# Patient Record
Sex: FEMALE | Race: WHITE | HISPANIC OR LATINO | URBAN - METROPOLITAN AREA
[De-identification: names, ages, dates, MRNs, and addresses within clinical notes are randomized per-mention and may not be internally consistent; named-entity substitution may affect disease eponyms.]

---

## 2023-05-18 ENCOUNTER — EMERGENCY (EMERGENCY)
Facility: HOSPITAL | Age: 58
LOS: 1 days | Discharge: HOME | End: 2023-05-18
Attending: STUDENT IN AN ORGANIZED HEALTH CARE EDUCATION/TRAINING PROGRAM | Admitting: STUDENT IN AN ORGANIZED HEALTH CARE EDUCATION/TRAINING PROGRAM
Payer: MEDICAID

## 2023-05-18 VITALS
DIASTOLIC BLOOD PRESSURE: 92 MMHG | SYSTOLIC BLOOD PRESSURE: 137 MMHG | TEMPERATURE: 97 F | RESPIRATION RATE: 18 BRPM | HEART RATE: 104 BPM | OXYGEN SATURATION: 97 %

## 2023-05-18 LAB
ALBUMIN SERPL ELPH-MCNC: 3.7 G/DL — SIGNIFICANT CHANGE UP (ref 3.3–5)
ALP SERPL-CCNC: 102 U/L — SIGNIFICANT CHANGE UP (ref 40–120)
ALT FLD-CCNC: 9 U/L — SIGNIFICANT CHANGE UP (ref 4–33)
ANION GAP SERPL CALC-SCNC: 13 MMOL/L — SIGNIFICANT CHANGE UP (ref 7–14)
APTT BLD: 31.1 SEC — SIGNIFICANT CHANGE UP (ref 27–36.3)
AST SERPL-CCNC: 27 U/L — SIGNIFICANT CHANGE UP (ref 4–32)
BASOPHILS # BLD AUTO: 0.12 K/UL — SIGNIFICANT CHANGE UP (ref 0–0.2)
BASOPHILS NFR BLD AUTO: 0.8 % — SIGNIFICANT CHANGE UP (ref 0–2)
BILIRUB SERPL-MCNC: 0.2 MG/DL — SIGNIFICANT CHANGE UP (ref 0.2–1.2)
BUN SERPL-MCNC: 11 MG/DL — SIGNIFICANT CHANGE UP (ref 7–23)
CALCIUM SERPL-MCNC: 9.4 MG/DL — SIGNIFICANT CHANGE UP (ref 8.4–10.5)
CHLORIDE SERPL-SCNC: 109 MMOL/L — HIGH (ref 98–107)
CO2 SERPL-SCNC: 18 MMOL/L — LOW (ref 22–31)
CREAT SERPL-MCNC: 0.57 MG/DL — SIGNIFICANT CHANGE UP (ref 0.5–1.3)
EGFR: 105 ML/MIN/1.73M2 — SIGNIFICANT CHANGE UP
EOSINOPHIL # BLD AUTO: 0.64 K/UL — HIGH (ref 0–0.5)
EOSINOPHIL NFR BLD AUTO: 4.4 % — SIGNIFICANT CHANGE UP (ref 0–6)
GLUCOSE SERPL-MCNC: 106 MG/DL — HIGH (ref 70–99)
HCG SERPL-ACNC: 7.4 MIU/ML — SIGNIFICANT CHANGE UP
HCT VFR BLD CALC: 43.2 % — SIGNIFICANT CHANGE UP (ref 34.5–45)
HGB BLD-MCNC: 13.2 G/DL — SIGNIFICANT CHANGE UP (ref 11.5–15.5)
IANC: 11.02 K/UL — HIGH (ref 1.8–7.4)
IMM GRANULOCYTES NFR BLD AUTO: 0.3 % — SIGNIFICANT CHANGE UP (ref 0–0.9)
INR BLD: 1.15 RATIO — SIGNIFICANT CHANGE UP (ref 0.88–1.16)
LYMPHOCYTES # BLD AUTO: 1.98 K/UL — SIGNIFICANT CHANGE UP (ref 1–3.3)
LYMPHOCYTES # BLD AUTO: 13.6 % — SIGNIFICANT CHANGE UP (ref 13–44)
MCHC RBC-ENTMCNC: 26.5 PG — LOW (ref 27–34)
MCHC RBC-ENTMCNC: 30.6 GM/DL — LOW (ref 32–36)
MCV RBC AUTO: 86.7 FL — SIGNIFICANT CHANGE UP (ref 80–100)
MONOCYTES # BLD AUTO: 0.79 K/UL — SIGNIFICANT CHANGE UP (ref 0–0.9)
MONOCYTES NFR BLD AUTO: 5.4 % — SIGNIFICANT CHANGE UP (ref 2–14)
NEUTROPHILS # BLD AUTO: 11.02 K/UL — HIGH (ref 1.8–7.4)
NEUTROPHILS NFR BLD AUTO: 75.5 % — SIGNIFICANT CHANGE UP (ref 43–77)
NRBC # BLD: 0 /100 WBCS — SIGNIFICANT CHANGE UP (ref 0–0)
NRBC # FLD: 0 K/UL — SIGNIFICANT CHANGE UP (ref 0–0)
PLATELET # BLD AUTO: 354 K/UL — SIGNIFICANT CHANGE UP (ref 150–400)
POTASSIUM SERPL-MCNC: 4.5 MMOL/L — SIGNIFICANT CHANGE UP (ref 3.5–5.3)
POTASSIUM SERPL-SCNC: 4.5 MMOL/L — SIGNIFICANT CHANGE UP (ref 3.5–5.3)
PROT SERPL-MCNC: 7.2 G/DL — SIGNIFICANT CHANGE UP (ref 6–8.3)
PROTHROM AB SERPL-ACNC: 13.4 SEC — SIGNIFICANT CHANGE UP (ref 10.5–13.4)
RBC # BLD: 4.98 M/UL — SIGNIFICANT CHANGE UP (ref 3.8–5.2)
RBC # FLD: 16.3 % — HIGH (ref 10.3–14.5)
SODIUM SERPL-SCNC: 140 MMOL/L — SIGNIFICANT CHANGE UP (ref 135–145)
WBC # BLD: 14.59 K/UL — HIGH (ref 3.8–10.5)
WBC # FLD AUTO: 14.59 K/UL — HIGH (ref 3.8–10.5)

## 2023-05-18 PROCEDURE — 99236 HOSP IP/OBS SAME DATE HI 85: CPT

## 2023-05-18 PROCEDURE — 70450 CT HEAD/BRAIN W/O DYE: CPT | Mod: 26,MA

## 2023-05-18 RX ORDER — METOCLOPRAMIDE HCL 10 MG
10 TABLET ORAL ONCE
Refills: 0 | Status: COMPLETED | OUTPATIENT
Start: 2023-05-18 | End: 2023-05-18

## 2023-05-18 RX ORDER — FAMOTIDINE 10 MG/ML
20 INJECTION INTRAVENOUS ONCE
Refills: 0 | Status: COMPLETED | OUTPATIENT
Start: 2023-05-18 | End: 2023-05-18

## 2023-05-18 RX ORDER — ACETAMINOPHEN 500 MG
1000 TABLET ORAL ONCE
Refills: 0 | Status: COMPLETED | OUTPATIENT
Start: 2023-05-18 | End: 2023-05-18

## 2023-05-18 RX ORDER — SODIUM CHLORIDE 9 MG/ML
1000 INJECTION INTRAMUSCULAR; INTRAVENOUS; SUBCUTANEOUS ONCE
Refills: 0 | Status: COMPLETED | OUTPATIENT
Start: 2023-05-18 | End: 2023-05-18

## 2023-05-18 RX ORDER — IPRATROPIUM/ALBUTEROL SULFATE 18-103MCG
3 AEROSOL WITH ADAPTER (GRAM) INHALATION ONCE
Refills: 0 | Status: COMPLETED | OUTPATIENT
Start: 2023-05-18 | End: 2023-05-18

## 2023-05-18 RX ORDER — ONDANSETRON 8 MG/1
4 TABLET, FILM COATED ORAL ONCE
Refills: 0 | Status: COMPLETED | OUTPATIENT
Start: 2023-05-18 | End: 2023-05-18

## 2023-05-18 RX ORDER — PROCHLORPERAZINE MALEATE 5 MG
10 TABLET ORAL ONCE
Refills: 0 | Status: COMPLETED | OUTPATIENT
Start: 2023-05-18 | End: 2023-05-18

## 2023-05-18 RX ADMIN — Medication 3 MILLILITER(S): at 21:20

## 2023-05-18 RX ADMIN — ONDANSETRON 4 MILLIGRAM(S): 8 TABLET, FILM COATED ORAL at 23:50

## 2023-05-18 RX ADMIN — Medication 1000 MILLIGRAM(S): at 21:00

## 2023-05-18 RX ADMIN — Medication 10 MILLIGRAM(S): at 19:45

## 2023-05-18 RX ADMIN — Medication 104 MILLIGRAM(S): at 23:50

## 2023-05-18 RX ADMIN — Medication 400 MILLIGRAM(S): at 19:45

## 2023-05-18 RX ADMIN — FAMOTIDINE 20 MILLIGRAM(S): 10 INJECTION INTRAVENOUS at 20:08

## 2023-05-18 RX ADMIN — SODIUM CHLORIDE 1000 MILLILITER(S): 9 INJECTION INTRAMUSCULAR; INTRAVENOUS; SUBCUTANEOUS at 19:46

## 2023-05-18 NOTE — ED PROVIDER NOTE - EYES, MLM
Clear bilaterally, pupils equal, round and reactive to light, L pupil slower to react than R pupil. L medial sclera erythema noted, no conjunctival erythema.

## 2023-05-18 NOTE — ED PROVIDER NOTE - CLINICAL SUMMARY MEDICAL DECISION MAKING FREE TEXT BOX
57-year-old female with past medical history of multiple strokes in 2021, on Lovenox, remaining history unknown presents emergency room for headache.  Reports diffuse headache for the last 7 months every single day, worse the last few days, no resolution with meds, Reports left eye issues since stroke without obvious acute changes.  Chronic left-sided weakness with diminished sensation, able to ambulate without assistance.  Denies fever, chills, acute change in vision, URI symptoms, chest pain, shortness of breath, difficulty breathing, ruperto pain, nausea, vomiting, diarrhea or urinary complaints. Vital signs stable, LUE/LLE 4/5 strength, dec sensation to L side which is baseline, no new focal deficit. Likely migraine headache, low suspicion for CVA/ICH. Will get labs, meds, IVF, CT head, reassess. Likely dc with outpatient neurology and PMD follow up here.

## 2023-05-18 NOTE — ED ADULT NURSE NOTE - NSFALLUNIVINTERV_ED_ALL_ED
Bed/Stretcher in lowest position, wheels locked, appropriate side rails in place/Call bell, personal items and telephone in reach/Instruct patient to call for assistance before getting out of bed/chair/stretcher/Non-slip footwear applied when patient is off stretcher/[place] to call system/Physically safe environment - no spills, clutter or unnecessary equipment/Purposeful proactive rounding/Room/bathroom lighting operational, light cord in reach

## 2023-05-18 NOTE — ED ADULT NURSE NOTE - ED STAT RN HANDOFF DETAILS
verbal report given to RN Gloria, no signs of acute distress, breathing with ease, denies pain at this time, stable gait

## 2023-05-18 NOTE — ED PROVIDER NOTE - CONSTITUTIONAL DISTRESS
"I feel pressure in my head and I feel tingling in my fingers and toes and my body aches. I have a history of fibromyalgia" no apparent

## 2023-05-18 NOTE — ED PROVIDER NOTE - ATTENDING APP SHARED VISIT CONTRIBUTION OF CARE
57F h/o multiple CVA now on Lovenox, remote breast Ca p/w persistent Lt sided HA x6 months. Pt states she has been living in [place] but moved to [place] ~10 days ago and plans on living here so was trying to establish care.  Reports left eye issues since stroke without obvious acute changes. Denies any new weakness or sensory deficits, stating that she has had "tingling" to Lt side consistently since the stroke. Also notes intermittent L hand weakness that is unchanged.  Denies fever, chills, acute change in vision, URI symptoms, chest pain, shortness of breath, difficulty breathing, nausea, vomiting, diarrhea or urinary complaints.   Gen: nad, non-toxic appearing  HEENT: scleral injection of L eye (baseline per pt and sister at bedside); EOMI, PERRL  CV: rrr  Pulm: clear lungs  Abd: soft, ntnd  Ext: no edema/swelling  Neuro: awake and alert, follows commands, CN 2-12 grossly intact, sensation grossly intact, motor 5/5 on R, 4/5 on L   MDM: 57F h/o multiple CVA now on Lovenox p/w persistent Lt sided HA x6 months. Symptoms likely 2/2 migraine vs headache syndrome, low suspicion for acute bleed, recurrent CVA or mets causing pain. Will get CTH to futher assess. Check basic labs. IVF, tylenol and reglan for symptom relief

## 2023-05-18 NOTE — ED ADULT TRIAGE NOTE - CHIEF COMPLAINT QUOTE
c/o left sided HA for 7 month worse last few days, repots onset after stroke, developed  left eye nystagmus s/p CVA. hx of  left breast CA not on chemo or radiation, no mastectomy.  Extremities are strong and equal B/L. ambulate with unsteady gait but states baseline. pt is  poor historian, most hx  provided by sister.

## 2023-05-18 NOTE — ED PROVIDER NOTE - OBJECTIVE STATEMENT
57-year-old female with past medical history of multiple strokes in 2021, on Lovenox, remaining history unknown presents emergency room for headache.  Patient reports diffuse headache for the last 7 months every single day, states last few days it is gotten worse which prompted her evaluation.  Patient states she has been living in [place] but came to [place] 10 days ago and plans on living here so was trying to establish care.  Reports left eye issues since stroke without obvious acute changes.  Reports chronic left-sided weakness with diminished sensation, able to ambulate without assistance.  Denies fever, chills, acute change in vision, URI symptoms, chest pain, shortness of breath, difficulty breathing, ruperto pain, nausea, vomiting, diarrhea or urinary complaints.  Of note, patient is a poor historian.  Sister is here providing some information but is unable to give more details.  Patient is currently going to be living with a friend until she can find a place of her own.

## 2023-05-19 VITALS
OXYGEN SATURATION: 99 % | HEART RATE: 83 BPM | DIASTOLIC BLOOD PRESSURE: 82 MMHG | SYSTOLIC BLOOD PRESSURE: 144 MMHG | RESPIRATION RATE: 17 BRPM | TEMPERATURE: 97 F

## 2023-05-19 LAB
ALBUMIN SERPL ELPH-MCNC: 3.4 G/DL — SIGNIFICANT CHANGE UP (ref 3.3–5)
ALP SERPL-CCNC: 92 U/L — SIGNIFICANT CHANGE UP (ref 40–120)
ALT FLD-CCNC: 5 U/L — SIGNIFICANT CHANGE UP (ref 4–33)
ANION GAP SERPL CALC-SCNC: 12 MMOL/L — SIGNIFICANT CHANGE UP (ref 7–14)
AST SERPL-CCNC: 11 U/L — SIGNIFICANT CHANGE UP (ref 4–32)
BASOPHILS # BLD AUTO: 0.13 K/UL — SIGNIFICANT CHANGE UP (ref 0–0.2)
BASOPHILS NFR BLD AUTO: 0.9 % — SIGNIFICANT CHANGE UP (ref 0–2)
BILIRUB SERPL-MCNC: 0.3 MG/DL — SIGNIFICANT CHANGE UP (ref 0.2–1.2)
BUN SERPL-MCNC: 8 MG/DL — SIGNIFICANT CHANGE UP (ref 7–23)
CALCIUM SERPL-MCNC: 8.8 MG/DL — SIGNIFICANT CHANGE UP (ref 8.4–10.5)
CHLORIDE SERPL-SCNC: 111 MMOL/L — HIGH (ref 98–107)
CHOLEST SERPL-MCNC: 155 MG/DL — SIGNIFICANT CHANGE UP
CO2 SERPL-SCNC: 17 MMOL/L — LOW (ref 22–31)
CREAT SERPL-MCNC: 0.59 MG/DL — SIGNIFICANT CHANGE UP (ref 0.5–1.3)
EGFR: 104 ML/MIN/1.73M2 — SIGNIFICANT CHANGE UP
EOSINOPHIL # BLD AUTO: 0.56 K/UL — HIGH (ref 0–0.5)
EOSINOPHIL NFR BLD AUTO: 4 % — SIGNIFICANT CHANGE UP (ref 0–6)
GLUCOSE SERPL-MCNC: 100 MG/DL — HIGH (ref 70–99)
HCG SERPL-ACNC: 6.5 MIU/ML — SIGNIFICANT CHANGE UP
HCT VFR BLD CALC: 39.8 % — SIGNIFICANT CHANGE UP (ref 34.5–45)
HDLC SERPL-MCNC: 45 MG/DL — LOW
HGB BLD-MCNC: 11.9 G/DL — SIGNIFICANT CHANGE UP (ref 11.5–15.5)
IANC: 10.1 K/UL — HIGH (ref 1.8–7.4)
IMM GRANULOCYTES NFR BLD AUTO: 0.4 % — SIGNIFICANT CHANGE UP (ref 0–0.9)
LIPID PNL WITH DIRECT LDL SERPL: 89 MG/DL — SIGNIFICANT CHANGE UP
LYMPHOCYTES # BLD AUTO: 16 % — SIGNIFICANT CHANGE UP (ref 13–44)
LYMPHOCYTES # BLD AUTO: 2.24 K/UL — SIGNIFICANT CHANGE UP (ref 1–3.3)
MCHC RBC-ENTMCNC: 25.9 PG — LOW (ref 27–34)
MCHC RBC-ENTMCNC: 29.9 GM/DL — LOW (ref 32–36)
MCV RBC AUTO: 86.7 FL — SIGNIFICANT CHANGE UP (ref 80–100)
MONOCYTES # BLD AUTO: 0.9 K/UL — SIGNIFICANT CHANGE UP (ref 0–0.9)
MONOCYTES NFR BLD AUTO: 6.4 % — SIGNIFICANT CHANGE UP (ref 2–14)
NEUTROPHILS # BLD AUTO: 10.1 K/UL — HIGH (ref 1.8–7.4)
NEUTROPHILS NFR BLD AUTO: 72.3 % — SIGNIFICANT CHANGE UP (ref 43–77)
NON HDL CHOLESTEROL: 110 MG/DL — SIGNIFICANT CHANGE UP
NRBC # BLD: 0 /100 WBCS — SIGNIFICANT CHANGE UP (ref 0–0)
NRBC # FLD: 0 K/UL — SIGNIFICANT CHANGE UP (ref 0–0)
PLATELET # BLD AUTO: 331 K/UL — SIGNIFICANT CHANGE UP (ref 150–400)
POTASSIUM SERPL-MCNC: 3.5 MMOL/L — SIGNIFICANT CHANGE UP (ref 3.5–5.3)
POTASSIUM SERPL-SCNC: 3.5 MMOL/L — SIGNIFICANT CHANGE UP (ref 3.5–5.3)
PROT SERPL-MCNC: 6.4 G/DL — SIGNIFICANT CHANGE UP (ref 6–8.3)
RBC # BLD: 4.59 M/UL — SIGNIFICANT CHANGE UP (ref 3.8–5.2)
RBC # FLD: 16 % — HIGH (ref 10.3–14.5)
SODIUM SERPL-SCNC: 140 MMOL/L — SIGNIFICANT CHANGE UP (ref 135–145)
TRIGL SERPL-MCNC: 107 MG/DL — SIGNIFICANT CHANGE UP
WBC # BLD: 13.98 K/UL — HIGH (ref 3.8–10.5)
WBC # FLD AUTO: 13.98 K/UL — HIGH (ref 3.8–10.5)

## 2023-05-19 RX ORDER — MAGNESIUM SULFATE 500 MG/ML
1 VIAL (ML) INJECTION ONCE
Refills: 0 | Status: COMPLETED | OUTPATIENT
Start: 2023-05-19 | End: 2023-05-19

## 2023-05-19 RX ORDER — KETOROLAC TROMETHAMINE 30 MG/ML
15 SYRINGE (ML) INJECTION ONCE
Refills: 0 | Status: DISCONTINUED | OUTPATIENT
Start: 2023-05-19 | End: 2023-05-19

## 2023-05-19 RX ORDER — AMPICILLIN SODIUM AND SULBACTAM SODIUM 250; 125 MG/ML; MG/ML
3 INJECTION, POWDER, FOR SUSPENSION INTRAMUSCULAR; INTRAVENOUS ONCE
Refills: 0 | Status: COMPLETED | OUTPATIENT
Start: 2023-05-19 | End: 2023-05-19

## 2023-05-19 RX ORDER — METOCLOPRAMIDE HCL 10 MG
10 TABLET ORAL ONCE
Refills: 0 | Status: COMPLETED | OUTPATIENT
Start: 2023-05-19 | End: 2023-05-19

## 2023-05-19 RX ORDER — ACETAMINOPHEN 500 MG
1000 TABLET ORAL ONCE
Refills: 0 | Status: COMPLETED | OUTPATIENT
Start: 2023-05-19 | End: 2023-05-19

## 2023-05-19 RX ORDER — DIPHENHYDRAMINE HCL 50 MG
25 CAPSULE ORAL ONCE
Refills: 0 | Status: COMPLETED | OUTPATIENT
Start: 2023-05-19 | End: 2023-05-19

## 2023-05-19 RX ORDER — CALAMINE AND ZINC OXIDE AND PHENOL 160; 10 MG/ML; MG/ML
1 LOTION TOPICAL ONCE
Refills: 0 | Status: COMPLETED | OUTPATIENT
Start: 2023-05-19 | End: 2023-05-19

## 2023-05-19 RX ORDER — POLYETHYLENE GLYCOL 3350 17 G/17G
17 POWDER, FOR SOLUTION ORAL ONCE
Refills: 0 | Status: COMPLETED | OUTPATIENT
Start: 2023-05-19 | End: 2023-05-19

## 2023-05-19 RX ORDER — AMPICILLIN SODIUM AND SULBACTAM SODIUM 250; 125 MG/ML; MG/ML
3 INJECTION, POWDER, FOR SUSPENSION INTRAMUSCULAR; INTRAVENOUS EVERY 6 HOURS
Refills: 0 | Status: DISCONTINUED | OUTPATIENT
Start: 2023-05-19 | End: 2023-05-22

## 2023-05-19 RX ORDER — MAGNESIUM SULFATE 500 MG/ML
2 VIAL (ML) INJECTION ONCE
Refills: 0 | Status: COMPLETED | OUTPATIENT
Start: 2023-05-19 | End: 2023-05-19

## 2023-05-19 RX ADMIN — POLYETHYLENE GLYCOL 3350 17 GRAM(S): 17 POWDER, FOR SOLUTION ORAL at 14:38

## 2023-05-19 RX ADMIN — CALAMINE AND ZINC OXIDE AND PHENOL 1 APPLICATION(S): 160; 10 LOTION TOPICAL at 14:39

## 2023-05-19 RX ADMIN — Medication 25 GRAM(S): at 13:24

## 2023-05-19 RX ADMIN — AMPICILLIN SODIUM AND SULBACTAM SODIUM 200 GRAM(S): 250; 125 INJECTION, POWDER, FOR SUSPENSION INTRAMUSCULAR; INTRAVENOUS at 03:09

## 2023-05-19 RX ADMIN — AMPICILLIN SODIUM AND SULBACTAM SODIUM 200 GRAM(S): 250; 125 INJECTION, POWDER, FOR SUSPENSION INTRAMUSCULAR; INTRAVENOUS at 15:34

## 2023-05-19 RX ADMIN — Medication 25 MILLIGRAM(S): at 07:22

## 2023-05-19 RX ADMIN — Medication 10 MILLIGRAM(S): at 07:22

## 2023-05-19 RX ADMIN — Medication 100 GRAM(S): at 03:16

## 2023-05-19 RX ADMIN — Medication 1000 MILLIGRAM(S): at 09:23

## 2023-05-19 RX ADMIN — Medication 400 MILLIGRAM(S): at 07:21

## 2023-05-19 RX ADMIN — AMPICILLIN SODIUM AND SULBACTAM SODIUM 200 GRAM(S): 250; 125 INJECTION, POWDER, FOR SUSPENSION INTRAMUSCULAR; INTRAVENOUS at 09:46

## 2023-05-19 NOTE — ED CDU PROVIDER INITIAL DAY NOTE - DETAILS
IV Unasyn / recommendations as per ENT team following pt, supportive care / headache management, general observation care / monitoring.  Pt recently moved to [place]; currently staying with a friend; ED team requested for  to evaluate in daytime to give pt resource options.

## 2023-05-19 NOTE — CHART NOTE - NSCHARTNOTEFT_GEN_A_CORE
Neurology contacted for recs for HA. Patients HA is mild and thus far not required medication. Patient has a HA neurologist in [place] but recently moved to .    CT head shows age-indeterminant infarct in titus vs artifact. Reviewed myself and called radiology. Likely artifact vs chronic. Very unlikely related to HA.   CT showing b/l R>L mastoid effusion and partial right middle ear effusion. ENT started patient on antibiotics. Due to HA related to sinuses no further inpatient neurology recs at this time.    If patient wishes to set up neurology care patient can follow up with [facility] upon discharge, Dr. Charles Crespo, [address], [place], [place]; ([phone]) for history of previous CVA & HA management

## 2023-05-19 NOTE — CONSULT NOTE ADULT - ASSESSMENT
57F PMH CVA, COPD, RA, breast Ca p/w 7 months of headache and 2 months of ear pain R>L. No clinical evidence of mastoiditis. EAC without edema. TMs intact. Facial nerve function intact. Afebrile. WBC 14.59. CT showing b/l R>L mastoid effusion and partial right middle ear effusion.     - no acute ENT intervention  - course of augmentin  - f/u [phone]  - d/w attending

## 2023-05-19 NOTE — ED CDU PROVIDER DISPOSITION NOTE - ATTENDING CONTRIBUTION TO CARE
I (Crista) agree with above, I performed a history and physical. Counseled [facility] medical staff, physician assistant, and/or medical student on medical decision making as documented. Medical decisions and treatment interventions were made in real time during the patient encounter. Additionally and/or with the following exceptions:

## 2023-05-19 NOTE — CONSULT NOTE ADULT - SUBJECTIVE AND OBJECTIVE BOX
57F PMH CVA, COPD, RA, breast Ca p/w with 7 months of headache and 2 months of ear pain R>L. Denies otorrhea and fever. No mastoid erythema, edema, or tenderness. No prior ear surgeries. No recent URI symptoms. Denies vertigo. Reports residual paresthesia on the left side of her body from a prior CVA. No facial asymmetry or weakness. AVSS    ICU Vital Signs Last 24 Hrs  T(C): 36.2 (19 May 2023 00:12), Max: 36.3 (18 May 2023 17:56)  T(F): 97.1 (19 May 2023 00:12), Max: 97.3 (18 May 2023 17:56)  HR: 88 (19 May 2023 00:12) (88 - 104)  BP: 146/90 (19 May 2023 00:12) (117/101 - 146/90)  BP(mean): --  ABP: --  ABP(mean): --  RR: 16 (19 May 2023 00:12) (16 - 18)  SpO2: 99% (19 May 2023 00:12) (97% - 99%)    O2 Parameters below as of 19 May 2023 00:12  Patient On (Oxygen Delivery Method): room air      PHYSICAL EXAM:    CONSTITUTIONAL: Well nourished, well developed, NON-DYSMORPHIC, and in no acute distress.    HEAD: normocephalic, atraumatic.  EARS: The right/left pinna was normal. The right/left external auditory canal was normal and the right/left TM was intact, possible effusion on the right. No mastoid edema, erythema, or tenderness  NOSE: Normal external nose. Anterior nasal cavity patent with no obstruction. Inferior turbinates normally sized.  ORAL CAVITY/OROPHARYNX: normal mucosa. No erythema, lesions or bleeding.  NECK: No cervical lymphadenopathy  RESPIRATORY: Respirations unlabored, no increased work of breathing with use of accessory muscles and retractions. No stridor.  CARDIAC: Warm extremities, no cyanosis.  NEURO: facial nerve function intact                          13.2   14.59 )-----------( 354      ( 18 May 2023 19:30 )             43.2   05-18    140  |  109<H>  |  11  ----------------------------<  106<H>  4.5   |  18<L>  |  0.57    Ca    9.4      18 May 2023 19:30    TPro  7.2  /  Alb  3.7  /  TBili  0.2  /  DBili  x   /  AST  27  /  ALT  9   /  AlkPhos  102  05-18    ACC: [id number] EXAM:  CT BRAIN   ORDERED BY: ИРИНА CHANDLER     PROCEDURE DATE:  05/18/2023          INTERPRETATION:  CLINICAL INFORMATION: Persistent headache resistant   medications.    TECHNIQUE: Noncontrast axial CT images were acquired through the head.   Two-dimensional sagittal and coronal reformats were generated.    COMPARISON STUDY: None    FINDINGS:  The study is slightly degraded by patient motion artifact.    No acute intracranial hemorrhage, mass effect, or midline shift. No   abnormal extra-axial collections. The basal cisterns are patent without   evidence of central herniation. The gray-white junctions are preserved.    There is a questionable ill-defined small hypodensity in the central titus   best seen on 401:20, unclear whether this is artifact or could represent   an age-indeterminate lacunar infarct.    The calvarium is intact. The soft tissues of the scalp are unremarkable.   The visualized paranasal sinuses are clear. Severe right greater than   left mastoid air cell effusions, partial opacification the right middle   ear.      IMPRESSION:    No acute intracranial hemorrhage or mass effect.    Questionable small central pontine hypodensity, may be artifactual or   represent age indeterminate lacunar infarct. Consider further workup with   MRI brain.    Severe bilateral mastoid air cell effusions as well as partial right   middle ear opacification, correlate for otomastoiditis.    --- End of Report ---          GUERDA ELDER MD; Resident Radiology  This document has been electronically signed.  FERDINAND FORREST MD; Attending Radiologist  This document has been electronically signed. May 18 2023 11:31PM

## 2023-05-19 NOTE — ED CDU PROVIDER INITIAL DAY NOTE - PHYSICAL EXAMINATION
CONSTITUTIONAL:  Well appearing, awake, alert, oriented to person, place, time/situation and in no apparent distress.  Pt. is objectively comfortable appearing and verbalizing in full, clear, effortless sentences.  ENMT: NC/AT.  Airway patent.  Nasal mucosa clear.  Moist mucous membranes.  Neck supple.  EYES:  Clear OU.  CARDIAC:  Normal rate, regular rhythm.  Heart sounds S1 S2.  No murmurs, gallops, or rubs.  RESPIRATORY:  Breath sounds clear and equal bilaterally.  No wheezes, no rales, no rhonchi.  GASTROINTESTINAL:  Abdomen soft, non-distended, non-tender.  No rebound, no guarding.  NEUROLOGICAL:  Alert and oriented to person/place/time/situation.  LUE/LLE with slight decreased strength and reported sensation diminished on left side.  No other focal deficits; no tremors noted.   MUSCULOSKELETAL:  Range of motion is not limited.  Gait stable; pt ambulates effortlessly, unassisted.  SKIN:  Skin color unremarkable.  Skin warm, dry, and intact.    PSYCHIATRIC:  Alert and oriented to person/place/time/situation.  Pt cooperative, pleasant.  No apparent risk to self or others.

## 2023-05-19 NOTE — ED CDU PROVIDER DISPOSITION NOTE - NSFOLLOWUPINSTRUCTIONS_ED_ALL_ED_FT
May take reglan 10mg three times daily (every 8 hours) as needed for headaches.   May take tylenol up to 1000mg with food every 4-6 hours for headaches.  Drink plenty of fluids.  Follow up with PCP, neurologist, ENT, as well as opthalmologist.  Take Augmentin twice daily for a total of 14 days for your ear infection.  Return to ED for fevers, numbness, tingling, dizziness, changes in your vision, weakness, chest pain or shortness of breath.      Acute Headache    WHAT YOU NEED TO KNOW:    An acute headache is pain or discomfort that starts suddenly and gets worse quickly. You may have an acute headache only when you feel stress or eat certain foods. Other acute headache pain can happen every day, and sometimes several times a day.     DISCHARGE INSTRUCTIONS:    Seek care immediately if:   •You have severe pain.      •You have numbness or weakness on one side of your face or body.      •You have a headache that occurs after a blow to the head, a fall, or other trauma.       •You have a headache, are forgetful or confused, or have trouble speaking.      •You have a headache, stiff neck, and a fever.      Contact your healthcare provider if:   •You have a constant headache and are vomiting.      •You have a headache each day that does not get better, even after treatment.      •You have changes in your headaches, or new symptoms that occur when you have a headache.      •You have questions or concerns about your condition or care.      Medicines: You may need any of the following:   •Prescription pain medicine may be given. The medicine your healthcare provider recommends will depend on the kind of headaches you have. You will need to take prescription headache medicines as directed to prevent a problem called rebound headache. These headaches happen with regular use of pain relievers for headache disorders.      •NSAIDs, such as ibuprofen, help decrease swelling, pain, and fever. This medicine is available with or without a doctor's order. NSAIDs can cause stomach bleeding or kidney problems in certain people. If you take blood thinner medicine, always ask your healthcare provider if NSAIDs are safe for you. Always read the medicine label and follow directions.      •Acetaminophen decreases pain and fever. It is available without a doctor's order. Ask how much to take and how often to take it. Follow directions. Read the labels of all other medicines you are using to see if they also contain acetaminophen, or ask your doctor or pharmacist. Acetaminophen can cause liver damage if not taken correctly. Do not use more than 3 grams (3,000 milligrams) total of acetaminophen in one day.       •Antidepressants may be given for some kinds of headaches.       •Take your medicine as directed. Contact your healthcare provider if you think your medicine is not helping or if you have side effects. Tell him or her if you are allergic to any medicine. Keep a list of the medicines, vitamins, and herbs you take. Include the amounts, and when and why you take them. Bring the list or the pill bottles to follow-up visits. Carry your medicine list with you in case of an emergency.      Manage your symptoms:   •Apply heat or ice on the headache area. Use a heat or ice pack. For an ice pack, you can also put crushed ice in a plastic bag. Cover the pack or bag with a towel before you apply it to your skin. Ice and heat both help decrease pain, and heat also helps decrease muscle spasms. Apply heat for 20 to 30 minutes every 2 hours. Apply ice for 15 to 20 minutes every hour. Apply heat or ice for as long and for as many days as directed. You may alternate heat and ice.      •Relax your muscles. Lie down in a comfortable position and close your eyes. Relax your muscles slowly. Start at your toes and work your way up your body.      •Keep a record of your headaches. Write down when your headaches start and stop. Include your symptoms and what you were doing when the headache began. Record what you ate or drank for 24 hours before the headache started. Describe the pain and where it hurts. Keep track of what you did to treat your headache and if it worked.       Prevent an acute headache:   •Avoid anything that triggers an acute headache. Examples include exposure to chemicals, going to high altitude, or not getting enough sleep. Create a regular sleep routine. Go to sleep at the same time and wake up at the same time each day. Do not use electronic devices before bedtime. These may trigger a headache or prevent you from sleeping well.      •Do not smoke. Nicotine and other chemicals in cigarettes and cigars can trigger an acute headache or make it worse. Ask your healthcare provider for information if you currently smoke and need help to quit. E-cigarettes or smokeless tobacco still contain nicotine. Talk to your healthcare provider before you use these products.       •Limit alcohol as directed. Alcohol can trigger an acute headache or make it worse. If you have cluster headaches, do not drink alcohol during an episode. For other types of headaches, ask your healthcare provider if it is safe for you to drink alcohol. Ask how much is safe for you to drink, and how often.      •Exercise as directed. Exercise can reduce tension and help with headache pain. Aim for 30 minutes of physical activity on most days of the week. Your healthcare provider can help you create an exercise plan.      •Eat a variety of healthy foods. Healthy foods include fruits, vegetables, low-fat dairy products, lean meats, fish, whole grains, and cooked beans. Your healthcare provider or dietitian can help you create meals plans if you need to avoid foods that trigger headaches.      Follow up with your healthcare provider as directed: Bring your headache record with you when you see your healthcare provider. Write down your questions so you remember to ask them during your visits.      Dolor de junie black    LO QUE NECESITA SABER:    El dolor de junie black es un dolor o molestia que comienza de repente y empeora rápidamente. Usted puede tener un dolor de junie black sólo cuando siente estrés o come ciertos alimentos. Otro tipo dolor de junie black puede producirse todos los días y a veces varias veces al día.    INSTRUCCIONES SOBRE EL [facility]ITAL[facility]:    Busque atención médica de inmediato si:  •Usted tiene dolor intenso.      •Usted tiene entumecimiento en un lado de mcarthur rhonda o cuerpo.      •Usted tiene un dolor de junie que ocurre después de un golpe en la junie, nahid caída u otro trauma.      •Tiene dolor de junie, está olvidadizo o confundido o tiene dificultad para hablar.      •Tiene dolor de junie, rigidez en el cliff y fiebre.      Comuníquese con mcarthur médico si:  •Usted tiene un dolor de junie nasreen y está vomitando.      •Usted tiene dolor de junie todos los días y no se jessica aun después de tratarlo.      •Wendy pilar de junie cambian u ocurren nuevos síntomas cuando tiene dolor de junie.      •Usted tiene preguntas o inquietudes acerca de mcarthur condición o cuidado.      Medicamentos:Es posible que usted necesite alguno de los siguientes:   •Los analgésicos recetadospodrían administrarse. El medicamento que recomienda mcarthur médico dependerá del tipo de dolor de junie que tenga. Usted necesitará juan medicamentos para el dolor de junie según las indicaciones para evitar un problema llamado dolor de junie de rebote. Estos pilar de junie ocurren con el uso regular de analgésicos para los trastornos de dolor de junie.      •Los HEVER,kilo el ibuprofeno, ayudan a disminuir la inflamación, el dolor y la fiebre. Melly medicamento está disponible con o sin nahid receta médica. Los HEVER pueden causar sangrado estomacal o problemas renales en ciertas personas. Si usted candy un medicamento anticoagulante, siempre pregúntele a mcarthur médico si los HEVER son seguros para usted. Siempre amanda la etiqueta de melly medicamento y siga las instrucciones.      •Acetaminofénalivia el dolor y baja la fiebre. Está disponible sin receta médica. Pregunte la cantidad y la frecuencia con que debe tomarlos. Siga las indicaciones. Amanda las etiquetas de todos los demás medicamentos que esté usando para saber si también contienen acetaminofén, o pregunte a mcarthur médico o farmacéutico. El acetaminofén puede causar daño en el hígado cuando no se candy de forma correcta. No use más de 3 gramos (3,000 miligramos) en total de acetaminofeno en un día.      •Antidepresivosse pueden administrar para algunos tipos de pilar de junie.      •[place] wendy medicamentos kilo se le haya indicado.Consulte con mcatrhur médico si usted josephine que mcarthur medicamento no le está ayudando o si presenta efectos secundarios. Infórmele si es alérgico a cualquier medicamento. Mantenga nahid lista actualizada de los medicamentos, las vitaminas y los productos herbales que candy. Incluya los siguientes datos de los medicamentos: cantidad, frecuencia y motivo de administración. Traiga con usted la lista o los envases de las píldoras a wendy citas de seguimiento. Lleve la lista de los medicamentos con usted en neil de nahid emergencia.      El manejo de wendy síntomas:  •Aplique hielo o caloren la tresa donde mcarthur hijo siente el dolor de junie. Utilice un paquete (compresa) de hielo o calor. Para un paquete de hielo, también puede colocar hielo molido en nahid bolsa plástica. Cubra el paquete de hielo o la bolsa con nahid toalla pequeña antes de aplicarla en la piel. Tanto el hielo kilo el calor ayudan a reducir el dolor, y el calor también contribuye a reducir los espasmos musculares. Aplique calor sharimla 20 a 30 minutos cada 2 horas. Aplique hielo sharmila 15 a 20 minutos cada hora. Aplique calor o hielo sharmila el tiempo y la cantidad de días que se le indique. Usted puede alternar el calor y el hielo.      •Relaje wendy músculos.Acuéstese en nahid posición cómoda y cierre wendy ojos. Relaje wendy músculos lentamente. Comience por los dedos de los pies y avance hacia arriba al nish de macrthur cuerpo.      •Registre en un diario wendy pilar de junie.Escriba cuándo comienzan y terminan wendy migrañas. Incluya wendy síntomas y qué estaba haciendo cuando comenzó la migraña. Registre lo que comió y lo que tomó las 24 horas previas al comienzo de mcarthur migraña. Describa el dolor y dónde le duele: Lleve un registro de lo que hizo para tratar mcarthur migraña y si obtuvo un resultado satisfactorio.      Cómo prevenir un dolor de junie black:  •Evite cualquier cosa que provoque un dolor de junie black.Los ejemplos incluyen la exposición a sustancias químicas, las grandes altitudes o no dormir lo suficiente. Josephine nahid rutina para dormir. Acuéstese y levántese todos los días a la misma hora. No utilice aparatos electrónicos antes de acostarse. Pueden provocarle un dolor de junie o impedirle dormir becky.      •No fume.La nicotina y otras sustancias químicas en los cigarrillos y puros pueden desencadenar un dolor de junie black o empeorarlo. Pida información a mcarthur médico si usted actualmente fuma y necesita ayuda para dejar de fumar. Los cigarrillos electrónicos o el tabaco sin humo igualmente contienen nicotina. Consulte con mcarthur médico antes de utilizar estos productos.      •Limite el consumo de alcohol según le indicaron.El alcohol puede provocar un dolor de junie black o empeorarlo. Si usted tiene pilar de junie de racimo, no tala alcohol sharmila un episodio. Para otros tipos de pilar de junie, pregúntele a mcarthur proveedor de atención médica si es seguro para usted beber alcohol. Pregunte cuál es la cantidad pal que puede beber y con qué frecuencia.      •Ejercítese según indicaciones.El ejercicio puede reducir la tensión y ayudarlo a aliviar el dolor de junie. Propóngase hacer 30 minutos de actividad física imani todos los días de la semana. Mcarthur médico puede ayudarle a crear un plan de ejercicios.      •Consuma alimentos saludables y variados.Los alimentos saludables incluyen las frutas, verduras, productos lácteos bajos en grasa, danielle magras, pescado y frijoles cocidos. Mcarthur médico o dietista puede ayudarle a crear planes de comidas si desea evitar los alimentos que provocan pilar de junie.      Acuda a wendy consultas de control con mcarthur médico según le indicaron.Traiga mcarthur registro de pilar de junie con usted cuando visite a mcarthur médico. Anote wedny preguntas para que se acuerde de hacerlas sharmila wendy visitas.

## 2023-05-19 NOTE — ED CDU PROVIDER INITIAL DAY NOTE - ATTENDING APP SHARED VISIT CONTRIBUTION OF CARE
57F h/o multiple strokes in 2021, on Lovenox p/w persistent HA [id number]. Workup in ED, included CT head which showed no acute intracranial hemorrhage or mass effect, questionable small central pontine hypodensity, and severe bilateral mastoid air cell effusions as well as partial right middle ear opacification, concerning for otomastoiditis.  ENT was consulted; pt was given IV Unasyn in the ED; ENT documented pt could be given regimen of PO Augmentin and f/u outpatient; no acute ENT intervention was felt warranted at this time.  Pt was treated for headache, with reported improvement.  Pt was sent to CDU for continued IV Unasyn and supportive care, general observation care / monitoring.

## 2023-05-19 NOTE — ED CDU PROVIDER DISPOSITION NOTE - CARE PROVIDER_API CALL
Charles Crespo)  Neurology; Vascular Neurology  [address] [facility] [address]  [place] [address]  Phone: [phone]  Fax: [phone]  Follow Up Time: 1-3 Days

## 2023-05-19 NOTE — ED CDU PROVIDER DISPOSITION NOTE - CLINICAL COURSE
57-year-old female with past medical history of multiple strokes w/ residual left-sided weakness, sensory deficit, OS scleral injection in 2021, on Lovenox, remaining history unknown presents emergency room for headache.  Patient reports diffuse headache for the last 7 months every single day, states last few days it is gotten worse which prompted her evaluation. symptoms c/w prior headaches just worsened in severity. Recently moved from [place] to [place] to live with her sister. was seeing neurologist outpatient and underwent MRI imaging. while in ED, labs revealing mild leukocytosis, CT revealing  No acute intracranial hemorrhage or mass effect.  Questionable small central pontine hypodensity, may be artifactual or represent age indeterminate lacunar infarct. Pt seen by ENT and started on Unasyn, although otoscopic exam unremarkable. Neuro consulted, and recommended outpatient w/u due to hx of Multiple CVAs and remains at baseline neurological status. Pt transferred to CDU for IV unasyn, and headache control. pt given reglan/benadryl/ofirmev, and remains headache free today. discharge lounge contacted for neuro/optho/PCP/ENT appts. Return precautions discusssed. pt given reglan 10mg PO TID as needed for headaches. pt / sister at bedside updated on plans. will dc. 57-year-old female with past medical history of multiple strokes w/ residual left-sided weakness, sensory deficit, OS scleral injection in 2021, on Lovenox, remaining history unknown presents emergency room for headache.  Patient reports diffuse headache for the last 7 months every single day, states last few days it is gotten worse which prompted her evaluation. symptoms c/w prior headaches just worsened in severity. Recently moved from [place] to [place] to live with her sister. was seeing neurologist outpatient and underwent MRI imaging. while in ED, labs revealing mild leukocytosis, CT revealing  No acute intracranial hemorrhage or mass effect.  Questionable small central pontine hypodensity, may be artifactual or represent age indeterminate lacunar infarct. Pt seen by ENT and started on Unasyn, although otoscopic exam unremarkable. Neuro consulted, and recommended outpatient w/u due to hx of Multiple CVAs and remains at baseline neurological status. Pt transferred to CDU for IV unasyn, and headache control. pt given reglan/benadryl/ofirmev, and remains headache free today. discharge lounge contacted for neuro/optho/PCP/ENT appts. Return precautions discusssed. pt given reglan 10mg PO TID as needed for headaches. pt / sister at bedside updated on plans. will dc.  upon discharge, pt states has bruising to her abdomen from lovenox injections, ecchymoses visualized, soft, no induration. Pt advised to apply warm compresses to the area, tylenol for pain, and if hardens worsens, develops fevers, redness or any worsening symptoms should return for eval.

## 2023-05-19 NOTE — ED CDU PROVIDER INITIAL DAY NOTE - OBJECTIVE STATEMENT
57-year-old female with past medical history of multiple strokes in 2021, on Lovenox, remaining history unknown presents emergency room for headache.  Patient reports diffuse headache for the last 7 months every single day, states last few days it is gotten worse which prompted her evaluation.  Patient states she has been living in [place] but came to [place] 10 days ago and plans on living here so was trying to establish care.  Reports left eye issues since stroke without obvious acute changes.  Reports chronic left-sided weakness with diminished sensation, able to ambulate without assistance.  Denies fever, chills, acute change in vision, URI symptoms, chest pain, shortness of breath, difficulty breathing, ruperto pain, nausea, vomiting, diarrhea or urinary complaints.  Of note, patient is a poor historian.  Sister is here providing some information but is unable to give more details.  Patient is currently going to be living with a friend until she can find a place of her own.    CDU PERRI Parsons Note---  ED Provider HPI as above, agreed to.  Pt is a 58 yo female, PMH multiple CVAs (2021) with residual left-sided weakness, sensory deficit, OS scleral injection; presenting to the ED c/o headache x 7 months, worse over past few days, as above.  In the ED, CT head was performed; per official radiology report: "...IMPRESSION:  No acute intracranial hemorrhage or mass effect.  Questionable small central pontine hypodensity, may be artifactual or represent age indeterminate lacunar infarct. Consider further workup with MRI brain.  Severe bilateral mastoid air cell effusions as well as partial right middle ear opacification, correlate for otomastoiditis.".  ENT was consulted; pt was given IV Unasyn in the ED; ENT documented pt could be given regimen of PO Augmentin and f/u outpatient; no acute ENT intervention was felt warranted at this time.  Pt was treated for headache, with reported improvement.  Pt was sent to CDU for continued IV Unasyn and supportive care, general observation care / monitoring.

## 2023-05-19 NOTE — ED ADULT NURSE REASSESSMENT NOTE - NS ED NURSE REASSESS COMMENT FT1
Break coverage RN: Pt A&Ox4 resting on stretcher. Respirations even and unlabored. Pt offers no complaints at this time. No acute distress noted. IV site patent, no redness or swelling noted. Pending results. bed in lowest position, side rails up, call bell in hand, safety maintained.
Break RN covering, pt breathing even and unlabored- pt reprots headache coming back. Dr. Tobin aware.

## 2023-05-19 NOTE — ED CDU PROVIDER INITIAL DAY NOTE - CLINICAL SUMMARY MEDICAL DECISION MAKING FREE TEXT BOX
show IV Unasyn / recommendations as per ENT team following pt, supportive care / headache management, general observation care / monitoring.  Pt recently moved to [place]; currently staying with a friend; ED team requested for  to evaluate in daytime to give pt resource options.

## 2023-05-19 NOTE — ED CDU PROVIDER DISPOSITION NOTE - PATIENT PORTAL LINK FT
You can access the FollowMyHealth Patient Portal offered by [facility] by registering at the following website: [email or web address]. By joining [facility]’s FollowMyHealth portal, you will also be able to view your health information using other applications (apps) compatible with our system.

## 2023-05-22 PROBLEM — F17.210 NICOTINE DEPENDENCE, CIGARETTES, UNCOMPLICATED: Chronic | Status: ACTIVE | Noted: 2023-05-19

## 2023-05-22 PROBLEM — Z00.00 ENCOUNTER FOR PREVENTIVE HEALTH EXAMINATION: Status: ACTIVE | Noted: 2023-05-22

## 2023-05-30 ENCOUNTER — OUTPATIENT (OUTPATIENT)
Dept: OUTPATIENT SERVICES | Facility: HOSPITAL | Age: 58
LOS: 1 days | Discharge: STILL ADMITTED OR ADMINISTRATIVE | End: 2023-05-30

## 2023-05-30 ENCOUNTER — NON-APPOINTMENT (OUTPATIENT)
Age: 58
End: 2023-05-30

## 2023-05-30 ENCOUNTER — APPOINTMENT (OUTPATIENT)
Dept: OPHTHALMOLOGY | Facility: CLINIC | Age: 58
End: 2023-05-30
Payer: MEDICAID

## 2023-05-30 PROCEDURE — 92004 COMPRE OPH EXAM NEW PT 1/>: CPT

## 2023-05-30 PROCEDURE — 92134 CPTRZ OPH DX IMG PST SGM RTA: CPT

## 2023-06-07 ENCOUNTER — NON-APPOINTMENT (OUTPATIENT)
Age: 58
End: 2023-06-07

## 2023-06-07 ENCOUNTER — APPOINTMENT (OUTPATIENT)
Dept: OPHTHALMOLOGY | Facility: CLINIC | Age: 58
End: 2023-06-07
Payer: MEDICAID

## 2023-06-07 DIAGNOSIS — F31.30 BIPOLAR DISORDER, CURRENT EPISODE DEPRESSED, MILD OR MODERATE SEVERITY, UNSPECIFIED: ICD-10-CM

## 2023-06-07 PROCEDURE — 92014 COMPRE OPH EXAM EST PT 1/>: CPT

## 2023-06-30 ENCOUNTER — OUTPATIENT (OUTPATIENT)
Dept: OUTPATIENT SERVICES | Facility: HOSPITAL | Age: 58
LOS: 1 days | End: 2023-06-30
Payer: MEDICAID

## 2023-06-30 ENCOUNTER — APPOINTMENT (OUTPATIENT)
Dept: OBGYN | Facility: CLINIC | Age: 58
End: 2023-06-30
Payer: MEDICAID

## 2023-06-30 VITALS
SYSTOLIC BLOOD PRESSURE: 115 MMHG | BODY MASS INDEX: 32.66 KG/M2 | HEIGHT: 61 IN | HEART RATE: 83 BPM | RESPIRATION RATE: 18 BRPM | DIASTOLIC BLOOD PRESSURE: 78 MMHG | OXYGEN SATURATION: 97 % | WEIGHT: 173 LBS

## 2023-06-30 DIAGNOSIS — Z83.3 FAMILY HISTORY OF DIABETES MELLITUS: ICD-10-CM

## 2023-06-30 DIAGNOSIS — I69.354 HEMIPLEGIA AND HEMIPARESIS FOLLOWING CEREBRAL INFARCTION AFFECTING LEFT NON-DOMINANT SIDE: ICD-10-CM

## 2023-06-30 DIAGNOSIS — Z78.9 OTHER SPECIFIED HEALTH STATUS: ICD-10-CM

## 2023-06-30 DIAGNOSIS — F17.210 NICOTINE DEPENDENCE, CIGARETTES, UNCOMPLICATED: ICD-10-CM

## 2023-06-30 DIAGNOSIS — J45.909 UNSPECIFIED ASTHMA, UNCOMPLICATED: ICD-10-CM

## 2023-06-30 DIAGNOSIS — Z92.3 PERSONAL HISTORY OF IRRADIATION: ICD-10-CM

## 2023-06-30 DIAGNOSIS — Z85.3 PERSONAL HISTORY OF MALIGNANT NEOPLASM OF BREAST: ICD-10-CM

## 2023-06-30 DIAGNOSIS — Z78.0 ASYMPTOMATIC MENOPAUSAL STATE: ICD-10-CM

## 2023-06-30 DIAGNOSIS — F31.10 BIPOLAR DISORDER, CURRENT EPISODE MANIC W/OUT PSYCHOTIC FEATURES, UNSPECIFIED: ICD-10-CM

## 2023-06-30 DIAGNOSIS — F31.10 BIPOLAR DISORDER, CURRENT EPISODE MANIC WITHOUT PSYCHOTIC FEATURES, UNSPECIFIED: ICD-10-CM

## 2023-06-30 DIAGNOSIS — Z00.00 ENCOUNTER FOR GENERAL ADULT MEDICAL EXAMINATION WITHOUT ABNORMAL FINDINGS: ICD-10-CM

## 2023-06-30 DIAGNOSIS — Z01.419 ENCOUNTER FOR GYNECOLOGICAL EXAMINATION (GENERAL) (ROUTINE) WITHOUT ABNORMAL FINDINGS: ICD-10-CM

## 2023-06-30 DIAGNOSIS — J44.9 CHRONIC OBSTRUCTIVE PULMONARY DISEASE, UNSPECIFIED: ICD-10-CM

## 2023-06-30 DIAGNOSIS — Z01.419 ENCOUNTER FOR GYNECOLOGICAL EXAMINATION (GENERAL) (ROUTINE) W/OUT ABNORMAL FINDINGS: ICD-10-CM

## 2023-06-30 DIAGNOSIS — Z80.3 FAMILY HISTORY OF MALIGNANT NEOPLASM OF BREAST: ICD-10-CM

## 2023-06-30 DIAGNOSIS — Z11.3 ENCOUNTER FOR SCREENING FOR INFECTIONS WITH A PREDOMINANTLY SEXUAL MODE OF TRANSMISSION: ICD-10-CM

## 2023-06-30 DIAGNOSIS — Z12.4 ENCOUNTER FOR SCREENING FOR MALIGNANT NEOPLASM OF CERVIX: ICD-10-CM

## 2023-06-30 DIAGNOSIS — Z86.73 PERSONAL HISTORY OF TRANSIENT ISCHEMIC ATTACK (TIA), AND CEREBRAL INFARCTION W/OUT RESIDUAL DEFICITS: ICD-10-CM

## 2023-06-30 PROCEDURE — 87591 N.GONORRHOEAE DNA AMP PROB: CPT

## 2023-06-30 PROCEDURE — 87624 HPV HI-RISK TYP POOLED RSLT: CPT

## 2023-06-30 PROCEDURE — G0463: CPT

## 2023-06-30 PROCEDURE — 99204 OFFICE O/P NEW MOD 45 MIN: CPT

## 2023-06-30 PROCEDURE — 87491 CHLMYD TRACH DNA AMP PROBE: CPT

## 2023-06-30 RX ORDER — ALBUTEROL SULFATE 2.5 MG/3ML
(2.5 MG/3ML) SOLUTION RESPIRATORY (INHALATION)
Refills: 0 | Status: ACTIVE | COMMUNITY

## 2023-06-30 NOTE — PHYSICAL EXAM
Patient seen and examined at the bedside.    Remained critically ill on continuous ICU monitoring.    OBJECTIVE:  Vital Signs Last 24 Hrs  T(C): 37.5 (29 Mar 2023 08:00), Max: 38 (28 Mar 2023 20:00)  T(F): 99.5 (29 Mar 2023 08:00), Max: 100.4 (28 Mar 2023 20:00)  HR: 75 (29 Mar 2023 08:00) (75 - 104)  BP: 95/53 (29 Mar 2023 08:00) (95/53 - 133/71)  BP(mean): 66 (29 Mar 2023 08:00) (66 - 98)  RR: 25 (29 Mar 2023 08:00) (1 - 29)  SpO2: 94% (29 Mar 2023 08:00) (94% - 100%)    Parameters below as of 29 Mar 2023 08:00  Patient On (Oxygen Delivery Method): nasal cannula  O2 Flow (L/min): 2        Physical Exam:   General: NC, resting comfortably in chair  Neurology: nonfocal  Eyes: bilateral pupils equal and reactive   ENT/Neck: Neck supple, trachea midline, No JVD   Respiratory: Clear bilaterally   CV: S1S2, no murmurs        [x] Sternal dressing, [x] Mediastinal CT x2, [x] L Pleural CT        [x] Sinus rhythm [x] Temporary pacing - box off   Abdominal: Soft, NT, ND +BS   Extremities: 1-2+ pedal edema noted, + peripheral pulses   Skin: No Rashes, Hematoma, Ecchymosis                             Assessment:  73 years old male with PMHX of HTN, Type I DM (50 years ago, on Insulin pump for last 15 years), Vertigo and BPH who was brought to ED due to Abdominal pain, Nausea and vomiting.    CAD s/p CABG 03/27/23   Hemodynamic instability   hyperglycemia  Diabetes mellitus type1  Hypovolemia  Respiratory insufficiency       Today:         Plan:   ***Neuro***  [x] Nonfocal   Post operative neuro assessment   Continue pain management with Tylenol, gabapentin, oxy and Dilaudid.     ***Cardiovascular***  Invasive hemodynamic monitoring, assess perfusion indices   SR / CVP 4/ MAP 58/ Hct 26.8%/ Lactate 0.7  Start Beta blockers when weaned off pressors  Volume: [x] Albumin 250 ccs  Reassessment of hemodynamics post resuscitation   Amiodarone for Afib prophylaxis   Monitor chest tube outputs   [x] Started Lovenox for VTE prophylaxis   [x] ASA [x] Statin [x] Plavix   Serial EKG and cardiac enzymes   Repeat gas, check H&H, if goes down give transfusion    ***Pulmonary***  NC - 2L   Encourage incentive spirometry, continue pulse ox monitoring, follow ABGs               ***GI***  [x] Tolerating PO consistent carb diet.   [x] Protonix   Bowel regimen with Miralax and senna.  Started Flomax for Urinary retention    ***Renal***  Continue to monitor I/Os, BUN/Creatinine.   Replete lytes PRN  Santana present     ***ID***  No active antibiotic coverage      ***Endocrine***  [x] DM1: HbA1c 8.2%                - [x] Insulin gtt --> [x] transition to ISS and Lantus;              - Need tight glycemic control to prevent wound infection.  Endo following for malfunctioning pump         Patient requires continuous monitoring with bedside rhythm monitoring, pulse oximetry monitoring, and continuous central venous and arterial pressure monitoring; and intermittent blood gas analysis. Care plan discussed with the ICU care team.   Patient remained critical, at risk for life threatening decompensation.    I have spent 30 minutes providing critical care management to this patient.    By signing my name below, I, Alfonso Pringle, attest that this documentation has been prepared under the direction and in the presence of Anita Alonzo NP   Electronically signed: Lucio Barnard, 03-29-23 @ 09:56    ICheri Mirabile NP, personally performed the services described in this documentation. all medical record entries made by the lucio were at my direction and in my presence. I have reviewed the chart and agree that the record reflects my personal performance and is accurate and complete  Electronically signed: Anita Alonzo NP  Patient seen and examined at the bedside.    Remained critically ill on continuous ICU monitoring.    OBJECTIVE:  Vital Signs Last 24 Hrs  T(C): 37.5 (29 Mar 2023 08:00), Max: 38 (28 Mar 2023 20:00)  T(F): 99.5 (29 Mar 2023 08:00), Max: 100.4 (28 Mar 2023 20:00)  HR: 75 (29 Mar 2023 08:00) (75 - 104)  BP: 95/53 (29 Mar 2023 08:00) (95/53 - 133/71)  BP(mean): 66 (29 Mar 2023 08:00) (66 - 98)  RR: 25 (29 Mar 2023 08:00) (1 - 29)  SpO2: 94% (29 Mar 2023 08:00) (94% - 100%)    Parameters below as of 29 Mar 2023 08:00  Patient On (Oxygen Delivery Method): nasal cannula  O2 Flow (L/min): 2        Physical Exam:   General: NC, resting comfortably in chair  Neurology: nonfocal  Eyes: bilateral pupils equal and reactive   ENT/Neck: Neck supple, trachea midline, No JVD   Respiratory: Clear bilaterally   CV: S1S2, no murmurs        [x] Sternal dressing, [x] Mediastinal CT x2, [x] L Pleural CT        [x] Sinus rhythm [x] Temporary pacing - box off   Abdominal: Soft, NT, ND +BS   Extremities: 1-2+ pedal edema noted, + peripheral pulses   Skin: No Rashes, Hematoma, Ecchymosis                             Assessment:  73 years old male with PMHX of HTN, Type I DM (50 years ago, on Insulin pump for last 15 years), Vertigo and BPH who was brought to ED due to Abdominal pain, Nausea and vomiting.    CAD s/p CABG 03/27/23   Hemodynamic instability   hyperglycemia  Diabetes mellitus type1  Hypovolemia  Respiratory insufficiency       Today:   - PT/OT as tolerated   - Dc Chest Tube    - Follow up with Endocrine for his DM1   - Diurese PRN       Plan:   ***Neuro***  [x] Nonfocal   Post operative neuro assessment   Continue pain management with Tylenol, gabapentin, oxy and Dilaudid.     ***Cardiovascular***  Invasive hemodynamic monitoring, assess perfusion indices   SR / CVP 4/ MAP 58/ Hct 26.8%/ Lactate 0.7  Start Beta blockers when weaned off pressors  Volume: [x] Albumin 250 ccs  Reassessment of hemodynamics post resuscitation   Amiodarone for Afib prophylaxis   Monitor chest tube outputs   [x] Started Lovenox for VTE prophylaxis   [x] ASA [x] Statin [x] Plavix   Serial EKG and cardiac enzymes   Repeat gas, check H&H, if goes down give transfusion    ***Pulmonary***  NC - 2L   Encourage incentive spirometry, continue pulse ox monitoring, follow ABGs               ***GI***  [x] Tolerating PO consistent carb diet.   [x] Protonix   Bowel regimen with Miralax and senna.  Started Flomax for Urinary retention    ***Renal***  Continue to monitor I/Os, BUN/Creatinine.   Replete lytes PRN  Santana present     ***ID***  No active antibiotic coverage      ***Endocrine***  [x] DM1: HbA1c 8.2%                - [x] Insulin gtt --> [x] transition to ISS and Lantus;              - Need tight glycemic control to prevent wound infection.  Endo following for malfunctioning pump         Patient requires continuous monitoring with bedside rhythm monitoring, pulse oximetry monitoring, and continuous central venous and arterial pressure monitoring; and intermittent blood gas analysis. Care plan discussed with the ICU care team.   Patient remained critical, at risk for life threatening decompensation.    I have spent 30 minutes providing critical care management to this patient.    By signing my name below, I, Alfonso Pringle, attest that this documentation has been prepared under the direction and in the presence of Anita Alonzo NP   Electronically signed: Lucio Barnard, 03-29-23 @ 09:56    I, Anita Alonzo NP, personally performed the services described in this documentation. all medical record entries made by the lucio were at my direction and in my presence. I have reviewed the chart and agree that the record reflects my personal performance and is accurate and complete  Electronically signed: Anita Alonzo NP  Patient seen and examined at the bedside.    Remained critically ill on continuous ICU monitoring.    OBJECTIVE:  Vital Signs Last 24 Hrs  T(C): 37.5 (29 Mar 2023 08:00), Max: 38 (28 Mar 2023 20:00)  T(F): 99.5 (29 Mar 2023 08:00), Max: 100.4 (28 Mar 2023 20:00)  HR: 75 (29 Mar 2023 08:00) (75 - 104)  BP: 95/53 (29 Mar 2023 08:00) (95/53 - 133/71)  BP(mean): 66 (29 Mar 2023 08:00) (66 - 98)  RR: 25 (29 Mar 2023 08:00) (1 - 29)  SpO2: 94% (29 Mar 2023 08:00) (94% - 100%)    Parameters below as of 29 Mar 2023 08:00  Patient On (Oxygen Delivery Method): nasal cannula  O2 Flow (L/min): 2        Physical Exam:   General: NAD, resting comfortably in chair  Neurology: nonfocal  Eyes: bilateral pupils equal and reactive   ENT/Neck: Neck supple, trachea midline, No JVD   Respiratory: Clear bilaterally   CV: S1S2, no murmurs        [x] Sternal dressing, [x] Mediastinal CT x2, [x] L Pleural CT        [x] Sinus rhythm [x] Temporary pacing - box off   Abdominal: Soft, NT, ND +BS   Extremities: 1-2+ pedal edema noted, + peripheral pulses   Skin: No Rashes, Hematoma, Ecchymosis                             Assessment:  73 years old male with PMHX of HTN, Type I DM (50 years ago, on Insulin pump for last 15 years), Vertigo and BPH who was brought to ED due to Abdominal pain, Nausea and vomiting.    CAD s/p CABG 03/27/23   Hemodynamic instability   hyperglycemia  Diabetes mellitus type1  Hypovolemia  Respiratory insufficiency       Today:   - PT/OT as tolerated   - Dc Chest Tubes    - Follow up with Endocrine recs for his DM1   - Diurese w/ PO lasix x 3 days  - Afib RVR with hypotension-> amio bolus x1, lopressor on, and Full PO amio load      Plan:   ***Neuro***  [x] Nonfocal   Post operative neuro assessment   Continue pain management with Tylenol, gabapentin, oxy and Dilaudid.     ***Cardiovascular***  Invasive hemodynamic monitoring, assess perfusion indices   SR / CVP 4/ MAP 58/ Hct 26.8%/ Lactate 0.7  Start Beta blockers when weaned off pressors  Volume: [x] Albumin 250 ccs  Reassessment of hemodynamics post resuscitation   Amiodarone for Afib prophylaxis   Monitor chest tube outputs -> will d/c   [x] Started Lovenox for VTE prophylaxis   [x] ASA [x] Statin [x] Plavix   PO amio load for afib        ***Pulmonary***  NC - 2L, wean to rm air   Encourage incentive spirometry, continue pulse ox monitoring, follow ABGs               ***GI***  [x] Tolerating PO consistent carb diet.   [x] Protonix   Bowel regimen with Miralax and senna.  Started Flomax for Urinary retention    ***Renal***  Continue to monitor I/Os, BUN/Creatinine.   Replete lytes PRN  Santana present     ***ID***  No active antibiotic coverage      ***Endocrine***  [x] DM1: HbA1c 8.2%                - [x] Insulin gtt --> [x] transition to ISS and Lantus; hyperglycemic, increased lantus/ pre meal dose per endo             - Need tight glycemic control to prevent wound infection.  Endo following for malfunctioning pump         Patient requires continuous monitoring with bedside rhythm monitoring, pulse oximetry monitoring, and continuous central venous and arterial pressure monitoring; and intermittent blood gas analysis. Care plan discussed with the ICU care team.   Patient remained critical, at risk for life threatening decompensation.    I have spent 45 minutes providing critical care management to this patient.    By signing my name below, I, Alfonso Pringle, attest that this documentation has been prepared under the direction and in the presence of Anita Alonzo NP   Electronically signed: Lucio Barnard, 03-29-23 @ 09:56    I, Anita Alonzo NP, personally performed the services described in this documentation. all medical record entries made by the lucio were at my direction and in my presence. I have reviewed the chart and agree that the record reflects my personal performance and is accurate and complete  Electronically signed: Anita Alonzo NP  [Chaperone Present] : A chaperone was present in the examining room during all aspects of the physical examination [Appropriately responsive] : appropriately responsive [Alert] : alert [No Acute Distress] : no acute distress [No Lymphadenopathy] : no lymphadenopathy [Soft] : soft [Non-tender] : non-tender [No Lesions] : no lesions [Oriented x3] : oriented x3 [Examination Of The Breasts] : a normal appearance [No Masses] : no breast masses were palpable [Labia Majora] : normal [Labia Minora] : normal [Normal] : normal

## 2023-06-30 NOTE — HISTORY OF PRESENT ILLNESS
[postmenopausal] : postmenopausal [N] : Patient is not sexually active [Y] : Positive pregnancy history [FreeTextEntry1] : Annual  [Mammogramdate] : 2-3 yrs ago as per patient [PapSmeardate] : few years ago as per pt [PGHxTotal] : 4 [LMPDate] : 7-8 yrs ago [[facility]] : 4 [PGHxPremature] : 0 [PGHxAbortions] : 0 [[facility]] : 4

## 2023-07-03 ENCOUNTER — NON-APPOINTMENT (OUTPATIENT)
Age: 58
End: 2023-07-03

## 2023-07-03 ENCOUNTER — APPOINTMENT (OUTPATIENT)
Dept: OPHTHALMOLOGY | Facility: CLINIC | Age: 58
End: 2023-07-03
Payer: MEDICAID

## 2023-07-03 PROCEDURE — 92134 CPTRZ OPH DX IMG PST SGM RTA: CPT

## 2023-07-03 PROCEDURE — 92012 INTRM OPH EXAM EST PATIENT: CPT

## 2023-07-05 LAB
C TRACH RRNA SPEC QL NAA+PROBE: NOT DETECTED
HPV HIGH+LOW RISK DNA PNL CVX: NOT DETECTED
N GONORRHOEA RRNA SPEC QL NAA+PROBE: NOT DETECTED
SOURCE TP AMPLIFICATION: NORMAL

## 2023-07-06 LAB — CYTOLOGY CVX/VAG DOC THIN PREP: NORMAL

## 2023-07-10 ENCOUNTER — OUTPATIENT (OUTPATIENT)
Dept: OUTPATIENT SERVICES | Facility: HOSPITAL | Age: 58
LOS: 1 days | Discharge: HOME | End: 2023-07-10

## 2023-07-10 ENCOUNTER — INPATIENT (INPATIENT)
Facility: HOSPITAL | Age: 58
LOS: 7 days | Discharge: HOME | End: 2023-07-18
Attending: INTERNAL MEDICINE | Admitting: INTERNAL MEDICINE
Payer: MEDICAID

## 2023-07-10 VITALS
HEART RATE: 82 BPM | SYSTOLIC BLOOD PRESSURE: 95 MMHG | TEMPERATURE: 98 F | DIASTOLIC BLOOD PRESSURE: 61 MMHG | OXYGEN SATURATION: 100 % | RESPIRATION RATE: 17 BRPM

## 2023-07-10 LAB
ALBUMIN SERPL ELPH-MCNC: 4.6 G/DL — SIGNIFICANT CHANGE UP (ref 3.3–5)
ALP SERPL-CCNC: 100 U/L — SIGNIFICANT CHANGE UP (ref 40–120)
ALT FLD-CCNC: 19 U/L — SIGNIFICANT CHANGE UP (ref 4–33)
ANION GAP SERPL CALC-SCNC: 13 MMOL/L — SIGNIFICANT CHANGE UP (ref 7–14)
APTT BLD: 30.4 SEC — SIGNIFICANT CHANGE UP (ref 27–36.3)
AST SERPL-CCNC: 14 U/L — SIGNIFICANT CHANGE UP (ref 4–32)
BASE EXCESS BLDV CALC-SCNC: -3.4 MMOL/L — LOW (ref -2–3)
BASOPHILS # BLD AUTO: 0.1 K/UL — SIGNIFICANT CHANGE UP (ref 0–0.2)
BASOPHILS NFR BLD AUTO: 0.7 % — SIGNIFICANT CHANGE UP (ref 0–2)
BILIRUB SERPL-MCNC: 1 MG/DL — SIGNIFICANT CHANGE UP (ref 0.2–1.2)
BLOOD GAS VENOUS COMPREHENSIVE RESULT: SIGNIFICANT CHANGE UP
BUN SERPL-MCNC: 16 MG/DL — SIGNIFICANT CHANGE UP (ref 7–23)
CALCIUM SERPL-MCNC: 9.8 MG/DL — SIGNIFICANT CHANGE UP (ref 8.4–10.5)
CHLORIDE BLDV-SCNC: 107 MMOL/L — SIGNIFICANT CHANGE UP (ref 96–108)
CHLORIDE SERPL-SCNC: 105 MMOL/L — SIGNIFICANT CHANGE UP (ref 98–107)
CO2 BLDV-SCNC: 22.5 MMOL/L — SIGNIFICANT CHANGE UP (ref 22–26)
CO2 SERPL-SCNC: 19 MMOL/L — LOW (ref 22–31)
CREAT SERPL-MCNC: 0.58 MG/DL — SIGNIFICANT CHANGE UP (ref 0.5–1.3)
EGFR: 104 ML/MIN/1.73M2 — SIGNIFICANT CHANGE UP
EOSINOPHIL # BLD AUTO: 0.23 K/UL — SIGNIFICANT CHANGE UP (ref 0–0.5)
EOSINOPHIL NFR BLD AUTO: 1.7 % — SIGNIFICANT CHANGE UP (ref 0–6)
GAS PNL BLDV: 135 MMOL/L — LOW (ref 136–145)
GLUCOSE BLDV-MCNC: 106 MG/DL — HIGH (ref 70–99)
GLUCOSE SERPL-MCNC: 105 MG/DL — HIGH (ref 70–99)
HCO3 BLDV-SCNC: 21 MMOL/L — LOW (ref 22–29)
HCT VFR BLD CALC: 44.5 % — SIGNIFICANT CHANGE UP (ref 34.5–45)
HCT VFR BLDA CALC: 43 % — SIGNIFICANT CHANGE UP (ref 34.5–46.5)
HGB BLD CALC-MCNC: 14.3 G/DL — SIGNIFICANT CHANGE UP (ref 11.7–16.1)
HGB BLD-MCNC: 13.7 G/DL — SIGNIFICANT CHANGE UP (ref 11.5–15.5)
IANC: 10.14 K/UL — HIGH (ref 1.8–7.4)
IMM GRANULOCYTES NFR BLD AUTO: 0.3 % — SIGNIFICANT CHANGE UP (ref 0–0.9)
INR BLD: 1.17 RATIO — HIGH (ref 0.88–1.16)
LACTATE BLDV-MCNC: 1.4 MMOL/L — SIGNIFICANT CHANGE UP (ref 0.5–2)
LYMPHOCYTES # BLD AUTO: 19.1 % — SIGNIFICANT CHANGE UP (ref 13–44)
LYMPHOCYTES # BLD AUTO: 2.59 K/UL — SIGNIFICANT CHANGE UP (ref 1–3.3)
MCHC RBC-ENTMCNC: 26 PG — LOW (ref 27–34)
MCHC RBC-ENTMCNC: 30.8 GM/DL — LOW (ref 32–36)
MCV RBC AUTO: 84.6 FL — SIGNIFICANT CHANGE UP (ref 80–100)
MONOCYTES # BLD AUTO: 0.43 K/UL — SIGNIFICANT CHANGE UP (ref 0–0.9)
MONOCYTES NFR BLD AUTO: 3.2 % — SIGNIFICANT CHANGE UP (ref 2–14)
NEUTROPHILS # BLD AUTO: 10.14 K/UL — HIGH (ref 1.8–7.4)
NEUTROPHILS NFR BLD AUTO: 75 % — SIGNIFICANT CHANGE UP (ref 43–77)
NRBC # BLD: 0 /100 WBCS — SIGNIFICANT CHANGE UP (ref 0–0)
NRBC # FLD: 0 K/UL — SIGNIFICANT CHANGE UP (ref 0–0)
PCO2 BLDV: 37 MMHG — LOW (ref 39–52)
PH BLDV: 7.37 — SIGNIFICANT CHANGE UP (ref 7.32–7.43)
PLATELET # BLD AUTO: 328 K/UL — SIGNIFICANT CHANGE UP (ref 150–400)
PO2 BLDV: 42 MMHG — SIGNIFICANT CHANGE UP (ref 25–45)
POTASSIUM BLDV-SCNC: 3.7 MMOL/L — SIGNIFICANT CHANGE UP (ref 3.5–5.1)
POTASSIUM SERPL-MCNC: 3.6 MMOL/L — SIGNIFICANT CHANGE UP (ref 3.5–5.3)
POTASSIUM SERPL-SCNC: 3.6 MMOL/L — SIGNIFICANT CHANGE UP (ref 3.5–5.3)
PROT SERPL-MCNC: 7.9 G/DL — SIGNIFICANT CHANGE UP (ref 6–8.3)
PROTHROM AB SERPL-ACNC: 13.6 SEC — HIGH (ref 10.5–13.4)
RBC # BLD: 5.26 M/UL — HIGH (ref 3.8–5.2)
RBC # FLD: 16.8 % — HIGH (ref 10.3–14.5)
SAO2 % BLDV: 70.7 % — SIGNIFICANT CHANGE UP (ref 67–88)
SODIUM SERPL-SCNC: 137 MMOL/L — SIGNIFICANT CHANGE UP (ref 135–145)
TROPONIN T, HIGH SENSITIVITY RESULT: 104 NG/L — CRITICAL HIGH
TROPONIN T, HIGH SENSITIVITY RESULT: 113 NG/L — CRITICAL HIGH
WBC # BLD: 13.53 K/UL — HIGH (ref 3.8–10.5)
WBC # FLD AUTO: 13.53 K/UL — HIGH (ref 3.8–10.5)

## 2023-07-10 PROCEDURE — 70496 CT ANGIOGRAPHY HEAD: CPT | Mod: 26,MA

## 2023-07-10 PROCEDURE — 99291 CRITICAL CARE FIRST HOUR: CPT

## 2023-07-10 PROCEDURE — 70498 CT ANGIOGRAPHY NECK: CPT | Mod: 26,MA

## 2023-07-10 PROCEDURE — 74174 CTA ABD&PLVS W/CONTRAST: CPT | Mod: 26,MA

## 2023-07-10 PROCEDURE — 0042T: CPT | Mod: MA

## 2023-07-10 PROCEDURE — 71275 CT ANGIOGRAPHY CHEST: CPT | Mod: 26,MA

## 2023-07-10 RX ORDER — ACETAMINOPHEN 500 MG
1000 TABLET ORAL ONCE
Refills: 0 | Status: COMPLETED | OUTPATIENT
Start: 2023-07-10 | End: 2023-07-10

## 2023-07-10 RX ORDER — SODIUM CHLORIDE 9 MG/ML
1000 INJECTION INTRAMUSCULAR; INTRAVENOUS; SUBCUTANEOUS ONCE
Refills: 0 | Status: COMPLETED | OUTPATIENT
Start: 2023-07-10 | End: 2023-07-10

## 2023-07-10 RX ADMIN — Medication 400 MILLIGRAM(S): at 19:55

## 2023-07-10 RX ADMIN — SODIUM CHLORIDE 1000 MILLILITER(S): 9 INJECTION INTRAMUSCULAR; INTRAVENOUS; SUBCUTANEOUS at 18:14

## 2023-07-10 NOTE — ED PROVIDER NOTE - ATTENDING CONTRIBUTION TO CARE
I performed a history and physical exam of the patient and discussed their management with the resident/fellow/ACP/student. I have reviewed the resident/fellow/ACP/student note and agree with the documented findings and plan of care, except as noted. I have personally performed a substantive portion of the visit including all aspects of the medical decision making. My medical decision making and observations are found above. Please refer to any progress notes for updates on clinical course.     58 year old female with pmhx of multiple strokes in 2021, on Lovenox (hasn't been taking for unclear amount of time) presenting with Chest pain, left upper extremity/left facial numbness.  Patient went to [facility] for medication refill today with possible suicidal ideation and reported chest pain x1 week with new left facial numbness.  Patient reports baseline left upper extremity numbness/weakness from stroke in past but feels it may be, worse.  Chest pain right-sided, nonexertional, nonradiating with no associated LOC, palpitations, diaphoresis.  No recent fever/chills, nausea/vomiting/diarrhea, cough, rashes, or changes in urination. L arm numbness that is new since this morning.     Gen: WDWN, NAD, comfortable appearing   HEENT: PERRLA, EOMI, no nasal discharge, mucous membranes moist, no oropharyngeal edema/erythema/exudates   CV: RRR, +S1/S2, no M/R/G, equal b/l radial pulses 2+  Resp: CTAB, no W/R/R, SPO2 >95% on RA, no increased WOB   GI: Abdomen soft non-distended, NTTP, no masses/organomegaly   MSK/Skin: No CVA tenderness, no open wounds, no bruising, no LE edema  Neuro: A&Ox4, moving all 4 extremities spontaneously, gross sensation intact in UE and LE BL  Psych: appropriate mood I performed a history and physical exam of the patient and discussed their management with the resident/fellow/ACP/student. I have reviewed the resident/fellow/ACP/student note and agree with the documented findings and plan of care, except as noted. I have personally performed a substantive portion of the visit including all aspects of the medical decision making. My medical decision making and observations are found above. Please refer to any progress notes for updates on clinical course.     58 year old female with pmhx of multiple strokes in 2021, on Lovenox (hasn't been taking for unclear amount of time) presenting with chest pain, left upper extremity/left facial numbness. Patient went to [facility] for medication refill today with possible suicidal ideation (denies ingestions) and reported chest pain x1 week with new left facial numbness today. Patient reports baseline left upper extremity numbness/weakness from stroke in past but feels it may be worse now. Unclear LKN; patient poor historian. Chest pain right-sided, nonexertional, nonradiating with no associated LOC, palpitations, diaphoresis.  No recent fever/chills, nausea/vomiting/diarrhea, cough, rashes, or changes in urination.     Gen: WDWN, NAD, comfortable appearing   HEENT: PERRLA, EOMI, no nasal discharge, mucous membranes moist  CV: RRR, +S1/S2, no M/R/G, equal b/l radial pulses 2+  Resp: CTAB, no W/R/R, SPO2 >95% on RA, no increased WOB   GI: Abdomen soft non-distended, NTTP, no masses/organomegaly   MSK/Skin: No CVA tenderness, no open wounds, no bruising, no LE edema  Neuro: CN2-12 grossly intact, A&Ox4, MS +5/5 in UE and LE BL, finger to nose smooth and rapid, gross sensation intact decreased on left V1/V2/V3 distribution and diffusely throughout LUE, gait smooth and coordinated, negative pronator drift   Psych: No SI at this time, no hallucinations    58 year old female with pmhx of multiple strokes in 2021, on Lovenox (has been taking for unclear amount of time) presenting with chest pain, left upper extremity/left facial numbness. Patient poor historian with unclear last known well, left upper extremity/left facial decreased sensation, unclear if new/old, equal radial pulses and blood pressures in bilateral upper extremities. Code stroke called in ambulance bay. Aortic dissection considered but low suspicion given b/l UE pulses/BP equal with possible old neurologic symptoms?. Will reconsider CT aorta if any signs of cardiac ischemia or change in clinical status. Neuro at bedside and pending CTs at this time. Also will eval for ACS; EKG/cardiac enzymes vs. metabolic derangement

## 2023-07-10 NOTE — ED PROVIDER NOTE - NS ED ROS FT
GENERAL: No fever, no chills  	EYES: No change in vision  	HEENT: No trouble swallowing or speaking  	CARDIAC: + chest pain  	PULMONARY: No cough, no SOB  	GI: No abdominal pain, no nausea, no vomiting, no diarrhea, no constipation  	: No changes in urination  	SKIN: No rashes  	NEURO: + headache, + numbness  	MSK: No visible bony deformity   Otherwise as HPI or negative.

## 2023-07-10 NOTE — ED ADULT NURSE NOTE - NSFALLRISKINTERV_ED_ALL_ED
Pt needs updated script for urine cath needs 6 boxes not 5 sent to [facility] in [place] for urianry incontinence.         AS Assistance OOB with selected safe patient handling equipment if applicable/Assistance with ambulation/Communicate fall risk and risk factors to all staff, patient, and family/Monitor gait and stability/Provide visual cue: yellow wristband, yellow gown, etc/Reinforce activity limits and safety measures with patient and family/Call bell, personal items and telephone in reach/Instruct patient to call for assistance before getting out of bed/chair/stretcher/Non-slip footwear applied when patient is off stretcher/[place] to call system/Physically safe environment - no spills, clutter or unnecessary equipment/Purposeful Proactive Rounding/Room/bathroom lighting operational, light cord in reach

## 2023-07-10 NOTE — ED ADULT NURSE NOTE - OBJECTIVE STATEMENT
facilitator RN - Pt received at CT as code stroke. Pt coming from [facility] crisis center, initally presented there for medication refill and expressed SI. Pt is currently denying SI at this time. Pt is c/o of midsternal chest pain that radiates to the left arm numbness & left facial numbness, +blurry vision. States initial symptom onset one week ago. Pt also w/ slurred speech, pt and family member poor historians unable to elaborate if this is baseline. Resp even and unlabored. Hand grasp equal bilaterally, sensation intact bilaterally. 20G IV placed by EMS in L forearm, additional 20G IV placed to R AC by RN. Labs drawn and sent. Ongoing eval in progress.

## 2023-07-10 NOTE — ED PROVIDER NOTE - PHYSICAL EXAMINATION
General: NAD  HEENT: NCAT.   Cardiac: RRR, 2+ radial pulses  Chest: CTA  Abdomen: soft, non-distended, no ttp, no rebound or guarding  Extremities: no peripheral edema, calf tenderness, or leg size discrepancies  Skin: no rashes  Neuro: AAOx3, motor grossly intact. Numbness to L face, L arm, and L leg. CN II-XII intact. 5/5 strength upper and lower extremities. Normal sensation bilaterally upper and lower extremities. Normal finger to nose exam.   Psych: mood and affect appropriate

## 2023-07-10 NOTE — ED ADULT TRIAGE NOTE - CHIEF COMPLAINT QUOTE
Pt coming to ER from the [facility] crisis center for suicidal ideation c/o of midsternal chest pain that radiates to the left arm numbness. Dr Hedrick called for stroke evaluation CODE STROKE CALLED. Symptom onset one week ago.  Hx CVA, not currently taking her blood thinners anymore

## 2023-07-10 NOTE — CONSULT NOTE ADULT - TIME BILLING
58-year-old right-handed lady evaluated at [facility] on 7/11/2023 with increased left-sided sensory symptoms.  History and exam as above, with minor changes.  ROS otherwise negative.  CT head (7/10/2023) to my eye showed a lacunar infarct in the right lentiform nucleus of indeterminate age.  CTA neck and head (7/10/2023) was unremarkable.    Impression.  She had a stroke in 2022 and was hospitalized at [place], where she was treated with therapeutic Lovenox which has been maintained, but the reason for this decision is currently unknown.  She had residual left-sided weakness and perhaps mostly numbness or tingling.  Several days PTA, she developed worsened numbness/tingling affecting her left face and arm, perhaps a pure sensory syndrome.  Her presentation is consistent with right brain dysfunction, perhaps thalamic or involving sensory cortex, of unknown mechanism.  Suggest.  Elective MRI brain as inpatient or outpatient and should not delay discharge; elective TTE; for now, continue therapeutic Lovenox, but try to find out why she was treated with this; if there is in fact no role for ongoing Lovenox, then Plavix 300 mg loading dose, then 75 mg daily for 3 weeks; aspirin 81 mg daily; atorvastatin 80 mg daily, target LDL less than 70; PT/OT; smoking cessation counseling and treatment.

## 2023-07-10 NOTE — ED PROVIDER NOTE - PROGRESS NOTE DETAILS
Santhosh Tapia, PGY2 - trop 113. Chest pain with neurological symptoms. Will obtain CT angio chest and abd/pelv to r/o dissection. Talked about risks vs benefits for IV contrast. Patient and patient's aunt verbalized understanding and agrees with the plan. Communicated with CT tech. Will bring the patient to the CT now. Ryley, Attending   Agree with above. Low suspicion for aortic dissection however patient poor historian, unclear neurologic symptoms in setting of chest pain with no focal neurologic deficits other than decreased gross sensation of left upper extremity and left face, blood pressures taken in bilateral upper extremities: Right upper extremity 116/65, left upper extremity 122/74, equal radial pulses, EKG with no obvious ischemic signs. CT Aorta chest/AP ordered and patient brought emergently to CT. Pending read Ryley, Attending   Agree with above. Low suspicion for aortic dissection however patient poor historian, unclear neurologic symptoms in setting of chest pain with elevated Troponin with no focal neurologic deficits other than decreased gross sensation of left upper extremity and left face, blood pressures taken in bilateral upper extremities: Right upper extremity 116/65, left upper extremity 122/74, equal radial pulses, EKG with no obvious ischemic signs. CT Aorta chest/AP ordered and patient brought emergently to CT. Pending read. CT head shows age indeterminate lacunar infarct. Neuro reports no acute intervention and obtain non-emergent MRI. Jennifer Hedrick   No evidence of aortic dissection on CT chest. Pending rpt trop/admission. EKG shows no overt ischemic changes. Hemodynamically stable

## 2023-07-10 NOTE — ED PROVIDER NOTE - CLINICAL SUMMARY MEDICAL DECISION MAKING FREE TEXT BOX
This is a 58 year old female with pmh of  multiple strokes in 2021, on Lovenox presenting with L arm numbness that is new since this morning. Has been off of Lovenox for the last few days due to inability to fill prescription. Had suicidal ideation in the past, but denies at the moment. BP soft at this time with MAP of 72. Will give a normal saline bolus. Will reassess. Will work up for stroke with imaging and labs. Will ask further questions on suicidal ideation after work up is completed. Most likely psych eval needed.

## 2023-07-10 NOTE — CONSULT NOTE ADULT - ASSESSMENT
HPI: Patient BRENDEN DE LA CRUZ is a 58y (1965) wo/man with a PMHx significant for  multiple strokes in 2021 (residual L sided hemiparesis) on Lovenox (last dose 3 days, stopped as she ran out of meds), asthma/COPD, presents emergency room from [facility] for suicidal ideation c/o of midsternal chest pain that radiates to the left arm numbness. Code stroke was called for L facial numbness since this AM, as per primary team. Upon evaluation, pt states she has had L sided sensory motor symptoms for over a year but states L facial numbness has gotten significantly worse in the last 1-2 days. Associated with a generalized HA and light headedness.  Pt is not a tenecteplase/thromectomy candidate as pt is outside both windows.     NIH 6  preMRS 1    Impression: worsening L sided hemiparesis (especially in L face with numbness) possibly 2/2 recrudescence of prior CVA vs. new stroke possibly in setting of missed home AC for 3 days. Mechanism unclear at this time.     Recommendations:   Imaging:  -MRI brain w/o contrast, if no contraindications  -TTE w/ bubble study  -Can consider STAT CT head non-contrast for any change in neuro exam    Secondary prevention of stroke:  -Continue home AC, if no contraindications.   -Normotension  -Atorvastatin 80 mg daily (long-term goal LDL < 70)  -Tight glucose control (long-term goal HgbA1c < 6%)    Misc/additional recs:  -toxic/metabolic/infectious w/u per primary team  -Dysphagia screen  -Speech and swallow eval if dysphagia screen fails  -NPO except meds until dysphagia screen passed  -Head of bed > 30 degrees for aspiration prevention   -Continuous  telemetry to monitor for arrhythmia  -Stroke labwork: HgbA1C, fasting lipid panel, CBC, CMP, coag pannel, troponin  - Baseline EKG  -Neuro checks Q4  -PT/OT  -DVT Prophylaxis: Lovenox 40 mg Sq daily unless contraindicated in which case SCDs   Fall precautions, aspiration precautions  -ILR - inpatient or outpatient- to rule out arrhythmia if found to have ischemic stroke (if pt does not have ILR already)    To be discussed with neurology attending and will be formally staffed by attending during morning rounds. Recommendations will be finalized once signed by attending.    HPI: Patient BRENDEN DE LA CRUZ is a 58y (1965) wo/man with a PMHx significant for  multiple strokes in 2021 (residual L sided hemiparesis) on Lovenox (last dose 3 days, stopped as she ran out of meds), asthma/COPD, presents emergency room from [facility] for suicidal ideation c/o of midsternal chest pain that radiates to the left arm numbness. Code stroke was called for L facial numbness since this AM, as per primary team. Upon evaluation, pt states she has had L sided sensory motor symptoms for over a year but states L facial numbness has gotten significantly worse in the last 1-2 days (LKW 2 days ago).     NIH 6  preMRS 1    Impression: worsening L sided hemiparesis (especially in L face with numbness) possibly 2/2 recrudescence of prior CVA vs. new stroke possibly in setting of missed home AC for 3 days. Mechanism unclear at this time.     Recommendations:   Imaging:  -MRI brain w/o contrast, if no contraindications  -TTE w/ bubble study  -Can consider STAT CT head non-contrast for any change in neuro exam    Secondary prevention of stroke:  -Continue home AC, if no contraindications.   -Normotension  -Atorvastatin 80 mg daily (long-term goal LDL < 70)  -Tight glucose control (long-term goal HgbA1c < 6%)    Misc/additional recs:  -toxic/metabolic/infectious w/u per primary team  -Dysphagia screen  -Speech and swallow eval if dysphagia screen fails  -NPO except meds until dysphagia screen passed  -Head of bed > 30 degrees for aspiration prevention   -Continuous  telemetry to monitor for arrhythmia  -Stroke labwork: HgbA1C, fasting lipid panel, CBC, CMP, coag pannel, troponin  - Baseline EKG  -Neuro checks Q4  -PT/OT  -DVT Prophylaxis: Lovenox 40 mg Sq daily unless contraindicated in which case SCDs   Fall precautions, aspiration precautions  -ILR - inpatient or outpatient- to rule out arrhythmia if found to have ischemic stroke (if pt does not have ILR already)    To be discussed with neurology attending and will be formally staffed by attending during morning rounds. Recommendations will be finalized once signed by attending.    HPI: Patient BRENDEN DE LA CRUZ is a 58y (1965) wo/man with a PMHx significant for  multiple strokes in 8976-9623 (residual L sided hemiparesis) on Lovenox (last dose 3 days, stopped as she ran out of meds), asthma/COPD, breast CA (dx 5 years ago, treated with radiation), RA on methrotrexate, fibromyalgia, bipolar disorder  presents emergency room from [facility] for suicidal ideation c/o of midsternal chest pain that radiates to the left arm numbness. Code stroke was called for L facial numbness since this AM, as per primary team. Upon evaluation, pt states she has had L sided sensory motor symptoms for over a year but states L facial numbness has gotten significantly worse in the last 1-2 days (LKW 2 days ago).     NIH 6  preMRS 1    Impression: worsening L sided hemiparesis (especially in L face with numbness) possibly 2/2 recrudescence of prior CVA vs. new stroke possibly in setting of missed home AC for 3 days. Mechanism unclear at this time.     Recommendations:   Imaging:  -MRI brain w/o contrast, if no contraindications  -TTE w/ bubble study  -Can consider STAT CT head non-contrast for any change in neuro exam    Secondary prevention of stroke:  -Continue home AC, if no contraindications.   -Normotension  -Atorvastatin 80 mg daily (long-term goal LDL < 70)  -Tight glucose control (long-term goal HgbA1c < 6%)    Misc/additional recs:  -toxic/metabolic/infectious w/u per primary team  -Dysphagia screen  -Speech and swallow eval if dysphagia screen fails  -NPO except meds until dysphagia screen passed  -Head of bed > 30 degrees for aspiration prevention   -Continuous  telemetry to monitor for arrhythmia  -Stroke labwork: HgbA1C, fasting lipid panel, CBC, CMP, coag pannel, troponin  - Baseline EKG  -Neuro checks Q4  -PT/OT  -DVT Prophylaxis: Lovenox 40 mg Sq daily unless contraindicated in which case SCDs   Fall precautions, aspiration precautions  -ILR - inpatient or outpatient- to rule out arrhythmia if found to have ischemic stroke (if pt does not have ILR already)    To be discussed with neurology attending and will be formally staffed by attending during morning rounds. Recommendations will be finalized once signed by attending.

## 2023-07-10 NOTE — ED PROVIDER NOTE - OBJECTIVE STATEMENT
This is a 58 year old female with pmh of  multiple strokes in 2021, on Lovenox presenting with L arm numbness that is new since this morning. Has been off of Lovenox for the last few days due to inability to fill prescription. Had suicidal ideation in the past, but denies at the moment. Endorses mild chest pain x weeks. Has residual L facial numbness from prior stroke, however the L arm numbness is new. Denies any weakness.

## 2023-07-10 NOTE — CONSULT NOTE ADULT - SUBJECTIVE AND OBJECTIVE BOX
Neurology - Consult Note    -  Spectra: [phone] ([facility]), [phone] ([facility])  -    HPI: Patient BRENDEN DE LA CRUZ is a 58y (1965) wo/man with a PMHx significant for  multiple strokes in 2021 (residual L sided hemiparesis) on Lovenox (last dose 3 days, stopped as she ran out of meds), asthma/COPD, presents emergency room from [facility] for suicidal ideation c/o of midsternal chest pain that radiates to the left arm numbness. Code stroke was called for L facial numbness since this AM, as per primary team. Upon evaluation, pt states she has had L sided sensory motor symptoms for over a year but states L facial numbness has gotten significantly worse in the last 1-2 days. Associated with a generalized HA and light headedness.  Pt is not a tenecteplase/thromectomy candidate as pt is outside both windows.     NIH 6  preMRS 1    Review of Systems:    CONSTITUTIONAL: No fevers or chills  NECK: No pain or stiffness  RESPIRATORY: No hemoptysis or shortness of breath  NEUROLOGICAL: +As stated in HPI above  SKIN: No itching, burning, rashes, or lesions   All other review of systems is negative unless indicated above.    Allergies:  aspirin (Hives; Swelling)      PMHx/PSHx/Family Hx: As above, otherwise see below   CVA (cerebrovascular accident)    Cigarette smoker        Social Hx:  No current use of tobacco, alcohol, or illicit drugs    Medications:  MEDICATIONS  (STANDING):  sodium chloride 0.9% Bolus 1000 milliLiter(s) IV Bolus once    MEDICATIONS  (PRN):      Vitals:  T(C): 36.7 (07-10-23 @ 16:58), Max: 36.7 (07-10-23 @ 16:58)  HR: 82 (07-10-23 @ 16:58) (82 - 82)  BP: 95/61 (07-10-23 @ 16:58) (95/61 - 95/61)  RR: 17 (07-10-23 @ 16:58) (17 - 17)  SpO2: 100% (07-10-23 @ 16:58) (100% - 100%)    Physical Examination:   General - NAD  Cardiovascular - Peripheral pulses palpable, no edema  Eyes -  Fundoscopy not performed due to safety precautions in the setting of the COVID-19 pandemic    Neurologic Exam:  Mental status - Awake, Alert, Oriented to person, place, and time. Speech fluent, repetition and naming intact. Follows simple and complex commands. Attention/concentration, recent and remote memory (including registration and recall), and fund of knowledge intact    Cranial nerves - PERRLA, VFF, EOMI, face sensation (V1-V3) decreased on L side, facial strength intact without asymmetry b/l, hearing intact b/l, palate with symmetric elevation,  sternocleidomastiod 5/5 strength b/l, tongue midline on protrusion with full lateral movement    Motor - Normal bulk and tone throughout. No pronator drift.  Strength testing  Mild- moderate drift in LUE And LLE and mild drift in RLE.    Sensation - Light touch/temperature  decreased in LUE and LLE.     Coordination - Finger to Nose intact b/l. No tremors appreciated    Gait and station - deferred due to fall risk    Labs:          CAPILLARY BLOOD GLUCOSE              CSF:                  Radiology:     Neurology - Consult Note    -  Spectra: [phone] ([facility]), [phone] ([facility]  -    HPI: Patient BRENDEN DE LA CRUZ is a 58y (1965) wo/man with a PMHx significant for  multiple strokes in 2021 (residual L sided hemiparesis) on Lovenox (last dose 3 days, stopped as she ran out of meds), asthma/COPD, presents emergency room from [facility] for suicidal ideation c/o of midsternal chest pain that radiates to the left arm numbness. Code stroke was called for L facial numbness since this AM, as per primary team. Upon evaluation, pt states she has had L sided sensory motor symptoms for over a year but states L facial numbness has gotten significantly worse in the last 1-2 days (LKW 2 days ago). Associated with a generalized HA and light headedness.  Pt is not a tenecteplase/thromectomy candidate as pt is outside both windows.     NIH 6  preMRS 1    Review of Systems:    CONSTITUTIONAL: No fevers or chills  NECK: No pain or stiffness  RESPIRATORY: No hemoptysis or shortness of breath  NEUROLOGICAL: +As stated in HPI above  SKIN: No itching, burning, rashes, or lesions   All other review of systems is negative unless indicated above.    Allergies:  aspirin (Hives; Swelling)      PMHx/PSHx/Family Hx: As above, otherwise see below   CVA (cerebrovascular accident)    Cigarette smoker        Social Hx:  No current use of tobacco, alcohol, or illicit drugs    Medications:  MEDICATIONS  (STANDING):  sodium chloride 0.9% Bolus 1000 milliLiter(s) IV Bolus once    MEDICATIONS  (PRN):      Vitals:  T(C): 36.7 (07-10-23 @ 16:58), Max: 36.7 (07-10-23 @ 16:58)  HR: 82 (07-10-23 @ 16:58) (82 - 82)  BP: 95/61 (07-10-23 @ 16:58) (95/61 - 95/61)  RR: 17 (07-10-23 @ 16:58) (17 - 17)  SpO2: 100% (07-10-23 @ 16:58) (100% - 100%)    Physical Examination:   General - NAD  Cardiovascular - Peripheral pulses palpable, no edema  Eyes -  Fundoscopy not performed due to safety precautions in the setting of the COVID-19 pandemic    Neurologic Exam:  Mental status - Awake, Alert, Oriented to person, place, and time. Speech fluent, repetition and naming intact. Follows simple and complex commands. Attention/concentration, recent and remote memory (including registration and recall), and fund of knowledge intact    Cranial nerves - PERRLA, VFF, EOMI, face sensation (V1-V3) decreased on L side, facial strength intact without asymmetry b/l, hearing intact b/l, palate with symmetric elevation,  sternocleidomastiod 5/5 strength b/l, tongue midline on protrusion with full lateral movement    Motor - Normal bulk and tone throughout. No pronator drift.  Strength testing  Mild- moderate drift in LUE And LLE and mild drift in RLE.    Sensation - Light touch/temperature  decreased in LUE and LLE.     Coordination - Finger to Nose intact b/l. No tremors appreciated    Gait and station - deferred due to fall risk    Labs:          CAPILLARY BLOOD GLUCOSE              CSF:                  Radiology:     Neurology - Consult Note    -  Spectra: [phone] ([facility]), [phone] ([facility])  -    HPI: HPI: Patient BRENDEN DE LA CRUZ is a 58y (1965) wo/man with a PMHx significant for  multiple strokes in [id number] (residual L sided hemiparesis) on Lovenox (last dose 3 days, stopped as she ran out of meds), asthma/COPD, breast CA (dx 5 years ago, treated with radiation), RA on methrotrexate, fibromyalgia, bipolar disorder  presents emergency room from [facility] for suicidal ideation c/o of midsternal chest pain that radiates to the left arm numbness. Code stroke was called for L facial numbness since this AM, as per primary team. Upon evaluation, pt states she has had L sided sensory motor symptoms for over a year but states L facial numbness has gotten significantly worse in the last 1-2 days (LKW 2 days ago). Associated with a generalized HA and light headedness.  Pt is not a tenecteplase/thromectomy candidate as pt is outside both windows.     NIH 6  preMRS 1    Review of Systems:    CONSTITUTIONAL: No fevers or chills  NECK: No pain or stiffness  RESPIRATORY: No hemoptysis or shortness of breath  NEUROLOGICAL: +As stated in HPI above  SKIN: No itching, burning, rashes, or lesions   All other review of systems is negative unless indicated above.    Allergies:  aspirin (Hives; Swelling)      PMHx/PSHx/Family Hx: As above, otherwise see below   CVA (cerebrovascular accident)    Cigarette smoker        Social Hx:  No current use of tobacco, alcohol, or illicit drugs    Medications:  MEDICATIONS  (STANDING):  sodium chloride 0.9% Bolus 1000 milliLiter(s) IV Bolus once    MEDICATIONS  (PRN):      Vitals:  T(C): 36.7 (07-10-23 @ 16:58), Max: 36.7 (07-10-23 @ 16:58)  HR: 82 (07-10-23 @ 16:58) (82 - 82)  BP: 95/61 (07-10-23 @ 16:58) (95/61 - 95/61)  RR: 17 (07-10-23 @ 16:58) (17 - 17)  SpO2: 100% (07-10-23 @ 16:58) (100% - 100%)    Physical Examination:   General - NAD  Cardiovascular - Peripheral pulses palpable, no edema  Eyes -  Fundoscopy not performed due to safety precautions in the setting of the COVID-19 pandemic    Neurologic Exam:  Mental status - Awake, Alert, Oriented to person, place, and time. Speech fluent, repetition and naming intact. Follows simple and complex commands. Attention/concentration, recent and remote memory (including registration and recall), and fund of knowledge intact    Cranial nerves - PERRLA, VFF, EOMI, face sensation (V1-V3) decreased on L side, facial strength intact without asymmetry b/l, hearing intact b/l, palate with symmetric elevation,  sternocleidomastiod 5/5 strength b/l, tongue midline on protrusion with full lateral movement    Motor - Normal bulk and tone throughout. No pronator drift.  Strength testing  Mild- moderate drift in LUE And LLE and mild drift in RLE.    Sensation - Light touch/temperature  decreased in LUE and LLE.     Coordination - Finger to Nose intact b/l. No tremors appreciated    Gait and station - deferred due to fall risk    Labs:          CAPILLARY BLOOD GLUCOSE              CSF:                  Radiology:

## 2023-07-10 NOTE — ED ADULT NURSE NOTE - CHIEF COMPLAINT QUOTE
Pt coming to ER from the [facility] crisis center for suicidal ideation c/o of midsternal chest pain that radiates to the left arm numbness. Dr Hedrick called for stroke evaluation CODE STROKE CALLED. Symptom onset one week ago.  Hx CVA, not currently taking her blood thinners anymore

## 2023-07-11 DIAGNOSIS — R77.8 OTHER SPECIFIED ABNORMALITIES OF PLASMA PROTEINS: ICD-10-CM

## 2023-07-11 DIAGNOSIS — Z90.710 ACQUIRED ABSENCE OF BOTH CERVIX AND UTERUS: Chronic | ICD-10-CM

## 2023-07-11 DIAGNOSIS — Z91.148 PATIENT'S OTHER NONCOMPLIANCE WITH MEDICATION REGIMEN FOR OTHER REASON: ICD-10-CM

## 2023-07-11 DIAGNOSIS — R51.9 HEADACHE, UNSPECIFIED: ICD-10-CM

## 2023-07-11 DIAGNOSIS — K59.01 SLOW TRANSIT CONSTIPATION: ICD-10-CM

## 2023-07-11 DIAGNOSIS — R10.11 RIGHT UPPER QUADRANT PAIN: ICD-10-CM

## 2023-07-11 DIAGNOSIS — R20.2 PARESTHESIA OF SKIN: ICD-10-CM

## 2023-07-11 DIAGNOSIS — H57.89 OTHER SPECIFIED DISORDERS OF EYE AND ADNEXA: ICD-10-CM

## 2023-07-11 DIAGNOSIS — F41.9 ANXIETY DISORDER, UNSPECIFIED: ICD-10-CM

## 2023-07-11 DIAGNOSIS — Z90.49 ACQUIRED ABSENCE OF OTHER SPECIFIED PARTS OF DIGESTIVE TRACT: Chronic | ICD-10-CM

## 2023-07-11 DIAGNOSIS — E86.0 DEHYDRATION: ICD-10-CM

## 2023-07-11 DIAGNOSIS — Z29.9 ENCOUNTER FOR PROPHYLACTIC MEASURES, UNSPECIFIED: ICD-10-CM

## 2023-07-11 LAB
24R-OH-CALCIDIOL SERPL-MCNC: 12 NG/ML — LOW (ref 30–80)
A1C WITH ESTIMATED AVERAGE GLUCOSE RESULT: 6.1 % — HIGH (ref 4–5.6)
ALBUMIN SERPL ELPH-MCNC: 3.6 G/DL — SIGNIFICANT CHANGE UP (ref 3.3–5)
ALP SERPL-CCNC: 109 U/L — SIGNIFICANT CHANGE UP (ref 40–120)
ALT FLD-CCNC: 31 U/L — SIGNIFICANT CHANGE UP (ref 4–33)
ANION GAP SERPL CALC-SCNC: 11 MMOL/L — SIGNIFICANT CHANGE UP (ref 7–14)
APPEARANCE UR: ABNORMAL
AST SERPL-CCNC: 62 U/L — HIGH (ref 4–32)
BACTERIA # UR AUTO: ABNORMAL /HPF
BASOPHILS # BLD AUTO: 0.09 K/UL — SIGNIFICANT CHANGE UP (ref 0–0.2)
BASOPHILS NFR BLD AUTO: 0.9 % — SIGNIFICANT CHANGE UP (ref 0–2)
BILIRUB DIRECT SERPL-MCNC: 0.4 MG/DL — HIGH (ref 0–0.3)
BILIRUB INDIRECT FLD-MCNC: 0.7 MG/DL — SIGNIFICANT CHANGE UP (ref 0–1)
BILIRUB SERPL-MCNC: 1.1 MG/DL — SIGNIFICANT CHANGE UP (ref 0.2–1.2)
BILIRUB UR-MCNC: NEGATIVE — SIGNIFICANT CHANGE UP
BUN SERPL-MCNC: 12 MG/DL — SIGNIFICANT CHANGE UP (ref 7–23)
CALCIUM SERPL-MCNC: 9 MG/DL — SIGNIFICANT CHANGE UP (ref 8.4–10.5)
CHLORIDE SERPL-SCNC: 104 MMOL/L — SIGNIFICANT CHANGE UP (ref 98–107)
CO2 SERPL-SCNC: 21 MMOL/L — LOW (ref 22–31)
COLOR SPEC: YELLOW — SIGNIFICANT CHANGE UP
CREAT SERPL-MCNC: 0.63 MG/DL — SIGNIFICANT CHANGE UP (ref 0.5–1.3)
DIFF PNL FLD: ABNORMAL
EGFR: 102 ML/MIN/1.73M2 — SIGNIFICANT CHANGE UP
EOSINOPHIL # BLD AUTO: 0.31 K/UL — SIGNIFICANT CHANGE UP (ref 0–0.5)
EOSINOPHIL NFR BLD AUTO: 3.1 % — SIGNIFICANT CHANGE UP (ref 0–6)
EPI CELLS # UR: PRESENT
ESTIMATED AVERAGE GLUCOSE: 128 — SIGNIFICANT CHANGE UP
GLUCOSE SERPL-MCNC: 94 MG/DL — SIGNIFICANT CHANGE UP (ref 70–99)
GLUCOSE UR QL: NEGATIVE MG/DL — SIGNIFICANT CHANGE UP
HCT VFR BLD CALC: 40.3 % — SIGNIFICANT CHANGE UP (ref 34.5–45)
HGB BLD-MCNC: 12.1 G/DL — SIGNIFICANT CHANGE UP (ref 11.5–15.5)
IANC: 7.22 K/UL — SIGNIFICANT CHANGE UP (ref 1.8–7.4)
IMM GRANULOCYTES NFR BLD AUTO: 0.3 % — SIGNIFICANT CHANGE UP (ref 0–0.9)
KETONES UR-MCNC: ABNORMAL MG/DL
LEUKOCYTE ESTERASE UR-ACNC: NEGATIVE — SIGNIFICANT CHANGE UP
LYMPHOCYTES # BLD AUTO: 2.03 K/UL — SIGNIFICANT CHANGE UP (ref 1–3.3)
LYMPHOCYTES # BLD AUTO: 20.1 % — SIGNIFICANT CHANGE UP (ref 13–44)
MAGNESIUM SERPL-MCNC: 1.9 MG/DL — SIGNIFICANT CHANGE UP (ref 1.6–2.6)
MCHC RBC-ENTMCNC: 25.7 PG — LOW (ref 27–34)
MCHC RBC-ENTMCNC: 30 GM/DL — LOW (ref 32–36)
MCV RBC AUTO: 85.6 FL — SIGNIFICANT CHANGE UP (ref 80–100)
MONOCYTES # BLD AUTO: 0.44 K/UL — SIGNIFICANT CHANGE UP (ref 0–0.9)
MONOCYTES NFR BLD AUTO: 4.3 % — SIGNIFICANT CHANGE UP (ref 2–14)
NEUTROPHILS # BLD AUTO: 7.22 K/UL — SIGNIFICANT CHANGE UP (ref 1.8–7.4)
NEUTROPHILS NFR BLD AUTO: 71.3 % — SIGNIFICANT CHANGE UP (ref 43–77)
NITRITE UR-MCNC: NEGATIVE — SIGNIFICANT CHANGE UP
NRBC # BLD: 0 /100 WBCS — SIGNIFICANT CHANGE UP (ref 0–0)
NRBC # FLD: 0 K/UL — SIGNIFICANT CHANGE UP (ref 0–0)
NT-PROBNP SERPL-SCNC: 686 PG/ML — HIGH
PH UR: 7 — SIGNIFICANT CHANGE UP (ref 5–8)
PHOSPHATE SERPL-MCNC: 3.3 MG/DL — SIGNIFICANT CHANGE UP (ref 2.5–4.5)
PLATELET # BLD AUTO: 273 K/UL — SIGNIFICANT CHANGE UP (ref 150–400)
POTASSIUM SERPL-MCNC: 3.5 MMOL/L — SIGNIFICANT CHANGE UP (ref 3.5–5.3)
POTASSIUM SERPL-SCNC: 3.5 MMOL/L — SIGNIFICANT CHANGE UP (ref 3.5–5.3)
PROT SERPL-MCNC: 6.3 G/DL — SIGNIFICANT CHANGE UP (ref 6–8.3)
PROT UR-MCNC: SIGNIFICANT CHANGE UP MG/DL
RBC # BLD: 4.71 M/UL — SIGNIFICANT CHANGE UP (ref 3.8–5.2)
RBC # FLD: 16.5 % — HIGH (ref 10.3–14.5)
RBC CASTS # UR COMP ASSIST: 1 /HPF — SIGNIFICANT CHANGE UP (ref 0–4)
SODIUM SERPL-SCNC: 136 MMOL/L — SIGNIFICANT CHANGE UP (ref 135–145)
SP GR SPEC: 1.05 — HIGH (ref 1–1.03)
TROPONIN T, HIGH SENSITIVITY RESULT: 89 NG/L — CRITICAL HIGH
TSH SERPL-MCNC: 2.02 UIU/ML — SIGNIFICANT CHANGE UP (ref 0.27–4.2)
UROBILINOGEN FLD QL: 1 MG/DL — SIGNIFICANT CHANGE UP (ref 0.2–1)
WBC # BLD: 10.12 K/UL — SIGNIFICANT CHANGE UP (ref 3.8–10.5)
WBC # FLD AUTO: 10.12 K/UL — SIGNIFICANT CHANGE UP (ref 3.8–10.5)
WBC UR QL: 3 /HPF — SIGNIFICANT CHANGE UP (ref 0–5)

## 2023-07-11 PROCEDURE — 12345: CPT | Mod: NC

## 2023-07-11 PROCEDURE — 76705 ECHO EXAM OF ABDOMEN: CPT | Mod: 26

## 2023-07-11 PROCEDURE — 99223 1ST HOSP IP/OBS HIGH 75: CPT

## 2023-07-11 PROCEDURE — 99233 SBSQ HOSP IP/OBS HIGH 50: CPT

## 2023-07-11 RX ORDER — PANTOPRAZOLE SODIUM 20 MG/1
40 TABLET, DELAYED RELEASE ORAL ONCE
Refills: 0 | Status: COMPLETED | OUTPATIENT
Start: 2023-07-11 | End: 2023-07-11

## 2023-07-11 RX ORDER — SODIUM CHLORIDE 9 MG/ML
1000 INJECTION, SOLUTION INTRAVENOUS
Refills: 0 | Status: DISCONTINUED | OUTPATIENT
Start: 2023-07-11 | End: 2023-07-18

## 2023-07-11 RX ORDER — METHOTREXATE 2.5 MG/1
1 TABLET ORAL
Refills: 0 | DISCHARGE

## 2023-07-11 RX ORDER — POLYETHYLENE GLYCOL 3350 17 G/17G
17 POWDER, FOR SOLUTION ORAL ONCE
Refills: 0 | Status: COMPLETED | OUTPATIENT
Start: 2023-07-11 | End: 2023-07-11

## 2023-07-11 RX ORDER — DULOXETINE HYDROCHLORIDE 30 MG/1
1 CAPSULE, DELAYED RELEASE ORAL
Refills: 0 | DISCHARGE

## 2023-07-11 RX ORDER — METOCLOPRAMIDE HCL 10 MG
1 TABLET ORAL
Refills: 0 | DISCHARGE

## 2023-07-11 RX ORDER — FOLIC ACID 0.8 MG
1 TABLET ORAL DAILY
Refills: 0 | Status: DISCONTINUED | OUTPATIENT
Start: 2023-07-11 | End: 2023-07-18

## 2023-07-11 RX ORDER — NICOTINE POLACRILEX 2 MG
1 GUM BUCCAL DAILY
Refills: 0 | Status: DISCONTINUED | OUTPATIENT
Start: 2023-07-11 | End: 2023-07-18

## 2023-07-11 RX ORDER — PREDNISOLONE SODIUM PHOSPHATE 1 %
1 DROPS OPHTHALMIC (EYE)
Refills: 0 | DISCHARGE

## 2023-07-11 RX ORDER — QUETIAPINE FUMARATE 200 MG/1
1 TABLET, FILM COATED ORAL
Refills: 0 | DISCHARGE

## 2023-07-11 RX ORDER — OLANZAPINE 15 MG/1
1 TABLET, FILM COATED ORAL
Refills: 0 | DISCHARGE

## 2023-07-11 RX ORDER — GABAPENTIN 400 MG/1
1 CAPSULE ORAL
Refills: 0 | DISCHARGE

## 2023-07-11 RX ORDER — DIPHENHYDRAMINE HCL 50 MG
25 CAPSULE ORAL ONCE
Refills: 0 | Status: COMPLETED | OUTPATIENT
Start: 2023-07-11 | End: 2023-07-11

## 2023-07-11 RX ORDER — MIRTAZAPINE 45 MG/1
1 TABLET, ORALLY DISINTEGRATING ORAL
Refills: 0 | DISCHARGE

## 2023-07-11 RX ORDER — TOPIRAMATE 25 MG
1 TABLET ORAL
Refills: 0 | DISCHARGE

## 2023-07-11 RX ORDER — METOCLOPRAMIDE HCL 10 MG
10 TABLET ORAL DAILY
Refills: 0 | Status: DISCONTINUED | OUTPATIENT
Start: 2023-07-11 | End: 2023-07-18

## 2023-07-11 RX ORDER — PREDNISOLONE SODIUM PHOSPHATE 1 %
1 DROPS OPHTHALMIC (EYE)
Refills: 0 | Status: DISCONTINUED | OUTPATIENT
Start: 2023-07-11 | End: 2023-07-18

## 2023-07-11 RX ORDER — SENNA PLUS 8.6 MG/1
2 TABLET ORAL AT BEDTIME
Refills: 0 | Status: DISCONTINUED | OUTPATIENT
Start: 2023-07-11 | End: 2023-07-18

## 2023-07-11 RX ORDER — CHOLECALCIFEROL (VITAMIN D3) 125 MCG
2000 CAPSULE ORAL DAILY
Refills: 0 | Status: DISCONTINUED | OUTPATIENT
Start: 2023-07-11 | End: 2023-07-12

## 2023-07-11 RX ORDER — ATORVASTATIN CALCIUM 80 MG/1
80 TABLET, FILM COATED ORAL ONCE
Refills: 0 | Status: COMPLETED | OUTPATIENT
Start: 2023-07-11 | End: 2023-07-11

## 2023-07-11 RX ORDER — ATORVASTATIN CALCIUM 80 MG/1
80 TABLET, FILM COATED ORAL AT BEDTIME
Refills: 0 | Status: DISCONTINUED | OUTPATIENT
Start: 2023-07-11 | End: 2023-07-18

## 2023-07-11 RX ORDER — FOLIC ACID 0.8 MG
1 TABLET ORAL
Refills: 0 | DISCHARGE

## 2023-07-11 RX ORDER — SODIUM CHLORIDE 9 MG/ML
1000 INJECTION, SOLUTION INTRAVENOUS ONCE
Refills: 0 | Status: COMPLETED | OUTPATIENT
Start: 2023-07-11 | End: 2023-07-11

## 2023-07-11 RX ORDER — ENOXAPARIN SODIUM 100 MG/ML
120 INJECTION SUBCUTANEOUS
Refills: 0 | DISCHARGE

## 2023-07-11 RX ORDER — PANTOPRAZOLE SODIUM 20 MG/1
40 TABLET, DELAYED RELEASE ORAL
Refills: 0 | Status: DISCONTINUED | OUTPATIENT
Start: 2023-07-12 | End: 2023-07-18

## 2023-07-11 RX ORDER — ENOXAPARIN SODIUM 100 MG/ML
120 INJECTION SUBCUTANEOUS EVERY 24 HOURS
Refills: 0 | Status: DISCONTINUED | OUTPATIENT
Start: 2023-07-11 | End: 2023-07-18

## 2023-07-11 RX ORDER — ACETAMINOPHEN 500 MG
650 TABLET ORAL EVERY 6 HOURS
Refills: 0 | Status: DISCONTINUED | OUTPATIENT
Start: 2023-07-11 | End: 2023-07-18

## 2023-07-11 RX ORDER — LANOLIN ALCOHOL/MO/W.PET/CERES
3 CREAM (GRAM) TOPICAL AT BEDTIME
Refills: 0 | Status: DISCONTINUED | OUTPATIENT
Start: 2023-07-11 | End: 2023-07-17

## 2023-07-11 RX ADMIN — Medication 25 MILLIGRAM(S): at 23:09

## 2023-07-11 RX ADMIN — Medication 1 DROP(S): at 17:57

## 2023-07-11 RX ADMIN — ATORVASTATIN CALCIUM 80 MILLIGRAM(S): 80 TABLET, FILM COATED ORAL at 23:08

## 2023-07-11 RX ADMIN — Medication 1 DROP(S): at 23:09

## 2023-07-11 RX ADMIN — PANTOPRAZOLE SODIUM 40 MILLIGRAM(S): 20 TABLET, DELAYED RELEASE ORAL at 10:29

## 2023-07-11 RX ADMIN — Medication 1 DROP(S): at 10:30

## 2023-07-11 RX ADMIN — ATORVASTATIN CALCIUM 80 MILLIGRAM(S): 80 TABLET, FILM COATED ORAL at 10:29

## 2023-07-11 RX ADMIN — Medication 1 MILLIGRAM(S): at 17:58

## 2023-07-11 RX ADMIN — SODIUM CHLORIDE 1000 MILLILITER(S): 9 INJECTION, SOLUTION INTRAVENOUS at 02:19

## 2023-07-11 RX ADMIN — Medication 10 MILLIGRAM(S): at 10:30

## 2023-07-11 RX ADMIN — ENOXAPARIN SODIUM 120 MILLIGRAM(S): 100 INJECTION SUBCUTANEOUS at 10:29

## 2023-07-11 NOTE — OCCUPATIONAL THERAPY INITIAL EVALUATION ADULT - GENERAL OBSERVATIONS, REHAB EVAL
Pt. received semisupine in bed. No acute distress. Noted pt agitated at times, but agreed to evaluation from Occupational Therapist as tolerated. +Heplock, +Tele. HR: 78 bpm. Aunt bedside.

## 2023-07-11 NOTE — PROGRESS NOTE ADULT - SUBJECTIVE AND OBJECTIVE BOX
[facility] Division of Hospital Medicine  Ashtyn Nunez MD  Pager (M-F, 8A-5P): [phone]  Other Times:      Patient is a 58y old  Female who presents with a chief complaint of Paresthesias (2023 01:47)      SUBJECTIVE / OVERNIGHT EVENTS:  No acute events overnight.     MEDICATIONS  (STANDING):  atorvastatin 80 milliGRAM(s) Oral at bedtime  atorvastatin 80 milliGRAM(s) Oral once  enoxaparin Injectable 120 milliGRAM(s) SubCutaneous every 24 hours  folic acid 1 milliGRAM(s) Oral daily  lactated ringers. 1000 milliLiter(s) (125 mL/Hr) IV Continuous <Continuous>  nicotine - 21 mG/24Hr(s) Patch 1 Patch Transdermal daily  pantoprazole  Injectable 40 milliGRAM(s) IV Push once  polyethylene glycol 3350 17 Gram(s) Oral once  prednisoLONE acetate 1% Suspension 1 Drop(s) Left EYE four times a day  predniSONE   Tablet 10 milliGRAM(s) Oral daily  senna 2 Tablet(s) Oral at bedtime    MEDICATIONS  (PRN):  acetaminophen     Tablet .. 650 milliGRAM(s) Oral every 6 hours PRN Mild Pain (1 - 3)  bisacodyl 5 milliGRAM(s) Oral daily PRN Constipation  melatonin 3 milliGRAM(s) Oral at bedtime PRN Insomnia  metoclopramide 10 milliGRAM(s) Oral daily PRN for headache  oxycodone    5 mG/acetaminophen 325 mG 1 Tablet(s) Oral every 6 hours PRN Moderate Pain (4 - 6)      CAPILLARY BLOOD GLUCOSE        I&O's Summary      PHYSICAL EXAM:  Vital Signs Last 24 Hrs  T(C): 36.6 (2023 07:24), Max: 37 (2023 01:28)  T(F): 97.8 (2023 07:24), Max: 98.6 (2023 01:28)  HR: 96 (2023 07:24) (66 - 96)  BP: 101/72 (2023 07:24) (95/61 - 109/65)  BP(mean): --  RR: 18 (2023 07:24) (17 - 18)  SpO2: 100% (2023 07:24) (100% - 100%)    Parameters below as of 10 Jul 2023 16:58  Patient On (Oxygen Delivery Method): room air        CONSTITUTIONAL: NAD, well-developed, well-groomed  EYES: EOMI; conjunctiva and sclera clear  ENMT: Moist oral mucosa  RESPIRATORY: Normal respiratory effort; lungs are clear to auscultation bilaterally  CARDIOVASCULAR: Regular rate and rhythm, normal S1 and S2, no murmur; No lower extremity edema  ABDOMEN: Nontender to palpation, normoactive bowel sounds, no rebound/guarding  MUSCULOSKELETAL:   no clubbing or cyanosis of digits; no joint swelling or tenderness to palpation  PSYCH: A+O to person, place, and time; affect appropriate  NEUROLOGY: CN 2-12 are intact and symmetric; no gross sensory deficits   SKIN: No rashes; no palpable lesions    LABS:                        12.1   10.12 )-----------( 273      ( 2023 06:02 )             40.3     07-11    136  |  104  |  12  ----------------------------<  94  3.5   |  21<L>  |  0.63    Ca    9.0      2023 06:02  Phos  3.3     07-11  Mg     1.90     07-11    TPro  7.9  /  Alb  4.6  /  TBili  1.0  /  DBili  x   /  AST  14  /  ALT  19  /  AlkPhos  100  07-10    PT/INR - ( 10 Jul 2023 17:23 )   PT: 13.6 sec;   INR: 1.17 ratio         PTT - ( 10 Jul 2023 17:23 )  PTT:30.4 sec      Urinalysis Basic - ( 2023 07:10 )    Color: Yellow / Appearance: Cloudy / S.052 / pH: x  Gluc: x / Ketone: Trace mg/dL  / Bili: Negative / Urobili: 1.0 mg/dL   Blood: x / Protein: Trace mg/dL / Nitrite: Negative   Leuk Esterase: Negative / RBC: 1 /HPF / WBC 3 /HPF   Sq Epi: x / Non Sq Epi: x / Bacteria: Few /HPF        RADIOLOGY & ADDITIONAL TESTS:  Results Reviewed:   Imaging Personally Reviewed:  Electrocardiogram Personally Reviewed:    COORDINATION OF CARE:  Care Discussed with Consultants/Other Providers [Y/N]: Y  Prior or Outpatient Records Reviewed [Y/N]: Y   [facility] Division of Hospital Medicine  Ashtyn Nunez MD  Pager (M-F, 8A-5P): [phone]  Other Times:      Patient is a 58y old  Female who presents with a chief complaint of Paresthesias (2023 01:47)      SUBJECTIVE / OVERNIGHT EVENTS:  Pt complaining of RUQ pain - states she has had decreased po intake for about 1 week at home.  She had nbnb vomit yesterday.  She tolerated a small portion of plain cereal this am for breakfast.  Denies fevers/chills.  She had her gall bladder removed but cannot recall the reason.  L sided weakness persists.  She complains of blurry vision from L eye, thinks it is worse than last week.      MEDICATIONS  (STANDING):  atorvastatin 80 milliGRAM(s) Oral at bedtime  atorvastatin 80 milliGRAM(s) Oral once  enoxaparin Injectable 120 milliGRAM(s) SubCutaneous every 24 hours  folic acid 1 milliGRAM(s) Oral daily  lactated ringers. 1000 milliLiter(s) (125 mL/Hr) IV Continuous <Continuous>  nicotine - 21 mG/24Hr(s) Patch 1 Patch Transdermal daily  pantoprazole  Injectable 40 milliGRAM(s) IV Push once  polyethylene glycol 3350 17 Gram(s) Oral once  prednisoLONE acetate 1% Suspension 1 Drop(s) Left EYE four times a day  predniSONE   Tablet 10 milliGRAM(s) Oral daily  senna 2 Tablet(s) Oral at bedtime    MEDICATIONS  (PRN):  acetaminophen     Tablet .. 650 milliGRAM(s) Oral every 6 hours PRN Mild Pain (1 - 3)  bisacodyl 5 milliGRAM(s) Oral daily PRN Constipation  melatonin 3 milliGRAM(s) Oral at bedtime PRN Insomnia  metoclopramide 10 milliGRAM(s) Oral daily PRN for headache  oxycodone    5 mG/acetaminophen 325 mG 1 Tablet(s) Oral every 6 hours PRN Moderate Pain (4 - 6)      CAPILLARY BLOOD GLUCOSE        I&O's Summary      PHYSICAL EXAM:  Vital Signs Last 24 Hrs  T(C): 36.6 (2023 07:24), Max: 37 (2023 01:28)  T(F): 97.8 (2023 07:24), Max: 98.6 (2023 01:28)  HR: 96 (2023 07:24) (66 - 96)  BP: 101/72 (2023 07:24) (95/61 - 109/65)  BP(mean): --  RR: 18 (2023 07:24) (17 - 18)  SpO2: 100% (2023 07:24) (100% - 100%)    Parameters below as of 10 Jul 2023 16:58  Patient On (Oxygen Delivery Method): room air        CONSTITUTIONAL: NAD, well-developed, well-groomed  EYES: EOMI; L eye with redness   ENMT: Moist oral mucosa  RESPIRATORY: Normal respiratory effort; lungs are clear to auscultation bilaterally  CARDIOVASCULAR: Regular rate and rhythm, normal S1 and S2, no murmur; No lower extremity edema  ABDOMEN: RUQ TTP with deep palpation; normoactive bowel sounds, no rebound/guarding  MUSCULOSKELETAL:   no clubbing or cyanosis of digits; no joint swelling or tenderness to palpation  PSYCH: A+O to person, place, and time; affect appropriate  NEUROLOGY: L sided weakness   SKIN: No rashes; no palpable lesions    LABS:                        12.1   10.12 )-----------( 273      ( 2023 06:02 )             40.3     07-11    136  |  104  |  12  ----------------------------<  94  3.5   |  21<L>  |  0.63    Ca    9.0      2023 06:02  Phos  3.3     07-11  Mg     1.90     07-11    TPro  7.9  /  Alb  4.6  /  TBili  1.0  /  DBili  x   /  AST  14  /  ALT  19  /  AlkPhos  100  07-10    PT/INR - ( 10 Jul 2023 17:23 )   PT: 13.6 sec;   INR: 1.17 ratio         PTT - ( 10 Jul 2023 17:23 )  PTT:30.4 sec      Urinalysis Basic - ( 2023 07:10 )    Color: Yellow / Appearance: Cloudy / S.052 / pH: x  Gluc: x / Ketone: Trace mg/dL  / Bili: Negative / Urobili: 1.0 mg/dL   Blood: x / Protein: Trace mg/dL / Nitrite: Negative   Leuk Esterase: Negative / RBC: 1 /HPF / WBC 3 /HPF   Sq Epi: x / Non Sq Epi: x / Bacteria: Few /HPF        RADIOLOGY & ADDITIONAL TESTS:  Results Reviewed:   Imaging Personally Reviewed:  Electrocardiogram Personally Reviewed:    COORDINATION OF CARE:  Care Discussed with Consultants/Other Providers [Y/N]: Y  Prior or Outpatient Records Reviewed [Y/N]: Y

## 2023-07-11 NOTE — H&P ADULT - PROBLEM SELECTOR PLAN 3
- troponin = 113 --> 114  - ECG = sinus rhythm w/ 1st degree AVB at 74 bpm, QTc = 430, TWI in V2, flattened T-wave in aVR, LAD  - patient initially hypotensive in the ED  - could be that troponin elevation is due to demand ischemia, stress  - on Telemetry  - Neurology team suggesting ILR (please d/w Cardiology team)   - Cardiology consult in the AM re ILR, troponemia  - f/u TTE (ordered)  - f/u AM lab-work, including lipid panel, TSH, electrolytes

## 2023-07-11 NOTE — H&P ADULT - PROBLEM SELECTOR PLAN 2
- due to multiple personal stressors  - cites same and becomes tearful  - confirms suicidal ideation at times, but dismisses the thought because of love for her grandchildren  - due to depression, has not been motivated to fill prescriptions so has not been taking most medications for over one month  - PTA psychotropic meds not prescribed; please get Psychiatry/BH consult in the AM  - f/u TSH, vitamin D levels in the AM  - evaluate for any signs of acute decompensation - due to multiple personal stressors  - cites being anxious and depressed and becomes tearful when attempting to discuss same  - has poor appetite, poor sleep, lack of motivation (including adhering to medication regimen); has not been motivated to fill prescriptions so has not been taking most medications for over one month  - confirms suicidal ideation at times, but dismisses the thought because of love for her grandchildren  - PTA psychotropic meds not prescribed; please get Psychiatry/BH consult in the AM  - f/u TSH, vitamin D levels in the AM  - evaluate for any signs of acute decompensation

## 2023-07-11 NOTE — PHYSICAL THERAPY INITIAL EVALUATION ADULT - PERTINENT HX OF CURRENT PROBLEM, REHAB EVAL
Patient is a 58 year old female, presents to the ED secondary to increased left face and arm paresthesia, as well as headache and upper abdominal pain. Complains of frontal headache, present for approximately 6 days and not ameliorated by Tylenol.  Has associated nausea and dizziness. Numbness of the left face and left upper extremity; persistent/chronic condition, but is more intense at present.

## 2023-07-11 NOTE — CONSULT NOTE ADULT - NS ATTEND AMEND GEN_ALL_CORE FT
8 year old woman with a pmhx of Depression, CVA (multiple, in 2021; on Lovenox), Rheumatoid arthritis, Breast cancer s/p radiation therapy, and Glaucoma who presented to the ED with left side face ad upper extremity numbness. Neurology following. CT scan of head and CTA of brain and neck negative for any acute findings.  CT brain showed age-indeterminate lacunar infarct in the right lentiform nucleus and bihemispheric chronic microvascular ischemic white matter changes. Now awaiting BMRI. Ep was consulted for ILR evaluation. Patient denied having a hx of cardiac arrhthymias. There is no telemetry evidence of atrial arrhythmias; nor is there a clear pacing indication at this time.  Per CRYSTAL-AF, patient will benefit from prolonged monitoring for detection of AF/PAF as cause of potential cardioembolic source.  The risks and benefits were explained in detail which included but not limited to bleeding, infection, stroke, syncope 2/2 vasovagal, intubation, and death. Patient expressed understanding and all questions were answered. Patient is currently is undecided about ILR.

## 2023-07-11 NOTE — PHARMACOTHERAPY INTERVENTION NOTE - COMMENTS
Medication history is complete. Medication list updated in Outpatient Medication Record (OMR). Patient has reported non-compliance, not taking medications for ~1 month.  Enoxaparin prescribed by prescriber in [place] Dr. Ricky Hernandez [phone]; most recently prescribed from this provider in April 2023. In May and June, prescribed by ophthalmologist Dr. Kev Reynoso [phone].  Please call spectra [phone] if you have any questions.

## 2023-07-11 NOTE — CONSULT NOTE ADULT - ASSESSMENT
Assessment and Recommendations:  58y female with a past medical history/ocular history of depression, CVA (multiple, in 2021; on Lovenox), Rheumatoid arthritis, Cigarette smoking, Breast cancer - s/p radiation therapy, anterior uveitis, anterior scleritis, cataract consulted for worsening red eye and eye pain, found to have mildly worsening anterior uveitis.    1) Anterior uveitis, OS  - patient with known anterior uveitis and anterior scleritis likely related to RA in the left eye  - patient sees Dr. Naomi Goldberg outpatient last visit 7/3  - vision, pressures overall stable from last week  - anterior exam with 2+ cell and 1+ flare, moderately worsened from 7/3 visit  - dilated exam within normal limits  - cataract OS to be addressed outpatient  - continue prednisone 10mg  - increased home pred forte from BID to QID in the left eye  - can follow up with Dr. Goldberg outpatient upon discharge  - please reach out to ophtho with any acute ophthalmic changes    Seen and discussed with Dr. Castellanos, attending.    Outpatient Follow-up: Patient should follow-up with his/her ophthalmologist or with [facility] Department of Ophthalmology within 1 week of after discharge at:    [address]  [place] [address]  [phone]    Patrick Briggs MD, PGY3  Also available on [employer] Teams

## 2023-07-11 NOTE — CONSULT NOTE ADULT - SUBJECTIVE AND OBJECTIVE BOX
[facility] DEPARTMENT OF OPHTHALMOLOGY - INITIAL ADULT CONSULT  -----------------------------------------------------------------------------------------------------------------  Patrick Briggs MD, PGY3  Available on [email or web address] Teams  -----------------------------------------------------------------------------------------------------------------    HPI:  58 year old female, with past history significant for Depression, CVA (multiple, in 2021; on Lovenox), Rheumatoid arthritis, Cigarette smoking, Breast cancer - s/p radiation therapy, and ?Glaucoma, presented to the ED secondary to increased L-face and LUE paresthesia, as well as headache and upper abdominal pain.  Seen and evaluated at bedside; appears a bit anxious, but in NAD.  Complains of frontal headache, present for approximately 6 days and not ameliorated by Tylenol.  Has associated nausea and dizziness.  Also reports pain at the epigastric area radiating toward the RUQ; states bending over to reach for a fallen object when she heard a snapping type sound and since then has had persistent pain.  Pain worsens with coughing and when lying supine in bed and is alleviated partially when pressure is applied to the area.  Pain is sharp and reaches 9/10 maximum intensity.  N prior episodes.  Suffers with acid reflux.  Also has constipation with last adequate bowel movement being approximately one week ago; had a miniscule amount of feces yesterday.    Comments on numbness of the left face and left upper extremity; persistent/chronic condition, but is more intense at present.    Patient also cites medication non-adherence, chiefly in the context of depression.  Has not been motivated to fill most of her prescriptions and presented some empty bottles confirming same.  States that she has significant amount of stressors at present, including being  from her partner of over 35 years.  Confirms depression and suicidal ideation at times, but not presently.  Thinks of suicide (no plans), but this loves her grandchildren too much to seriously entertain the thought.    Vital signs upon ED presentation as follows: BP = 95/61, HR = 82, RR = 17, T = 36.7 C (98 F), O2 Sat = 100% on RA.  Diagnosed with Paresthesias and prescribed NaCl one liter and Tylenol 1 gram in the ED. (11 Jul 2023 01:47)    Ophthlamology consulted as patient has a history of uveitis and scleritis and was complaining that the left eye is having more pain and photophobia. Last saw uveitis specialist Dr. Goldberg 7/3 with improvement. Now endorsing more pain and mild blurry vision. VA at that visit was 20/50 PH 20/30 OD, 20/100 PH 20/60 OS.    Past Medical History:  Depression, CVA (multiple, in 2021; on Lovenox), Rheumatoid arthritis, Cigarette smoking, Breast cancer - s/p radiation therapy  Past Ocular History: anterior uveitis, anterior scleritis, cataract  Drops: pred forte BID OS  Medications: prednisone 10 mg  Allergies: ASA  Family History: denies  Surgical History: no eye surgeries  Outpatient Ophthalmologist: Dr. Naomi Goldberg      Review of Systems:  Constitutional: No fever, chills  Eyes: +blurry vision OS, + eye redness OS, + photophobia OS, denies flashes, floaters, FBS, erythema, discharge, double vision, OU  Neuro: No tremors  Cardiovascular: No chest pain, palpitations  Respiratory: No SOB, no cough  GI: No nausea, vomiting, abdominal pain    Vital Signs: T(C): 36.6 (07-11-23 @ 18:02)  T(F): 97.8 (07-11-23 @ 18:02), Max: 98.6 (07-11-23 @ 01:28)  HR: 69 (07-11-23 @ 18:02) (66 - 96)  BP: 103/55 (07-11-23 @ 18:02) (99/53 - 109/65)  RR:  (17 - 18)  SpO2:  (100% - 100%)  Wt(kg): --  AAOx4    Ophthalmology Exam:  Visual acuity (cc): 20/50 PH 20/30-2 OD. 20/100 PH 20/70 OS (similar to last week no changes)  Pupils: PERRL OU, no APD   Intraocular Pressure:  Ttono 18 OD, 20 OS  Extraocular movements (EOMs): Full OU, no pain, no diplopia. alternating exotropia (previously noted)  Confrontational Visual Field (CVF): Full OD. Full OS    Pen Light Exam (PLE)  External: Normal OU.  Lids/Lashes/Lacrimal Ducts: Flat OU.  Sclera/Conjunctiva: White and quiet OD. nasal injection similar to last wee OS  Cornea: Clear OU.  Anterior Chamber: Deep and formed OD. 2+ cell, 1+ cell, no hypopyon OS  Iris: Flat OD. posterior synechiae OS  Lens: clear OD, NS with pigment on lens OD    Fundus Exam: dilated with 1% tropicamide and 2.5% phenylephrine  Approval obtained from primary team for dilation  Patient aware that pupils can remained dilated for at least 4-6 hours.  Exam performed with 20 D lens    Vitreous: wnl OU  Disc, cup/disc: sharp and pink, 0.2 OU  Macula: wnl OU  Vessels: wnl OU  Periphery: wnl OU    Labs/Imaging:  < from: CT Brain Stroke Protocol (07.10.23 @ 17:40) >  Age-indeterminate lacunar infarct in the right lentiform nucleus.  Bihemispheric chronic microvascular ischemic white matter changes.    Findings were discussed by Dr. Pace with  Dr. Medel, of   Neurology on 7/10/2023 5:35 PM with read back confirmation.    < end of copied text >

## 2023-07-11 NOTE — H&P ADULT - ASSESSMENT
[ x ]  Lab studies personally reviewed  [ x ]  Radiology personally reviewed  [ x ]  Old records personally reviewed    58 year old female, with past history significant for Depression, CVA (multiple, in 2021; on Lovenox), Rheumatoid arthritis, Cigarette smoking, Breast cancer - s/p radiation therapy, and ?Glaucoma, presented to the ED secondary to increased L-face and LUE paresthesia, as well as headache and upper abdominal pain.  Diagnosed with Paresthesias in the ED. [ x ]  Lab studies personally reviewed  [ x ]  Radiology personally reviewed  [ x ]  Old records personally reviewed    58 year old female, with past history significant for Depression, CVA (multiple, in 2021; on Lovenox), Rheumatoid arthritis, Cigarette smoking, Breast cancer - s/p radiation therapy, and ?Glaucoma, presented to the ED secondary to increased L-face and LUE paresthesia, as well as headache and upper abdominal pain.  Diagnosed with Paresthesias in the ED.    Misc/additional recs:  -toxic/metabolic/infectious w/u per primary team  -Dysphagia screen  -Speech and swallow eval if dysphagia screen fails  -NPO except meds until dysphagia screen passed  -Head of bed > 30 degrees for aspiration prevention   -Continuous  telemetry to monitor for arrhythmia  -Stroke labwork: HgbA1C, fasting lipid panel, CBC, CMP, coag pannel, troponin  - Baseline EKG  -Neuro checks Q4  -PT/OT  -DVT Prophylaxis: Lovenox 40 mg Sq daily unless contraindicated in which case SCDs   Fall precautions, aspiration precautions  -ILR - inpatient or outpatient- to rule out arrhythmia if found to have ischemic stroke (if pt does not have ILR already)    To be discussed with neurology attending and will be formally staffed by attending during morning rounds. Recommendations will be finalized once signed by attending.

## 2023-07-11 NOTE — PHYSICAL THERAPY INITIAL EVALUATION ADULT - MANUAL MUSCLE TESTING RESULTS, REHAB EVAL
bilateral upper extremity strength 3+/5, right lower extremity strength 4/5, left lower extremity strength 3+/5

## 2023-07-11 NOTE — OCCUPATIONAL THERAPY INITIAL EVALUATION ADULT - RANGE OF MOTION EXAMINATION, UPPER EXTREMITY
except Left Shoulder Flexion Active ROM 0-90 degrees/bilateral UE Active ROM was WFL  (within functional limits)

## 2023-07-11 NOTE — H&P ADULT - PROBLEM SELECTOR PLAN 5
- left eye very inflamed appearing; has blurred/decreased vision  - patient reporting different diagnoses; cataract, glaucoma, rheumatoid arthritis  - on prednisone drops initially every 2 hours; patient reports using daily at present  - would get urgent Ophthalmology consult - left eye very inflamed appearing; has blurred/decreased vision  - patient reporting different diagnoses; cataract, glaucoma, rheumatoid arthritis  - on prednisone drops initially every 2 hours; patient reports using daily at present  - would get urgent Ophthalmology consult  - may need Rheumatology consult

## 2023-07-11 NOTE — H&P ADULT - TIME BILLING
I have spent a total of 80 minutes or greater to prepare to see the patient, obtaining and reviewing history, physical examination, explaining the diagnosis/es, prognosis and treatment plan with the patient/family/caregiver.  I have also have spent time ordering studies and testing, interpreting results, performing medication reconciliation, submitting subspecialty consultation and documentation as above.

## 2023-07-11 NOTE — OCCUPATIONAL THERAPY INITIAL EVALUATION ADULT - PERTINENT HX OF CURRENT PROBLEM, REHAB EVAL
Pt is a 58 year old female with hx of Depression, CVA (multiple, in 2021), Rheumatoid arthritis, Cigarette smoking, Breast cancer s/p radiation therapy, and ?Glaucoma, who presented to [facility] on 7/11/23 with increased Left face and Left UE paresthesia, as well as headache and upper abdominal pain. CT Brain Scan on 7/10/23 displayed Age-indeterminate lacunar infarct in the right lentiform nucleus. Bihemispheric chronic microvascular ischemic white matter changes.

## 2023-07-11 NOTE — PROGRESS NOTE ADULT - PROBLEM SELECTOR PLAN 4
- liver function tests from today pending  - CTA of abdomen neg for acute pathology   - could be associated with constipation since last adequate bowel movement was more than one week ago  - bowel regimen prescribed  - check RUQ sono although CTA neg

## 2023-07-11 NOTE — H&P ADULT - PROBLEM SELECTOR PLAN 7
- chiefly due to depression; patient is not motivated to fill prescriptions  - has not been taking some medications for more than one month  - needs Psychiatry consult in the AM  - please f/u with [facility] Pharmacist for medication verification

## 2023-07-11 NOTE — H&P ADULT - PROBLEM SELECTOR PLAN 9
- last adequate bowel movement was more than one week ago  - likely impacted by dehydration  - IVF hydration prescribed.  Encourage oral hydration  - f/u electrolytes, TSH level

## 2023-07-11 NOTE — H&P ADULT - NSHPSOCIALHISTORY_GEN_ALL_CORE
SOCIAL HISTORY:    Marital Status:  (  )    ( x ) Single        (  )        (  )        (  )   Lives with:          ( x ) Alone      (  ) Spouse     (  ) Children         (  ) Parents             (  ) Other    History of smoking  No history of alcohol abuse  No history of illegal drug use    Occupation: SOCIAL HISTORY:    Marital Status:  (  )    ( x ) Single        (  )        (  )        (  )   Lives with:          ( x ) Alone      (  ) Spouse     (  ) Children         (  ) Parents             (  ) Other    History of smoking; active smoker of less than 1ppd since around age 29  years old  No history of alcohol abuse  No history of illegal drug use    Occupation:

## 2023-07-11 NOTE — OCCUPATIONAL THERAPY INITIAL EVALUATION ADULT - NSOTDISCHREC_GEN_A_CORE
Pt. may benefit from PT post-hospital discharge. Please continue to follow progress notes closely./No skilled OT needs

## 2023-07-11 NOTE — H&P ADULT - PROBLEM SELECTOR PLAN 6
- frontal area; not ameliorated with Tylenol at home  - chart review notes patient with history of migraine headache  - likely impacted by stress, dehydration, medication non-compliance  - being hydrated  - prescribed Percocet  - no Motrin since record of allergy to aspirin  - continuing w/ Reglan (unclear if daily dosing, but prescribing daily PRN for now)

## 2023-07-11 NOTE — H&P ADULT - NSICDXFAMILYHX_GEN_ALL_CORE_FT
FAMILY HISTORY:  Father  Still living? Unknown  Family history of diabetes mellitus (DM), Age at diagnosis: Age Unknown    Mother  Still living? Unknown  Family history of breast cancer, Age at diagnosis: Age Unknown     Admission

## 2023-07-11 NOTE — PROGRESS NOTE ADULT - PROBLEM SELECTOR PLAN 3
- on Telemetry  - Neurology team suggesting ILR (please d/w Cardiology team)   - EP consult placed for ILR  - f/u TTE (ordered)

## 2023-07-11 NOTE — PHYSICAL THERAPY INITIAL EVALUATION ADULT - GENERAL OBSERVATIONS, REHAB EVAL
Patient was found supine in bed A&Ox4, with some agitation, but agreeable to participate in PT/OT evaluations, all lines and tubes intact, aunt at bedside, HR 76 at rest prior to mobility

## 2023-07-11 NOTE — H&P ADULT - HISTORY OF PRESENT ILLNESS
58 year old female, with past history significant for Depression, CVA (multiple, in 2021; on Lovenox), Rheumatoid arthritis, Cigarette smoking, Breast cancer - s/p radiation therapy, and ?Glaucoma, presented to the ED secondary to increased L-face and LUE paresthesia, as well as headache and upper abdominal pain.  Seen and evaluated at bedside;    Vital signs upon ED presentation as follows: BP = 95/61, HR = 82, RR = 17, T = 36.7 C (98 F), O2 Sat = 100% on RA.  Diagnosed with Paresthesias and prescribed NaCl one liter and Tylenol 1 gram in the ED. 58 year old female, with past history significant for Depression, CVA (multiple, in 2021; on Lovenox), Rheumatoid arthritis, Cigarette smoking, Breast cancer - s/p radiation therapy, and ?Glaucoma, presented to the ED secondary to increased L-face and LUE paresthesia, as well as headache and upper abdominal pain.  Seen and evaluated at bedside; appears a bit anxious, but in NAD.  Complains of frontal headache, present for approximately 6 days and not ameliorated by Tylenol.  Has associated nausea and dizziness.  Also reports pain at the epigastric area radiating toward the RUQ; states bending over to reach for a fallen object when she heard a snapping type sound and since then has had persistent pain.  Pain worsens with coughing and when lying supine in bed and is alleviated partially when pressure is applied to the area.  Pain is sharp and reaches 9/10 maximum intensity.  N prior episodes.  Suffers with acid reflux.  Also has constipation with last adequate bowel movement being approximately one week ago; had a miniscule amount of feces yesterday.    Comments on numbness of the left face and left upper extremity; persistent/chronic condition, but is more intense at present.    Patient also cites medication non-adherence, chiefly in the context of depression.  Has not been motivated to fill most of her prescriptions and presented some empty bottles confirming same.  States that she has significant amount of stressors at present, including being  from her partner of over 35 years.  Confirms depression and suicidal ideation at times, but not presently.  Thinks of suicide (no plans), but this loves her grandchildren too much to seriously entertain the thought.    Vital signs upon ED presentation as follows: BP = 95/61, HR = 82, RR = 17, T = 36.7 C (98 F), O2 Sat = 100% on RA.  Diagnosed with Paresthesias and prescribed NaCl one liter and Tylenol 1 gram in the ED.

## 2023-07-11 NOTE — H&P ADULT - PROBLEM SELECTOR PLAN 4
- sudden onset and rated at 9/10 maximum intensity  - some relief when pressure applied  - history of acid reflux, per patient  - liver enzymes, bilirubin with acceptable ranges  - CTA of abdomen = "No evidence of aortic dissection.  No acute pathology"  - could be associated with constipation since last adequate bowel movement was more than one week ago  - bowel regimen prescribed  - prescribing Protonix for now

## 2023-07-11 NOTE — CONSULT NOTE ADULT - ASSESSMENT
This is a 58 year old woman with a pmhx of Depression, CVA (multiple, in 2021; on Lovenox), Rheumatoid arthritis, Breast cancer s/p radiation therapy, and Glaucoma who presented to the ED with left side face ad upper extremity numbness. Neurology following. CT scan of head and CTA of brain and neck negative for any acute findings.  CT brain showed age-indeterminate lacunar infarct in the right lentiform nucleus and bihemispheric chronic microvascular ischemic white matter changes. Now awaiting BMRI. Ep was consulted for ILR evaluation. Patient denied having a hx of cardiac arrhthymias. There is no telemetry evidence of atrial arrhythmias; nor is there a clear pacing indication at this time.  Per CRYSTAL-AF, patient will benefit from prolonged monitoring for detection of AF/PAF as cause of potential cardioembolic source.  The risks and benefits were explained in detail which included but not limited to bleeding, infection, stroke, syncope 2/2 vasovagal, intubation, and death. Patient expressed understanding and all questions were answered. Patient is currently is undecided about ILR.     Plan:  - Continuous telemetric monitoring  - Monitor electrolytes and replete K to 4 and Mg to 2  - TTE ordered  - Appreciated neurology rec  - Continue care per primary team    Shaji Morales PA-C  Patient to be staffed with attending. Please await attending addendum

## 2023-07-11 NOTE — H&P ADULT - NSHPREVIEWOFSYSTEMS_GEN_ALL_CORE
REVIEW OF SYSTEMS:    CONSTITUTIONAL: No weakness, fever, chills or sweating  EYES/ENT: L-eye redness with decreased vision.  No dysphagia  NECK: No pain or stiffness  RESPIRATORY: No cough or hemoptysis.  No shortness of breath  CARDIOVASCULAR: No chest pain or palpitations.  No lower extremity edema  GASTROINTESTINAL: No epigastric or abdominal pain. No nausea, vomiting or hematemesis.  No diarrhea or constipation. No melena or hematochezia.  GENITOURINARY: No dysuria, frequency or hematuria  MUSCULOSKELETAL: No joint pain, swelling, decreased ROM, erythema, warmth  NEUROLOGICAL: Numbness of the left face and left upper extremity; more pronounced than usual  PSYCHIATRY: Depression and anxiety related to significant personal and relationship stressors  SKIN: No itching, burning, rashes, or lesions   All other review of systems is negative unless indicated above.

## 2023-07-11 NOTE — PHYSICAL THERAPY INITIAL EVALUATION ADULT - RANGE OF MOTION EXAMINATION, REHAB EVAL
noted some decreased ROM in left arm in comparison to right, although still WFL/bilateral upper extremity ROM was WFL (within functional limits)/bilateral lower extremity ROM was WFL (within functional limits)

## 2023-07-11 NOTE — H&P ADULT - PROBLEM SELECTOR PLAN 1
- - presented w/ report of L- arm numbness since the AM as well as L-facial numbness; both more pronounced than at baseline  - history of multiple CVAs and is on Lovenox 120 mg daily  - CT scan of head and CTA of brain and neck negative for any acute findings  - s/p Stroke Code in the ED and has already been seen by Neurology team (appreciated).  NIH = 6  - on Telemetry  - continuing PTA Lovenox 120 mg are recommended  - atorvastatin 80 mg daily  - f/u AM lab-work  - f/u PT/OT  - passed bedside dysphagia screen  - f/u Speech/Swallow eval  - suggestion for ILR, per Neurology team

## 2023-07-11 NOTE — H&P ADULT - PROBLEM SELECTOR PLAN 8
- endorses poor fluid intake  - lab-work appears hemoconcentrated and patient w/ dry oral mucosa  - constipation, with last bowel movement more than one week ago is likely impacted by same  - IVF hydration prescribed  - encourage oral hydration  - f/u electrolytes

## 2023-07-11 NOTE — CONSULT NOTE ADULT - SUBJECTIVE AND OBJECTIVE BOX
Source: patient and Chart    HPI:  This is a 58 year old woman with a pmhx of Depression, CVA (multiple, in ; on Lovenox), Rheumatoid arthritis, Breast cancer s/p radiation therapy, and Glaucoma who presented to the ED with left side face ad upper extremity numbness.    Patient stated that she has been having a frontal HA for the past 6 days and Epigastric pain which radiated to her RUQ for 7 days. She admitted to a hx of constipation but stated yesterday she had a loose BM. She stated that the abdominal pain worsens with coughing and when lying supine in bed and is alleviated partially when pressure is applied to the area.  She describe that pain as sharp and a 9/10 at its maximum intensity. She stated that she try tylenols with no relief. She stated that at baseline she had numbness in her left face and left upper extremity but noted yesterday that it was more intense. Patient expressed that she does not have a stable place to live. She currently staying with a friend but will need to find a new place in 2 months. She also admitted to be non-complaint with her medication. Patient denied fever, chills, diaphoresis, HA, lightheadedness, dizziness, changes in visions, cold like symptoms  (sore throat, cough, conjunctivitis runny nose), CP, palpitation, orthopnea, hematuria, melena, and hematochezia. Patient admitted to chronic SOB.     ED course was significant for  BP = 95/61, HR = 82, RR = 17, T = 36.7 C (98 F), O2 Sat = 100% on RA. Labs were significant for WB __, Hgb __, HCT __, Na __, K __, sCr __, TSH __,  FT4 __, and Troponin 1 negative x2.  CXR revealed __. and EKG. Neuroogy was consulted for code stroke.     There is no telemetry evidence of atrial arrhythmias; nor is there a clear pacing indication at this time.  Per CRYSTAL-AF, patient will benefit from prolonged monitoring for detection of AF/PAF as cause of potential cardioembolic source.  The risks and benefits were explained in detail which included but not limited to bleeding, infection, stroke, syncope 2/2 vasovagal, intubation, and death. Patient expressed understanding and all questions were answered. Patient is currently is undecided about ILR.     PAST MEDICAL & SURGICAL HISTORY:  Cigarette smoker      Rheumatoid arthritis      Other depression      Breast cancer      Migraines      History of multiple cerebrovascular accidents (CVAs)      Suicidal ideation      Paresthesia of left arm      Facial paresthesia      History of hysterectomy      History of cholecystectomy            MEDICATIONS  (STANDING):  atorvastatin 80 milliGRAM(s) Oral at bedtime  enoxaparin Injectable 120 milliGRAM(s) SubCutaneous every 24 hours  folic acid 1 milliGRAM(s) Oral daily  lactated ringers. 1000 milliLiter(s) (125 mL/Hr) IV Continuous <Continuous>  nicotine - 21 mG/24Hr(s) Patch 1 Patch Transdermal daily  prednisoLONE acetate 1% Suspension 1 Drop(s) Left EYE four times a day  predniSONE   Tablet 10 milliGRAM(s) Oral daily  senna 2 Tablet(s) Oral at bedtime    MEDICATIONS  (PRN):  acetaminophen     Tablet .. 650 milliGRAM(s) Oral every 6 hours PRN Mild Pain (1 - 3)  bisacodyl 5 milliGRAM(s) Oral daily PRN Constipation  melatonin 3 milliGRAM(s) Oral at bedtime PRN Insomnia  metoclopramide 10 milliGRAM(s) Oral daily PRN for headache  oxycodone    5 mG/acetaminophen 325 mG 1 Tablet(s) Oral every 6 hours PRN Moderate Pain (4 - 6)      FAMILY HISTORY:  Family history of breast cancer (Mother)    Family history of diabetes mellitus (DM) (Father)        SOCIAL HISTORY:    LIVING SITUATION:  CIGARETTES: Denied  ALCOHOL: denied   ILLICIT DRUG USES: denied    REVIEW OF SYSTEMS:  CONSTITUTIONAL: No fever, weight loss, chills, shakes, or fatigue  EYES: No eye pain, visual disturbances, or discharge  ENMT:  No difficulty hearing, tinnitus, vertigo; No sinus or throat pain  NECK: No pain or stiffness  RESPIRATORY: No cough, wheezing, hemoptysis, or shortness of breath  CARDIOVASCULAR: No chest pain, dyspnea, palpitations, dizziness, syncope, paroxysmal nocturnal dyspnea, orthopnea, or arm or leg swelling  GASTROINTESTINAL: No abdominal  or epigastric pain, nausea, vomiting, hematemesis, diarrhea, constipation, melena or bright red blood.  GENITOURINARY: No dysuria, nocturia, hematuria, or urinary incontinence  NEUROLOGICAL: No headaches, memory loss, slurred speech, limb weakness, loss of strength, numbness, or tremors  MUSCULOSKELETAL: No joint pain or swelling, muscle, back, or extremity pain  PSYCHIATRIC: No depression, anxiety, or difficulty sleeping        Vital Signs Last 24 Hrs  T(C): 36.6 (2023 07:24), Max: 37 (2023 01:28)  T(F): 97.8 (2023 07:24), Max: 98.6 (2023 01:28)  HR: 74 (2023 10:35) (66 - 96)  BP: 107/67 (2023 10:35) (95/61 - 109/65)  BP(mean): --  RR: 18 (2023 10:35) (17 - 18)  SpO2: 100% (2023 10:35) (100% - 100%)    Parameters below as of 2023 10:35  Patient On (Oxygen Delivery Method): room air        PHYSICAL EXAM:  GENERAL: Well appearing, speaking in full sentence, in NAD  HEAD:  Atraumatic, Normocephalic  EYES: EOMI, PERRLA, conjunctiva and sclera clear  ENMT: No tonsillar erythema, exudates, or enlargement; Moist mucous membranes, Good dentition, No lesions  NECK: Supple and normal thyroid.  No JVD or carotid bruit.  Carotid pulse is 2+ bilaterally.  HEART: S1S2 RRR; No murmurs, rubs, or gallops appreciated .  PULMONARY:CTABL, normal respiratory effort.  No rales, wheezing, or rhonchi appreciated bilaterally  ABDOMEN: Bowel sounds present, soft, NDNT  EXTREMITIES:  Warm, well -perfused, no pedal edema, distal pulses present  NEUROLOGICAL:AOx3 ,  motor function grossly  intact    INTERPRETATION OF TELEMETRY:    ECG:    I&O's Detail      LABS:                        12.1   10.12 )-----------( 273      ( 2023 06:02 )             40.3     07-11    136  |  104  |  12  ----------------------------<  94  3.5   |  21<L>  |  0.63    Ca    9.0      2023 06:02  Phos  3.3     07-11  Mg     1.90     07-11    TPro  6.3  /  Alb  3.6  /  TBili  1.1  /  DBili  0.4<H>  /  AST  62<H>  /  ALT  31  /  AlkPhos  109  07-11        PT/INR - ( 10 Jul 2023 17:23 )   PT: 13.6 sec;   INR: 1.17 ratio         PTT - ( 10 Jul 2023 17:23 )  PTT:30.4 sec  Urinalysis Basic - ( 2023 07:10 )    Color: Yellow / Appearance: Cloudy / S.052 / pH: x  Gluc: x / Ketone: Trace mg/dL  / Bili: Negative / Urobili: 1.0 mg/dL   Blood: x / Protein: Trace mg/dL / Nitrite: Negative   Leuk Esterase: Negative / RBC: 1 /HPF / WBC 3 /HPF   Sq Epi: x / Non Sq Epi: x / Bacteria: Few /HPF      BNP  I&O's Detail    Daily     Daily     RADIOLOGY & ADDITIONAL STUDIES:        ASSESSMENT:      Plan:  - Continuous telemetric monitoring  - Monitor electrolytes and replete K to 4 and Mg to 2  -  - Continue care per primary team        Patient to be staffed with attending. Please await attending addendum   Source: patient and Chart    HPI:  This is a 58 year old woman with a pmhx of Depression, CVA (multiple, in ; on Lovenox), Rheumatoid arthritis, Breast cancer s/p radiation therapy, and Glaucoma who presented to the ED with left side face ad upper extremity numbness.    Patient stated that she has been having a frontal HA for the past 6 days and Epigastric pain which radiated to her RUQ for 7 days. She admitted to a hx of constipation but stated yesterday she had a loose BM. She stated that the abdominal pain is worsens with coughing and when lying supine in bed and is alleviated partially when pressure is applied to the area.  She described that pain as sharp and a 9/10 at its maximum intensity. She stated that she tried tylenols with no relief. She stated that at baseline she has numbness in her left face and left upper extremity but noted yesterday that it was more intense. Patient expressed that she does not have a stable place to live. She currently "rooming" with a friend but will need to find a new place in 2 months. She also admitted to be non-complaint with her medications. Patient denied fever, chills, diaphoresis, HA, lightheadedness, dizziness, changes in visions, cold like symptoms  (sore throat, cough, conjunctivitis runny nose), CP, palpitation, orthopnea, hematuria, melena, and hematochezia. Patient admitted to chronic SOB.     ED course was significant for  BP = 95/61, HR = 82, RR = 17, T = 36.7 C (98 F), O2 Sat = 100% on RA. Labs were significant WBC 13.53, H/H 13.7/44.5, , Na 137, K 3.6, BUN 16, scr 0.58, AST 14, ALT 19, PT, 13.6, and INR 1.17. Neurology was cosnulted for code stroke. CT scan of head and CTA of brain and neck negative for any acute findings.  CT brain showed age-indeterminate lacunar infarct in the right lentiform nucleus and bihemispheric chronic microvascular ischemic white matter changes. Patient was deemed not a  tenecteplase/thromectomy candidate as pt is outside both windows. CT AP showed no evidence of aortic dissection. EEp was consulted for ILR evaluation. Patient denied having a hx of cardiac arrhthymias. KG showed 1st degree AVB HR 74 bpm. There is no telemetry evidence of atrial arrhythmias; nor is there a clear pacing indication at this time.  Per [facility]-AF, patient will benefit from prolonged monitoring for detection of AF/PAF as cause of potential cardioembolic source.  The risks and benefits were explained in detail which included but not limited to bleeding, infection, stroke, syncope 2/2 vasovagal, intubation, and death. Patient expressed understanding and all questions were answered. Patient is currently is undecided about ILR.     PAST MEDICAL & SURGICAL HISTORY:  Cigarette smoker  Rheumatoid arthritis  Other depression  Breast cancer  Migraines  History of multiple cerebrovascular accidents (CVAs)  Suicidal ideation  Paresthesia of left arm  Facial paresthesia  History of hysterectomy  History of cholecystectomy    MEDICATIONS  (STANDING):  atorvastatin 80 milliGRAM(s) Oral at bedtime  enoxaparin Injectable 120 milliGRAM(s) SubCutaneous every 24 hours  folic acid 1 milliGRAM(s) Oral daily  lactated ringers. 1000 milliLiter(s) (125 mL/Hr) IV Continuous <Continuous>  nicotine - 21 mG/24Hr(s) Patch 1 Patch Transdermal daily  prednisoLONE acetate 1% Suspension 1 Drop(s) Left EYE four times a day  predniSONE   Tablet 10 milliGRAM(s) Oral daily  senna 2 Tablet(s) Oral at bedtime    MEDICATIONS  (PRN):  acetaminophen     Tablet .. 650 milliGRAM(s) Oral every 6 hours PRN Mild Pain (1 - 3)  bisacodyl 5 milliGRAM(s) Oral daily PRN Constipation  melatonin 3 milliGRAM(s) Oral at bedtime PRN Insomnia  metoclopramide 10 milliGRAM(s) Oral daily PRN for headache  oxycodone    5 mG/acetaminophen 325 mG 1 Tablet(s) Oral every 6 hours PRN Moderate Pain (4 - 6)      FAMILY HISTORY:  Mother hx of breast cancer  Father hx of DM        SOCIAL HISTORY:  LIVING SITUATION: currently rooming with a friend   CIGARETTES: smoked for about 40 yeard 1ppd  ALCOHOL: denied   ILLICIT DRUG USES: denied    REVIEW OF SYSTEMS:  CONSTITUTIONAL: No fever, weight loss, chills, shakes, or fatigue  EYES: No eye pain, visual disturbances, or discharge  ENMT:  No difficulty hearing, tinnitus, vertigo; No sinus or throat pain  NECK: No pain or stiffness  RESPIRATORY: per HPI  CARDIOVASCULAR: per HPI  GASTROINTESTINAL:per HPI  GENITOURINARY: No dysuria, nocturia, hematuria, or urinary incontinence  NEUROLOGICAL: No headaches, memory loss, slurred speech, limb weakness, loss of strength, numbness, or tremors  MUSCULOSKELETAL: No joint pain or swelling, muscle, back, or extremity pain    Vital Signs Last 24 Hrs  T(C): 36.6 (2023 07:24), Max: 37 (2023 01:28)  T(F): 97.8 (2023 07:24), Max: 98.6 (2023 01:28)  HR: 74 (2023 10:35) (66 - 96)  BP: 107/67 (2023 10:35) (95/61 - 109/65)  BP(mean): --  RR: 18 (2023 10:35) (17 - 18)  SpO2: 100% (2023 10:35) (100% - 100%)    Parameters below as of 2023 10:35  Patient On (Oxygen Delivery Method): room air        PHYSICAL EXAM:  GENERAL: Well appearing, speaking in full sentence, in NAD  HEART: S1S2 RRR  Pulmonary: rhonchi b/l , normal respiratory effort  ABDOMEN: Bowel sounds present, soft  EXTREMITIES:  Warm, well -perfused, no pedal edema, distal pulses present  NEUROLOGICAL:AOx3     INTERPRETATION OF TELEMETRY: SR hr 60-80BPM      I&O's Detail      LABS:                        12.1   10.12 )-----------( 273      ( 2023 06:02 )             40.3     07-11    136  |  104  |  12  ----------------------------<  94  3.5   |  21<L>  |  0.63    Ca    9.0      2023 06:02  Phos  3.3     07-11  Mg     1.90     07-11    TPro  6.3  /  Alb  3.6  /  TBili  1.1  /  DBili  0.4<H>  /  AST  62<H>  /  ALT  31  /  AlkPhos  109  07-11        PT/INR - ( 10 Jul 2023 17:23 )   PT: 13.6 sec;   INR: 1.17 ratio         PTT - ( 10 Jul 2023 17:23 )  PTT:30.4 sec  Urinalysis Basic - ( 2023 07:10 )    Color: Yellow / Appearance: Cloudy / S.052 / pH: x  Gluc: x / Ketone: Trace mg/dL  / Bili: Negative / Urobili: 1.0 mg/dL   Blood: x / Protein: Trace mg/dL / Nitrite: Negative   Leuk Esterase: Negative / RBC: 1 /HPF / WBC 3 /HPF   Sq Epi: x / Non Sq Epi: x / Bacteria: Few /HPF      BNP  I&O's Detail    Daily     Daily     RADIOLOGY & ADDITIONAL STUDIES:    < from: CT Brain Stroke Protocol (07.10.23 @ 17:40) >    IMPRESSION:  Age-indeterminate lacunar infarct in the right lentiform nucleus.  Bihemispheric chronic microvascular ischemic white matter changes.    < end of copied text >  < from: CT Brain Perfusion Maps Stroke (07.10.23 @ 17:39) >  CT PERFUSION demonstrated: No perfusion abnormality.  If symptoms persist consider follow up head CT or MRI, MRA  if no   contraindication.    CTA COW:  Patent intracranial circulation without flow limiting stenosis.    CTA NECK: Patent, ECAs, ICAs, no  hemodynamically significant stenosis at    ICA origins by NASCET criteria.  Bilateral vertebral arteries are patent without flow limiting stenosis.    < end of copied text >  < from: CT Angio Chest Aorta w/wo IV Cont (07.10.23 @ 19:26) >  IMPRESSION:  No evidence of aortic dissection    No acute pathology    < end of copied text >  < from: CT Angio Abdomen and Pelvis w/ IV Cont (07.10.23 @ 19:26) >  IMPRESSION:  No evidence of aortic dissection    No acute pathology    < end of copied text >

## 2023-07-11 NOTE — H&P ADULT - NSHPLABSRESULTS_GEN_ALL_CORE
LAB-WORK/STUDIES:                          13.7   13.53 )-----------( 328      ( 10 Jul 2023 17:23 )             44.5     10 Jul 2023 17:23    137    |  105    |  16     ----------------------------<  105    3.6     |  19     |  0.58     Ca    9.8        10 Jul 2023 17:23    TPro  7.9    /  Alb  4.6    /  TBili  1.0    /  DBili  x      /  AST  14     /  ALT  19     /  AlkPhos  100    10 Jul 2023 17:23    LIVER FUNCTIONS - ( 10 Jul 2023 17:23 )  Alb: 4.6 g/dL / Pro: 7.9 g/dL / ALK PHOS: 100 U/L / ALT: 19 U/L / AST: 14 U/L / GGT: x           PT/INR - ( 10 Jul 2023 17:23 )   PT: 13.6 sec;   INR: 1.17 ratio    PTT - ( 10 Jul 2023 17:23 )  PTT:30.4 sec    CAPILLARY BLOOD GLUCOSE          Urinalysis Basic - ( 10 Jul 2023 17:23 )    Color: x / Appearance: x / SG: x / pH: x  Gluc: 105 mg/dL / Ketone: x  / Bili: x / Urobili: x   Blood: x / Protein: x / Nitrite: x   Leuk Esterase: x / RBC: x / WBC x   Sq Epi: x / Non Sq Epi: x / Bacteria: x    =======================================================================    ECG = sinus tachycardia at 103 bpm, QTc = 429    ======================================================================= LAB-WORK/STUDIES:                          13.7   13.53 )-----------( 328      ( 10 Jul 2023 17:23 )             44.5     10 Jul 2023 17:23    137    |  105    |  16     ----------------------------<  105    3.6     |  19     |  0.58     Ca    9.8        10 Jul 2023 17:23    TPro  7.9    /  Alb  4.6    /  TBili  1.0    /  DBili  x      /  AST  14     /  ALT  19     /  AlkPhos  100    10 Jul 2023 17:23    LIVER FUNCTIONS - ( 10 Jul 2023 17:23 )  Alb: 4.6 g/dL / Pro: 7.9 g/dL / ALK PHOS: 100 U/L / ALT: 19 U/L / AST: 14 U/L / GGT: x           PT/INR - ( 10 Jul 2023 17:23 )   PT: 13.6 sec;   INR: 1.17 ratio    PTT - ( 10 Jul 2023 17:23 )  PTT:30.4 sec    CAPILLARY BLOOD GLUCOSE          Urinalysis Basic - ( 10 Jul 2023 17:23 )    Color: x / Appearance: x / SG: x / pH: x  Gluc: 105 mg/dL / Ketone: x  / Bili: x / Urobili: x   Blood: x / Protein: x / Nitrite: x   Leuk Esterase: x / RBC: x / WBC x   Sq Epi: x / Non Sq Epi: x / Bacteria: x    =======================================================================    ECG = sinus rhythm w/ 1st degree AVB at 74 bpm, QTc = 430,   TWI in V2, flattened T-wave in aVR, LAD    =======================================================================

## 2023-07-11 NOTE — PROGRESS NOTE ADULT - PROBLEM SELECTOR PLAN 5
- left eye very inflamed appearing; has blurred/decreased vision - pt with hx of uveitis likely due to RA   - on prednisone drops initially every 2 hours; patient reports using 4x daily at present  - ophthalmology consulted given symptoms worsening as per pt   - may need Rheumatology consult

## 2023-07-11 NOTE — PROGRESS NOTE ADULT - PROBLEM SELECTOR PLAN 6
- frontal area; not ameliorated with Tylenol at home  - chart review notes patient with history of migraine headache  - likely impacted by stress, dehydration, medication non-compliance  - prescribed Percocet  - no Motrin since record of allergy to aspirin  - continuing w/ Reglan (unclear if daily dosing, but prescribing daily PRN for now)

## 2023-07-11 NOTE — OCCUPATIONAL THERAPY INITIAL EVALUATION ADULT - MD ORDER
Occupational Therapy (OT) to evaluate and treat. Occupational Therapy (OT) to evaluate and treat. Out of bed with assistance. Per MEAGHAN Luciano, pt is okay to participate in OT evaluation and perform activity as tolerated.

## 2023-07-11 NOTE — H&P ADULT - NSICDXPASTMEDICALHX_GEN_ALL_CORE_FT
PAST MEDICAL HISTORY:  Breast cancer     Cigarette smoker     CVA (cerebrovascular accident)     Other depression     Rheumatoid arthritis      PAST MEDICAL HISTORY:  Breast cancer     Cigarette smoker     Facial paresthesia     History of multiple cerebrovascular accidents (CVAs)     Migraines     Other depression     Paresthesia of left arm     Rheumatoid arthritis     Suicidal ideation      PAST MEDICAL HISTORY:  APS (antiphospholipid syndrome)     Breast cancer     Cigarette smoker     Facial paresthesia     History of multiple cerebrovascular accidents (CVAs)     Migraines     Other depression     Paresthesia of left arm     Rheumatoid arthritis     Suicidal ideation

## 2023-07-11 NOTE — ED ADULT NURSE REASSESSMENT NOTE - NS ED NURSE REASSESS COMMENT FT1
Break RN note- Patient resting quietly in bed, breathing even and nonlabored. No acute distress. Patient complaining of under breast pain. Patient medicated as ordered. Safety maintained. Patient stable upon exiting the room. Cardiac monitor in place- sinus rhythm. Safety maintained. Patient stable upon exiting the room.

## 2023-07-11 NOTE — PROGRESS NOTE ADULT - ASSESSMENT
58 year old female, with past history significant for Depression, CVA (multiple, in 2021; on Lovenox), Rheumatoid arthritis, Cigarette smoking, Breast cancer - s/p radiation therapy, and uveitis, presented to the ED secondary to increased L-face and LUE paresthesia, as well as headache and upper abdominal pain.

## 2023-07-11 NOTE — H&P ADULT - NSHPPHYSICALEXAM_GEN_ALL_CORE
Vital Signs Last 24 Hrs  T(C): 37 (11 Jul 2023 01:28), Max: 37 (11 Jul 2023 01:28)  T(F): 98.6 (11 Jul 2023 01:28), Max: 98.6 (11 Jul 2023 01:28)  HR: 68 (11 Jul 2023 01:28) (66 - 82)  BP: 99/53 (11 Jul 2023 01:28) (95/61 - 109/65)  BP(mean): --  RR: 17 (11 Jul 2023 01:28) (17 - 17)  SpO2: 100% (11 Jul 2023 01:28) (100% - 100%)    Parameters below as of 10 Jul 2023 16:58  Patient On (Oxygen Delivery Method): room air    ================================================ Vital Signs Last 24 Hrs  T(C): 37 (11 Jul 2023 01:28), Max: 37 (11 Jul 2023 01:28)  T(F): 98.6 (11 Jul 2023 01:28), Max: 98.6 (11 Jul 2023 01:28)  HR: 68 (11 Jul 2023 01:28) (66 - 82)  BP: 99/53 (11 Jul 2023 01:28) (95/61 - 109/65)  BP(mean): --  RR: 17 (11 Jul 2023 01:28) (17 - 17)  SpO2: 100% (11 Jul 2023 01:28) (100% - 100%)    Parameters below as of 10 Jul 2023 16:58  Patient On (Oxygen Delivery Method): room air    ================================================    PHYSICAL EXAMINATION:    APPEARANCE: Adequately groomed, adequately nourished female, sitting up in stretcher, appearing a bit anxious and helpless, but in NAD  HEENT: Dry oral mucosa.  Poor dentition.  Sivakumar erythema of the let eye with some drooping of the eyelid.  No epiphora noted.  Decreased vision  LYMPHATIC: No lymphadenopathy appreciated  CARDIOVASCULAR: (+) S1 S2.  No JVD.  No murmurs.  No edema  RESPIRATORY: Pronounced bronchial breath sounds bilaterally with some rhonchi.  No wheezing or crackles appreciated  GASTROINTESTINAL: Soft.  Non-tender.  (+) BS  GENITOURINARY: No suprapubic tenderness.  No CVA tenderness B/L  EXTREMITIES: Normal range of motion.  No clubbing, cyanosis or edema  MUSCULOSKELETAL: No atrophy.  No asymmetry.  Good ROM  SKIN: No rashes. No ecchymoses.  No cyanosis  PSYCHIATRIC: A&O x 3.  Sad appearing.  Became tearful at one instance when addressing current stressors.  Appears a bit despondent  NEUROLOGICAL: Non-focal, DEWITT x 4 against gravity  VASCULAR: Peripheral pulses palpable

## 2023-07-11 NOTE — PROGRESS NOTE ADULT - PROBLEM SELECTOR PLAN 1
- presented w/ report of L- arm numbness since the AM as well as L-facial numbness; both more pronounced than at baseline  - history of multiple CVAs and is on Lovenox 120 mg daily  - CT scan of head and CTA of brain and neck negative for any acute findings  - s/p Stroke Code in the ED and has already been seen by Neurology team (appreciated).  NIH = 6  - on Telemetry  - continuing PTA Lovenox 120 mg - unclear reason why pt takes this medication at home, pt unsure  - atorvastatin 80 mg daily  - f/u MRI   - f/u PT/OT  - passed bedside dysphagia screen  - f/u Speech/Swallow eval  - suggestion for ILR, per Neurology team

## 2023-07-11 NOTE — PHYSICAL THERAPY INITIAL EVALUATION ADULT - ADDITIONAL COMMENTS
As per patient she is very stressed and overwhelmed because she is currently "homeless" patient stated she was staying with her friend in a home with steps to enter. Patient was unclear wether she would be going back there or would be staying with her aunt in her home. Aunt lives in a basement with steps to get down to. Patient denies using any assistive devices prior to admission.     Patient left supine in bed, NAD, lines intact, RN Mustapha made aware.

## 2023-07-12 LAB
ANION GAP SERPL CALC-SCNC: 11 MMOL/L — SIGNIFICANT CHANGE UP (ref 7–14)
BASOPHILS # BLD AUTO: 0.09 K/UL — SIGNIFICANT CHANGE UP (ref 0–0.2)
BASOPHILS NFR BLD AUTO: 1 % — SIGNIFICANT CHANGE UP (ref 0–2)
BUN SERPL-MCNC: 15 MG/DL — SIGNIFICANT CHANGE UP (ref 7–23)
CALCIUM SERPL-MCNC: 9.1 MG/DL — SIGNIFICANT CHANGE UP (ref 8.4–10.5)
CHLORIDE SERPL-SCNC: 109 MMOL/L — HIGH (ref 98–107)
CO2 SERPL-SCNC: 18 MMOL/L — LOW (ref 22–31)
CREAT SERPL-MCNC: 0.61 MG/DL — SIGNIFICANT CHANGE UP (ref 0.5–1.3)
EGFR: 103 ML/MIN/1.73M2 — SIGNIFICANT CHANGE UP
EOSINOPHIL # BLD AUTO: 0.23 K/UL — SIGNIFICANT CHANGE UP (ref 0–0.5)
EOSINOPHIL NFR BLD AUTO: 2.6 % — SIGNIFICANT CHANGE UP (ref 0–6)
GLUCOSE SERPL-MCNC: 102 MG/DL — HIGH (ref 70–99)
HCT VFR BLD CALC: 39.9 % — SIGNIFICANT CHANGE UP (ref 34.5–45)
HCV AB S/CO SERPL IA: 0.12 S/CO — SIGNIFICANT CHANGE UP (ref 0–0.99)
HCV AB SERPL-IMP: SIGNIFICANT CHANGE UP
HGB BLD-MCNC: 11.6 G/DL — SIGNIFICANT CHANGE UP (ref 11.5–15.5)
IANC: 5.16 K/UL — SIGNIFICANT CHANGE UP (ref 1.8–7.4)
IMM GRANULOCYTES NFR BLD AUTO: 0.3 % — SIGNIFICANT CHANGE UP (ref 0–0.9)
LYMPHOCYTES # BLD AUTO: 2.69 K/UL — SIGNIFICANT CHANGE UP (ref 1–3.3)
LYMPHOCYTES # BLD AUTO: 30.7 % — SIGNIFICANT CHANGE UP (ref 13–44)
MAGNESIUM SERPL-MCNC: 2 MG/DL — SIGNIFICANT CHANGE UP (ref 1.6–2.6)
MCHC RBC-ENTMCNC: 26.3 PG — LOW (ref 27–34)
MCHC RBC-ENTMCNC: 29.1 GM/DL — LOW (ref 32–36)
MCV RBC AUTO: 90.5 FL — SIGNIFICANT CHANGE UP (ref 80–100)
MONOCYTES # BLD AUTO: 0.57 K/UL — SIGNIFICANT CHANGE UP (ref 0–0.9)
MONOCYTES NFR BLD AUTO: 6.5 % — SIGNIFICANT CHANGE UP (ref 2–14)
NEUTROPHILS # BLD AUTO: 5.16 K/UL — SIGNIFICANT CHANGE UP (ref 1.8–7.4)
NEUTROPHILS NFR BLD AUTO: 58.9 % — SIGNIFICANT CHANGE UP (ref 43–77)
NRBC # BLD: 0 /100 WBCS — SIGNIFICANT CHANGE UP (ref 0–0)
NRBC # FLD: 0 K/UL — SIGNIFICANT CHANGE UP (ref 0–0)
PHOSPHATE SERPL-MCNC: 3.5 MG/DL — SIGNIFICANT CHANGE UP (ref 2.5–4.5)
PLATELET # BLD AUTO: 311 K/UL — SIGNIFICANT CHANGE UP (ref 150–400)
POTASSIUM SERPL-MCNC: 3.8 MMOL/L — SIGNIFICANT CHANGE UP (ref 3.5–5.3)
POTASSIUM SERPL-SCNC: 3.8 MMOL/L — SIGNIFICANT CHANGE UP (ref 3.5–5.3)
RBC # BLD: 4.41 M/UL — SIGNIFICANT CHANGE UP (ref 3.8–5.2)
RBC # FLD: 16.6 % — HIGH (ref 10.3–14.5)
SODIUM SERPL-SCNC: 138 MMOL/L — SIGNIFICANT CHANGE UP (ref 135–145)
WBC # BLD: 8.77 K/UL — SIGNIFICANT CHANGE UP (ref 3.8–10.5)
WBC # FLD AUTO: 8.77 K/UL — SIGNIFICANT CHANGE UP (ref 3.8–10.5)

## 2023-07-12 PROCEDURE — 99233 SBSQ HOSP IP/OBS HIGH 50: CPT

## 2023-07-12 PROCEDURE — 90792 PSYCH DIAG EVAL W/MED SRVCS: CPT

## 2023-07-12 PROCEDURE — 93306 TTE W/DOPPLER COMPLETE: CPT | Mod: 26

## 2023-07-12 PROCEDURE — 99232 SBSQ HOSP IP/OBS MODERATE 35: CPT

## 2023-07-12 RX ORDER — ERGOCALCIFEROL 1.25 MG/1
50000 CAPSULE ORAL
Refills: 0 | Status: DISCONTINUED | OUTPATIENT
Start: 2023-07-12 | End: 2023-07-18

## 2023-07-12 RX ORDER — OLANZAPINE 15 MG/1
5 TABLET, FILM COATED ORAL
Refills: 0 | Status: DISCONTINUED | OUTPATIENT
Start: 2023-07-12 | End: 2023-07-18

## 2023-07-12 RX ORDER — ERGOCALCIFEROL 1.25 MG/1
50000 CAPSULE ORAL
Refills: 0 | Status: DISCONTINUED | OUTPATIENT
Start: 2023-07-12 | End: 2023-07-12

## 2023-07-12 RX ORDER — DIPHENHYDRAMINE HCL 50 MG
25 CAPSULE ORAL ONCE
Refills: 0 | Status: COMPLETED | OUTPATIENT
Start: 2023-07-12 | End: 2023-07-12

## 2023-07-12 RX ADMIN — Medication 1 DROP(S): at 22:30

## 2023-07-12 RX ADMIN — Medication 25 MILLIGRAM(S): at 23:12

## 2023-07-12 RX ADMIN — ENOXAPARIN SODIUM 120 MILLIGRAM(S): 100 INJECTION SUBCUTANEOUS at 09:03

## 2023-07-12 RX ADMIN — Medication 1 DROP(S): at 18:16

## 2023-07-12 RX ADMIN — Medication 2000 UNIT(S): at 11:42

## 2023-07-12 RX ADMIN — OLANZAPINE 5 MILLIGRAM(S): 15 TABLET, FILM COATED ORAL at 19:06

## 2023-07-12 RX ADMIN — Medication 1 DROP(S): at 06:04

## 2023-07-12 RX ADMIN — ERGOCALCIFEROL 50000 UNIT(S): 1.25 CAPSULE ORAL at 19:06

## 2023-07-12 RX ADMIN — Medication 1 DROP(S): at 11:42

## 2023-07-12 RX ADMIN — ATORVASTATIN CALCIUM 80 MILLIGRAM(S): 80 TABLET, FILM COATED ORAL at 21:20

## 2023-07-12 RX ADMIN — Medication 10 MILLIGRAM(S): at 06:04

## 2023-07-12 RX ADMIN — Medication 1 MILLIGRAM(S): at 11:42

## 2023-07-12 RX ADMIN — PANTOPRAZOLE SODIUM 40 MILLIGRAM(S): 20 TABLET, DELAYED RELEASE ORAL at 06:05

## 2023-07-12 NOTE — BH CONSULTATION LIAISON ASSESSMENT NOTE - NSBHCHARTREVIEWLAB_PSY_A_CORE FT
11.6   8.77  )-----------( 311      ( 12 Jul 2023 05:54 )             39.9   07-12    138  |  109<H>  |  15  ----------------------------<  102<H>  3.8   |  18<L>  |  0.61    Ca    9.1      12 Jul 2023 05:54  Phos  3.5     07-12  Mg     2.00     07-12    TPro  6.3  /  Alb  3.6  /  TBili  1.1  /  DBili  0.4<H>  /  AST  62<H>  /  ALT  31  /  AlkPhos  109  07-11

## 2023-07-12 NOTE — PROGRESS NOTE ADULT - SUBJECTIVE AND OBJECTIVE BOX
Patient is a 58y old  Female who presents with a chief complaint of Paresthesias (12 Jul 2023 13:36)      SUBJECTIVE / OVERNIGHT EVENTS: patient seen and examined by bedside, pt c/o headache, dizziness, left face tingling, , had vomited yesterday, was sob before, now resolved   tele : NSR in 60s. first degree AV block     MEDICATIONS  (STANDING):  atorvastatin 80 milliGRAM(s) Oral at bedtime  cholecalciferol 2000 Unit(s) Oral daily  enoxaparin Injectable 120 milliGRAM(s) SubCutaneous every 24 hours  folic acid 1 milliGRAM(s) Oral daily  lactated ringers. 1000 milliLiter(s) (125 mL/Hr) IV Continuous <Continuous>  nicotine - 21 mG/24Hr(s) Patch 1 Patch Transdermal daily  OLANZapine 5 milliGRAM(s) Oral <User Schedule>  pantoprazole    Tablet 40 milliGRAM(s) Oral before breakfast  prednisoLONE acetate 1% Suspension 1 Drop(s) Left EYE four times a day  predniSONE   Tablet 10 milliGRAM(s) Oral daily  senna 2 Tablet(s) Oral at bedtime    MEDICATIONS  (PRN):  acetaminophen     Tablet .. 650 milliGRAM(s) Oral every 6 hours PRN Mild Pain (1 - 3)  bisacodyl 5 milliGRAM(s) Oral daily PRN Constipation  melatonin 3 milliGRAM(s) Oral at bedtime PRN Insomnia  metoclopramide 10 milliGRAM(s) Oral daily PRN for headache  oxycodone    5 mG/acetaminophen 325 mG 1 Tablet(s) Oral every 6 hours PRN Moderate Pain (4 - 6)      Vital Signs Last 24 Hrs  T(C): 36.4 (12 Jul 2023 10:00), Max: 36.6 (11 Jul 2023 18:02)  T(F): 97.6 (12 Jul 2023 10:00), Max: 97.9 (11 Jul 2023 21:45)  HR: 61 (12 Jul 2023 10:00) (61 - 75)  BP: 105/60 (12 Jul 2023 10:00) (102/59 - 118/71)  BP(mean): --  RR: 18 (12 Jul 2023 10:00) (18 - 18)  SpO2: 98% (12 Jul 2023 10:00) (98% - 100%)    Parameters below as of 12 Jul 2023 10:00  Patient On (Oxygen Delivery Method): room air      CAPILLARY BLOOD GLUCOSE        I&O's Summary      PHYSICAL EXAM:  CONSTITUTIONAL: NAD,   EYES: EOMI; L eye with redness   ENMT: Moist oral mucosa  RESPIRATORY: Normal respiratory effort; lungs are clear to auscultation bilaterally  CARDIOVASCULAR: Regular rate and rhythm, normal S1 and S2, no murmur; No lower extremity edema  ABDOMEN: RUQ TTP with deep palpation; normoactive bowel sounds, no rebound/guarding  MUSCULOSKELETAL:   no clubbing or cyanosis of digits; no joint swelling or tenderness to palpation  PSYCH: A+O to person, place, and time; affect appropriate  NEUROLOGY: moving all extremities, strength intact    SKIN: No rashes; no palpable lesions    LABS:                        11.6   8.77  )-----------( 311      ( 12 Jul 2023 05:54 )             39.9     07-12    138  |  109<H>  |  15  ----------------------------<  102<H>  3.8   |  18<L>  |  0.61    Ca    9.1      12 Jul 2023 05:54  Phos  3.5     07-12  Mg     2.00     07-12    TPro  6.3  /  Alb  3.6  /  TBili  1.1  /  DBili  0.4<H>  /  AST  62<H>  /  ALT  31  /  AlkPhos  109  07-11    PT/INR - ( 10 Jul 2023 17:23 )   PT: 13.6 sec;   INR: 1.17 ratio         PTT - ( 10 Jul 2023 17:23 )  PTT:30.4 sec      Urinalysis Basic - ( 12 Jul 2023 05:54 )  < from: US Abdomen Upper Quadrant Right (07.11.23 @ 17:33) >    Liver: 15.1 cm. Increased echogenicity, likely fatty infiltration.  Bile ducts: Normal caliber. Common bile duct measures 5 mm.  Gallbladder: Cholecystectomy.  Pancreas: Poorly visualized due to bowel gas.  Right kidney: 10.1 cm. No hydronephrosis. A 9.9 x 7.9 x 9.8 cm cyst again   noted.  Ascites: None.    IMPRESSION:    Cholecystectomy. Additional findings as described    < end of copied text >    Color: x / Appearance: x / SG: x / pH: x  Gluc: 102 mg/dL / Ketone: x  / Bili: x / Urobili: x   Blood: x / Protein: x / Nitrite: x   Leuk Esterase: x / RBC: x / WBC x   Sq Epi: x / Non Sq Epi: x / Bacteria: x        RADIOLOGY & ADDITIONAL TESTS:  < from: TTE with Doppler (w/Cont) (07.12.23 @ 07:20) >  -------------------------------------------------------------------  CONCLUSIONS:  1. Normal mitral valve. Minimal mitral regurgitation.  2. Normal left ventricular internal dimensions and wall  thicknesses.  3. Endocardium not well visualized; grossly moderate to  severe segmental left ventricular systolic dysfunction.  Hypokinesis of the apex, mid to distal septum, and distal  anterior wall.  Endocardial visualization enhanced with  intravenous injection of echo contrast (Definity).  No  obvious LV thrombus seen.  4. Normal left ventricular diastolic function.  5. The right ventricle is not well visualized; grossly  normal right ventricular systolic function.  6. A bubble study was performed with the intravenous  injection of agitated saline contrast.  Following  intravenous contrast injection, bubbles were seen in the  left heart (following the Valsalva maneuver).  This may be  consistent with an intracardiac shunt.  *** No previous Echo exam.    < end of copied text >    Imaging Personally Reviewed:    Consultant(s) Notes Reviewed: EP, Psychiatry      Care Discussed with Consultants/Other Providers:

## 2023-07-12 NOTE — BH CONSULTATION LIAISON ASSESSMENT NOTE - NSBHATTESTCOMMENTATTENDFT_PSY_A_CORE
met with the patient. case discussed with ms3, impression and plan discussed. Patient admits to depressive symptoms, denies any manic symptoms today, denies any si or hi. Passive thoughts of dying, but future oriented, and help seeking. Poor sleep  In agreement with resuming zyprexa

## 2023-07-12 NOTE — PROVIDER CONTACT NOTE (OTHER) - ACTION/TREATMENT ORDERED:
PERRI Wild made aware; no action at this time; monitor patient and notify with any changes; Safety maintained

## 2023-07-12 NOTE — BH CONSULTATION LIAISON ASSESSMENT NOTE - NSBHCHARTREVIEWVS_PSY_A_CORE FT
Vital Signs Last 24 Hrs  T(C): 36.4 (12 Jul 2023 06:00), Max: 36.6 (11 Jul 2023 18:02)  T(F): 97.5 (12 Jul 2023 06:00), Max: 97.9 (11 Jul 2023 21:45)  HR: 65 (12 Jul 2023 06:00) (65 - 75)  BP: 102/59 (12 Jul 2023 06:00) (102/59 - 118/71)  BP(mean): --  RR: 18 (12 Jul 2023 06:00) (18 - 18)  SpO2: 98% (12 Jul 2023 06:00) (98% - 100%)    Parameters below as of 12 Jul 2023 06:00  Patient On (Oxygen Delivery Method): room air

## 2023-07-12 NOTE — PROGRESS NOTE ADULT - SUBJECTIVE AND OBJECTIVE BOX
Subjective: Denies HA, lightheadedness, dizziness, CP, palpitation, SOB, abdominal pain, and N/V.      Interval Events:  - Presented with left side face ad upper extremity numbness. Patient stated that she has been having a frontal HA for the past 6 days and Epigastric pain which radiated to her RUQ for 7 days. She stated that the abdominal pain is worsens with coughing and when lying supine in bed and is alleviated partially when pressure is applied to the area.  She described that pain as sharp and a 9/10 at its maximum intensity. CT AP showed no evidence of aortic dissection  - Neurology was cosnulted for code stroke. CT scan of head and CTA of brain and neck negative for any acute findings.  CT brain showed age-indeterminate lacunar infarct in the right lentiform nucleus and bihemispheric chronic microvascular ischemic white matter changes. Patient was deemed not a  tenecteplase/thromectomy candidate as pt is outside both windows  - Ep was consulted for ILR evaluation. Patient denied having a hx of cardiac arrhthymias. KG showed 1st degree AVB HR 74 bpm. There is no telemetry evidence of atrial arrhythmias; nor is there a clear pacing indication at this time.  Per [facility]YSTAL-AF, patient will benefit from prolonged monitoring for detection of AF/PAF as cause of potential cardioembolic source.  The risks and benefits were explained in detail which included but not limited to bleeding, infection, stroke, syncope 2/2 vasovagal, intubation, and death. Patient expressed understanding and all questions were answered. Patient is currently is undecided about ILR.     MEDICATIONS  (STANDING):  atorvastatin 80 milliGRAM(s) Oral at bedtime  cholecalciferol 2000 Unit(s) Oral daily  enoxaparin Injectable 120 milliGRAM(s) SubCutaneous every 24 hours  folic acid 1 milliGRAM(s) Oral daily  lactated ringers. 1000 milliLiter(s) (125 mL/Hr) IV Continuous <Continuous>  nicotine - 21 mG/24Hr(s) Patch 1 Patch Transdermal daily  pantoprazole    Tablet 40 milliGRAM(s) Oral before breakfast  prednisoLONE acetate 1% Suspension 1 Drop(s) Left EYE four times a day  predniSONE   Tablet 10 milliGRAM(s) Oral daily  senna 2 Tablet(s) Oral at bedtime    MEDICATIONS  (PRN):  acetaminophen     Tablet .. 650 milliGRAM(s) Oral every 6 hours PRN Mild Pain (1 - 3)  bisacodyl 5 milliGRAM(s) Oral daily PRN Constipation  melatonin 3 milliGRAM(s) Oral at bedtime PRN Insomnia  metoclopramide 10 milliGRAM(s) Oral daily PRN for headache  oxycodone    5 mG/acetaminophen 325 mG 1 Tablet(s) Oral every 6 hours PRN Moderate Pain (4 - 6)      Vital Signs Last 24 Hrs  T(C): 36.4 (12 Jul 2023 10:00), Max: 36.6 (11 Jul 2023 18:02)  T(F): 97.6 (12 Jul 2023 10:00), Max: 97.9 (11 Jul 2023 21:45)  HR: 61 (12 Jul 2023 10:00) (61 - 75)  BP: 105/60 (12 Jul 2023 10:00) (102/59 - 118/71)  BP(mean): --  RR: 18 (12 Jul 2023 10:00) (18 - 18)  SpO2: 98% (12 Jul 2023 10:00) (98% - 100%)    Parameters below as of 12 Jul 2023 10:00  Patient On (Oxygen Delivery Method): room air      I&O's Detail      Physical Exam:  GENERAL: Lying in bed, well appearing, speaking in full sentence, in NAD  HEART: S1S2 RRR  PULMONARY: CTABL, normal respiratory effort  ABDOMEN: + Bowel sounds present, soft  EXTREMITIES:  Warm, well -perfused, no pedal edema, distal pulses present  NEUROLOGICAL:AOx3    EKG:  TELEMETERIC:                            11.6   8.77  )-----------( 311      ( 12 Jul 2023 05:54 )             39.9     PT/INR - ( 10 Jul 2023 17:23 )   PT: 13.6 sec;   INR: 1.17 ratio         PTT - ( 10 Jul 2023 17:23 )  PTT:30.4 sec  07-12    138  |  109<H>  |  15  ----------------------------<  102<H>  3.8   |  18<L>  |  0.61    Ca    9.1      12 Jul 2023 05:54  Phos  3.5     07-12  Mg     2.00     07-12    TPro  6.3  /  Alb  3.6  /  TBili  1.1  /  DBili  0.4<H>  /  AST  62<H>  /  ALT  31  /  AlkPhos  109  07-11      DIMENSIONS:  Dimensions:     Normal Values:  LA:     3.3 cm    2.0 - 4.0 cm  Ao:     2.8 cm    2.0 - 3.8 cm  SEPTUM: 0.7 cm    0.6 - 1.2 cm  PWT:    0.8 cm    0.6 - 1.1 cm  LVIDd:  4.9 cm    3.0 - 5.6 cm  LVIDs:  3.7 cm    1.8 - 4.0 cm  Derived Variables:  LVMI: 69 g/m2  RWT: 0.32  Fractional short: 24 %  Ejection Fraction (Visual Estimate): 30-35 %  ------------------------------------------------------------------------  OBSERVATIONS:  Mitral Valve: Normal mitral valve. Minimal mitral  regurgitation.  Aortic Root: Normal aortic root.  Aortic Valve: Aortic valve leaflet morphology not well  visualized. Minimal aortic regurgitation.  Left Atrium: Normal left atrium.  LA volume index = 22  cc/m2.  Left Ventricle: Endocardium not well visualized; grossly  moderate to severe segmental left ventricular systolic  dysfunction.  Hypokinesis of the apex, mid to distal  septum, and distal anterior wall.  Endocardial  visualization enhanced with intravenous injection of echo  contrast (Definity).  No obvious LV thrombus seen. Normal  left ventricular internal dimensions and wall thicknesses.  Normal left ventricular diastolic function.  Right Heart: Normal right atrium. The right ventricle is  not well visualized; grossly normal right ventricular  systolic function. Normal tricuspid valve. Minimal  tricuspid regurgitation. Normal pulmonic valve.  Pericardium/PleuraNormal pericardium with no pericardial  effusion.  ------------------------------------------------------------------------  CONCLUSIONS:  1. Normal mitral valve. Minimal mitral regurgitation.  2. Normal left ventricular internal dimensions and wall  thicknesses.  3. Endocardium not well visualized; grossly moderate to  severe segmental left ventricular systolic dysfunction.  Hypokinesis of the apex, mid to distal septum, and distal  anterior wall.  Endocardial visualization enhanced with  intravenous injection of echo contrast (Definity).  No  obvious LV thrombus seen.  4. Normal left ventricular diastolic function.  5. The right ventricle is not well visualized; grossly  normal right ventricular systolic function.  6. A bubble study was performed with the intravenous  injection of agitated saline contrast.  Following  intravenous contrast injection, bubbles were seen in the  left heart (following the Valsalva maneuver).  This may be  consistent with an intracardiac shunt.  *** No previous Echo exam.  ------------------------------------------------------------------------  Confirmed on  7/12/2023 - 09:49:14 by Amaury Nunes M.D.,  [facility], [facility]  ------------------------------------------------------------------------

## 2023-07-12 NOTE — BH CONSULTATION LIAISON ASSESSMENT NOTE - DESCRIPTION
Housing instability. Says she became homeless and is now crashing on friends couch, but that she needs to move out in the next month or two. Has 4 children. Previously  for 33 years, but  left her a year ago for another woman. Drinks alcohol a few times a year. Smokes cannabis every other day. Smokes a pack of cigarettes a day. Does not use other drugs.     Unemployed, on disability. Was previously living in [place] in May.

## 2023-07-12 NOTE — BH CONSULTATION LIAISON ASSESSMENT NOTE - NSSUICPROTFACT_PSY_ALL_CORE
Identifies reasons for living/Cultural, spiritual and/or moral attitudes against suicide/Taoist beliefs

## 2023-07-12 NOTE — PROGRESS NOTE ADULT - PROBLEM SELECTOR PLAN 3
- on Telemetry  -downtrending   - Neurology team suggesting ILR  - EP consult placed for ILR,pt undecided about it   - TTE noted as above, grossly moderate to severe segmental left ventricular systolic dysfunction. Hypokinesis of the apex, mid to distal septum, and distal anterior wall.  .  No obvious LV thrombus seen.   bubble study was performed concerning for  intracardiac shunt.   will f/u further Neuro recs  will also request cardiology eval

## 2023-07-12 NOTE — PROGRESS NOTE ADULT - PROBLEM SELECTOR PLAN 1
- presented w/ report of L- arm numbness since the AM as well as L-facial numbness; both more pronounced than at baseline  - history of multiple CVAs and is on Lovenox 120 mg daily  - CT scan of head and CTA of brain and neck negative for any acute findings  - s/p Stroke Code in the ED and has already been seen by Neurology team (appreciated).  NIH = 6  - on Telemetry  - continuing PTA Lovenox 120 mg - unclear reason why pt takes this medication at home, pt says she was on coumadin, was getting bruises , so was changed to lovenox   - atorvastatin 80 mg daily  - f/u MRI   - f/u PT/OT  - passed bedside dysphagia screen  - f/u Speech/Swallow eval  - suggestion for ILR, per Neurology team, EP yolanda appreciated, pt undecided about ILR, will CTM

## 2023-07-12 NOTE — BH CONSULTATION LIAISON ASSESSMENT NOTE - DESCRIPTION (FIRST USE, LAST USE, QUANTITY, FREQUENCY, DURATION)
1 pack a day. Patient unable to say when she started smoking -- memory impairment post-CVA. Says probably 20 years.

## 2023-07-12 NOTE — PROVIDER CONTACT NOTE (OTHER) - ACTION/TREATMENT ORDERED:
PERRI Wild made aware; Okay to give percocet to patient for pain; encourage oral hydration and food intake to help increase Bp and repeat BP in hour of giving pain med; Safety maintained;

## 2023-07-12 NOTE — PROVIDER CONTACT NOTE (OTHER) - BACKGROUND
Pt admitted with chest pain and Left sided numbness and weakness
Pt admitted for left sided numbness and weakness

## 2023-07-12 NOTE — BH CONSULTATION LIAISON ASSESSMENT NOTE - RISK ASSESSMENT
Risk factors: little social support, single, substance use (cannabis), chronic pain/health problems, housing instability, financial instability, bipolar diagnosis  Protective factors: says she wants to be here for grandchildren, [employer] (Religion) believes suicide is a sin and only god can decide life/death, positive attitude and self-efficacy re: improving mental health Risk factors: little social support, noncompliance, no connected with psychiatry, single, substance use (cannabis), chronic pain/health problems, housing instability, financial instability, bipolar diagnosis  Protective factors: says she wants to be here for grandchildren, [employer] ([employer]) believes suicide is a sin and only god can decide life/death, positive attitude and self-efficacy re: improving mental health  Denies any si or hi today, calm, advocates for self

## 2023-07-12 NOTE — BH CONSULTATION LIAISON ASSESSMENT NOTE - NSICDXPASTMEDICALHX_GEN_ALL_CORE_FT
PAST MEDICAL HISTORY:  Breast cancer     Cigarette smoker     Facial paresthesia     History of multiple cerebrovascular accidents (CVAs)     Migraines     Other depression     Paresthesia of left arm     Rheumatoid arthritis     Suicidal ideation

## 2023-07-12 NOTE — BH CONSULTATION LIAISON ASSESSMENT NOTE - SUMMARY
Siddhartha Frost 58 year old female with a past psychiatric history notable for bipolar disorder, depression, and anxiety with a PMH of multiple CVAs, glaucoma, and rheumatoid arthritis who presented to the [facility] ED complaining of parasthesias of the left arm secondary to a subacute lacunar infarct and who was referred to  psychiatry for evaluation of suicidal ideation and depressive symptoms.    Patient is on disability, has chronic pain, has housing instability, was recently left by her  of 33 years, and feels like these factors have been contributing to her low mood. Patient is endorses symptoms of depression, passive SI, and low-mood and is interested in psychiatric treatment and long-term follow-up care outpatient. Endorses a positive attitude about improving her mental health and life circumstances.     She denies any feelings of grandiosity, working on any new projects, spending more money than usual, impulsivity, high levels of energy, or a euphoric mood. She denies any visual hallucinations, delusions, or paranoia. She denies wanting to physically hurt herself, but endorses passive suicidal ideation.     Assessment    Plan  - Start patient on lithium and risperidone  - CBT and psychiatry follow-up outpatient     Siddhartha Frost 58 year old female with a past psychiatric history notable for bipolar disorder, depression, and anxiety with a PMH of multiple CVAs, glaucoma, and rheumatoid arthritis who presented to the [facility] ED complaining of parasthesias of the left arm secondary to a subacute lacunar infarct and who was referred to  psychiatry for evaluation of suicidal ideation and depressive symptoms.    Patient is on disability, has chronic pain, has housing instability, was recently left by her  of 33 years, and feels like these factors have been contributing to her low mood. Patient is endorses symptoms of depression, passive SI, and low-mood and is interested in psychiatric treatment and long-term follow-up care outpatient. Endorses a positive attitude about improving her mental health and life circumstances.     She denies any feelings of grandiosity, working on any new projects, spending more money than usual, impulsivity, high levels of energy, or a euphoric mood. She denies any visual hallucinations, delusions, or paranoia. She denies wanting to physically hurt herself, but endorses passive suicidal ideation.     Assessment  Patient experiencing a depressive episode in the context of Bipolar I with psychotic features    Plan  - Start patient on lithium and risperidone  - Melatonin for sleep  - Get TSH, vitamin D  - CBT and psychiatry follow-up outpatient       Siddhartha Frost 58 year old female with a past psychiatric history notable for bipolar disorder, depression, and anxiety with a PMH of multiple CVAs, glaucoma, and rheumatoid arthritis who presented to the [facility] ED complaining of parasthesias of the left arm secondary to a subacute lacunar infarct and who was referred to  psychiatry for evaluation of suicidal ideation and depressive symptoms.    Patient is on disability, has chronic pain, has housing instability, was recently left by her  of 33 years, and feels like these factors have been contributing to her low mood. Patient is endorses symptoms of depression, passive SI, and low-mood and is interested in psychiatric treatment and long-term follow-up care outpatient. Endorses a positive attitude about improving her mental health and life circumstances.     She denies any feelings of grandiosity, working on any new projects, spending more money than usual, impulsivity, high levels of energy, or a euphoric mood. She denies any visual hallucinations, delusions, or paranoia. She denies wanting to physically hurt herself, but endorses passive suicidal ideation.     Assessment  Patient experiencing a depressive episode in the context of Bipolar I with psychotic features    Plan  - No indication for psychiatric 1:1  - Get TSH, vitamin D levels  - Will await collateral from previous psychiatrist in [place].  - RESUME Zyprexa at a dose of 5mg q 8PM PO--- to target sleep, mood, anxiety.  - SW assistance-- homelessness, needs to establish care with medical providers in [place].   - AGGRESSION/COMBATIVE BEHAVIORS==== Zyprexa 2.5mg BID PRN IM/PO  - DISPOSITION---- outpatient psychiatric follow up. Will benefit from psychotherapy and psychiatric med management       Siddhartha Frost 58 year old female with a past psychiatric history notable for bipolar disorder, depression, and anxiety with a PMH of multiple CVAs, glaucoma, and rheumatoid arthritis who presented to the [facility] ED complaining of parasthesias of the left arm secondary to a subacute lacunar infarct and who was referred to  psychiatry for evaluation of suicidal ideation and depressive symptoms.    Patient is on disability, has chronic pain, has housing instability, was recently left by her  of 33 years, and feels like these factors have been contributing to her low mood. Patient is endorses symptoms of depression, passive SI, and low-mood and is interested in psychiatric treatment and long-term follow-up care outpatient. Endorses a positive attitude about improving her mental health and life circumstances.     She denies any feelings of grandiosity, working on any new projects, spending more money than usual, impulsivity, high levels of energy, or a euphoric mood. She denies any visual hallucinations, delusions, or paranoia. Has a h/o manic symptoms, + psychosis. She denies wanting to physically hurt herself, but endorses passive suicidal ideation.     Plan  - No indication for psychiatric 1:1  - Get TSH, vitamin D levels  - Will await collateral from previous psychiatrist in [place].  - RESUME Zyprexa at a dose of 5mg q 8PM PO--- to target sleep, mood, anxiety. Monitor qtc. Monitor BP  - SW assistance-- homelessness, needs to establish care with medical providers in [place].   - AGGRESSION/COMBATIVE BEHAVIORS==== Zyprexa 2.5mg BID PRN IM/PO  - DISPOSITION---- outpatient psychiatric follow up. Will benefit from psychotherapy and psychiatric med management

## 2023-07-12 NOTE — PROGRESS NOTE ADULT - ASSESSMENT
This is a 58 year old woman with a pmhx of Depression, CVA (multiple, in 2021; on Lovenox), Rheumatoid arthritis, Breast cancer s/p radiation therapy, and Glaucoma who presented to the ED with left side face ad upper extremity numbness. Neurology following. CT scan of head and CTA of brain and neck negative for any acute findings.  CT brain showed age-indeterminate lacunar infarct in the right lentiform nucleus and bihemispheric chronic microvascular ischemic white matter changes. Now awaiting BMRI. Ep was consulted for ILR evaluation. Patient denied having a hx of cardiac arrhthymias. There is no telemetry evidence of atrial arrhythmias; nor is there a clear pacing indication at this time.  Per [facility]YSTAL-AF, patient will benefit from prolonged monitoring for detection of AF/PAF as cause of potential cardioembolic source.  The risks and benefits were explained in detail which included but not limited to bleeding, infection, stroke, syncope 2/2 vasovagal, intubation, and death. Patient expressed understanding and all questions were answered. Patient is currently is undecided about ILR. Ep will sign off.   Plan:  - Continuous telemetric monitoring  - Monitor electrolytes and replete K to 4 and Mg to 2  - Appreciated neurology rec  - GMDT per cardiology /primary team with re-evaluation of EF in 3months   - Continue care per primary team    Shaji Morales PA-C  Patient to be staffed with attending. Please await attending addendum

## 2023-07-12 NOTE — BH CONSULTATION LIAISON ASSESSMENT NOTE - CURRENT MEDICATION
MEDICATIONS  (STANDING):  atorvastatin 80 milliGRAM(s) Oral at bedtime  cholecalciferol 2000 Unit(s) Oral daily  enoxaparin Injectable 120 milliGRAM(s) SubCutaneous every 24 hours  folic acid 1 milliGRAM(s) Oral daily  lactated ringers. 1000 milliLiter(s) (125 mL/Hr) IV Continuous <Continuous>  nicotine - 21 mG/24Hr(s) Patch 1 Patch Transdermal daily  pantoprazole    Tablet 40 milliGRAM(s) Oral before breakfast  prednisoLONE acetate 1% Suspension 1 Drop(s) Left EYE four times a day  predniSONE   Tablet 10 milliGRAM(s) Oral daily  senna 2 Tablet(s) Oral at bedtime    MEDICATIONS  (PRN):  acetaminophen     Tablet .. 650 milliGRAM(s) Oral every 6 hours PRN Mild Pain (1 - 3)  bisacodyl 5 milliGRAM(s) Oral daily PRN Constipation  melatonin 3 milliGRAM(s) Oral at bedtime PRN Insomnia  metoclopramide 10 milliGRAM(s) Oral daily PRN for headache  oxycodone    5 mG/acetaminophen 325 mG 1 Tablet(s) Oral every 6 hours PRN Moderate Pain (4 - 6)

## 2023-07-12 NOTE — PROGRESS NOTE ADULT - PROBLEM SELECTOR PLAN 4
- LFts noted, mild AST elevation, will CTM   - CTA of abdomen neg for acute pathology   - could be associated with constipation since last adequate bowel movement was more than one week ago  - bowel regimen prescribed  - cRUQ sono noted as above, no acute findings

## 2023-07-12 NOTE — PROGRESS NOTE ADULT - PROBLEM SELECTOR PLAN 10
- on Lovenox 120 mg SQ PTA (in the context of multiple CVAs); continuing  Vit d deficiency: will supplement   PT eval   plan of care d/w pt and ACP

## 2023-07-12 NOTE — BH CONSULTATION LIAISON ASSESSMENT NOTE - OTHER PAST PSYCHIATRIC HISTORY (INCLUDE DETAILS REGARDING ONSET, COURSE OF ILLNESS, INPATIENT/OUTPATIENT TREATMENT)
Bipolar I  Says she's had two episodes where she didn't sleep or eat for a period of time, had grandiose delusions (thought she was god), and had auditory and visual hallucinations and paranoid delusions (she saw blood coming out of the walls, believed she could 'smell' the devil). Has had periods of depression. Was previously given lithium, but has been unable to find motivation to get medications and has a hard time keeping track of what she needs to take.

## 2023-07-12 NOTE — PROGRESS NOTE ADULT - PROBLEM SELECTOR PLAN 5
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

## 2023-07-12 NOTE — BH CONSULTATION LIAISON ASSESSMENT NOTE - HPI (INCLUDE ILLNESS QUALITY, SEVERITY, DURATION, TIMING, CONTEXT, MODIFYING FACTORS, ASSOCIATED SIGNS AND SYMPTOMS)
Siddhartha Frost 58 year old female with a past psychiatric history notable for bipolar disorder, depression, and anxiety with a PMH of multiple CVAs, glaucoma, and rheumatoid arthritis who presented to the [facility] ED complaining of parasthesias of the left arm secondary to a subacute lacunar infarct and who was referred to  psychiatry for evaluation of suicidal ideation and depressive symptoms.    Siddhartha notes that for the past year or so she has been feeling very 'down'. She is on disability, has chronic pain, has housing instability, was recently left by her  of 33 years, and feels like these factors have been contributing to her low mood. She has been having difficulty sleeping for the past few months, waking up every few hours. She is eating less than she used to and has difficulty concentrating. She has thoughts about wanting to end her life, but denies any plan and states that she wouldn't kill herself because it is against her Christianity (Scientologist). She is less interested in the activities that she usually enjoys doing and says that she has had low self-esteem for the past year after her  left her for another woman. She was previously taking lithium for bipolar disorder, but says it was switched to 'gabapentin' and that she hasn't had the energy to  her medications at the pharmacy so has been non-adherent to medications for approximately a month. She says that while she has been at the hospital she has been hearing things 'moving' that she thinks other people cannot hear, but denies any other auditory hallucinations like voices. She also endorses anxiety about her future and her health.    She denies any feelings of grandiosity, working on any new projects, spending more money than usual, impulsivity, high levels of energy, or a euphoric mood. She denies any visual hallucinations, delusions, or paranoia. She denies wanting to physically hurt herself, but endorses passive suicidal ideation.     Patient is interested in psychiatric treatment to help her depressive symptoms and insomnia and wants long-term follow-up care outpatient. Endorses a positive attitude about improving her mental health and life circumstances.      Siddhartha Frost 58 year old female with a past psychiatric history notable for bipolar disorder, depression, and anxiety with a PMH of multiple CVAs, glaucoma, and rheumatoid arthritis who presented to the [facility] ED complaining of parasthesias of the left arm secondary to a subacute lacunar infarct and who was referred to  psychiatry for evaluation of suicidal ideation and depressive symptoms.    Sdidhartha notes that for the past year or so she has been feeling very 'down'. She is on disability, has chronic pain, has housing instability, was recently left by her  of 33 years, and feels like these factors have been contributing to her low mood. She has been having difficulty sleeping for the past few months, waking up every few hours. She is eating less than she used to and has difficulty concentrating. She has thoughts about wanting to end her life, but denies any plan and states that she wouldn't kill herself because it is against her Hindu (Jehovah's witness). She is less interested in the activities that she usually enjoys doing and says that she has had low self-esteem for the past year after her  left her for another woman. She was previously taking lithium for bipolar disorder, but says it was switched to 'gabapentin' and that she hasn't had the energy to  her medications at the pharmacy so has been non-adherent to medications for approximately a month. She says that while she has been at the hospital she has been hearing things 'moving' that she thinks other people cannot hear, but denies any other auditory hallucinations like voices. She also endorses anxiety about her future and her health.    She denies any feelings of grandiosity, working on any new projects, spending more money than usual, impulsivity, high levels of energy, or a euphoric mood. She denies any visual hallucinations, delusions, or paranoia. She denies wanting to physically hurt herself, but endorses passive suicidal ideation.     Patient is interested in psychiatric treatment to help her depressive symptoms and insomnia and wants long-term follow-up care outpatient. Endorses a positive attitude about improving her mental health and life circumstances.     Previous Medz from pharmacy. Pharmacist said last time patient picked up medications was in April  Lovenox 128 8 mg 1 daily  Olanzapine 5 mg 1 bedtime  Topomax 50 mg 1 tablet 2x  Mirtazapine 45 mg at bedtime  Metoclopramide PRN for migraines  Prenisolone eyedrops  Cymbalta 20mg last dose was in feb  Hydroxazine - discontinued  Gabapentin    Pt saw Kadi Fernández at [facility] on [facility] in [place] for mental health treatment. Called and spoke with receptionst and said they would call back/     Siddhartha Frost 58 year old female with a past psychiatric history notable for bipolar disorder, depression, and anxiety with a PMH of multiple CVAs, glaucoma, and rheumatoid arthritis who presented to the [facility] ED complaining of parasthesias of the left arm secondary to a subacute lacunar infarct and who was referred to  psychiatry for evaluation of suicidal ideation and depressive symptoms.    Siddhartha notes that for the past year or so she has been feeling very 'down'. She is on disability, has chronic pain, has housing instability, was recently left by her  of 33 years, and feels like these factors have been contributing to her low mood. She has been having difficulty sleeping for the past few months, waking up every few hours. She is eating less than she used to and has difficulty concentrating. She has thoughts about wanting to end her life, but denies any plan and states that she wouldn't kill herself because it is against her Latter day (Congregation). She is less interested in the activities that she usually enjoys doing and says that she has had low self-esteem for the past year after her  left her for another woman. She was previously taking lithium for bipolar disorder, but says it was switched to 'gabapentin' and that she hasn't had the energy to  her medications at the pharmacy so has been non-adherent to medications for approximately a month. She says that while she has been at the hospital she has been hearing things 'moving' that she thinks other people cannot hear, but denies any other auditory hallucinations like voices. She also endorses anxiety about her future and her health.    She denies any feelings of grandiosity, working on any new projects, spending more money than usual, impulsivity, high levels of energy, or a euphoric mood. She denies any visual hallucinations, delusions, or paranoia. She denies wanting to physically hurt herself, but endorses passive suicidal ideation no intent or plan. Is future oriented    Patient is interested in psychiatric treatment to help her depressive symptoms and insomnia and wants long-term follow-up care outpatient. Endorses a positive attitude about improving her mental health and life circumstances.     Previous Medz from pharmacy. Pharmacist said last time patient picked up medications was in April  Lovenox 128 8 mg 1 daily  Olanzapine 5 mg 1 bedtime  Topomax 50 mg 1 tablet 2x  Mirtazapine 45 mg at bedtime  Metoclopramide PRN for migraines  Prenisolone eyedrops  Cymbalta 20mg last dose was in feb  Hydroxazine - discontinued  Gabapentin    Pt saw Kadi Fernández at [facility] on [facility] in [place] for mental health treatment. Called and spoke with receptionst and said they would call back/

## 2023-07-12 NOTE — PROVIDER CONTACT NOTE (OTHER) - ASSESSMENT
Pt resting comfortably at this time; resp even and unlabored; VSS stable except for soft BP; patient report slight dizziness however not related to BP related to pain; No acute distress at this moment; Safety maintained
Pt asymptomatic with BP of 95/50; VSS at this time; No signs of distress; patient resting comfortably in bed; Call bell in reach; Safety maintained

## 2023-07-12 NOTE — BH CONSULTATION LIAISON ASSESSMENT NOTE - MSE UNSTRUCTURED FT
Appearance: Adult female in no acute distress; laying in hospital bed  Behavior: Calm & cooperative; fair eye contact  Speech: Normal rate, tone, and volume  Motor: No PMR/PMA; no other abnormal movements  Mood: “Ok”  Affect: Depressed and mood congruent  Thought Process: Somewhat circumstantial, mostly logical, and goal-directed  Thought Content: Endorses SI without plan, denies HI; no evidence of delusions  Perception: Notes that she hears sounds at night that she doesn't think are really there  Cognition: Grossly intact, but with some impaired attention  Insight: Fair  Judgement: Fair  Impulsivity: Intact

## 2023-07-13 DIAGNOSIS — Z87.898 PERSONAL HISTORY OF OTHER SPECIFIED CONDITIONS: ICD-10-CM

## 2023-07-13 LAB
ANION GAP SERPL CALC-SCNC: 11 MMOL/L — SIGNIFICANT CHANGE UP (ref 7–14)
BUN SERPL-MCNC: 12 MG/DL — SIGNIFICANT CHANGE UP (ref 7–23)
CALCIUM SERPL-MCNC: 8.8 MG/DL — SIGNIFICANT CHANGE UP (ref 8.4–10.5)
CHLORIDE SERPL-SCNC: 108 MMOL/L — HIGH (ref 98–107)
CO2 SERPL-SCNC: 21 MMOL/L — LOW (ref 22–31)
CREAT SERPL-MCNC: 0.6 MG/DL — SIGNIFICANT CHANGE UP (ref 0.5–1.3)
EGFR: 103 ML/MIN/1.73M2 — SIGNIFICANT CHANGE UP
GLUCOSE SERPL-MCNC: 92 MG/DL — SIGNIFICANT CHANGE UP (ref 70–99)
HCT VFR BLD CALC: 39.9 % — SIGNIFICANT CHANGE UP (ref 34.5–45)
HGB BLD-MCNC: 12.6 G/DL — SIGNIFICANT CHANGE UP (ref 11.5–15.5)
MAGNESIUM SERPL-MCNC: 1.9 MG/DL — SIGNIFICANT CHANGE UP (ref 1.6–2.6)
MCHC RBC-ENTMCNC: 27.2 PG — SIGNIFICANT CHANGE UP (ref 27–34)
MCHC RBC-ENTMCNC: 31.6 GM/DL — LOW (ref 32–36)
MCV RBC AUTO: 86.2 FL — SIGNIFICANT CHANGE UP (ref 80–100)
NRBC # BLD: 0 /100 WBCS — SIGNIFICANT CHANGE UP (ref 0–0)
NRBC # FLD: 0 K/UL — SIGNIFICANT CHANGE UP (ref 0–0)
PHOSPHATE SERPL-MCNC: 3.3 MG/DL — SIGNIFICANT CHANGE UP (ref 2.5–4.5)
PLATELET # BLD AUTO: 293 K/UL — SIGNIFICANT CHANGE UP (ref 150–400)
POTASSIUM SERPL-MCNC: 3.8 MMOL/L — SIGNIFICANT CHANGE UP (ref 3.5–5.3)
POTASSIUM SERPL-SCNC: 3.8 MMOL/L — SIGNIFICANT CHANGE UP (ref 3.5–5.3)
RBC # BLD: 4.63 M/UL — SIGNIFICANT CHANGE UP (ref 3.8–5.2)
RBC # FLD: 16.4 % — HIGH (ref 10.3–14.5)
SODIUM SERPL-SCNC: 140 MMOL/L — SIGNIFICANT CHANGE UP (ref 135–145)
WBC # BLD: 8.4 K/UL — SIGNIFICANT CHANGE UP (ref 3.8–10.5)
WBC # FLD AUTO: 8.4 K/UL — SIGNIFICANT CHANGE UP (ref 3.8–10.5)

## 2023-07-13 PROCEDURE — 99233 SBSQ HOSP IP/OBS HIGH 50: CPT

## 2023-07-13 PROCEDURE — 70551 MRI BRAIN STEM W/O DYE: CPT | Mod: 26

## 2023-07-13 PROCEDURE — 93970 EXTREMITY STUDY: CPT | Mod: 26

## 2023-07-13 RX ORDER — DIPHENHYDRAMINE HCL 50 MG
25 CAPSULE ORAL AT BEDTIME
Refills: 0 | Status: COMPLETED | OUTPATIENT
Start: 2023-07-13 | End: 2023-07-16

## 2023-07-13 RX ADMIN — Medication 25 MILLIGRAM(S): at 22:51

## 2023-07-13 RX ADMIN — Medication 1 MILLIGRAM(S): at 15:08

## 2023-07-13 RX ADMIN — Medication 1 DROP(S): at 05:57

## 2023-07-13 RX ADMIN — Medication 10 MILLIGRAM(S): at 05:58

## 2023-07-13 RX ADMIN — Medication 1 DROP(S): at 15:07

## 2023-07-13 RX ADMIN — ATORVASTATIN CALCIUM 80 MILLIGRAM(S): 80 TABLET, FILM COATED ORAL at 21:26

## 2023-07-13 RX ADMIN — ENOXAPARIN SODIUM 120 MILLIGRAM(S): 100 INJECTION SUBCUTANEOUS at 11:28

## 2023-07-13 RX ADMIN — Medication 1 DROP(S): at 21:25

## 2023-07-13 RX ADMIN — OLANZAPINE 5 MILLIGRAM(S): 15 TABLET, FILM COATED ORAL at 21:26

## 2023-07-13 RX ADMIN — PANTOPRAZOLE SODIUM 40 MILLIGRAM(S): 20 TABLET, DELAYED RELEASE ORAL at 05:57

## 2023-07-13 NOTE — DIETITIAN INITIAL EVALUATION ADULT - REASON FOR ADMISSION
Paresthesia    Per chart Patient is a 58y old  Female who presents with a chief complaint of Paresthesias

## 2023-07-13 NOTE — DIETITIAN INITIAL EVALUATION ADULT - NS FNS DIET ORDER
Diet, Regular:   High Fiber (HIFIBER)  DASH/TLC {Sodium & Cholesterol Restricted} (DASH)  Low Fat (LOWFAT) (07-13-23 @ 12:32)

## 2023-07-13 NOTE — DIETITIAN INITIAL EVALUATION ADULT - OTHER INFO
Attempted to see pt at bedside, pt was out for MRI. Pt was NPO from 7/10 - 7/11. Pt was then placed on a minced and moist diet on 7/11 - 7/13. Swallow study on 7/13 recommended diet be upgraded to Regular. Pt is currently on a bowel regimen, last BM unknown. No current weight trends will reassess on follow-up. Attempted to see pt at bedside during breakfast and lunch, pt was out for MRI. Pt was NPO from 7/10 - 7/11. Pt was then placed on a minced and moist diet on 7/11 - 7/13. Bedside swallow study on 7/13 recommended diet be upgraded to Regular. Pt is currently being followed by psych for anxiety and depression, is noted with a poor appetite. Pt is currently on a bowel regimen as noted for constipation, last BM unknown. No current weight trends will reassess on follow-up.

## 2023-07-13 NOTE — PROGRESS NOTE ADULT - PROBLEM SELECTOR PLAN 1
- presented w/ report of L- arm numbness since the AM as well as L-facial numbness; both more pronounced than at baseline  - history of multiple CVAs and is on Lovenox 120 mg daily  - CT scan of head and CTA of brain and neck negative for any acute findings  - s/p Stroke Code in the ED and has already been seen by Neurology team (appreciated).  NIH = 6  - on Telemetry  - continuing PTA Lovenox 120 mg - unclear reason why pt takes this medication at home, pt says she was on coumadin, was getting bruises , so was changed to lovenox   - atorvastatin 80 mg daily  - f/u MRI   - f/u PT/OT  - passed bedside dysphagia screen  - f/u Speech/Swallow eval  - suggestion for ILR, per Neurology team, EP yolanda appreciated, pt undecided about ILR, will CTM - presented w/ report of L- arm numbness since the AM as well as L-facial numbness; both more pronounced than at baseline  - history of multiple CVAs and is on Lovenox 120 mg daily  - CT scan of head and CTA of brain and neck negative for any acute findings  - s/p Stroke Code in the ED and has already been seen by Neurology team (appreciated).  NIH = 6  - on Telemetry  - continuing PTA Lovenox 120 mg - unclear reason why pt takes this medication at home, pt says she was on coumadin, was getting bruises , so was changed to lovenox   - atorvastatin 80 mg daily  -  MRI  head shows Acute lacunar infarct involving the right basal ganglia region.  - f/u PT/OT recc outpt PT   - passed bedside dysphagia screen  - Speech/Swallow eval noted, recc regular with thin fluids   - suggestion for ILR, per Neurology team, EP eval appreciated, pt undecided about ILR, will CTM  -TTE noted - grossly moderate to severe segmental left ventricular systolic dysfunction. Hypokinesis of the apex, mid to distal septum, and distal anterior wall.  .  No obvious LV thrombus seen. bubble study was performed concerning for  intracardiac shunt. Case was discussed with neurology  , recc checking doppler which is negative  cards recc checking LESLEY if MRI shows stroke, will order for LESLEY   will f/u further neuro recs

## 2023-07-13 NOTE — PROGRESS NOTE ADULT - PROBLEM SELECTOR PLAN 3
- on Telemetry  -downtrending   - Neurology team suggesting ILR  - EP consult placed for ILR,pt undecided about it   - TTE noted as above, grossly moderate to severe segmental left ventricular systolic dysfunction. Hypokinesis of the apex, mid to distal septum, and distal anterior wall.  .  No obvious LV thrombus seen.   bubble study was performed concerning for  intracardiac shunt.   will f/u further Neuro recs  will also request cardiology eval - on Telemetry  -downtrending   - Neurology team suggesting ILR  - EP consult placed for ILR,pt undecided about it   - TTE noted as above, grossly moderate to severe segmental left ventricular systolic dysfunction. Hypokinesis of the apex, mid to distal septum, and distal anterior wall.  .  No obvious LV thrombus seen.   bubble study was performed concerning for  intracardiac shunt.   will f/u further Neuro recs   cardiology eval appreciated, case discussed, since acute CVA present , will need to wait 1 month prior to [facility] pe cards recs  will order LESLEY

## 2023-07-13 NOTE — CONSULT NOTE ADULT - ASSESSMENT
58 year old female, with past history significant for Depression, CVA (multiple, in 2021; on Lovenox), Rheumatoid arthritis, Cigarette smoking, Breast cancer - s/p radiation therapy, and ?Glaucoma, presented to the ED secondary to increased L-face and LUE paresthesia, as well as headache and upper abdominal pain.         1. LV dysfunction  -new mod-severe segmental LV dysfunction  -denies cardiac hx or workup in past  -endorses SOB, CP on exertion. trops elevated on admission 113--104--89  -will need to discuss LHC pending neuro workup    2.  Paresthesias  -presented w/ report of L- arm numbness as well as L-facial numbness  -history of multiple CVAs and is on Lovenox 120 mg daily  -CT scan of head and CTA of brain and neck negative for any acute findings  -s/p Stroke Code in the ED  -f/u MRI   -f/u neuro recs, patient deciding about ILR     58 year old female, with past history significant for Depression, CVA (multiple, in 2021; on Lovenox), Rheumatoid arthritis, Cigarette smoking, Breast cancer - s/p radiation therapy, and ?Glaucoma, presented to the ED secondary to increased L-face and LUE paresthesia, as well as headache and upper abdominal pain.         1. LV dysfunction  -new mod-severe segmental LV dysfunction  -denies cardiac hx or workup in past  -endorses SOB, CP on exertion. trops elevated on admission 113--104--89  -will need to discuss LHC pending neuro workup. If acute CVA present will need to wait 1 month prior to LHC    2.  Paresthesias  -presented w/ report of L- arm numbness as well as L-facial numbness  -history of multiple CVAs and is on Lovenox 120 mg daily  -CT scan of head and CTA of brain and neck negative for any acute findings  -s/p Stroke Code in the ED  -f/u MRI   -f/u neuro recs, patient deciding about ILR  -intracardiac shunt on TTE, if acute CVA present will need LESLEY

## 2023-07-13 NOTE — DIETITIAN INITIAL EVALUATION ADULT - PERTINENT LABORATORY DATA
07-13    140  |  108<H>  |  12  ----------------------------<  92  3.8   |  21<L>  |  0.60    Ca    8.8      13 Jul 2023 05:51  Phos  3.3     07-13  Mg     1.90     07-13    A1C with Estimated Average Glucose Result: 6.1 % (07-11-23 @ 06:02)

## 2023-07-13 NOTE — PROGRESS NOTE ADULT - PROBLEM SELECTOR PLAN 6
- frontal area; not ameliorated with Tylenol at home  - chart review notes patient with history of migraine headache  - likely impacted by stress, dehydration, medication non-compliance  - prescribed Percocet  - no Motrin since record of allergy to aspirin  - continuing w/ Reglan (unclear if daily dosing, but prescribing daily PRN for now) - frontal area; not ameliorated with Tylenol at home  - chart review notes patient with history of migraine headache  - headache improved

## 2023-07-13 NOTE — BH CONSULTATION LIAISON PROGRESS NOTE - NSBHFUPINTERVALHXFT_PSY_A_CORE
Siddhartha Santosh 58 year old female with a past psychiatric history notable for bipolar disorder, depression, and anxiety with a PMH of multiple CVAs, glaucoma, and rheumatoid arthritis who presented to the [facility] ED complaining of parasthesias of the left arm secondary to a subacute lacunar infarct and who was referred to  psychiatry for evaluation of suicidal ideation and depressive symptoms.    AOX3, but keeps forgetting name of hospital (calling it the [facility])  Patient denies auditory/visual hallucinations, SI or HI, paranoid delusions.  Mood is 'depressed'  Patient still saying mood is low and has difficulty sleeping at night English Siddhartha Frost 58 year old female with a past psychiatric history notable for bipolar disorder, depression, and anxiety with a PMH of multiple CVAs, glaucoma, and rheumatoid arthritis who presented to the [facility] ED complaining of parasthesias of the left arm secondary to a subacute lacunar infarct and who was referred to  psychiatry for evaluation of suicidal ideation and depressive symptoms.    AOX3, but keeps forgetting name of hospital (calling it the [facility])  Patient denies auditory/visual hallucinations, SI or HI, paranoid delusions.  Mood is 'depressed'  Patient still saying mood is low and has difficulty sleeping at night    Spoke with sister over the phone. Says patient has longstanding bipolar disorder and when she has witnessed manic episodes that the patient had incoherent speech, talked about god/devil, and had auditory hallucinations. Also mentioned patient had 2 suicide attempts in the past trying to overdose on prescription pain pills given to treat fibromyalgia/RA. Said patient had difficulty remembering to take medications and go to appointments and came to [place] for social support with friend/sister to help get her back on her feet. Expressed concerns for substance use disorder re: prescription pain meds and cannabis. Said patient used to go to [facility] in [place] frequently.     Siddhartha Frost 58 year old female with a past psychiatric history notable for bipolar disorder, depression, and anxiety with a PMH of multiple CVAs, glaucoma, and rheumatoid arthritis who presented to the [facility] ED complaining of parasthesias of the left arm secondary to a subacute lacunar infarct and who was referred to  psychiatry for evaluation of suicidal ideation and depressive symptoms.    AOX3, but keeps forgetting name of hospital (calling it the [facility])  Patient denies auditory/visual hallucinations, SI or HI, paranoid delusions.  Mood is 'depressed'  Patient still saying mood is low and has difficulty sleeping at night    Spoke with sister over the phone. Says patient has longstanding bipolar disorder and when she has witnessed manic episodes that the patient had incoherent speech, talked about god/devil, and had auditory hallucinations. Also mentioned patient had 2 suicide attempts in the past trying to overdose on prescription pain pills given to treat fibromyalgia/RA. Said patient had difficulty remembering to take medications and go to appointments and came to [place] for social support with friend/sister to help get her back on her feet. Expressed concerns for substance use disorder re: prescription pain meds and cannabis.      AOX3, but keeps forgetting name of hospital (calling it the [facility])  Patient denies auditory/visual hallucinations, SI or HI, paranoid delusions.  Mood is 'depressed'  Patient still saying mood is low and has difficulty sleeping at night    Spoke with sister over the phone. Says patient has longstanding bipolar disorder and when she has witnessed manic episodes that the patient had incoherent speech, talked about god/devil, and had auditory hallucinations. Also mentioned patient had 2 suicide attempts in the past trying to overdose on prescription pain pills given to treat fibromyalgia/RA. Said patient had difficulty remembering to take medications and go to appointments and came to [place] for social support with friend/sister to help get her back on her feet. Expressed concerns for substance use disorder re: prescription pain meds and cannabis.     Care coordinated with psychiatric provider in [place]. Did not have record of a bipolar diagnosis, but rather focus was [facility]AN[facility]. Has a h.o inpatient psych admissions at [facility], OD on prescription pain meds. Has not seen patient since last 1 year

## 2023-07-13 NOTE — PROGRESS NOTE ADULT - PROBLEM SELECTOR PLAN 9
- last adequate bowel movement was more than one week ago  - likely impacted by dehydration  - IVF hydration prescribed.  Encourage oral hydration  - f/u electrolytes, TSH level resolved l

## 2023-07-13 NOTE — BH CONSULTATION LIAISON PROGRESS NOTE - NSBHFUPINTERVALCCFT_PSY_A_CORE
Patient still complaining that she cannot sleep well  Last night patient's BP dropped to 96/46 (around 9pm)  Patient given 25mg benedryl prn last night to help with sleep Patient still complaining that she cannot sleep well  Last night patient's BP dropped to 96/46 (around 9pm)  Patient given 25mg benadryl prn last night to help with sleep

## 2023-07-13 NOTE — DIETITIAN INITIAL EVALUATION ADULT - PERTINENT MEDS FT
MEDICATIONS  (STANDING):  atorvastatin 80 milliGRAM(s) Oral at bedtime  enoxaparin Injectable 120 milliGRAM(s) SubCutaneous every 24 hours  ergocalciferol 83382 Unit(s) Oral <User Schedule>  folic acid 1 milliGRAM(s) Oral daily  lactated ringers. 1000 milliLiter(s) (125 mL/Hr) IV Continuous <Continuous>  nicotine - 21 mG/24Hr(s) Patch 1 Patch Transdermal daily  OLANZapine 5 milliGRAM(s) Oral <User Schedule>  pantoprazole    Tablet 40 milliGRAM(s) Oral before breakfast  prednisoLONE acetate 1% Suspension 1 Drop(s) Left EYE four times a day  predniSONE   Tablet 10 milliGRAM(s) Oral daily  senna 2 Tablet(s) Oral at bedtime    MEDICATIONS  (PRN):  acetaminophen     Tablet .. 650 milliGRAM(s) Oral every 6 hours PRN Mild Pain (1 - 3)  bisacodyl 5 milliGRAM(s) Oral daily PRN Constipation  melatonin 3 milliGRAM(s) Oral at bedtime PRN Insomnia  metoclopramide 10 milliGRAM(s) Oral daily PRN for headache  oxycodone    5 mG/acetaminophen 325 mG 1 Tablet(s) Oral every 6 hours PRN Moderate Pain (4 - 6)

## 2023-07-13 NOTE — PROGRESS NOTE ADULT - SUBJECTIVE AND OBJECTIVE BOX
Patient is a 58y old  Female who presents with a chief complaint of Paresthesias (12 Jul 2023 16:29)      SUBJECTIVE / OVERNIGHT EVENTS:    MEDICATIONS  (STANDING):  atorvastatin 80 milliGRAM(s) Oral at bedtime  enoxaparin Injectable 120 milliGRAM(s) SubCutaneous every 24 hours  ergocalciferol 51925 Unit(s) Oral <User Schedule>  folic acid 1 milliGRAM(s) Oral daily  lactated ringers. 1000 milliLiter(s) (125 mL/Hr) IV Continuous <Continuous>  nicotine - 21 mG/24Hr(s) Patch 1 Patch Transdermal daily  OLANZapine 5 milliGRAM(s) Oral <User Schedule>  pantoprazole    Tablet 40 milliGRAM(s) Oral before breakfast  prednisoLONE acetate 1% Suspension 1 Drop(s) Left EYE four times a day  predniSONE   Tablet 10 milliGRAM(s) Oral daily  senna 2 Tablet(s) Oral at bedtime    MEDICATIONS  (PRN):  acetaminophen     Tablet .. 650 milliGRAM(s) Oral every 6 hours PRN Mild Pain (1 - 3)  bisacodyl 5 milliGRAM(s) Oral daily PRN Constipation  melatonin 3 milliGRAM(s) Oral at bedtime PRN Insomnia  metoclopramide 10 milliGRAM(s) Oral daily PRN for headache  oxycodone    5 mG/acetaminophen 325 mG 1 Tablet(s) Oral every 6 hours PRN Moderate Pain (4 - 6)      Vital Signs Last 24 Hrs  T(C): 36.3 (13 Jul 2023 05:47), Max: 36.7 (12 Jul 2023 14:00)  T(F): 97.4 (13 Jul 2023 05:47), Max: 98.1 (12 Jul 2023 21:00)  HR: 52 (13 Jul 2023 05:47) (52 - 71)  BP: 104/62 (13 Jul 2023 05:47) (91/55 - 116/54)  BP(mean): --  RR: 18 (13 Jul 2023 05:47) (18 - 18)  SpO2: 98% (13 Jul 2023 05:47) (96% - 99%)    Parameters below as of 13 Jul 2023 05:47  Patient On (Oxygen Delivery Method): room air      CAPILLARY BLOOD GLUCOSE        I&O's Summary      PHYSICAL EXAM:  GENERAL: NAD, well-developed  HEAD:  Atraumatic, Normocephalic  EYES: EOMI, PERRLA, conjunctiva and sclera clear  NECK: Supple, No JVD  CHEST/LUNG: Clear to auscultation bilaterally; No wheeze  HEART: Regular rate and rhythm; No murmurs, rubs, or gallops  ABDOMEN: Soft, Nontender, Nondistended; Bowel sounds present  EXTREMITIES:  2+ Peripheral Pulses, No clubbing, cyanosis, or edema  PSYCH: AAOx3  NEUROLOGY: non-focal  SKIN: No rashes or lesions    LABS:                        12.6   8.40  )-----------( 293      ( 13 Jul 2023 05:51 )             39.9     07-13    140  |  108<H>  |  12  ----------------------------<  92  3.8   |  21<L>  |  0.60    Ca    8.8      13 Jul 2023 05:51  Phos  3.3     07-13  Mg     1.90     07-13            Urinalysis Basic - ( 13 Jul 2023 05:51 )    Color: x / Appearance: x / SG: x / pH: x  Gluc: 92 mg/dL / Ketone: x  / Bili: x / Urobili: x   Blood: x / Protein: x / Nitrite: x   Leuk Esterase: x / RBC: x / WBC x   Sq Epi: x / Non Sq Epi: x / Bacteria: x        RADIOLOGY & ADDITIONAL TESTS:    Imaging Personally Reviewed:    Consultant(s) Notes Reviewed:      Care Discussed with Consultants/Other Providers:   Patient is a 58y old  Female who presents with a chief complaint of Paresthesias (12 Jul 2023 16:29)      SUBJECTIVE / OVERNIGHT EVENTS: patient seen and examined by bedside, pt still with persistent left face tingling, chest pain , sob resolved   tele : NSR in 50s      MEDICATIONS  (STANDING):  atorvastatin 80 milliGRAM(s) Oral at bedtime  enoxaparin Injectable 120 milliGRAM(s) SubCutaneous every 24 hours  ergocalciferol 71540 Unit(s) Oral <User Schedule>  folic acid 1 milliGRAM(s) Oral daily  lactated ringers. 1000 milliLiter(s) (125 mL/Hr) IV Continuous <Continuous>  nicotine - 21 mG/24Hr(s) Patch 1 Patch Transdermal daily  OLANZapine 5 milliGRAM(s) Oral <User Schedule>  pantoprazole    Tablet 40 milliGRAM(s) Oral before breakfast  prednisoLONE acetate 1% Suspension 1 Drop(s) Left EYE four times a day  predniSONE   Tablet 10 milliGRAM(s) Oral daily  senna 2 Tablet(s) Oral at bedtime    MEDICATIONS  (PRN):  acetaminophen     Tablet .. 650 milliGRAM(s) Oral every 6 hours PRN Mild Pain (1 - 3)  bisacodyl 5 milliGRAM(s) Oral daily PRN Constipation  melatonin 3 milliGRAM(s) Oral at bedtime PRN Insomnia  metoclopramide 10 milliGRAM(s) Oral daily PRN for headache  oxycodone    5 mG/acetaminophen 325 mG 1 Tablet(s) Oral every 6 hours PRN Moderate Pain (4 - 6)      Vital Signs Last 24 Hrs  T(C): 36.3 (13 Jul 2023 05:47), Max: 36.7 (12 Jul 2023 14:00)  T(F): 97.4 (13 Jul 2023 05:47), Max: 98.1 (12 Jul 2023 21:00)  HR: 52 (13 Jul 2023 05:47) (52 - 71)  BP: 104/62 (13 Jul 2023 05:47) (91/55 - 116/54)  BP(mean): --  RR: 18 (13 Jul 2023 05:47) (18 - 18)  SpO2: 98% (13 Jul 2023 05:47) (96% - 99%)    Parameters below as of 13 Jul 2023 05:47  Patient On (Oxygen Delivery Method): room air      CAPILLARY BLOOD GLUCOSE        I&O's Summary      PHYSICAL EXAM:  CONSTITUTIONAL: NAD,   EYES: EOMI; L eye with redness   ENMT: Moist oral mucosa  RESPIRATORY: Normal respiratory effort; lungs are clear to auscultation bilaterally  CARDIOVASCULAR: Regular rate and rhythm, normal S1 and S2, no murmur; No lower extremity edema  ABDOMEN: RUQ TTP with deep palpation; normoactive bowel sounds, no rebound/guarding  MUSCULOSKELETAL:   no clubbing or cyanosis of digits; no joint swelling or tenderness to palpation  PSYCH: A+O to person, place, and time; affect appropriate  NEUROLOGY: moving all extremities, strength intact    SKIN: No rashes; no palpable lesions        LABS:                        12.6   8.40  )-----------( 293      ( 13 Jul 2023 05:51 )             39.9     07-13    140  |  108<H>  |  12  ----------------------------<  92  3.8   |  21<L>  |  0.60    Ca    8.8      13 Jul 2023 05:51  Phos  3.3     07-13  Mg     1.90     07-13            Urinalysis Basic - ( 13 Jul 2023 05:51 )    Color: x / Appearance: x / SG: x / pH: x  Gluc: 92 mg/dL / Ketone: x  / Bili: x / Urobili: x   Blood: x / Protein: x / Nitrite: x   Leuk Esterase: x / RBC: x / WBC x   Sq Epi: x / Non Sq Epi: x / Bacteria: x        RADIOLOGY & ADDITIONAL TESTS:  < from: MR Head No Cont (07.13.23 @ 13:52) >  MRI of the brain was performed sagittal T1 axial T1 T2 T2 FLAIR diffusion   and susceptibility weightedsequence    This exam is compared with prior head CT performed on July 10, 2023    The lateral ventricles have a normal configuration    There is evidence of abnormal T2 prolongation with restricted diffusion   seen involving the right basal ganglia region. This is compatible with an   acute lacunar infarct. No hemorrhagic transformation is seen. No   significant shift or herniation is seen.    No abnormal intra or extra-axial collections are seen.    The large vessels demonstrate normal flow voids.    Opacification of both mastoid and middle ear regions seen which   compatible underlying inflammatory change.    IMPRESSION: Acute lacunar infarct involving the right basal ganglia   region.    < end of copied text >  < from: US Duplex Venous Lower Ext Complete, Bilateral (07.13.23 @ 09:51) >  DINGS:    RIGHT:  Normal compressibility of the RIGHT common femoral, femoral and popliteal   veins.  Doppler examination shows normal spontaneous and phasic flow.  No RIGHT calf vein thrombosis is detected.    LEFT:  Normal compressibility of the LEFT common femoral, femoral and popliteal   veins.  Doppler examination shows normal spontaneous and phasic flow.  No LEFT calf vein thrombosis is detected.    IMPRESSION:  No evidence of deep venous thrombosis in either lower extremity.    < end of copied text >    Imaging Personally Reviewed:    Consultant(s) Notes Reviewed:  cardiology, psych     Care Discussed with Consultants/Other Providers: cardiology

## 2023-07-13 NOTE — SWALLOW BEDSIDE ASSESSMENT ADULT - SWALLOW EVAL: DIAGNOSIS
1- Functional Oral Stage for puree, regular solids, and thin liquids marked by adequate mastication, adequate bolus manipulation, adequate anterior to posterior transfer, and adequate oral clearance. 2- Functional pharyngeal stage for puree, regular solids, and thin liquids marked by initiation of the pharyngeal swallow with hyolaryngeal excursion upon palpation without evidence of impaired airway protection.

## 2023-07-13 NOTE — DIETITIAN INITIAL EVALUATION ADULT - PROBLEM SELECTOR PLAN 2
- due to multiple personal stressors  - cites being anxious and depressed and becomes tearful when attempting to discuss same  - has poor appetite, poor sleep, lack of motivation (including adhering to medication regimen); has not been motivated to fill prescriptions so has not been taking most medications for over one month  - confirms suicidal ideation at times, but dismisses the thought because of love for her grandchildren  - PTA psychotropic meds not prescribed; please get Psychiatry/BH consult in the AM  - f/u TSH, vitamin D levels in the AM  - evaluate for any signs of acute decompensation

## 2023-07-13 NOTE — PROGRESS NOTE ADULT - PROBLEM SELECTOR PLAN 8
- endorses poor fluid intake  - lab-work appears hemoconcentrated and patient w/ dry oral mucosa  - constipation, with last bowel movement more than one week ago is likely impacted by same  - IVF hydration prescribed  - encourage oral hydration  - f/u electrolytes - endorses poor fluid intake  - lab-work appears hemoconcentrated and patient w/ dry oral mucosa  - constipation resolved   - s/p IVF hydration   - encourage oral hydration  - f/u electrolytes

## 2023-07-13 NOTE — DIETITIAN INITIAL EVALUATION ADULT - ORAL NUTRITION SUPPLEMENTS
Mighty Shake x2 daily (440 kcal, 12 g protein) RDN to provide Mighty Shake x2 daily (440 kcal, 12 g protein)

## 2023-07-13 NOTE — DIETITIAN INITIAL EVALUATION ADULT - PROBLEM SELECTOR PLAN 1
- presented w/ report of L- arm numbness since the AM as well as L-facial numbness; both more pronounced than at baseline  - history of multiple CVAs and is on Lovenox 120 mg daily  - CT scan of head and CTA of brain and neck negative for any acute findings  - s/p Stroke Code in the ED and has already been seen by Neurology team (appreciated).  NIH = 6  - on Telemetry  - continuing PTA Lovenox 120 mg are recommended  - atorvastatin 80 mg daily  - f/u AM lab-work  - f/u PT/OT  - passed bedside dysphagia screen  - f/u Speech/Swallow eval  - suggestion for ILR, per Neurology team

## 2023-07-13 NOTE — SWALLOW BEDSIDE ASSESSMENT ADULT - ASR SWALLOW RECOMMEND DIAG
Objective testing NOT warranted at this time given no overt signs of aspiration and functional oral/pharyngeal stage

## 2023-07-13 NOTE — SWALLOW BEDSIDE ASSESSMENT ADULT - COMMENTS
Progress Note- Hospitalist Attending 7/13: "58 year old female, with past history significant for Depression, CVA (multiple, in 2021; on Lovenox), Rheumatoid arthritis, Cigarette smoking, Breast cancer - s/p radiation therapy, and uveitis, presented to the ED secondary to increased L-face and LUE paresthesia, as well as headache and upper abdominal pain."     Patient is awake/alert and oriented during clinical swallow evaluation this AM. Patient is able to follow simple directives and make needs/wants known. Patient denies difficulty swallowing and states she eats "regular food" at home.

## 2023-07-13 NOTE — BH CONSULTATION LIAISON PROGRESS NOTE - NSBHASSESSMENTFT_PSY_ALL_CORE
Siddhartha Frost 58 year old female with a past psychiatric history notable for bipolar disorder, depression, and anxiety with a PMH of multiple CVAs, glaucoma, and rheumatoid arthritis who presented to the [facility] ED complaining of parasthesias of the left arm secondary to a subacute lacunar infarct and who was referred to  psychiatry for evaluation of suicidal ideation and depressive symptoms.    Patient is on disability, has chronic pain, has housing instability, was recently left by her  of 33 years, and feels like these factors have been contributing to her low mood. Patient is endorses symptoms of depression, passive SI, and low-mood and is interested in psychiatric treatment and long-term follow-up care outpatient. Endorses a positive attitude about improving her mental health and life circumstances.     She denies any feelings of grandiosity, working on any new projects, spending more money than usual, impulsivity, high levels of energy, or a euphoric mood. She denies any visual hallucinations, delusions, or paranoia. Has a h/o manic symptoms, + psychosis. She denies wanting to physically hurt herself, but endorses passive suicidal ideation.     7/13: patient still complaining that she cannot sleep and that her mood is low. BP was decreased for period of time last night.    Plan  - No indication for psychiatric 1:1  - Will await collateral from previous psychiatrist in [place].  - RESUME Zyprexa at a dose of 5mg q 8PM PO--- to target sleep, mood, anxiety. Monitor qtc. Monitor BP  - SW assistance-- homelessness, needs to establish care with medical providers in [place].   - AGGRESSION/COMBATIVE BEHAVIORS==== Zyprexa 2.5mg BID PRN IM/PO  - DISPOSITION---- outpatient psychiatric follow up. Will benefit from Siddhartha Frost 58 year old female with a past psychiatric history notable for bipolar disorder, depression, and anxiety with a PMH of multiple CVAs, glaucoma, and rheumatoid arthritis who presented to the [facility] ED complaining of parasthesias of the left arm secondary to a subacute lacunar infarct and who was referred to  psychiatry for evaluation of suicidal ideation and depressive symptoms.    Patient is on disability, has chronic pain, has housing instability, was recently left by her  of 33 years, and feels like these factors have been contributing to her low mood. Patient is endorses symptoms of depression, passive SI, and low-mood and is interested in psychiatric treatment and long-term follow-up care outpatient. Endorses a positive attitude about improving her mental health and life circumstances.     She denies any feelings of grandiosity, working on any new projects, spending more money than usual, impulsivity, high levels of energy, or a euphoric mood. She denies any visual hallucinations, delusions, or paranoia. Has a h/o manic symptoms, + psychosis. She denies wanting to physically hurt herself, but endorses passive suicidal ideation.     7/13: patient still complaining that she cannot sleep and that her mood is low. BP was decreased for period of time last night.     Plan  - No indication for psychiatric 1:1    - CONTINUE Zyprexa at a dose of 5mg q 8PM PO--- to target sleep, mood, anxiety. Monitor qtc. Monitor BP  - SW assistance-- homelessness, needs to establish care with medical providers in [place].   - AGGRESSION/COMBATIVE BEHAVIORS==== Zyprexa 2.5mg BID PRN IM/PO  - DISPOSITION---- outpatient psychiatric follow up.

## 2023-07-13 NOTE — CONSULT NOTE ADULT - SUBJECTIVE AND OBJECTIVE BOX
German Carrasco MD  Interventional Cardiology / Advance Heart Failure and Cardiac Transplant Specialist  [place] Office : [address] [place] [address]  Tel:   [place] Office : [address] [facility] [address] [address]  Tel: [phone]      HISTORY OF PRESENTING ILLNESS:  58 year old female, with past history significant for Depression, CVA (multiple, in 2021; on Lovenox), Rheumatoid arthritis, Cigarette smoking, Breast cancer - s/p radiation therapy, and ?Glaucoma, presented to the ED secondary to increased L-face and LUE paresthesia, as well as headache and upper abdominal pain. Complains of frontal headache, present for approximately 6 days and not ameliorated by Tylenol.  Has associated nausea and dizziness.  Also reports pain at the epigastric area radiating toward the RUQ; states bending over to reach for a fallen object when she heard a snapping type sound and since then has had persistent pain.  Pain worsens with coughing and when lying supine in bed and is alleviated partially when pressure is applied to the area.  Pain is sharp and reaches 9/10 maximum intensity.  N prior episodes.  Suffers with acid reflux.  Also has constipation with last adequate bowel movement being approximately one week ago; had a miniscule amount of feces yesterday. Cardiology consulted for abnormal TTE. TTE shows mod-severe segmental LV dysfunction. Patient denies hx of MI, CHF Or stents. Denies cardiac workup in the past.     	  MEDICATIONS:  enoxaparin Injectable 120 milliGRAM(s) SubCutaneous every 24 hours        acetaminophen     Tablet .. 650 milliGRAM(s) Oral every 6 hours PRN  melatonin 3 milliGRAM(s) Oral at bedtime PRN  OLANZapine 5 milliGRAM(s) Oral <User Schedule>  oxycodone    5 mG/acetaminophen 325 mG 1 Tablet(s) Oral every 6 hours PRN    bisacodyl 5 milliGRAM(s) Oral daily PRN  metoclopramide 10 milliGRAM(s) Oral daily PRN  pantoprazole    Tablet 40 milliGRAM(s) Oral before breakfast  senna 2 Tablet(s) Oral at bedtime    atorvastatin 80 milliGRAM(s) Oral at bedtime  predniSONE   Tablet 10 milliGRAM(s) Oral daily    ergocalciferol 04489 Unit(s) Oral <User Schedule>  folic acid 1 milliGRAM(s) Oral daily  lactated ringers. 1000 milliLiter(s) IV Continuous <Continuous>  prednisoLONE acetate 1% Suspension 1 Drop(s) Left EYE four times a day      PAST MEDICAL/SURGICAL HISTORY  PAST MEDICAL & SURGICAL HISTORY:  Cigarette smoker      Rheumatoid arthritis      Other depression      Breast cancer      Migraines      History of multiple cerebrovascular accidents (CVAs)      Suicidal ideation      Paresthesia of left arm      Facial paresthesia      History of hysterectomy      History of cholecystectomy          SOCIAL HISTORY: Substance Use (street drugs): ( x ) never used  (  ) other:    FAMILY HISTORY:  Family history of breast cancer (Mother)    Family history of diabetes mellitus (DM) (Father)        REVIEW OF SYSTEMS:  CONSTITUTIONAL: No fever, weight loss, or fatigue  EYES: No eye pain, visual disturbances, or discharge  ENMT:  No difficulty hearing, tinnitus, vertigo; No sinus or throat pain  BREASTS: No pain, masses, or nipple discharge  GASTROINTESTINAL: No abdominal or epigastric pain. No nausea, vomiting, or hematemesis; No diarrhea or constipation. No melena or hematochezia.  GENITOURINARY: No dysuria, frequency, hematuria, or incontinence  NEUROLOGICAL: No headaches, memory loss, loss of strength, numbness, or tremors  ENDOCRINE: No heat or cold intolerance; No hair loss  MUSCULOSKELETAL: No joint pain or swelling; No muscle, back, or extremity pain  PSYCHIATRIC: No depression, anxiety, mood swings, or difficulty sleeping  HEME/LYMPH: No easy bruising, or bleeding gums  All others negative    PHYSICAL EXAM:  T(C): 36.6 (07-13-23 @ 08:06), Max: 36.7 (07-12-23 @ 14:00)  HR: 69 (07-13-23 @ 08:06) (52 - 71)  BP: 111/66 (07-13-23 @ 08:06) (91/55 - 116/54)  RR: 17 (07-13-23 @ 08:06) (17 - 18)  SpO2: 98% (07-13-23 @ 08:06) (96% - 99%)  Wt(kg): --  I&O's Summary        GENERAL: NAD  EYES: EOMI, PERRLA, conjunctiva and sclera clear  ENMT: No tonsillar erythema, exudates, or enlargement  Cardiovascular: Normal S1 S2, No JVD, No murmurs, No edema  Respiratory: Lungs clear to auscultation	  Gastrointestinal:  Soft, Non-tender, + BS	  Extremities: No edema                                     12.6   8.40  )-----------( 293      ( 13 Jul 2023 05:51 )             39.9     07-13    140  |  108<H>  |  12  ----------------------------<  92  3.8   |  21<L>  |  0.60    Ca    8.8      13 Jul 2023 05:51  Phos  3.3     07-13  Mg     1.90     07-13      proBNP:   Lipid Profile:   HgA1c:   TSH:     Consultant(s) Notes Reviewed:  [x ] YES  [ ] NO    Care Discussed with Consultants/Other Providers [ x] YES  [ ] NO    Imaging Personally Reviewed independently:  [x] YES  [ ] NO    All labs, radiologic studies, vitals, orders and medications list reviewed. Patient is seen and examined at bedside. Case discussed with medical team.

## 2023-07-13 NOTE — BH CONSULTATION LIAISON PROGRESS NOTE - MSE UNSTRUCTURED FT
Appearance: Adult female in no acute distress;   Behavior: Calm & cooperative; fair eye contact  Speech: Normal rate, tone, and volume  Motor: No PMR/PMA; no other abnormal movements  Mood: “depressed”  Affect: Lethargic and mood congruent; full-range  Thought Process: Linear, logical, and goal-directed  Thought Content: Denies SI or HI; no evidence of delusions  Perception: no AVH  Cognition: somewhat impaired  Insight: Fair  Judgement: Fair  Impulsivity: Intact

## 2023-07-13 NOTE — PROGRESS NOTE ADULT - PROBLEM SELECTOR PLAN 10
- on Lovenox 120 mg SQ PTA (in the context of multiple CVAs); continuing  Vit d deficiency: will supplement   PT eval   plan of care d/w pt and ACP - on Lovenox 120 mg SQ PTA (in the context of multiple CVAs); continuing  Vit d deficiency: will supplement   PT eval -outpt PT   plan of care d/w pt and ACP

## 2023-07-14 LAB
ANION GAP SERPL CALC-SCNC: 2 MMOL/L — LOW (ref 7–14)
BUN SERPL-MCNC: 17 MG/DL — SIGNIFICANT CHANGE UP (ref 7–23)
CALCIUM SERPL-MCNC: 9.4 MG/DL — SIGNIFICANT CHANGE UP (ref 8.4–10.5)
CHLORIDE SERPL-SCNC: 111 MMOL/L — HIGH (ref 98–107)
CO2 SERPL-SCNC: 21 MMOL/L — LOW (ref 22–31)
CREAT SERPL-MCNC: 0.62 MG/DL — SIGNIFICANT CHANGE UP (ref 0.5–1.3)
EGFR: 103 ML/MIN/1.73M2 — SIGNIFICANT CHANGE UP
GLUCOSE SERPL-MCNC: 106 MG/DL — HIGH (ref 70–99)
HCT VFR BLD CALC: 42.6 % — SIGNIFICANT CHANGE UP (ref 34.5–45)
HGB BLD-MCNC: 12.9 G/DL — SIGNIFICANT CHANGE UP (ref 11.5–15.5)
MAGNESIUM SERPL-MCNC: 1.9 MG/DL — SIGNIFICANT CHANGE UP (ref 1.6–2.6)
MCHC RBC-ENTMCNC: 26.3 PG — LOW (ref 27–34)
MCHC RBC-ENTMCNC: 30.3 GM/DL — LOW (ref 32–36)
MCV RBC AUTO: 86.9 FL — SIGNIFICANT CHANGE UP (ref 80–100)
NRBC # BLD: 0 /100 WBCS — SIGNIFICANT CHANGE UP (ref 0–0)
NRBC # FLD: 0 K/UL — SIGNIFICANT CHANGE UP (ref 0–0)
PHOSPHATE SERPL-MCNC: 3.2 MG/DL — SIGNIFICANT CHANGE UP (ref 2.5–4.5)
PLATELET # BLD AUTO: 281 K/UL — SIGNIFICANT CHANGE UP (ref 150–400)
POTASSIUM SERPL-MCNC: 3.7 MMOL/L — SIGNIFICANT CHANGE UP (ref 3.5–5.3)
POTASSIUM SERPL-SCNC: 3.7 MMOL/L — SIGNIFICANT CHANGE UP (ref 3.5–5.3)
RBC # BLD: 4.9 M/UL — SIGNIFICANT CHANGE UP (ref 3.8–5.2)
RBC # FLD: 17.2 % — HIGH (ref 10.3–14.5)
SODIUM SERPL-SCNC: 134 MMOL/L — LOW (ref 135–145)
WBC # BLD: 9.46 K/UL — SIGNIFICANT CHANGE UP (ref 3.8–10.5)
WBC # FLD AUTO: 9.46 K/UL — SIGNIFICANT CHANGE UP (ref 3.8–10.5)

## 2023-07-14 PROCEDURE — 99232 SBSQ HOSP IP/OBS MODERATE 35: CPT

## 2023-07-14 PROCEDURE — 99231 SBSQ HOSP IP/OBS SF/LOW 25: CPT

## 2023-07-14 PROCEDURE — 33285 INSJ SUBQ CAR RHYTHM MNTR: CPT

## 2023-07-14 RX ADMIN — Medication 1 MILLIGRAM(S): at 14:33

## 2023-07-14 RX ADMIN — PANTOPRAZOLE SODIUM 40 MILLIGRAM(S): 20 TABLET, DELAYED RELEASE ORAL at 06:15

## 2023-07-14 RX ADMIN — OLANZAPINE 5 MILLIGRAM(S): 15 TABLET, FILM COATED ORAL at 22:17

## 2023-07-14 RX ADMIN — ENOXAPARIN SODIUM 120 MILLIGRAM(S): 100 INJECTION SUBCUTANEOUS at 09:30

## 2023-07-14 RX ADMIN — ATORVASTATIN CALCIUM 80 MILLIGRAM(S): 80 TABLET, FILM COATED ORAL at 22:17

## 2023-07-14 RX ADMIN — Medication 1 DROP(S): at 22:18

## 2023-07-14 RX ADMIN — Medication 1 DROP(S): at 09:30

## 2023-07-14 RX ADMIN — Medication 10 MILLIGRAM(S): at 06:18

## 2023-07-14 NOTE — PROGRESS NOTE ADULT - PROBLEM SELECTOR PLAN 10
- on Lovenox 120 mg SQ PTA (in the context of multiple CVAs); continuing  Vit d deficiency: will supplement   PT eval -outpt PT   plan of care d/w pt and ACP

## 2023-07-14 NOTE — PROGRESS NOTE ADULT - SUBJECTIVE AND OBJECTIVE BOX
German Carrasco MD  Interventional Cardiology / Advance Heart Failure and Cardiac Transplant Specialist  [place] Office : [address] [place] [address] [place]. [address]  Tel:   [place] Office : [address] [facility] [place] [address]  Tel: [phone]       Pt is lying in bed comfortable not in distress, no chest pains no SOB no palpitations  	  MEDICATIONS:  enoxaparin Injectable 120 milliGRAM(s) SubCutaneous every 24 hours        acetaminophen     Tablet .. 650 milliGRAM(s) Oral every 6 hours PRN  diphenhydrAMINE 25 milliGRAM(s) Oral at bedtime PRN  melatonin 3 milliGRAM(s) Oral at bedtime PRN  OLANZapine 5 milliGRAM(s) Oral <User Schedule>  oxycodone    5 mG/acetaminophen 325 mG 1 Tablet(s) Oral every 6 hours PRN    bisacodyl 5 milliGRAM(s) Oral daily PRN  metoclopramide 10 milliGRAM(s) Oral daily PRN  pantoprazole    Tablet 40 milliGRAM(s) Oral before breakfast  senna 2 Tablet(s) Oral at bedtime    atorvastatin 80 milliGRAM(s) Oral at bedtime  predniSONE   Tablet 10 milliGRAM(s) Oral daily    ergocalciferol 16284 Unit(s) Oral <User Schedule>  folic acid 1 milliGRAM(s) Oral daily  lactated ringers. 1000 milliLiter(s) IV Continuous <Continuous>  prednisoLONE acetate 1% Suspension 1 Drop(s) Left EYE four times a day      PAST MEDICAL/SURGICAL HISTORY  PAST MEDICAL & SURGICAL HISTORY:  Cigarette smoker      Rheumatoid arthritis      Other depression      Breast cancer      Migraines      History of multiple cerebrovascular accidents (CVAs)      Suicidal ideation      Paresthesia of left arm      Facial paresthesia      History of hysterectomy      History of cholecystectomy          SOCIAL HISTORY: Substance Use (street drugs): ( x ) never used  (  ) other:    FAMILY HISTORY:  Family history of breast cancer (Mother)    Family history of diabetes mellitus (DM) (Father)         PHYSICAL EXAM:  T(C): 36.6 (07-14-23 @ 10:00), Max: 36.9 (07-13-23 @ 18:00)  HR: 67 (07-14-23 @ 10:00) (63 - 76)  BP: 112/76 (07-14-23 @ 10:00) (94/50 - 122/76)  RR: 18 (07-14-23 @ 10:00) (17 - 20)  SpO2: 99% (07-14-23 @ 10:00) (96% - 100%)  Wt(kg): --  I&O's Summary           EYES:   PERRLA   ENMT:   Moist mucous membranes, Good dentition, No lesions  Cardiovascular: Normal S1 S2, No JVD, No murmurs, No edema  Respiratory: Lungs clear to auscultation	  Gastrointestinal:  Soft, Non-tender, + BS	  Extremities: no edema                                    12.9   9.46  )-----------( 281      ( 14 Jul 2023 06:11 )             42.6     07-14    134<L>  |  111<H>  |  17  ----------------------------<  106<H>  3.7   |  21<L>  |  0.62    Ca    9.4      14 Jul 2023 06:11  Phos  3.2     07-14  Mg     1.90     07-14      proBNP:   Lipid Profile:   HgA1c:   TSH:     Consultant(s) Notes Reviewed:  [x ] YES  [ ] NO    Care Discussed with Consultants/Other Providers [ x] YES  [ ] NO    Imaging Personally Reviewed independently:  [x] YES  [ ] NO    All labs, radiologic studies, vitals, orders and medications list reviewed. Patient is seen and examined at bedside. Case discussed with medical team.

## 2023-07-14 NOTE — PROGRESS NOTE ADULT - ASSESSMENT
This is a 58 year old woman with a pmhx of Depression, CVA (multiple, in 2021; on Lovenox), Rheumatoid arthritis, Breast cancer s/p radiation therapy, and Glaucoma who presented to the ED with left side face ad upper extremity numbness. Neurology following. CT scan of head and CTA of brain and neck negative for any acute findings.  CT brain showed age-indeterminate lacunar infarct in the right lentiform nucleus and bihemispheric chronic microvascular ischemic white matter changes. Now awaiting BMRI. Ep was consulted for ILR evaluation. Patient denied having a hx of cardiac arrhthymias. There is no telemetry evidence of atrial arrhythmias; nor is there a clear pacing indication at this time.  Per CRYSTAL-AF, patient will benefit from prolonged monitoring for detection of AF/PAF as cause of potential cardioembolic source.  The risks and benefits were explained in detail which included but not limited to bleeding, infection, stroke, syncope 2/2 vasovagal, intubation, and death. Patient expressed understanding and all questions were answered. Patient is currently is undecided about ILR.     Plan:  - Continuous telemetric monitoring  - Monitor electrolytes and replete K to 4 and Mg to 2  - TTE ordered  - Appreciated neurology rec  - Continue care per primary team    Shaji Morales PA-C  Patient to be staffed with attending. Please await attending addendum This is a 58 year old woman with a pmhx of Depression, CVA (multiple, in 2021; on Lovenox), Rheumatoid arthritis, Breast cancer s/p radiation therapy, and Glaucoma who presented to the ED with left side face ad upper extremity numbness. Neurology following. CT scan of head and CTA of brain and neck negative for any acute findings.  CT brain showed age-indeterminate lacunar infarct in the right lentiform nucleus and bihemispheric chronic microvascular ischemic white matter changes. MRI demonstrated right lacunar infarct.  ECHO with grossly moderate to severe segmental LV dysfunction with bubble study showing possible intracardiac shunt.  Patient denied having a hx of cardiac arrhthymias. There is no EKG or telemetry evidence of atrial arrhythmias; nor is there a clear pacing indication at this time.  Per CRYSTAL-AF, patient will benefit from prolonged monitoring for detection of AF/PAF as cause of potential cardioembolic source.  The risks and benefits were explained in detail which included but not limited to bleeding, infection, stroke, syncope 2/2 vasovagal, intubation, and death. Patient expressed understanding and all questions were answered and has now agreed to the  ILR. She is scheduled for this afternoon.          This is a 58 year old woman with a pmhx of Depression, CVA (multiple, in 2021; on Lovenox), Rheumatoid arthritis, Breast cancer s/p radiation therapy, and Glaucoma who presented to the ED with left side face ad upper extremity numbness. Neurology following. CT scan of head and CTA of brain and neck negative for any acute findings.  CT brain showed age-indeterminate lacunar infarct in the right lentiform nucleus and bihemispheric chronic microvascular ischemic white matter changes. MRI demonstrated right lacunar infarct.  ECHO with grossly moderate to severe segmental LV dysfunction with bubble study showing likely PFO.  Patient denied having a hx of cardiac arrhthymias. There is no EKG or telemetry evidence of atrial arrhythmias; nor is there a clear pacing indication at this time.  Per CRYSTAL-AF, patient will benefit from prolonged monitoring for detection of AF/PAF as cause of potential cardioembolic source.  The risks and benefits were explained in detail which included but not limited to bleeding, infection, stroke, syncope 2/2 vasovagal, intubation, and death. Patient expressed understanding and all questions were answered and has now agreed to the  ILR. She is scheduled for this afternoon.

## 2023-07-14 NOTE — PROGRESS NOTE ADULT - PROBLEM SELECTOR PLAN 3
- on Telemetry  -downtrending   - Neurology team suggesting ILR  - EP consult placed for ILR,pt undecided about it   - TTE noted as above, grossly moderate to severe segmental left ventricular systolic dysfunction. Hypokinesis of the apex, mid to distal septum, and distal anterior wall.  .  No obvious LV thrombus seen.   bubble study was performed concerning for  intracardiac shunt.   will f/u further Neuro recs   cardiology eval appreciated, case discussed, since acute CVA present , will need to wait 1 month prior to [facility] pe cards recs  will order LESLEY - on Telemetry  -downtrending   - Neurology team suggesting ILR  - EP consult placed for ILR,pt undecided about it   - TTE noted as above, grossly moderate to severe segmental left ventricular systolic dysfunction. Hypokinesis of the apex, mid to distal septum, and distal anterior wall.  .  No obvious LV thrombus seen.   bubble study was performed concerning for  intracardiac shunt.   will f/u further Neuro recs   cardiology eval appreciated, case discussed, since acute CVA present , will need to wait 1 month prior to C pe cards recs  BP soft, card rec GDMT when BP improves

## 2023-07-14 NOTE — CHART NOTE - NSCHARTNOTEFT_GEN_A_CORE
ELECTROPHYSIOLOGY      Patient s/p implantation of a Medtronic ILR.   Tolerated the procedure well. No complications.  Vital signs stable.   Telemetry: NSR 76 bpm.   Dressing dry and intact. No evidence of swelling, bleeding or ecchymosis.   Post procedure ILR teaching done.  Written instructions and contact information provided.  Patient instructed to remove the dressing in 24 hours.   She was given a home monitor with verbal and written instructions.   She has a follow-up appointment in the device clinic on 7/31/23 at 9:20am  [address] [place] [address] [phone].   Repeat echocardiogram for reevaluation of LVEF will be done by general cardiology team as an outpatient. ELECTROPHYSIOLOGY      Patient s/p implantation of a Medtronic ILR.   Tolerated the procedure well. No complications.  Vital signs stable.   Telemetry: NSR 76 bpm.   Dressing dry and intact. No evidence of swelling, bleeding or ecchymosis.   Post procedure ILR teaching done.  Written instructions and contact information provided.  Patient instructed to remove the dressing in 24 hours.   She was given a home monitor with verbal and written instructions.   Repeat echocardiogram for reevaluation of LVEF after 3 months of GDMT will be done by general cardiology team as an outpatient.   Patient being considered for outpatient PFO closure if no atrial arrhythmia noted on ILR.   She has a follow-up appointment in the device clinic on 7/31/23 at 9:20am  4th floor Oncology [facility] [phone].

## 2023-07-14 NOTE — CHART NOTE - NSCHARTNOTEFT_GEN_A_CORE
Spoke with Dr. Carrasco, Patient with likely PFO on TTE, anterior wall motion abnormality. If decision is to intervene for closure, LESLEY can be done during that time post workup of cardiomyopathy.

## 2023-07-14 NOTE — PROGRESS NOTE ADULT - SUBJECTIVE AND OBJECTIVE BOX
Patient is a 58y old  Female who presents with a chief complaint of Paresthesia    Per chart Patient is a 58y old  Female who presents with a chief complaint of Paresthesias      (13 Jul 2023 13:27)      SUBJECTIVE / OVERNIGHT EVENTS:    MEDICATIONS  (STANDING):  atorvastatin 80 milliGRAM(s) Oral at bedtime  enoxaparin Injectable 120 milliGRAM(s) SubCutaneous every 24 hours  ergocalciferol 76453 Unit(s) Oral <User Schedule>  folic acid 1 milliGRAM(s) Oral daily  lactated ringers. 1000 milliLiter(s) (125 mL/Hr) IV Continuous <Continuous>  nicotine - 21 mG/24Hr(s) Patch 1 Patch Transdermal daily  OLANZapine 5 milliGRAM(s) Oral <User Schedule>  pantoprazole    Tablet 40 milliGRAM(s) Oral before breakfast  prednisoLONE acetate 1% Suspension 1 Drop(s) Left EYE four times a day  predniSONE   Tablet 10 milliGRAM(s) Oral daily  senna 2 Tablet(s) Oral at bedtime    MEDICATIONS  (PRN):  acetaminophen     Tablet .. 650 milliGRAM(s) Oral every 6 hours PRN Mild Pain (1 - 3)  bisacodyl 5 milliGRAM(s) Oral daily PRN Constipation  diphenhydrAMINE 25 milliGRAM(s) Oral at bedtime PRN Insomnia  melatonin 3 milliGRAM(s) Oral at bedtime PRN Insomnia  metoclopramide 10 milliGRAM(s) Oral daily PRN for headache  oxycodone    5 mG/acetaminophen 325 mG 1 Tablet(s) Oral every 6 hours PRN Moderate Pain (4 - 6)      Vital Signs Last 24 Hrs  T(C): 36.5 (14 Jul 2023 06:00), Max: 36.9 (13 Jul 2023 18:00)  T(F): 97.7 (14 Jul 2023 06:00), Max: 98.4 (13 Jul 2023 18:00)  HR: 63 (14 Jul 2023 06:00) (63 - 76)  BP: 95/55 (14 Jul 2023 06:00) (94/50 - 122/76)  BP(mean): --  RR: 18 (14 Jul 2023 06:00) (17 - 20)  SpO2: 96% (14 Jul 2023 06:00) (96% - 100%)    Parameters below as of 14 Jul 2023 06:00  Patient On (Oxygen Delivery Method): room air      CAPILLARY BLOOD GLUCOSE        I&O's Summary      PHYSICAL EXAM:  GENERAL: NAD, well-developed  HEAD:  Atraumatic, Normocephalic  EYES: EOMI, PERRLA, conjunctiva and sclera clear  NECK: Supple, No JVD  CHEST/LUNG: Clear to auscultation bilaterally; No wheeze  HEART: Regular rate and rhythm; No murmurs, rubs, or gallops  ABDOMEN: Soft, Nontender, Nondistended; Bowel sounds present  EXTREMITIES:  2+ Peripheral Pulses, No clubbing, cyanosis, or edema  PSYCH: AAOx3  NEUROLOGY: non-focal  SKIN: No rashes or lesions    LABS:                        12.9   9.46  )-----------( 281      ( 14 Jul 2023 06:11 )             42.6     07-14    134<L>  |  111<H>  |  17  ----------------------------<  106<H>  3.7   |  21<L>  |  0.62    Ca    9.4      14 Jul 2023 06:11  Phos  3.2     07-14  Mg     1.90     07-14            Urinalysis Basic - ( 14 Jul 2023 06:11 )    Color: x / Appearance: x / SG: x / pH: x  Gluc: 106 mg/dL / Ketone: x  / Bili: x / Urobili: x   Blood: x / Protein: x / Nitrite: x   Leuk Esterase: x / RBC: x / WBC x   Sq Epi: x / Non Sq Epi: x / Bacteria: x        RADIOLOGY & ADDITIONAL TESTS:    Imaging Personally Reviewed:    Consultant(s) Notes Reviewed:      Care Discussed with Consultants/Other Providers:   Patient is a 58y old  Female who presents with a chief complaint of Paresthesia    Per chart Patient is a 58y old  Female who presents with a chief complaint of Paresthesias      (13 Jul 2023 13:27)      SUBJECTIVE / OVERNIGHT EVENTS: patient seen and examined by bedside, pt c/o left face numbness , getting frustrated as she is NPO , LESLEY was cancelled today as recommend  If decision is to intervene for closure, LESLEY can be done during that time post workup of cardiomyopathy.   Tele : NSR in 60s. , was kavon to 40s when sleeping       MEDICATIONS  (STANDING):  atorvastatin 80 milliGRAM(s) Oral at bedtime  enoxaparin Injectable 120 milliGRAM(s) SubCutaneous every 24 hours  ergocalciferol 52517 Unit(s) Oral <User Schedule>  folic acid 1 milliGRAM(s) Oral daily  lactated ringers. 1000 milliLiter(s) (125 mL/Hr) IV Continuous <Continuous>  nicotine - 21 mG/24Hr(s) Patch 1 Patch Transdermal daily  OLANZapine 5 milliGRAM(s) Oral <User Schedule>  pantoprazole    Tablet 40 milliGRAM(s) Oral before breakfast  prednisoLONE acetate 1% Suspension 1 Drop(s) Left EYE four times a day  predniSONE   Tablet 10 milliGRAM(s) Oral daily  senna 2 Tablet(s) Oral at bedtime    MEDICATIONS  (PRN):  acetaminophen     Tablet .. 650 milliGRAM(s) Oral every 6 hours PRN Mild Pain (1 - 3)  bisacodyl 5 milliGRAM(s) Oral daily PRN Constipation  diphenhydrAMINE 25 milliGRAM(s) Oral at bedtime PRN Insomnia  melatonin 3 milliGRAM(s) Oral at bedtime PRN Insomnia  metoclopramide 10 milliGRAM(s) Oral daily PRN for headache  oxycodone    5 mG/acetaminophen 325 mG 1 Tablet(s) Oral every 6 hours PRN Moderate Pain (4 - 6)      Vital Signs Last 24 Hrs  T(C): 36.5 (14 Jul 2023 06:00), Max: 36.9 (13 Jul 2023 18:00)  T(F): 97.7 (14 Jul 2023 06:00), Max: 98.4 (13 Jul 2023 18:00)  HR: 63 (14 Jul 2023 06:00) (63 - 76)  BP: 95/55 (14 Jul 2023 06:00) (94/50 - 122/76)  BP(mean): --  RR: 18 (14 Jul 2023 06:00) (17 - 20)  SpO2: 96% (14 Jul 2023 06:00) (96% - 100%)    Parameters below as of 14 Jul 2023 06:00  Patient On (Oxygen Delivery Method): room air            PHYSICAL EXAM:  CONSTITUTIONAL: NAD,   EYES: EOMI; L eye with redness   ENMT: Moist oral mucosa  RESPIRATORY: Normal respiratory effort; lungs are clear to auscultation bilaterally  CARDIOVASCULAR: Regular rate and rhythm, normal S1 and S2, no murmur; No lower extremity edema  ABDOMEN: RUQ TTP with deep palpation; normoactive bowel sounds, no rebound/guarding  MUSCULOSKELETAL:   no clubbing or cyanosis of digits; no joint swelling or tenderness to palpation  PSYCH: A+O to person, place, and time; affect appropriate  NEUROLOGY: moving all extremities, strength intact    SKIN: No rashes; no palpable lesions    LABS:                        12.9   9.46  )-----------( 281      ( 14 Jul 2023 06:11 )             42.6     07-14    134<L>  |  111<H>  |  17  ----------------------------<  106<H>  3.7   |  21<L>  |  0.62    Ca    9.4      14 Jul 2023 06:11  Phos  3.2     07-14  Mg     1.90     07-14            Urinalysis Basic - ( 14 Jul 2023 06:11 )    Color: x / Appearance: x / SG: x / pH: x  Gluc: 106 mg/dL / Ketone: x  / Bili: x / Urobili: x   Blood: x / Protein: x / Nitrite: x   Leuk Esterase: x / RBC: x / WBC x   Sq Epi: x / Non Sq Epi: x / Bacteria: x        RADIOLOGY & ADDITIONAL TESTS:    Imaging Personally Reviewed:    Consultant(s) Notes Reviewed:  cardiology     Care Discussed with Consultants/Other Providers: cardiology

## 2023-07-14 NOTE — PROGRESS NOTE ADULT - PROBLEM SELECTOR PLAN 6
- frontal area; not ameliorated with Tylenol at home  - chart review notes patient with history of migraine headache  - headache improved

## 2023-07-14 NOTE — PROGRESS NOTE ADULT - PROBLEM SELECTOR PLAN 8
- endorses poor fluid intake  - lab-work appears hemoconcentrated and patient w/ dry oral mucosa  - constipation resolved   - s/p IVF hydration   - encourage oral hydration  - f/u electrolytes

## 2023-07-14 NOTE — PROGRESS NOTE ADULT - SUBJECTIVE AND OBJECTIVE BOX
chief complaint:        Vital Signs Last 24 Hrs  T(C): 36.7 (14 Jul 2023 14:00), Max: 36.9 (13 Jul 2023 18:00)  T(F): 98 (14 Jul 2023 14:00), Max: 98.4 (13 Jul 2023 18:00)  HR: 71 (14 Jul 2023 14:00) (63 - 76)  BP: 107/65 (14 Jul 2023 14:00) (94/50 - 122/76)  BP(mean): --  RR: 18 (14 Jul 2023 14:00) (17 - 20)  SpO2: 100% (14 Jul 2023 14:00) (96% - 100%)    Parameters below as of 14 Jul 2023 14:00  Patient On (Oxygen Delivery Method): room air            Telemetry:  Normal sinus rhythm 60-70's.  Sinus kavon to 44 bpm when asleep.   MEDICATIONS  (STANDING):  atorvastatin 80 milliGRAM(s) Oral at bedtime  enoxaparin Injectable 120 milliGRAM(s) SubCutaneous every 24 hours  ergocalciferol 73306 Unit(s) Oral <User Schedule>  folic acid 1 milliGRAM(s) Oral daily  lactated ringers. 1000 milliLiter(s) (125 mL/Hr) IV Continuous <Continuous>  nicotine - 21 mG/24Hr(s) Patch 1 Patch Transdermal daily  OLANZapine 5 milliGRAM(s) Oral <User Schedule>  pantoprazole    Tablet 40 milliGRAM(s) Oral before breakfast  prednisoLONE acetate 1% Suspension 1 Drop(s) Left EYE four times a day  predniSONE   Tablet 10 milliGRAM(s) Oral daily  senna 2 Tablet(s) Oral at bedtime    MEDICATIONS  (PRN):  acetaminophen     Tablet .. 650 milliGRAM(s) Oral every 6 hours PRN Mild Pain (1 - 3)  bisacodyl 5 milliGRAM(s) Oral daily PRN Constipation  diphenhydrAMINE 25 milliGRAM(s) Oral at bedtime PRN Insomnia  melatonin 3 milliGRAM(s) Oral at bedtime PRN Insomnia  metoclopramide 10 milliGRAM(s) Oral daily PRN for headache  oxycodone    5 mG/acetaminophen 325 mG 1 Tablet(s) Oral every 6 hours PRN Moderate Pain (4 - 6)          Physical exam:   Gen- well developed well nourished NAD  Resp-  clear to auscultation. No wheezing, rales or rhonchi  CV- S1 and S2 RRR. No murmurs, gallops or rubs  ABD- soft nontender + bowel sounds  EXT- no edema no calf tenderness  Neuro- residual left facial numbness. No asymmetry. Tongue midline. All extremities with intact strength.                              12.9   9.46  )-----------( 281      ( 14 Jul 2023 06:11 )             42.6       07-14    134<L>  |  111<H>  |  17  ----------------------------<  106<H>  3.7   |  21<L>  |  0.62    Ca    9.4      14 Jul 2023 06:11  Phos  3.2     07-14  Mg     1.90     07-14                             Called by team that patient is agreeing to the ILR.   Patient denies chest pain, shortness of breath, palpitations or lightheadedness.   Reviewed the risks, benefits and alternatives to the ILR with the patient and answered all questions.       Vital Signs Last 24 Hrs  T(C): 36.7 (14 Jul 2023 14:00), Max: 36.9 (13 Jul 2023 18:00)  T(F): 98 (14 Jul 2023 14:00), Max: 98.4 (13 Jul 2023 18:00)  HR: 71 (14 Jul 2023 14:00) (63 - 76)  BP: 107/65 (14 Jul 2023 14:00) (94/50 - 122/76)  BP(mean): --  RR: 18 (14 Jul 2023 14:00) (17 - 20)  SpO2: 100% (14 Jul 2023 14:00) (96% - 100%)    Parameters below as of 14 Jul 2023 14:00  Patient On (Oxygen Delivery Method): room air            Telemetry:  Normal sinus rhythm 60-70's.  Sinus kavon to 44 bpm when asleep.   MEDICATIONS  (STANDING):  atorvastatin 80 milliGRAM(s) Oral at bedtime  enoxaparin Injectable 120 milliGRAM(s) SubCutaneous every 24 hours  ergocalciferol 56557 Unit(s) Oral <User Schedule>  folic acid 1 milliGRAM(s) Oral daily  lactated ringers. 1000 milliLiter(s) (125 mL/Hr) IV Continuous <Continuous>  nicotine - 21 mG/24Hr(s) Patch 1 Patch Transdermal daily  OLANZapine 5 milliGRAM(s) Oral <User Schedule>  pantoprazole    Tablet 40 milliGRAM(s) Oral before breakfast  prednisoLONE acetate 1% Suspension 1 Drop(s) Left EYE four times a day  predniSONE   Tablet 10 milliGRAM(s) Oral daily  senna 2 Tablet(s) Oral at bedtime    MEDICATIONS  (PRN):  acetaminophen     Tablet .. 650 milliGRAM(s) Oral every 6 hours PRN Mild Pain (1 - 3)  bisacodyl 5 milliGRAM(s) Oral daily PRN Constipation  diphenhydrAMINE 25 milliGRAM(s) Oral at bedtime PRN Insomnia  melatonin 3 milliGRAM(s) Oral at bedtime PRN Insomnia  metoclopramide 10 milliGRAM(s) Oral daily PRN for headache  oxycodone    5 mG/acetaminophen 325 mG 1 Tablet(s) Oral every 6 hours PRN Moderate Pain (4 - 6)          Physical exam:   Gen- well developed well nourished NAD  Resp-  clear to auscultation. No wheezing, rales or rhonchi  CV- S1 and S2 RRR. No murmurs, gallops or rubs  ABD- soft nontender + bowel sounds  EXT- no edema no calf tenderness  Neuro- residual left facial numbness. No asymmetry. Tongue midline. All extremities with intact strength.                              12.9   9.46  )-----------( 281      ( 14 Jul 2023 06:11 )             42.6       07-14    134<L>  |  111<H>  |  17  ----------------------------<  106<H>  3.7   |  21<L>  |  0.62    Ca    9.4      14 Jul 2023 06:11  Phos  3.2     07-14  Mg     1.90     07-14          ECHOCARDIOGRAM  Patient name: BRENDEN DE LA CRUZ  YOB: 1965   Age: 58 (F)   MR#: [id number]  Study Date: 7/12/2023  Location: [facility].bubbleSonographer: Kary Aguilar RDCS  Study quality: Technically Fair  Referring Physician: Leonard Manning MD  Blood Pressure: 101/72 mmHg  Height: 155 cm  Weight: 77 kg  BSA: 1.8 m2  Heart Rate: 59 mmHg  ------------------------------------------------------------------------  PROCEDURE: Transthoracic echocardiogram with 2-D, M-Mode  and complete spectral and color flow Doppler. Patient was  injected with 10 cc's of aerosolized saline.  Patient was injected with 10 cc's of aerosolized saline.  Intravenous ultrasound enhancing agent was administered for  improved left ventricular endocardial border definition.  Following the intravenous injection of ultrasound enhancing  agent, harmonic imaging was performed.  INDICATION: Other cerebrovascular disease (I67.89)  ------------------------------------------------------------------------  DIMENSIONS:  Dimensions:     Normal Values:  LA:     3.3 cm    2.0 - 4.0 cm  Ao:     2.8 cm    2.0 - 3.8 cm  SEPTUM: 0.7 cm    0.6 - 1.2 cm  PWT:    0.8 cm    0.6 - 1.1 cm  LVIDd:  4.9 cm    3.0 - 5.6 cm  LVIDs:  3.7 cm    1.8 - 4.0 cm  Derived Variables:  LVMI: 69 g/m2  RWT: 0.32  Fractional short: 24 %  Ejection Fraction (Visual Estimate): 30-35 %  ------------------------------------------------------------------------  OBSERVATIONS:  Mitral Valve: Normal mitral valve. Minimal mitral  regurgitation.  Aortic Root: Normal aortic root.  Aortic Valve: Aortic valve leaflet morphology not well  visualized. Minimal aortic regurgitation.  Left Atrium: Normal left atrium.  LA volume index = 22  cc/m2.  Left Ventricle: Endocardium not well visualized; grossly  moderate to severe segmental left ventricular systolic  dysfunction.  Hypokinesis of the apex, mid to distal  septum, and distal anterior wall.  Endocardial  visualization enhanced with intravenous injection of echo  contrast (Definity).  No obvious LV thrombus seen. Normal  left ventricular internal dimensions and wall thicknesses.  Normal left ventricular diastolic function.  Right Heart: Normal right atrium. The right ventricle is  not well visualized; grossly normal right ventricular  systolic function. Normal tricuspid valve. Minimal  tricuspid regurgitation. Normal pulmonic valve.  Pericardium/PleuraNormal pericardium with no pericardial  effusion.  ------------------------------------------------------------------------  CONCLUSIONS:  1. Normal mitral valve. Minimal mitral regurgitation.  2. Normal left ventricular internal dimensions and wall  thicknesses.  3. Endocardium not well visualized; grossly moderate to  severe segmental left ventricular systolic dysfunction.  Hypokinesis of the apex, mid to distal septum, and distal  anterior wall.  Endocardial visualization enhanced with  intravenous injection of echo contrast (Definity).  No  obvious LV thrombus seen.  4. Normal left ventricular diastolic function.  5. The right ventricle is not well visualized; grossly  normal right ventricular systolic function.  6. A bubble study was performed with the intravenous  injection of agitated saline contrast.  Following  intravenous contrast injection, bubbles were seen in the  left heart (following the Valsalva maneuver).  This may be  consistent with an intracardiac shunt.  *** No previous Echo exam.  ------------------------------------------------------------------------  Confirmed on  7/12/2023 - 09:49:14 by Amaury Nunes M.D.,  [facility], [place]  ------------------------------------------------------------------------

## 2023-07-14 NOTE — PROGRESS NOTE ADULT - PROBLEM SELECTOR PLAN 1
- presented w/ report of L- arm numbness since the AM as well as L-facial numbness; both more pronounced than at baseline  - history of multiple CVAs and is on Lovenox 120 mg daily  - CT scan of head and CTA of brain and neck negative for any acute findings  - s/p Stroke Code in the ED and has already been seen by Neurology team (appreciated).  NIH = 6  - on Telemetry  - continuing PTA Lovenox 120 mg - unclear reason why pt takes this medication at home, pt says she was on coumadin, was getting bruises , so was changed to lovenox   - atorvastatin 80 mg daily  -  MRI  head shows Acute lacunar infarct involving the right basal ganglia region.  - f/u PT/OT recc outpt PT   - passed bedside dysphagia screen  - Speech/Swallow eval noted, recc regular with thin fluids   - suggestion for ILR, per Neurology team, EP eval appreciated, pt undecided about ILR, will CTM  -TTE noted - grossly moderate to severe segmental left ventricular systolic dysfunction. Hypokinesis of the apex, mid to distal septum, and distal anterior wall.  .  No obvious LV thrombus seen. bubble study was performed concerning for  intracardiac shunt. Case was discussed with neurology  , recc checking doppler which is negative  cards recc checking LESLEY if MRI shows stroke, will order for LESLEY   will f/u further neuro recs - presented w/ report of L- arm numbness as well as L-facial numbness; both more pronounced than at baseline  - history of multiple CVAs and is on Lovenox 120 mg daily  - CT scan of head and CTA of brain and neck negative for any acute findings  - s/p Stroke Code in the ED and has already been seen by Neurology team (appreciated).  NIH = 6  - on Telemetry  - continuing PTA Lovenox 120 mg - unclear reason why pt takes this medication at home, pt says she was on coumadin, was getting bruises , so was changed to lovenox   - atorvastatin 80 mg daily  -  MRI  head shows Acute lacunar infarct involving the right basal ganglia region.  - f PT/OT recc outpt PT   - passed bedside dysphagia screen  - Speech/Swallow eval noted, recc regular with thin fluids   - suggestion for ILR, per Neurology team, EP eval appreciated, pt undecided about ILR, will CTM  -TTE noted - grossly moderate to severe segmental left ventricular systolic dysfunction. Hypokinesis of the apex, mid to distal septum, and distal anterior wall.  .  No obvious LV thrombus seen. bubble study was performed concerning for  intracardiac shunt. Case was discussed with neurology  , recc checking doppler which is negative  cards recc checking LESLEY if MRI shows stroke,  LESLEY was cancelled as LESLEY can be done during that time post workup of cardiomyopathy  If decision is to intervene for closure,   cards rec ILR, pt agreed, EP notified    will f/u further neuro recs

## 2023-07-14 NOTE — BH CONSULTATION LIAISON PROGRESS NOTE - MSE UNSTRUCTURED FT
Appearance: Adult female in no acute distress  Behavior: Calm & cooperative; fair eye contact  Speech: Normal rate, tone, and volume  Motor: No PMR/PMA; no other abnormal movements  Mood: “Fine”  Affect: Euthymic and mood congruent; full-range  Thought Process: Linear, logical, and goal-directed  Thought Content: Denies SI or HI; no evidence of delusions, (major themes of session)  Perception: no AVH  Cognition: impaired  Insight: fair  Judgement: Fair  Impulsivity: Intact

## 2023-07-14 NOTE — BH CONSULTATION LIAISON PROGRESS NOTE - NSBHFUPINTERVALHXFT_PSY_A_CORE
Pt denies SI, HI, AVH, paranoia. Says she feels 'ok' and that her mood is improved from the previous few days. Says she is still having difficulty falling asleep and requires benedryl. No PRNs required for agitation. Pt denies SI, HI, AVH, paranoia. Says she feels 'ok' and that her mood is improved from the previous few days. Says she is still having difficulty falling asleep and requires benadryl. No PRNs required for agitation.

## 2023-07-14 NOTE — PROGRESS NOTE ADULT - ASSESSMENT
58 year old female, with past history significant for Depression, CVA (multiple, in 2021; on Lovenox), Rheumatoid arthritis, Cigarette smoking, Breast cancer - s/p radiation therapy, and ?Glaucoma, presented to the ED secondary to increased L-face and LUE paresthesia, as well as headache and upper abdominal pain.         1. LV dysfunction  -new mod-severe segmental LV dysfunction  -denies cardiac hx or workup in past  -endorses SOB, CP on exertion. trops elevated on admission 113--104--89  -  [facility] as out pt as pt has an acute stroke  - blood pressure soft will add GDMT when bp improves    2.  Paresthesias  -presented w/ report of L- arm numbness as well as L-facial numbness  -history of multiple CVAs and is on Lovenox 120 mg daily  -CT scan of head and CTA of brain and neck negative for any acute findings  -s/p Stroke Code in the ED  -  MRI shows Right basal ganglia acute infarct   -  patient agrees for  ILR  -intracardiac shunt on TTE, spoke to LESLEY attending she is concerned about doing LESLEY now sec to acute infarct and sev LV dysfunction, will get done as out pt if ILR shows no atrial fibrillation

## 2023-07-14 NOTE — CHART NOTE - NSCHARTNOTEFT_GEN_A_CORE
TTE 7/12/23: EF 30-35%, normal LA, bubble study consistent with an intracardiac shunt.  MRI brain w/o contrast 7/13/23 revealing acute lacunar infarct involving R basal ganglia.   Neurological impression: residual left hemiparesis +/- sensory symptoms, p/w worsening numbness/tingling consistent with a pure sensory syndrome, likely localizable to R brain infarct. Mechanism of acute ischemic stroke is consistent with small vessel disease.    Patient currently on anticoagulation with enoxaparin 120 mg QD.  As per 7/10/23 neurology attending attestation, "continue therapeutic Lovenox, but try to find out why she was treated with this; if there is in fact no role for ongoing Lovenox, then Plavix 300 mg loading dose, then 75 mg daily for 3 weeks"    If PFO closure is being considered for secondary stroke prevention, we would recommend against closure, as patient's acute ischemic stroke is most consistent with small vessel disease rather than embolic stroke. Similarly, if LESLEY is being pursued for PFO characterization for PFO closure, we would similarly recommend against LESLEY for this purpose.      -  Aram Hughes MD  PGY-4 Neurology  #[id number] TTE 7/12/23: EF 30-35%, normal LA, bubble study consistent with an intracardiac shunt.  MRI brain w/o contrast 7/13/23 revealing acute lacunar infarct involving R basal ganglia.   Neurological impression: residual left hemiparesis +/- sensory symptoms, p/w worsening numbness/tingling consistent with a pure sensory syndrome, likely localizable to R brain infarct. Mechanism of acute ischemic stroke is consistent with small vessel disease.    Patient currently on anticoagulation with enoxaparin 120 mg QD.  As per 7/10/23 neurology attending attestation, "continue therapeutic Lovenox, but try to find out why she was treated with this; if there is in fact no role for ongoing Lovenox, then instead: Plavix 300 mg loading dose, then 75 mg daily for 3 weeks; ASA 81 mg daily"    Impression. The infarct is most likely due to small vessel disease, and most likely not due to embolic stroke of undetermined source (i.e. not cryptogenic); strictly from the neurovascular standpoint, there is no role for LESLEY and no role for PFO closure.      -  Aram Hughes MD  PGY-4 Neurology  #[id number]    Stroke attending. Agree with above

## 2023-07-14 NOTE — BH CONSULTATION LIAISON PROGRESS NOTE - NSBHASSESSMENTFT_PSY_ALL_CORE
Siddhartha Frost 58 year old female with a past psychiatric history notable for bipolar disorder, depression, and anxiety with a PMH of multiple CVAs, glaucoma, and rheumatoid arthritis who presented to the [facility] ED complaining of parasthesias of the left arm secondary to a subacute lacunar infarct and who was referred to  psychiatry for evaluation of suicidal ideation and depressive symptoms.    Patient is on disability, has chronic pain, has housing instability, was recently left by her  of 33 years, and feels like these factors have been contributing to her low mood. Patient is endorses symptoms of depression, passive SI, and low-mood and is interested in psychiatric treatment and long-term follow-up care outpatient. Endorses a positive attitude about improving her mental health and life circumstances.     She denies any feelings of grandiosity, working on any new projects, spending more money than usual, impulsivity, high levels of energy, or a euphoric mood. She denies any visual hallucinations, delusions, or paranoia. Has a h/o manic symptoms, + psychosis. She denies wanting to physically hurt herself, but endorses passive suicidal ideation.     7/13: patient still complaining that she cannot sleep and that her mood is low. BP was decreased for period of time last night.   7/14: says mood is improved from past few days. still complaining of difficulty falling asleep, but says that she is sleeping better through the night (fewer nightime awakenings)    Plan  - No indication for psychiatric 1:1  - RESUME Zyprexa at a dose of 5mg q 8PM PO--- to target sleep, mood, anxiety. Monitor qtc. Monitor BP  - SW assistance-- homelessness, needs to establish care with medical providers in [place].   - AGGRESSION/COMBATIVE BEHAVIORS==== Zyprexa 2.5mg BID PRN IM/PO  - DISPOSITION---- outpatient psychiatric follow up. Will benefit from psychotherapy and psychiatric med management Siddhartha Frost 58 year old female with a past psychiatric history notable for bipolar disorder, depression, and anxiety with a PMH of multiple CVAs, glaucoma, and rheumatoid arthritis who presented to the [facility] ED complaining of parasthesias of the left arm secondary to a subacute lacunar infarct and who was referred to  psychiatry for evaluation of suicidal ideation and depressive symptoms.    Patient is on disability, has chronic pain, has housing instability, was recently left by her  of 33 years, and feels like these factors have been contributing to her low mood. Patient is endorses symptoms of depression, passive SI, and low-mood and is interested in psychiatric treatment and long-term follow-up care outpatient. Endorses a positive attitude about improving her mental health and life circumstances.     She denies any feelings of grandiosity, working on any new projects, spending more money than usual, impulsivity, high levels of energy, or a euphoric mood. She denies any visual hallucinations, delusions, or paranoia. Has a h/o manic symptoms, + psychosis. She denies wanting to physically hurt herself, but endorses passive suicidal ideation.     7/13: patient still complaining that she cannot sleep and that her mood is low. BP was decreased for period of time last night.   7/14: says mood is improved from past few days. still complaining of difficulty falling asleep, but says that she is sleeping better through the night (fewer nightime awakenings)    Plan  - No indication for psychiatric 1:1  - CONTINUE Zyprexa at a dose of 5mg q 8PM PO--- to target sleep, mood, anxiety. Monitor qtc. Monitor BP  - SW assistance-- homelessness, needs to establish care with medical providers in [place].   - AGGRESSION/COMBATIVE BEHAVIORS==== Zyprexa 2.5mg BID PRN IM/PO  - DISPOSITION---- outpatient psychiatric follow up. Will benefit from psychotherapy and psychiatric med management

## 2023-07-15 PROBLEM — M06.9 RHEUMATOID ARTHRITIS, UNSPECIFIED: Chronic | Status: ACTIVE | Noted: 2023-07-11

## 2023-07-15 PROBLEM — F32.89 OTHER SPECIFIED DEPRESSIVE EPISODES: Chronic | Status: ACTIVE | Noted: 2023-07-11

## 2023-07-15 PROBLEM — R20.2 PARESTHESIA OF SKIN: Chronic | Status: ACTIVE | Noted: 2023-07-11

## 2023-07-15 PROBLEM — Z86.73 PERSONAL HISTORY OF TRANSIENT ISCHEMIC ATTACK (TIA), AND CEREBRAL INFARCTION WITHOUT RESIDUAL DEFICITS: Chronic | Status: ACTIVE | Noted: 2023-07-11

## 2023-07-15 PROBLEM — G43.909 MIGRAINE, UNSPECIFIED, NOT INTRACTABLE, WITHOUT STATUS MIGRAINOSUS: Chronic | Status: ACTIVE | Noted: 2023-07-11

## 2023-07-15 PROBLEM — C50.919 MALIGNANT NEOPLASM OF UNSPECIFIED SITE OF UNSPECIFIED FEMALE BREAST: Chronic | Status: ACTIVE | Noted: 2023-07-11

## 2023-07-15 PROBLEM — R45.851 SUICIDAL IDEATIONS: Chronic | Status: ACTIVE | Noted: 2023-07-11

## 2023-07-15 LAB
ANION GAP SERPL CALC-SCNC: 13 MMOL/L — SIGNIFICANT CHANGE UP (ref 7–14)
BUN SERPL-MCNC: 18 MG/DL — SIGNIFICANT CHANGE UP (ref 7–23)
CALCIUM SERPL-MCNC: 8.7 MG/DL — SIGNIFICANT CHANGE UP (ref 8.4–10.5)
CHLORIDE SERPL-SCNC: 105 MMOL/L — SIGNIFICANT CHANGE UP (ref 98–107)
CO2 SERPL-SCNC: 22 MMOL/L — SIGNIFICANT CHANGE UP (ref 22–31)
CREAT SERPL-MCNC: 0.51 MG/DL — SIGNIFICANT CHANGE UP (ref 0.5–1.3)
EGFR: 107 ML/MIN/1.73M2 — SIGNIFICANT CHANGE UP
GLUCOSE SERPL-MCNC: 101 MG/DL — HIGH (ref 70–99)
HCT VFR BLD CALC: 40.6 % — SIGNIFICANT CHANGE UP (ref 34.5–45)
HGB BLD-MCNC: 12.4 G/DL — SIGNIFICANT CHANGE UP (ref 11.5–15.5)
MAGNESIUM SERPL-MCNC: 1.7 MG/DL — SIGNIFICANT CHANGE UP (ref 1.6–2.6)
MCHC RBC-ENTMCNC: 26.9 PG — LOW (ref 27–34)
MCHC RBC-ENTMCNC: 30.5 GM/DL — LOW (ref 32–36)
MCV RBC AUTO: 88.1 FL — SIGNIFICANT CHANGE UP (ref 80–100)
NRBC # BLD: 0 /100 WBCS — SIGNIFICANT CHANGE UP (ref 0–0)
NRBC # FLD: 0 K/UL — SIGNIFICANT CHANGE UP (ref 0–0)
PHOSPHATE SERPL-MCNC: 3.5 MG/DL — SIGNIFICANT CHANGE UP (ref 2.5–4.5)
PLATELET # BLD AUTO: 277 K/UL — SIGNIFICANT CHANGE UP (ref 150–400)
POTASSIUM SERPL-MCNC: 3.4 MMOL/L — LOW (ref 3.5–5.3)
POTASSIUM SERPL-SCNC: 3.4 MMOL/L — LOW (ref 3.5–5.3)
RBC # BLD: 4.61 M/UL — SIGNIFICANT CHANGE UP (ref 3.8–5.2)
RBC # FLD: 17.4 % — HIGH (ref 10.3–14.5)
SODIUM SERPL-SCNC: 140 MMOL/L — SIGNIFICANT CHANGE UP (ref 135–145)
WBC # BLD: 9.77 K/UL — SIGNIFICANT CHANGE UP (ref 3.8–10.5)
WBC # FLD AUTO: 9.77 K/UL — SIGNIFICANT CHANGE UP (ref 3.8–10.5)

## 2023-07-15 PROCEDURE — 99232 SBSQ HOSP IP/OBS MODERATE 35: CPT

## 2023-07-15 PROCEDURE — 99497 ADVNCD CARE PLAN 30 MIN: CPT | Mod: 25

## 2023-07-15 RX ORDER — DIPHENHYDRAMINE HCL 50 MG
25 CAPSULE ORAL ONCE
Refills: 0 | Status: DISCONTINUED | OUTPATIENT
Start: 2023-07-15 | End: 2023-07-18

## 2023-07-15 RX ADMIN — Medication 1 DROP(S): at 14:44

## 2023-07-15 RX ADMIN — Medication 5 MILLIGRAM(S): at 14:49

## 2023-07-15 RX ADMIN — Medication 1 DROP(S): at 09:45

## 2023-07-15 RX ADMIN — Medication 1 DROP(S): at 03:37

## 2023-07-15 RX ADMIN — ATORVASTATIN CALCIUM 80 MILLIGRAM(S): 80 TABLET, FILM COATED ORAL at 21:26

## 2023-07-15 RX ADMIN — SENNA PLUS 2 TABLET(S): 8.6 TABLET ORAL at 21:21

## 2023-07-15 RX ADMIN — Medication 1 MILLIGRAM(S): at 12:33

## 2023-07-15 RX ADMIN — ENOXAPARIN SODIUM 120 MILLIGRAM(S): 100 INJECTION SUBCUTANEOUS at 09:46

## 2023-07-15 RX ADMIN — OLANZAPINE 5 MILLIGRAM(S): 15 TABLET, FILM COATED ORAL at 19:45

## 2023-07-15 RX ADMIN — Medication 10 MILLIGRAM(S): at 05:37

## 2023-07-15 RX ADMIN — Medication 3 MILLIGRAM(S): at 21:23

## 2023-07-15 RX ADMIN — Medication 25 MILLIGRAM(S): at 01:16

## 2023-07-15 RX ADMIN — Medication 1 DROP(S): at 21:37

## 2023-07-15 RX ADMIN — PANTOPRAZOLE SODIUM 40 MILLIGRAM(S): 20 TABLET, DELAYED RELEASE ORAL at 05:37

## 2023-07-15 NOTE — PROGRESS NOTE ADULT - PROBLEM SELECTOR PLAN 3
- on Telemetry  -downtrending   - Neurology team suggesting ILR  - EP consult placed for ILR,pt undecided about it   - TTE noted as above, grossly moderate to severe segmental left ventricular systolic dysfunction. Hypokinesis of the apex, mid to distal septum, and distal anterior wall.  .  No obvious LV thrombus seen.   bubble study was performed concerning for  intracardiac shunt.   will f/u further Neuro recs   cardiology eval appreciated, case discussed, since acute CVA present , will need to wait 1 month prior to C pe cards recs  BP soft, card rec GDMT when BP improves

## 2023-07-15 NOTE — PROGRESS NOTE ADULT - CONVERSATION DETAILS
Advance care directives discussed. Pt has no advance care directives, Education provided about HCP, pt wants her sister to be HCP, Encouraged patient to discuss with her sister and if she is willing, HCP document can be completed   Code status also discussed, Pt wants to be resuscitated with CPR and intubation if needed   Pt FULL CODE at  this time

## 2023-07-15 NOTE — PROGRESS NOTE ADULT - PROBLEM SELECTOR PLAN 1
- presented w/ report of L- arm numbness as well as L-facial numbness; both more pronounced than at baseline  - history of multiple CVAs and is on Lovenox 120 mg daily  - CT scan of head and CTA of brain and neck negative for any acute findings  - s/p Stroke Code in the ED and has already been seen by Neurology team (appreciated).  NIH = 6  - on Telemetry  - continuing PTA Lovenox 120 mg - unclear reason why pt takes this medication at home, pt says she was on coumadin, was getting bruises , so was changed to lovenox   - atorvastatin 80 mg daily  -  MRI  head shows Acute lacunar infarct involving the right basal ganglia region.  - f PT/OT recc outpt PT   - passed bedside dysphagia screen  - Speech/Swallow eval noted, recc regular with thin fluids   - suggestion for ILR, per Neurology team, EP eval appreciated, pt undecided about ILR, will CTM  -TTE noted - grossly moderate to severe segmental left ventricular systolic dysfunction. Hypokinesis of the apex, mid to distal septum, and distal anterior wall.  .  No obvious LV thrombus seen. bubble study was performed concerning for  intracardiac shunt. Case was discussed with neurology  , recc checking doppler which is negative  cards recc checking LESLEY if MRI shows stroke,  LESLEY was cancelled as LESLEY can be done during that time post workup of cardiomyopathy  If decision is to intervene for closure,   cards rec ILR, pt agreed, EP notified    will f/u further neuro recs - presented w/ report of L- arm numbness as well as L-facial numbness; both more pronounced than at baseline  - history of multiple CVAs and is on Lovenox 120 mg daily  - CT scan of head and CTA of brain and neck negative for any acute findings  - s/p Stroke Code in the ED and has already been seen by Neurology team (appreciated).  NIH = 6  - on Telemetry  - continuing PTA Lovenox 120 mg - unclear reason why pt takes this medication at home, pt says she was on coumadin, was getting bruises , so was changed to lovenox   - atorvastatin 80 mg daily  -  MRI  head shows Acute lacunar infarct involving the right basal ganglia region.  - f PT/OT recc outpt PT   - passed bedside dysphagia screen  - Speech/Swallow eval noted, recc regular with thin fluids   -TTE noted - grossly moderate to severe segmental left ventricular systolic dysfunction. Hypokinesis of the apex, mid to distal septum, and distal anterior wall.  .  No obvious LV thrombus seen. bubble study was performed concerning for  intracardiac shunt. Case was discussed with neurology  , recc checking doppler which is negative  cards recc checking LESLEY if MRI shows stroke,  LESLEY was cancelled as LESLEY can be done during that time post workup of cardiomyopathy  If decision is to intervene for closure,   cards rec ILR, EP f/u appreciated, pt s/p ILR, , She has a follow-up appointment in the device clinic on 7/31/23 at 9:20am  4th floor Oncology Building [phone].  neuro f/u noted , Per neuro  The infarct is most likely due to small vessel disease, and most likely not due to embolic stroke of undetermined source (i.e. not cryptogenic);   From the neurovascular standpoint, there is no role for LESLEY and no role for PFO closure. per neuro   per neuro if there is no role for ongoing Lovenox, then instead:  start Plavix 300 mg loading dose, then 75 mg daily for 3 weeks; ASA 81 mg daily  Not sure why patient on Lovenox at this time

## 2023-07-15 NOTE — PROGRESS NOTE ADULT - PROBLEM SELECTOR PLAN 5
- left eye very inflamed appearing; has blurred/decreased vision - pt with hx of uveitis likely due to RA   - on prednisone drops initially every 2 hours; patient reports using 4x daily at present  - ophthalmology consulted given symptoms worsening as per pt , recs noted , c/w prednisone 10 mg po daily   - may need Rheumatology consult - left eye very inflamed appearing; has blurred/decreased vision - pt with hx of uveitis likely due to RA   - on prednisone drops initially every 2 hours; patient reports using 4x daily at present  - ophthalmology consulted given symptoms worsening as per pt , recs noted , c/w prednisone 10 mg po daily   -  Rheumatology eval as outpt

## 2023-07-15 NOTE — PROGRESS NOTE ADULT - SUBJECTIVE AND OBJECTIVE BOX
Patient is a 58y old  Female who presents with a chief complaint of Paresthesias (14 Jul 2023 16:41)      SUBJECTIVE / OVERNIGHT EVENTS:    MEDICATIONS  (STANDING):  atorvastatin 80 milliGRAM(s) Oral at bedtime  enoxaparin Injectable 120 milliGRAM(s) SubCutaneous every 24 hours  ergocalciferol 64586 Unit(s) Oral <User Schedule>  folic acid 1 milliGRAM(s) Oral daily  lactated ringers. 1000 milliLiter(s) (125 mL/Hr) IV Continuous <Continuous>  nicotine - 21 mG/24Hr(s) Patch 1 Patch Transdermal daily  OLANZapine 5 milliGRAM(s) Oral <User Schedule>  pantoprazole    Tablet 40 milliGRAM(s) Oral before breakfast  prednisoLONE acetate 1% Suspension 1 Drop(s) Left EYE four times a day  predniSONE   Tablet 10 milliGRAM(s) Oral daily  senna 2 Tablet(s) Oral at bedtime    MEDICATIONS  (PRN):  acetaminophen     Tablet .. 650 milliGRAM(s) Oral every 6 hours PRN Mild Pain (1 - 3)  bisacodyl 5 milliGRAM(s) Oral daily PRN Constipation  diphenhydrAMINE 25 milliGRAM(s) Oral at bedtime PRN Insomnia  melatonin 3 milliGRAM(s) Oral at bedtime PRN Insomnia  metoclopramide 10 milliGRAM(s) Oral daily PRN for headache  oxycodone    5 mG/acetaminophen 325 mG 1 Tablet(s) Oral every 6 hours PRN Moderate Pain (4 - 6)      Vital Signs Last 24 Hrs  T(C): 36.6 (15 Jul 2023 05:30), Max: 36.8 (14 Jul 2023 18:00)  T(F): 97.8 (15 Jul 2023 05:30), Max: 98.2 (14 Jul 2023 18:00)  HR: 51 (15 Jul 2023 05:30) (51 - 73)  BP: 94/50 (15 Jul 2023 05:30) (94/50 - 125/61)  BP(mean): --  RR: 16 (15 Jul 2023 05:30) (16 - 18)  SpO2: 97% (15 Jul 2023 05:30) (97% - 100%)    Parameters below as of 15 Jul 2023 05:30  Patient On (Oxygen Delivery Method): room air      CAPILLARY BLOOD GLUCOSE        I&O's Summary      PHYSICAL EXAM:  GENERAL: NAD, well-developed  HEAD:  Atraumatic, Normocephalic  EYES: EOMI, PERRLA, conjunctiva and sclera clear  NECK: Supple, No JVD  CHEST/LUNG: Clear to auscultation bilaterally; No wheeze  HEART: Regular rate and rhythm; No murmurs, rubs, or gallops  ABDOMEN: Soft, Nontender, Nondistended; Bowel sounds present  EXTREMITIES:  2+ Peripheral Pulses, No clubbing, cyanosis, or edema  PSYCH: AAOx3  NEUROLOGY: non-focal  SKIN: No rashes or lesions    LABS:                        12.4   9.77  )-----------( 277      ( 15 Jul 2023 05:00 )             40.6     07-15    140  |  105  |  18  ----------------------------<  101<H>  3.4<L>   |  22  |  0.51    Ca    8.7      15 Jul 2023 05:00  Phos  3.5     07-15  Mg     1.70     07-15            Urinalysis Basic - ( 15 Jul 2023 05:00 )    Color: x / Appearance: x / SG: x / pH: x  Gluc: 101 mg/dL / Ketone: x  / Bili: x / Urobili: x   Blood: x / Protein: x / Nitrite: x   Leuk Esterase: x / RBC: x / WBC x   Sq Epi: x / Non Sq Epi: x / Bacteria: x        RADIOLOGY & ADDITIONAL TESTS:    Imaging Personally Reviewed:    Consultant(s) Notes Reviewed:      Care Discussed with Consultants/Other Providers:   Patient is a 58y old  Female who presents with a chief complaint of Paresthesias (14 Jul 2023 16:41)      SUBJECTIVE / OVERNIGHT EVENTS: patient seen and examined by bedside, pt alert and awake, still has some left face numbness, denies headache, dizziness, SOB, CP, Palpitations , N/V/D, abdominal pain  Tele : NSR 50-70, first degee AV block     MEDICATIONS  (STANDING):  atorvastatin 80 milliGRAM(s) Oral at bedtime  enoxaparin Injectable 120 milliGRAM(s) SubCutaneous every 24 hours  ergocalciferol 17722 Unit(s) Oral <User Schedule>  folic acid 1 milliGRAM(s) Oral daily  lactated ringers. 1000 milliLiter(s) (125 mL/Hr) IV Continuous <Continuous>  nicotine - 21 mG/24Hr(s) Patch 1 Patch Transdermal daily  OLANZapine 5 milliGRAM(s) Oral <User Schedule>  pantoprazole    Tablet 40 milliGRAM(s) Oral before breakfast  prednisoLONE acetate 1% Suspension 1 Drop(s) Left EYE four times a day  predniSONE   Tablet 10 milliGRAM(s) Oral daily  senna 2 Tablet(s) Oral at bedtime    MEDICATIONS  (PRN):  acetaminophen     Tablet .. 650 milliGRAM(s) Oral every 6 hours PRN Mild Pain (1 - 3)  bisacodyl 5 milliGRAM(s) Oral daily PRN Constipation  diphenhydrAMINE 25 milliGRAM(s) Oral at bedtime PRN Insomnia  melatonin 3 milliGRAM(s) Oral at bedtime PRN Insomnia  metoclopramide 10 milliGRAM(s) Oral daily PRN for headache  oxycodone    5 mG/acetaminophen 325 mG 1 Tablet(s) Oral every 6 hours PRN Moderate Pain (4 - 6)      Vital Signs Last 24 Hrs  T(C): 36.6 (15 Jul 2023 05:30), Max: 36.8 (14 Jul 2023 18:00)  T(F): 97.8 (15 Jul 2023 05:30), Max: 98.2 (14 Jul 2023 18:00)  HR: 51 (15 Jul 2023 05:30) (51 - 73)  BP: 94/50 (15 Jul 2023 05:30) (94/50 - 125/61)  BP(mean): --  RR: 16 (15 Jul 2023 05:30) (16 - 18)  SpO2: 97% (15 Jul 2023 05:30) (97% - 100%)    Parameters below as of 15 Jul 2023 05:30  Patient On (Oxygen Delivery Method): room air      CAPILLARY BLOOD GLUCOSE        PHYSICAL EXAM:  CONSTITUTIONAL: NAD,   EYES: EOMI; L eye redness improved   ENMT: Moist oral mucosa  RESPIRATORY: Normal respiratory effort; lungs are clear to auscultation bilaterally  CARDIOVASCULAR: Regular rate and rhythm, normal S1 and S2, no murmur; No lower extremity edema  ABDOMEN: RUQ TTP with deep palpation; normoactive bowel sounds, no rebound/guarding  MUSCULOSKELETAL:   no clubbing or cyanosis of digits; no joint swelling or tenderness to palpation  PSYCH: A+O to person, place, and time; affect appropriate  NEUROLOGY: moving all extremities, strength intact    SKIN: ILR site with dressing C/D/I       LABS:                        12.4   9.77  )-----------( 277      ( 15 Jul 2023 05:00 )             40.6     07-15    140  |  105  |  18  ----------------------------<  101<H>  3.4<L>   |  22  |  0.51    Ca    8.7      15 Jul 2023 05:00  Phos  3.5     07-15  Mg     1.70     07-15            Urinalysis Basic - ( 15 Jul 2023 05:00 )    Color: x / Appearance: x / SG: x / pH: x  Gluc: 101 mg/dL / Ketone: x  / Bili: x / Urobili: x   Blood: x / Protein: x / Nitrite: x   Leuk Esterase: x / RBC: x / WBC x   Sq Epi: x / Non Sq Epi: x / Bacteria: x        RADIOLOGY & ADDITIONAL TESTS:    Imaging Personally Reviewed:    Consultant(s) Notes Reviewed:  cardiology,  neurology, EP     Care Discussed with Consultants/Other Providers:

## 2023-07-15 NOTE — PROGRESS NOTE ADULT - ASSESSMENT
58 year old female, with past history significant for Depression, CVA (multiple, in 2021; on Lovenox), Rheumatoid arthritis, Cigarette smoking, Breast cancer - s/p radiation therapy, and ?Glaucoma, presented to the ED secondary to increased L-face and LUE paresthesia, as well as headache and upper abdominal pain.         1. LV dysfunction  -new mod-severe segmental LV dysfunction  -denies cardiac hx or workup in past  -endorses SOB, CP on exertion. trops elevated on admission 113--104--89  -  [facility] as out pt as pt has an acute stroke  - blood pressure soft will add GDMT when bp improves    2.  Paresthesias  -presented w/ report of L- arm numbness as well as L-facial numbness  -history of multiple CVAs and is on Lovenox 120 mg daily  -CT scan of head and CTA of brain and neck negative for any acute findings  -s/p Stroke Code in the ED  -  MRI shows Right basal ganglia acute infarct   -s/p ILR placement   -intracardiac shunt on TTE, spoke to LESLEY attending she is concerned about doing LESLEY now sec to acute infarct and sev LV dysfunction, will get done as out pt if ILR shows no atrial fibrillation

## 2023-07-15 NOTE — PROGRESS NOTE ADULT - PROBLEM SELECTOR PLAN 10
- on Lovenox 120 mg SQ PTA (in the context of multiple CVAs); continuing  Vit d deficiency: will supplement   PT eval -outpt PT   plan of care d/w pt and ACP - on Lovenox 120 mg SQ PTA (in the context of multiple CVAs); continuing  Vit d deficiency: will supplement   PT eval -outpt PT   plan of care d/w pt and ACP  Pt medically ready for discharge , will f/u CM to ensure safe discharge

## 2023-07-15 NOTE — PROGRESS NOTE ADULT - SUBJECTIVE AND OBJECTIVE BOX
German Carrasco MD  Interventional Cardiology / Advance Heart Failure and Cardiac Transplant Specialist  [place] Office : [address] [place] [address]  Tel:   [place] Office : [address] [facility] [address]  Tel: [phone]      Subjective/Overnight events: Pt is lying in bed comfortable not in distress, no chest pains no SOB no palpitations  	  MEDICATIONS:  enoxaparin Injectable 120 milliGRAM(s) SubCutaneous every 24 hours        acetaminophen     Tablet .. 650 milliGRAM(s) Oral every 6 hours PRN  diphenhydrAMINE 25 milliGRAM(s) Oral at bedtime PRN  melatonin 3 milliGRAM(s) Oral at bedtime PRN  OLANZapine 5 milliGRAM(s) Oral <User Schedule>  oxycodone    5 mG/acetaminophen 325 mG 1 Tablet(s) Oral every 6 hours PRN    bisacodyl 5 milliGRAM(s) Oral daily PRN  metoclopramide 10 milliGRAM(s) Oral daily PRN  pantoprazole    Tablet 40 milliGRAM(s) Oral before breakfast  senna 2 Tablet(s) Oral at bedtime    atorvastatin 80 milliGRAM(s) Oral at bedtime  predniSONE   Tablet 10 milliGRAM(s) Oral daily    ergocalciferol 68460 Unit(s) Oral <User Schedule>  folic acid 1 milliGRAM(s) Oral daily  lactated ringers. 1000 milliLiter(s) IV Continuous <Continuous>  prednisoLONE acetate 1% Suspension 1 Drop(s) Left EYE four times a day      PAST MEDICAL/SURGICAL HISTORY  PAST MEDICAL & SURGICAL HISTORY:  Cigarette smoker      Rheumatoid arthritis      Other depression      Breast cancer      Migraines      History of multiple cerebrovascular accidents (CVAs)      Suicidal ideation      Paresthesia of left arm      Facial paresthesia      History of hysterectomy      History of cholecystectomy          SOCIAL HISTORY: Substance Use (street drugs): ( x ) never used  (  ) other:    FAMILY HISTORY:  Family history of breast cancer (Mother)    Family history of diabetes mellitus (DM) (Father)          PHYSICAL EXAM:  T(C): 36.8 (07-15-23 @ 10:31), Max: 36.8 (07-14-23 @ 18:00)  HR: 68 (07-15-23 @ 10:31) (51 - 73)  BP: 101/59 (07-15-23 @ 10:31) (94/50 - 125/61)  RR: 17 (07-15-23 @ 10:31) (16 - 18)  SpO2: 98% (07-15-23 @ 10:31) (97% - 100%)  Wt(kg): --  I&O's Summary        GENERAL: NAD  EYES: EOMI, PERRLA, conjunctiva and sclera clear  ENMT: No tonsillar erythema, exudates, or enlargement  Cardiovascular: Normal S1 S2, No JVD, No murmurs, No edema  Respiratory: Lungs clear to auscultation	  Gastrointestinal:  Soft, Non-tender, + BS	  Extremities: No edema                                     12.4   9.77  )-----------( 277      ( 15 Jul 2023 05:00 )             40.6     07-15    140  |  105  |  18  ----------------------------<  101<H>  3.4<L>   |  22  |  0.51    Ca    8.7      15 Jul 2023 05:00  Phos  3.5     07-15  Mg     1.70     07-15      proBNP:   Lipid Profile:   HgA1c:   TSH:     Consultant(s) Notes Reviewed:  [x ] YES  [ ] NO    Care Discussed with Consultants/Other Providers [ x] YES  [ ] NO    Imaging Personally Reviewed independently:  [x] YES  [ ] NO    All labs, radiologic studies, vitals, orders and medications list reviewed. Patient is seen and examined at bedside. Case discussed with medical team.

## 2023-07-15 NOTE — PROGRESS NOTE ADULT - PROBLEM SELECTOR PLAN 4
- LFts noted, mild AST elevation, will CTM   - CTA of abdomen neg for acute pathology   - could be associated with constipation since last adequate bowel movement was more than one week ago  - bowel regimen prescribed  - cRUQ sono noted as above, no acute findings - LFts noted, mild AST elevation, will CTM   - CTA of abdomen neg for acute pathology   - could be associated with constipation since last adequate bowel movement was more than one week ago  - bowel regimen prescribed  - RUQ sono noted as above, no acute findings

## 2023-07-16 LAB
ANION GAP SERPL CALC-SCNC: 13 MMOL/L — SIGNIFICANT CHANGE UP (ref 7–14)
BUN SERPL-MCNC: 15 MG/DL — SIGNIFICANT CHANGE UP (ref 7–23)
CALCIUM SERPL-MCNC: 9.3 MG/DL — SIGNIFICANT CHANGE UP (ref 8.4–10.5)
CHLORIDE SERPL-SCNC: 105 MMOL/L — SIGNIFICANT CHANGE UP (ref 98–107)
CO2 SERPL-SCNC: 23 MMOL/L — SIGNIFICANT CHANGE UP (ref 22–31)
CREAT SERPL-MCNC: 0.55 MG/DL — SIGNIFICANT CHANGE UP (ref 0.5–1.3)
EGFR: 106 ML/MIN/1.73M2 — SIGNIFICANT CHANGE UP
GLUCOSE SERPL-MCNC: 98 MG/DL — SIGNIFICANT CHANGE UP (ref 70–99)
HCT VFR BLD CALC: 41.5 % — SIGNIFICANT CHANGE UP (ref 34.5–45)
HGB BLD-MCNC: 12.6 G/DL — SIGNIFICANT CHANGE UP (ref 11.5–15.5)
MAGNESIUM SERPL-MCNC: 1.8 MG/DL — SIGNIFICANT CHANGE UP (ref 1.6–2.6)
MCHC RBC-ENTMCNC: 26.8 PG — LOW (ref 27–34)
MCHC RBC-ENTMCNC: 30.4 GM/DL — LOW (ref 32–36)
MCV RBC AUTO: 88.1 FL — SIGNIFICANT CHANGE UP (ref 80–100)
NRBC # BLD: 0 /100 WBCS — SIGNIFICANT CHANGE UP (ref 0–0)
NRBC # FLD: 0 K/UL — SIGNIFICANT CHANGE UP (ref 0–0)
PHOSPHATE SERPL-MCNC: 3.9 MG/DL — SIGNIFICANT CHANGE UP (ref 2.5–4.5)
PLATELET # BLD AUTO: 282 K/UL — SIGNIFICANT CHANGE UP (ref 150–400)
POTASSIUM SERPL-MCNC: 3.7 MMOL/L — SIGNIFICANT CHANGE UP (ref 3.5–5.3)
POTASSIUM SERPL-SCNC: 3.7 MMOL/L — SIGNIFICANT CHANGE UP (ref 3.5–5.3)
RBC # BLD: 4.71 M/UL — SIGNIFICANT CHANGE UP (ref 3.8–5.2)
RBC # FLD: 17.2 % — HIGH (ref 10.3–14.5)
SODIUM SERPL-SCNC: 141 MMOL/L — SIGNIFICANT CHANGE UP (ref 135–145)
WBC # BLD: 10.41 K/UL — SIGNIFICANT CHANGE UP (ref 3.8–10.5)
WBC # FLD AUTO: 10.41 K/UL — SIGNIFICANT CHANGE UP (ref 3.8–10.5)

## 2023-07-16 PROCEDURE — 99232 SBSQ HOSP IP/OBS MODERATE 35: CPT

## 2023-07-16 RX ADMIN — Medication 10 MILLIGRAM(S): at 06:42

## 2023-07-16 RX ADMIN — Medication 1 MILLIGRAM(S): at 12:35

## 2023-07-16 RX ADMIN — SENNA PLUS 2 TABLET(S): 8.6 TABLET ORAL at 21:23

## 2023-07-16 RX ADMIN — Medication 1 DROP(S): at 21:23

## 2023-07-16 RX ADMIN — Medication 3 MILLIGRAM(S): at 21:23

## 2023-07-16 RX ADMIN — PANTOPRAZOLE SODIUM 40 MILLIGRAM(S): 20 TABLET, DELAYED RELEASE ORAL at 06:42

## 2023-07-16 RX ADMIN — OLANZAPINE 5 MILLIGRAM(S): 15 TABLET, FILM COATED ORAL at 19:16

## 2023-07-16 RX ADMIN — ATORVASTATIN CALCIUM 80 MILLIGRAM(S): 80 TABLET, FILM COATED ORAL at 21:23

## 2023-07-16 RX ADMIN — Medication 1 DROP(S): at 15:30

## 2023-07-16 RX ADMIN — Medication 25 MILLIGRAM(S): at 21:23

## 2023-07-16 RX ADMIN — Medication 1 DROP(S): at 06:42

## 2023-07-16 RX ADMIN — ENOXAPARIN SODIUM 120 MILLIGRAM(S): 100 INJECTION SUBCUTANEOUS at 10:27

## 2023-07-16 NOTE — PROGRESS NOTE ADULT - PROBLEM SELECTOR PLAN 5
- left eye very inflamed appearing; has blurred/decreased vision - pt with hx of uveitis likely due to RA   - on prednisone drops initially every 2 hours; patient reports using 4x daily at present  - ophthalmology consulted given symptoms worsening as per pt , recs noted , c/w prednisone 10 mg po daily   -  Rheumatology eval as outpt

## 2023-07-16 NOTE — PROGRESS NOTE ADULT - SUBJECTIVE AND OBJECTIVE BOX
German Carrasco MD  Interventional Cardiology / Advance Heart Failure and Cardiac Transplant Specialist  [place] Office : [address] [place] [address]  Tel:   [place] Office : [address] [facility] [address]  Tel: [phone]      Subjective/Overnight events: Pt is lying in bed comfortable not in distress, no chest pains no SOB no palpitations  	  MEDICATIONS:  enoxaparin Injectable 120 milliGRAM(s) SubCutaneous every 24 hours        acetaminophen     Tablet .. 650 milliGRAM(s) Oral every 6 hours PRN  diphenhydrAMINE 25 milliGRAM(s) Oral at bedtime PRN  diphenhydrAMINE 25 milliGRAM(s) Oral once  melatonin 3 milliGRAM(s) Oral at bedtime PRN  OLANZapine 5 milliGRAM(s) Oral <User Schedule>  oxycodone    5 mG/acetaminophen 325 mG 1 Tablet(s) Oral every 6 hours PRN    bisacodyl 5 milliGRAM(s) Oral daily PRN  metoclopramide 10 milliGRAM(s) Oral daily PRN  pantoprazole    Tablet 40 milliGRAM(s) Oral before breakfast  senna 2 Tablet(s) Oral at bedtime    atorvastatin 80 milliGRAM(s) Oral at bedtime  predniSONE   Tablet 10 milliGRAM(s) Oral daily    ergocalciferol 48017 Unit(s) Oral <User Schedule>  folic acid 1 milliGRAM(s) Oral daily  lactated ringers. 1000 milliLiter(s) IV Continuous <Continuous>  prednisoLONE acetate 1% Suspension 1 Drop(s) Left EYE four times a day      PAST MEDICAL/SURGICAL HISTORY  PAST MEDICAL & SURGICAL HISTORY:  Cigarette smoker      Rheumatoid arthritis      Other depression      Breast cancer      Migraines      History of multiple cerebrovascular accidents (CVAs)      Suicidal ideation      Paresthesia of left arm      Facial paresthesia      History of hysterectomy      History of cholecystectomy          SOCIAL HISTORY: Substance Use (street drugs): ( x ) never used  (  ) other:    FAMILY HISTORY:  Family history of breast cancer (Mother)    Family history of diabetes mellitus (DM) (Father)            PHYSICAL EXAM:  T(C): 36.7 (07-16-23 @ 10:20), Max: 37.1 (07-16-23 @ 06:21)  HR: 64 (07-16-23 @ 10:20) (64 - 75)  BP: 103/66 (07-16-23 @ 10:20) (102/56 - 119/76)  RR: 18 (07-16-23 @ 10:20) (18 - 18)  SpO2: 98% (07-16-23 @ 10:20) (98% - 100%)  Wt(kg): --  I&O's Summary        GENERAL: NAD  EYES: EOMI, PERRLA, conjunctiva and sclera clear  ENMT: No tonsillar erythema, exudates, or enlargement  Cardiovascular: Normal S1 S2, No JVD, No murmurs, No edema  Respiratory: Lungs clear to auscultation	  Gastrointestinal:  Soft, Non-tender, + BS	  Extremities: No edema                                     12.6   10.41 )-----------( 282      ( 16 Jul 2023 08:13 )             41.5     07-16    141  |  105  |  15  ----------------------------<  98  3.7   |  23  |  0.55    Ca    9.3      16 Jul 2023 08:13  Phos  3.9     07-16  Mg     1.80     07-16      proBNP:   Lipid Profile:   HgA1c:   TSH:     Consultant(s) Notes Reviewed:  [x ] YES  [ ] NO    Care Discussed with Consultants/Other Providers [ x] YES  [ ] NO    Imaging Personally Reviewed independently:  [x] YES  [ ] NO    All labs, radiologic studies, vitals, orders and medications list reviewed. Patient is seen and examined at bedside. Case discussed with medical team.

## 2023-07-16 NOTE — PROGRESS NOTE ADULT - ASSESSMENT
58 year old female, with past history significant for Depression, CVA (multiple, in 2021; on Lovenox), Rheumatoid arthritis, Cigarette smoking, Breast cancer - s/p radiation therapy, and ?Glaucoma, presented to the ED secondary to increased L-face and LUE paresthesia, as well as headache and upper abdominal pain.         1. LV dysfunction  -new mod-severe segmental LV dysfunction  -denies cardiac hx or workup in past  -endorses SOB, CP on exertion. trops elevated on admission 113--104--89  -  [facility] as out pt as pt has an acute stroke  - blood pressure soft will add GDMT when bp improves    2.  Paresthesias  -presented w/ report of L- arm numbness as well as L-facial numbness  -history of multiple CVAs and is on Lovenox 120 mg daily  -CT scan of head and CTA of brain and neck negative for any acute findings  -s/p Stroke Code in the ED  -  MRI shows Right basal ganglia acute infarct   -s/p ILR placement   -intracardiac shunt on TTE, spoke to LESLEY attending she is concerned about doing LESLEY now sec to acute infarct and sev LV dysfunction, will get done as out pt if ILR shows no atrial fibrillation

## 2023-07-16 NOTE — PROGRESS NOTE ADULT - PROBLEM SELECTOR PLAN 4
- LFts noted, mild AST elevation, will CTM   - CTA of abdomen neg for acute pathology   - could be associated with constipation since last adequate bowel movement was more than one week ago  - bowel regimen prescribed  - RUQ sono noted as above, no acute findings

## 2023-07-16 NOTE — PROGRESS NOTE ADULT - PROBLEM SELECTOR PLAN 10
- on Lovenox 120 mg SQ PTA (in the context of multiple CVAs); continuing  Vit d deficiency: will supplement   PT eval -outpt PT   plan of care d/w pt and ACP  Pt medically ready for discharge , will f/u CM to ensure safe discharge

## 2023-07-16 NOTE — PROGRESS NOTE ADULT - PROBLEM SELECTOR PLAN 3
- on Telemetry  -downtrending   - Neurology team suggesting ILR  - EP consult placed for ILR,pt undecided about it   - TTE noted as above, grossly moderate to severe segmental left ventricular systolic dysfunction. Hypokinesis of the apex, mid to distal septum, and distal anterior wall.  .  No obvious LV thrombus seen.   bubble study was performed concerning for  intracardiac shunt.   will f/u further Neuro recs   cardiology eval appreciated, case discussed, since acute CVA present , will need to wait 1 month prior to C pe cards recs  BP soft, card rec GDMT when BP improves - on Telemetry  -downtrending   - Neurology team suggesting ILR  - EP consult placed for ILR,pt undecided about it   - TTE noted as above, grossly moderate to severe segmental left ventricular systolic dysfunction. Hypokinesis of the apex, mid to distal septum, and distal anterior wall.  .  No obvious LV thrombus seen.   bubble study was performed concerning for  intracardiac shunt.   will f/u further Neuro recs   cardiology eval appreciated, case discussed, since acute CVA present , will need to wait 1 month prior to LHC per  cards recs  BP soft, card rec GDMT when BP improves, outpt cardiology f/u

## 2023-07-16 NOTE — PROGRESS NOTE ADULT - PROBLEM SELECTOR PLAN 8
- endorses poor fluid intake  - lab-work appears hemoconcentrated and patient w/ dry oral mucosa  - constipation resolved   - s/p IVF hydration   - encourage oral hydration  - f/u electrolytes resolved

## 2023-07-16 NOTE — PROGRESS NOTE ADULT - PROBLEM SELECTOR PLAN 1
- presented w/ report of L- arm numbness as well as L-facial numbness; both more pronounced than at baseline  - history of multiple CVAs and is on Lovenox 120 mg daily  - CT scan of head and CTA of brain and neck negative for any acute findings  - s/p Stroke Code in the ED and has already been seen by Neurology team (appreciated).  NIH = 6  - on Telemetry  - continuing PTA Lovenox 120 mg - unclear reason why pt takes this medication at home, pt says she was on coumadin, was getting bruises , so was changed to lovenox   - atorvastatin 80 mg daily  -  MRI  head shows Acute lacunar infarct involving the right basal ganglia region.  - f PT/OT recc outpt PT   - passed bedside dysphagia screen  - Speech/Swallow eval noted, recc regular with thin fluids   -TTE noted - grossly moderate to severe segmental left ventricular systolic dysfunction. Hypokinesis of the apex, mid to distal septum, and distal anterior wall.  .  No obvious LV thrombus seen. bubble study was performed concerning for  intracardiac shunt. Case was discussed with neurology  , recc checking doppler which is negative  cards recc checking LESLEY if MRI shows stroke,  LESLEY was cancelled as LESLEY can be done during that time post workup of cardiomyopathy  If decision is to intervene for closure,   cards rec ILR, EP f/u appreciated, pt s/p ILR, , She has a follow-up appointment in the device clinic on 7/31/23 at 9:20am  4th floor Oncology Building [phone].  neuro f/u noted , Per neuro  The infarct is most likely due to small vessel disease, and most likely not due to embolic stroke of undetermined source (i.e. not cryptogenic);   From the neurovascular standpoint, there is no role for LESLEY and no role for PFO closure. per neuro   per neuro if there is no role for ongoing Lovenox, then instead:  start Plavix 300 mg loading dose, then 75 mg daily for 3 weeks; ASA 81 mg daily  Not sure why patient on Lovenox at this time - presented w/ report of L- arm numbness as well as L-facial numbness; both more pronounced than at baseline  - history of multiple CVAs and is on Lovenox 120 mg daily  - CT scan of head and CTA of brain and neck negative for any acute findings  - continuing PTA Lovenox 120 mg - unclear reason why pt takes this medication at home, pt says she was on coumadin, was getting bruises , so was changed to lovenox , pt says she was on Lovenox as she had strokes   - atorvastatin 80 mg daily  -  MRI  head shows Acute lacunar infarct involving the right basal ganglia region.  - PT/OT recc outpt PT   - passed bedside dysphagia screen  - Speech/Swallow eval noted, recc regular with thin fluids   -TTE noted - grossly moderate to severe segmental left ventricular systolic dysfunction. Hypokinesis of the apex, mid to distal septum, and distal anterior wall.  .  No obvious LV thrombus seen. bubble study was performed concerning for  intracardiac shunt. Case was discussed with neurology  , recc checking doppler which is negative  cards recc checking LESLEY if MRI shows stroke,  LESLEY was cancelled as LESLEY can be done during that time post workup of cardiomyopathy  If decision is to intervene for closure,   cards rec ILR, EP f/u appreciated, pt s/p ILR, , She has a follow-up appointment in the device clinic on 7/31/23 at 9:20am  [address] [place] [address] [phone].  neuro f/u noted , Per neuro  The infarct is most likely due to small vessel disease, and most likely not due to embolic stroke of undetermined source (i.e. not cryptogenic);   From the neurovascular standpoint, there is no role for LESLEY and no role for PFO closure. per neuro   per neuro if there is no role for ongoing Lovenox, then instead:  start Plavix 300 mg loading dose, then 75 mg daily for 3 weeks; ASA 81 mg daily, Not sure why patient on Lovenox at this time, per patient its for stroke , will d/w Neurology if Asa 81 should be added

## 2023-07-16 NOTE — PROGRESS NOTE ADULT - SUBJECTIVE AND OBJECTIVE BOX
Patient is a 58y old  Female who presents with a chief complaint of Paresthesias (15 Jul 2023 13:16)      SUBJECTIVE / OVERNIGHT EVENTS:    MEDICATIONS  (STANDING):  atorvastatin 80 milliGRAM(s) Oral at bedtime  diphenhydrAMINE 25 milliGRAM(s) Oral once  enoxaparin Injectable 120 milliGRAM(s) SubCutaneous every 24 hours  ergocalciferol 54281 Unit(s) Oral <User Schedule>  folic acid 1 milliGRAM(s) Oral daily  lactated ringers. 1000 milliLiter(s) (125 mL/Hr) IV Continuous <Continuous>  nicotine - 21 mG/24Hr(s) Patch 1 Patch Transdermal daily  OLANZapine 5 milliGRAM(s) Oral <User Schedule>  pantoprazole    Tablet 40 milliGRAM(s) Oral before breakfast  prednisoLONE acetate 1% Suspension 1 Drop(s) Left EYE four times a day  predniSONE   Tablet 10 milliGRAM(s) Oral daily  senna 2 Tablet(s) Oral at bedtime    MEDICATIONS  (PRN):  acetaminophen     Tablet .. 650 milliGRAM(s) Oral every 6 hours PRN Mild Pain (1 - 3)  bisacodyl 5 milliGRAM(s) Oral daily PRN Constipation  diphenhydrAMINE 25 milliGRAM(s) Oral at bedtime PRN Insomnia  melatonin 3 milliGRAM(s) Oral at bedtime PRN Insomnia  metoclopramide 10 milliGRAM(s) Oral daily PRN for headache  oxycodone    5 mG/acetaminophen 325 mG 1 Tablet(s) Oral every 6 hours PRN Moderate Pain (4 - 6)      Vital Signs Last 24 Hrs  T(C): 37.1 (16 Jul 2023 06:21), Max: 37.1 (16 Jul 2023 06:21)  T(F): 98.7 (16 Jul 2023 06:21), Max: 98.7 (16 Jul 2023 06:21)  HR: 75 (16 Jul 2023 06:21) (64 - 75)  BP: 119/76 (16 Jul 2023 06:21) (101/59 - 119/76)  BP(mean): --  RR: 18 (16 Jul 2023 06:21) (17 - 18)  SpO2: 100% (16 Jul 2023 06:21) (98% - 100%)    Parameters below as of 16 Jul 2023 06:21  Patient On (Oxygen Delivery Method): room air      CAPILLARY BLOOD GLUCOSE        I&O's Summary      PHYSICAL EXAM:  GENERAL: NAD, well-developed  HEAD:  Atraumatic, Normocephalic  EYES: EOMI, PERRLA, conjunctiva and sclera clear  NECK: Supple, No JVD  CHEST/LUNG: Clear to auscultation bilaterally; No wheeze  HEART: Regular rate and rhythm; No murmurs, rubs, or gallops  ABDOMEN: Soft, Nontender, Nondistended; Bowel sounds present  EXTREMITIES:  2+ Peripheral Pulses, No clubbing, cyanosis, or edema  PSYCH: AAOx3  NEUROLOGY: non-focal  SKIN: No rashes or lesions    LABS:                        12.6   10.41 )-----------( 282      ( 16 Jul 2023 08:13 )             41.5     07-15    140  |  105  |  18  ----------------------------<  101<H>  3.4<L>   |  22  |  0.51    Ca    8.7      15 Jul 2023 05:00  Phos  3.5     07-15  Mg     1.70     07-15            Urinalysis Basic - ( 15 Jul 2023 05:00 )    Color: x / Appearance: x / SG: x / pH: x  Gluc: 101 mg/dL / Ketone: x  / Bili: x / Urobili: x   Blood: x / Protein: x / Nitrite: x   Leuk Esterase: x / RBC: x / WBC x   Sq Epi: x / Non Sq Epi: x / Bacteria: x        RADIOLOGY & ADDITIONAL TESTS:    Imaging Personally Reviewed:    Consultant(s) Notes Reviewed:      Care Discussed with Consultants/Other Providers:   Patient is a 58y old  Female who presents with a chief complaint of Paresthesias (15 Jul 2023 13:16)      SUBJECTIVE / OVERNIGHT EVENTS: patient seen and examined by bedside, pt still with left face numbness , denies headache, dizziness, SOB, CP, Palpitations , N/V/D, abdominal pain  tele : Sinus kavon 50s     MEDICATIONS  (STANDING):  atorvastatin 80 milliGRAM(s) Oral at bedtime  diphenhydrAMINE 25 milliGRAM(s) Oral once  enoxaparin Injectable 120 milliGRAM(s) SubCutaneous every 24 hours  ergocalciferol 63147 Unit(s) Oral <User Schedule>  folic acid 1 milliGRAM(s) Oral daily  lactated ringers. 1000 milliLiter(s) (125 mL/Hr) IV Continuous <Continuous>  nicotine - 21 mG/24Hr(s) Patch 1 Patch Transdermal daily  OLANZapine 5 milliGRAM(s) Oral <User Schedule>  pantoprazole    Tablet 40 milliGRAM(s) Oral before breakfast  prednisoLONE acetate 1% Suspension 1 Drop(s) Left EYE four times a day  predniSONE   Tablet 10 milliGRAM(s) Oral daily  senna 2 Tablet(s) Oral at bedtime    MEDICATIONS  (PRN):  acetaminophen     Tablet .. 650 milliGRAM(s) Oral every 6 hours PRN Mild Pain (1 - 3)  bisacodyl 5 milliGRAM(s) Oral daily PRN Constipation  diphenhydrAMINE 25 milliGRAM(s) Oral at bedtime PRN Insomnia  melatonin 3 milliGRAM(s) Oral at bedtime PRN Insomnia  metoclopramide 10 milliGRAM(s) Oral daily PRN for headache  oxycodone    5 mG/acetaminophen 325 mG 1 Tablet(s) Oral every 6 hours PRN Moderate Pain (4 - 6)      Vital Signs Last 24 Hrs  T(C): 37.1 (16 Jul 2023 06:21), Max: 37.1 (16 Jul 2023 06:21)  T(F): 98.7 (16 Jul 2023 06:21), Max: 98.7 (16 Jul 2023 06:21)  HR: 75 (16 Jul 2023 06:21) (64 - 75)  BP: 119/76 (16 Jul 2023 06:21) (101/59 - 119/76)  BP(mean): --  RR: 18 (16 Jul 2023 06:21) (17 - 18)  SpO2: 100% (16 Jul 2023 06:21) (98% - 100%)    Parameters below as of 16 Jul 2023 06:21  Patient On (Oxygen Delivery Method): room air      CAPILLARY BLOOD GLUCOSE        I&O's Summary    PHYSICAL EXAM:  CONSTITUTIONAL: NAD,   EYES: EOMI; L eye redness improved   ENMT: Moist oral mucosa  RESPIRATORY: Normal respiratory effort; lungs are clear to auscultation bilaterally  CARDIOVASCULAR: Regular rate and rhythm, normal S1 and S2, no murmur; No lower extremity edema  ABDOMEN: RUQ TTP with deep palpation; normoactive bowel sounds, no rebound/guarding  MUSCULOSKELETAL:   no clubbing or cyanosis of digits; no joint swelling or tenderness to palpation  PSYCH: A+O to person, place, and time; affect appropriate  NEUROLOGY: moving all extremities, strength intact    SKIN: ILR site with dressing C/D/I       LABS:                        12.6   10.41 )-----------( 282      ( 16 Jul 2023 08:13 )             41.5     07-15    140  |  105  |  18  ----------------------------<  101<H>  3.4<L>   |  22  |  0.51    Ca    8.7      15 Jul 2023 05:00  Phos  3.5     07-15  Mg     1.70     07-15            Urinalysis Basic - ( 15 Jul 2023 05:00 )    Color: x / Appearance: x / SG: x / pH: x  Gluc: 101 mg/dL / Ketone: x  / Bili: x / Urobili: x   Blood: x / Protein: x / Nitrite: x   Leuk Esterase: x / RBC: x / WBC x   Sq Epi: x / Non Sq Epi: x / Bacteria: x        RADIOLOGY & ADDITIONAL TESTS:    Imaging Personally Reviewed:    Consultant(s) Notes Reviewed: cardiology      Care Discussed with Consultants/Other Providers:

## 2023-07-17 ENCOUNTER — TRANSCRIPTION ENCOUNTER (OUTPATIENT)
Age: 58
End: 2023-07-17

## 2023-07-17 DIAGNOSIS — I63.9 CEREBRAL INFARCTION, UNSPECIFIED: ICD-10-CM

## 2023-07-17 LAB
ANION GAP SERPL CALC-SCNC: 9 MMOL/L — SIGNIFICANT CHANGE UP (ref 7–14)
BUN SERPL-MCNC: 20 MG/DL — SIGNIFICANT CHANGE UP (ref 7–23)
CALCIUM SERPL-MCNC: 9.2 MG/DL — SIGNIFICANT CHANGE UP (ref 8.4–10.5)
CHLORIDE SERPL-SCNC: 106 MMOL/L — SIGNIFICANT CHANGE UP (ref 98–107)
CO2 SERPL-SCNC: 23 MMOL/L — SIGNIFICANT CHANGE UP (ref 22–31)
CREAT SERPL-MCNC: 0.64 MG/DL — SIGNIFICANT CHANGE UP (ref 0.5–1.3)
EGFR: 102 ML/MIN/1.73M2 — SIGNIFICANT CHANGE UP
GLUCOSE SERPL-MCNC: 142 MG/DL — HIGH (ref 70–99)
HCT VFR BLD CALC: 38.5 % — SIGNIFICANT CHANGE UP (ref 34.5–45)
HGB BLD-MCNC: 11.8 G/DL — SIGNIFICANT CHANGE UP (ref 11.5–15.5)
MAGNESIUM SERPL-MCNC: 1.7 MG/DL — SIGNIFICANT CHANGE UP (ref 1.6–2.6)
MCHC RBC-ENTMCNC: 26.7 PG — LOW (ref 27–34)
MCHC RBC-ENTMCNC: 30.6 GM/DL — LOW (ref 32–36)
MCV RBC AUTO: 87.1 FL — SIGNIFICANT CHANGE UP (ref 80–100)
NRBC # BLD: 0 /100 WBCS — SIGNIFICANT CHANGE UP (ref 0–0)
NRBC # FLD: 0 K/UL — SIGNIFICANT CHANGE UP (ref 0–0)
PHOSPHATE SERPL-MCNC: 3.9 MG/DL — SIGNIFICANT CHANGE UP (ref 2.5–4.5)
PLATELET # BLD AUTO: 266 K/UL — SIGNIFICANT CHANGE UP (ref 150–400)
POTASSIUM SERPL-MCNC: 3.5 MMOL/L — SIGNIFICANT CHANGE UP (ref 3.5–5.3)
POTASSIUM SERPL-SCNC: 3.5 MMOL/L — SIGNIFICANT CHANGE UP (ref 3.5–5.3)
RBC # BLD: 4.42 M/UL — SIGNIFICANT CHANGE UP (ref 3.8–5.2)
RBC # FLD: 16.9 % — HIGH (ref 10.3–14.5)
SODIUM SERPL-SCNC: 138 MMOL/L — SIGNIFICANT CHANGE UP (ref 135–145)
WBC # BLD: 11.06 K/UL — HIGH (ref 3.8–10.5)
WBC # FLD AUTO: 11.06 K/UL — HIGH (ref 3.8–10.5)

## 2023-07-17 PROCEDURE — 93010 ELECTROCARDIOGRAM REPORT: CPT

## 2023-07-17 PROCEDURE — 99233 SBSQ HOSP IP/OBS HIGH 50: CPT

## 2023-07-17 RX ORDER — LANOLIN ALCOHOL/MO/W.PET/CERES
3 CREAM (GRAM) TOPICAL AT BEDTIME
Refills: 0 | Status: DISCONTINUED | OUTPATIENT
Start: 2023-07-17 | End: 2023-07-18

## 2023-07-17 RX ORDER — TRAZODONE HCL 50 MG
50 TABLET ORAL AT BEDTIME
Refills: 0 | Status: DISCONTINUED | OUTPATIENT
Start: 2023-07-17 | End: 2023-07-18

## 2023-07-17 RX ORDER — SPIRONOLACTONE 25 MG/1
25 TABLET, FILM COATED ORAL DAILY
Refills: 0 | Status: DISCONTINUED | OUTPATIENT
Start: 2023-07-17 | End: 2023-07-18

## 2023-07-17 RX ADMIN — Medication 1 DROP(S): at 11:19

## 2023-07-17 RX ADMIN — ATORVASTATIN CALCIUM 80 MILLIGRAM(S): 80 TABLET, FILM COATED ORAL at 22:04

## 2023-07-17 RX ADMIN — Medication 10 MILLIGRAM(S): at 05:50

## 2023-07-17 RX ADMIN — SPIRONOLACTONE 25 MILLIGRAM(S): 25 TABLET, FILM COATED ORAL at 22:04

## 2023-07-17 RX ADMIN — Medication 1 DROP(S): at 05:50

## 2023-07-17 RX ADMIN — PANTOPRAZOLE SODIUM 40 MILLIGRAM(S): 20 TABLET, DELAYED RELEASE ORAL at 05:50

## 2023-07-17 RX ADMIN — Medication 50 MILLIGRAM(S): at 22:04

## 2023-07-17 RX ADMIN — Medication 1 DROP(S): at 19:32

## 2023-07-17 RX ADMIN — Medication 3 MILLIGRAM(S): at 22:04

## 2023-07-17 RX ADMIN — ENOXAPARIN SODIUM 120 MILLIGRAM(S): 100 INJECTION SUBCUTANEOUS at 10:30

## 2023-07-17 RX ADMIN — OLANZAPINE 5 MILLIGRAM(S): 15 TABLET, FILM COATED ORAL at 19:32

## 2023-07-17 RX ADMIN — Medication 1 MILLIGRAM(S): at 11:19

## 2023-07-17 NOTE — PROGRESS NOTE ADULT - PROBLEM SELECTOR PROBLEM 1
Paresthesias
Paresthesias
CVA (cerebrovascular accident)
Paresthesias

## 2023-07-17 NOTE — PROGRESS NOTE ADULT - ASSESSMENT
58 year old female, with past history significant for Depression, CVA (multiple, in 2021; on Lovenox), Rheumatoid arthritis, Cigarette smoking, Breast cancer - s/p radiation therapy, and ?Glaucoma, presented to the ED secondary to increased L-face and LUE paresthesia, as well as headache and upper abdominal pain.         1. LV dysfunction  -new mod-severe segmental LV dysfunction  -denies cardiac hx or workup in past  -endorses SOB, CP on exertion. trops elevated on admission 113--104--89  -  [facility] as out pt as pt has an acute stroke  - blood pressure soft will add further GDMT when bp improves. started on aldactone 25mg daily    2.  Paresthesias  -presented w/ report of L- arm numbness as well as L-facial numbness  -history of multiple CVAs and is on Lovenox 120 mg daily  -CT scan of head and CTA of brain and neck negative for any acute findings  -s/p Stroke Code in the ED  -  MRI shows Right basal ganglia acute infarct   -s/p ILR placement   -intracardiac shunt on TTE, spoke to LESLEY attending she is concerned about doing LESLEY now sec to acute infarct and sev LV dysfunction, will get done as out pt if ILR shows no atrial fibrillation

## 2023-07-17 NOTE — DISCHARGE NOTE PROVIDER - NSDCFUSCHEDAPPT_GEN_ALL_CORE_FT
Marlena Guy  [facility]  OTOLARYNG  [facility]  Scheduled Appointment: 07/31/2023    [facility]  ELECTROPH [id number]  Scheduled Appointment: 07/31/2023    [facility]  ELECTROPH [id number]  Scheduled Appointment: 07/31/2023    Gerald Teresa  [facility]  RHEUM [address]  Scheduled Appointment: 08/01/2023    Wendi Lares  [facility]  NEUROLOGY [address] [facility]  Scheduled Appointment: 08/08/2023    Goldberg, Naomi  [facility]  [facility]THALM [address] [facility]  Scheduled Appointment: 08/11/2023    [facility]  NEUROLOGY  [address]  Scheduled Appointment: 10/10/2023

## 2023-07-17 NOTE — PROGRESS NOTE ADULT - PROBLEM SELECTOR PROBLEM 5
Inflammatory eye disease

## 2023-07-17 NOTE — DISCHARGE NOTE PROVIDER - NSFOLLOWUPCLINICS_GEN_ALL_ED_FT
[facility] General Internal Medicine  General Internal Medicine  [address] [place] [address]  Phone: [phone]  Fax:   Follow Up Time: 1 week    [facility] Rheumatology  Rheumatology  [address] [place] [id number]  Phone: [phone]  Fax:   Follow Up Time: 1 week

## 2023-07-17 NOTE — PROGRESS NOTE ADULT - PROBLEM SELECTOR PLAN 7
- chiefly due to depression; patient is not motivated to fill prescriptions  - has not been taking some medications for more than one month  - Apprec Psych assistance
- chiefly due to depression; patient is not motivated to fill prescriptions  - has not been taking some medications for more than one month  - [facility] pharmacy team to review home medications due to poor history
- chiefly due to depression; patient is not motivated to fill prescriptions  - has not been taking some medications for more than one month  - [facility] pharmacy team to review home medications due to poor history
- chiefly due to depression; patient is not motivated to fill prescriptions  - has not been taking some medications for more than one month  - [facility] pharmacy team to review home medications due to poor history
- chiefly due to depression; patient is not motivated to fill prescriptions  - has not been taking some medications for more than one month  - [facility] pharmacy team to review home medications due to poor history
- chiefly due to depression; patient is not motivated to fill prescriptions  - has not been taking some medications for more than one month  - [facility] pharmacy team to review home medications due to poor history
- chiefly due to depression; patient is not motivated to fill prescriptions  - has not been taking some medications for more than one month  - [facility] pharmacy team to review home medications due to poor history

## 2023-07-17 NOTE — CHART NOTE - NSCHARTNOTEFT_GEN_A_CORE
Per hematology notes from "[facility], Ricky Hernandez PA-C,  [place]. Patient was diagnosed with antiphospholipid syndrome, RA and ANCA-positive vasculitis. Per note, due to fluctuations in INR, Pt was started on Lovenox 120mg sc daily since 1/30/23. Discussed with neurology, will only continue lovenox on discharge, and hold anti platelets (aspirin and plavix)    Vivi Lawrence NP  [id number]

## 2023-07-17 NOTE — DISCHARGE NOTE PROVIDER - NSDCQMSTROKERISK_NEU_ALL_CORE
History of a stroke or TIA Blood clotting problems/History of a stroke or TIA Blood clotting problems/High blood pressure/History of a stroke or TIA

## 2023-07-17 NOTE — PROGRESS NOTE ADULT - PROBLEM SELECTOR PROBLEM 7
Nonadherence to medication

## 2023-07-17 NOTE — PROGRESS NOTE ADULT - PROBLEM SELECTOR PROBLEM 4
Right upper quadrant pain

## 2023-07-17 NOTE — PROGRESS NOTE ADULT - SUBJECTIVE AND OBJECTIVE BOX
Patient is a 58y old  Female who presents with a chief complaint of Paresthesias (17 Jul 2023 12:57)      INTERVAL HPI/OVERNIGHT EVENTS:  Seen by me this afternoon, niece at bedside, feeling much better, wants to go home soon. LUE numbness is still present.    Review of Systems: 12 point review of systems otherwise negative    MEDICATIONS  (STANDING):  atorvastatin 80 milliGRAM(s) Oral at bedtime  diphenhydrAMINE 25 milliGRAM(s) Oral once  enoxaparin Injectable 120 milliGRAM(s) SubCutaneous every 24 hours  ergocalciferol 32495 Unit(s) Oral <User Schedule>  folic acid 1 milliGRAM(s) Oral daily  lactated ringers. 1000 milliLiter(s) (125 mL/Hr) IV Continuous <Continuous>  melatonin 3 milliGRAM(s) Oral at bedtime  nicotine - 21 mG/24Hr(s) Patch 1 Patch Transdermal daily  OLANZapine 5 milliGRAM(s) Oral <User Schedule>  pantoprazole    Tablet 40 milliGRAM(s) Oral before breakfast  prednisoLONE acetate 1% Suspension 1 Drop(s) Left EYE four times a day  predniSONE   Tablet 10 milliGRAM(s) Oral daily  senna 2 Tablet(s) Oral at bedtime  spironolactone 25 milliGRAM(s) Oral daily  traZODone 50 milliGRAM(s) Oral at bedtime    MEDICATIONS  (PRN):  acetaminophen     Tablet .. 650 milliGRAM(s) Oral every 6 hours PRN Mild Pain (1 - 3)  bisacodyl 5 milliGRAM(s) Oral daily PRN Constipation  metoclopramide 10 milliGRAM(s) Oral daily PRN for headache  oxycodone    5 mG/acetaminophen 325 mG 1 Tablet(s) Oral every 6 hours PRN Moderate Pain (4 - 6)      Allergies    aspirin (Hives; Swelling)    Intolerances          Vital Signs Last 24 Hrs  T(C): 36.7 (17 Jul 2023 15:32), Max: 36.9 (17 Jul 2023 05:50)  T(F): 98 (17 Jul 2023 15:32), Max: 98.4 (17 Jul 2023 05:50)  HR: 74 (17 Jul 2023 15:33) (67 - 79)  BP: 92/56 (17 Jul 2023 15:33) (92/56 - 112/69)  BP(mean): --  RR: 18 (17 Jul 2023 15:32) (17 - 18)  SpO2: 94% (17 Jul 2023 15:32) (94% - 100%)    Parameters below as of 17 Jul 2023 15:32  Patient On (Oxygen Delivery Method): room air      CAPILLARY BLOOD GLUCOSE            Physical Exam:    Daily     Daily   General:  Well appearing, NAD, obese  HEENT:  Nonicteric, PERRLA  CV:  RRR, no murmur, no JVD  Lungs:  CTA B/L, no wheezes, rales, rhonchi  Abdomen:  Soft, non-tender, no distended, positive BS  Extremities:  2+ pulses, no c/c, no edema  Skin:  Warm and dry, no rashes  :  No soliz  Neuro:  AAOx3  No Restraints    LABS:                        11.8   11.06 )-----------( 266      ( 17 Jul 2023 07:14 )             38.5     07-17    138  |  106  |  20  ----------------------------<  142<H>  3.5   |  23  |  0.64    Ca    9.2      17 Jul 2023 07:14  Phos  3.9     07-17  Mg     1.70     07-17        Urinalysis Basic - ( 17 Jul 2023 07:14 )    Color: x / Appearance: x / SG: x / pH: x  Gluc: 142 mg/dL / Ketone: x  / Bili: x / Urobili: x   Blood: x / Protein: x / Nitrite: x   Leuk Esterase: x / RBC: x / WBC x   Sq Epi: x / Non Sq Epi: x / Bacteria: x          RADIOLOGY & ADDITIONAL TESTS:  Reviewed by me

## 2023-07-17 NOTE — BH CONSULTATION LIAISON PROGRESS NOTE - NSBHFUPINTERVALHXFT_PSY_A_CORE
Patient did not sleep well last night and is continuing to ask for something to help her sleep. She says that not sleeping has caused her to become upset and agitated and she wants to leave the hospital before she 'does something bad'.     Nurse said she was up all night and verbally disruptive. Was upset the nurses wouldn't give her anything to help her sleep.

## 2023-07-17 NOTE — BH CONSULTATION LIAISON PROGRESS NOTE - MSE UNSTRUCTURED FT
Appearance: Adult female in no acute distress  Behavior: Calm & cooperative; fair eye contact  Speech: Normal rate, tone, and volume  Motor: No PMR/PMA; no other abnormal movements  Mood: “upset”  Affect: Euthymic and mood congruent; full-range  Thought Process: Linear, logical, and goal-directed  Thought Content: Denies SI or HI; no evidence of delusions, (major themes of session)  Perception: no AVH  Cognition: slightly impaired   Insight: Fair  Judgement: Fair  Impulsivity: variable

## 2023-07-17 NOTE — DISCHARGE NOTE PROVIDER - HOSPITAL COURSE
58 year old female, with past history significant for Depression, CVA (multiple, in 2021; on Lovenox), Rheumatoid arthritis, Cigarette smoking, Breast cancer - s/p radiation therapy, and uveitis, presented to the ED secondary to increased L-face and LUE paresthesia, as well as headache and upper abdominal pain.     Paresthesias.    presented w/ report of L- arm numbness as well as L-facial numbness; both more pronounced than at baseline  - history of multiple CVAs and is on Lovenox 120 mg daily  - CT scan of head and CTA of brain and neck negative for any acute findings  - continuing PTA Lovenox 120 mg - unclear reason why pt takes this medication at home, pt says she was on coumadin, was getting bruises , so was changed to lovenox , pt says she was on Lovenox as she had strokes   - atorvastatin 80 mg daily  -  MRI  head shows Acute lacunar infarct involving the right basal ganglia region.  - PT/OT recc outpt PT   - Speech/Swallow eval noted, recc regular with thin fluids   -TTE noted - grossly moderate to severe segmental left ventricular systolic dysfunction. Hypokinesis of the apex, mid to distal septum, and distal anterior wall.  .  No obvious LV thrombus seen. bubble study was performed concerning for  intracardiac shunt. Case was discussed with neurology  , recc checking doppler which is negative  cards recc checking LESLEY if MRI shows stroke,  LESLEY was cancelled as LESLEY can be done during that time post workup of cardiomyopathy  If decision is to intervene for closure,   cards rec ILR, EP f/u appreciated, pt s/p ILR, , She has a follow-up appointment in the device clinic on 7/31/23 at 9:20am  [address] [place] [address] [facility] [phone].  neuro f/u noted , Per neuro  The infarct is most likely due to small vessel disease, and most likely not due to embolic stroke of undetermined source (i.e. not cryptogenic);   From the neurovascular standpoint, there is no role for LESLEY and no role for PFO closure. per neuro   per neuro if there is no role for ongoing Lovenox, then instead:  start Plavix 300 mg loading dose, then 75 mg daily for 3 weeks; ASA 81 mg daily, Not sure why patient on Lovenox at this time, per patient its for stroke , will d/w Neurology if Asa 81 should be added.   Anxiety and depression.   ·  Plan: - due to multiple personal stressors  - cites being anxious and depressed and becomes tearful when attempting to discuss same  - has poor appetite, poor sleep, lack of motivation (including adhering to medication regimen); has not been motivated to fill prescriptions so has not been taking most medications for over one month  - confirms suicidal ideation at times, but dismisses the thought because of love for her grandchildren  - PTA psychotropic meds not prescribed  - evaluate for any signs of acute decompensation  -  consult  appreciated, recommend to resume  Zyprexa at a dose of 5mg q 8PM PO to target sleep, mood, anxiety. Zyprexa 2.5mg BID PRN IM/PO for agitation   - SW assistance for homelessness,   -outpt psych f/u upon discharge.   Elevated troponin.   ·  Plan: - on Telemetry  -downtrending   - Neurology team suggesting ILR  - EP consult placed for ILR,pt undecided about it   - TTE noted as above, grossly moderate to severe segmental left ventricular systolic dysfunction. Hypokinesis of the apex, mid to distal septum, and distal anterior wall.  .  No obvious LV thrombus seen.   bubble study was performed concerning for  intracardiac shunt.   will f/u further Neuro recs   cardiology eval appreciated, case discussed, since acute CVA present , will need to wait 1 month prior to LHC per  cards recs  BP soft, card rec GDMT when BP improves, outpt cardiology f/u.     Right upper quadrant pain.   ·  Plan: - LFts noted, mild AST elevation, will CTM   - CTA of abdomen neg for acute pathology   - could be associated with constipation since last adequate bowel movement was more than one week ago  - bowel regimen prescribed  - RUQ sono noted as above, no acute findings.     Inflammatory eye disease.   ·  Plan: - left eye very inflamed appearing; has blurred/decreased vision - pt with hx of uveitis likely due to RA   - on prednisone drops initially every 2 hours; patient reports using 4x daily at present  - ophthalmology consulted given symptoms worsening as per pt , recs noted , c/w prednisone 10 mg po daily   -  Rheumatology eval as outpt.    : Acute headache.   ·  Plan: - frontal area; not ameliorated with Tylenol at home  - chart review notes patient with history of migraine headache  - headache improved.      Nonadherence to medication.   ·  Plan: - chiefly due to depression; patient is not motivated to fill prescriptions  - has not been taking some medications for more than one month  - [facility] pharmacy team to review home medications due to poor history.    Dehydration.   resolved.  Constipation  resolved 58F with past history significant for Depression, APS on lovenox, CVA (multiple, in 2021; on [place]), Rheumatoid arthritis, Cigarette smoking, Breast cancer - s/p radiation therapy, and uveitis, presented to the ED secondary to increased L-face and LUE paresthesia, as well as headache and upper abdominal pain. Found to have an acute lacunar infarct in imaging, likely due to recent non-compliance with lovenox as patient ran out of it. Lovenox was resumed, seen by Neurology and Cardiology as patient was found to have grossly moderate to severe segmental left ventricular systolic dysfunction on TTE. Given acute CVA decision is to have LHC and LESLEY (if needed) performed as outpatient. Aldactone was added, further GDMT as outpatient. An ILR was placed during her hospital stay. Seen by Psych while in the hospital, started on trazodone for insomnia and zyprexa was resumed. Seen by PT --> Outpatient PT. Medically stable for discharge home (will go to her niece's house for at least 2 weeks).    Paresthesias   presented w/ report of L- arm numbness as well as L-facial numbness; both more pronounced than at baseline  - history of multiple CVAs and is on Lovenox 120 mg daily  - CT scan of head and CTA of brain and neck negative for any acute findings  - continuing PTA Lovenox 120 mg (for APS per collateral obtained from Hematology in [place] that evaluated the patient), pt was on coumadin but was getting bruises and due to fluctuating INR's she was started on lovenox on January 2023  - atorvastatin 80 mg daily  -  MRI head shows Acute lacunar infarct involving the right basal ganglia region.  - PT/OT recc outpt PT   - Speech/Swallow eval noted, recc regular with thin fluids   -TTE noted - grossly moderate to severe segmental left ventricular systolic dysfunction. Hypokinesis of the apex, mid to distal septum, and distal anterior wall.  .  No obvious LV thrombus seen bubble study was performed concerning for  intracardiac shunt. Case was discussed with neurology, recc checking doppler which is negative  cards recc checking LESLEY if MRI shows stroke,  LESLEY was cancelled as LESLEY can be done during that time post workup of cardiomyopathy  If decision is to intervene for closure,   cards rec ILR, EP f/u appreciated, pt s/p ILR. She has a follow-up appointment in the device clinic on 7/31/23 at 9:20am 4th floor [facility]cology [facility] [phone].  neuro f/u noted , Per neuro  The infarct is most likely due to small vessel disease, and most likely not due to embolic stroke of undetermined source (i.e. not cryptogenic);   From the neurovascular standpoint, there is no role for LESLEY and no role for PFO closure per neuro      Anxiety and depression  ·  Plan: - due to multiple personal stressors  - cites being anxious and depressed and becomes tearful when attempting to discuss same  - has poor appetite, poor sleep, lack of motivation (including adhering to medication regimen); has not been motivated to fill prescriptions so has not been taking most medications for over one month  - confirms suicidal ideation at times, but dismisses the thought because of love for her grandchildren  - PTA psychotropic meds not prescribed  - evaluate for any signs of acute decompensation  -  consult  appreciated, recommend to resume  Zyprexa at a dose of 5mg q 8PM PO to target sleep, mood, anxiety. Zyprexa 2.5mg BID PRN IM/PO for agitation   - outpt psych f/u upon discharge.     Elevated troponin  ·  Plan: - on Telemetry  -downtrending   - Neurology team suggesting ILR, s/p ILR by EP  - TTE noted as above, grossly moderate to severe segmental left ventricular systolic dysfunction. Hypokinesis of the apex, mid to distal septum, and distal anterior wall. No obvious LV thrombus seen   bubble study was performed concerning for intracardiac shunt.   cardiology eval appreciated, case discussed, since acute CVA present , will need to wait 1 month prior to LHC per cards recs  Started on aldactone 25mg QD  BP soft, cards rec GDMT when BP improves, outpt cardiology f/up     Right upper quadrant pain.   ·  Plan: - LFts noted, mild AST elevation, will CTM   - CTA of abdomen neg for acute pathology   - could be associated with constipation since last adequate bowel movement was more than one week ago  - bowel regimen prescribed  - RUQ sono noted as above, no acute findings     Inflammatory eye disease  ·  Plan: - left eye very inflamed appearing; has blurred/decreased vision - pt with hx of uveitis likely due to RA   - on prednisone drops initially every 2 hours; patient reports using 4x daily at present  - ophthalmology consulted given symptoms worsening as per pt, recs noted , c/w prednisone 10 mg po daily   -  Rheumatology eval as outpt    : Acute headache  ·  Plan: - frontal area; not ameliorated with Tylenol at home  - chart review notes patient with history of migraine headache  - Headache improved      Nonadherence to medication.   ·  Plan: - chiefly due to depression; patient is not motivated to fill prescriptions  - has not been taking some medications for more than one month  - [facility] pharmacy team to review home medications due to poor history.  - Apprec Psych assistance    Dehydration  resolved    Constipation  resolved

## 2023-07-17 NOTE — PROGRESS NOTE ADULT - PROBLEM SELECTOR PLAN 10
- on Lovenox 120 mg SQ PTA (in the context of multiple CVAs/underlying APS), lifelong  Vit D deficiency, c/w ergocalciferol, needs Vitamin D level check in 8-12 weeks   PT eval -outpatient PT       Discussed with patient and niece at bedside  CM follow up in AM  Anticipate DC home in 1-2 days - on Lovenox 120 mg SQ PTA (in the context of multiple CVAs/underlying APS), lifelong  Vit D deficiency, c/w ergocalciferol, needs Vitamin D level check in 8-12 weeks   PT eval -outpatient PT       Discussed with patient and niece at bedside at length  CM follow up in AM  Anticipate DC home in 1-2 days

## 2023-07-17 NOTE — DISCHARGE NOTE PROVIDER - NSDCCPCAREPLAN_GEN_ALL_CORE_FT
PRINCIPAL DISCHARGE DIAGNOSIS  Diagnosis: CVA (cerebrovascular accident)  Assessment and Plan of Treatment: You presented with  left arm numbness as well as Left facial numbness;  MRI  head shows Acute lacunar infarct involving the right basal ganglia region. Echo shows  grossly moderate to severe segmental left ventricular systolic dysfunction. Bubble study was performed concerning for  intracardiac shunt. You had ILR placed by EP   You have  a follow-up appointment in the device clinic on 7/31/23 at 9:20am  4th floor [facility] [phone].  Follow up with Neurology, cardiology and EP.  Continue physical therapy and skills learned for rehabilitation.  Follow up with your neurologist in 1-2 weeks for further medical management.  Continue to take your medications as prescribed, low salt, low fat, and diabetic diet.  Consider joining a support group for stroke survivors for emotional support to prevent depression.        SECONDARY DISCHARGE DIAGNOSES  Diagnosis: Anxiety and depression  Assessment and Plan of Treatment: continue your medications .Folllow up with psychiatrist as outpatient    Diagnosis: Inflammatory eye disease  Assessment and Plan of Treatment: continue your eye drops. Follow up with opthalmology and rheumatology as outpatient    Diagnosis: Elevated troponin  Assessment and Plan of Treatment: You were noted to have elevated cardiac enzymes .  Echo shows , grossly moderate to severe segmental left ventricular systolic dysfunction. Hypokinesis of the apex, mid to distal septum, and distal anterior wall.  .  You were evaluated by cardiology    since acute CVA present , will need to wait 1 month prior to Cardiac cath. Follow up with cardiology for further management       Diagnosis: Nonadherence to medication  Assessment and Plan of Treatment: Make sure to be complaint with your medications. Follow up with your medical Doctors,     PRINCIPAL DISCHARGE DIAGNOSIS  Diagnosis: CVA (cerebrovascular accident)  Assessment and Plan of Treatment: You presented with  left arm numbness as well as Left facial numbness;  MRI  head shows Acute lacunar infarct involving the right basal ganglia region. Echo shows  grossly moderate to severe segmental left ventricular systolic dysfunction. Bubble study was performed concerning for  intracardiac shunt. You had ILR placed by EP   You have  a follow-up appointment in the device clinic on 7/31/23 at 9:20am  4th floor [facility] [phone].  Follow up with Neurology, cardiology and EP.  Continue physical therapy (Outpatient PT) and skills learned for rehabilitation.  Follow up with your neurologist in 1-2 weeks for further medical management.  Continue to take your medications as prescribed, low salt, low fat, and diabetic diet.  Consider joining a support group for stroke survivors for emotional support to prevent depression. CAN'T MISS ANY DOSAGES OF LOVENOX AS YOU HAVE APS (Antiphospholipid syndrome) and this disease makes you prone to develop blood clotts.        SECONDARY DISCHARGE DIAGNOSES  Diagnosis: Anxiety and depression  Assessment and Plan of Treatment: Continue your medications. Folllow up with psychiatrist as outpatient.    Diagnosis: Inflammatory eye disease  Assessment and Plan of Treatment: Continue your eye drops. Follow up with opthalmology and Rheumatology as outpatient.    Diagnosis: Elevated troponin  Assessment and Plan of Treatment: You were noted to have elevated cardiac enzymes .  Echo shows , grossly moderate to severe segmental left ventricular systolic dysfunction. Hypokinesis of the apex, mid to distal septum, and distal anterior wall. You were evaluated by cardiology since acute CVA present, will need to wait 1 month prior to Cardiac cath. You were started on spironolactone while in the hospital, please take it as prescribed. Follow up with cardiology for further management and medication adjustments.      Diagnosis: Nonadherence to medication  Assessment and Plan of Treatment: Make sure to be complaint with your medications. Follow up with your medical Doctors regularly.  CAN'T MISS ANY DOSAGES OF LOVENOX AS YOU HAVE APS (Antiphospholipid syndrome) and this disease makes you prone to develop blood clotts.

## 2023-07-17 NOTE — PROGRESS NOTE ADULT - PROBLEM SELECTOR PLAN 1
- presented w/ report of L- arm numbness as well as L-facial numbness; both more pronounced than at baseline  - history of multiple CVAs and is on Lovenox 120 mg daily  - CT scan of head and CTA of brain and neck negative for any acute findings  - continuing PTA Lovenox 120 mg - per collateral obtained pt has underlying APS/ANCA positive vasculitis, initially on warfarin but difficulties with therapeutic INR so on lovenox since 1/30/2023, to be continued  - atorvastatin 80 mg daily  -  MRI head shows Acute lacunar infarct involving the right basal ganglia region  - PT/OT recc outpt PT   - passed bedside dysphagia screen  - Speech/Swallow eval noted, recc regular with thin fluids   -TTE noted - grossly moderate to severe segmental left ventricular systolic dysfunction. Hypokinesis of the apex, mid to distal septum, and distal anterior wall.  .  No obvious LV thrombus seen. bubble study was performed concerning for  intracardiac shunt. Case was discussed with neurology  , recc checking doppler which is negative  cards recc checking LESLEY if MRI shows stroke,  LESLEY was cancelled as LESLEY can be done during that time post workup of cardiomyopathy  If decision is to intervene for closure  cards rec ILR, EP f/u appreciated, pt s/p ILR, , She has a follow-up appointment in the device clinic on 7/31/23 at 9:20am  4th floor Oncology Building [phone].  neuro f/u noted , Per neuro  The infarct is most likely due to small vessel disease, and most likely not due to embolic stroke of undetermined source (i.e. not cryptogenic);   From the neurovascular standpoint, there is no role for LESLEY and no role for PFO closure per neuro

## 2023-07-17 NOTE — PROGRESS NOTE ADULT - PROBLEM SELECTOR PROBLEM 6
Acute headache

## 2023-07-17 NOTE — DISCHARGE NOTE PROVIDER - NSDCMRMEDTOKEN_GEN_ALL_CORE_FT
cyclopentolate 1% ophthalmic solution: 1 drop(s) in the left eye once a day (patient states she does not use this; per [employer], frequency decreased from 3 times a day to once daily 7/3/23)  folic acid 1 mg oral tablet: 1 tab(s) orally once a day  gabapentin 300 mg oral capsule: 1 cap(s) orally 3 times a day (30-day supply last dispensed 5/11/23)  ipratropium-albuterol 0.5 mg-2.5 mg/3 mL inhalation solution: 3 milliliter(s) by nebulizer every 6 hours as needed for  shortness of breath and/or wheezing  Lovenox 120 mg/0.8 mL injectable solution: 0.8 milliliter(s) subcutaneously once a day  methotrexate 2.5 mg oral tablet: 6 tab(s) orally once a week on Friday  metoclopramide 10 mg oral tablet: 1 tab(s) orally once a day as needed for  headache /migraine (30-day supply last dispensed 3/30/23)  mirtazapine 45 mg oral tablet: 1 tab(s) orally once a day (at bedtime) (30-day supply last dispensed 4/23/23)  OLANZapine 5 mg oral tablet: 1 tab(s) orally once a day (at bedtime) (therapeutic duplication with quetiapine; 30-day supply last dispensed 4/23/23)  prednisoLONE acetate 1% ophthalmic suspension: 1 drop(s) in the left eye 2 times a day (Per [employer], frequency decreased from 4 times a day to 2 times a day 7/3/23)  predniSONE 5 mg oral tablet: 1 tab(s) orally once a day (Per [employer], dose decreased to 5mg 7/3/23)  QUEtiapine 50 mg oral tablet: 1 tab(s) orally once a day (at bedtime) (therapeutic duplication with olanzapine)  topiramate 50 mg oral tablet: 1 tab(s) orally 2 times a day (30-day supply last dispensed 4/23/23)   atorvastatin 80 mg oral tablet: 1 tab(s) orally once a day (at bedtime)  cyclopentolate 1% ophthalmic solution: 1 drop(s) in the left eye once a day  ergocalciferol 1.25 mg (50,000 intl units) oral capsule: 1 cap(s) orally once a week  folic acid 1 mg oral tablet: 1 tab(s) orally once a day  gabapentin 300 mg oral capsule: 1 cap(s) orally once a day (at bedtime) hold for sedation  Lovenox 120 mg/0.8 mL injectable solution: 0.8 milliliter(s) subcutaneously once a day  methotrexate 2.5 mg oral tablet: 6 tab(s) orally once a week on Friday  metoclopramide 10 mg oral tablet: 1 tab(s) orally once a day As needed for headache  nicotine 21 mg/24 hr transdermal film, extended release: 1 patch transdermal once a day  OLANZapine 5 mg oral tablet: 1 tab(s) orally once a day at 8 pm  pantoprazole 40 mg oral delayed release tablet: 1 tab(s) orally once a day (before a meal)  Physical Therapy: 3-4x/week for 8 weeks MDD: 1  prednisoLONE acetate 1% ophthalmic suspension: 1 drop(s) to each affected eye 4 times a day  predniSONE 10 mg oral tablet: 1 tab(s) orally once a day  senna leaf extract oral tablet: 2 tab(s) orally once a day (at bedtime)  spironolactone 25 mg oral tablet: 1 tab(s) orally once a day hold for low blood pressure  topiramate 50 mg oral tablet: 1 tab(s) orally 2 times a day  traZODone 50 mg oral tablet: 1 tab(s) orally once a day (at bedtime) hold for sedation or low blood pressure MDD: 1

## 2023-07-17 NOTE — BH CONSULTATION LIAISON PROGRESS NOTE - NSBHASSESSMENTFT_PSY_ALL_CORE
Siddhartha Frost 58 year old female with a past psychiatric history notable for bipolar disorder, depression, and anxiety with a PMH of multiple CVAs, glaucoma, and rheumatoid arthritis who presented to the [facility] ED complaining of parasthesias of the left arm secondary to a subacute lacunar infarct and who was referred to  psychiatry for evaluation of suicidal ideation and depressive symptoms.    Patient is on disability, has chronic pain, has housing instability, was recently left by her  of 33 years, and feels like these factors have been contributing to her low mood. Patient is endorses symptoms of depression, passive SI, and low-mood and is interested in psychiatric treatment and long-term follow-up care outpatient. Endorses a positive attitude about improving her mental health and life circumstances.     She denies any feelings of grandiosity, working on any new projects, spending more money than usual, impulsivity, high levels of energy, or a euphoric mood. She denies any visual hallucinations, delusions, or paranoia. Has a h/o manic symptoms, + psychosis. She denies wanting to physically hurt herself, but endorses passive suicidal ideation.     7/13: patient still complaining that she cannot sleep and that her mood is low. BP was decreased for period of time last night.   7/14: says mood is improved from past few days. still complaining of difficulty falling asleep, but says that she is sleeping better through the night (fewer nightime awakenings)  7/17: patient more agitated and upset than previously. still complaining of difficulty sleeping through night.     Plan  - No indication for psychiatric 1:1  - CONTINUE Zyprexa at a dose of 5mg q 8PM PO--- to target sleep, mood, anxiety. Monitor qtc. Monitor BP  - SW assistance-- homelessness, needs to establish care with medical providers in [place].   - AGGRESSION/COMBATIVE BEHAVIORS==== Zyprexa 2.5mg BID PRN IM/PO Siddhartha Frost 58 year old female with a past psychiatric history notable for bipolar disorder, depression, and anxiety with a PMH of multiple CVAs, glaucoma, and rheumatoid arthritis who presented to the [facility] ED complaining of parasthesias of the left arm secondary to a subacute lacunar infarct and who was referred to  psychiatry for evaluation of suicidal ideation and depressive symptoms.    Patient is on disability, has chronic pain, has housing instability, was recently left by her  of 33 years, and feels like these factors have been contributing to her low mood. Patient is endorses symptoms of depression, passive SI, and low-mood and is interested in psychiatric treatment and long-term follow-up care outpatient. Endorses a positive attitude about improving her mental health and life circumstances.     She denies any feelings of grandiosity, working on any new projects, spending more money than usual, impulsivity, high levels of energy, or a euphoric mood. She denies any visual hallucinations, delusions, or paranoia. Has a h/o manic symptoms, + psychosis. She denies wanting to physically hurt herself, but endorses passive suicidal ideation.     7/13: patient still complaining that she cannot sleep and that her mood is low. BP was decreased for period of time last night.   7/14: says mood is improved from past few days. still complaining of difficulty falling asleep, but says that she is sleeping better through the night (fewer nightime awakenings)  7/17: patient more agitated and upset than previously. still complaining of difficulty sleeping through night.     Plan  - No indication for psychiatric 1:1  - CONTINUE Zyprexa at a dose of 5mg q 8PM PO--- to target sleep, mood, anxiety. Monitor qtc. Monitor BP  - Give melatonin nightly, not just PRN  - Give trazodone 50mg at bedtime. Additional 50mg can be given PRN if patient wakes up/cannot sleep.  - SW assistance-- homelessness, needs to establish care with medical providers in [place].   - AGGRESSION/COMBATIVE BEHAVIORS==== Zyprexa 2.5mg BID PRN IM/PO Siddhartha Frost 58 year old female with a past psychiatric history notable for bipolar disorder, depression, and anxiety with a PMH of multiple CVAs, glaucoma, and rheumatoid arthritis who presented to the [facility] ED complaining of parasthesias of the left arm secondary to a subacute lacunar infarct and who was referred to  psychiatry for evaluation of suicidal ideation and depressive symptoms.    Patient is on disability, has chronic pain, has housing instability, was recently left by her  of 33 years, and feels like these factors have been contributing to her low mood. Patient is endorses symptoms of depression, passive SI, and low-mood and is interested in psychiatric treatment and long-term follow-up care outpatient. Endorses a positive attitude about improving her mental health and life circumstances.     She denies any feelings of grandiosity, working on any new projects, spending more money than usual, impulsivity, high levels of energy, or a euphoric mood. She denies any visual hallucinations, delusions, or paranoia. Has a h/o manic symptoms, + psychosis. She denies wanting to physically hurt herself, but endorses passive suicidal ideation.     7/13: patient still complaining that she cannot sleep and that her mood is low. BP was decreased for period of time last night.   7/14: says mood is improved from past few days. still complaining of difficulty falling asleep, but says that she is sleeping better through the night (fewer nightime awakenings)    7/17: Patient continues to complain of sleep issues- see below for recommendations.     Plan  - No indication for psychiatric 1:1  [] repeat EKG to monitor QTC  - CONTINUE Zyprexa at a dose of 5mg q 8PM PO--- to target sleep, mood, anxiety  	[] hold if QTC > 500  - switch melatonin to scheduled- not PRN  [] If QTC < 500, Start trazodone 50mg at bedtime. Additional 50mg can be given PRN if patient wakes up/cannot sleep.  []  assistance-- homelessness, needs to establish care with medical providers in [place]- wants to be closer to [place]  - AGGRESSION/COMBATIVE BEHAVIORS==== Zyprexa 2.5mg BID PRN IM/PO Siddhartha Frost 58 year old female with a past psychiatric history notable for bipolar disorder, depression, and anxiety with a PMH of multiple CVAs, glaucoma, and rheumatoid arthritis who presented to the [facility] ED complaining of parasthesias of the left arm secondary to a subacute lacunar infarct and who was referred to  psychiatry for evaluation of suicidal ideation and depressive symptoms.    Patient is on disability, has chronic pain, has housing instability, was recently left by her  of 33 years, and feels like these factors have been contributing to her low mood. Patient is endorses symptoms of depression, passive SI, and low-mood and is interested in psychiatric treatment and long-term follow-up care outpatient. Endorses a positive attitude about improving her mental health and life circumstances.     She denies any feelings of grandiosity, working on any new projects, spending more money than usual, impulsivity, high levels of energy, or a euphoric mood. She denies any visual hallucinations, delusions, or paranoia. Has a h/o manic symptoms, + psychosis. She denies wanting to physically hurt herself, but endorses passive suicidal ideation.     7/13: patient still complaining that she cannot sleep and that her mood is low. BP was decreased for period of time last night.   7/14: says mood is improved from past few days. still complaining of difficulty falling asleep, but says that she is sleeping better through the night (fewer nightime awakenings)    7/17: Patient continues to complain of sleep issues- see below for recommendations.     Plan  - No indication for psychiatric 1:1  [] repeat EKG to monitor QTC  - CONTINUE Zyprexa at a dose of 5mg q 8PM PO--- to target sleep, mood, anxiety  	[] hold if QTC > 500  - switch melatonin to scheduled- not PRN  [] If QTC < 500, Start trazodone 50mg at bedtime. Additional 25mg can be given PRN at least 2 hours after if patient wakes up/cannot sleep.  [] SW assistance-- homelessness, needs to establish care with medical providers in [place]- wants to be closer to [place]  - AGGRESSION/COMBATIVE BEHAVIORS==== Zyprexa 2.5mg BID PRN IM/PO

## 2023-07-17 NOTE — PROGRESS NOTE ADULT - PROBLEM SELECTOR PLAN 2
- due to multiple personal stressors  - cites being anxious and depressed and becomes tearful when attempting to discuss same  - has poor appetite, poor sleep, lack of motivation (including adhering to medication regimen); has not been motivated to fill prescriptions so has not been taking most medications for over one month  - confirms suicidal ideation at times, but dismisses the thought because of love for her grandchildren  - PTA psychotropic meds not prescribed  - evaluate for any signs of acute decompensation  -  consult  appreciated, recommend to resume  Zyprexa at a dose of 5mg q 8PM PO to target sleep, mood, anxiety. Zyprexa 2.5mg BID PRN IM/PO for agitation   - SW assistance for homelessness,   -outpt psych f/u upon discharge
- due to multiple personal stressors  - cites being anxious and depressed and becomes tearful when attempting to discuss same  - has poor appetite, poor sleep, lack of motivation (including adhering to medication regimen); has not been motivated to fill prescriptions so has not been taking most medications for over one month  - confirms suicidal ideation at times, but dismisses the thought because of love for her grandchildren  - PTA psychotropic meds not prescribed  - evaluate for any signs of acute decompensation  -  consult  appreciated, recommend to resume Zyprexa at a dose of 5mg q 8PM PO to target sleep, mood, anxiety. Zyprexa 2.5mg BID PRN IM/PO for agitation  - Trazodone 50mg qhs added today for insomnia, apprec Psych recs  -  assistance for homelessness  - Outpt psych f/u upon discharge
- due to multiple personal stressors  - cites being anxious and depressed and becomes tearful when attempting to discuss same  - has poor appetite, poor sleep, lack of motivation (including adhering to medication regimen); has not been motivated to fill prescriptions so has not been taking most medications for over one month  - confirms suicidal ideation at times, but dismisses the thought because of love for her grandchildren  - PTA psychotropic meds not prescribed  - evaluate for any signs of acute decompensation  -  consult  appreciated, recommend to resume  Zyprexa at a dose of 5mg q 8PM PO to target sleep, mood, anxiety. Zyprexa 2.5mg BID PRN IM/PO for agitation   - SW assistance for homelessness,   -outpt psych f/u upon discharge
- due to multiple personal stressors  - cites being anxious and depressed and becomes tearful when attempting to discuss same  - has poor appetite, poor sleep, lack of motivation (including adhering to medication regimen); has not been motivated to fill prescriptions so has not been taking most medications for over one month  - confirms suicidal ideation at times, but dismisses the thought because of love for her grandchildren  - PTA psychotropic meds not prescribed  - evaluate for any signs of acute decompensation  -  consult placed
- due to multiple personal stressors  - cites being anxious and depressed and becomes tearful when attempting to discuss same  - has poor appetite, poor sleep, lack of motivation (including adhering to medication regimen); has not been motivated to fill prescriptions so has not been taking most medications for over one month  - confirms suicidal ideation at times, but dismisses the thought because of love for her grandchildren  - PTA psychotropic meds not prescribed  - evaluate for any signs of acute decompensation  -  consult  appreciated, recommend to resume  Zyprexa at a dose of 5mg q 8PM PO to target sleep, mood, anxiety. Zyprexa 2.5mg BID PRN IM/PO for agitation   - SW assistance for homelessness,   -outpt psych f/u upon discharge

## 2023-07-17 NOTE — DISCHARGE NOTE PROVIDER - CARE PROVIDER_API CALL
German Carrasco  Interventional Cardiology  [address] [place] [address]  Phone: [phone]  Fax: [phone]  Follow Up Time: 1 week

## 2023-07-17 NOTE — PROGRESS NOTE ADULT - PROBLEM SELECTOR PLAN 4
- LFT's noted, mild AST elevation, will CTM   - CTA of abdomen neg for acute pathology   - could be associated with constipation since last adequate bowel movement was more than one week ago  - bowel regimen prescribed  - RUQ sono noted as above, no acute findings

## 2023-07-17 NOTE — PROGRESS NOTE ADULT - PROBLEM SELECTOR PLAN 3
- on Telemetry  - downtrending   - Neurology team suggesting ILR  - EP consult placed for ILR, s/p ILR   - TTE noted as above, grossly moderate to severe segmental left ventricular systolic dysfunction. Hypokinesis of the apex, mid to distal septum, and distal anterior wall.  .  No obvious LV thrombus seen.   bubble study was performed concerning for  intracardiac shunt.   will f/u further Neuro recs   cardiology eval appreciated, since acute CVA present , will need to wait 1 month prior to LHC per  cards recs  BP soft, card rec GDMT when BP improves, outpt cardiology f/u

## 2023-07-17 NOTE — PROGRESS NOTE ADULT - TIME BILLING
Time spent includes direct patient care  (interview and examination of patient), discussion with other providers, support staff and/or patient's family members, review of medical records labs, imaging studies, telemetry findings , ordering diagnostic tests and analyzing results, and documentation.
- Ordering, reviewing, and interpreting labs, testing, and imaging.  - Independently obtaining a review of systems and performing a physical exam  - Reviewing consultant documentation/recommendations in addition to discussing plan of care with consultants.  - Counselling and educating patient and family regarding interpretation of aforementioned items and plan of care.
Time spent includes direct patient care  (interview and examination of patient), discussion with other providers, support staff and/or patient's family members, review of medical records, labs, imaging  ordering diagnostic tests and analyzing results, and documentation.

## 2023-07-17 NOTE — PROGRESS NOTE ADULT - SUBJECTIVE AND OBJECTIVE BOX
German Carrasco MD  Interventional Cardiology / Endovascular Specialist  [place] Office : [address] [place] [address] Tel:   [place] Office : [address] [facility] [address]  Tel: [phone]      Subjective/Overnight events: Patient lying in bed comfortably. No acute distress. Denies chest pain, SOB or palpitations  	  MEDICATIONS:  enoxaparin Injectable 120 milliGRAM(s) SubCutaneous every 24 hours  spironolactone 25 milliGRAM(s) Oral daily        acetaminophen     Tablet .. 650 milliGRAM(s) Oral every 6 hours PRN  diphenhydrAMINE 25 milliGRAM(s) Oral once  melatonin 3 milliGRAM(s) Oral at bedtime PRN  OLANZapine 5 milliGRAM(s) Oral <User Schedule>  oxycodone    5 mG/acetaminophen 325 mG 1 Tablet(s) Oral every 6 hours PRN    bisacodyl 5 milliGRAM(s) Oral daily PRN  metoclopramide 10 milliGRAM(s) Oral daily PRN  pantoprazole    Tablet 40 milliGRAM(s) Oral before breakfast  senna 2 Tablet(s) Oral at bedtime    atorvastatin 80 milliGRAM(s) Oral at bedtime  predniSONE   Tablet 10 milliGRAM(s) Oral daily    ergocalciferol 95797 Unit(s) Oral <User Schedule>  folic acid 1 milliGRAM(s) Oral daily  lactated ringers. 1000 milliLiter(s) IV Continuous <Continuous>  prednisoLONE acetate 1% Suspension 1 Drop(s) Left EYE four times a day      PAST MEDICAL/SURGICAL HISTORY  PAST MEDICAL & SURGICAL HISTORY:  Cigarette smoker      Rheumatoid arthritis      Other depression      Breast cancer      Migraines      History of multiple cerebrovascular accidents (CVAs)      Suicidal ideation      Paresthesia of left arm      Facial paresthesia      History of hysterectomy      History of cholecystectomy          SOCIAL HISTORY: Substance Use (street drugs): ( x ) never used  (  ) other:    FAMILY HISTORY:  Family history of breast cancer (Mother)    Family history of diabetes mellitus (DM) (Father)            PHYSICAL EXAM:  T(C): 36.7 (07-17-23 @ 11:07), Max: 36.9 (07-17-23 @ 05:50)  HR: 67 (07-17-23 @ 11:07) (67 - 79)  BP: 112/69 (07-17-23 @ 11:07) (104/66 - 112/69)  RR: 17 (07-17-23 @ 11:07) (17 - 18)  SpO2: 96% (07-17-23 @ 11:07) (96% - 100%)  Wt(kg): --  I&O's Summary        GENERAL: NAD  EYES: EOMI, PERRLA, conjunctiva and sclera clear  ENMT: No tonsillar erythema, exudates, or enlargement  Cardiovascular: Normal S1 S2, No JVD, No murmurs, No edema  Respiratory: Lungs clear to auscultation	  Gastrointestinal:  Soft, Non-tender, + BS	  Extremities: No edema                                     11.8   11.06 )-----------( 266      ( 17 Jul 2023 07:14 )             38.5     07-17    138  |  106  |  20  ----------------------------<  142<H>  3.5   |  23  |  0.64    Ca    9.2      17 Jul 2023 07:14  Phos  3.9     07-17  Mg     1.70     07-17      proBNP:   Lipid Profile:   HgA1c:   TSH:     Consultant(s) Notes Reviewed:  [x ] YES  [ ] NO    Care Discussed with Consultants/Other Providers [ x] YES  [ ] NO    Imaging Personally Reviewed independently:  [x] YES  [ ] NO    All labs, radiologic studies, vitals, orders and medications list reviewed. Patient is seen and examined at bedside. Case discussed with medical team.

## 2023-07-17 NOTE — BH CONSULTATION LIAISON PROGRESS NOTE - NSBHCONSULTFOLLOWAFTERCARE_PSY_A_CORE FT
[facility]  [address] [facility] [address] [phone]    KATIE to assist in finding referrals for psychiatric care closer to where patient resides

## 2023-07-18 ENCOUNTER — TRANSCRIPTION ENCOUNTER (OUTPATIENT)
Age: 58
End: 2023-07-18

## 2023-07-18 VITALS
DIASTOLIC BLOOD PRESSURE: 60 MMHG | SYSTOLIC BLOOD PRESSURE: 112 MMHG | RESPIRATION RATE: 18 BRPM | OXYGEN SATURATION: 97 % | TEMPERATURE: 98 F | HEART RATE: 78 BPM

## 2023-07-18 LAB
ALBUMIN SERPL ELPH-MCNC: 3.9 G/DL — SIGNIFICANT CHANGE UP (ref 3.3–5)
ALP SERPL-CCNC: 111 U/L — SIGNIFICANT CHANGE UP (ref 40–120)
ALT FLD-CCNC: 13 U/L — SIGNIFICANT CHANGE UP (ref 4–33)
ANION GAP SERPL CALC-SCNC: 13 MMOL/L — SIGNIFICANT CHANGE UP (ref 7–14)
AST SERPL-CCNC: 8 U/L — SIGNIFICANT CHANGE UP (ref 4–32)
BILIRUB DIRECT SERPL-MCNC: <0.2 MG/DL — SIGNIFICANT CHANGE UP (ref 0–0.3)
BILIRUB INDIRECT FLD-MCNC: >0.1 MG/DL — SIGNIFICANT CHANGE UP (ref 0–1)
BILIRUB SERPL-MCNC: 0.3 MG/DL — SIGNIFICANT CHANGE UP (ref 0.2–1.2)
BUN SERPL-MCNC: 19 MG/DL — SIGNIFICANT CHANGE UP (ref 7–23)
CALCIUM SERPL-MCNC: 9.1 MG/DL — SIGNIFICANT CHANGE UP (ref 8.4–10.5)
CHLORIDE SERPL-SCNC: 103 MMOL/L — SIGNIFICANT CHANGE UP (ref 98–107)
CO2 SERPL-SCNC: 24 MMOL/L — SIGNIFICANT CHANGE UP (ref 22–31)
CREAT SERPL-MCNC: 0.53 MG/DL — SIGNIFICANT CHANGE UP (ref 0.5–1.3)
EGFR: 106 ML/MIN/1.73M2 — SIGNIFICANT CHANGE UP
GLUCOSE SERPL-MCNC: 116 MG/DL — HIGH (ref 70–99)
HCT VFR BLD CALC: 39.1 % — SIGNIFICANT CHANGE UP (ref 34.5–45)
HGB BLD-MCNC: 12 G/DL — SIGNIFICANT CHANGE UP (ref 11.5–15.5)
MAGNESIUM SERPL-MCNC: 1.8 MG/DL — SIGNIFICANT CHANGE UP (ref 1.6–2.6)
MCHC RBC-ENTMCNC: 26.1 PG — LOW (ref 27–34)
MCHC RBC-ENTMCNC: 30.7 GM/DL — LOW (ref 32–36)
MCV RBC AUTO: 85.2 FL — SIGNIFICANT CHANGE UP (ref 80–100)
NRBC # BLD: 0 /100 WBCS — SIGNIFICANT CHANGE UP (ref 0–0)
NRBC # FLD: 0 K/UL — SIGNIFICANT CHANGE UP (ref 0–0)
PHOSPHATE SERPL-MCNC: 4.3 MG/DL — SIGNIFICANT CHANGE UP (ref 2.5–4.5)
PLATELET # BLD AUTO: 285 K/UL — SIGNIFICANT CHANGE UP (ref 150–400)
POTASSIUM SERPL-MCNC: 3.7 MMOL/L — SIGNIFICANT CHANGE UP (ref 3.5–5.3)
POTASSIUM SERPL-SCNC: 3.7 MMOL/L — SIGNIFICANT CHANGE UP (ref 3.5–5.3)
PROT SERPL-MCNC: 6.1 G/DL — SIGNIFICANT CHANGE UP (ref 6–8.3)
RBC # BLD: 4.59 M/UL — SIGNIFICANT CHANGE UP (ref 3.8–5.2)
RBC # FLD: 17 % — HIGH (ref 10.3–14.5)
SODIUM SERPL-SCNC: 140 MMOL/L — SIGNIFICANT CHANGE UP (ref 135–145)
WBC # BLD: 11.38 K/UL — HIGH (ref 3.8–10.5)
WBC # FLD AUTO: 11.38 K/UL — HIGH (ref 3.8–10.5)

## 2023-07-18 PROCEDURE — 99239 HOSP IP/OBS DSCHRG MGMT >30: CPT

## 2023-07-18 PROCEDURE — 99232 SBSQ HOSP IP/OBS MODERATE 35: CPT

## 2023-07-18 RX ORDER — METOCLOPRAMIDE HCL 10 MG
1 TABLET ORAL
Qty: 0 | Refills: 0 | DISCHARGE
Start: 2023-07-18

## 2023-07-18 RX ORDER — SENNA PLUS 8.6 MG/1
2 TABLET ORAL
Qty: 0 | Refills: 0 | DISCHARGE
Start: 2023-07-18

## 2023-07-18 RX ORDER — OLANZAPINE 15 MG/1
1 TABLET, FILM COATED ORAL
Refills: 0 | DISCHARGE

## 2023-07-18 RX ORDER — ENOXAPARIN SODIUM 100 MG/ML
0.8 INJECTION SUBCUTANEOUS
Qty: 24 | Refills: 0
Start: 2023-07-18 | End: 2023-08-16

## 2023-07-18 RX ORDER — ERGOCALCIFEROL 1.25 MG/1
1 CAPSULE ORAL
Qty: 4 | Refills: 0
Start: 2023-07-18 | End: 2023-08-16

## 2023-07-18 RX ORDER — PREDNISOLONE SODIUM PHOSPHATE 1 %
1 DROPS OPHTHALMIC (EYE)
Qty: 0 | Refills: 0 | DISCHARGE
Start: 2023-07-18

## 2023-07-18 RX ORDER — TRAZODONE HCL 50 MG
1 TABLET ORAL
Qty: 30 | Refills: 0
Start: 2023-07-18 | End: 2023-08-16

## 2023-07-18 RX ORDER — PANTOPRAZOLE SODIUM 20 MG/1
1 TABLET, DELAYED RELEASE ORAL
Qty: 30 | Refills: 0
Start: 2023-07-18 | End: 2023-08-16

## 2023-07-18 RX ORDER — PREDNISOLONE SODIUM PHOSPHATE 1 %
1 DROPS OPHTHALMIC (EYE)
Refills: 0 | DISCHARGE

## 2023-07-18 RX ORDER — MIRTAZAPINE 45 MG/1
1 TABLET, ORALLY DISINTEGRATING ORAL
Refills: 0 | DISCHARGE

## 2023-07-18 RX ORDER — OLANZAPINE 15 MG/1
1 TABLET, FILM COATED ORAL
Qty: 30 | Refills: 0
Start: 2023-07-18 | End: 2023-08-16

## 2023-07-18 RX ORDER — ENOXAPARIN SODIUM 100 MG/ML
0.8 INJECTION SUBCUTANEOUS
Refills: 0 | DISCHARGE

## 2023-07-18 RX ORDER — NICOTINE POLACRILEX 2 MG
1 GUM BUCCAL
Qty: 2 | Refills: 0
Start: 2023-07-18 | End: 2023-08-16

## 2023-07-18 RX ORDER — ATORVASTATIN CALCIUM 80 MG/1
1 TABLET, FILM COATED ORAL
Qty: 30 | Refills: 0
Start: 2023-07-18 | End: 2023-08-16

## 2023-07-18 RX ORDER — METOCLOPRAMIDE HCL 10 MG
1 TABLET ORAL
Refills: 0 | DISCHARGE

## 2023-07-18 RX ORDER — IPRATROPIUM/ALBUTEROL SULFATE 18-103MCG
3 AEROSOL WITH ADAPTER (GRAM) INHALATION
Refills: 0 | DISCHARGE

## 2023-07-18 RX ORDER — GABAPENTIN 400 MG/1
1 CAPSULE ORAL
Refills: 0 | DISCHARGE

## 2023-07-18 RX ORDER — SPIRONOLACTONE 25 MG/1
1 TABLET, FILM COATED ORAL
Qty: 30 | Refills: 0
Start: 2023-07-18 | End: 2023-08-16

## 2023-07-18 RX ORDER — QUETIAPINE FUMARATE 200 MG/1
1 TABLET, FILM COATED ORAL
Refills: 0 | DISCHARGE

## 2023-07-18 RX ORDER — GABAPENTIN 400 MG/1
1 CAPSULE ORAL
Qty: 30 | Refills: 0
Start: 2023-07-18 | End: 2023-08-16

## 2023-07-18 RX ORDER — IPRATROPIUM/ALBUTEROL SULFATE 18-103MCG
3 AEROSOL WITH ADAPTER (GRAM) INHALATION
Qty: 360 | Refills: 0
Start: 2023-07-18 | End: 2023-08-16

## 2023-07-18 RX ADMIN — Medication 10 MILLIGRAM(S): at 05:31

## 2023-07-18 RX ADMIN — ENOXAPARIN SODIUM 120 MILLIGRAM(S): 100 INJECTION SUBCUTANEOUS at 08:57

## 2023-07-18 RX ADMIN — Medication 1 MILLIGRAM(S): at 11:15

## 2023-07-18 RX ADMIN — Medication 1 DROP(S): at 05:33

## 2023-07-18 RX ADMIN — Medication 1 DROP(S): at 11:15

## 2023-07-18 RX ADMIN — PANTOPRAZOLE SODIUM 40 MILLIGRAM(S): 20 TABLET, DELAYED RELEASE ORAL at 05:31

## 2023-07-18 RX ADMIN — SPIRONOLACTONE 25 MILLIGRAM(S): 25 TABLET, FILM COATED ORAL at 05:31

## 2023-07-18 NOTE — PROGRESS NOTE ADULT - SUBJECTIVE AND OBJECTIVE BOX
German Carrasco MD  Interventional Cardiology / Endovascular Specialist  [place] Office : [address] [place] [address] Tel:   [place] Office : [address] [facility] [address]  Tel: [phone]      Subjective/Overnight events: Patient lying in bed. No acute distress.  	  MEDICATIONS:  enoxaparin Injectable 120 milliGRAM(s) SubCutaneous every 24 hours  spironolactone 25 milliGRAM(s) Oral daily        acetaminophen     Tablet .. 650 milliGRAM(s) Oral every 6 hours PRN  diphenhydrAMINE 25 milliGRAM(s) Oral once  melatonin 3 milliGRAM(s) Oral at bedtime  OLANZapine 5 milliGRAM(s) Oral <User Schedule>  oxycodone    5 mG/acetaminophen 325 mG 1 Tablet(s) Oral every 6 hours PRN  traZODone 50 milliGRAM(s) Oral at bedtime    bisacodyl 5 milliGRAM(s) Oral daily PRN  metoclopramide 10 milliGRAM(s) Oral daily PRN  pantoprazole    Tablet 40 milliGRAM(s) Oral before breakfast  senna 2 Tablet(s) Oral at bedtime    atorvastatin 80 milliGRAM(s) Oral at bedtime  predniSONE   Tablet 10 milliGRAM(s) Oral daily    ergocalciferol 35559 Unit(s) Oral <User Schedule>  folic acid 1 milliGRAM(s) Oral daily  lactated ringers. 1000 milliLiter(s) IV Continuous <Continuous>  prednisoLONE acetate 1% Suspension 1 Drop(s) Left EYE four times a day      PAST MEDICAL/SURGICAL HISTORY  PAST MEDICAL & SURGICAL HISTORY:  Cigarette smoker      Rheumatoid arthritis      Other depression      Breast cancer      Migraines      History of multiple cerebrovascular accidents (CVAs)      Suicidal ideation      Paresthesia of left arm      Facial paresthesia      History of hysterectomy      History of cholecystectomy          SOCIAL HISTORY: Substance Use (street drugs): ( x ) never used  (  ) other:    FAMILY HISTORY:  Family history of breast cancer (Mother)    Family history of diabetes mellitus (DM) (Father)        PHYSICAL EXAM:  T(C): 36.8 (07-18-23 @ 12:54), Max: 37 (07-18-23 @ 10:00)  HR: 78 (07-18-23 @ 12:54) (65 - 89)  BP: 112/60 (07-18-23 @ 12:54) (92/56 - 112/60)  RR: 18 (07-18-23 @ 12:54) (16 - 18)  SpO2: 97% (07-18-23 @ 12:54) (94% - 99%)  Wt(kg): --  I&O's Summary        GENERAL: NAD  EYES: EOMI, PERRLA, conjunctiva and sclera clear  ENMT: No tonsillar erythema, exudates, or enlargement  Cardiovascular: Normal S1 S2, No JVD, No murmurs, No edema  Respiratory: Lungs clear to auscultation	  Gastrointestinal:  Soft, Non-tender, + BS	  Extremities: No edema                                     12.0   11.38 )-----------( 285      ( 18 Jul 2023 07:48 )             39.1     07-18    140  |  103  |  19  ----------------------------<  116<H>  3.7   |  24  |  0.53    Ca    9.1      18 Jul 2023 07:48  Phos  4.3     07-18  Mg     1.80     07-18    TPro  6.1  /  Alb  3.9  /  TBili  0.3  /  DBili  <0.2  /  AST  8   /  ALT  13  /  AlkPhos  111  07-18    proBNP:   Lipid Profile:   HgA1c:   TSH:     Consultant(s) Notes Reviewed:  [x ] YES  [ ] NO    Care Discussed with Consultants/Other Providers [ x] YES  [ ] NO    Imaging Personally Reviewed independently:  [x] YES  [ ] NO    All labs, radiologic studies, vitals, orders and medications list reviewed. Patient is seen and examined at bedside. Case discussed with medical team.

## 2023-07-18 NOTE — BH CONSULTATION LIAISON PROGRESS NOTE - NSBHCHARTREVIEWLAB_PSY_A_CORE FT
12.0   11.38 )-----------( 285      ( 18 Jul 2023 07:48 )             39.1   07-18    140  |  103  |  19  ----------------------------<  116<H>  3.7   |  24  |  0.53    Ca    9.1      18 Jul 2023 07:48  Phos  4.3     07-18  Mg     1.80     07-18    TPro  6.1  /  Alb  3.9  /  TBili  0.3  /  DBili  <0.2  /  AST  8   /  ALT  13  /  AlkPhos  111  07-18  
                      11.8   11.06 )-----------( 266      ( 17 Jul 2023 07:14 )             38.5   07-17    138  |  106  |  20  ----------------------------<  142<H>  3.5   |  23  |  0.64    Ca    9.2      17 Jul 2023 07:14  Phos  3.9     07-17  Mg     1.70     07-17    
                      12.9   9.46  )-----------( 281      ( 14 Jul 2023 06:11 )             42.6   07-14    134<L>  |  111<H>  |  17  ----------------------------<  106<H>  3.7   |  21<L>  |  0.62    Ca    9.4      14 Jul 2023 06:11  Phos  3.2     07-14  Mg     1.90     07-14    
                      12.6   8.40  )-----------( 293      ( 13 Jul 2023 05:51 )             39.9   07-13    140  |  108<H>  |  12  ----------------------------<  92  3.8   |  21<L>  |  0.60    Ca    8.8      13 Jul 2023 05:51  Phos  3.3     07-13  Mg     1.90     07-13

## 2023-07-18 NOTE — BH CONSULTATION LIAISON PROGRESS NOTE - NSBHFUPINTERVALHXFT_PSY_A_CORE
Patient says she got trazadone last night and it somewhat helped her insomnia, but she is still complaining of sleep difficulties. She asked for the PRN trazadone to be given if the first dose did not improve symptoms, but it was never given. Patient says she got Trazodone last night and it somewhat helped her insomnia, but she is still complaining of sleep difficulties. Denies manic sxs, denies SI and HI.

## 2023-07-18 NOTE — BH CONSULTATION LIAISON PROGRESS NOTE - CURRENT MEDICATION
MEDICATIONS  (STANDING):  atorvastatin 80 milliGRAM(s) Oral at bedtime  diphenhydrAMINE 25 milliGRAM(s) Oral once  enoxaparin Injectable 120 milliGRAM(s) SubCutaneous every 24 hours  ergocalciferol 25185 Unit(s) Oral <User Schedule>  folic acid 1 milliGRAM(s) Oral daily  lactated ringers. 1000 milliLiter(s) (125 mL/Hr) IV Continuous <Continuous>  melatonin 3 milliGRAM(s) Oral at bedtime  nicotine - 21 mG/24Hr(s) Patch 1 Patch Transdermal daily  OLANZapine 5 milliGRAM(s) Oral <User Schedule>  pantoprazole    Tablet 40 milliGRAM(s) Oral before breakfast  prednisoLONE acetate 1% Suspension 1 Drop(s) Left EYE four times a day  predniSONE   Tablet 10 milliGRAM(s) Oral daily  senna 2 Tablet(s) Oral at bedtime  spironolactone 25 milliGRAM(s) Oral daily  traZODone 50 milliGRAM(s) Oral at bedtime    MEDICATIONS  (PRN):  acetaminophen     Tablet .. 650 milliGRAM(s) Oral every 6 hours PRN Mild Pain (1 - 3)  bisacodyl 5 milliGRAM(s) Oral daily PRN Constipation  metoclopramide 10 milliGRAM(s) Oral daily PRN for headache  oxycodone    5 mG/acetaminophen 325 mG 1 Tablet(s) Oral every 6 hours PRN Moderate Pain (4 - 6)  
MEDICATIONS  (STANDING):  atorvastatin 80 milliGRAM(s) Oral at bedtime  enoxaparin Injectable 120 milliGRAM(s) SubCutaneous every 24 hours  ergocalciferol 18157 Unit(s) Oral <User Schedule>  folic acid 1 milliGRAM(s) Oral daily  lactated ringers. 1000 milliLiter(s) (125 mL/Hr) IV Continuous <Continuous>  nicotine - 21 mG/24Hr(s) Patch 1 Patch Transdermal daily  OLANZapine 5 milliGRAM(s) Oral <User Schedule>  pantoprazole    Tablet 40 milliGRAM(s) Oral before breakfast  prednisoLONE acetate 1% Suspension 1 Drop(s) Left EYE four times a day  predniSONE   Tablet 10 milliGRAM(s) Oral daily  senna 2 Tablet(s) Oral at bedtime    MEDICATIONS  (PRN):  acetaminophen     Tablet .. 650 milliGRAM(s) Oral every 6 hours PRN Mild Pain (1 - 3)  bisacodyl 5 milliGRAM(s) Oral daily PRN Constipation  diphenhydrAMINE 25 milliGRAM(s) Oral at bedtime PRN Insomnia  melatonin 3 milliGRAM(s) Oral at bedtime PRN Insomnia  metoclopramide 10 milliGRAM(s) Oral daily PRN for headache  oxycodone    5 mG/acetaminophen 325 mG 1 Tablet(s) Oral every 6 hours PRN Moderate Pain (4 - 6)  
MEDICATIONS  (STANDING):  atorvastatin 80 milliGRAM(s) Oral at bedtime  enoxaparin Injectable 120 milliGRAM(s) SubCutaneous every 24 hours  ergocalciferol 50266 Unit(s) Oral <User Schedule>  folic acid 1 milliGRAM(s) Oral daily  lactated ringers. 1000 milliLiter(s) (125 mL/Hr) IV Continuous <Continuous>  nicotine - 21 mG/24Hr(s) Patch 1 Patch Transdermal daily  OLANZapine 5 milliGRAM(s) Oral <User Schedule>  pantoprazole    Tablet 40 milliGRAM(s) Oral before breakfast  prednisoLONE acetate 1% Suspension 1 Drop(s) Left EYE four times a day  predniSONE   Tablet 10 milliGRAM(s) Oral daily  senna 2 Tablet(s) Oral at bedtime    MEDICATIONS  (PRN):  acetaminophen     Tablet .. 650 milliGRAM(s) Oral every 6 hours PRN Mild Pain (1 - 3)  bisacodyl 5 milliGRAM(s) Oral daily PRN Constipation  melatonin 3 milliGRAM(s) Oral at bedtime PRN Insomnia  metoclopramide 10 milliGRAM(s) Oral daily PRN for headache  oxycodone    5 mG/acetaminophen 325 mG 1 Tablet(s) Oral every 6 hours PRN Moderate Pain (4 - 6)  
MEDICATIONS  (STANDING):  atorvastatin 80 milliGRAM(s) Oral at bedtime  diphenhydrAMINE 25 milliGRAM(s) Oral once  enoxaparin Injectable 120 milliGRAM(s) SubCutaneous every 24 hours  ergocalciferol 82445 Unit(s) Oral <User Schedule>  folic acid 1 milliGRAM(s) Oral daily  lactated ringers. 1000 milliLiter(s) (125 mL/Hr) IV Continuous <Continuous>  nicotine - 21 mG/24Hr(s) Patch 1 Patch Transdermal daily  OLANZapine 5 milliGRAM(s) Oral <User Schedule>  pantoprazole    Tablet 40 milliGRAM(s) Oral before breakfast  prednisoLONE acetate 1% Suspension 1 Drop(s) Left EYE four times a day  predniSONE   Tablet 10 milliGRAM(s) Oral daily  senna 2 Tablet(s) Oral at bedtime    MEDICATIONS  (PRN):  acetaminophen     Tablet .. 650 milliGRAM(s) Oral every 6 hours PRN Mild Pain (1 - 3)  bisacodyl 5 milliGRAM(s) Oral daily PRN Constipation  melatonin 3 milliGRAM(s) Oral at bedtime PRN Insomnia  metoclopramide 10 milliGRAM(s) Oral daily PRN for headache  oxycodone    5 mG/acetaminophen 325 mG 1 Tablet(s) Oral every 6 hours PRN Moderate Pain (4 - 6)

## 2023-07-18 NOTE — DISCHARGE NOTE NURSING/CASE MANAGEMENT/SOCIAL WORK - PATIENT PORTAL LINK FT
You can access the FollowMyHealth Patient Portal offered by [facility] by registering at the following website: [email or web address]. By joining [facility]’s FollowMyHealth portal, you will also be able to view your health information using other applications (apps) compatible with our system.

## 2023-07-18 NOTE — BH CONSULTATION LIAISON PROGRESS NOTE - MSE UNSTRUCTURED FT
Appearance: Adult female in no acute distress; dressed in street clothes  Behavior: Calm & cooperative; fair eye contact  Speech: Normal rate, tone, and volume  Motor: No PMR/PMA; no other abnormal movements  Mood: "tired"  Affect: Euthymic and mood congruent; full-range  Thought Process: Linear, logical, and goal-directed  Thought Content: Denies SI or HI; no evidence of delusions, (major themes of session)  Perception: no AVH  Cognition: Grossly intact  Insight: Fair  Judgement: Fair  Impulsivity: Intact   Appearance: Adult female in no acute distress;   Behavior: Calm & cooperative; fair eye contact  Speech: Normal rate, tone, and volume  Motor: No PMR/PMA; no other abnormal movements  Mood: "tired"  Affect: Euthymic and mood congruent; full-range  Thought Process: Linear, logical, and goal-directed  Thought Content: Denies SI or HI; no evidence of delusions,   Perception: no AVH  Cognition: Grossly intact  Insight: Fair  Judgement: Fair

## 2023-07-18 NOTE — PROGRESS NOTE ADULT - PROVIDER SPECIALTY LIST ADULT
Cardiology
Cardiology
Hospitalist
Cardiology
Electrophysiology
Cardiology
Electrophysiology
Cardiology
Hospitalist

## 2023-07-18 NOTE — PROGRESS NOTE ADULT - NS ATTEND AMEND GEN_ALL_CORE FT
8 year old woman with a pmhx of Depression, CVA (multiple, in 2021; on Lovenox), Rheumatoid arthritis, Breast cancer s/p radiation therapy, and Glaucoma who presented to the ED with left side face ad upper extremity numbness. Neurology following. CT scan of head and CTA of brain and neck negative for any acute findings.  CT brain showed age-indeterminate lacunar infarct in the right lentiform nucleus and bihemispheric chronic microvascular ischemic white matter changes. Now awaiting BMRI. Ep was consulted for ILR evaluation. Patient denied having a hx of cardiac arrhthymias. There is no telemetry evidence of atrial arrhythmias; nor is there a clear pacing indication at this time.  Per CRYSTAL-AF, patient will benefit from prolonged monitoring for detection of AF/PAF as cause of potential cardioembolic source.  The risks and benefits were explained in detail which included but not limited to bleeding, infection, stroke, syncope 2/2 vasovagal, intubation, and death. Patient expressed understanding and all questions were answered. Patient is currently is undecided about ILR.
I reviewed the overnight course of events on the unit, re-confirming the patient history. I discussed the care with the patient and their family. The plan of care was discussed with the ACP team and modifications were made to the notation where appropriate. Differential diagnosis and plan of care discussed with patient after the evaluation. Advanced care planning was discussed with patient and family.  Advanced care planning forms were reviewed and discussed.  Risks, benefits and alternatives of cardiac procedures were discussed in detail and all questions were answered. 35 minutes spent on total encounter of which more than fifty percent of the encounter was spent counseling and/or coordinating care by the attending physician.
8 year old woman with a pmhx of Depression, CVA (multiple, in 2021; on Lovenox), Rheumatoid arthritis, Breast cancer s/p radiation therapy, and Glaucoma who presented to the ED with left side face ad upper extremity numbness. Neurology following. CT scan of head and CTA of brain and neck negative for any acute findings.  CT brain showed age-indeterminate lacunar infarct in the right lentiform nucleus and bihemispheric chronic microvascular ischemic white matter changes. Now awaiting BMRI. Ep was consulted for ILR evaluation. Patient denied having a hx of cardiac arrhthymias. There is no telemetry evidence of atrial arrhythmias; nor is there a clear pacing indication at this time.  Per CRYSTAL-AF, patient will benefit from prolonged monitoring for detection of AF/PAF as cause of potential cardioembolic source.  The risks and benefits were explained in detail which included but not limited to bleeding, infection, stroke, syncope 2/2 vasovagal, intubation, and death. Patient expressed understanding and all questions were answered. Patient agreed to ILR implant. Plan for today.

## 2023-07-18 NOTE — BH CONSULTATION LIAISON PROGRESS NOTE - NSBHCONSULTFOLLOWAFTERCARE_PSY_A_CORE FT
recommendation to follow-up with outpatient psychiatric care closer to her home in [facility].  [facility] behavioral health crisis phone number - [phone] [facility]  [phone] [facility] [address] [phone]    KATIE to assist in finding referrals for psychiatric care closer to where patient resides

## 2023-07-18 NOTE — PROGRESS NOTE ADULT - ASSESSMENT
58 year old female, with past history significant for Depression, CVA (multiple, in 2021; on Lovenox), Rheumatoid arthritis, Cigarette smoking, Breast cancer - s/p radiation therapy, and ?Glaucoma, presented to the ED secondary to increased L-face and LUE paresthesia, as well as headache and upper abdominal pain.         1. LV dysfunction  -new mod-severe segmental LV dysfunction  -denies cardiac hx or workup in past  -endorses SOB, CP on exertion. trops elevated on admission 113--104--89  -  [facility] as out pt as pt has an acute stroke  - blood pressure soft will add further GDMT when bp improves. c/w aldactone 25mg daily    2.  Paresthesias  -presented w/ report of L- arm numbness as well as L-facial numbness  -history of multiple CVAs and is on Lovenox 120 mg daily  -CT scan of head and CTA of brain and neck negative for any acute findings  -s/p Stroke Code in the ED  -  MRI shows Right basal ganglia acute infarct   -s/p ILR placement   -intracardiac shunt on TTE, spoke to LESLEY attending she is concerned about doing LESLEY now sec to acute infarct and sev LV dysfunction, will get done as out pt if ILR shows no atrial fibrillation

## 2023-07-18 NOTE — DISCHARGE NOTE NURSING/CASE MANAGEMENT/SOCIAL WORK - NSDPACMPNY_GEN_ALL_CORE
Canthacur Duration Text (Please Remove Duration From Postcare): The patient was instructed to leave the Canthacur on for 6-8 hours and then wash the area well with soap and water. Detail Level: Detailed Include Z78.9 (Other Specified Conditions Influencing Health Status) As An Associated Diagnosis?: No Cantharone Duration Text (Please Remove Duration From Postcare): The patient was instructed to leave the Cantharone on for 1 hour and then wash the area well with soap and water. Curette Text: Prior to application of cantharidin the lesions were lightly pared with a curette. Canthacur Ps Duration Text (Please Remove Duration From Postcare): The patient was instructed to leave the Canthacur PS on for 6-8 hours and then wash the area well with soap and water. Consent: The patient's consent was obtained including but not limited to risks of crusting, scabbing, scarring, blistering, darker or lighter pigmentary change, recurrence, incomplete removal and infection. Strength: Yanna Medical Necessity Information: It is in your best interest to select a reason for this procedure from the list below. All of these items fulfill various CMS LCD requirements except the new and changing color options. Cantharone Plus Duration Text (Please Remove Duration From Postcare): The patient was instructed to leave the Cantharone Plus on for 6-8 hours and then wash the area well with soap and water. Traveling alone Medical Necessity Clause: This procedure was medically necessary because the lesions that were treated were: Post-Care Instructions: I reviewed with the patient in detail post-care instructions. The patient understands that the treated areas should be washed off 1 hour after application. Cantharone Forte Duration Text (Please Remove Duration From Postcare): The patient was instructed to leave the Cantharone Forte on for 6-8 hours and then wash the area well with soap and water.

## 2023-07-18 NOTE — PROGRESS NOTE ADULT - REASON FOR ADMISSION
Paresthesias

## 2023-07-18 NOTE — BH CONSULTATION LIAISON PROGRESS NOTE - NSICDXBHPRIMARYDX_PSY_ALL_CORE
Bipolar 1 disorder   F31.9  

## 2023-07-18 NOTE — BH CONSULTATION LIAISON PROGRESS NOTE - NSBHATTESTTYPEVISIT_PSY_A_CORE
Attending with Resident/Fellow/Student
Attending with Resident/Fellow/Student
On-site Attending with Resident/Fellow/Student and KATHYA (99XXX codes)
Attending with Resident/Fellow/Student

## 2023-07-18 NOTE — BH CONSULTATION LIAISON PROGRESS NOTE - NSBHCONSFOLLOWNEEDS_PSY_ALL_CORE
Needs further psych safety assessment prior to discharge
No psychiatric contraindications to discharge
No psychiatric contraindications to discharge
Needs further psych safety assessment prior to discharge

## 2023-07-18 NOTE — BH CONSULTATION LIAISON PROGRESS NOTE - NSBHATTESTCOMMENTATTENDFT_PSY_A_CORE
Chart reviewed. I agree with MSPerla Hernández's history, MSE, A/P with below additions.    Patient seen this AM. She focuses on her sleep. States that she wants to go to sleep but cannot- but that she does feel tired. Denies SI/HI. Agrees to stay in the hospital as she knows why she is here and wants further workup. Affect is irritable. Thought process is mostly linear. Does not appear internally preoccupied.    Plan per above.  
Met with the patient. case discussed with ms3, impression and plan discussed and agreed upon
Handoff received from Dr. Keenan, chart reviewed, pt. seen/evaluated, I agree with above assessment/plan. Patient oriented, well engaged, euthymic, reports she slept better with Trazodone however sleep is still not great, in agreement to increase the trazodone dose for insomnia, thought process is linear/coherent,  denies manic or depressive sxs, no evidence of psychosis, firmly denies SIIP, denies HIIP, pt. is hopeful/future oriented, plan as above, case d/w Dr. Francis. 
met with the patient. case discussed with medical student, impression and plan discussed and agreed upon.   Patient was less distressed, affect much brighter, and engaging better. Her friend at bedside. She complains of sleep issues but otherwise feels better in terms of her mood and anxiety today. No si or hi, no psychosis. Gave us consent to speak with psychiatrist in [place]  Patient is interested in establishing care with providers within [facility]. Friend has no concerns for safety at home when asked--- patient will living with this friend/aunt in [place].   All questions answered

## 2023-07-18 NOTE — BH CONSULTATION LIAISON PROGRESS NOTE - NSBHASSESSMENTFT_PSY_ALL_CORE
Siddhartha Frost 58 year old female with a past psychiatric history notable for bipolar disorder, depression, and anxiety with a PMH of multiple CVAs, glaucoma, and rheumatoid arthritis who presented to the [facility] ED complaining of parasthesias of the left arm secondary to a subacute lacunar infarct and who was referred to  psychiatry for evaluation of suicidal ideation and depressive symptoms.    Patient is on disability, has chronic pain, has housing instability, was recently left by her  of 33 years, and feels like these factors have been contributing to her low mood. Patient is endorses symptoms of depression, passive SI, and low-mood and is interested in psychiatric treatment and long-term follow-up care outpatient. Endorses a positive attitude about improving her mental health and life circumstances.     She denies any feelings of grandiosity, working on any new projects, spending more money than usual, impulsivity, high levels of energy, or a euphoric mood. She denies any visual hallucinations, delusions, or paranoia. Has a h/o manic symptoms, + psychosis. She denies wanting to physically hurt herself, but endorses passive suicidal ideation.     7/13: patient still complaining that she cannot sleep and that her mood is low. BP was decreased for period of time last night.   7/14: says mood is improved from past few days. still complaining of difficulty falling asleep, but says that she is sleeping better through the night (fewer nightime awakenings)    7/17: Patient continues to complain of sleep issues- see below for recommendations.   7/18: Patient says sleep somewhat improved w/ trazadone, but still complaining of insomnia.     Plan - NOTE IN PROGRESS !!!!  - No indication for psychiatric 1:1  - CONTINUE Zyprexa at a dose of 5mg q 8PM PO--- to target sleep, mood, anxiety  	[] hold if QTC > 500  [] If QTC < 500, Start trazodone 50mg at bedtime. Additional 25mg can be given PRN at least 2 hours after if patient wakes up/cannot sleep.  [] SW assistance-- homelessness, needs to establish care with medical providers in [place]- wants to be closer to [place]  - AGGRESSION/COMBATIVE BEHAVIORS==== Zyprexa 2.5mg BID PRN IM/PO   Siddhartha Frost 58 year old female with a past psychiatric history notable for bipolar disorder, depression, and anxiety with a PMH of multiple CVAs, glaucoma, and rheumatoid arthritis who presented to the [facility] ED complaining of parasthesias of the left arm secondary to a subacute lacunar infarct and who was referred to  psychiatry for evaluation of suicidal ideation and depressive symptoms.    Patient is on disability, has chronic pain, has housing instability, was recently left by her  of 33 years, and feels like these factors have been contributing to her low mood. Patient is endorses symptoms of depression, passive SI, and low-mood and is interested in psychiatric treatment and long-term follow-up care outpatient. Endorses a positive attitude about improving her mental health and life circumstances.     She denies any feelings of grandiosity, working on any new projects, spending more money than usual, impulsivity, high levels of energy, or a euphoric mood. She denies any visual hallucinations, delusions, or paranoia. Has a h/o manic symptoms, + psychosis. She denies wanting to physically hurt herself, but endorses passive suicidal ideation.     7/13: patient still complaining that she cannot sleep and that her mood is low. BP was decreased for period of time last night.   7/14: says mood is improved from past few days. still complaining of difficulty falling asleep, but says that she is sleeping better through the night (fewer nightime awakenings)    7/17: Patient continues to complain of sleep issues- see below for recommendations.   7/18: Patient says sleep somewhat improved w/ trazadone, but still complaining of insomnia.     Plan - NOTE IN PROGRESS !!!!  - No indication for psychiatric 1:1  - CONTINUE Zyprexa at a dose of 5mg q 8PM PO--- to target sleep, mood, anxiety. Consider going up on Zyprexa?  	[] hold if QTC > 500  [] If QTC < 500, Start trazodone 75mg at bedtime. Additional 25mg can be given PRN at least 2 hours after if patient wakes up/cannot sleep.  [] SW assistance-- homelessness, needs to establish care with medical providers in [place]- wants to be closer to [place]  - AGGRESSION/COMBATIVE BEHAVIORS==== Zyprexa 2.5mg BID PRN IM/PO   Siddhartha Frost 58 year old female with a past psychiatric history notable for bipolar disorder, depression, and anxiety with a PMH of multiple CVAs, glaucoma, and rheumatoid arthritis who presented to the [facility] ED complaining of parasthesias of the left arm secondary to a subacute lacunar infarct and who was referred to  psychiatry for evaluation of suicidal ideation and depressive symptoms.    Patient is on disability, has chronic pain, has housing instability, was recently left by her  of 33 years, and feels like these factors have been contributing to her low mood. Patient is endorses symptoms of depression, passive SI, and low-mood and is interested in psychiatric treatment and long-term follow-up care outpatient. Endorses a positive attitude about improving her mental health and life circumstances.     She denies any feelings of grandiosity, working on any new projects, spending more money than usual, impulsivity, high levels of energy, or a euphoric mood. She denies any visual hallucinations, delusions, or paranoia. Has a h/o manic symptoms, + psychosis. She denies wanting to physically hurt herself, but endorses passive suicidal ideation.     7/13: patient still complaining that she cannot sleep and that her mood is low. BP was decreased for period of time last night.   7/14: says mood is improved from past few days. still complaining of difficulty falling asleep, but says that she is sleeping better through the night (fewer nightime awakenings)    7/17: Patient continues to complain of sleep issues- see below for recommendations.   7/18: Patient says sleep somewhat improved w/ trazadone, but still complaining of insomnia.     Plan - NOTE IN PROGRESS !!!!  - No indication for psychiatric 1:1  - CONTINUE Zyprexa at a dose of 5mg q 8PM PO--- to target sleep, mood, anxiety.   	[] hold if QTC > 500  [] If QTC < 500, Start trazodone 75mg at bedtime. Additional 25mg can be given PRN at least 2 hours after if patient wakes up/cannot sleep.  [] SW assistance-- homelessness, needs to establish care with medical providers in [place]- wants to be closer to [place]  - AGGRESSION/COMBATIVE BEHAVIORS==== Zyprexa 2.5mg BID PRN IM/PO   Siddhartha Frost 58 year old female with a past psychiatric history notable for bipolar disorder, depression, and anxiety with a PMH of multiple CVAs, glaucoma, and rheumatoid arthritis who presented to the [facility] ED complaining of parasthesias of the left arm secondary to a subacute lacunar infarct and who was referred to  psychiatry for evaluation of suicidal ideation and depressive symptoms.    Patient is on disability, has chronic pain, has housing instability, was recently left by her  of 33 years, and feels like these factors have been contributing to her low mood. Patient is endorses symptoms of depression, passive SI, and low-mood and is interested in psychiatric treatment and long-term follow-up care outpatient. Endorses a positive attitude about improving her mental health and life circumstances.     She denies any feelings of grandiosity, working on any new projects, spending more money than usual, impulsivity, high levels of energy, or a euphoric mood. She denies any visual hallucinations, delusions, or paranoia. Has a h/o manic symptoms, + psychosis. She denies wanting to physically hurt herself, but endorses passive suicidal ideation.     7/13: patient still complaining that she cannot sleep and that her mood is low. BP was decreased for period of time last night.   7/14: says mood is improved from past few days. still complaining of difficulty falling asleep, but says that she is sleeping better through the night (fewer nightime awakenings)    7/17: Patient continues to complain of sleep issues- see below for recommendations.   7/18: Patient says sleep somewhat improved w/ trazadone, but still complaining of insomnia. No si or hi. Hopeful/future oriented    Plan -   - No indication for psychiatric 1:1  - CONTINUE Zyprexa at a dose of 5mg q 8PM PO--- to target sleep, mood, anxiety.   	[] hold if QTC > 500  [] If QTC < 500, INCREASE trazodone 75mg at bedtime. Additional 25mg can be given PRN at least 2 hours after if patient wakes up/cannot sleep.  [] SW assistance-- homelessness, needs to establish care with medical providers in [place]- wants to be closer to [place]  - AGGRESSION/COMBATIVE BEHAVIORS==== Zyprexa 2.5mg BID PRN IM/PO if qtc <500

## 2023-07-18 NOTE — BH CONSULTATION LIAISON PROGRESS NOTE - NSBHATTESTBILLING_PSY_A_CORE
54730-Vsznnufjmx OBS or IP - high complexity OR 50-79 mins
84699-Ejfznaazet OBS or IP - low complexity OR 25-34 mins
62097-Npztwqjiui OBS or IP - high complexity OR 50-79 mins
24852-Cpvnfyuptn OBS or IP - moderate complexity OR 35-49 mins

## 2023-07-18 NOTE — BH CONSULTATION LIAISON PROGRESS NOTE - NSBHCHARTREVIEWVS_PSY_A_CORE FT
Vital Signs Last 24 Hrs  T(C): 36.6 (13 Jul 2023 08:06), Max: 36.7 (12 Jul 2023 14:00)  T(F): 97.8 (13 Jul 2023 08:06), Max: 98.1 (12 Jul 2023 21:00)  HR: 69 (13 Jul 2023 08:06) (52 - 71)  BP: 111/66 (13 Jul 2023 08:06) (91/55 - 116/54)  BP(mean): --  RR: 17 (13 Jul 2023 08:06) (17 - 18)  SpO2: 98% (13 Jul 2023 08:06) (96% - 99%)    Parameters below as of 13 Jul 2023 08:06  Patient On (Oxygen Delivery Method): room air    
Vital Signs Last 24 Hrs  T(C): 36.8 (18 Jul 2023 06:00), Max: 36.8 (18 Jul 2023 06:00)  T(F): 98.2 (18 Jul 2023 06:00), Max: 98.2 (18 Jul 2023 06:00)  HR: 65 (18 Jul 2023 06:00) (65 - 79)  BP: 100/53 (18 Jul 2023 06:00) (92/56 - 112/69)  BP(mean): --  RR: 16 (18 Jul 2023 06:00) (16 - 18)  SpO2: 99% (18 Jul 2023 06:00) (94% - 99%)    Parameters below as of 18 Jul 2023 06:00  Patient On (Oxygen Delivery Method): room air    
Vital Signs Last 24 Hrs  T(C): 36.5 (14 Jul 2023 06:00), Max: 36.9 (13 Jul 2023 18:00)  T(F): 97.7 (14 Jul 2023 06:00), Max: 98.4 (13 Jul 2023 18:00)  HR: 63 (14 Jul 2023 06:00) (63 - 76)  BP: 95/55 (14 Jul 2023 06:00) (94/50 - 122/76)  BP(mean): --  RR: 18 (14 Jul 2023 06:00) (17 - 20)  SpO2: 96% (14 Jul 2023 06:00) (96% - 100%)    Parameters below as of 14 Jul 2023 06:00  Patient On (Oxygen Delivery Method): room air    
Vital Signs Last 24 Hrs  T(C): 36.9 (17 Jul 2023 05:50), Max: 36.9 (17 Jul 2023 05:50)  T(F): 98.4 (17 Jul 2023 05:50), Max: 98.4 (17 Jul 2023 05:50)  HR: 75 (17 Jul 2023 05:50) (64 - 79)  BP: 107/65 (17 Jul 2023 05:50) (103/66 - 109/71)  BP(mean): --  RR: 17 (17 Jul 2023 05:50) (17 - 18)  SpO2: 100% (17 Jul 2023 05:50) (98% - 100%)    Parameters below as of 17 Jul 2023 05:50  Patient On (Oxygen Delivery Method): room air

## 2023-07-19 ENCOUNTER — TRANSCRIPTION ENCOUNTER (OUTPATIENT)
Age: 58
End: 2023-07-19

## 2023-07-21 PROBLEM — D68.61 ANTIPHOSPHOLIPID SYNDROME: Chronic | Status: ACTIVE | Noted: 2023-07-18

## 2023-07-27 DIAGNOSIS — E31.9 POLYGLANDULAR DYSFUNCTION, UNSPECIFIED: ICD-10-CM

## 2023-07-31 ENCOUNTER — APPOINTMENT (OUTPATIENT)
Dept: ELECTROPHYSIOLOGY | Facility: CLINIC | Age: 58
End: 2023-07-31
Payer: MEDICAID

## 2023-07-31 ENCOUNTER — NON-APPOINTMENT (OUTPATIENT)
Age: 58
End: 2023-07-31

## 2023-07-31 ENCOUNTER — APPOINTMENT (OUTPATIENT)
Dept: OTOLARYNGOLOGY | Facility: CLINIC | Age: 58
End: 2023-07-31

## 2023-07-31 PROCEDURE — 93285 PRGRMG DEV EVAL SCRMS IP: CPT

## 2023-08-03 RX ORDER — CHOLECALCIFEROL (VITAMIN D3) 1250 MCG
1.25 MG CAPSULE ORAL
Refills: 0 | Status: ACTIVE | COMMUNITY

## 2023-08-03 RX ORDER — CYCLOPENTOLATE HYDROCHLORIDE 10 MG/ML
1 SOLUTION OPHTHALMIC
Refills: 0 | Status: ACTIVE | COMMUNITY

## 2023-08-08 ENCOUNTER — APPOINTMENT (OUTPATIENT)
Dept: NEUROLOGY | Facility: CLINIC | Age: 58
End: 2023-08-08
Payer: MEDICAID

## 2023-08-08 ENCOUNTER — LABORATORY RESULT (OUTPATIENT)
Age: 58
End: 2023-08-08

## 2023-08-08 VITALS
SYSTOLIC BLOOD PRESSURE: 109 MMHG | WEIGHT: 180 LBS | DIASTOLIC BLOOD PRESSURE: 74 MMHG | HEART RATE: 85 BPM | TEMPERATURE: 98.3 F | HEIGHT: 61 IN | OXYGEN SATURATION: 98 % | BODY MASS INDEX: 33.99 KG/M2

## 2023-08-08 DIAGNOSIS — Z87.09 PERSONAL HISTORY OF OTHER DISEASES OF THE RESPIRATORY SYSTEM: ICD-10-CM

## 2023-08-08 DIAGNOSIS — Z09 ENCOUNTER FOR FOLLOW-UP EXAMINATION AFTER COMPLETED TREATMENT FOR CONDITIONS OTHER THAN MALIGNANT NEOPLASM: ICD-10-CM

## 2023-08-08 DIAGNOSIS — R20.0 ANESTHESIA OF SKIN: ICD-10-CM

## 2023-08-08 DIAGNOSIS — G45.9 TRANSIENT CEREBRAL ISCHEMIC ATTACK, UNSPECIFIED: ICD-10-CM

## 2023-08-08 PROCEDURE — 99215 OFFICE O/P EST HI 40 MIN: CPT

## 2023-08-08 NOTE — ASSESSMENT
[FreeTextEntry1] : lacunar infarct involving the right basal ganglia region cw Lipitor 80mg, advised patient to add asa 81mg for secondary stroke prevention BP goal of normal  Numbness in legs, concerning for neuropathy, will check basic neuropathy labs, nerve conduction EMG of the legs.  Will start Neurontin 300 mg 3 times a day for neuropathic pain.  I spent the time noted on the day of this patient encounter preparing for, providing and documenting the above E/M service and counseling and educate patient on differential, workup, disease course, and treatment/management. Education was provided to the patient during this encounter. All questions and concerns were answered and addressed in detail. The patient verbalized understanding and agreed to plan. Patient was advised to continue to monitor for neurologic symptoms and to notify my office or go to the nearest emergency room if there are any changes. Any orders/referrals and communications were provided as well.   Side effects of the above medications were discussed in detail including but not limited to applicable black box warning and teratogenicity as appropriate.  For patients with seizures, I discussed risk of SUDEP with patient and advised that SUDEP risk can be minimized with good seizure control through medication compliance and other measures. Patient was advised to bring previous records to my office, including CD of imaging, when applicable.

## 2023-08-08 NOTE — DATA REVIEWED
[de-identified] : Acute lacunar infarct involving the right basal ganglia region. [de-identified] : Ophthalmology note appreciated GYN note noted

## 2023-08-08 NOTE — PHYSICAL EXAM
[General Appearance - Alert] : alert [General Appearance - In No Acute Distress] : in no acute distress [Person] : oriented to person [Place] : oriented to place [Time] : oriented to time [Concentration Intact] : normal concentrating ability [Naming Objects] : no difficulty naming common objects [Fluency] : fluency intact [Comprehension] : comprehension intact [Vocabulary] : adequate range of vocabulary [Cranial Nerves Optic (II)] : visual acuity intact bilaterally,  visual fields full to confrontation, pupils equal round and reactive to light [Cranial Nerves Oculomotor (III)] : extraocular motion intact [Cranial Nerves Trigeminal (V)] : facial sensation intact symmetrically [Cranial Nerves Facial (VII)] : face symmetrical [Motor Tone] : muscle tone was normal in all four extremities [Motor Strength] : muscle strength was normal in all four extremities [Involuntary Movements] : no involuntary movements were seen [Sensation Tactile Decrease] : light touch was intact [Limited Balance] : the patient's balance was impaired [Coordination - Dysmetria Impaired Finger-to-Nose Bilateral] : not present [Coordination - Dysmetria Impaired Heel-to-Shin Bilateral] : not present

## 2023-08-08 NOTE — HISTORY OF PRESENT ILLNESS
[FreeTextEntry1] : Patient was admitted to the hospital and found to have right basal ganglier stroke, she is here for follow-up.  She says that she continues to feel unwell.  There is no focal symptoms, however she has numbness and tingling in her feet.  The patient denies being on aspirin or Plavix.  She is on atorvastatin 80 mg at night.

## 2023-08-09 LAB
CHOLEST SERPL-MCNC: 186 MG/DL
ESTIMATED AVERAGE GLUCOSE: 154 MG/DL
FOLATE SERPL-MCNC: >20 NG/ML
HBA1C MFR BLD HPLC: 7 %
HDLC SERPL-MCNC: 92 MG/DL
LDLC SERPL CALC-MCNC: 77 MG/DL
NONHDLC SERPL-MCNC: 94 MG/DL
T PALLIDUM AB SER QL IA: NEGATIVE
T3 SERPL-MCNC: 126 NG/DL
T4 SERPL-MCNC: 7.4 UG/DL
TRIGL SERPL-MCNC: 94 MG/DL
TSH SERPL-ACNC: 0.51 UIU/ML
VIT B12 SERPL-MCNC: 321 PG/ML

## 2023-08-11 ENCOUNTER — NON-APPOINTMENT (OUTPATIENT)
Age: 58
End: 2023-08-11

## 2023-08-11 ENCOUNTER — APPOINTMENT (OUTPATIENT)
Dept: OPHTHALMOLOGY | Facility: CLINIC | Age: 58
End: 2023-08-11
Payer: MEDICAID

## 2023-08-11 LAB — METHYLMALONATE SERPL-SCNC: 197 NMOL/L

## 2023-08-11 PROCEDURE — 92012 INTRM OPH EXAM EST PATIENT: CPT | Mod: 25

## 2023-08-11 RX ORDER — GABAPENTIN 300 MG/1
300 CAPSULE ORAL 3 TIMES DAILY
Qty: 90 | Refills: 5 | Status: ACTIVE | COMMUNITY
Start: 2023-08-08 | End: 1900-01-01

## 2023-08-24 ENCOUNTER — APPOINTMENT (OUTPATIENT)
Dept: INTERNAL MEDICINE | Facility: CLINIC | Age: 58
End: 2023-08-24

## 2023-08-31 ENCOUNTER — NON-APPOINTMENT (OUTPATIENT)
Age: 58
End: 2023-08-31

## 2023-08-31 ENCOUNTER — APPOINTMENT (OUTPATIENT)
Dept: ELECTROPHYSIOLOGY | Facility: CLINIC | Age: 58
End: 2023-08-31
Payer: MEDICAID

## 2023-08-31 PROCEDURE — 93298 REM INTERROG DEV EVAL SCRMS: CPT

## 2023-08-31 PROCEDURE — G2066: CPT

## 2023-09-06 ENCOUNTER — NON-APPOINTMENT (OUTPATIENT)
Age: 58
End: 2023-09-06

## 2023-09-06 ENCOUNTER — APPOINTMENT (OUTPATIENT)
Dept: OPHTHALMOLOGY | Facility: CLINIC | Age: 58
End: 2023-09-06
Payer: MEDICAID

## 2023-09-06 PROCEDURE — 92012 INTRM OPH EXAM EST PATIENT: CPT | Mod: 25

## 2023-09-17 ENCOUNTER — EMERGENCY (EMERGENCY)
Facility: HOSPITAL | Age: 58
LOS: 1 days | Discharge: HOME | End: 2023-09-17
Attending: EMERGENCY MEDICINE | Admitting: EMERGENCY MEDICINE
Payer: MEDICAID

## 2023-09-17 VITALS
DIASTOLIC BLOOD PRESSURE: 52 MMHG | SYSTOLIC BLOOD PRESSURE: 112 MMHG | TEMPERATURE: 98 F | OXYGEN SATURATION: 96 % | RESPIRATION RATE: 20 BRPM | HEART RATE: 64 BPM

## 2023-09-17 DIAGNOSIS — Z90.49 ACQUIRED ABSENCE OF OTHER SPECIFIED PARTS OF DIGESTIVE TRACT: Chronic | ICD-10-CM

## 2023-09-17 LAB
ALBUMIN SERPL ELPH-MCNC: 4.5 G/DL — SIGNIFICANT CHANGE UP (ref 3.3–5)
ALP SERPL-CCNC: 123 U/L — HIGH (ref 40–120)
ALT FLD-CCNC: 17 U/L — SIGNIFICANT CHANGE UP (ref 4–33)
ANION GAP SERPL CALC-SCNC: 16 MMOL/L — HIGH (ref 7–14)
AST SERPL-CCNC: 31 U/L — SIGNIFICANT CHANGE UP (ref 4–32)
BASE EXCESS BLDV CALC-SCNC: -1.3 MMOL/L — SIGNIFICANT CHANGE UP (ref -2–3)
BASOPHILS # BLD AUTO: 0.03 K/UL — SIGNIFICANT CHANGE UP (ref 0–0.2)
BASOPHILS NFR BLD AUTO: 0.2 % — SIGNIFICANT CHANGE UP (ref 0–2)
BILIRUB SERPL-MCNC: 0.8 MG/DL — SIGNIFICANT CHANGE UP (ref 0.2–1.2)
BLOOD GAS VENOUS COMPREHENSIVE RESULT: SIGNIFICANT CHANGE UP
BUN SERPL-MCNC: 18 MG/DL — SIGNIFICANT CHANGE UP (ref 7–23)
CALCIUM SERPL-MCNC: 9.5 MG/DL — SIGNIFICANT CHANGE UP (ref 8.4–10.5)
CHLORIDE BLDV-SCNC: 106 MMOL/L — SIGNIFICANT CHANGE UP (ref 96–108)
CHLORIDE SERPL-SCNC: 102 MMOL/L — SIGNIFICANT CHANGE UP (ref 98–107)
CO2 BLDV-SCNC: 24.9 MMOL/L — SIGNIFICANT CHANGE UP (ref 22–26)
CO2 SERPL-SCNC: 20 MMOL/L — LOW (ref 22–31)
CREAT SERPL-MCNC: 0.67 MG/DL — SIGNIFICANT CHANGE UP (ref 0.5–1.3)
EGFR: 101 ML/MIN/1.73M2 — SIGNIFICANT CHANGE UP
EOSINOPHIL # BLD AUTO: 0.01 K/UL — SIGNIFICANT CHANGE UP (ref 0–0.5)
EOSINOPHIL NFR BLD AUTO: 0.1 % — SIGNIFICANT CHANGE UP (ref 0–6)
FLUAV AG NPH QL: SIGNIFICANT CHANGE UP
FLUBV AG NPH QL: SIGNIFICANT CHANGE UP
GAS PNL BLDV: 137 MMOL/L — SIGNIFICANT CHANGE UP (ref 136–145)
GAS PNL BLDV: SIGNIFICANT CHANGE UP
GLUCOSE BLDV-MCNC: 164 MG/DL — HIGH (ref 70–99)
GLUCOSE SERPL-MCNC: 172 MG/DL — HIGH (ref 70–99)
HCO3 BLDV-SCNC: 24 MMOL/L — SIGNIFICANT CHANGE UP (ref 22–29)
HCT VFR BLD CALC: 42.8 % — SIGNIFICANT CHANGE UP (ref 34.5–45)
HCT VFR BLDA CALC: 41 % — SIGNIFICANT CHANGE UP (ref 34.5–46.5)
HGB BLD CALC-MCNC: 13.6 G/DL — SIGNIFICANT CHANGE UP (ref 11.7–16.1)
HGB BLD-MCNC: 13.1 G/DL — SIGNIFICANT CHANGE UP (ref 11.5–15.5)
IANC: 13.18 K/UL — HIGH (ref 1.8–7.4)
IMM GRANULOCYTES NFR BLD AUTO: 0.4 % — SIGNIFICANT CHANGE UP (ref 0–0.9)
LACTATE BLDV-MCNC: 2.7 MMOL/L — HIGH (ref 0.5–2)
LYMPHOCYTES # BLD AUTO: 0.66 K/UL — LOW (ref 1–3.3)
LYMPHOCYTES # BLD AUTO: 4.7 % — LOW (ref 13–44)
MCHC RBC-ENTMCNC: 27.9 PG — SIGNIFICANT CHANGE UP (ref 27–34)
MCHC RBC-ENTMCNC: 30.6 GM/DL — LOW (ref 32–36)
MCV RBC AUTO: 91.1 FL — SIGNIFICANT CHANGE UP (ref 80–100)
MONOCYTES # BLD AUTO: 0.12 K/UL — SIGNIFICANT CHANGE UP (ref 0–0.9)
MONOCYTES NFR BLD AUTO: 0.9 % — LOW (ref 2–14)
NEUTROPHILS # BLD AUTO: 13.18 K/UL — HIGH (ref 1.8–7.4)
NEUTROPHILS NFR BLD AUTO: 93.7 % — HIGH (ref 43–77)
NRBC # BLD: 0 /100 WBCS — SIGNIFICANT CHANGE UP (ref 0–0)
NRBC # FLD: 0 K/UL — SIGNIFICANT CHANGE UP (ref 0–0)
PCO2 BLDV: 40 MMHG — SIGNIFICANT CHANGE UP (ref 39–52)
PH BLDV: 7.38 — SIGNIFICANT CHANGE UP (ref 7.32–7.43)
PLATELET # BLD AUTO: 264 K/UL — SIGNIFICANT CHANGE UP (ref 150–400)
PO2 BLDV: 48 MMHG — HIGH (ref 25–45)
POTASSIUM BLDV-SCNC: 4.2 MMOL/L — SIGNIFICANT CHANGE UP (ref 3.5–5.1)
POTASSIUM SERPL-MCNC: 5.1 MMOL/L — SIGNIFICANT CHANGE UP (ref 3.5–5.3)
POTASSIUM SERPL-SCNC: 5.1 MMOL/L — SIGNIFICANT CHANGE UP (ref 3.5–5.3)
PROT SERPL-MCNC: 8 G/DL — SIGNIFICANT CHANGE UP (ref 6–8.3)
RBC # BLD: 4.7 M/UL — SIGNIFICANT CHANGE UP (ref 3.8–5.2)
RBC # FLD: 19.1 % — HIGH (ref 10.3–14.5)
RSV RNA NPH QL NAA+NON-PROBE: SIGNIFICANT CHANGE UP
SAO2 % BLDV: 80.2 % — SIGNIFICANT CHANGE UP (ref 67–88)
SARS-COV-2 RNA SPEC QL NAA+PROBE: SIGNIFICANT CHANGE UP
SODIUM SERPL-SCNC: 138 MMOL/L — SIGNIFICANT CHANGE UP (ref 135–145)
WBC # BLD: 14.05 K/UL — HIGH (ref 3.8–10.5)
WBC # FLD AUTO: 14.05 K/UL — HIGH (ref 3.8–10.5)

## 2023-09-17 PROCEDURE — 71046 X-RAY EXAM CHEST 2 VIEWS: CPT | Mod: 26

## 2023-09-17 PROCEDURE — 93010 ELECTROCARDIOGRAM REPORT: CPT

## 2023-09-17 PROCEDURE — 99285 EMERGENCY DEPT VISIT HI MDM: CPT

## 2023-09-17 RX ORDER — EPINEPHRINE 0.3 MG/.3ML
0.3 INJECTION INTRAMUSCULAR; SUBCUTANEOUS ONCE
Refills: 0 | Status: COMPLETED | OUTPATIENT
Start: 2023-09-17 | End: 2023-09-17

## 2023-09-17 RX ORDER — ALBUTEROL 90 UG/1
2.5 AEROSOL, METERED ORAL ONCE
Refills: 0 | Status: DISCONTINUED | OUTPATIENT
Start: 2023-09-17 | End: 2023-09-17

## 2023-09-17 RX ORDER — ACETAMINOPHEN 500 MG
650 TABLET ORAL ONCE
Refills: 0 | Status: COMPLETED | OUTPATIENT
Start: 2023-09-17 | End: 2023-09-17

## 2023-09-17 RX ORDER — ALBUTEROL 90 UG/1
2.5 AEROSOL, METERED ORAL ONCE
Refills: 0 | Status: COMPLETED | OUTPATIENT
Start: 2023-09-17 | End: 2023-09-17

## 2023-09-17 RX ORDER — ALBUTEROL 90 UG/1
2.5 AEROSOL, METERED ORAL
Refills: 0 | Status: DISCONTINUED | OUTPATIENT
Start: 2023-09-17 | End: 2023-09-17

## 2023-09-17 RX ORDER — ALBUTEROL 90 UG/1
1 AEROSOL, METERED ORAL ONCE
Refills: 0 | Status: COMPLETED | OUTPATIENT
Start: 2023-09-17 | End: 2023-09-17

## 2023-09-17 RX ADMIN — ALBUTEROL 1 PUFF(S): 90 AEROSOL, METERED ORAL at 21:43

## 2023-09-17 RX ADMIN — ALBUTEROL 2.5 MILLIGRAM(S): 90 AEROSOL, METERED ORAL at 21:42

## 2023-09-17 RX ADMIN — Medication 650 MILLIGRAM(S): at 21:41

## 2023-09-17 RX ADMIN — EPINEPHRINE 0.3 MILLIGRAM(S): 0.3 INJECTION INTRAMUSCULAR; SUBCUTANEOUS at 22:54

## 2023-09-17 NOTE — ED ADULT NURSE NOTE - OBJECTIVE STATEMENT
Patient received in room 22. A&O3. Ambulatory with assistance. Past medical history of asthma, COPD, Emphysema, Bipolar disorder, breast ca, Left sided paresthesia. Came into ED by EMS for increased SOB x1 month. States today she has been feeling more wheezing than usual. Patient appear well, able to speak in clear full sentences. Saturating 98% on RA. Received patient with 20g L hand placed by EMS. No signs of acute distress noted. Respirations even and unlabored. Denies Chest pain, N/V/D, fevers. Comfort and safety maintained. Stretcher in lowest position. Call bell within reach. Family at bedside. Pending MD orders.

## 2023-09-17 NOTE — ED ADULT NURSE NOTE - NSFALLRISKINTERV_ED_ALL_ED
Assistance OOB with selected safe patient handling equipment if applicable/Assistance with ambulation/Communicate fall risk and risk factors to all staff, patient, and family/Monitor gait and stability/Provide visual cue: yellow wristband, yellow gown, etc/Reinforce activity limits and safety measures with patient and family/Call bell, personal items and telephone in reach/Instruct patient to call for assistance before getting out of bed/chair/stretcher/Non-slip footwear applied when patient is off stretcher/[place] to call system/Physically safe environment - no spills, clutter or unnecessary equipment/Purposeful Proactive Rounding/Room/bathroom lighting operational, light cord in reach

## 2023-09-17 NOTE — ED ADULT TRIAGE NOTE - CHIEF COMPLAINT QUOTE
Pt c/o shortness of breath worsening X 1 month. Received 12mg dexamethasone IV X 1 Combivent prior to arrival. Arrives with 20 G to L hand. + cough. Pt noted to have left eye gaze at baseline from stroke X 4 months ago. Denies fevers, chills. Hx of Asthma, smoker, CVA, facial paresthesia

## 2023-09-17 NOTE — ED PROVIDER NOTE - OBJECTIVE STATEMENT
Sav Frost is a 58 y.o. F PMHx significant for asthma, COPD not on home O2, multiple CVAs with residual deficits, migraine, Breast CA, BP dz, MDD, who presents to the ED with c.o. SOB x1 month worse over the last week.  Pt states she has been taking albuterol and prednisone with minimal relief.  Notes that walking has exacerbated her symptoms.  Otherwise complains of cramping in her legs and L. sided HA for 2 days. Otherwise, she denies new changes in vision, sore throat, feeling sick with the flu or cold, CP, abdominal pain, dysuria hematuria, changes in bowel movements, or other myalgias or arthralgias that has not been chronic.    Of note, patient reports smoking and vaping. Patient does endorse using albuterol and prednisone however notes minimal relief.  Patient was given another albuterol treatment via EMS upon transition to [facility] ED  She is requesting assistance to quit.  She also endorses having extreme anxiety and depression secondary to her living circumstances and familial problems.  Patient is asking to see psychiatry and social work. Sav Frost is a 58 y.o. F PMHx significant for asthma, COPD not on home O2, multiple CVAs with residual deficits (L. eye gaze, facial paresthesia), migraine, Breast CA, BP dz, MDD, who presents to the ED with c.o. SOB x1 month worse over the last week.  Pt states she has been taking albuterol and prednisone with minimal relief.  Notes that walking has exacerbated her symptoms.  Otherwise complains of cramping in her legs and L. sided HA for 2 days. Otherwise, she denies new changes in vision, sore throat, feeling sick with the flu or cold, CP, abdominal pain, dysuria hematuria, changes in bowel movements, or other myalgias or arthralgias that has not been chronic.    Of note, patient reports smoking and vaping. Patient does endorse using albuterol and prednisone however notes minimal relief.  Patient was given another albuterol treatment via EMS upon transition to [facility] ED. Pt is requesting assistance to quit smoking and vaping.  She also endorses having extreme anxiety and depression secondary to her living circumstances and familial problems.  Patient is asking to see psychiatry and social work.

## 2023-09-17 NOTE — ED PROVIDER NOTE - NSICDXFAMILYHX_GEN_ALL_CORE_FT
FAMILY HISTORY:  Father  Still living? Unknown  Family history of diabetes mellitus (DM), Age at diagnosis: Age Unknown    Mother  Still living? Unknown  Family history of breast cancer, Age at diagnosis: Age Unknown

## 2023-09-17 NOTE — ED PROVIDER NOTE - ATTENDING CONTRIBUTION TO CARE
58 F complaining of SOB.  Patient with history of asthma/COPD.  Has been using albuterol with increasing frequency but states in the past week has not been working as well, used albuterol x2 yesterday but did not persist because did not make her feel better.  Patient also complaining of left-sided chest pain for several days, no pleuritic symptoms but positive KIRK.  No leg swelling or leg pain, no travel or trauma history.  Patient with history of anxiety and depression but denies SI/HI.  Patient has been undomiciled but is currently residing with a friend until the end of the month.  Patient request social work and help with smoking cessation.  Smokes 1 pack/day and has not had relief with OTC patches in the past.  MMM, bilat wheeze, sl. labored, heart reg, abd soft, NT to palp, no brandyn/guarding, no CVAT, no edema, NT calves.

## 2023-09-17 NOTE — ED PROVIDER NOTE - PATIENT PORTAL LINK FT
You can access the FollowMyHealth Patient Portal offered by [facility] by registering at the following website: [email or web address]. By joining [facility]’s FollowMyHealth portal, you will also be able to view your health information using other applications (apps) compatible with our system.

## 2023-09-17 NOTE — ED PROVIDER NOTE - CLINICAL SUMMARY MEDICAL DECISION MAKING FREE TEXT BOX
Sav Frost is a 58 y.o. F PMHx significant for asthma, COPD not on home O2, multiple CVAs with residual deficits, migraine, Breast CA, BP dz, MDD, who presents to the ED with c.o. SOB x1 month worse over the last week. Given HPI and physical exam findings, patient likely has asthma exacerbation.  However, other concerning differentials include COPD exacerbation however at this time patient does not exhibit signs of respiratory distress or productive cough, URI, pneumonia.  Will obtain basic labs to include CBC, CMP, UA to evaluate for electrolyte disturbance versus infectious etiology.  Chest x-ray with pain to evaluate for lung etiology such as pneumonia.  Will provide breathing treatment as patient does sound diffusely wheezy to auscultation.

## 2023-09-17 NOTE — ED PROVIDER NOTE - PROGRESS NOTE DETAILS
resting comfortably in bed. CTA chest "negative for PE. Upper lobe predominant emphysema. Additional clustered cystic airspaces in a lower lobe predominance concerning for honeycombing and raising   specter chronic interstitial lung disease." Pt informed of these results. At this time, pt here for social work consult for help with living circumstances and smoking cessation. called social work left voicemail for them to see pt for help with living circumstances and smoking cessation. Social to see. spoke to social work, will come see pt. once conversation is had. pt to be discharged.

## 2023-09-17 NOTE — ED PROVIDER NOTE - NSFOLLOWUPINSTRUCTIONS_ED_ALL_ED_FT
You were seen in the emergency department for shortness of breath. You likely have exacerbation of your asthma. While here blood work and chest xray were performed which were reassuring. We also performed at CT scan of your chest which was negative for pulmonary embolism. You symptoms also improved with albuterol treatment. You were also able to talk to social work to help with your smoking cessation and living circumstances. If you have worsening of your symptoms, cough, difficulty breathing, lightheadedness, dizziness, chest pain, fever You were seen in the emergency department for shortness of breath. You likely have exacerbation of your asthma. While here blood work and chest xray were performed which were reassuring. We also performed at CT scan of your chest which was negative for pulmonary embolism. You symptoms also improved with albuterol treatment. You were also able to talk to social work to help with your smoking cessation and living circumstances. If you have worsening of your symptoms, cough, difficulty breathing, lightheadedness, dizziness, chest pain, fever        Asthma    WHAT YOU NEED TO KNOW:    What is asthma? Asthma is a lung disease that makes breathing difficult. Chronic inflammation and reactions to triggers narrow the airways in the lungs. Asthma can become life-threatening if it is not managed.  Normal vs Asthmatic Bronchioles Adult    What is cough-variant asthma? Cough-variant asthma is a type of asthma that causes a dry cough that keeps coming back. A dry cough may be your only symptom, or you may also have chest tightness. These symptoms may be caused by exercise or exposure to odors, allergens, or respiratory tract infections. Cough-variant asthma is treated the same way as typical asthma.    What are the signs and symptoms of asthma?    Coughing    Wheezing    Shortness of breath    Chest tightness  What may trigger an asthma attack?    A cold, the flu, or a sinus infection    Exercise    Weather changes, especially cold, dry air    Smoking or secondhand smoke    Fumes from chemicals, dust, air pollution, or other small particles in the air    Pets, pollen, dust mites, or cockroaches  How is asthma diagnosed? Your healthcare provider will examine you and listen to your lungs. He or she will ask how often you have symptoms and what makes them worse. Tell him or her if you have trouble sleeping, exercising, or doing other activities because of shortness of breath. Your provider will ask about your allergies and past colds, and if anyone in your family has allergies or asthma. Tell your healthcare provider about medicines you take, including over-the-counter drugs and herbal supplements. You may need a chest x-ray to check for lung problems, or a lung function test. Lung function tests show how well you can breathe.    How is asthma treated?    Medicines may be used to decrease inflammation, open airways, and make it easier to breathe. Medicines may be inhaled, taken as a pill, or injected. Short-term medicines relieve your symptoms quickly. Long-term medicines are used to prevent future asthma attacks. Other medicines may be needed if your regular medicines are not able to prevent attacks.    Allergy testing may find allergies that trigger an asthma attack. You may need allergy shots or medicine to control allergies that make your asthma worse.  How can I manage my symptoms and prevent future attacks?    Follow your Asthma Action Plan (AAP). This is a written plan that you and your healthcare provider create. It explains which medicine you need and when to change doses if necessary. It also explains how you can monitor symptoms and use a peak flow meter. The meter measures how well your lungs are working.    Manage other health conditions, such as allergies, acid reflux, and sleep apnea.    Identify and avoid triggers. These may include pets, dust mites, mold, and cockroaches.    Do not smoke or be around others who smoke. Nicotine and other chemicals in cigarettes and cigars can cause lung damage. Ask your healthcare provider for information if you currently smoke and need help to quit. E-cigarettes or smokeless tobacco still contain nicotine. Talk to your healthcare provider before you use these products.    Ask about the flu vaccine. The flu can make your asthma worse. You may need a yearly flu shot.  Call your local emergency number (911 in the ) if:    You have severe shortness of breath.    The skin around your neck and ribs pulls in with each breath.    Your peak flow numbers are in the red zone of your AAP.  When should I seek immediate care?    You have shortness of breath, even after you take your short-term medicine as directed.    Your lips or nails turn blue or gray.  When should I call my doctor?    You run out of medicine before your next refill is due.    Your symptoms get worse.    You need to take more medicine than usual to control your symptoms.    You have questions or concerns about your condition or care.  CARE AGREEMENT:    You have the right to help plan your care. Learn about your health condition and how it may be treated. Discuss treatment options with your healthcare providers to decide what care you want to receive. You always have the right to refuse treatment.      How to Use a Metered Dose Inhaler    A metered dose inhaler (MDI) is a handheld device filled with medicine that must be breathed into the lungs (inhaled). The medicine is delivered by pushing down on a metal canister. This releases a preset amount of spray and mist through the mouth and into the lungs. Each MDI canister holds a certain number of doses (puffs).    Using a spacer with a metered dose inhaler may be recommended to help get more medicine into the lungs. A spacer is a plastic tube that connects to the MDI on one end and has a mouthpiece on the other end. A spacer holds the medicine in the tube for a short time. This allows more medicine to be inhaled.    The MDI can be used to deliver many kinds of inhaled medicines, including:  Quick relief or rescue medicines, such as bronchodilators.  Controller medicines, such as corticosteroids.  What are the risks?  If you do not use your inhaler correctly, medicine might not reach your lungs to help you breathe.  If you do not have enough strength to push down the canister to make it spray, ask your health care provider for ways to help.  The medicine in the MDI may cause side effects, such as:  Mouth sores (thrush).  Cough.  Hoarseness.  Shakiness.  Headache.  Supplies needed:  A metered dose inhaler.  A spacer, if recommended.  How to use a metered dose inhaler without a spacer    Remove the cap from the inhaler.  If you are using the inhaler for the first time, shake it for 5 seconds, turn it away from your face, then release 4 puffs into the air. This is called priming.  Shake the inhaler for 5 seconds.  Position the inhaler so the top of the canister faces up.  Put your index finger on the top of the medicine canister. Support the bottom of the inhaler with your thumb.  Breathe out normally and as completely as possible, away from the inhaler.  Either place the inhaler between your teeth and close your lips tightly around the mouthpiece, or hold the inhaler 1–2 inches (2.5–5 cm) away from your open mouth. Keep your tongue down out of the way. If you are unsure which technique to use, ask your health care provider.  Press the canister down with your index finger to release the medicine. Inhale deeply and slowly through your mouth until your lungs are completely filled. Do not breathe in through your nose. Inhaling should take 4–6 seconds.  Hold the medicine in your lungs for 5–10 seconds (10 seconds is best). This helps the medicine get into the small airways of your lungs.  Remove the inhaler from your mouth, turn your head, and breathe out normally.  Wait about 1 minute between puffs or as directed. Then repeat steps 3–10 until you have taken the number of puffs that your health care provider directed.  Put the cap on the inhaler.  If you are using a steroid inhaler, rinse your mouth with water, gargle, and spit out the water. Do not swallow the water.  How to use a metered dose inhaler with a spacer    Remove the cap from the inhaler.  If you are using the inhaler for the first time, shake it for 5 seconds, turn it away from your face, then release 4 puffs into the air. This is called priming.  Shake the inhaler for 5 seconds.  Place the open end of the spacer onto the inhaler mouthpiece.  Position the inhaler so the top of the canister faces up and the spacer mouthpiece faces you.  Put your index finger on the top of the medicine canister. Support the bottom of the inhaler and the spacer with your thumb.  Breathe out normally and as completely as possible, away from the spacer.  Place the spacer between your teeth and close your lips tightly around it. Keep your tongue down out of the way.  Press the canister down with your index finger to release the medicine, then inhale deeply and slowly through your mouth until your lungs are completely filled. Do not breathe in through your nose. Inhaling should take 4–6 seconds.  Hold the medicine in your lungs for 5–10 seconds (10 seconds is best). This helps the medicine get into the small airways of your lungs.  Remove the spacer from your mouth, turn your head, and breathe out normally.  Wait about 1 minute between puffs or as directed. Then repeat steps 3–11 until you have taken the number of puffs that your health care provider directed.  Remove the spacer from the inhaler and put the cap on the inhaler.  If you are using a steroid inhaler, rinse your mouth with water, gargle, and spit out the water. Do not swallow the water.  Follow these instructions at home:  Caring for your MDI    Store your inhaler at or near room temperature. A cold MDI will not work properly.  Follow directions on the package insert for care and cleaning of your MDI and spacer.  General instructions    Take your inhaled medicine only as told by your health care provider. Do not use the inhaler more than directed by your health care provider.  Refill your MDI with medicine before all the preset doses have been used.  If your inhaler has a counter, check it to determine how full your MDI is. The number you see tells you how many doses are left.  If your inhaler does not have a counter, ask your health care provider when you will need to refill it. Then write the refill date on a calendar or on your MDI canister.  Keep in mind that you cannot tell when the medicine in an inhaler is empty by shaking it. You may feel or hear something in the canister even when the preset medicine doses have been used up. Keeping track of your dosages is important.  Do not use any products that contain nicotine or tobacco, such as cigarettes, e-cigarettes, and chewing tobacco. If you need help quitting, ask your health care provider.  Keep all follow-up visits as told by your health care provider. This is important.  Where to find more information  Centers for Disease Control and Prevention: www.cdc.gov  American Lung Association: www.lung.org  Contact a health care provider if:  Symptoms are only partially relieved with your inhaler.  You are having trouble using your inhaler.  You have side effects from the medicine.  You have chills or a fever.  You have night sweats.  There is blood in your thick saliva (phlegm).  Get help right away if:  You have dizziness.  You have a fast heart rate.  You have severe shortness of breath.  You have difficulty breathing.  These symptoms may represent a serious problem that is an emergency. Do not wait to see if the symptoms will go away. Get medical help right away. Call your local emergency services (911 in the [place] Do not drive yourself to the hospital.    Summary  A metered dose inhaler is a handheld device for taking medicine that must be breathed into the lungs (inhaled).  Take your inhaled medicine only as told by your health care provider. Do not use the inhaler more than directed by your health care provider.  You cannot tell when the medicine is gone in an inhaler by shaking it. Refill it with medicine before all the preset doses have been used.  Follow directions on the package insert for care and cleaning of your MDI and spacer.  This information is not intended to replace advice given to you by your health care provider. Make sure you discuss any questions you have with your health care provider. You were seen in the emergency department for shortness of breath. You likely have exacerbation of your asthma. While here blood work and chest xray were performed which were reassuring. We also performed at CT scan of your chest which was negative for pulmonary embolism. You symptoms also improved with albuterol treatment. You were also able to talk to social work to help with your smoking cessation and living circumstances. If you have worsening of your symptoms, cough, difficulty breathing, lightheadedness, dizziness, chest pain, fever please return to the emergency department.     Please follow up with your primary care doctor within 1-2 weeks for further management of your symptoms.         Asthma    WHAT YOU NEED TO KNOW:    What is asthma? Asthma is a lung disease that makes breathing difficult. Chronic inflammation and reactions to triggers narrow the airways in the lungs. Asthma can become life-threatening if it is not managed.  Normal vs Asthmatic Bronchioles Adult    What is cough-variant asthma? Cough-variant asthma is a type of asthma that causes a dry cough that keeps coming back. A dry cough may be your only symptom, or you may also have chest tightness. These symptoms may be caused by exercise or exposure to odors, allergens, or respiratory tract infections. Cough-variant asthma is treated the same way as typical asthma.    What are the signs and symptoms of asthma?    Coughing    Wheezing    Shortness of breath    Chest tightness  What may trigger an asthma attack?    A cold, the flu, or a sinus infection    Exercise    Weather changes, especially cold, dry air    Smoking or secondhand smoke    Fumes from chemicals, dust, air pollution, or other small particles in the air    Pets, pollen, dust mites, or cockroaches  How is asthma diagnosed? Your healthcare provider will examine you and listen to your lungs. He or she will ask how often you have symptoms and what makes them worse. Tell him or her if you have trouble sleeping, exercising, or doing other activities because of shortness of breath. Your provider will ask about your allergies and past colds, and if anyone in your family has allergies or asthma. Tell your healthcare provider about medicines you take, including over-the-counter drugs and herbal supplements. You may need a chest x-ray to check for lung problems, or a lung function test. Lung function tests show how well you can breathe.    How is asthma treated?    Medicines may be used to decrease inflammation, open airways, and make it easier to breathe. Medicines may be inhaled, taken as a pill, or injected. Short-term medicines relieve your symptoms quickly. Long-term medicines are used to prevent future asthma attacks. Other medicines may be needed if your regular medicines are not able to prevent attacks.    Allergy testing may find allergies that trigger an asthma attack. You may need allergy shots or medicine to control allergies that make your asthma worse.  How can I manage my symptoms and prevent future attacks?    Follow your Asthma Action Plan (AAP). This is a written plan that you and your healthcare provider create. It explains which medicine you need and when to change doses if necessary. It also explains how you can monitor symptoms and use a peak flow meter. The meter measures how well your lungs are working.    Manage other health conditions, such as allergies, acid reflux, and sleep apnea.    Identify and avoid triggers. These may include pets, dust mites, mold, and cockroaches.    Do not smoke or be around others who smoke. Nicotine and other chemicals in cigarettes and cigars can cause lung damage. Ask your healthcare provider for information if you currently smoke and need help to quit. E-cigarettes or smokeless tobacco still contain nicotine. Talk to your healthcare provider before you use these products.    Ask about the flu vaccine. The flu can make your asthma worse. You may need a yearly flu shot.  Call your local emergency number (911 in the [place]) if:    You have severe shortness of breath.    The skin around your neck and ribs pulls in with each breath.    Your peak flow numbers are in the red zone of your AAP.  When should I seek immediate care?    You have shortness of breath, even after you take your short-term medicine as directed.    Your lips or nails turn blue or gray.  When should I call my doctor?    You run out of medicine before your next refill is due.    Your symptoms get worse.    You need to take more medicine than usual to control your symptoms.    You have questions or concerns about your condition or care.  CARE AGREEMENT:    You have the right to help plan your care. Learn about your health condition and how it may be treated. Discuss treatment options with your healthcare providers to decide what care you want to receive. You always have the right to refuse treatment.      How to Use a Metered Dose Inhaler    A metered dose inhaler (MDI) is a handheld device filled with medicine that must be breathed into the lungs (inhaled). The medicine is delivered by pushing down on a metal canister. This releases a preset amount of spray and mist through the mouth and into the lungs. Each MDI canister holds a certain number of doses (puffs).    Using a spacer with a metered dose inhaler may be recommended to help get more medicine into the lungs. A spacer is a plastic tube that connects to the MDI on one end and has a mouthpiece on the other end. A spacer holds the medicine in the tube for a short time. This allows more medicine to be inhaled.    The MDI can be used to deliver many kinds of inhaled medicines, including:  Quick relief or rescue medicines, such as bronchodilators.  Controller medicines, such as corticosteroids.  What are the risks?  If you do not use your inhaler correctly, medicine might not reach your lungs to help you breathe.  If you do not have enough strength to push down the canister to make it spray, ask your health care provider for ways to help.  The medicine in the MDI may cause side effects, such as:  Mouth sores (thrush).  Cough.  Hoarseness.  Shakiness.  Headache.  Supplies needed:  A metered dose inhaler.  A spacer, if recommended.  How to use a metered dose inhaler without a spacer    Remove the cap from the inhaler.  If you are using the inhaler for the first time, shake it for 5 seconds, turn it away from your face, then release 4 puffs into the air. This is called priming.  Shake the inhaler for 5 seconds.  Position the inhaler so the top of the canister faces up.  Put your index finger on the top of the medicine canister. Support the bottom of the inhaler with your thumb.  Breathe out normally and as completely as possible, away from the inhaler.  Either place the inhaler between your teeth and close your lips tightly around the mouthpiece, or hold the inhaler 1–2 inches (2.5–5 cm) away from your open mouth. Keep your tongue down out of the way. If you are unsure which technique to use, ask your health care provider.  Press the canister down with your index finger to release the medicine. Inhale deeply and slowly through your mouth until your lungs are completely filled. Do not breathe in through your nose. Inhaling should take 4–6 seconds.  Hold the medicine in your lungs for 5–10 seconds (10 seconds is best). This helps the medicine get into the small airways of your lungs.  Remove the inhaler from your mouth, turn your head, and breathe out normally.  Wait about 1 minute between puffs or as directed. Then repeat steps 3–10 until you have taken the number of puffs that your health care provider directed.  Put the cap on the inhaler.  If you are using a steroid inhaler, rinse your mouth with water, gargle, and spit out the water. Do not swallow the water.  How to use a metered dose inhaler with a spacer    Remove the cap from the inhaler.  If you are using the inhaler for the first time, shake it for 5 seconds, turn it away from your face, then release 4 puffs into the air. This is called priming.  Shake the inhaler for 5 seconds.  Place the open end of the spacer onto the inhaler mouthpiece.  Position the inhaler so the top of the canister faces up and the spacer mouthpiece faces you.  Put your index finger on the top of the medicine canister. Support the bottom of the inhaler and the spacer with your thumb.  Breathe out normally and as completely as possible, away from the spacer.  Place the spacer between your teeth and close your lips tightly around it. Keep your tongue down out of the way.  Press the canister down with your index finger to release the medicine, then inhale deeply and slowly through your mouth until your lungs are completely filled. Do not breathe in through your nose. Inhaling should take 4–6 seconds.  Hold the medicine in your lungs for 5–10 seconds (10 seconds is best). This helps the medicine get into the small airways of your lungs.  Remove the spacer from your mouth, turn your head, and breathe out normally.  Wait about 1 minute between puffs or as directed. Then repeat steps 3–11 until you have taken the number of puffs that your health care provider directed.  Remove the spacer from the inhaler and put the cap on the inhaler.  If you are using a steroid inhaler, rinse your mouth with water, gargle, and spit out the water. Do not swallow the water.  Follow these instructions at home:  Caring for your MDI    Store your inhaler at or near room temperature. A cold MDI will not work properly.  Follow directions on the package insert for care and cleaning of your MDI and spacer.  General instructions    Take your inhaled medicine only as told by your health care provider. Do not use the inhaler more than directed by your health care provider.  Refill your MDI with medicine before all the preset doses have been used.  If your inhaler has a counter, check it to determine how full your MDI is. The number you see tells you how many doses are left.  If your inhaler does not have a counter, ask your health care provider when you will need to refill it. Then write the refill date on a calendar or on your MDI canister.  Keep in mind that you cannot tell when the medicine in an inhaler is empty by shaking it. You may feel or hear something in the canister even when the preset medicine doses have been used up. Keeping track of your dosages is important.  Do not use any products that contain nicotine or tobacco, such as cigarettes, e-cigarettes, and chewing tobacco. If you need help quitting, ask your health care provider.  Keep all follow-up visits as told by your health care provider. This is important.  Where to find more information  Centers for Disease Control and Prevention: www.cdc.gov  American Lung Association: www.lung.org  Contact a health care provider if:  Symptoms are only partially relieved with your inhaler.  You are having trouble using your inhaler.  You have side effects from the medicine.  You have chills or a fever.  You have night sweats.  There is blood in your thick saliva (phlegm).  Get help right away if:  You have dizziness.  You have a fast heart rate.  You have severe shortness of breath.  You have difficulty breathing.  These symptoms may represent a serious problem that is an emergency. Do not wait to see if the symptoms will go away. Get medical help right away. Call your local emergency services (911 in the [place] Do not drive yourself to the hospital.    Summary  A metered dose inhaler is a handheld device for taking medicine that must be breathed into the lungs (inhaled).  Take your inhaled medicine only as told by your health care provider. Do not use the inhaler more than directed by your health care provider.  You cannot tell when the medicine is gone in an inhaler by shaking it. Refill it with medicine before all the preset doses have been used.  Follow directions on the package insert for care and cleaning of your MDI and spacer.  This information is not intended to replace advice given to you by your health care provider. Make sure you discuss any questions you have with your health care provider.

## 2023-09-17 NOTE — ED PROVIDER NOTE - NSICDXPASTMEDICALHX_GEN_ALL_CORE_FT
PAST MEDICAL HISTORY:  APS (antiphospholipid syndrome)     Breast cancer     Cigarette smoker     Facial paresthesia     History of multiple cerebrovascular accidents (CVAs)     Migraines     Other depression     Paresthesia of left arm     Rheumatoid arthritis     Suicidal ideation

## 2023-09-18 VITALS
TEMPERATURE: 98 F | HEART RATE: 68 BPM | SYSTOLIC BLOOD PRESSURE: 103 MMHG | OXYGEN SATURATION: 96 % | DIASTOLIC BLOOD PRESSURE: 61 MMHG | RESPIRATION RATE: 18 BRPM

## 2023-09-18 PROCEDURE — 71275 CT ANGIOGRAPHY CHEST: CPT | Mod: 26,ME

## 2023-09-18 PROCEDURE — G1004: CPT

## 2023-09-18 NOTE — CHART NOTE - NSCHARTNOTEFT_GEN_A_CORE
was informed by provider that patient is discharged and needs assistance with getting a taxi home.  met with patient at bedside and she confirmed home address: [address] [place] [address].  scheduled taxi through [employer] online portal and the assigned vendor is [employer] ([phone]); pick-up at 9:30am and invoice number [id number]. No further social work intervention needed.

## 2023-09-18 NOTE — PROVIDER CONTACT NOTE (OTHER) - ASSESSMENT
The  met the patient at the bedside, and she mentioned that she feels overwhelmed because she has nowhere to live at the end of the month. She shared that she moved from [place] to [place] and is staying with a friend. The friend that she is staying with is leaving due to long term. The patient mentions that she has four children and a younger sister who wants nothing to do with her. The patient was very emotional. Sw provided validations to the patient on several obstacles that are stressing her, which are stopping tobacco, homelessness, mental health, and how hard it has been for her to navigate the world. SW and the client were able to explore options that best suited the client, such as supportive housing, and referrals were provided for shelter, housing resources, and mental health.

## 2023-09-18 NOTE — ED ADULT NURSE REASSESSMENT NOTE - NS ED NURSE REASSESS COMMENT FT1
Break coverage RN: Pt A&Ox4 resting on stretcher c/o sob. Respirations even and unlabored, sating 100% on room air, normal sinus on cardiac monitor. Pt denies any chest pain, dizziness, nausea, vomiting or discomfort. Medication administered per eMAR. Pt educated on signs/symptoms of medication given. No acute distress noted upon leaving room. bed in lowest position, side rails up, call bell in hand, safety maintained.
Patient in room 22. A&O3. Ambulatory. Patient anxious to get home. Patient pending SW for dispo. Respirations even and unlabored. No signs of acute distress noted. Stretcher in lowest position. Call bell within reach.
Received report from Erik robertson. patient in room 22. A&O3. Respirations even and unlabored. Patient pending CT. Comfort and safety maintained. Stretcher in lowest position. Call bell within reach.
SW arranged transport.
Patient A&O3. Respirations even and unlabored. No signs of acute distress noted. Comfort and safety maintained. Stretcher In lowest position. Pending dispo.

## 2023-10-01 PROBLEM — Z92.3 HISTORY OF RADIATION THERAPY: Status: RESOLVED | Noted: 2023-06-30 | Resolved: 2023-10-01

## 2023-10-02 ENCOUNTER — APPOINTMENT (OUTPATIENT)
Dept: RHEUMATOLOGY | Facility: CLINIC | Age: 58
End: 2023-10-02
Payer: MEDICAID

## 2023-10-02 VITALS
SYSTOLIC BLOOD PRESSURE: 117 MMHG | OXYGEN SATURATION: 98 % | WEIGHT: 184 LBS | HEIGHT: 61 IN | TEMPERATURE: 98.3 F | DIASTOLIC BLOOD PRESSURE: 77 MMHG | BODY MASS INDEX: 34.74 KG/M2 | RESPIRATION RATE: 17 BRPM | HEART RATE: 111 BPM

## 2023-10-02 DIAGNOSIS — H20.9 UNSPECIFIED IRIDOCYCLITIS: ICD-10-CM

## 2023-10-02 DIAGNOSIS — I73.00 RAYNAUD'S SYNDROME W/OUT GANGRENE: ICD-10-CM

## 2023-10-02 PROCEDURE — 99204 OFFICE O/P NEW MOD 45 MIN: CPT

## 2023-10-02 RX ORDER — CAPSAICIN 0.1 G/100G
0.1 CREAM TOPICAL 4 TIMES DAILY
Qty: 1 | Refills: 5 | Status: ACTIVE | COMMUNITY
Start: 2023-10-02 | End: 1900-01-01

## 2023-10-04 ENCOUNTER — NON-APPOINTMENT (OUTPATIENT)
Age: 58
End: 2023-10-04

## 2023-10-04 ENCOUNTER — APPOINTMENT (OUTPATIENT)
Dept: ELECTROPHYSIOLOGY | Facility: CLINIC | Age: 58
End: 2023-10-04
Payer: MEDICAID

## 2023-10-04 PROCEDURE — G2066: CPT

## 2023-10-04 PROCEDURE — 93298 REM INTERROG DEV EVAL SCRMS: CPT

## 2023-10-06 ENCOUNTER — TRANSCRIPTION ENCOUNTER (OUTPATIENT)
Age: 58
End: 2023-10-06

## 2023-10-17 ENCOUNTER — APPOINTMENT (OUTPATIENT)
Dept: NEUROLOGY | Facility: HOSPITAL | Age: 58
End: 2023-10-17

## 2023-11-07 ENCOUNTER — NON-APPOINTMENT (OUTPATIENT)
Age: 58
End: 2023-11-07

## 2023-11-07 ENCOUNTER — APPOINTMENT (OUTPATIENT)
Dept: ELECTROPHYSIOLOGY | Facility: CLINIC | Age: 58
End: 2023-11-07
Payer: MEDICAID

## 2023-11-07 ENCOUNTER — INPATIENT (INPATIENT)
Facility: HOSPITAL | Age: 58
LOS: 6 days | Discharge: ANOTHER HOSPITAL | End: 2023-11-14
Attending: STUDENT IN AN ORGANIZED HEALTH CARE EDUCATION/TRAINING PROGRAM | Admitting: STUDENT IN AN ORGANIZED HEALTH CARE EDUCATION/TRAINING PROGRAM
Payer: MEDICAID

## 2023-11-07 VITALS
TEMPERATURE: 98 F | DIASTOLIC BLOOD PRESSURE: 75 MMHG | RESPIRATION RATE: 16 BRPM | SYSTOLIC BLOOD PRESSURE: 134 MMHG | HEART RATE: 87 BPM | OXYGEN SATURATION: 98 %

## 2023-11-07 DIAGNOSIS — Z90.49 ACQUIRED ABSENCE OF OTHER SPECIFIED PARTS OF DIGESTIVE TRACT: Chronic | ICD-10-CM

## 2023-11-07 DIAGNOSIS — R53.1 WEAKNESS: ICD-10-CM

## 2023-11-07 DIAGNOSIS — Z90.710 ACQUIRED ABSENCE OF BOTH CERVIX AND UTERUS: Chronic | ICD-10-CM

## 2023-11-07 LAB
ALBUMIN SERPL ELPH-MCNC: 4.4 G/DL — SIGNIFICANT CHANGE UP (ref 3.3–5)
ALBUMIN SERPL ELPH-MCNC: 4.4 G/DL — SIGNIFICANT CHANGE UP (ref 3.3–5)
ALP SERPL-CCNC: 120 U/L — SIGNIFICANT CHANGE UP (ref 40–120)
ALP SERPL-CCNC: 120 U/L — SIGNIFICANT CHANGE UP (ref 40–120)
ALT FLD-CCNC: 18 U/L — SIGNIFICANT CHANGE UP (ref 4–33)
ALT FLD-CCNC: 18 U/L — SIGNIFICANT CHANGE UP (ref 4–33)
ANION GAP SERPL CALC-SCNC: 14 MMOL/L — SIGNIFICANT CHANGE UP (ref 7–14)
ANION GAP SERPL CALC-SCNC: 14 MMOL/L — SIGNIFICANT CHANGE UP (ref 7–14)
AST SERPL-CCNC: 19 U/L — SIGNIFICANT CHANGE UP (ref 4–32)
AST SERPL-CCNC: 19 U/L — SIGNIFICANT CHANGE UP (ref 4–32)
BASOPHILS # BLD AUTO: 0.09 K/UL — SIGNIFICANT CHANGE UP (ref 0–0.2)
BASOPHILS # BLD AUTO: 0.09 K/UL — SIGNIFICANT CHANGE UP (ref 0–0.2)
BASOPHILS NFR BLD AUTO: 0.4 % — SIGNIFICANT CHANGE UP (ref 0–2)
BASOPHILS NFR BLD AUTO: 0.4 % — SIGNIFICANT CHANGE UP (ref 0–2)
BILIRUB SERPL-MCNC: 1.1 MG/DL — SIGNIFICANT CHANGE UP (ref 0.2–1.2)
BILIRUB SERPL-MCNC: 1.1 MG/DL — SIGNIFICANT CHANGE UP (ref 0.2–1.2)
BUN SERPL-MCNC: 15 MG/DL — SIGNIFICANT CHANGE UP (ref 7–23)
BUN SERPL-MCNC: 15 MG/DL — SIGNIFICANT CHANGE UP (ref 7–23)
CALCIUM SERPL-MCNC: 9.8 MG/DL — SIGNIFICANT CHANGE UP (ref 8.4–10.5)
CALCIUM SERPL-MCNC: 9.8 MG/DL — SIGNIFICANT CHANGE UP (ref 8.4–10.5)
CHLORIDE SERPL-SCNC: 105 MMOL/L — SIGNIFICANT CHANGE UP (ref 98–107)
CHLORIDE SERPL-SCNC: 105 MMOL/L — SIGNIFICANT CHANGE UP (ref 98–107)
CO2 SERPL-SCNC: 21 MMOL/L — LOW (ref 22–31)
CO2 SERPL-SCNC: 21 MMOL/L — LOW (ref 22–31)
CREAT SERPL-MCNC: 0.62 MG/DL — SIGNIFICANT CHANGE UP (ref 0.5–1.3)
CREAT SERPL-MCNC: 0.62 MG/DL — SIGNIFICANT CHANGE UP (ref 0.5–1.3)
EGFR: 103 ML/MIN/1.73M2 — SIGNIFICANT CHANGE UP
EGFR: 103 ML/MIN/1.73M2 — SIGNIFICANT CHANGE UP
EOSINOPHIL # BLD AUTO: 0.11 K/UL — SIGNIFICANT CHANGE UP (ref 0–0.5)
EOSINOPHIL # BLD AUTO: 0.11 K/UL — SIGNIFICANT CHANGE UP (ref 0–0.5)
EOSINOPHIL NFR BLD AUTO: 0.5 % — SIGNIFICANT CHANGE UP (ref 0–6)
EOSINOPHIL NFR BLD AUTO: 0.5 % — SIGNIFICANT CHANGE UP (ref 0–6)
FLUAV AG NPH QL: SIGNIFICANT CHANGE UP
FLUAV AG NPH QL: SIGNIFICANT CHANGE UP
FLUBV AG NPH QL: SIGNIFICANT CHANGE UP
FLUBV AG NPH QL: SIGNIFICANT CHANGE UP
GLUCOSE BLDC GLUCOMTR-MCNC: 99 MG/DL — SIGNIFICANT CHANGE UP (ref 70–99)
GLUCOSE BLDC GLUCOMTR-MCNC: 99 MG/DL — SIGNIFICANT CHANGE UP (ref 70–99)
GLUCOSE SERPL-MCNC: 106 MG/DL — HIGH (ref 70–99)
GLUCOSE SERPL-MCNC: 106 MG/DL — HIGH (ref 70–99)
HCT VFR BLD CALC: 42.9 % — SIGNIFICANT CHANGE UP (ref 34.5–45)
HCT VFR BLD CALC: 42.9 % — SIGNIFICANT CHANGE UP (ref 34.5–45)
HGB BLD-MCNC: 13.7 G/DL — SIGNIFICANT CHANGE UP (ref 11.5–15.5)
HGB BLD-MCNC: 13.7 G/DL — SIGNIFICANT CHANGE UP (ref 11.5–15.5)
IANC: 18.32 K/UL — HIGH (ref 1.8–7.4)
IANC: 18.32 K/UL — HIGH (ref 1.8–7.4)
IMM GRANULOCYTES NFR BLD AUTO: 0.5 % — SIGNIFICANT CHANGE UP (ref 0–0.9)
IMM GRANULOCYTES NFR BLD AUTO: 0.5 % — SIGNIFICANT CHANGE UP (ref 0–0.9)
LYMPHOCYTES # BLD AUTO: 2.11 K/UL — SIGNIFICANT CHANGE UP (ref 1–3.3)
LYMPHOCYTES # BLD AUTO: 2.11 K/UL — SIGNIFICANT CHANGE UP (ref 1–3.3)
LYMPHOCYTES # BLD AUTO: 9.8 % — LOW (ref 13–44)
LYMPHOCYTES # BLD AUTO: 9.8 % — LOW (ref 13–44)
MCHC RBC-ENTMCNC: 29.2 PG — SIGNIFICANT CHANGE UP (ref 27–34)
MCHC RBC-ENTMCNC: 29.2 PG — SIGNIFICANT CHANGE UP (ref 27–34)
MCHC RBC-ENTMCNC: 31.9 GM/DL — LOW (ref 32–36)
MCHC RBC-ENTMCNC: 31.9 GM/DL — LOW (ref 32–36)
MCV RBC AUTO: 91.5 FL — SIGNIFICANT CHANGE UP (ref 80–100)
MCV RBC AUTO: 91.5 FL — SIGNIFICANT CHANGE UP (ref 80–100)
MONOCYTES # BLD AUTO: 0.69 K/UL — SIGNIFICANT CHANGE UP (ref 0–0.9)
MONOCYTES # BLD AUTO: 0.69 K/UL — SIGNIFICANT CHANGE UP (ref 0–0.9)
MONOCYTES NFR BLD AUTO: 3.2 % — SIGNIFICANT CHANGE UP (ref 2–14)
MONOCYTES NFR BLD AUTO: 3.2 % — SIGNIFICANT CHANGE UP (ref 2–14)
NEUTROPHILS # BLD AUTO: 18.32 K/UL — HIGH (ref 1.8–7.4)
NEUTROPHILS # BLD AUTO: 18.32 K/UL — HIGH (ref 1.8–7.4)
NEUTROPHILS NFR BLD AUTO: 85.6 % — HIGH (ref 43–77)
NEUTROPHILS NFR BLD AUTO: 85.6 % — HIGH (ref 43–77)
NRBC # BLD: 0 /100 WBCS — SIGNIFICANT CHANGE UP (ref 0–0)
NRBC # BLD: 0 /100 WBCS — SIGNIFICANT CHANGE UP (ref 0–0)
NRBC # FLD: 0 K/UL — SIGNIFICANT CHANGE UP (ref 0–0)
NRBC # FLD: 0 K/UL — SIGNIFICANT CHANGE UP (ref 0–0)
NT-PROBNP SERPL-SCNC: 998 PG/ML — HIGH
NT-PROBNP SERPL-SCNC: 998 PG/ML — HIGH
PLATELET # BLD AUTO: 314 K/UL — SIGNIFICANT CHANGE UP (ref 150–400)
PLATELET # BLD AUTO: 314 K/UL — SIGNIFICANT CHANGE UP (ref 150–400)
POTASSIUM SERPL-MCNC: 4.2 MMOL/L — SIGNIFICANT CHANGE UP (ref 3.5–5.3)
POTASSIUM SERPL-MCNC: 4.2 MMOL/L — SIGNIFICANT CHANGE UP (ref 3.5–5.3)
POTASSIUM SERPL-SCNC: 4.2 MMOL/L — SIGNIFICANT CHANGE UP (ref 3.5–5.3)
POTASSIUM SERPL-SCNC: 4.2 MMOL/L — SIGNIFICANT CHANGE UP (ref 3.5–5.3)
PROT SERPL-MCNC: 7.1 G/DL — SIGNIFICANT CHANGE UP (ref 6–8.3)
PROT SERPL-MCNC: 7.1 G/DL — SIGNIFICANT CHANGE UP (ref 6–8.3)
RBC # BLD: 4.69 M/UL — SIGNIFICANT CHANGE UP (ref 3.8–5.2)
RBC # BLD: 4.69 M/UL — SIGNIFICANT CHANGE UP (ref 3.8–5.2)
RBC # FLD: 16 % — HIGH (ref 10.3–14.5)
RBC # FLD: 16 % — HIGH (ref 10.3–14.5)
RSV RNA NPH QL NAA+NON-PROBE: SIGNIFICANT CHANGE UP
RSV RNA NPH QL NAA+NON-PROBE: SIGNIFICANT CHANGE UP
SARS-COV-2 RNA SPEC QL NAA+PROBE: SIGNIFICANT CHANGE UP
SARS-COV-2 RNA SPEC QL NAA+PROBE: SIGNIFICANT CHANGE UP
SODIUM SERPL-SCNC: 140 MMOL/L — SIGNIFICANT CHANGE UP (ref 135–145)
SODIUM SERPL-SCNC: 140 MMOL/L — SIGNIFICANT CHANGE UP (ref 135–145)
TROPONIN T, HIGH SENSITIVITY RESULT: 48 NG/L — SIGNIFICANT CHANGE UP
TROPONIN T, HIGH SENSITIVITY RESULT: 48 NG/L — SIGNIFICANT CHANGE UP
WBC # BLD: 21.43 K/UL — HIGH (ref 3.8–10.5)
WBC # BLD: 21.43 K/UL — HIGH (ref 3.8–10.5)
WBC # FLD AUTO: 21.43 K/UL — HIGH (ref 3.8–10.5)
WBC # FLD AUTO: 21.43 K/UL — HIGH (ref 3.8–10.5)

## 2023-11-07 PROCEDURE — 93010 ELECTROCARDIOGRAM REPORT: CPT

## 2023-11-07 PROCEDURE — 99285 EMERGENCY DEPT VISIT HI MDM: CPT

## 2023-11-07 PROCEDURE — 99223 1ST HOSP IP/OBS HIGH 75: CPT

## 2023-11-07 PROCEDURE — 71046 X-RAY EXAM CHEST 2 VIEWS: CPT | Mod: 26

## 2023-11-07 PROCEDURE — 70450 CT HEAD/BRAIN W/O DYE: CPT | Mod: 26,MA

## 2023-11-07 RX ORDER — AZITHROMYCIN 500 MG/1
500 TABLET, FILM COATED ORAL ONCE
Refills: 0 | Status: COMPLETED | OUTPATIENT
Start: 2023-11-07 | End: 2023-11-07

## 2023-11-07 RX ORDER — CEFTRIAXONE 500 MG/1
1000 INJECTION, POWDER, FOR SOLUTION INTRAMUSCULAR; INTRAVENOUS ONCE
Refills: 0 | Status: COMPLETED | OUTPATIENT
Start: 2023-11-07 | End: 2023-11-07

## 2023-11-07 RX ADMIN — CEFTRIAXONE 100 MILLIGRAM(S): 500 INJECTION, POWDER, FOR SOLUTION INTRAMUSCULAR; INTRAVENOUS at 23:35

## 2023-11-07 NOTE — H&P ADULT - PROBLEM SELECTOR PLAN 11
- Unclear history of her breast cancer, reports ?RT and ?surgery -> need to clarify details in AM  - Patient also reports having an appointment for mammogram this month -> might need to be rescheduled if she is in the hospital during the appointment

## 2023-11-07 NOTE — H&P ADULT - PROBLEM SELECTOR PLAN 1
- Patient with reports of worsening L sided numbness of the L side of her body with complaints of generalized weakness  - On examination noted to have decreased sensation to light touch on the L side of her body face/arm/leg, also with 4+/5 strength L arm/leg seems to be at baseline when compared to prior  - No sudden onset of these changes, said that they has been progressive since her initial stroke in 2022 though states that it has been more severe since Thursday of last week  - Given her prior history of acute CVA with mostly sensory complaints will obtain MR Head non-con  - Neurology consulted  - c/w aspirin, high dose statin therapy  - c/w outpatient follow up with Dr. Wendi Lares  - Check A1C, lipid profile, TSH

## 2023-11-07 NOTE — ED ADULT NURSE NOTE - OBJECTIVE STATEMENT
Patient Education        Your [facility] at [facility] 24 Instructions  During your baby's first few weeks, you will spend most of your time feeding, diapering, and comforting your baby. You may feel overwhelmed at times. It is normal to wonder if you know what you are doing, especially if you are first-time parents.  care gets easier with every day. Soon you will know what each cry means and be able to figure out what your baby needs and wants. Follow-up care is a key part of your child's treatment and safety. Be sure to make and go to all appointments, and call your doctor if your child is having problems. It's also a good idea to know your child's test results and keep a list of the medicines your child takes. How can you care for your child at home? Feeding  · Feed your baby on demand. This means that you should breastfeed or bottle-feed your baby whenever he or she seems hungry. Do not set a schedule. · During the first 2 weeks,  babies need to be fed every 1 to 3 hours (10 to 12 times in 24 hours) or whenever the baby is hungry. Formula-fed babies may need fewer feedings, about 6 to 10 every 24 hours. · These early feedings often are short. Sometimes, a  nurses or drinks from a bottle only for a few minutes. Feedings gradually will last longer. · You may have to wake your sleepy baby to feed in the first few days after birth. Sleeping  · Always put your baby to sleep on his or her back, not the stomach. This lowers the risk of sudden infant death syndrome (SIDS). · Most babies sleep for a total of 18 hours each day. They wake for a short time at least every 2 to 3 hours. · Newborns have some moments of active sleep. The baby may make sounds or seem restless. This happens about every 50 to 60 minutes and usually lasts a few minutes. · At first, your baby may sleep through loud noises. Later, noises may wake your baby.   · When your  wakes up, he or she usually will be hungry and will need to be fed. Diaper changing and bowel habits  · Try to check your baby's diaper at least every 2 hours. If it needs to be changed, do it as soon as you can. That will help prevent diaper rash. · Your 's wet and soiled diapers can give you clues about your baby's health. Babies can become dehydrated if they're not getting enough breast milk or formula or if they lose fluid because of diarrhea, vomiting, or a fever. · For the first few days, your baby may have about 3 wet diapers a day. After that, expect 6 or more wet diapers a day throughout the first month of life. It can be hard to tell when a diaper is wet if you use disposable diapers. If you cannot tell, put a piece of tissue in the diaper. It will be wet when your baby urinates. · Keep track of what bowel habits are normal or usual for your child. Umbilical cord care  · Gently clean your baby's umbilical cord stump and the skin around it at least one time a day. You also can clean it during diaper changes. · Gently pat dry the area with a soft cloth. You can help your baby's umbilical cord stump fall off and heal faster by keeping it dry between cleanings. · The stump should fall off within a week or two. After the stump falls off, keep cleaning around the belly button at least one time a day until it has healed. When should you call for help? Call your baby's doctor now or seek immediate medical care if:    · Your baby has a rectal temperature that is less than 97.5°F (36.4°C) or is 100.4°F (38°C) or higher. Call if you cannot take your baby's temperature but he or she seems hot.     · Your baby has no wet diapers for 6 hours.     · Your baby's skin or whites of the eyes gets a brighter or deeper yellow.     · You see pus or red skin on or around the umbilical cord stump.  These are signs of infection.    Watch closely for changes in your child's health, and be sure to contact your doctor if:    · Your baby is not having regular bowel movements based on his or her age.     · Your baby cries in an unusual way or for an unusual length of time.     · Your baby is rarely awake and does not wake up for feedings, is very fussy, seems too tired to eat, or is not interested in eating. Where can you learn more? Go to http://gavino-jeff.info/. Enter I820 in the search box to learn more about \"Your Ocate at Home: Care Instructions. \"  Current as of: 2018  Content Version: 11.9  © 5829-2304 [employer]. Care instructions adapted under license by [employer] (which disclaims liability or warranty for this information). If you have questions about a medical condition or this instruction, always ask your healthcare professional. [address] any warranty or liability for your use of this information. Patient Education        Your  at [address] 24 Instructions  During your baby's first few weeks, you will spend most of your time feeding, diapering, and comforting your baby. You may feel overwhelmed at times. It is normal to wonder if you know what you are doing, especially if you are first-time parents. Ocate care gets easier with every day. Soon you will know what each cry means and be able to figure out what your baby needs and wants. Follow-up care is a key part of your child's treatment and safety. Be sure to make and go to all appointments, and call your doctor if your child is having problems. It's also a good idea to know your child's test results and keep a list of the medicines your child takes. How can you care for your child at home? Feeding  · Feed your baby on demand. This means that you should breastfeed or bottle-feed your baby whenever he or she seems hungry. Do not set a schedule.   · During the first 2 weeks,  babies need to be fed every 1 to 3 hours (10 to 12 times in 24 hours) or whenever the baby is hungry. Formula-fed babies may need fewer feedings, about 6 to 10 every 24 hours. · These early feedings often are short. Sometimes, a  nurses or drinks from a bottle only for a few minutes. Feedings gradually will last longer. · You may have to wake your sleepy baby to feed in the first few days after birth. Sleeping  · Always put your baby to sleep on his or her back, not the stomach. This lowers the risk of sudden infant death syndrome (SIDS). · Most babies sleep for a total of 18 hours each day. They wake for a short time at least every 2 to 3 hours. · Newborns have some moments of active sleep. The baby may make sounds or seem restless. This happens about every 50 to 60 minutes and usually lasts a few minutes. · At first, your baby may sleep through loud noises. Later, noises may wake your baby. · When your  wakes up, he or she usually will be hungry and will need to be fed. Diaper changing and bowel habits  · Try to check your baby's diaper at least every 2 hours. If it needs to be changed, do it as soon as you can. That will help prevent diaper rash. · Your 's wet and soiled diapers can give you clues about your baby's health. Babies can become dehydrated if they're not getting enough breast milk or formula or if they lose fluid because of diarrhea, vomiting, or a fever. · For the first few days, your baby may have about 3 wet diapers a day. After that, expect 6 or more wet diapers a day throughout the first month of life. It can be hard to tell when a diaper is wet if you use disposable diapers. If you cannot tell, put a piece of tissue in the diaper. It will be wet when your baby urinates. · Keep track of what bowel habits are normal or usual for your child. Umbilical cord care  · Gently clean your baby's umbilical cord stump and the skin around it at least one time a day. You also can clean it during diaper changes.   · Gently pat dry the area with a soft cloth. You can help your baby's umbilical cord stump fall off and heal faster by keeping it dry between cleanings. · The stump should fall off within a week or two. After the stump falls off, keep cleaning around the belly button at least one time a day until it has healed. When should you call for help? Call your baby's doctor now or seek immediate medical care if:    · Your baby has a rectal temperature that is less than 97.5°F (36.4°C) or is 100.4°F (38°C) or higher. Call if you cannot take your baby's temperature but he or she seems hot.     · Your baby has no wet diapers for 6 hours.     · Your baby's skin or whites of the eyes gets a brighter or deeper yellow.     · You see pus or red skin on or around the umbilical cord stump. These are signs of infection.    Watch closely for changes in your child's health, and be sure to contact your doctor if:    · Your baby is not having regular bowel movements based on his or her age.     · Your baby cries in an unusual way or for an unusual length of time.     · Your baby is rarely awake and does not wake up for feedings, is very fussy, seems too tired to eat, or is not interested in eating. Where can you learn more? Go to http://gavino-jeff.info/. Enter O457 in the search box to learn more about \"Your [place] at Home: Care Instructions. \"  Current as of: 2018  Content Version: 11.9  © 6619-9212 [employer]. Care instructions adapted under license by [employer] (which disclaims liability or warranty for this information). If you have questions about a medical condition or this instruction, always ask your healthcare professional. [place] [address] any warranty or liability for your use of this information. Pt received to Rm 13, A&Ox4, ambulatory at baseline with no assisted devices.  Pt hx of DM type 2, asthma, CVA July 2023. Pt endorses use of anticoagulants. Pt states she has residual weakness to the left side at baseline s/p CVA, but newer onset of headache, left sided numbness, generalized weakness, left sided facial numbness, blurry vision, slow speech, drooling since friday. Pt denies chest pain, N/V/D, SOB, palpitations. Right eye noted to be round, reactive to light, Left eye sluggish, slow to react to light. Pt afebrile. Normal sinus rhythm on cardiac monitor. Abdomen soft, nontender, nondistended. Skin cool, dry, intact. Safety maintained. Plan of care ongoing.

## 2023-11-07 NOTE — H&P ADULT - ASSESSMENT
This is a 58F with history as above who presents to the hospital with multiple complaints.  DISCHARGE

## 2023-11-07 NOTE — H&P ADULT - PROBLEM SELECTOR PLAN 3
- Significant SOB with exertion with sputum (white) but no changes  - Significant wheezing throughout all lungs  - VBG without pCO2 retention  - Given no cough, no changes in her sputum production, and no pCO2 retention low concern for COPD exacerbation  - Will place on duonebs ATC for her wheezing, encourage continued tobacco cessation, c/w home inh therapy (need to clarify dosing, med hx pharmacist emailed)

## 2023-11-07 NOTE — ED ADULT TRIAGE NOTE - CHIEF COMPLAINT QUOTE
Pt c/o headache , L side numbness,  nausea, weakness, L facial numbness, L shldr pain, slowed speech, blurry vision , drooling  since FRiday

## 2023-11-07 NOTE — H&P ADULT - PROBLEM SELECTOR PLAN 5
- Patient reports worsening depression with insomnia, poor appetite, anhedonia, poor social support  - previously was on lithium and depakote, currently on olanzapine and trazodone  - No SI/HI, no AVH  - Concern for worsening depression in setting of social stressors, poor social support  - c/w current psych meds, psych C&L consult placed  - Patient also having difficulty obtaining outpatient psych f/u, states the earlier appointment she obtained was for March 2024, encourage follow up and see if patient can get an earlier appointment prior to discharge

## 2023-11-07 NOTE — H&P ADULT - PROBLEM SELECTOR PLAN 9
- Changed from lovenox injs to eliquis 5mg BID -> will c/w eliquis 5mg BID  - c/w outpatient follow up with Dr. Tim Driver for her heme conditions

## 2023-11-07 NOTE — ED PROVIDER NOTE - PHYSICAL EXAMINATION
Const: Awake, alert, pt appears in mild distress.  Well appearing.  Moving comfortably on stretcher.  HEENT: NC/AT.  Moist mucous membranes.  No pharyngeal erythema, no exudates.  Eyes: Extraocular movements intact b/l.  Conjunctiva pink.  No scleral icterus.  Cardiac: Regular rate and regular rhythm. S1 S2 present.  Peripheral pulses 2+ and symmetric. No LE edema.  Resp: Speaking in full sentences. No evidence of respiratory distress.  Breath sounds clear to auscultation b/l. Normal chest excursion.   Abd: Non-distended, no overlying skin changes.  Soft, non-tender, no guarding, no rigidity, no rebound tenderness.  No palpable masses.  Normal bowel sounds in all 4 quadrants.  Neuro: Awake, alert & oriented x 3.  Moves all extremities spontaneously. Left gaze preference. Decreased sensation on entire left side. CN2-12 intact. No dysmetria. Normal heel to shin. Antalgic gait.

## 2023-11-07 NOTE — ED ADULT NURSE NOTE - NSFALLASSISTNEEDED_ED_ALL_ED
Standing/Walking/Toileting/Moving from bed to stretcher Helical Rim Advancement Flap Text: The defect edges were debeveled with a #15 blade scalpel.  Given the location of the defect and the proximity to free margins (helical rim) a double helical rim advancement flap was deemed most appropriate.  Using a sterile surgical marker, the appropriate advancement flaps were drawn incorporating the defect and placing the expected incisions between the helical rim and antihelix where possible.  The area thus outlined was incised through and through with a #15 scalpel blade.  With a skin hook and iris scissors, the flaps were gently and sharply undermined and freed up.

## 2023-11-07 NOTE — H&P ADULT - PROBLEM SELECTOR PLAN 6
- Patient with leukocytosis to 21k on presentation, unclear on cause  - Did report nasal congestion but full RVP negative, also reports sputum but likely in setting of known COPD as CXR without opacities  - Reports diarrhea x2 but last episode on 11/7 AM and abdomen currently benign on examination  - No Gu complaints from patient  - Given she is on MTX would want to r/o infectious issues -> check UA, monitor temp curve, trend WBC  - s/p ceftriaxone and azithromycin in ED but low concern for PNA as patient without cough and without opacities on examination -> hold off on additional abx and monitor clinically

## 2023-11-07 NOTE — H&P ADULT - PROBLEM SELECTOR PLAN 12
DVT ppx - on eliquis  Diet - DASH/CC regular diet (prior S&S eval in July 2023 recommended regular diet with thin liquids)  Activity - OOB with assistance, increase as tolerated    Med Hx Pharmacist emailed for medication reconciliation DVT ppx - on eliquis  Diet - DASH/CC regular diet (prior S&S eval in July 2023 recommended regular diet with thin liquids, pt herself also denies dysphagia currently)  Activity - OOB with assistance, increase as tolerated    Med Hx Pharmacist emailed for medication reconciliation DVT ppx - on eliquis  Diet - DASH/CC regular diet (prior S&S eval in July 2023 recommended regular diet with thin liquids, pt herself also denies dysphagia currently)  Activity - OOB with assistance, increase as tolerated    Med Hx Pharmacist emailed for medication reconciliation  Social work eval for patient's living situation and disposition eval    Aspiration and fall precautions

## 2023-11-07 NOTE — H&P ADULT - PROBLEM SELECTOR PLAN 4
- Reports abd pain with liquid diarrhea x2  - No significant abd pain noted on examination  - Also reports blood on her toilet paper when wiping, no blood in toilet bowl, no known episodes of melena  - Will monitor for now, if recurrent liquid diarrhea then would obtain GI studies  - Monitor for blood in BM, asked patient to show us her next BM to assess for blood

## 2023-11-07 NOTE — H&P ADULT - PROBLEM SELECTOR PLAN 7
- Rheum eval called to evaluate for MTX continuation  - c/w folic acid supplementation  - Outpatient f/u with Dr. Gerald Teresa

## 2023-11-07 NOTE — H&P ADULT - HISTORY OF PRESENT ILLNESS
This is a 58F with history of CVA with L sided weakness/numbness/L gaze deviation, DM2 with b/l peripheral neuropathy, COPD/Asthma, Breast Ca s/p ?RT, Bipolar depression, Rheumatoid Arthritis, Antiphospholipid syndrome, L eye uveitis, and current tobacco use who presents to the hospital with multiple complaints.     Patient states that her main issue is her symptoms from her prior stroke. States that she had her stroke in 2022 and was hospitalized at [facility] in [place]. Since then states that she has had chronic L sided weakness and chronic L sided numbness but recently (x months, especially over the past few days) her numbness and weakness seem to be worsening. She denies any new symptoms, just a worsening of her chronic symptoms. Also reports blurry vision and states that she has double vision at times (none currently).     Patient also reports significant exercise limitation 2/2 SOB. Said that she can only walk 1/2 of a hallway before she has to stop 2/2 SOB. No SOB at rest, no orthopnea but patient does report some LE edema. Also reports having daily, pressure like, L sided chest pain for some time now. Follows with a cardiologist but unclear on what work up she has had. Denies current chest pain, no palpitations, no syncope.     Also reports chronic wheezing. Denies cough but reports sputum production (white in color, no blood) that is unchanged in amount. Reports new rhinorrhea but no sore throat. Does report chills/diaphoresis but denies fevers.     Also reports recent abdominal pain with associated nausea. Also with 2 episodes of liquid diarrhea, nonbloody but reports seeing blood on her toilet paper when wiping, streaks and small amount as per patient. No melena.     Also reports feeling more depression recently 2/2 social stressors (daughter, , home situation, etc). Denies SI/HI, denies AVH. Does state that she used to be on lithium and depakote in the past and thinks she might need to restart those medications.     No other acute complaints.     On arrival to the ED, her vitals were T 97.8, P 87, /75, RR 16, O2 sat 98% RA.  This is a 58F with history of CVA with L sided weakness/numbness/L gaze deviation, DM2 with b/l peripheral neuropathy, COPD/Asthma, Breast Ca s/p ?RT, Bipolar depression, Rheumatoid Arthritis, Antiphospholipid syndrome, L eye uveitis/scleritis, Newly diagnosed DM2 (pt cannot remember which medication she takes for it), and current tobacco use who presents to the hospital with multiple complaints.     Patient states that her main issue is her symptoms from her prior stroke. States that she had her stroke in 2022 and was hospitalized at [facility] in [place]. Since then states that she has had chronic L sided weakness and chronic L sided numbness but recently (x months, especially over the past few days) her numbness and weakness seem to be worsening. She denies any new symptoms, just a worsening of her chronic symptoms. Also reports blurry vision and states that she has double vision at times (none currently).     Patient also reports significant exercise limitation 2/2 SOB. Said that she can only walk 1/2 of a hallway before she has to stop 2/2 SOB. No SOB at rest, no orthopnea but patient does report some LE edema. Also reports having daily, pressure like, L sided chest pain for some time now. Follows with a cardiologist but unclear on what work up she has had. Denies current chest pain, no palpitations, no syncope.     Also reports chronic wheezing. Denies cough but reports sputum production (white in color, no blood) that is unchanged in amount. Reports new rhinorrhea but no sore throat. Does report chills/diaphoresis but denies fevers.     Also reports recent abdominal pain with associated nausea. Also with 2 episodes of liquid diarrhea, nonbloody but reports seeing blood on her toilet paper when wiping, streaks and small amount as per patient. No melena.     Also reports feeling more depression recently 2/2 social stressors (daughter, , home situation, etc). Denies SI/HI, denies AVH. Does state that she used to be on lithium and depakote in the past and thinks she might need to restart those medications.     No other acute complaints.     On arrival to the ED, her vitals were T 97.8, P 87, /75, RR 16, O2 sat 98% RA. Her lab work was significant for leukocytosis but otherwise did not show any other significant abnormalities. She was given ceftriaxone 1g and azithromycin 500mg and was admitted to medicine on telemetry.

## 2023-11-07 NOTE — H&P ADULT - NSHPLABSRESULTS_GEN_ALL_CORE
LABS and ADDITIONAL STUDIES:                        13.2   15.76 )-----------( 294      ( 08 Nov 2023 06:43 )             41.4     139  |  103  |  13  ----------------------------<  96     11-08  3.5   |  19<L>  |  0.62    Ca    9.1      08 Nov 2023 06:43  Phos  3.5     11-08  Mg     1.30     11-08    TPro  6.8  /  Alb  4.1  /  TBili  1.2  /  DBili  x   /  AST  21  /  ALT  18  /  AlkPhos  111  11-08    06:43 - VBG - pH: 7.37  | pCO2: 43    | pO2: 44    | Lactate:          hs Troponin, T - 39 ng/L (11-08-23 @ 06:43)  hs Troponin, T - 48 ng/L (11-07-23 @ 20:00)    Pro-BNP: 998 (11-07-23 @ 20:00)    Hgb A1c: 7.6 (11-08-23 @ 06:43)    VBG - neg    < from: Xray Chest 2 Views PA/Lat (11.07.23 @ 20:49) >  FINDINGS:  The heart is normal in size.  The lungs are clear.  There is no pneumothorax or pleural effusion.  No acute bony abnormality.    IMPRESSION:  Clear lungs.  --- End of Report ---  < end of copied text >    < from: CT Head No Cont (11.07.23 @ 21:19) >  FINDINGS:  There is no acute intracranial mass-effect, hemorrhage, midline shift, or abnormal extra-axial fluid collection. Gray-white differentiation is maintained.    Tiny chronic right basal ganglia lacunar infarction again noted.    Ventricles, sulci, and cisterns are normal in size for the patient's age without hydrocephalus. Basal cisterns are patent.    Visualized paranasal sinuses and mastoid air cells are clear. Calvarium is intact.    IMPRESSION:  No acute intracranial bleeding.  --- End of Report ---  < end of copied text >    EKG - NSR at 79, QTc 440, V5 poor due to artifact but no significant ST-T wave changes

## 2023-11-07 NOTE — ED PROVIDER NOTE - CLINICAL SUMMARY MEDICAL DECISION MAKING FREE TEXT BOX
58-year-old female with history of COPD not on home O2, breat Ca s/p radiation, DM, CVA (left gaze preference, left-sided weakness and numbness) presents to the ED with new weakness for the past 3 days.  Patient endorses feeling weak, numbness that has worsened.  Patient also endorses chest pain, SOB, palpitations.  Patient says that chest pain has been going on for the past 4 weeks and at times radiates to her left shoulder.  Patient endorses cigarette use (7 cigarettes/day).    Patient also endorses that she is currently homeless and has never done rehab after her 2 CVAs.  Patient is unable to ambulate w/o support.  Patient currently does not have a cane, walker or wheelchair. Ddx includes new CVA, ACS, arrythmia, viral/infectious etiology. orders: labs, flu/covid, ekg, chest x ray. Dispo pending labs, imaging and reassessment.

## 2023-11-07 NOTE — H&P ADULT - NSHPREVIEWOFSYSTEMS_GEN_ALL_CORE
REVIEW OF SYSTEMS:    CONSTITUTIONAL: No weakness, fevers or chills  EYES: +Blurry vision, sometimes double vision  ENT: +rhinorrhea, No sore throat  NECK: No pain or stiffness  RESPIRATORY: +Wheezing with chronic white colored sputum production; No cough, hemoptysis; +shortness of breath with exertion  CARDIOVASCULAR: +L sided chest pain pressure like chest pain almost daily, no current chest pain, no palpitations; possible lower extremity edema  GASTROINTESTINAL: +abdominal pain. +nausea, no vomiting or hematemesis; +diarrhea (last episode on AM of 11/7), No constipation. No melena or hematochezia. +Blood on toilet paper when wiping.   BACK: No back pain  GENITOURINARY: No dysuria, frequency or hematuria  NEUROLOGICAL: +Worsening L sided numbness and weakness  SKIN: No itching, burning, rashes, or lesions

## 2023-11-07 NOTE — ED PROVIDER NOTE - ATTENDING CONTRIBUTION TO CARE
58-year-old female, every day smoker, past medical history of diabetes myelitis, CVA with residual left-sided weakness and left gaze, COPD (not on home O2), breast cancer status post radiation, presenting with complaints of intermittent chest pain x4 weeks as well as worsening of her left-sided symptoms since Friday.  Patient denies fevers, chills, cough.  Patient also states has been having increasing trouble getting around at work members home.  She states that she is homeless, needs assistance with housing.  On exam, patient is well-appearing, appears mildly anxious, crying on exam secondary to reported stress, heart regular rhythm, lungs with wheezing bilateral.  No lower extremity edema.  Left-sided weakness, left gaze preference.  Plan for CT head, EKG, chest x-ray, labs, fluid COVID and to be admitted.

## 2023-11-07 NOTE — H&P ADULT - PROBLEM SELECTOR PLAN 10
- c/o b/l blurry vision  - On snellen her visual acuity is 20/40 on R and 20/50 on L  - Reports occasional double vision  - No eye discharge or pain noted  - Ophthalmology consult placed  - c/w outpatient follow up with Dr. Naomi Goldberg

## 2023-11-07 NOTE — ED ADULT NURSE REASSESSMENT NOTE - NS ED NURSE REASSESS COMMENT FT1
Pt received to room 13. Pt A&Ox4, ambulatory at baseline. Pt c/o left sided weakness, left sided facial droop, left pupil less reactive than right pupil. Pt endorses a headache. Pt denies chest pain, SOB, N/V/D/C, urinary symptoms. Respirations even and unlabored. Abdomen round, nontender, and nondistended. +2 pulses in all extremities. Pt has an 18G IV in right AC. Safety measures maintained. Pt has a PMHx of CVA in July. Care plan continued.

## 2023-11-07 NOTE — H&P ADULT - NSHPPHYSICALEXAM_GEN_ALL_CORE
Vital Signs Last 24 Hrs  T(C): 36.6 (08 Nov 2023 06:54), Max: 37.1 (07 Nov 2023 23:41)  T(F): 97.8 (08 Nov 2023 06:54), Max: 98.7 (07 Nov 2023 23:41)  HR: 77 (08 Nov 2023 06:54) (68 - 87)  BP: 109/88 (08 Nov 2023 06:54) (106/82 - 134/75)  BP(mean): --  RR: 20 (08 Nov 2023 06:54) (14 - 20)  SpO2: 97% (08 Nov 2023 06:54) (97% - 98%)    Parameters below as of 08 Nov 2023 06:54  Patient On (Oxygen Delivery Method): room air    GENERAL: No acute distress, well-developed  EYES: L eye with L sided gaze deviation but EOMI on ocular testing, PERRL, conjunctiva and sclera clear; on snellen test - 20/40 R eye, 20/50 on L eye  ENT: Neck supple, No JVD, moist mucosa  CHEST/LUNG: +Diffuse wheeze, no crackles  BACK: No spinal tenderness  HEART: Regular rate and rhythm; No murmurs, rubs, or gallops  ABDOMEN: Soft, Nontender, Nondistended; Bowel sounds present  EXTREMITIES: +DP/PT/rad, No clubbing, cyanosis, or edema  PSYCH: Nl behavior, nl affect  NEUROLOGY: AAOx3; speech fluent, no facial asymmetry, tongue midline, EOMI/PERRL, Decreased V1-V3 sensation to light touch on L side when compared to R as per patient, shoulder shrug intact; Decreased sensation to light touch L UE and L  LE when compared to R side; 5/5 strength RUE and RLE, 4+/5 strength L UE and L LE  SKIN: Normal color, No rashes or lesions

## 2023-11-07 NOTE — H&P ADULT - PROBLEM SELECTOR PLAN 8
- New diagnosis of T2DM, unsure of what medications she is on currently for it  - Will place on low dose ISS, FS qAC, CC diet, check A1C

## 2023-11-07 NOTE — H&P ADULT - NSHPOUTPATIENTPROVIDERS_GEN_ALL_CORE
PCP - Dr. Khan  Cards - Dr. Carrasco  Rheum - Dr. Teresa ([facility])  Neuro - Dr. Lares ([facility])

## 2023-11-07 NOTE — ED ADULT NURSE NOTE - NSFALLRISKINTERV_ED_ALL_ED

## 2023-11-07 NOTE — H&P ADULT - PROBLEM SELECTOR PLAN 2
- Reports daily chest pain, L sided, pressure like, radiation to R side at times, occurs randomly; also with significant KIRK  - No chest pain currently, EKG nonischemic, troponins indeterminant x2 but with positive downtrend  - Prior TTE with a moderate to severe LV systolic dysfunction with an intracardiac shunt, LESLEY was deferred at that time 2/2 acute CVA  - Will recheck TTE to reassess LV function, intracardiac shunt  - Cardiology consultation placed with Dr. German Carrasco (her outpatient cardiologist)

## 2023-11-07 NOTE — ED PROVIDER NOTE - OBJECTIVE STATEMENT
58-year-old female with history of COPD not on home O2, breat Ca s/p radiation, DM, CVA (left gaze preference, left-sided weakness and numbness) presents to the ED with new weakness for the past 3 days.  Patient endorses feeling weak, numbness that has worsened.  Patient also endorses chest pain, SOB, palpitations.  Patient says that chest pain has been going on for the past 4 weeks and at times radiates to her left shoulder.  Patient endorses cigarette use (7 cigarettes/day).    Patient also endorses that she is currently homeless and has never done rehab after her 2 CVAs.  Patient is unable to ambulate w/o support.  Patient currently does not have a cane, walker or wheelchair.

## 2023-11-08 ENCOUNTER — NON-APPOINTMENT (OUTPATIENT)
Age: 58
End: 2023-11-08

## 2023-11-08 DIAGNOSIS — R10.9 UNSPECIFIED ABDOMINAL PAIN: ICD-10-CM

## 2023-11-08 DIAGNOSIS — Z85.3 PERSONAL HISTORY OF MALIGNANT NEOPLASM OF BREAST: ICD-10-CM

## 2023-11-08 DIAGNOSIS — Z29.9 ENCOUNTER FOR PROPHYLACTIC MEASURES, UNSPECIFIED: ICD-10-CM

## 2023-11-08 DIAGNOSIS — M06.9 RHEUMATOID ARTHRITIS, UNSPECIFIED: ICD-10-CM

## 2023-11-08 DIAGNOSIS — E11.42 TYPE 2 DIABETES MELLITUS WITH DIABETIC POLYNEUROPATHY: ICD-10-CM

## 2023-11-08 DIAGNOSIS — R07.9 CHEST PAIN, UNSPECIFIED: ICD-10-CM

## 2023-11-08 DIAGNOSIS — F31.9 BIPOLAR DISORDER, UNSPECIFIED: ICD-10-CM

## 2023-11-08 DIAGNOSIS — Z86.73 PERSONAL HISTORY OF TRANSIENT ISCHEMIC ATTACK (TIA), AND CEREBRAL INFARCTION WITHOUT RESIDUAL DEFICITS: ICD-10-CM

## 2023-11-08 DIAGNOSIS — R20.2 PARESTHESIA OF SKIN: ICD-10-CM

## 2023-11-08 DIAGNOSIS — D68.61 ANTIPHOSPHOLIPID SYNDROME: ICD-10-CM

## 2023-11-08 DIAGNOSIS — D72.829 ELEVATED WHITE BLOOD CELL COUNT, UNSPECIFIED: ICD-10-CM

## 2023-11-08 DIAGNOSIS — H20.9 UNSPECIFIED IRIDOCYCLITIS: ICD-10-CM

## 2023-11-08 DIAGNOSIS — R06.02 SHORTNESS OF BREATH: ICD-10-CM

## 2023-11-08 LAB
A1C WITH ESTIMATED AVERAGE GLUCOSE RESULT: 7.6 % — HIGH (ref 4–5.6)
A1C WITH ESTIMATED AVERAGE GLUCOSE RESULT: 7.6 % — HIGH (ref 4–5.6)
ALBUMIN SERPL ELPH-MCNC: 4.1 G/DL — SIGNIFICANT CHANGE UP (ref 3.3–5)
ALBUMIN SERPL ELPH-MCNC: 4.1 G/DL — SIGNIFICANT CHANGE UP (ref 3.3–5)
ALP SERPL-CCNC: 111 U/L — SIGNIFICANT CHANGE UP (ref 40–120)
ALP SERPL-CCNC: 111 U/L — SIGNIFICANT CHANGE UP (ref 40–120)
ALT FLD-CCNC: 18 U/L — SIGNIFICANT CHANGE UP (ref 4–33)
ALT FLD-CCNC: 18 U/L — SIGNIFICANT CHANGE UP (ref 4–33)
ANION GAP SERPL CALC-SCNC: 17 MMOL/L — HIGH (ref 7–14)
ANION GAP SERPL CALC-SCNC: 17 MMOL/L — HIGH (ref 7–14)
AST SERPL-CCNC: 21 U/L — SIGNIFICANT CHANGE UP (ref 4–32)
AST SERPL-CCNC: 21 U/L — SIGNIFICANT CHANGE UP (ref 4–32)
B PERT DNA SPEC QL NAA+PROBE: SIGNIFICANT CHANGE UP
B PERT DNA SPEC QL NAA+PROBE: SIGNIFICANT CHANGE UP
B PERT+PARAPERT DNA PNL SPEC NAA+PROBE: SIGNIFICANT CHANGE UP
B PERT+PARAPERT DNA PNL SPEC NAA+PROBE: SIGNIFICANT CHANGE UP
BASE EXCESS BLDV CALC-SCNC: -0.6 MMOL/L — SIGNIFICANT CHANGE UP (ref -2–3)
BASE EXCESS BLDV CALC-SCNC: -0.6 MMOL/L — SIGNIFICANT CHANGE UP (ref -2–3)
BASOPHILS # BLD AUTO: 0.09 K/UL — SIGNIFICANT CHANGE UP (ref 0–0.2)
BASOPHILS # BLD AUTO: 0.09 K/UL — SIGNIFICANT CHANGE UP (ref 0–0.2)
BASOPHILS NFR BLD AUTO: 0.6 % — SIGNIFICANT CHANGE UP (ref 0–2)
BASOPHILS NFR BLD AUTO: 0.6 % — SIGNIFICANT CHANGE UP (ref 0–2)
BILIRUB SERPL-MCNC: 1.2 MG/DL — SIGNIFICANT CHANGE UP (ref 0.2–1.2)
BILIRUB SERPL-MCNC: 1.2 MG/DL — SIGNIFICANT CHANGE UP (ref 0.2–1.2)
BORDETELLA PARAPERTUSSIS (RAPRVP): SIGNIFICANT CHANGE UP
BORDETELLA PARAPERTUSSIS (RAPRVP): SIGNIFICANT CHANGE UP
BUN SERPL-MCNC: 13 MG/DL — SIGNIFICANT CHANGE UP (ref 7–23)
BUN SERPL-MCNC: 13 MG/DL — SIGNIFICANT CHANGE UP (ref 7–23)
C PNEUM DNA SPEC QL NAA+PROBE: SIGNIFICANT CHANGE UP
C PNEUM DNA SPEC QL NAA+PROBE: SIGNIFICANT CHANGE UP
CALCIUM SERPL-MCNC: 9.1 MG/DL — SIGNIFICANT CHANGE UP (ref 8.4–10.5)
CALCIUM SERPL-MCNC: 9.1 MG/DL — SIGNIFICANT CHANGE UP (ref 8.4–10.5)
CHLORIDE SERPL-SCNC: 103 MMOL/L — SIGNIFICANT CHANGE UP (ref 98–107)
CHLORIDE SERPL-SCNC: 103 MMOL/L — SIGNIFICANT CHANGE UP (ref 98–107)
CHOLEST SERPL-MCNC: 100 MG/DL — SIGNIFICANT CHANGE UP
CHOLEST SERPL-MCNC: 100 MG/DL — SIGNIFICANT CHANGE UP
CO2 BLDV-SCNC: 26.2 MMOL/L — HIGH (ref 22–26)
CO2 BLDV-SCNC: 26.2 MMOL/L — HIGH (ref 22–26)
CO2 SERPL-SCNC: 19 MMOL/L — LOW (ref 22–31)
CO2 SERPL-SCNC: 19 MMOL/L — LOW (ref 22–31)
CREAT SERPL-MCNC: 0.62 MG/DL — SIGNIFICANT CHANGE UP (ref 0.5–1.3)
CREAT SERPL-MCNC: 0.62 MG/DL — SIGNIFICANT CHANGE UP (ref 0.5–1.3)
EGFR: 103 ML/MIN/1.73M2 — SIGNIFICANT CHANGE UP
EGFR: 103 ML/MIN/1.73M2 — SIGNIFICANT CHANGE UP
EOSINOPHIL # BLD AUTO: 0.2 K/UL — SIGNIFICANT CHANGE UP (ref 0–0.5)
EOSINOPHIL # BLD AUTO: 0.2 K/UL — SIGNIFICANT CHANGE UP (ref 0–0.5)
EOSINOPHIL NFR BLD AUTO: 1.3 % — SIGNIFICANT CHANGE UP (ref 0–6)
EOSINOPHIL NFR BLD AUTO: 1.3 % — SIGNIFICANT CHANGE UP (ref 0–6)
ESTIMATED AVERAGE GLUCOSE: 171 — SIGNIFICANT CHANGE UP
ESTIMATED AVERAGE GLUCOSE: 171 — SIGNIFICANT CHANGE UP
FLUAV SUBTYP SPEC NAA+PROBE: SIGNIFICANT CHANGE UP
FLUAV SUBTYP SPEC NAA+PROBE: SIGNIFICANT CHANGE UP
FLUBV RNA SPEC QL NAA+PROBE: SIGNIFICANT CHANGE UP
FLUBV RNA SPEC QL NAA+PROBE: SIGNIFICANT CHANGE UP
GLUCOSE BLDC GLUCOMTR-MCNC: 102 MG/DL — HIGH (ref 70–99)
GLUCOSE BLDC GLUCOMTR-MCNC: 102 MG/DL — HIGH (ref 70–99)
GLUCOSE SERPL-MCNC: 96 MG/DL — SIGNIFICANT CHANGE UP (ref 70–99)
GLUCOSE SERPL-MCNC: 96 MG/DL — SIGNIFICANT CHANGE UP (ref 70–99)
HADV DNA SPEC QL NAA+PROBE: SIGNIFICANT CHANGE UP
HADV DNA SPEC QL NAA+PROBE: SIGNIFICANT CHANGE UP
HCO3 BLDV-SCNC: 25 MMOL/L — SIGNIFICANT CHANGE UP (ref 22–29)
HCO3 BLDV-SCNC: 25 MMOL/L — SIGNIFICANT CHANGE UP (ref 22–29)
HCOV 229E RNA SPEC QL NAA+PROBE: SIGNIFICANT CHANGE UP
HCOV 229E RNA SPEC QL NAA+PROBE: SIGNIFICANT CHANGE UP
HCOV HKU1 RNA SPEC QL NAA+PROBE: SIGNIFICANT CHANGE UP
HCOV HKU1 RNA SPEC QL NAA+PROBE: SIGNIFICANT CHANGE UP
HCOV NL63 RNA SPEC QL NAA+PROBE: SIGNIFICANT CHANGE UP
HCOV NL63 RNA SPEC QL NAA+PROBE: SIGNIFICANT CHANGE UP
HCOV OC43 RNA SPEC QL NAA+PROBE: SIGNIFICANT CHANGE UP
HCOV OC43 RNA SPEC QL NAA+PROBE: SIGNIFICANT CHANGE UP
HCT VFR BLD CALC: 41.4 % — SIGNIFICANT CHANGE UP (ref 34.5–45)
HCT VFR BLD CALC: 41.4 % — SIGNIFICANT CHANGE UP (ref 34.5–45)
HDLC SERPL-MCNC: 43 MG/DL — LOW
HDLC SERPL-MCNC: 43 MG/DL — LOW
HGB BLD-MCNC: 13.2 G/DL — SIGNIFICANT CHANGE UP (ref 11.5–15.5)
HGB BLD-MCNC: 13.2 G/DL — SIGNIFICANT CHANGE UP (ref 11.5–15.5)
HMPV RNA SPEC QL NAA+PROBE: SIGNIFICANT CHANGE UP
HMPV RNA SPEC QL NAA+PROBE: SIGNIFICANT CHANGE UP
HPIV1 RNA SPEC QL NAA+PROBE: SIGNIFICANT CHANGE UP
HPIV1 RNA SPEC QL NAA+PROBE: SIGNIFICANT CHANGE UP
HPIV2 RNA SPEC QL NAA+PROBE: SIGNIFICANT CHANGE UP
HPIV2 RNA SPEC QL NAA+PROBE: SIGNIFICANT CHANGE UP
HPIV3 RNA SPEC QL NAA+PROBE: SIGNIFICANT CHANGE UP
HPIV3 RNA SPEC QL NAA+PROBE: SIGNIFICANT CHANGE UP
HPIV4 RNA SPEC QL NAA+PROBE: SIGNIFICANT CHANGE UP
HPIV4 RNA SPEC QL NAA+PROBE: SIGNIFICANT CHANGE UP
IANC: 11.64 K/UL — HIGH (ref 1.8–7.4)
IANC: 11.64 K/UL — HIGH (ref 1.8–7.4)
IMM GRANULOCYTES NFR BLD AUTO: 0.5 % — SIGNIFICANT CHANGE UP (ref 0–0.9)
IMM GRANULOCYTES NFR BLD AUTO: 0.5 % — SIGNIFICANT CHANGE UP (ref 0–0.9)
LIPID PNL WITH DIRECT LDL SERPL: 41 MG/DL — SIGNIFICANT CHANGE UP
LIPID PNL WITH DIRECT LDL SERPL: 41 MG/DL — SIGNIFICANT CHANGE UP
LYMPHOCYTES # BLD AUTO: 18 % — SIGNIFICANT CHANGE UP (ref 13–44)
LYMPHOCYTES # BLD AUTO: 18 % — SIGNIFICANT CHANGE UP (ref 13–44)
LYMPHOCYTES # BLD AUTO: 2.83 K/UL — SIGNIFICANT CHANGE UP (ref 1–3.3)
LYMPHOCYTES # BLD AUTO: 2.83 K/UL — SIGNIFICANT CHANGE UP (ref 1–3.3)
M PNEUMO DNA SPEC QL NAA+PROBE: SIGNIFICANT CHANGE UP
M PNEUMO DNA SPEC QL NAA+PROBE: SIGNIFICANT CHANGE UP
MAGNESIUM SERPL-MCNC: 1.3 MG/DL — LOW (ref 1.6–2.6)
MAGNESIUM SERPL-MCNC: 1.3 MG/DL — LOW (ref 1.6–2.6)
MCHC RBC-ENTMCNC: 29 PG — SIGNIFICANT CHANGE UP (ref 27–34)
MCHC RBC-ENTMCNC: 29 PG — SIGNIFICANT CHANGE UP (ref 27–34)
MCHC RBC-ENTMCNC: 31.9 GM/DL — LOW (ref 32–36)
MCHC RBC-ENTMCNC: 31.9 GM/DL — LOW (ref 32–36)
MCV RBC AUTO: 91 FL — SIGNIFICANT CHANGE UP (ref 80–100)
MCV RBC AUTO: 91 FL — SIGNIFICANT CHANGE UP (ref 80–100)
MONOCYTES # BLD AUTO: 0.92 K/UL — HIGH (ref 0–0.9)
MONOCYTES # BLD AUTO: 0.92 K/UL — HIGH (ref 0–0.9)
MONOCYTES NFR BLD AUTO: 5.8 % — SIGNIFICANT CHANGE UP (ref 2–14)
MONOCYTES NFR BLD AUTO: 5.8 % — SIGNIFICANT CHANGE UP (ref 2–14)
NEUTROPHILS # BLD AUTO: 11.64 K/UL — HIGH (ref 1.8–7.4)
NEUTROPHILS # BLD AUTO: 11.64 K/UL — HIGH (ref 1.8–7.4)
NEUTROPHILS NFR BLD AUTO: 73.8 % — SIGNIFICANT CHANGE UP (ref 43–77)
NEUTROPHILS NFR BLD AUTO: 73.8 % — SIGNIFICANT CHANGE UP (ref 43–77)
NON HDL CHOLESTEROL: 57 MG/DL — SIGNIFICANT CHANGE UP
NON HDL CHOLESTEROL: 57 MG/DL — SIGNIFICANT CHANGE UP
NRBC # BLD: 0 /100 WBCS — SIGNIFICANT CHANGE UP (ref 0–0)
NRBC # BLD: 0 /100 WBCS — SIGNIFICANT CHANGE UP (ref 0–0)
NRBC # FLD: 0 K/UL — SIGNIFICANT CHANGE UP (ref 0–0)
NRBC # FLD: 0 K/UL — SIGNIFICANT CHANGE UP (ref 0–0)
PCO2 BLDV: 43 MMHG — SIGNIFICANT CHANGE UP (ref 39–52)
PCO2 BLDV: 43 MMHG — SIGNIFICANT CHANGE UP (ref 39–52)
PH BLDV: 7.37 — SIGNIFICANT CHANGE UP (ref 7.32–7.43)
PH BLDV: 7.37 — SIGNIFICANT CHANGE UP (ref 7.32–7.43)
PHOSPHATE SERPL-MCNC: 3.5 MG/DL — SIGNIFICANT CHANGE UP (ref 2.5–4.5)
PHOSPHATE SERPL-MCNC: 3.5 MG/DL — SIGNIFICANT CHANGE UP (ref 2.5–4.5)
PLATELET # BLD AUTO: 294 K/UL — SIGNIFICANT CHANGE UP (ref 150–400)
PLATELET # BLD AUTO: 294 K/UL — SIGNIFICANT CHANGE UP (ref 150–400)
PO2 BLDV: 44 MMHG — SIGNIFICANT CHANGE UP (ref 25–45)
PO2 BLDV: 44 MMHG — SIGNIFICANT CHANGE UP (ref 25–45)
POTASSIUM SERPL-MCNC: 3.5 MMOL/L — SIGNIFICANT CHANGE UP (ref 3.5–5.3)
POTASSIUM SERPL-MCNC: 3.5 MMOL/L — SIGNIFICANT CHANGE UP (ref 3.5–5.3)
POTASSIUM SERPL-SCNC: 3.5 MMOL/L — SIGNIFICANT CHANGE UP (ref 3.5–5.3)
POTASSIUM SERPL-SCNC: 3.5 MMOL/L — SIGNIFICANT CHANGE UP (ref 3.5–5.3)
PROT SERPL-MCNC: 6.8 G/DL — SIGNIFICANT CHANGE UP (ref 6–8.3)
PROT SERPL-MCNC: 6.8 G/DL — SIGNIFICANT CHANGE UP (ref 6–8.3)
RAPID RVP RESULT: SIGNIFICANT CHANGE UP
RAPID RVP RESULT: SIGNIFICANT CHANGE UP
RBC # BLD: 4.55 M/UL — SIGNIFICANT CHANGE UP (ref 3.8–5.2)
RBC # BLD: 4.55 M/UL — SIGNIFICANT CHANGE UP (ref 3.8–5.2)
RBC # FLD: 15.9 % — HIGH (ref 10.3–14.5)
RBC # FLD: 15.9 % — HIGH (ref 10.3–14.5)
RSV RNA SPEC QL NAA+PROBE: SIGNIFICANT CHANGE UP
RSV RNA SPEC QL NAA+PROBE: SIGNIFICANT CHANGE UP
RV+EV RNA SPEC QL NAA+PROBE: SIGNIFICANT CHANGE UP
RV+EV RNA SPEC QL NAA+PROBE: SIGNIFICANT CHANGE UP
SAO2 % BLDV: 73.6 % — SIGNIFICANT CHANGE UP (ref 67–88)
SAO2 % BLDV: 73.6 % — SIGNIFICANT CHANGE UP (ref 67–88)
SARS-COV-2 RNA SPEC QL NAA+PROBE: SIGNIFICANT CHANGE UP
SARS-COV-2 RNA SPEC QL NAA+PROBE: SIGNIFICANT CHANGE UP
SODIUM SERPL-SCNC: 139 MMOL/L — SIGNIFICANT CHANGE UP (ref 135–145)
SODIUM SERPL-SCNC: 139 MMOL/L — SIGNIFICANT CHANGE UP (ref 135–145)
TRIGL SERPL-MCNC: 79 MG/DL — SIGNIFICANT CHANGE UP
TRIGL SERPL-MCNC: 79 MG/DL — SIGNIFICANT CHANGE UP
TROPONIN T, HIGH SENSITIVITY RESULT: 39 NG/L — SIGNIFICANT CHANGE UP
TROPONIN T, HIGH SENSITIVITY RESULT: 39 NG/L — SIGNIFICANT CHANGE UP
TSH SERPL-MCNC: 1.32 UIU/ML — SIGNIFICANT CHANGE UP (ref 0.27–4.2)
TSH SERPL-MCNC: 1.32 UIU/ML — SIGNIFICANT CHANGE UP (ref 0.27–4.2)
WBC # BLD: 15.76 K/UL — HIGH (ref 3.8–10.5)
WBC # BLD: 15.76 K/UL — HIGH (ref 3.8–10.5)
WBC # FLD AUTO: 15.76 K/UL — HIGH (ref 3.8–10.5)
WBC # FLD AUTO: 15.76 K/UL — HIGH (ref 3.8–10.5)

## 2023-11-08 PROCEDURE — 99223 1ST HOSP IP/OBS HIGH 75: CPT

## 2023-11-08 PROCEDURE — 93298 REM INTERROG DEV EVAL SCRMS: CPT

## 2023-11-08 PROCEDURE — 99232 SBSQ HOSP IP/OBS MODERATE 35: CPT

## 2023-11-08 PROCEDURE — 99222 1ST HOSP IP/OBS MODERATE 55: CPT | Mod: GC

## 2023-11-08 PROCEDURE — G2066: CPT

## 2023-11-08 RX ORDER — ACETAMINOPHEN 500 MG
1000 TABLET ORAL ONCE
Refills: 0 | Status: COMPLETED | OUTPATIENT
Start: 2023-11-08 | End: 2023-11-08

## 2023-11-08 RX ORDER — DEXTROSE 50 % IN WATER 50 %
25 SYRINGE (ML) INTRAVENOUS ONCE
Refills: 0 | Status: DISCONTINUED | OUTPATIENT
Start: 2023-11-08 | End: 2023-11-14

## 2023-11-08 RX ORDER — TOPIRAMATE 25 MG
1 TABLET ORAL
Qty: 0 | Refills: 0 | DISCHARGE

## 2023-11-08 RX ORDER — SODIUM CHLORIDE 9 MG/ML
1000 INJECTION, SOLUTION INTRAVENOUS
Refills: 0 | Status: DISCONTINUED | OUTPATIENT
Start: 2023-11-08 | End: 2023-11-14

## 2023-11-08 RX ORDER — METOPROLOL TARTRATE 50 MG
25 TABLET ORAL DAILY
Refills: 0 | Status: DISCONTINUED | OUTPATIENT
Start: 2023-11-08 | End: 2023-11-14

## 2023-11-08 RX ORDER — SPIRONOLACTONE 25 MG/1
25 TABLET, FILM COATED ORAL DAILY
Refills: 0 | Status: DISCONTINUED | OUTPATIENT
Start: 2023-11-08 | End: 2023-11-14

## 2023-11-08 RX ORDER — FOLIC ACID 0.8 MG
1 TABLET ORAL DAILY
Refills: 0 | Status: DISCONTINUED | OUTPATIENT
Start: 2023-11-08 | End: 2023-11-14

## 2023-11-08 RX ORDER — GABAPENTIN 400 MG/1
300 CAPSULE ORAL THREE TIMES A DAY
Refills: 0 | Status: DISCONTINUED | OUTPATIENT
Start: 2023-11-08 | End: 2023-11-14

## 2023-11-08 RX ORDER — MAGNESIUM SULFATE 500 MG/ML
1 VIAL (ML) INJECTION ONCE
Refills: 0 | Status: COMPLETED | OUTPATIENT
Start: 2023-11-08 | End: 2023-11-08

## 2023-11-08 RX ORDER — LANOLIN ALCOHOL/MO/W.PET/CERES
3 CREAM (GRAM) TOPICAL AT BEDTIME
Refills: 0 | Status: DISCONTINUED | OUTPATIENT
Start: 2023-11-08 | End: 2023-11-14

## 2023-11-08 RX ORDER — OLANZAPINE 15 MG/1
5 TABLET, FILM COATED ORAL AT BEDTIME
Refills: 0 | Status: DISCONTINUED | OUTPATIENT
Start: 2023-11-08 | End: 2023-11-13

## 2023-11-08 RX ORDER — ACETAMINOPHEN 500 MG
650 TABLET ORAL EVERY 6 HOURS
Refills: 0 | Status: DISCONTINUED | OUTPATIENT
Start: 2023-11-08 | End: 2023-11-14

## 2023-11-08 RX ORDER — GLUCAGON INJECTION, SOLUTION 0.5 MG/.1ML
1 INJECTION, SOLUTION SUBCUTANEOUS ONCE
Refills: 0 | Status: DISCONTINUED | OUTPATIENT
Start: 2023-11-08 | End: 2023-11-14

## 2023-11-08 RX ORDER — TRAZODONE HCL 50 MG
50 TABLET ORAL AT BEDTIME
Refills: 0 | Status: DISCONTINUED | OUTPATIENT
Start: 2023-11-08 | End: 2023-11-14

## 2023-11-08 RX ORDER — IPRATROPIUM/ALBUTEROL SULFATE 18-103MCG
3 AEROSOL WITH ADAPTER (GRAM) INHALATION EVERY 6 HOURS
Refills: 0 | Status: DISCONTINUED | OUTPATIENT
Start: 2023-11-08 | End: 2023-11-14

## 2023-11-08 RX ORDER — NICOTINE POLACRILEX 2 MG
1 GUM BUCCAL EVERY 4 HOURS
Refills: 0 | Status: DISCONTINUED | OUTPATIENT
Start: 2023-11-08 | End: 2023-11-14

## 2023-11-08 RX ORDER — INSULIN LISPRO 100/ML
VIAL (ML) SUBCUTANEOUS
Refills: 0 | Status: DISCONTINUED | OUTPATIENT
Start: 2023-11-08 | End: 2023-11-09

## 2023-11-08 RX ORDER — BUDESONIDE AND FORMOTEROL FUMARATE DIHYDRATE 160; 4.5 UG/1; UG/1
2 AEROSOL RESPIRATORY (INHALATION)
Refills: 0 | Status: DISCONTINUED | OUTPATIENT
Start: 2023-11-08 | End: 2023-11-14

## 2023-11-08 RX ORDER — ONDANSETRON 8 MG/1
4 TABLET, FILM COATED ORAL EVERY 8 HOURS
Refills: 0 | Status: DISCONTINUED | OUTPATIENT
Start: 2023-11-08 | End: 2023-11-14

## 2023-11-08 RX ORDER — ATORVASTATIN CALCIUM 80 MG/1
80 TABLET, FILM COATED ORAL AT BEDTIME
Refills: 0 | Status: DISCONTINUED | OUTPATIENT
Start: 2023-11-08 | End: 2023-11-14

## 2023-11-08 RX ORDER — DEXTROSE 50 % IN WATER 50 %
12.5 SYRINGE (ML) INTRAVENOUS ONCE
Refills: 0 | Status: DISCONTINUED | OUTPATIENT
Start: 2023-11-08 | End: 2023-11-14

## 2023-11-08 RX ORDER — OLANZAPINE 15 MG/1
2.5 TABLET, FILM COATED ORAL EVERY 12 HOURS
Refills: 0 | Status: DISCONTINUED | OUTPATIENT
Start: 2023-11-08 | End: 2023-11-14

## 2023-11-08 RX ORDER — CYCLOPENTOLATE HYDROCHLORIDE 10 MG/ML
1 SOLUTION/ DROPS OPHTHALMIC
Qty: 0 | Refills: 0 | DISCHARGE

## 2023-11-08 RX ORDER — DEXTROSE 50 % IN WATER 50 %
15 SYRINGE (ML) INTRAVENOUS ONCE
Refills: 0 | Status: DISCONTINUED | OUTPATIENT
Start: 2023-11-08 | End: 2023-11-14

## 2023-11-08 RX ORDER — CHOLECALCIFEROL (VITAMIN D3) 125 MCG
1000 CAPSULE ORAL DAILY
Refills: 0 | Status: DISCONTINUED | OUTPATIENT
Start: 2023-11-08 | End: 2023-11-14

## 2023-11-08 RX ORDER — PANTOPRAZOLE SODIUM 20 MG/1
40 TABLET, DELAYED RELEASE ORAL
Refills: 0 | Status: DISCONTINUED | OUTPATIENT
Start: 2023-11-08 | End: 2023-11-14

## 2023-11-08 RX ORDER — APIXABAN 2.5 MG/1
5 TABLET, FILM COATED ORAL EVERY 12 HOURS
Refills: 0 | Status: DISCONTINUED | OUTPATIENT
Start: 2023-11-08 | End: 2023-11-13

## 2023-11-08 RX ORDER — METHOTREXATE 2.5 MG/1
15 TABLET ORAL
Refills: 0 | Status: DISCONTINUED | OUTPATIENT
Start: 2023-11-08 | End: 2023-11-14

## 2023-11-08 RX ORDER — ASPIRIN/CALCIUM CARB/MAGNESIUM 324 MG
81 TABLET ORAL DAILY
Refills: 0 | Status: DISCONTINUED | OUTPATIENT
Start: 2023-11-08 | End: 2023-11-14

## 2023-11-08 RX ORDER — MAGNESIUM OXIDE 400 MG ORAL TABLET 241.3 MG
400 TABLET ORAL
Refills: 0 | Status: COMPLETED | OUTPATIENT
Start: 2023-11-08 | End: 2023-11-09

## 2023-11-08 RX ORDER — INSULIN LISPRO 100/ML
VIAL (ML) SUBCUTANEOUS AT BEDTIME
Refills: 0 | Status: DISCONTINUED | OUTPATIENT
Start: 2023-11-08 | End: 2023-11-09

## 2023-11-08 RX ADMIN — Medication 1 EACH: at 20:00

## 2023-11-08 RX ADMIN — Medication 1000 UNIT(S): at 12:50

## 2023-11-08 RX ADMIN — GABAPENTIN 300 MILLIGRAM(S): 400 CAPSULE ORAL at 13:45

## 2023-11-08 RX ADMIN — Medication 10 MILLIGRAM(S): at 10:10

## 2023-11-08 RX ADMIN — ATORVASTATIN CALCIUM 80 MILLIGRAM(S): 80 TABLET, FILM COATED ORAL at 21:53

## 2023-11-08 RX ADMIN — MAGNESIUM OXIDE 400 MG ORAL TABLET 400 MILLIGRAM(S): 241.3 TABLET ORAL at 12:50

## 2023-11-08 RX ADMIN — Medication 1000 MILLIGRAM(S): at 00:50

## 2023-11-08 RX ADMIN — PANTOPRAZOLE SODIUM 40 MILLIGRAM(S): 20 TABLET, DELAYED RELEASE ORAL at 10:10

## 2023-11-08 RX ADMIN — MAGNESIUM OXIDE 400 MG ORAL TABLET 400 MILLIGRAM(S): 241.3 TABLET ORAL at 18:38

## 2023-11-08 RX ADMIN — Medication 3: at 18:34

## 2023-11-08 RX ADMIN — Medication 3 MILLIGRAM(S): at 21:52

## 2023-11-08 RX ADMIN — Medication 50 MILLIGRAM(S): at 21:53

## 2023-11-08 RX ADMIN — Medication 25 MILLIGRAM(S): at 10:11

## 2023-11-08 RX ADMIN — SPIRONOLACTONE 25 MILLIGRAM(S): 25 TABLET, FILM COATED ORAL at 10:10

## 2023-11-08 RX ADMIN — Medication 81 MILLIGRAM(S): at 12:50

## 2023-11-08 RX ADMIN — AZITHROMYCIN 255 MILLIGRAM(S): 500 TABLET, FILM COATED ORAL at 00:38

## 2023-11-08 RX ADMIN — Medication 100 GRAM(S): at 10:10

## 2023-11-08 RX ADMIN — BUDESONIDE AND FORMOTEROL FUMARATE DIHYDRATE 2 PUFF(S): 160; 4.5 AEROSOL RESPIRATORY (INHALATION) at 10:09

## 2023-11-08 RX ADMIN — OLANZAPINE 5 MILLIGRAM(S): 15 TABLET, FILM COATED ORAL at 21:53

## 2023-11-08 RX ADMIN — Medication 400 MILLIGRAM(S): at 00:20

## 2023-11-08 RX ADMIN — GABAPENTIN 300 MILLIGRAM(S): 400 CAPSULE ORAL at 21:52

## 2023-11-08 RX ADMIN — Medication 1 MILLIGRAM(S): at 12:50

## 2023-11-08 RX ADMIN — APIXABAN 5 MILLIGRAM(S): 2.5 TABLET, FILM COATED ORAL at 18:38

## 2023-11-08 NOTE — BH CONSULTATION LIAISON ASSESSMENT NOTE - NSBHCHARTREVIEWVS_PSY_A_CORE FT
Vital Signs Last 24 Hrs  T(C): 36.6 (08 Nov 2023 09:54), Max: 37.1 (07 Nov 2023 23:41)  T(F): 97.8 (08 Nov 2023 09:54), Max: 98.7 (07 Nov 2023 23:41)  HR: 77 (08 Nov 2023 09:54) (68 - 87)  BP: 130/66 (08 Nov 2023 09:54) (106/82 - 134/75)  BP(mean): --  RR: 19 (08 Nov 2023 09:54) (14 - 20)  SpO2: 97% (08 Nov 2023 09:54) (97% - 98%)    Parameters below as of 08 Nov 2023 09:54  Patient On (Oxygen Delivery Method): room air

## 2023-11-08 NOTE — PHARMACOTHERAPY INTERVENTION NOTE - COMMENTS
Medication history is complete. Medication list updated in Outpatient Medication Record (OMR). Please call spectra [id number] if you have any questions.

## 2023-11-08 NOTE — BH CONSULTATION LIAISON ASSESSMENT NOTE - NSSUICPROTFACT_PSY_ALL_CORE
Responsibility to children, family, or others/Fear of death or the actual act of killing self/Cultural, spiritual and/or moral attitudes against suicide/Moravian beliefs

## 2023-11-08 NOTE — BH CONSULTATION LIAISON ASSESSMENT NOTE - MSE OPTIONS
TS Pt is requesting refills of Valacyclovir HCl (VALTREX) 1000 MG Tab   And pt is requesting a new prescription of Xerese topical cream.  [facility] [address] [place] [id number]  Phone: [phone] Fax: [phone]             Unstructured MSE

## 2023-11-08 NOTE — BH CONSULTATION LIAISON ASSESSMENT NOTE - MSE UNSTRUCTURED FT
On exam, she is AA O x 3, pleasant, though a bit dramatic. Endorses various aforementioned physical sx but also her psychosocial stressors. Says this Friday she was evicted from where she was staying with a friend who lost her job; she then took a train to a shelter but it was "a hassle." She tried to stay with an aunt but said "she is too Tenriism - I love God and everything but she is constantly praying." She notes that she has a psych hx of bipolar disorder and was in hospitals in the past, and says she needs to reconnect with psychiatry but only has an appointment somewhere in [facility] for March 2024 ("that's too far away"). She endorses current depressed mood in a vague manner tied to these psychosocial realities and endorses poor sleep from ruminating on these stressors, but denies hopelessness, SI, HI safety concerns, or AVH/psychosis. Throughout, of note, she presents herself as helpless but also help-rejecting.

## 2023-11-08 NOTE — CONSULT NOTE ADULT - ASSESSMENT
This is a 58F with history of CVA with L sided weakness/numbness/L gaze deviation, DM2 with b/l peripheral neuropathy, COPD/Asthma, Breast Ca s/p ?RT, Bipolar depression, Rheumatoid Arthritis, ?Antiphospholipid syndrome, L eye uveitis/scleritis, and current tobacco use who presents to the hospital with multiple complaints. Rheumatology consulted given her RA history.     #RA  - Established care with Dr. Teresa 10/2/23, prior was following in [place]   - Diagnosed in her 40s, reports pain, stiffness, and swelling of wrists, MPCs, PIPs, DIPs, elbows, shoulders, knees, ankles  - Currently on MTX 6 tabs weekly with folic acid, also on prednisone 10 mg daily for ?4-5 months. Not always adherent with her medications   - Hx of L eye anterior uveitis and scleritis, also has HF with EF 30-35%   - Reports tenderness on exam as above but no synovitis appreciated, ?an element of fibromyalgia     #Leukocytosis   - WBC 21 neutrophil predominant --> 15   - Received antibiotics in the ED but currently off them   - No fevers, chronic cough with clear phlegm, isolated episode of diarrhea yesterday, and no urinary symptoms  - CXR clear, pending blood cultures and U/A   - Overall low suspicion for infection     Recommendations:     INCOMPLETE PLEASE WAIT    Peyton Irwin MD  Rheumatology Fellow  Available on TEAMS   This is a 58F with history of CVA with L sided weakness/numbness/L gaze deviation, DM2 with b/l peripheral neuropathy, COPD/Asthma, Breast Ca s/p ?RT, Bipolar depression, Rheumatoid Arthritis, ?Antiphospholipid syndrome, L eye uveitis/scleritis, and current tobacco use who presents to the hospital with multiple complaints. Rheumatology consulted given her RA history.     #RA  - Established care with Dr. Teresa 10/2/23, prior was following in [place]   - Diagnosed in her 40s, reports pain, stiffness, and swelling of wrists, MPCs, PIPs, DIPs, elbows, shoulders, knees, ankles  - Currently on MTX 6 tabs weekly with folic acid, also on prednisone 10 mg daily for ?4-5 months. Not always adherent with her medications   - Hx of L eye anterior uveitis and scleritis, also has HF with EF 30-35%   - Reports tenderness on exam as above but no synovitis appreciated, low suspicion for RA flare   - ?an element of fibromyalgia     #Leukocytosis   - WBC 21 neutrophil predominant --> 15   - Received antibiotics in the ED but currently off them   - No fevers, chronic cough with clear phlegm, isolated episode of diarrhea yesterday, and no urinary symptoms  - CXR clear, pending blood cultures and U/A   - Overall low suspicion for infection     Recommendations:     INCOMPLETE PLEASE WAIT    Peyton Irwin MD  Rheumatology Fellow  Available on TEAMS   This is a 58F with history of CVA with L sided weakness/numbness/L gaze deviation, DM2 with b/l peripheral neuropathy, COPD/Asthma, Breast Ca s/p ?RT, Bipolar depression, Rheumatoid Arthritis, ?Antiphospholipid syndrome, L eye uveitis/scleritis, and current tobacco use who presents to the hospital with multiple complaints. Rheumatology consulted given her RA history.     #RA  - Established care with Dr. Teresa 10/2/23, prior was following in [place]   - Diagnosed in her 40s, reports pain, stiffness, and swelling of wrists, MPCs, PIPs, DIPs, elbows, shoulders, knees, ankles  - Currently on MTX 6 tabs weekly with folic acid, also on prednisone 10 mg daily for ?4-5 months. Not always adherent with her medications   - Hx of L eye anterior uveitis and scleritis, also has HF with EF 30-35%   - Reports tenderness on exam as above but no synovitis appreciated, low suspicion for RA flare   - ?an element of fibromyalgia     #Leukocytosis   - WBC 21 neutrophil predominant --> 15   - Received antibiotics in the ED but currently off them   - No fevers, chronic cough with clear phlegm, isolated episode of diarrhea yesterday, and no urinary symptoms  - CXR clear, pending blood cultures and U/A   - Overall low suspicion for infection     #CVA   - History of multiple CVAs   - Has RFs including DM, smoking, and malignancy   - CT scan of head negative for any acute findings, MRI pending  - Intracardiac shunt on TTE last admission  - Cardiology following     Recommendations:   - Can c/w MTX 6 pills weekly as long as remainder of infectious workup (U/A and blood cultures) is negative  - C/w folic acid 1 mg daily  - Can c/w prednisone 10 mg daily   - Please obtain RF, CCP, HLA-B27, centromere antibody, RNA polymerase III IgG, scleroderma antibodies, APS labs, GEORGE, C3, C4, ESR, CRP, CPK, dsDNA, ALLYSON, G6PD, U/A, urine protein/Cr, SSA/SSB, quantiferon, and hepatitis panel  - Can obtain x-rays hands/wrists, elbows, knees, and feet/ankles to evaluate for RA changes   - No contraindication to discharge from Rheumatology perspective (labs can be followed up outpatient)   - Pt should follow up with Dr. Teresa outpatient on discharge, depending on above workup can consider MTX, IL-6 inhibitor, or Rituximab as potential treatment options for RA     Case discussed with Dr. Yeny Irwin MD  Rheumatology Fellow  Available on TEAMS

## 2023-11-08 NOTE — CONSULT NOTE ADULT - ASSESSMENT
IMPRESSION:     RECOMMENDATIONS:       IMPRESSION: Exacerbation of Left-sided subjective weakness/numbness likely 2/2 recrudescence of prior Right BG infarct vs. new acute ischemic stroke although low suspicion at this time.     RECOMMENDATIONS:  [] Continue home Eliquis 5mg BID  [] Infectious work-up including UA, CXR, CBC, CMP, Mg, Phos  [] Check HbA1c and lipid panel   [] Continue home  58F R-handed PMHx acute ischemic strokes (Right BG infarct 7/2023 and another unknown infarct in 2022; residual Left-sided weakness/numbness), T2DM w/ peripheral neuropathy, COPD/Asthma, Breast Ca s/p ?RT, Bipolar depression, Rheumatoid Arthritis, Antiphospholipid syndrome, L eye uveitis/scleritis, and current tobacco use (7 cigarettes / day) who presents to the hospital with multiple complaints including worsening of Left-sided weakness and numbness. On exam, pt has full strength symmetrically on manual motor exam and decreased sensation of face, arm and leg (80%) per pt. Vitals stable. CBC notable for WBC 15.76 but pt on prednisone and CMP WNL. CTH w/o acute findings.     IMPRESSION: Exacerbation of Left-sided subjective weakness/numbness likely 2/2 recrudescence of prior Right BG infarct vs. less likely new acute ischemic stroke as small vessel disease mechanism strokes are known to have fluctuations in symptoms.    RECOMMENDATIONS:  [] No further neuroimaging indicated at this time.   [] Continue home Eliquis 5mg BID  [] Continue home Atorvastatin 80mg QHS  [] Check HbA1c (7.6) and lipid panel (LDL 41)  [] Infectious work-up including CBC, CMP, Mg, Phos, UA, CXR  [] Follow-up psych recs for mood changes  [] Neurochecks and vitals per routine  [] Rest of care per primary team    Case to be seen and d/w Neurology attending.

## 2023-11-08 NOTE — BH CONSULTATION LIAISON ASSESSMENT NOTE - SUMMARY
This is a 58F with history of CVA with L sided weakness/numbness/L gaze deviation, DM2 with b/l peripheral neuropathy, COPD/Asthma, Breast Ca s/p ?RT, Bipolar depression, Rheumatoid Arthritis, Antiphospholipid syndrome, L eye uveitis/scleritis, Newly diagnosed DM2 (pt cannot remember which medication she takes for it), and current tobacco use who presents to the hospital with multiple complaints. Psych consulted for depression. Pt known to us from a recent similar admission 8/2023 with similar physical and psychiatric complaints. She endorses subsyndromal depressive sx heavily confounded by psychosocial stressors. She presents helpless but also help-rejecting. Present and past admission likely informed by some degree of secondary gain given lack of financial/domestic stability.     Plan:   - Start Zyprexa 5mg HS (was previously prescribed this) for mood, anxiety, poor sleep; can be prescribed this x 30 days whenever discharged  - C/W Zyprexa 2.5 IM Q 12 PRN severe agitation as ordered   - Avoid benzodiazepines/controlled substances whenever possible  - Medical w/u as you are  - No indication for sitter/1:1; routine observation appropriate.   - SW consult given financial/domestic concerns.  - Referral can be given to [facility] including the [facility] ([phone]) though much depends on where she will reside after this  - Dispo: no role for inpt psychiatric admission; consider shelter/other referrals as indicated

## 2023-11-08 NOTE — CONSULT NOTE ADULT - SUBJECTIVE AND OBJECTIVE BOX
Neurology - Consult Note    Spectra: [phone] ([facility]), [phone] (J)    HPI:  This is a 58F with history of CVA with L sided weakness/numbness/L gaze deviation, DM2 with b/l peripheral neuropathy, COPD/Asthma, Breast Ca s/p ?RT, Bipolar depression, Rheumatoid Arthritis, Antiphospholipid syndrome, L eye uveitis/scleritis, Newly diagnosed DM2 (pt cannot remember which medication she takes for it), and current tobacco use who presents to the hospital with multiple complaints.     Patient states that her main issue is her symptoms from her prior stroke. States that she had her stroke in 2022 and was hospitalized at [facility] in [place]. Since then states that she has had chronic L sided weakness and chronic L sided numbness but recently (x months, especially over the past few days) her numbness and weakness seem to be worsening. She denies any new symptoms, just a worsening of her chronic symptoms. Also reports blurry vision and states that she has double vision at times (none currently).     Patient also reports significant exercise limitation 2/2 SOB. Said that she can only walk 1/2 of a hallway before she has to stop 2/2 SOB. No SOB at rest, no orthopnea but patient does report some LE edema. Also reports having daily, pressure like, L sided chest pain for some time now. Follows with a cardiologist but unclear on what work up she has had. Denies current chest pain, no palpitations, no syncope.     Also reports chronic wheezing. Denies cough but reports sputum production (white in color, no blood) that is unchanged in amount. Reports new rhinorrhea but no sore throat. Does report chills/diaphoresis but denies fevers.     Also reports recent abdominal pain with associated nausea. Also with 2 episodes of liquid diarrhea, nonbloody but reports seeing blood on her toilet paper when wiping, streaks and small amount as per patient. No melena.     Also reports feeling more depression recently 2/2 social stressors (daughter, , home situation, etc). Denies SI/HI, denies AVH. Does state that she used to be on lithium and depakote in the past and thinks she might need to restart those medications.     No other acute complaints.     On arrival to the ED, her vitals were T 97.8, P 87, /75, RR 16, O2 sat 98% RA. Her lab work was significant for leukocytosis but otherwise did not show any other significant abnormalities. She was given ceftriaxone 1g and azithromycin 500mg and was admitted to medicine on telemetry.  (07 Nov 2023 23:12)      Review of Systems:  INCOMPLETE   CONSTITUTIONAL: No fevers or chills  EYES AND ENT: No visual changes or no throat pain   NECK: No pain or stiffness  RESPIRATORY: No shortness of breath  CARDIOVASCULAR: No chest pain or palpitations  GASTROINTESTINAL: No nausea or vomiting   GENITOURINARY: No dysuria  NEUROLOGICAL: +As stated in HPI above  SKIN: No itching, burning, rashes, or lesions   All other review of systems is negative unless indicated above.    Allergies:  No Known Allergies      PMHx/PSHx/Family Hx: As above, otherwise see below   CVA (cerebrovascular accident)    Cigarette smoker    Rheumatoid arthritis    Other depression    Breast cancer    Migraines    History of multiple cerebrovascular accidents (CVAs)    Suicidal ideation    Paresthesia of left arm    Facial paresthesia    APS (antiphospholipid syndrome)        Social Hx:  Never smoker; no current use of tobacco, alcohol, or illicit drugs  Lives with ***; occupation ***, baseline functional status is ***    Medications:  MEDICATIONS  (STANDING):  apixaban 5 milliGRAM(s) Oral every 12 hours  aspirin enteric coated 81 milliGRAM(s) Oral daily  atorvastatin 80 milliGRAM(s) Oral at bedtime  budesonide 160 MICROgram(s)/formoterol 4.5 MICROgram(s) Inhaler 2 Puff(s) Inhalation two times a day  cholecalciferol 1000 Unit(s) Oral daily  dextrose 5%. 1000 milliLiter(s) (50 mL/Hr) IV Continuous <Continuous>  dextrose 5%. 1000 milliLiter(s) (100 mL/Hr) IV Continuous <Continuous>  dextrose 50% Injectable 25 Gram(s) IV Push once  dextrose 50% Injectable 25 Gram(s) IV Push once  dextrose 50% Injectable 12.5 Gram(s) IV Push once  folic acid 1 milliGRAM(s) Oral daily  gabapentin 300 milliGRAM(s) Oral three times a day  glucagon  Injectable 1 milliGRAM(s) IntraMuscular once  insulin lispro (ADMELOG) corrective regimen sliding scale   SubCutaneous at bedtime  insulin lispro (ADMELOG) corrective regimen sliding scale   SubCutaneous three times a day before meals  magnesium oxide 400 milliGRAM(s) Oral three times a day with meals  metoprolol succinate ER 25 milliGRAM(s) Oral daily  OLANZapine 5 milliGRAM(s) Oral at bedtime  pantoprazole    Tablet 40 milliGRAM(s) Oral before breakfast  predniSONE   Tablet 10 milliGRAM(s) Oral daily  spironolactone 25 milliGRAM(s) Oral daily  traZODone 50 milliGRAM(s) Oral at bedtime    MEDICATIONS  (PRN):  acetaminophen     Tablet .. 650 milliGRAM(s) Oral every 6 hours PRN Temp greater or equal to 38C (100.4F), Mild Pain (1 - 3)  albuterol/ipratropium for Nebulization 3 milliLiter(s) Nebulizer every 6 hours PRN Shortness of Breath and/or Wheezing  aluminum hydroxide/magnesium hydroxide/simethicone Suspension 30 milliLiter(s) Oral every 4 hours PRN Dyspepsia  dextrose Oral Gel 15 Gram(s) Oral once PRN Blood Glucose LESS THAN 70 milliGRAM(s)/deciliter  melatonin 3 milliGRAM(s) Oral at bedtime PRN Insomnia  OLANZapine Injectable 2.5 milliGRAM(s) IntraMuscular every 12 hours PRN agitation  ondansetron Injectable 4 milliGRAM(s) IV Push every 8 hours PRN Nausea and/or Vomiting      Vitals:  T(C): 36.6 (11-08-23 @ 09:54), Max: 37.1 (11-07-23 @ 23:41)  HR: 77 (11-08-23 @ 09:54) (68 - 87)  BP: 130/66 (11-08-23 @ 09:54) (106/82 - 134/75)  RR: 19 (11-08-23 @ 09:54) (14 - 20)  SpO2: 97% (11-08-23 @ 09:54) (97% - 98%)    Physical Examination: INCOMPLETE  General - non-toxic appearing male/female in no acute distress  Cardiovascular - peripheral pulses palpable, no edema  Respiratory - breathing comfortably with no increased work of breathing    Neurologic Exam:  Mental status - Awake, Alert, Oriented to person, place, and time. Speech fluent, repetition and naming intact. Follows simple and complex commands. Attention/concentration, recent and remote memory (including registration 3/3 and recall 3/3), and fund of knowledge intact    Cranial nerves - PERRLA (4mm -> 3mm b/l), VFF, EOMI, face sensation (V1-V3) intact b/l, facial strength intact without asymmetry b/l, hearing intact b/l, palate with symmetric elevation, trapezius OR sternocleidomastiod 5/5 strength b/l, tongue midline on protrusion with full lateral movement    Motor - Normal bulk and tone throughout. No pronator drift.    Strength testing            Deltoid      Biceps      Triceps     Wrist Extension    Wrist Flexion     Interossei         R            5                 5               5                     5                              5                        5                 5  L             5                 5               5                     5                              5                        5                 5              Hip Flexion    Hip Extension    Knee Flexion    Knee Extension    Dorsiflexion    Plantar Flexion  R              5                         5                       5                           5                            5                          5  L              5                         5                        5                           5                            5                          5    Sensation - Light touch/temperature OR pain/vibration intact throughout    DTR's -             Biceps      Triceps     Brachioradialis      Patellar    Ankle    Toes/plantar response  R             2+             2+                  2+                       2+            2+                 Down  L              2+             2+                 2+                        2+           2+                 Down    Coordination - Finger to Nose intact b/l. No tremors appreciated    Gait and station - Normal casual gait. Romberg (-)    Labs:                        13.2   15.76 )-----------( 294      ( 08 Nov 2023 06:43 )             41.4     11-08    139  |  103  |  13  ----------------------------<  96  3.5   |  19<L>  |  0.62    Ca    9.1      08 Nov 2023 06:43  Phos  3.5     11-08  Mg     1.30     11-08    TPro  6.8  /  Alb  4.1  /  TBili  1.2  /  DBili  x   /  AST  21  /  ALT  18  /  AlkPhos  111  11-08    CAPILLARY BLOOD GLUCOSE      POCT Blood Glucose.: 102 mg/dL (08 Nov 2023 08:41)    LIVER FUNCTIONS - ( 08 Nov 2023 06:43 )  Alb: 4.1 g/dL / Pro: 6.8 g/dL / ALK PHOS: 111 U/L / ALT: 18 U/L / AST: 21 U/L / GGT: x               CSF:                  Radiology:  CT Head No Cont:  (07 Nov 2023 21:19)     Neurology - Consult Note    Spectra: [phone] ([facility]), [phone] ([facility])    HPI:  58F R-handed PMHx acute ischemic strokes (Right BG infarct 7/2023 and another unknown infarct in 2022; residual Left-sided weakness/numbness), T2DM w/ peripheral neuropathy, COPD/Asthma, Breast Ca s/p ?RT, Bipolar depression, Rheumatoid Arthritis, Antiphospholipid syndrome, L eye uveitis/scleritis, and current tobacco use (7 cigarettes / day) who presents to the hospital with multiple complaints including worsening of Left-sided weakness and numbness. Pt states since Thursday, 11/2/2023, she started noticing worsening numbness without any triggering event. Pt thought symptoms would improve but persisted at a point which was worse than baseline therfore, visited ED. Of note, pt had her first stroke in 2022 at [facility] in [place]. Since then she has had chronic L sided weakness and chronic L sided numbness She denies any new symptoms, just a worsening of her chronic symptoms.      Review of Systems:  CONSTITUTIONAL: No fevers or chills  EYES AND ENT: No visual changes or no throat pain   NECK: No pain or stiffness  RESPIRATORY: No shortness of breath  CARDIOVASCULAR: No chest pain or palpitations  GASTROINTESTINAL: No nausea or vomiting   GENITOURINARY: No dysuria  NEUROLOGICAL: +As stated in HPI above  SKIN: No itching, burning, rashes, or lesions   All other review of systems is negative unless indicated above.    Allergies:  No Known Allergies      PMHx/PSHx/Family Hx: As above, otherwise see below   CVA (cerebrovascular accident)    Cigarette smoker    Rheumatoid arthritis    Other depression    Breast cancer    Migraines    History of multiple cerebrovascular accidents (CVAs)    Suicidal ideation    Paresthesia of left arm    Facial paresthesia    APS (antiphospholipid syndrome)        Social Hx:  Current smoker; current use of tobacco (7 cigarettes / day), no alcohol, or illicit drugs  Lives with aunt but does not have a permanent home currently.     Medications:  MEDICATIONS  (STANDING):  apixaban 5 milliGRAM(s) Oral every 12 hours  aspirin enteric coated 81 milliGRAM(s) Oral daily  atorvastatin 80 milliGRAM(s) Oral at bedtime  budesonide 160 MICROgram(s)/formoterol 4.5 MICROgram(s) Inhaler 2 Puff(s) Inhalation two times a day  cholecalciferol 1000 Unit(s) Oral daily  dextrose 5%. 1000 milliLiter(s) (50 mL/Hr) IV Continuous <Continuous>  dextrose 5%. 1000 milliLiter(s) (100 mL/Hr) IV Continuous <Continuous>  dextrose 50% Injectable 25 Gram(s) IV Push once  dextrose 50% Injectable 25 Gram(s) IV Push once  dextrose 50% Injectable 12.5 Gram(s) IV Push once  folic acid 1 milliGRAM(s) Oral daily  gabapentin 300 milliGRAM(s) Oral three times a day  glucagon  Injectable 1 milliGRAM(s) IntraMuscular once  insulin lispro (ADMELOG) corrective regimen sliding scale   SubCutaneous at bedtime  insulin lispro (ADMELOG) corrective regimen sliding scale   SubCutaneous three times a day before meals  magnesium oxide 400 milliGRAM(s) Oral three times a day with meals  metoprolol succinate ER 25 milliGRAM(s) Oral daily  OLANZapine 5 milliGRAM(s) Oral at bedtime  pantoprazole    Tablet 40 milliGRAM(s) Oral before breakfast  predniSONE   Tablet 10 milliGRAM(s) Oral daily  spironolactone 25 milliGRAM(s) Oral daily  traZODone 50 milliGRAM(s) Oral at bedtime    MEDICATIONS  (PRN):  acetaminophen     Tablet .. 650 milliGRAM(s) Oral every 6 hours PRN Temp greater or equal to 38C (100.4F), Mild Pain (1 - 3)  albuterol/ipratropium for Nebulization 3 milliLiter(s) Nebulizer every 6 hours PRN Shortness of Breath and/or Wheezing  aluminum hydroxide/magnesium hydroxide/simethicone Suspension 30 milliLiter(s) Oral every 4 hours PRN Dyspepsia  dextrose Oral Gel 15 Gram(s) Oral once PRN Blood Glucose LESS THAN 70 milliGRAM(s)/deciliter  melatonin 3 milliGRAM(s) Oral at bedtime PRN Insomnia  OLANZapine Injectable 2.5 milliGRAM(s) IntraMuscular every 12 hours PRN agitation  ondansetron Injectable 4 milliGRAM(s) IV Push every 8 hours PRN Nausea and/or Vomiting      Vitals:  T(C): 36.6 (11-08-23 @ 09:54), Max: 37.1 (11-07-23 @ 23:41)  HR: 77 (11-08-23 @ 09:54) (68 - 87)  BP: 130/66 (11-08-23 @ 09:54) (106/82 - 134/75)  RR: 19 (11-08-23 @ 09:54) (14 - 20)  SpO2: 97% (11-08-23 @ 09:54) (97% - 98%)    Physical Examination: INCOMPLETE  General - non-toxic appearing male/female in no acute distress  Cardiovascular - peripheral pulses palpable, no edema  Respiratory - breathing comfortably with no increased work of breathing    Neurologic Exam:  Mental status - Awake, Alert, Oriented to person, place, and time. Speech fluent, repetition and naming intact. Follows simple and complex commands. Attention/concentration, recent and remote memory (including registration 3/3 and recall 3/3), and fund of knowledge intact    Cranial nerves - PERRLA (4mm -> 3mm b/l), VFF, EOMI, face sensation (V1-V3) intact b/l, facial strength intact without asymmetry b/l, hearing intact b/l, palate with symmetric elevation, trapezius OR sternocleidomastiod 5/5 strength b/l, tongue midline on protrusion with full lateral movement    Motor - Normal bulk and tone throughout. No pronator drift.    Strength testing            Deltoid      Biceps      Triceps     Wrist Extension    Wrist Flexion     Interossei         R            5                 5               5                     5                              5                        5                 5  L             5                 5               5                     5                              5                        5                 5              Hip Flexion    Hip Extension    Knee Flexion    Knee Extension    Dorsiflexion    Plantar Flexion  R              5                         5                       5                           5                            5                          5  L              5                         5                        5                           5                            5                          5    Sensation - Light touch/temperature OR pain/vibration intact throughout    DTR's -             Biceps      Triceps     Brachioradialis      Patellar    Ankle    Toes/plantar response  R             2+             2+                  2+                       2+            2+                 Down  L              2+             2+                 2+                        2+           2+                 Down    Coordination - Finger to Nose intact b/l. No tremors appreciated    Gait and station - Normal casual gait. Romberg (-)    Labs:                        13.2   15.76 )-----------( 294      ( 08 Nov 2023 06:43 )             41.4     11-08    139  |  103  |  13  ----------------------------<  96  3.5   |  19<L>  |  0.62    Ca    9.1      08 Nov 2023 06:43  Phos  3.5     11-08  Mg     1.30     11-08    TPro  6.8  /  Alb  4.1  /  TBili  1.2  /  DBili  x   /  AST  21  /  ALT  18  /  AlkPhos  111  11-08    CAPILLARY BLOOD GLUCOSE      POCT Blood Glucose.: 102 mg/dL (08 Nov 2023 08:41)    LIVER FUNCTIONS - ( 08 Nov 2023 06:43 )  Alb: 4.1 g/dL / Pro: 6.8 g/dL / ALK PHOS: 111 U/L / ALT: 18 U/L / AST: 21 U/L / GGT: x               CSF:                  Radiology:  CT Head No Cont:  (07 Nov 2023 21:19)     Neurology - Consult Note    Spectra: [phone] ([facility]), [phone] ([facility])    HPI:  58F R-handed PMHx acute ischemic strokes (Right BG infarct 7/2023 and another unknown infarct in 2022; residual Left-sided weakness/numbness), T2DM w/ peripheral neuropathy, COPD/Asthma, Breast Ca s/p ?RT, Bipolar depression, Rheumatoid Arthritis, Antiphospholipid syndrome, L eye uveitis/scleritis, and current tobacco use (7 cigarettes / day) who presents to the hospital with multiple complaints including worsening of Left-sided weakness and numbness. Pt states since Thursday, 11/2/2023, she started noticing worsening numbness without any triggering event. Pt thought symptoms would improve but persisted at a point which was worse than baseline therefore visited ED. Of note, pt had her first stroke in 2022 at [facility] in [place]. Since then she has had chronic L sided weakness and chronic L sided numbness She denies any new symptoms, just a worsening of her chronic symptoms.      Review of Systems:  CONSTITUTIONAL: No fevers or chills  EYES AND ENT: No visual changes or no throat pain   NECK: No pain or stiffness  RESPIRATORY: No shortness of breath  CARDIOVASCULAR: No chest pain or palpitations  GASTROINTESTINAL: No nausea or vomiting   NEUROLOGICAL: +As stated in HPI above  SKIN: No itching, burning, rashes, or lesions   All other review of systems is negative unless indicated above.    Allergies:  No Known Allergies      PMHx/PSHx/Family Hx: As above, otherwise see below   CVA (cerebrovascular accident)    Cigarette smoker    Rheumatoid arthritis    Other depression    Breast cancer    Migraines    History of multiple cerebrovascular accidents (CVAs)    Suicidal ideation    Paresthesia of left arm    Facial paresthesia    APS (antiphospholipid syndrome)        Social Hx:  Current smoker; current use of tobacco (7 cigarettes / day), no alcohol, or illicit drugs  Lives with aunt but does not have a permanent home currently.     Medications:  MEDICATIONS  (STANDING):  apixaban 5 milliGRAM(s) Oral every 12 hours  aspirin enteric coated 81 milliGRAM(s) Oral daily  atorvastatin 80 milliGRAM(s) Oral at bedtime  budesonide 160 MICROgram(s)/formoterol 4.5 MICROgram(s) Inhaler 2 Puff(s) Inhalation two times a day  cholecalciferol 1000 Unit(s) Oral daily  dextrose 5%. 1000 milliLiter(s) (50 mL/Hr) IV Continuous <Continuous>  dextrose 5%. 1000 milliLiter(s) (100 mL/Hr) IV Continuous <Continuous>  dextrose 50% Injectable 25 Gram(s) IV Push once  dextrose 50% Injectable 25 Gram(s) IV Push once  dextrose 50% Injectable 12.5 Gram(s) IV Push once  folic acid 1 milliGRAM(s) Oral daily  gabapentin 300 milliGRAM(s) Oral three times a day  glucagon  Injectable 1 milliGRAM(s) IntraMuscular once  insulin lispro (ADMELOG) corrective regimen sliding scale   SubCutaneous at bedtime  insulin lispro (ADMELOG) corrective regimen sliding scale   SubCutaneous three times a day before meals  magnesium oxide 400 milliGRAM(s) Oral three times a day with meals  metoprolol succinate ER 25 milliGRAM(s) Oral daily  OLANZapine 5 milliGRAM(s) Oral at bedtime  pantoprazole    Tablet 40 milliGRAM(s) Oral before breakfast  predniSONE   Tablet 10 milliGRAM(s) Oral daily  spironolactone 25 milliGRAM(s) Oral daily  traZODone 50 milliGRAM(s) Oral at bedtime    MEDICATIONS  (PRN):  acetaminophen     Tablet .. 650 milliGRAM(s) Oral every 6 hours PRN Temp greater or equal to 38C (100.4F), Mild Pain (1 - 3)  albuterol/ipratropium for Nebulization 3 milliLiter(s) Nebulizer every 6 hours PRN Shortness of Breath and/or Wheezing  aluminum hydroxide/magnesium hydroxide/simethicone Suspension 30 milliLiter(s) Oral every 4 hours PRN Dyspepsia  dextrose Oral Gel 15 Gram(s) Oral once PRN Blood Glucose LESS THAN 70 milliGRAM(s)/deciliter  melatonin 3 milliGRAM(s) Oral at bedtime PRN Insomnia  OLANZapine Injectable 2.5 milliGRAM(s) IntraMuscular every 12 hours PRN agitation  ondansetron Injectable 4 milliGRAM(s) IV Push every 8 hours PRN Nausea and/or Vomiting    Vitals:  T(C): 36.6 (11-08-23 @ 09:54), Max: 37.1 (11-07-23 @ 23:41)  HR: 77 (11-08-23 @ 09:54) (68 - 87)  BP: 130/66 (11-08-23 @ 09:54) (106/82 - 134/75)  RR: 19 (11-08-23 @ 09:54) (14 - 20)  SpO2: 97% (11-08-23 @ 09:54) (97% - 98%)    Physical Examination:  General - non-toxic appearing female in mild distress  Cardiovascular - peripheral pulses palpable, no edema  Respiratory - breathing comfortably with no increased work of breathing    Neurologic Exam:  Mental status - Awake, Alert, Oriented to person, place, and time. Speech fluent, repetition and naming intact. Follows simple and complex commands. Attention/concentration and fund of knowledge intact    Cranial nerves - PERRL (4mm -> 3mm b/l), VFF, EOMI (Left eye exotropic but returns to midline upon primary gaze), face sensation (V1-V3) decreased in all 3 distributions on Left-side, facial strength intact without asymmetry b/l, hearing intact b/l, palate with symmetric elevation, trapezius 5/5 strength b/l, tongue midline on protrusion with full lateral movement    Motor - Normal bulk and tone throughout. No pronator drift.    Strength testing            Deltoid      Biceps      Triceps     Wrist Extension    Wrist Flexion     Interossei         R            5              5               5               5                      5                        5                 5  L             5              5               5               5                      5                        5                 5              Hip Flexion    Hip Extension    Knee Flexion    Knee Extension    Dorsiflexion    Plantar Flexion  R              5                 5                       5                     5                            5                          5  L              5                 5                        5                    5                            5                          5    Sensation - Decreased sensation of Left arm and leg, otherwise, intact.     DTR's -             Biceps      Triceps     Brachioradialis      Patellar    Ankle    Toes/plantar response  R             1+           1+                 1+                 2+           2+                neutral  L              1+           1+                 1+                2+           2+                 up    Coordination - Finger to Nose intact b/l. No tremors appreciated    Gait and station - pt did not want to walk at the time of interview    Labs:                        13.2   15.76 )-----------( 294      ( 08 Nov 2023 06:43 )             41.4     11-08    139  |  103  |  13  ----------------------------<  96  3.5   |  19<L>  |  0.62    Ca    9.1      08 Nov 2023 06:43  Phos  3.5     11-08  Mg     1.30     11-08    TPro  6.8  /  Alb  4.1  /  TBili  1.2  /  DBili  x   /  AST  21  /  ALT  18  /  AlkPhos  111  11-08    CAPILLARY BLOOD GLUCOSE  POCT Blood Glucose.: 102 mg/dL (08 Nov 2023 08:41)    LIVER FUNCTIONS - ( 08 Nov 2023 06:43 )  Alb: 4.1 g/dL / Pro: 6.8 g/dL / ALK PHOS: 111 U/L / ALT: 18 U/L / AST: 21 U/L / GGT: x           Radiology:  CT Head No Cont:  (07 Nov 2023 21:19)  < from: CT Head No Cont (11.07.23 @ 21:19) >  FINDINGS:    There is no acute intracranial mass-effect, hemorrhage, midline shift, or   abnormal extra-axial fluid collection. Gray-white differentiation is   maintained.    Tiny chronic right basal ganglia lacunar infarction again noted.    Ventricles, sulci, and cisterns are normal in size for thepatient's age   without hydrocephalus. Basal cisterns are patent.    Visualized paranasal sinuses and mastoid air cells are clear. Calvarium   is intact.    IMPRESSION:    No acute intracranial bleeding.    < end of copied text >

## 2023-11-08 NOTE — BH CONSULTATION LIAISON ASSESSMENT NOTE - CURRENT MEDICATION
MEDICATIONS  (STANDING):  apixaban 5 milliGRAM(s) Oral every 12 hours  aspirin enteric coated 81 milliGRAM(s) Oral daily  atorvastatin 80 milliGRAM(s) Oral at bedtime  budesonide 160 MICROgram(s)/formoterol 4.5 MICROgram(s) Inhaler 2 Puff(s) Inhalation two times a day  cholecalciferol 1000 Unit(s) Oral daily  dextrose 5%. 1000 milliLiter(s) (50 mL/Hr) IV Continuous <Continuous>  dextrose 5%. 1000 milliLiter(s) (100 mL/Hr) IV Continuous <Continuous>  dextrose 50% Injectable 25 Gram(s) IV Push once  dextrose 50% Injectable 12.5 Gram(s) IV Push once  dextrose 50% Injectable 25 Gram(s) IV Push once  folic acid 1 milliGRAM(s) Oral daily  gabapentin 300 milliGRAM(s) Oral three times a day  glucagon  Injectable 1 milliGRAM(s) IntraMuscular once  insulin lispro (ADMELOG) corrective regimen sliding scale   SubCutaneous at bedtime  insulin lispro (ADMELOG) corrective regimen sliding scale   SubCutaneous three times a day before meals  magnesium oxide 400 milliGRAM(s) Oral three times a day with meals  metoprolol succinate ER 25 milliGRAM(s) Oral daily  OLANZapine 5 milliGRAM(s) Oral at bedtime  pantoprazole    Tablet 40 milliGRAM(s) Oral before breakfast  predniSONE   Tablet 10 milliGRAM(s) Oral daily  spironolactone 25 milliGRAM(s) Oral daily  traZODone 50 milliGRAM(s) Oral at bedtime    MEDICATIONS  (PRN):  acetaminophen     Tablet .. 650 milliGRAM(s) Oral every 6 hours PRN Temp greater or equal to 38C (100.4F), Mild Pain (1 - 3)  albuterol/ipratropium for Nebulization 3 milliLiter(s) Nebulizer every 6 hours PRN Shortness of Breath and/or Wheezing  aluminum hydroxide/magnesium hydroxide/simethicone Suspension 30 milliLiter(s) Oral every 4 hours PRN Dyspepsia  dextrose Oral Gel 15 Gram(s) Oral once PRN Blood Glucose LESS THAN 70 milliGRAM(s)/deciliter  melatonin 3 milliGRAM(s) Oral at bedtime PRN Insomnia  OLANZapine Injectable 2.5 milliGRAM(s) IntraMuscular every 12 hours PRN agitation  ondansetron Injectable 4 milliGRAM(s) IV Push every 8 hours PRN Nausea and/or Vomiting

## 2023-11-08 NOTE — CONSULT NOTE ADULT - ATTENDING COMMENTS
as above    Dr. Magali Saini  Rheumatology Attending  [phone]  [email or web address]
58F R-handed PMHx acute ischemic strokes (Right BG infarct 7/2023 and another unknown infarct in 2022; residual Left-sided weakness/numbness), T2DM w/ peripheral neuropathy, COPD/Asthma, Breast Ca s/p ?RT, Bipolar depression, Rheumatoid Arthritis, Antiphospholipid syndrome, L eye uveitis/scleritis, and current tobacco use (7 cigarettes / day) who presents to the hospital with multiple complaints including worsening of Left-sided weakness and numbness.   On exam, pt has full strength symmetrically on manual motor exam and decreased sensation of face, arm and leg (80%) per pt. LUE is clumsy compared to R.   L eye esotropia   Vitals stable. CBC notable for WBC 15.76 but pt on prednisone and CMP WNL.   CTH w/o acute findings.   TTE with EF 30%, mod severe AR  ILR interrogated w/o events    IMPRESSION: Exacerbation of Left-sided subjective weakness/numbness likely 2/2 recrudescence of prior Right BG infarct, doubt new stroke but given worsening of sx can rule out if persists    RECOMMENDATIONS:  [] MRI brain unlikely to   [] Was on Lovenox -> changed to Eliquis for APLS? Sees Dr. Driver outpt; would have her f/u with Hematology.   [] Continue home Atorvastatin 80mg QHS  [] Check HbA1c (7.6) and lipid panel (LDL 41)  [] Cardiology following, LESLEY planned. Would also r/o LV thrombus   [] Consider Optho consult for blurry vision  [] MTX, steroids per Rheumatology   [] Smoking cessation counseling, nicotine patch  [] Rest of care per primary team

## 2023-11-08 NOTE — CONSULT NOTE ADULT - ASSESSMENT
58F with history of CVA with L sided weakness/numbness/L gaze deviation, DM2 with b/l peripheral neuropathy, COPD/Asthma, Breast Ca s/p ?RT, Bipolar depression, Rheumatoid Arthritis, Antiphospholipid syndrome, L eye uveitis/scleritis, Newly diagnosed DM2 (pt cannot remember which medication she takes for it), and current tobacco use who presents to the hospital with multiple complaints.     1. LV dysfunction  -new mod-severe segmental LV dysfunction in July has not had ischemic workup due to stroke at that time. if no stroke this admission will do LHC  -c/w metoprolol and aldactone  -TTE pending     2. CVA  -history of multiple CVAs and is on eliquis   -CT scan of head negative for any acute findings  -MRI pending  -has ILR placement --obtain interrogation  -intracardiac shunt on TTE last admission, if no afib on ILR will consider LESLEY    3. Antiphospholipid syndrome  -on eliquis 5mg BID

## 2023-11-08 NOTE — CHART NOTE - NSCHARTNOTEFT_GEN_A_CORE
Ophthalmology was consulted for blurry vision. The patient was seen at bedside at 10:30am on 11/8 and stated that she has had chronic blurry vision but denies any acute changes in vision. The patient declined ophthalmological exam at this time, stating that she follows with Dr. Goldberg, and will make apt with her once discharged from the hospital. She denies any current photosensitivity, pain, or new blurry vision. pt last saw Dr. Naomi Goldberg on 9/6/23. At that time VA was 20/40 OD and 20/60-1 OS, IOP 10 OU, PERR no RAPD. She had alternating exotropia, white and quiet OD, minimal scleral injection OS with nasal SM/thinning. Cornea was clear OU. AC was deep and quiet OU with resolved cell. Lens was clear OD with small cat OS. Per Dr. Goldberg's note, patient was on prednisone PO 10mg/day and methotrexate 15. Please continue methotrexate per rheumatology as well as patient's home PO steroids dose. Ophthalmology was consulted for blurry vision. The patient was seen at bedside at 10:30am on 11/8 and stated that she has had chronic blurry vision but denies any acute changes in vision. The patient declined ophthalmological exam at this time, stating that she follows with Dr. Goldberg, and will make apt with her once discharged from the hospital. She denies any current photosensitivity, pain, or new blurry vision. pt last saw Dr. Naomi Goldberg on 9/6/23. At that time VA was 20/40 OD and 20/60-1 OS, IOP 10 OU, PERR no RAPD. She had alternating exotropia, white and quiet OD, minimal scleral injection OS with nasal SM/thinning. Cornea was clear OU. AC was deep and quiet OU with resolved cell. Lens was clear OD with small cat OS. Per Dr. Goldberg's note, patient was on prednisone PO 10mg/day and methotrexate 15. Please continue methotrexate per rheumatology as well as patient's home PO steroids dose. Please consult ophthalmology if patient experiences any acute changes in vision.    Katie Murrell MD  PGY3 ophthalmology Resident  [facility]

## 2023-11-08 NOTE — CONSULT NOTE ADULT - SUBJECTIVE AND OBJECTIVE BOX
BRENDEN DE LA CRUZ  [id number]    HISTORY OF PRESENT ILLNESS:   This is a 58F with history of CVA with L sided weakness/numbness/L gaze deviation, DM2 with b/l peripheral neuropathy, COPD/Asthma, Breast Ca s/p ?RT, Bipolar depression, Rheumatoid Arthritis, Antiphospholipid syndrome, L eye uveitis/scleritis, Newly diagnosed DM2 (pt cannot remember which medication she takes for it), and current tobacco use who presents to the hospital with multiple complaints.     Patient states that her main issue is her symptoms from her prior stroke. States that she had her stroke in 2022 and was hospitalized at [facility] in [place]. Since then states that she has had chronic L sided weakness and chronic L sided numbness but recently (x months, especially over the past few days) her numbness and weakness seem to be worsening. She denies any new symptoms, just a worsening of her chronic symptoms. Also reports blurry vision and states that she has double vision at times (none currently).     Patient also reports significant exercise limitation 2/2 SOB. Said that she can only walk 1/2 of a hallway before she has to stop 2/2 SOB. No SOB at rest, no orthopnea but patient does report some LE edema. Also reports having daily, pressure like, L sided chest pain for some time now. Follows with a cardiologist but unclear on what work up she has had. Denies current chest pain, no palpitations, no syncope.     Also reports chronic wheezing. Denies cough but reports sputum production (white in color, no blood) that is unchanged in amount. Reports new rhinorrhea but no sore throat. Does report chills/diaphoresis but denies fevers.     Also reports recent abdominal pain with associated nausea. Also with 2 episodes of liquid diarrhea, nonbloody but reports seeing blood on her toilet paper when wiping, streaks and small amount as per patient. No melena.     Also reports feeling more depression recently 2/2 social stressors (daughter, , home situation, etc). Denies SI/HI, denies AVH. Does state that she used to be on lithium and depakote in the past and thinks she might need to restart those medications.     No other acute complaints.     On arrival to the ED, her vitals were T 97.8, P 87, /75, RR 16, O2 sat 98% RA. Her lab work was significant for leukocytosis but otherwise did not show any other significant abnormalities. She was given ceftriaxone 1g and azithromycin 500mg and was admitted to medicine on telemetry.  (07 Nov 2023 23:12)    Rheumatology consulted given RA history on MTX.     Saw Rheumatology Dr. Teresa for an initial visit on 10/2/23. Reported being diagnosed with RA in her 40s. Presented with pain, stiffness, swelling of wrists, MPCs, PIPs, DIPs, elbows, shoulders, knees, ankles. Pt taking is MTX 6 tabs weekly w folic acid mg daily. Pt also taking prednisone 10mg daily. Pt says this has not helped. Pt takes OTC medication like tylenol, as her current regimen does not help. Reported swelling, but there was no synovitis on exam. Has a history of L eye anterior uveitis, scleritis along with CVA. Her respiratory issue was thought to be in the setting of COPD given hx of smoking. Unclear if MTX is the best option though given possibility of lung toxicity. The possibility of a TNF inhibitor was mentioned. The labs ordered during that visit were never done.     Pt reports pain and occasional swelling and stiffness of wrists, MPCs, PIPs, DIPs, elbows, shoulders, knees, and ankles. Was following with a rheumatologist in [place] but moved to [place] 6 months ago. Was on Remicade in the past but states that she had problems with follow up and adherence to medications. Has been on MTX 6 pills q1 week and prednisone 10 mg daily. Reports occasionally forgetting to take her MTX dose.     Denies fevers, rash, hair loss, ulcers, epistaxis, sinusitis, swollen glands, sicca symptoms, or weight loss  Endorses chills, depression, history of Raynaud's with purple discoloration of fingers, toes, worsening chronic L-sided weakness and numbness, chronic KIRK, chronic L-sided chest pain, and abdominal pain with 2 episodes of diarrhea yesterday     Reports one miscarriage early on during a pregnancy, denies any history of pre-eclampsia     MEDICATIONS  (STANDING):  apixaban 5 milliGRAM(s) Oral every 12 hours  aspirin enteric coated 81 milliGRAM(s) Oral daily  atorvastatin 80 milliGRAM(s) Oral at bedtime  budesonide 160 MICROgram(s)/formoterol 4.5 MICROgram(s) Inhaler 2 Puff(s) Inhalation two times a day  cholecalciferol 1000 Unit(s) Oral daily  dextrose 5%. 1000 milliLiter(s) (100 mL/Hr) IV Continuous <Continuous>  dextrose 5%. 1000 milliLiter(s) (50 mL/Hr) IV Continuous <Continuous>  dextrose 50% Injectable 25 Gram(s) IV Push once  dextrose 50% Injectable 25 Gram(s) IV Push once  dextrose 50% Injectable 12.5 Gram(s) IV Push once  folic acid 1 milliGRAM(s) Oral daily  gabapentin 300 milliGRAM(s) Oral three times a day  glucagon  Injectable 1 milliGRAM(s) IntraMuscular once  insulin lispro (ADMELOG) corrective regimen sliding scale   SubCutaneous three times a day before meals  insulin lispro (ADMELOG) corrective regimen sliding scale   SubCutaneous at bedtime  magnesium oxide 400 milliGRAM(s) Oral three times a day with meals  metoprolol succinate ER 25 milliGRAM(s) Oral daily  nicotine - Inhaler 1 Each Inhalation every 4 hours  OLANZapine 5 milliGRAM(s) Oral at bedtime  pantoprazole    Tablet 40 milliGRAM(s) Oral before breakfast  predniSONE   Tablet 10 milliGRAM(s) Oral daily  spironolactone 25 milliGRAM(s) Oral daily  traZODone 50 milliGRAM(s) Oral at bedtime    MEDICATIONS  (PRN):  acetaminophen     Tablet .. 650 milliGRAM(s) Oral every 6 hours PRN Temp greater or equal to 38C (100.4F), Mild Pain (1 - 3)  albuterol/ipratropium for Nebulization 3 milliLiter(s) Nebulizer every 6 hours PRN Shortness of Breath and/or Wheezing  aluminum hydroxide/magnesium hydroxide/simethicone Suspension 30 milliLiter(s) Oral every 4 hours PRN Dyspepsia  dextrose Oral Gel 15 Gram(s) Oral once PRN Blood Glucose LESS THAN 70 milliGRAM(s)/deciliter  melatonin 3 milliGRAM(s) Oral at bedtime PRN Insomnia  OLANZapine Injectable 2.5 milliGRAM(s) IntraMuscular every 12 hours PRN agitation  ondansetron Injectable 4 milliGRAM(s) IV Push every 8 hours PRN Nausea and/or Vomiting      Allergies    No Known Allergies    Intolerances        PERTINENT MEDICATION HISTORY: refer to HPI     SOCIAL HISTORY: homeless, current smoker, no alcohol, occasional weed   OCCUPATION: unemployed   TRAVEL HISTORY: no recent travel     FAMILY HISTORY:  Family history of breast cancer (Mother)    Family history of diabetes mellitus (DM) (Father)        Vital Signs Last 24 Hrs  T(C): 36.6 (08 Nov 2023 13:00), Max: 37.1 (07 Nov 2023 23:41)  T(F): 97.9 (08 Nov 2023 13:00), Max: 98.7 (07 Nov 2023 23:41)  HR: 69 (08 Nov 2023 13:00) (68 - 87)  BP: 103/58 (08 Nov 2023 13:00) (103/58 - 134/75)  BP(mean): --  RR: 19 (08 Nov 2023 13:00) (14 - 20)  SpO2: 98% (08 Nov 2023 13:00) (97% - 98%)    Parameters below as of 08 Nov 2023 13:00  Patient On (Oxygen Delivery Method): room air        Physical Exam:  General: No apparent distress  HEENT: L eye with L-sided gaze deviation, EOMI, PERRL, conjunctiva and sclera clear   CVS: +S1/S2, RRR, no murmurs/rubs/gallops  Resp: diffuse wheezing   GI: Soft, NT/ND +BS  MSK: tenderness to palpation of b/l wrists, MCPs, PIPs, DIPs, elbows, shoulders, knees, and ankles, along with muscles of UE and LE, but no swelling/warmth/erythema of the joints of the UE/LE  Neuro: AAOx3, 4+/5 LUE and LLE strength, otherwise 5/5 strength throughout   Skin: no visible rashes      LABS:                        13.2   15.76 )-----------( 294      ( 08 Nov 2023 06:43 )             41.4     11-08    139  |  103  |  13  ----------------------------<  96  3.5   |  19<L>  |  0.62    Ca    9.1      08 Nov 2023 06:43  Phos  3.5     11-08  Mg     1.30     11-08    TPro  6.8  /  Alb  4.1  /  TBili  1.2  /  DBili  x   /  AST  21  /  ALT  18  /  AlkPhos  111  11-08      Urinalysis Basic - ( 08 Nov 2023 06:43 )    Color: x / Appearance: x / SG: x / pH: x  Gluc: 96 mg/dL / Ketone: x  / Bili: x / Urobili: x   Blood: x / Protein: x / Nitrite: x   Leuk Esterase: x / RBC: x / WBC x   Sq Epi: x / Non Sq Epi: x / Bacteria: x      < from: Xray Chest 2 Views PA/Lat (11.07.23 @ 20:49) >  IMPRESSION:  Clear lungs.    --- End of Report ---    < end of copied text >

## 2023-11-08 NOTE — CONSULT NOTE ADULT - SUBJECTIVE AND OBJECTIVE BOX
German Carrasco MD  Interventional Cardiology / Endovascular Specialist  [place] Office : [address] [place] [id number] Tel:   [place] Office : [address] [facility] [place] [address]  Tel: [phone]    HISTORY OF PRESENTING ILLNESS:  This is a 58F with history of CVA with L sided weakness/numbness/L gaze deviation, DM2 with b/l peripheral neuropathy, COPD/Asthma, Breast Ca s/p ?RT, Bipolar depression, Rheumatoid Arthritis, Antiphospholipid syndrome, L eye uveitis/scleritis, Newly diagnosed DM2 (pt cannot remember which medication she takes for it), and current tobacco use who presents to the hospital with multiple complaints.     Patient states that her main issue is her symptoms from her prior stroke. States that she had her stroke in 2022 and was hospitalized at [facility] in [place]. Since then states that she has had chronic L sided weakness and chronic L sided numbness but recently (x months, especially over the past few days) her numbness and weakness seem to be worsening. She denies any new symptoms, just a worsening of her chronic symptoms. Also reports blurry vision and states that she has double vision at times (none currently).     Patient also reports significant exercise limitation 2/2 SOB. Said that she can only walk 1/2 of a hallway before she has to stop 2/2 SOB. No SOB at rest, no orthopnea but patient does report some LE edema. Also reports having daily, pressure like, L sided chest pain for some time now. Follows with a cardiologist but unclear on what work up she has had. Denies current chest pain, no palpitations, no syncope.     Also reports chronic wheezing. Denies cough but reports sputum production (white in color, no blood) that is unchanged in amount. Reports new rhinorrhea but no sore throat. Does report chills/diaphoresis but denies fevers.     Also reports recent abdominal pain with associated nausea. Also with 2 episodes of liquid diarrhea, nonbloody but reports seeing blood on her toilet paper when wiping, streaks and small amount as per patient. No melena.       	  MEDICATIONS:  apixaban 5 milliGRAM(s) Oral every 12 hours  aspirin enteric coated 81 milliGRAM(s) Oral daily  metoprolol succinate ER 25 milliGRAM(s) Oral daily  spironolactone 25 milliGRAM(s) Oral daily      albuterol/ipratropium for Nebulization 3 milliLiter(s) Nebulizer every 6 hours PRN  budesonide 160 MICROgram(s)/formoterol 4.5 MICROgram(s) Inhaler 2 Puff(s) Inhalation two times a day    acetaminophen     Tablet .. 650 milliGRAM(s) Oral every 6 hours PRN  gabapentin 300 milliGRAM(s) Oral three times a day  melatonin 3 milliGRAM(s) Oral at bedtime PRN  OLANZapine 5 milliGRAM(s) Oral at bedtime  OLANZapine Injectable 2.5 milliGRAM(s) IntraMuscular every 12 hours PRN  ondansetron Injectable 4 milliGRAM(s) IV Push every 8 hours PRN  traZODone 50 milliGRAM(s) Oral at bedtime    aluminum hydroxide/magnesium hydroxide/simethicone Suspension 30 milliLiter(s) Oral every 4 hours PRN  pantoprazole    Tablet 40 milliGRAM(s) Oral before breakfast    atorvastatin 80 milliGRAM(s) Oral at bedtime  dextrose 50% Injectable 25 Gram(s) IV Push once  dextrose 50% Injectable 25 Gram(s) IV Push once  dextrose 50% Injectable 12.5 Gram(s) IV Push once  dextrose Oral Gel 15 Gram(s) Oral once PRN  glucagon  Injectable 1 milliGRAM(s) IntraMuscular once  insulin lispro (ADMELOG) corrective regimen sliding scale   SubCutaneous at bedtime  insulin lispro (ADMELOG) corrective regimen sliding scale   SubCutaneous three times a day before meals  predniSONE   Tablet 10 milliGRAM(s) Oral daily    cholecalciferol 1000 Unit(s) Oral daily  dextrose 5%. 1000 milliLiter(s) IV Continuous <Continuous>  dextrose 5%. 1000 milliLiter(s) IV Continuous <Continuous>  folic acid 1 milliGRAM(s) Oral daily  magnesium oxide 400 milliGRAM(s) Oral three times a day with meals      PAST MEDICAL/SURGICAL HISTORY  PAST MEDICAL & SURGICAL HISTORY:  Cigarette smoker      Rheumatoid arthritis      Other depression      Breast cancer      Migraines      History of multiple cerebrovascular accidents (CVAs)      Suicidal ideation      Paresthesia of left arm      Facial paresthesia      APS (antiphospholipid syndrome)      History of hysterectomy      History of cholecystectomy          SOCIAL HISTORY: Substance Use (street drugs): ( x ) never used  (  ) other:    FAMILY HISTORY:  Family history of breast cancer (Mother)    Family history of diabetes mellitus (DM) (Father)        REVIEW OF SYSTEMS:  CONSTITUTIONAL: No fever, weight loss, or fatigue  EYES: No eye pain, visual disturbances, or discharge  ENMT:  No difficulty hearing, tinnitus, vertigo; No sinus or throat pain  BREASTS: No pain, masses, or nipple discharge  GASTROINTESTINAL: No abdominal or epigastric pain. No nausea, vomiting, or hematemesis; No diarrhea or constipation. No melena or hematochezia.  GENITOURINARY: No dysuria, frequency, hematuria, or incontinence  NEUROLOGICAL: No headaches, memory loss, loss of strength, numbness, or tremors  ENDOCRINE: No heat or cold intolerance; No hair loss  MUSCULOSKELETAL: No joint pain or swelling; No muscle, back, or extremity pain  PSYCHIATRIC: No depression, anxiety, mood swings, or difficulty sleeping  HEME/LYMPH: No easy bruising, or bleeding gums  All others negative    PHYSICAL EXAM:  T(C): 36.6 (11-08-23 @ 13:00), Max: 37.1 (11-07-23 @ 23:41)  HR: 69 (11-08-23 @ 13:00) (68 - 87)  BP: 103/58 (11-08-23 @ 13:00) (103/58 - 134/75)  RR: 19 (11-08-23 @ 13:00) (14 - 20)  SpO2: 98% (11-08-23 @ 13:00) (97% - 98%)  Wt(kg): --  I&O's Summary    Height (cm): 154.9 (11-08 @ 03:53)  Weight (kg): 86.2 (11-08 @ 03:53)  BMI (kg/m2): 35.9 (11-08 @ 03:53)  BSA (m2): 1.85 (11-08 @ 03:53)    GENERAL: NAD  EYES: EOMI, PERRLA, conjunctiva and sclera clear  ENMT: No tonsillar erythema, exudates, or enlargement  Cardiovascular: Normal S1 S2, No JVD, No murmurs, No edema  Respiratory: Lungs clear to auscultation	  Gastrointestinal:  Soft, Non-tender, + BS	  Extremities: No edema                                     13.2   15.76 )-----------( 294      ( 08 Nov 2023 06:43 )             41.4     11-08    139  |  103  |  13  ----------------------------<  96  3.5   |  19<L>  |  0.62    Ca    9.1      08 Nov 2023 06:43  Phos  3.5     11-08  Mg     1.30     11-08    TPro  6.8  /  Alb  4.1  /  TBili  1.2  /  DBili  x   /  AST  21  /  ALT  18  /  AlkPhos  111  11-08    proBNP:   Lipid Profile:   HgA1c:   TSH: Thyroid Stimulating Hormone, Serum: 1.32 uIU/mL (11-08 @ 06:43)      Consultant(s) Notes Reviewed:  [x ] YES  [ ] NO    Care Discussed with Consultants/Other Providers [ x] YES  [ ] NO    Imaging Personally Reviewed independently:  [x] YES  [ ] NO    All labs, radiologic studies, vitals, orders and medications list reviewed. Patient is seen and examined at bedside. Case discussed with medical team.

## 2023-11-08 NOTE — BH CONSULTATION LIAISON ASSESSMENT NOTE - HPI (INCLUDE ILLNESS QUALITY, SEVERITY, DURATION, TIMING, CONTEXT, MODIFYING FACTORS, ASSOCIATED SIGNS AND SYMPTOMS)
This is a 58F with history of CVA with L sided weakness/numbness/L gaze deviation, DM2 with b/l peripheral neuropathy, COPD/Asthma, Breast Ca s/p ?RT, Bipolar depression, Rheumatoid Arthritis, Antiphospholipid syndrome, L eye uveitis/scleritis, Newly diagnosed DM2 (pt cannot remember which medication she takes for it), and current tobacco use who presents to the hospital with multiple complaints. Psych consulted for depression.     Chart reviewed. Per initial H&P: "Patient states that her main issue is her symptoms from her prior stroke. States that she had her stroke in 2022 and was hospitalized at [facility] in [place]. Since then states that she has had chronic L sided weakness and chronic L sided numbness but recently (x months, especially over the past few days) her numbness and weakness seem to be worsening. She denies any new symptoms, just a worsening of her chronic symptoms. Also reports blurry vision and states that she has double vision at times (none currently)." Also states: "Also reports feeling more depression recently 2/2 social stressors (daughter, , home situation, etc). Denies SI/HI, denies AVH. Does state that she used to be on lithium and depakote in the past and thinks she might need to restart those medications."     Of note, chart also shows pt was seen by out  service 8/2023 for mood disorder: "Siddhartha Frost 58 year old female with a past psychiatric history notable for bipolar disorder, depression, and anxiety with a PMH of multiple CVAs, glaucoma, and rheumatoid arthritis who presented to the [facility] ED complaining of parasthesias of the left arm secondary to a subacute lacunar infarct and who was referred to  psychiatry for evaluation of suicidal ideation and depressive symptoms. Patient is on disability, has chronic pain, has housing instability, was recently left by her  of 33 years, and feels like these factors have been contributing to her low mood. Patient is endorses symptoms of depression, passive SI, and low-mood and is interested in psychiatric treatment and long-term follow-up care outpatient. Endorses a positive attitude about improving her mental health and life circumstances. She denies any feelings of grandiosity, working on any new projects, spending more money than usual, impulsivity, high levels of energy, or a euphoric mood. She denies any visual hallucinations, delusions, or paranoia. Has a h/o manic symptoms, + psychosis. She denies wanting to physically hurt herself, but endorses passive suicidal ideation."     On exam, she is AA O x 3, pleasant, though a bit dramatic. Endorses various aforementioned physical sx but also her psychosocial stressors. Says this Friday she was evicted from where she was staying with a friend who lost her job; she then took a train to a shelter but it was "a hassle." She tried to stay with an aunt but said "she is too Orthodoxy - I love God and everything but she is constantly praying." She notes that she has a psych hx of bipolar disorder and was in hospitals in the past, and says she needs to reconnect with psychiatry but only has an appointment somewhere in [facility] for March 2024 ("that's too far away"). She endorses current depressed mood in a vague manner tied to these psychosocial realities and endorses poor sleep from ruminating on these stressors, but denies hopelessness, SI, HI safety concerns, or AVH/psychosis. Throughout, of note, she presents herself as helpless but also help-rejecting. She also added that she feels "better since being here"

## 2023-11-09 LAB
A1C WITH ESTIMATED AVERAGE GLUCOSE RESULT: 7.5 % — HIGH (ref 4–5.6)
A1C WITH ESTIMATED AVERAGE GLUCOSE RESULT: 7.5 % — HIGH (ref 4–5.6)
ALBUMIN SERPL ELPH-MCNC: 3.9 G/DL — SIGNIFICANT CHANGE UP (ref 3.3–5)
ALBUMIN SERPL ELPH-MCNC: 3.9 G/DL — SIGNIFICANT CHANGE UP (ref 3.3–5)
ALP SERPL-CCNC: 109 U/L — SIGNIFICANT CHANGE UP (ref 40–120)
ALP SERPL-CCNC: 109 U/L — SIGNIFICANT CHANGE UP (ref 40–120)
ALT FLD-CCNC: 18 U/L — SIGNIFICANT CHANGE UP (ref 4–33)
ALT FLD-CCNC: 18 U/L — SIGNIFICANT CHANGE UP (ref 4–33)
ANION GAP SERPL CALC-SCNC: 12 MMOL/L — SIGNIFICANT CHANGE UP (ref 7–14)
ANION GAP SERPL CALC-SCNC: 12 MMOL/L — SIGNIFICANT CHANGE UP (ref 7–14)
ANTI-RIBONUCLEAR PROTEIN: 0.2 AI — SIGNIFICANT CHANGE UP
ANTI-RIBONUCLEAR PROTEIN: 0.2 AI — SIGNIFICANT CHANGE UP
APPEARANCE UR: CLEAR — SIGNIFICANT CHANGE UP
APPEARANCE UR: CLEAR — SIGNIFICANT CHANGE UP
AST SERPL-CCNC: 14 U/L — SIGNIFICANT CHANGE UP (ref 4–32)
AST SERPL-CCNC: 14 U/L — SIGNIFICANT CHANGE UP (ref 4–32)
BACTERIA # UR AUTO: NEGATIVE /HPF — SIGNIFICANT CHANGE UP
BACTERIA # UR AUTO: NEGATIVE /HPF — SIGNIFICANT CHANGE UP
BASOPHILS # BLD AUTO: 0.11 K/UL — SIGNIFICANT CHANGE UP (ref 0–0.2)
BASOPHILS # BLD AUTO: 0.11 K/UL — SIGNIFICANT CHANGE UP (ref 0–0.2)
BASOPHILS NFR BLD AUTO: 0.8 % — SIGNIFICANT CHANGE UP (ref 0–2)
BASOPHILS NFR BLD AUTO: 0.8 % — SIGNIFICANT CHANGE UP (ref 0–2)
BILIRUB SERPL-MCNC: 0.6 MG/DL — SIGNIFICANT CHANGE UP (ref 0.2–1.2)
BILIRUB SERPL-MCNC: 0.6 MG/DL — SIGNIFICANT CHANGE UP (ref 0.2–1.2)
BILIRUB UR-MCNC: NEGATIVE — SIGNIFICANT CHANGE UP
BILIRUB UR-MCNC: NEGATIVE — SIGNIFICANT CHANGE UP
BUN SERPL-MCNC: 14 MG/DL — SIGNIFICANT CHANGE UP (ref 7–23)
BUN SERPL-MCNC: 14 MG/DL — SIGNIFICANT CHANGE UP (ref 7–23)
C3 SERPL-MCNC: 140 MG/DL — SIGNIFICANT CHANGE UP (ref 90–180)
C3 SERPL-MCNC: 140 MG/DL — SIGNIFICANT CHANGE UP (ref 90–180)
C4 SERPL-MCNC: 22 MG/DL — SIGNIFICANT CHANGE UP (ref 10–40)
C4 SERPL-MCNC: 22 MG/DL — SIGNIFICANT CHANGE UP (ref 10–40)
CALCIUM SERPL-MCNC: 9 MG/DL — SIGNIFICANT CHANGE UP (ref 8.4–10.5)
CALCIUM SERPL-MCNC: 9 MG/DL — SIGNIFICANT CHANGE UP (ref 8.4–10.5)
CAST: 0 /LPF — SIGNIFICANT CHANGE UP (ref 0–4)
CAST: 0 /LPF — SIGNIFICANT CHANGE UP (ref 0–4)
CENTROMERE AB SER-ACNC: <0.2 AI — SIGNIFICANT CHANGE UP
CENTROMERE AB SER-ACNC: <0.2 AI — SIGNIFICANT CHANGE UP
CHLORIDE SERPL-SCNC: 107 MMOL/L — SIGNIFICANT CHANGE UP (ref 98–107)
CHLORIDE SERPL-SCNC: 107 MMOL/L — SIGNIFICANT CHANGE UP (ref 98–107)
CHOLEST SERPL-MCNC: 97 MG/DL — SIGNIFICANT CHANGE UP
CHOLEST SERPL-MCNC: 97 MG/DL — SIGNIFICANT CHANGE UP
CK SERPL-CCNC: 103 U/L — SIGNIFICANT CHANGE UP (ref 25–170)
CK SERPL-CCNC: 103 U/L — SIGNIFICANT CHANGE UP (ref 25–170)
CO2 SERPL-SCNC: 21 MMOL/L — LOW (ref 22–31)
CO2 SERPL-SCNC: 21 MMOL/L — LOW (ref 22–31)
COLOR SPEC: YELLOW — SIGNIFICANT CHANGE UP
COLOR SPEC: YELLOW — SIGNIFICANT CHANGE UP
CREAT ?TM UR-MCNC: 120 MG/DL — SIGNIFICANT CHANGE UP
CREAT ?TM UR-MCNC: 120 MG/DL — SIGNIFICANT CHANGE UP
CREAT SERPL-MCNC: 0.63 MG/DL — SIGNIFICANT CHANGE UP (ref 0.5–1.3)
CREAT SERPL-MCNC: 0.63 MG/DL — SIGNIFICANT CHANGE UP (ref 0.5–1.3)
CRP SERPL-MCNC: <3 MG/L — SIGNIFICANT CHANGE UP
CRP SERPL-MCNC: <3 MG/L — SIGNIFICANT CHANGE UP
DIFF PNL FLD: ABNORMAL
DIFF PNL FLD: ABNORMAL
DSDNA AB FLD-ACNC: <0.2 AI — SIGNIFICANT CHANGE UP
DSDNA AB FLD-ACNC: <0.2 AI — SIGNIFICANT CHANGE UP
EGFR: 102 ML/MIN/1.73M2 — SIGNIFICANT CHANGE UP
EGFR: 102 ML/MIN/1.73M2 — SIGNIFICANT CHANGE UP
ENA SCL70 AB SER-ACNC: <0.2 AI — SIGNIFICANT CHANGE UP
ENA SCL70 AB SER-ACNC: <0.2 AI — SIGNIFICANT CHANGE UP
ENA SM AB FLD QL: <0.2 AI — SIGNIFICANT CHANGE UP
ENA SM AB FLD QL: <0.2 AI — SIGNIFICANT CHANGE UP
ENA SS-A AB FLD IA-ACNC: <0.2 AI — SIGNIFICANT CHANGE UP
ENA SS-A AB FLD IA-ACNC: <0.2 AI — SIGNIFICANT CHANGE UP
EOSINOPHIL # BLD AUTO: 0.25 K/UL — SIGNIFICANT CHANGE UP (ref 0–0.5)
EOSINOPHIL # BLD AUTO: 0.25 K/UL — SIGNIFICANT CHANGE UP (ref 0–0.5)
EOSINOPHIL NFR BLD AUTO: 1.8 % — SIGNIFICANT CHANGE UP (ref 0–6)
EOSINOPHIL NFR BLD AUTO: 1.8 % — SIGNIFICANT CHANGE UP (ref 0–6)
ERYTHROCYTE [SEDIMENTATION RATE] IN BLOOD: 12 MM/HR — SIGNIFICANT CHANGE UP (ref 4–25)
ERYTHROCYTE [SEDIMENTATION RATE] IN BLOOD: 12 MM/HR — SIGNIFICANT CHANGE UP (ref 4–25)
ESTIMATED AVERAGE GLUCOSE: 169 — SIGNIFICANT CHANGE UP
ESTIMATED AVERAGE GLUCOSE: 169 — SIGNIFICANT CHANGE UP
GLUCOSE SERPL-MCNC: 138 MG/DL — HIGH (ref 70–99)
GLUCOSE SERPL-MCNC: 138 MG/DL — HIGH (ref 70–99)
GLUCOSE UR QL: NEGATIVE MG/DL — SIGNIFICANT CHANGE UP
GLUCOSE UR QL: NEGATIVE MG/DL — SIGNIFICANT CHANGE UP
HAV IGM SER-ACNC: SIGNIFICANT CHANGE UP
HAV IGM SER-ACNC: SIGNIFICANT CHANGE UP
HBV CORE AB SER-ACNC: SIGNIFICANT CHANGE UP
HBV CORE AB SER-ACNC: SIGNIFICANT CHANGE UP
HBV CORE IGM SER-ACNC: SIGNIFICANT CHANGE UP
HBV CORE IGM SER-ACNC: SIGNIFICANT CHANGE UP
HBV SURFACE AG SER-ACNC: SIGNIFICANT CHANGE UP
HBV SURFACE AG SER-ACNC: SIGNIFICANT CHANGE UP
HCT VFR BLD CALC: 37.8 % — SIGNIFICANT CHANGE UP (ref 34.5–45)
HCT VFR BLD CALC: 37.8 % — SIGNIFICANT CHANGE UP (ref 34.5–45)
HCV AB S/CO SERPL IA: 0.07 S/CO — SIGNIFICANT CHANGE UP (ref 0–0.99)
HCV AB S/CO SERPL IA: 0.07 S/CO — SIGNIFICANT CHANGE UP (ref 0–0.99)
HCV AB SERPL-IMP: SIGNIFICANT CHANGE UP
HCV AB SERPL-IMP: SIGNIFICANT CHANGE UP
HDLC SERPL-MCNC: 44 MG/DL — LOW
HDLC SERPL-MCNC: 44 MG/DL — LOW
HGB BLD-MCNC: 12.3 G/DL — SIGNIFICANT CHANGE UP (ref 11.5–15.5)
HGB BLD-MCNC: 12.3 G/DL — SIGNIFICANT CHANGE UP (ref 11.5–15.5)
IANC: 9.25 K/UL — HIGH (ref 1.8–7.4)
IANC: 9.25 K/UL — HIGH (ref 1.8–7.4)
IMM GRANULOCYTES NFR BLD AUTO: 0.5 % — SIGNIFICANT CHANGE UP (ref 0–0.9)
IMM GRANULOCYTES NFR BLD AUTO: 0.5 % — SIGNIFICANT CHANGE UP (ref 0–0.9)
KETONES UR-MCNC: NEGATIVE MG/DL — SIGNIFICANT CHANGE UP
KETONES UR-MCNC: NEGATIVE MG/DL — SIGNIFICANT CHANGE UP
LEUKOCYTE ESTERASE UR-ACNC: NEGATIVE — SIGNIFICANT CHANGE UP
LEUKOCYTE ESTERASE UR-ACNC: NEGATIVE — SIGNIFICANT CHANGE UP
LIPID PNL WITH DIRECT LDL SERPL: 37 MG/DL — SIGNIFICANT CHANGE UP
LIPID PNL WITH DIRECT LDL SERPL: 37 MG/DL — SIGNIFICANT CHANGE UP
LYMPHOCYTES # BLD AUTO: 22.7 % — SIGNIFICANT CHANGE UP (ref 13–44)
LYMPHOCYTES # BLD AUTO: 22.7 % — SIGNIFICANT CHANGE UP (ref 13–44)
LYMPHOCYTES # BLD AUTO: 3.11 K/UL — SIGNIFICANT CHANGE UP (ref 1–3.3)
LYMPHOCYTES # BLD AUTO: 3.11 K/UL — SIGNIFICANT CHANGE UP (ref 1–3.3)
MAGNESIUM SERPL-MCNC: 1.9 MG/DL — SIGNIFICANT CHANGE UP (ref 1.6–2.6)
MAGNESIUM SERPL-MCNC: 1.9 MG/DL — SIGNIFICANT CHANGE UP (ref 1.6–2.6)
MCHC RBC-ENTMCNC: 29.7 PG — SIGNIFICANT CHANGE UP (ref 27–34)
MCHC RBC-ENTMCNC: 29.7 PG — SIGNIFICANT CHANGE UP (ref 27–34)
MCHC RBC-ENTMCNC: 32.5 GM/DL — SIGNIFICANT CHANGE UP (ref 32–36)
MCHC RBC-ENTMCNC: 32.5 GM/DL — SIGNIFICANT CHANGE UP (ref 32–36)
MCV RBC AUTO: 91.3 FL — SIGNIFICANT CHANGE UP (ref 80–100)
MCV RBC AUTO: 91.3 FL — SIGNIFICANT CHANGE UP (ref 80–100)
MONOCYTES # BLD AUTO: 0.93 K/UL — HIGH (ref 0–0.9)
MONOCYTES # BLD AUTO: 0.93 K/UL — HIGH (ref 0–0.9)
MONOCYTES NFR BLD AUTO: 6.8 % — SIGNIFICANT CHANGE UP (ref 2–14)
MONOCYTES NFR BLD AUTO: 6.8 % — SIGNIFICANT CHANGE UP (ref 2–14)
NEUTROPHILS # BLD AUTO: 9.25 K/UL — HIGH (ref 1.8–7.4)
NEUTROPHILS # BLD AUTO: 9.25 K/UL — HIGH (ref 1.8–7.4)
NEUTROPHILS NFR BLD AUTO: 67.4 % — SIGNIFICANT CHANGE UP (ref 43–77)
NEUTROPHILS NFR BLD AUTO: 67.4 % — SIGNIFICANT CHANGE UP (ref 43–77)
NITRITE UR-MCNC: NEGATIVE — SIGNIFICANT CHANGE UP
NITRITE UR-MCNC: NEGATIVE — SIGNIFICANT CHANGE UP
NON HDL CHOLESTEROL: 53 MG/DL — SIGNIFICANT CHANGE UP
NON HDL CHOLESTEROL: 53 MG/DL — SIGNIFICANT CHANGE UP
NRBC # BLD: 0 /100 WBCS — SIGNIFICANT CHANGE UP (ref 0–0)
NRBC # BLD: 0 /100 WBCS — SIGNIFICANT CHANGE UP (ref 0–0)
NRBC # FLD: 0 K/UL — SIGNIFICANT CHANGE UP (ref 0–0)
NRBC # FLD: 0 K/UL — SIGNIFICANT CHANGE UP (ref 0–0)
PH UR: 6.5 — SIGNIFICANT CHANGE UP (ref 5–8)
PH UR: 6.5 — SIGNIFICANT CHANGE UP (ref 5–8)
PHOSPHATE SERPL-MCNC: 3.3 MG/DL — SIGNIFICANT CHANGE UP (ref 2.5–4.5)
PHOSPHATE SERPL-MCNC: 3.3 MG/DL — SIGNIFICANT CHANGE UP (ref 2.5–4.5)
PLATELET # BLD AUTO: 292 K/UL — SIGNIFICANT CHANGE UP (ref 150–400)
PLATELET # BLD AUTO: 292 K/UL — SIGNIFICANT CHANGE UP (ref 150–400)
POTASSIUM SERPL-MCNC: 3.7 MMOL/L — SIGNIFICANT CHANGE UP (ref 3.5–5.3)
POTASSIUM SERPL-MCNC: 3.7 MMOL/L — SIGNIFICANT CHANGE UP (ref 3.5–5.3)
POTASSIUM SERPL-SCNC: 3.7 MMOL/L — SIGNIFICANT CHANGE UP (ref 3.5–5.3)
POTASSIUM SERPL-SCNC: 3.7 MMOL/L — SIGNIFICANT CHANGE UP (ref 3.5–5.3)
PROT ?TM UR-MCNC: 15 MG/DL — SIGNIFICANT CHANGE UP
PROT ?TM UR-MCNC: 15 MG/DL — SIGNIFICANT CHANGE UP
PROT SERPL-MCNC: 6.2 G/DL — SIGNIFICANT CHANGE UP (ref 6–8.3)
PROT SERPL-MCNC: 6.2 G/DL — SIGNIFICANT CHANGE UP (ref 6–8.3)
PROT UR-MCNC: NEGATIVE MG/DL — SIGNIFICANT CHANGE UP
PROT UR-MCNC: NEGATIVE MG/DL — SIGNIFICANT CHANGE UP
PROT/CREAT UR-RTO: 0.1 RATIO — SIGNIFICANT CHANGE UP (ref 0–0.2)
PROT/CREAT UR-RTO: 0.1 RATIO — SIGNIFICANT CHANGE UP (ref 0–0.2)
RBC # BLD: 4.14 M/UL — SIGNIFICANT CHANGE UP (ref 3.8–5.2)
RBC # BLD: 4.14 M/UL — SIGNIFICANT CHANGE UP (ref 3.8–5.2)
RBC # FLD: 15.9 % — HIGH (ref 10.3–14.5)
RBC # FLD: 15.9 % — HIGH (ref 10.3–14.5)
RBC CASTS # UR COMP ASSIST: 6 /HPF — HIGH (ref 0–4)
RBC CASTS # UR COMP ASSIST: 6 /HPF — HIGH (ref 0–4)
RHEUMATOID FACT SERPL-ACNC: 168 IU/ML — HIGH (ref 0–13)
RHEUMATOID FACT SERPL-ACNC: 168 IU/ML — HIGH (ref 0–13)
SODIUM SERPL-SCNC: 140 MMOL/L — SIGNIFICANT CHANGE UP (ref 135–145)
SODIUM SERPL-SCNC: 140 MMOL/L — SIGNIFICANT CHANGE UP (ref 135–145)
SP GR SPEC: 1.02 — SIGNIFICANT CHANGE UP (ref 1–1.03)
SP GR SPEC: 1.02 — SIGNIFICANT CHANGE UP (ref 1–1.03)
SQUAMOUS # UR AUTO: 2 /HPF — SIGNIFICANT CHANGE UP (ref 0–5)
SQUAMOUS # UR AUTO: 2 /HPF — SIGNIFICANT CHANGE UP (ref 0–5)
TRIGL SERPL-MCNC: 80 MG/DL — SIGNIFICANT CHANGE UP
TRIGL SERPL-MCNC: 80 MG/DL — SIGNIFICANT CHANGE UP
UROBILINOGEN FLD QL: 1 MG/DL — SIGNIFICANT CHANGE UP (ref 0.2–1)
UROBILINOGEN FLD QL: 1 MG/DL — SIGNIFICANT CHANGE UP (ref 0.2–1)
WBC # BLD: 13.72 K/UL — HIGH (ref 3.8–10.5)
WBC # BLD: 13.72 K/UL — HIGH (ref 3.8–10.5)
WBC # FLD AUTO: 13.72 K/UL — HIGH (ref 3.8–10.5)
WBC # FLD AUTO: 13.72 K/UL — HIGH (ref 3.8–10.5)
WBC UR QL: 1 /HPF — SIGNIFICANT CHANGE UP (ref 0–5)
WBC UR QL: 1 /HPF — SIGNIFICANT CHANGE UP (ref 0–5)

## 2023-11-09 PROCEDURE — 93291 INTERROG DEV EVAL SCRMS IP: CPT | Mod: 26,GC

## 2023-11-09 PROCEDURE — G0452: CPT | Mod: 26

## 2023-11-09 PROCEDURE — 93306 TTE W/DOPPLER COMPLETE: CPT | Mod: 26

## 2023-11-09 PROCEDURE — 99232 SBSQ HOSP IP/OBS MODERATE 35: CPT

## 2023-11-09 RX ORDER — INSULIN LISPRO 100/ML
2 VIAL (ML) SUBCUTANEOUS
Refills: 0 | Status: DISCONTINUED | OUTPATIENT
Start: 2023-11-09 | End: 2023-11-10

## 2023-11-09 RX ORDER — INSULIN LISPRO 100/ML
VIAL (ML) SUBCUTANEOUS AT BEDTIME
Refills: 0 | Status: DISCONTINUED | OUTPATIENT
Start: 2023-11-09 | End: 2023-11-14

## 2023-11-09 RX ORDER — INSULIN LISPRO 100/ML
VIAL (ML) SUBCUTANEOUS
Refills: 0 | Status: DISCONTINUED | OUTPATIENT
Start: 2023-11-09 | End: 2023-11-14

## 2023-11-09 RX ORDER — INSULIN GLARGINE 100 [IU]/ML
6 INJECTION, SOLUTION SUBCUTANEOUS AT BEDTIME
Refills: 0 | Status: DISCONTINUED | OUTPATIENT
Start: 2023-11-09 | End: 2023-11-14

## 2023-11-09 RX ADMIN — Medication 4: at 18:11

## 2023-11-09 RX ADMIN — OLANZAPINE 5 MILLIGRAM(S): 15 TABLET, FILM COATED ORAL at 22:01

## 2023-11-09 RX ADMIN — INSULIN GLARGINE 6 UNIT(S): 100 INJECTION, SOLUTION SUBCUTANEOUS at 22:01

## 2023-11-09 RX ADMIN — MAGNESIUM OXIDE 400 MG ORAL TABLET 400 MILLIGRAM(S): 241.3 TABLET ORAL at 09:40

## 2023-11-09 RX ADMIN — GABAPENTIN 300 MILLIGRAM(S): 400 CAPSULE ORAL at 21:59

## 2023-11-09 RX ADMIN — GABAPENTIN 300 MILLIGRAM(S): 400 CAPSULE ORAL at 14:43

## 2023-11-09 RX ADMIN — Medication 10 MILLIGRAM(S): at 05:13

## 2023-11-09 RX ADMIN — Medication 2 UNIT(S): at 18:12

## 2023-11-09 RX ADMIN — APIXABAN 5 MILLIGRAM(S): 2.5 TABLET, FILM COATED ORAL at 18:12

## 2023-11-09 RX ADMIN — APIXABAN 5 MILLIGRAM(S): 2.5 TABLET, FILM COATED ORAL at 05:13

## 2023-11-09 RX ADMIN — Medication 1 EACH: at 09:36

## 2023-11-09 RX ADMIN — BUDESONIDE AND FORMOTEROL FUMARATE DIHYDRATE 2 PUFF(S): 160; 4.5 AEROSOL RESPIRATORY (INHALATION) at 09:36

## 2023-11-09 RX ADMIN — Medication 650 MILLIGRAM(S): at 18:10

## 2023-11-09 RX ADMIN — ATORVASTATIN CALCIUM 80 MILLIGRAM(S): 80 TABLET, FILM COATED ORAL at 22:00

## 2023-11-09 RX ADMIN — Medication 1 EACH: at 14:44

## 2023-11-09 RX ADMIN — GABAPENTIN 300 MILLIGRAM(S): 400 CAPSULE ORAL at 05:13

## 2023-11-09 RX ADMIN — Medication 1 MILLIGRAM(S): at 14:44

## 2023-11-09 RX ADMIN — Medication 1000 UNIT(S): at 14:43

## 2023-11-09 RX ADMIN — Medication 50 MILLIGRAM(S): at 21:59

## 2023-11-09 RX ADMIN — Medication 81 MILLIGRAM(S): at 14:43

## 2023-11-09 RX ADMIN — Medication 25 MILLIGRAM(S): at 05:13

## 2023-11-09 RX ADMIN — Medication 1 EACH: at 22:06

## 2023-11-09 RX ADMIN — SPIRONOLACTONE 25 MILLIGRAM(S): 25 TABLET, FILM COATED ORAL at 05:13

## 2023-11-09 RX ADMIN — BUDESONIDE AND FORMOTEROL FUMARATE DIHYDRATE 2 PUFF(S): 160; 4.5 AEROSOL RESPIRATORY (INHALATION) at 21:58

## 2023-11-09 RX ADMIN — Medication 4: at 12:49

## 2023-11-09 RX ADMIN — PANTOPRAZOLE SODIUM 40 MILLIGRAM(S): 20 TABLET, DELAYED RELEASE ORAL at 06:42

## 2023-11-09 RX ADMIN — Medication 1: at 09:35

## 2023-11-09 RX ADMIN — Medication 1 EACH: at 18:10

## 2023-11-09 RX ADMIN — Medication 1 EACH: at 05:11

## 2023-11-09 NOTE — PROCEDURE NOTE - ADDITIONAL PROCEDURE DETAILS
There were no events detected on ILR since last interrogation 7/31/23.  Device functioning properly.

## 2023-11-10 ENCOUNTER — TRANSCRIPTION ENCOUNTER (OUTPATIENT)
Age: 58
End: 2023-11-10

## 2023-11-10 LAB
24R-OH-CALCIDIOL SERPL-MCNC: 22.2 NG/ML — LOW (ref 30–80)
24R-OH-CALCIDIOL SERPL-MCNC: 22.2 NG/ML — LOW (ref 30–80)
ALBUMIN SERPL ELPH-MCNC: 3.8 G/DL — SIGNIFICANT CHANGE UP (ref 3.3–5)
ALBUMIN SERPL ELPH-MCNC: 3.8 G/DL — SIGNIFICANT CHANGE UP (ref 3.3–5)
ALP SERPL-CCNC: 103 U/L — SIGNIFICANT CHANGE UP (ref 40–120)
ALP SERPL-CCNC: 103 U/L — SIGNIFICANT CHANGE UP (ref 40–120)
ALT FLD-CCNC: 16 U/L — SIGNIFICANT CHANGE UP (ref 4–33)
ALT FLD-CCNC: 16 U/L — SIGNIFICANT CHANGE UP (ref 4–33)
ANION GAP SERPL CALC-SCNC: 11 MMOL/L — SIGNIFICANT CHANGE UP (ref 7–14)
ANION GAP SERPL CALC-SCNC: 11 MMOL/L — SIGNIFICANT CHANGE UP (ref 7–14)
APTT BLD: 34.3 SEC — SIGNIFICANT CHANGE UP (ref 24.5–35.6)
APTT BLD: 34.3 SEC — SIGNIFICANT CHANGE UP (ref 24.5–35.6)
AST SERPL-CCNC: 13 U/L — SIGNIFICANT CHANGE UP (ref 4–32)
AST SERPL-CCNC: 13 U/L — SIGNIFICANT CHANGE UP (ref 4–32)
BASOPHILS # BLD AUTO: 0.1 K/UL — SIGNIFICANT CHANGE UP (ref 0–0.2)
BASOPHILS # BLD AUTO: 0.1 K/UL — SIGNIFICANT CHANGE UP (ref 0–0.2)
BASOPHILS NFR BLD AUTO: 0.8 % — SIGNIFICANT CHANGE UP (ref 0–2)
BASOPHILS NFR BLD AUTO: 0.8 % — SIGNIFICANT CHANGE UP (ref 0–2)
BILIRUB SERPL-MCNC: 0.6 MG/DL — SIGNIFICANT CHANGE UP (ref 0.2–1.2)
BILIRUB SERPL-MCNC: 0.6 MG/DL — SIGNIFICANT CHANGE UP (ref 0.2–1.2)
BLD GP AB SCN SERPL QL: NEGATIVE — SIGNIFICANT CHANGE UP
BLD GP AB SCN SERPL QL: NEGATIVE — SIGNIFICANT CHANGE UP
BUN SERPL-MCNC: 15 MG/DL — SIGNIFICANT CHANGE UP (ref 7–23)
BUN SERPL-MCNC: 15 MG/DL — SIGNIFICANT CHANGE UP (ref 7–23)
CALCIUM SERPL-MCNC: 9 MG/DL — SIGNIFICANT CHANGE UP (ref 8.4–10.5)
CALCIUM SERPL-MCNC: 9 MG/DL — SIGNIFICANT CHANGE UP (ref 8.4–10.5)
CHLORIDE SERPL-SCNC: 109 MMOL/L — HIGH (ref 98–107)
CHLORIDE SERPL-SCNC: 109 MMOL/L — HIGH (ref 98–107)
CO2 SERPL-SCNC: 22 MMOL/L — SIGNIFICANT CHANGE UP (ref 22–31)
CO2 SERPL-SCNC: 22 MMOL/L — SIGNIFICANT CHANGE UP (ref 22–31)
CREAT SERPL-MCNC: 0.61 MG/DL — SIGNIFICANT CHANGE UP (ref 0.5–1.3)
CREAT SERPL-MCNC: 0.61 MG/DL — SIGNIFICANT CHANGE UP (ref 0.5–1.3)
EGFR: 103 ML/MIN/1.73M2 — SIGNIFICANT CHANGE UP
EGFR: 103 ML/MIN/1.73M2 — SIGNIFICANT CHANGE UP
EOSINOPHIL # BLD AUTO: 0.32 K/UL — SIGNIFICANT CHANGE UP (ref 0–0.5)
EOSINOPHIL # BLD AUTO: 0.32 K/UL — SIGNIFICANT CHANGE UP (ref 0–0.5)
EOSINOPHIL NFR BLD AUTO: 2.5 % — SIGNIFICANT CHANGE UP (ref 0–6)
EOSINOPHIL NFR BLD AUTO: 2.5 % — SIGNIFICANT CHANGE UP (ref 0–6)
GLUCOSE SERPL-MCNC: 113 MG/DL — HIGH (ref 70–99)
GLUCOSE SERPL-MCNC: 113 MG/DL — HIGH (ref 70–99)
HCT VFR BLD CALC: 37 % — SIGNIFICANT CHANGE UP (ref 34.5–45)
HCT VFR BLD CALC: 37 % — SIGNIFICANT CHANGE UP (ref 34.5–45)
HGB BLD-MCNC: 12 G/DL — SIGNIFICANT CHANGE UP (ref 11.5–15.5)
HGB BLD-MCNC: 12 G/DL — SIGNIFICANT CHANGE UP (ref 11.5–15.5)
IANC: 8.4 K/UL — HIGH (ref 1.8–7.4)
IANC: 8.4 K/UL — HIGH (ref 1.8–7.4)
IMM GRANULOCYTES NFR BLD AUTO: 0.6 % — SIGNIFICANT CHANGE UP (ref 0–0.9)
IMM GRANULOCYTES NFR BLD AUTO: 0.6 % — SIGNIFICANT CHANGE UP (ref 0–0.9)
INR BLD: 1.53 RATIO — HIGH (ref 0.85–1.18)
INR BLD: 1.53 RATIO — HIGH (ref 0.85–1.18)
LYMPHOCYTES # BLD AUTO: 2.89 K/UL — SIGNIFICANT CHANGE UP (ref 1–3.3)
LYMPHOCYTES # BLD AUTO: 2.89 K/UL — SIGNIFICANT CHANGE UP (ref 1–3.3)
LYMPHOCYTES # BLD AUTO: 22.8 % — SIGNIFICANT CHANGE UP (ref 13–44)
LYMPHOCYTES # BLD AUTO: 22.8 % — SIGNIFICANT CHANGE UP (ref 13–44)
MAGNESIUM SERPL-MCNC: 1.9 MG/DL — SIGNIFICANT CHANGE UP (ref 1.6–2.6)
MAGNESIUM SERPL-MCNC: 1.9 MG/DL — SIGNIFICANT CHANGE UP (ref 1.6–2.6)
MCHC RBC-ENTMCNC: 29.4 PG — SIGNIFICANT CHANGE UP (ref 27–34)
MCHC RBC-ENTMCNC: 29.4 PG — SIGNIFICANT CHANGE UP (ref 27–34)
MCHC RBC-ENTMCNC: 32.4 GM/DL — SIGNIFICANT CHANGE UP (ref 32–36)
MCHC RBC-ENTMCNC: 32.4 GM/DL — SIGNIFICANT CHANGE UP (ref 32–36)
MCV RBC AUTO: 90.7 FL — SIGNIFICANT CHANGE UP (ref 80–100)
MCV RBC AUTO: 90.7 FL — SIGNIFICANT CHANGE UP (ref 80–100)
MONOCYTES # BLD AUTO: 0.88 K/UL — SIGNIFICANT CHANGE UP (ref 0–0.9)
MONOCYTES # BLD AUTO: 0.88 K/UL — SIGNIFICANT CHANGE UP (ref 0–0.9)
MONOCYTES NFR BLD AUTO: 7 % — SIGNIFICANT CHANGE UP (ref 2–14)
MONOCYTES NFR BLD AUTO: 7 % — SIGNIFICANT CHANGE UP (ref 2–14)
NEUTROPHILS # BLD AUTO: 8.4 K/UL — HIGH (ref 1.8–7.4)
NEUTROPHILS # BLD AUTO: 8.4 K/UL — HIGH (ref 1.8–7.4)
NEUTROPHILS NFR BLD AUTO: 66.3 % — SIGNIFICANT CHANGE UP (ref 43–77)
NEUTROPHILS NFR BLD AUTO: 66.3 % — SIGNIFICANT CHANGE UP (ref 43–77)
NRBC # BLD: 0 /100 WBCS — SIGNIFICANT CHANGE UP (ref 0–0)
NRBC # BLD: 0 /100 WBCS — SIGNIFICANT CHANGE UP (ref 0–0)
NRBC # FLD: 0 K/UL — SIGNIFICANT CHANGE UP (ref 0–0)
NRBC # FLD: 0 K/UL — SIGNIFICANT CHANGE UP (ref 0–0)
PHOSPHATE SERPL-MCNC: 3.2 MG/DL — SIGNIFICANT CHANGE UP (ref 2.5–4.5)
PHOSPHATE SERPL-MCNC: 3.2 MG/DL — SIGNIFICANT CHANGE UP (ref 2.5–4.5)
PLATELET # BLD AUTO: 273 K/UL — SIGNIFICANT CHANGE UP (ref 150–400)
PLATELET # BLD AUTO: 273 K/UL — SIGNIFICANT CHANGE UP (ref 150–400)
POTASSIUM SERPL-MCNC: 4 MMOL/L — SIGNIFICANT CHANGE UP (ref 3.5–5.3)
POTASSIUM SERPL-MCNC: 4 MMOL/L — SIGNIFICANT CHANGE UP (ref 3.5–5.3)
POTASSIUM SERPL-SCNC: 4 MMOL/L — SIGNIFICANT CHANGE UP (ref 3.5–5.3)
POTASSIUM SERPL-SCNC: 4 MMOL/L — SIGNIFICANT CHANGE UP (ref 3.5–5.3)
PROT SERPL-MCNC: 6.2 G/DL — SIGNIFICANT CHANGE UP (ref 6–8.3)
PROT SERPL-MCNC: 6.2 G/DL — SIGNIFICANT CHANGE UP (ref 6–8.3)
PROTHROM AB SERPL-ACNC: 17.1 SEC — HIGH (ref 9.5–13)
PROTHROM AB SERPL-ACNC: 17.1 SEC — HIGH (ref 9.5–13)
RBC # BLD: 4.08 M/UL — SIGNIFICANT CHANGE UP (ref 3.8–5.2)
RBC # BLD: 4.08 M/UL — SIGNIFICANT CHANGE UP (ref 3.8–5.2)
RBC # FLD: 16 % — HIGH (ref 10.3–14.5)
RBC # FLD: 16 % — HIGH (ref 10.3–14.5)
RH IG SCN BLD-IMP: POSITIVE — SIGNIFICANT CHANGE UP
RH IG SCN BLD-IMP: POSITIVE — SIGNIFICANT CHANGE UP
SODIUM SERPL-SCNC: 142 MMOL/L — SIGNIFICANT CHANGE UP (ref 135–145)
SODIUM SERPL-SCNC: 142 MMOL/L — SIGNIFICANT CHANGE UP (ref 135–145)
TSH SERPL-MCNC: 1.36 UIU/ML — SIGNIFICANT CHANGE UP (ref 0.27–4.2)
TSH SERPL-MCNC: 1.36 UIU/ML — SIGNIFICANT CHANGE UP (ref 0.27–4.2)
WBC # BLD: 12.66 K/UL — HIGH (ref 3.8–10.5)
WBC # BLD: 12.66 K/UL — HIGH (ref 3.8–10.5)
WBC # FLD AUTO: 12.66 K/UL — HIGH (ref 3.8–10.5)
WBC # FLD AUTO: 12.66 K/UL — HIGH (ref 3.8–10.5)

## 2023-11-10 PROCEDURE — 93320 DOPPLER ECHO COMPLETE: CPT | Mod: 26,GC

## 2023-11-10 PROCEDURE — 99233 SBSQ HOSP IP/OBS HIGH 50: CPT

## 2023-11-10 PROCEDURE — 93312 ECHO TRANSESOPHAGEAL: CPT | Mod: 26

## 2023-11-10 PROCEDURE — 93325 DOPPLER ECHO COLOR FLOW MAPG: CPT | Mod: 26,GC

## 2023-11-10 RX ORDER — FUROSEMIDE 40 MG
40 TABLET ORAL ONCE
Refills: 0 | Status: COMPLETED | OUTPATIENT
Start: 2023-11-10 | End: 2023-11-10

## 2023-11-10 RX ORDER — INFLUENZA VIRUS VACCINE 15; 15; 15; 15 UG/.5ML; UG/.5ML; UG/.5ML; UG/.5ML
0.5 SUSPENSION INTRAMUSCULAR ONCE
Refills: 0 | Status: DISCONTINUED | OUTPATIENT
Start: 2023-11-10 | End: 2023-11-14

## 2023-11-10 RX ORDER — ACETAMINOPHEN 500 MG
1000 TABLET ORAL ONCE
Refills: 0 | Status: COMPLETED | OUTPATIENT
Start: 2023-11-10 | End: 2023-11-10

## 2023-11-10 RX ORDER — INSULIN LISPRO 100/ML
4 VIAL (ML) SUBCUTANEOUS
Refills: 0 | Status: DISCONTINUED | OUTPATIENT
Start: 2023-11-10 | End: 2023-11-14

## 2023-11-10 RX ADMIN — GABAPENTIN 300 MILLIGRAM(S): 400 CAPSULE ORAL at 12:20

## 2023-11-10 RX ADMIN — INSULIN GLARGINE 6 UNIT(S): 100 INJECTION, SOLUTION SUBCUTANEOUS at 22:46

## 2023-11-10 RX ADMIN — PANTOPRAZOLE SODIUM 40 MILLIGRAM(S): 20 TABLET, DELAYED RELEASE ORAL at 06:04

## 2023-11-10 RX ADMIN — GABAPENTIN 300 MILLIGRAM(S): 400 CAPSULE ORAL at 22:45

## 2023-11-10 RX ADMIN — OLANZAPINE 5 MILLIGRAM(S): 15 TABLET, FILM COATED ORAL at 22:45

## 2023-11-10 RX ADMIN — ATORVASTATIN CALCIUM 80 MILLIGRAM(S): 80 TABLET, FILM COATED ORAL at 22:45

## 2023-11-10 RX ADMIN — Medication 1 MILLIGRAM(S): at 12:20

## 2023-11-10 RX ADMIN — GABAPENTIN 300 MILLIGRAM(S): 400 CAPSULE ORAL at 06:03

## 2023-11-10 RX ADMIN — APIXABAN 5 MILLIGRAM(S): 2.5 TABLET, FILM COATED ORAL at 17:19

## 2023-11-10 RX ADMIN — Medication 3 MILLIGRAM(S): at 22:45

## 2023-11-10 RX ADMIN — METHOTREXATE 15 MILLIGRAM(S): 2.5 TABLET ORAL at 17:18

## 2023-11-10 RX ADMIN — Medication 40 MILLIGRAM(S): at 09:34

## 2023-11-10 RX ADMIN — Medication 2 UNIT(S): at 12:35

## 2023-11-10 RX ADMIN — Medication 2 UNIT(S): at 17:34

## 2023-11-10 RX ADMIN — Medication 50 MILLIGRAM(S): at 22:45

## 2023-11-10 RX ADMIN — Medication 10 MILLIGRAM(S): at 06:03

## 2023-11-10 RX ADMIN — Medication 1 EACH: at 12:21

## 2023-11-10 RX ADMIN — Medication 81 MILLIGRAM(S): at 12:20

## 2023-11-10 RX ADMIN — Medication 10: at 17:34

## 2023-11-10 RX ADMIN — APIXABAN 5 MILLIGRAM(S): 2.5 TABLET, FILM COATED ORAL at 06:04

## 2023-11-10 RX ADMIN — Medication 1 EACH: at 22:46

## 2023-11-10 RX ADMIN — Medication 1 EACH: at 06:06

## 2023-11-10 RX ADMIN — BUDESONIDE AND FORMOTEROL FUMARATE DIHYDRATE 2 PUFF(S): 160; 4.5 AEROSOL RESPIRATORY (INHALATION) at 22:45

## 2023-11-10 RX ADMIN — Medication 1000 UNIT(S): at 12:20

## 2023-11-10 RX ADMIN — BUDESONIDE AND FORMOTEROL FUMARATE DIHYDRATE 2 PUFF(S): 160; 4.5 AEROSOL RESPIRATORY (INHALATION) at 12:20

## 2023-11-10 NOTE — PROVIDER CONTACT NOTE (OTHER) - ASSESSMENT
Patient is AOx4, on RA, denies chest pain or SOB. Patient complains of headache. Patient is NPO for LESLEY.
Patient is AOx4, on RA, denies chest pain or SOB.

## 2023-11-10 NOTE — DIETITIAN INITIAL EVALUATION ADULT - ADD RECOMMEND
1. Monitor weight, labs, po intake and intolerance, bowel movement, skin integrity.   2. Encourage PO intake and honor food preferences as able.

## 2023-11-10 NOTE — CHART NOTE - NSCHARTNOTEFT_GEN_A_CORE
Type of Procedure: LESLEY (Transesophageal echocardiogram)  Licensed independent practitioner: Rocael Nunez MD  Assistant: none  Estimated blood loss: None  Specimen removed: None  Preoperative Dx: Severe AI, CMP  Postoperative Dx: same  Complications: See below**  Anesthesia type: Sedation by the attending anesthesiologist    Findings:     LESLEY terminated within 1 minute of starting. Patient had acute respiratory distress, desaturation, and hypotension accompanied by tachycardia.    Limited imaging suggests severe AI and at least moderate to severe MR.  Quantitation was not possible.    Pt is currently 100% sats with HR 64 and /73    Full report to follow    Rocael Nunez MD  Chief, Cardiology  [facility] Type of Procedure: LESLEY (Transesophageal echocardiogram)  Licensed independent practitioner: Rocael Nunez MD  Assistant: none  Estimated blood loss: None  Specimen removed: None  Preoperative Dx: Severe AI, CMP  Postoperative Dx: same  Complications: See below**  Anesthesia type: Sedation by the attending anesthesiologist    Findings:     LESLEY terminated within 1 minute of starting. Patient had acute respiratory distress, desaturation, and hypotension accompanied by tachycardia.    Limited imaging suggests severe AI and at least moderate to severe MR.  Quantitation was not possible.    Pt is currently 100% sats with HR 64 and /73    Pt given Lasix 40 mg IV x 1 in IRS  I informed Dr. Carrasco and the patient's sister Donna Lares after the procedure.    Full report to follow    Rocael Nunez MD  Chief, Cardiology  [facility]

## 2023-11-10 NOTE — DIETITIAN INITIAL EVALUATION ADULT - NS FNS DIET ORDER
Diet, Regular:   Consistent Carbohydrate {Evening Snack} (CSTCHOSN)  DASH/TLC {Sodium & Cholesterol Restricted} (DASH) (11-08-23 @ 04:01) [Active]

## 2023-11-10 NOTE — DISCHARGE NOTE PROVIDER - NSDCCPCAREPLAN_GEN_ALL_CORE_FT
PRINCIPAL DISCHARGE DIAGNOSIS  Diagnosis: Need for transfer to another facility  Assessment and Plan of Treatment: Acute on chronic systolic congestive heart failure.    Pt found to have new CHF in July, however, ischemic eval was postponed due to acute CVA. Now patient with daily chest pain and e/o volume overload.   -Prior TTE with a moderate to severe LV systolic dysfunction with an intracardiac shunt, LESLEY attempted 11/10, canceled due to flash pulm edema, hypotension, limited pictures were taken  -Cardiology following, s/p IV lasix -> changed to po   -Strict I/Os   -Daily standing weights  -11/14 LHC:  LAD prox 100% fills via right to left collateral   [ ] Transfer to [facility] for bypass & AVR. - Dr Loaiza   started on therapuetic lovenox

## 2023-11-10 NOTE — PHYSICAL THERAPY INITIAL EVALUATION ADULT - GENERAL OBSERVATIONS, REHAB EVAL
Chart reviewed. Pt. received in bed, no apparent distress, +tele, family member present at bedside. Chart reviewed. Pt. received in bed, no apparent distress, +tele, +pulse ox, HR 71 bpm, family member present at bedside.

## 2023-11-10 NOTE — DIETITIAN INITIAL EVALUATION ADULT - ORAL INTAKE PTA/DIET HISTORY
Patient reports usual body weight 190-200lbs. Patient reports good appetite prior to hospitalization. Patient states that she is trying to lose weight. Patient does not like to eat vegetables, has no known food allergies.

## 2023-11-10 NOTE — DISCHARGE NOTE PROVIDER - HOSPITAL COURSE
This is a 58F with history as above who presents to the hospital with multiple complaints including unsteady gait and paresthesia    Chronic systolic Heart failure  decreased EF last admission, CATH postponed due to CVA, plan for CATH 11/13  Cards following  Attempted LESLEY 11/10, cancelled due to hypotension and flash pulm edema.      Paresthesias with subjective weakness.    Patient with reports of worsening L sided numbness of the L side of her body with complaints of generalized weakness. Given her prior history of acute CVA, Neuro was consulted: symptoms likely recrudescence, no need for repeat imaging, will dc MRI brain  - c/w aspirin, high dose statin therapy  - c/w outpatient follow up with Dr. Wendi Lares.    Chest pain.   Intermittent  - Prior TTE with a moderate to severe LV systolic dysfunction with an intracardiac shunt, LESLEY attempted 11/10, canceled due to flash pulm edema, hypotension, limited oictures were taken  - Discussed with cards, plan for LHC 11/13 given decreased EF  - Cardiology consultation placed with Dr. German Carrasco (her outpatient cardiologist).  quid diarrhea then would obtain GI studies.    Bipolar depression.   - Patient reports worsening depression with insomnia, poor appetite, anhedonia, poor social support  - previously was on lithium and depakote, currently on olanzapine and trazodone  - No SI/HI, no AVH  - psych C&L consult following, outpt f/u  - Patient also having difficulty obtaining outpatient psych f/u, states the earlier appointment she obtained was for March 2024, encourage follow up and see if patient can get an earlier appointment prior to discharge.    Rheumatoid arthritis.    Rheum consulted, rec MTX continuation  - c/w folic acid supplementation  - Outpatient f/u with Dr. Gerald Teresa.    Type 2 diabetes mellitus with polyneuropathy.   ·  Plan: - New diagnosis of T2DM, unsure of what medications she is on currently for it  Outpt Po meds    : Antiphospholipid syndrome.   ·  Plan: - Changed from lovenox injs to eliquis 5mg BID -> will c/w eliquis 5mg BID  - c/w outpatient follow up with Dr. Tim Driver for her heme conditions.    Uveitis.   - Reports occasional double vision  - No eye discharge or pain noted  - Ophthalmology consult placed, pt declined exam and prefers outpt f/u  - c/w outpatient follow up with Dr. Naomi Goldberg.    History of breast cancer.   ·  Plan: - Unclear history of her breast cancer, reports ?RT and ?surgery   - Patient also reports having an appointment for mammogram this month -> might need to be rescheduled if she is in the hospital during the appointment.     58F smoker with PMH CVA with L sided weakness/numbness/L gaze deviation, DM2 with b/l peripheral neuropathy, COPD/Asthma, Breast Ca s/p ?RT, Bipolar depression, Rheumatoid Arthritis, Antiphospholipid syndrome, and L eye uveitis/scleritis, p/w multiple medical complaints. A/w exacerbation of L-sided subjective weakness/numbness likely 2/2 recrudescence of prior Right BG infarct. Cardiology following for new LV dysfunction since July, pending ischemic eval.      Acute on chronic systolic congestive heart failure.    Pt found to have new CHF in July, however, ischemic eval was postponed due to acute CVA. Now patient with daily chest pain and e/o volume overload.   -Prior TTE with a moderate to severe LV systolic dysfunction with an intracardiac shunt, LESLEY attempted 11/10, canceled due to flash pulm edema, hypotension, limited pictures were taken  -Cardiology following, s/p IV lasix -> changed to po   -Strict I/Os   -Daily standing weights  -LHC:  LAD prox 100% fills via right to left collateral   [ ] Transfer to [facility] for bypass & AVR. - Dr Loaiza     Chest pain.    Reports daily chest pain, L sided, pressure like, radiation to R side at times, occurs randomly; also with significant KIRK  - No chest pain currently, EKG nonischemic, troponin indeterminant x2 but with positive downtrend  - Prior TTE with a moderate to severe LV systolic dysfunction with an intracardiac shunt, LESLEY attempted 11/10, canceled due to flash pulm edema, hypotension, limited pictures were taken  - [facility] as above.    Paresthesias with subjective weakness.    Patient with reports of worsening L sided numbness of the L side of her body with complaints of generalized weakness  - On examination noted to have decreased sensation to light touch on the L side of her body face/arm/leg, also with 4+/5 strength L arm/leg seems to be at baseline when compared to prior  - Given her prior history of acute CVA, Neuro was consulted: symptoms likely recrudescence, no need for repeat imaging  - c/w aspirin, high dose statin therapy  - c/w outpatient follow up with Dr. Wendi Lares.    Bipolar depression.   Patient reports worsening depression with insomnia, poor appetite, anhedonia, poor social support  - previously was on lithium and depakote, currently on olanzapine and trazodone  - No SI/HI, no AVH  - psych C&L consult following, outpt f/u  - Patient also having difficulty obtaining outpatient psych f/u, states the earlier appointment she obtained was for March 2024, encourage follow up and see if patient can get an earlier appointment prior to discharge.    Rheumatoid arthritis.    Stable, not in flare.  - C/w home MTX, prednisone, and folic acid   - Outpatient f/u with Dr. Gerald Teresa.    Type 2 diabetes mellitus with polyneuropathy.    Recently diagnosed with T2DM. Not started on any medications.   - A1c 7.6%  - CC diet   - C/w lantus 6U qHS and admelog 4U qAC   - Low dose ISS.     Antiphospholipid syndrome.    Eliquis on hold for bypass  - started on therapeutic Lovenox  - per Dr Loaiza   - Outpatient follow up with Dr. Tim Driver.    Uveitis.    - c/o b/l blurry vision  - On snellen her visual acuity is 20/40 on R and 20/50 on L  - Reports occasional double vision  - No eye discharge or pain noted  - Ophthalmology consult placed, pt declined exam and prefers outpt f/u  - c/w outpatient follow up with Dr. Naomi Goldberg.     History of breast cancer.   H/o breast cancer s/p XRT now reportedly in remission.   -Pending outpatient MMG.    Need for prophylactic measure.   ·DVT ppx: Lovenox this PM   Diet: DASH/TLC   Dispo: [facility].

## 2023-11-10 NOTE — DISCHARGE NOTE PROVIDER - NSDCFUSCHEDAPPT_GEN_ALL_CORE_FT
[facility]  ELECTROPH [id number]  Scheduled Appointment: 12/12/2023    Wendi Lares  [facility]  NEUROLOGY [address] [facility]  Scheduled Appointment: 01/03/2024    Wendi Lares  [facility]  NEUROLOGY [address] [facility]  Scheduled Appointment: 01/03/2024    Goldberg, Naomi  [facility]  OPHTHALM [address] [facility]  Scheduled Appointment: 01/22/2024     Sivakumar Loaiza  [facility]  CTSURG [address]  Scheduled Appointment: 11/17/2023    [facility]  ELECTROPH [id number]  Scheduled Appointment: 12/12/2023    Wendi Lares  [facility]  NEUROLOGY [address] [facility]  Scheduled Appointment: 01/03/2024    Wendi Lares  [facility]  NEUROLOGY [address] [facility]  Scheduled Appointment: 01/03/2024    Goldberg, Naomi  [facility]  [address]ALM [address] [facility]  Scheduled Appointment: 01/22/2024     Sivakumar Loaiza  [facility]  Scheduled Appointment: 11/16/2023    Sivakumar Loaiza  [facility]  CTSURG [address]  Scheduled Appointment: 11/17/2023    [facility]  ELECTROPH [id number]  Scheduled Appointment: 12/12/2023    Wendi Lares  [facility]  NEUROLOGY [address] [facility]  Scheduled Appointment: 01/03/2024    Wendi Lares  [facility]  NEUROLOGY [address] [facility]  Scheduled Appointment: 01/03/2024    Goldberg, Naomi  [facility]  [address]THALM [address]  Scheduled Appointment: 01/22/2024

## 2023-11-10 NOTE — DISCHARGE NOTE PROVIDER - NSDCMRMEDTOKEN_GEN_ALL_CORE_FT
aspirin 81 mg oral tablet: 1 tab(s) orally once a day  atorvastatin 80 mg oral tablet: 1 tab(s) orally once a day (at bedtime)  Eliquis 5 mg oral tablet: 1 tab(s) orally 2 times a day  folic acid 1 mg oral tablet: 1 tab(s) orally once a day  gabapentin 300 mg oral capsule: 1 cap(s) orally 3 times a day  methotrexate 2.5 mg oral tablet: 6 tab(s) orally once a week on Friday  metoprolol succinate 25 mg oral capsule, extended release: 1 cap(s) orally once a day  OLANZapine 5 mg oral tablet: 1 tab(s) orally once a day at 8 pm  pantoprazole 40 mg oral delayed release tablet: 1 tab(s) orally once a day (before a meal)  prednisoLONE acetate 1% ophthalmic suspension: 1 drop(s) to each affected eye 4 times a day  predniSONE 10 mg oral tablet: 1 tab(s) orally once a day  spironolactone 25 mg oral tablet: 1 tab(s) orally once a day hold for low blood pressure  Symbicort 160 mcg-4.5 mcg/inh inhalation aerosol: 2 puff(s) inhaled 2 times a day  traZODone 50 mg oral tablet: 1 tab(s) orally once a day (at bedtime) hold for sedation or low blood pressure MDD: 1  Vitamin D3 25 mcg (1000 intl units) oral capsule: 1 cap(s) orally once a day   acetaminophen 325 mg oral tablet: 2 tab(s) orally every 6 hours As needed Temp greater or equal to 38C (100.4F), Mild Pain (1 - 3)  aluminum hydroxide-magnesium hydroxide 200 mg-200 mg/5 mL oral suspension: 30 milliliter(s) orally every 4 hours As needed Dyspepsia  aspirin 81 mg oral tablet: 1 tab(s) orally once a day  atorvastatin 80 mg oral tablet: 1 tab(s) orally once a day (at bedtime)  budesonide-formoterol 160 mcg-4.5 mcg/inh inhalation aerosol: 2 puff(s) inhaled 2 times a day  cholecalciferol oral tablet: 1000 unit(s) orally once a day  folic acid 1 mg oral tablet: 1 tab(s) orally once a day  furosemide 40 mg oral tablet: 1 tab(s) orally once a day  gabapentin 300 mg oral capsule: 1 cap(s) orally 3 times a day  insulin glargine 100 units/mL subcutaneous solution: 6 unit(s) subcutaneous once a day (at bedtime)  insulin lispro 100 units/mL injectable solution: 4 unit(s) injectable 3 times a day premeal  insulin lispro 100 units/mL injectable solution: 1 unit(s) injectable once a day (at bedtime) 0 Unit(s) if Glucose 61 - 250  2 Unit(s) if Glucose 251 - 300  4 Unit(s) if Glucose 301 - 350  6 Unit(s) if Glucose 351 - 400  8 Unit(s) if Glucose Greater Than 400  at bedtime  insulin lispro 100 units/mL injectable solution: 1 unit(s) injectable 3 times a day 2 Unit(s) if Glucose 151 - 200  4 Unit(s) if Glucose 201 - 250  6 Unit(s) if Glucose 251 - 300  8 Unit(s) if Glucose 301 - 350  10 Unit(s) if Glucose 351 - 400  12 Unit(s) if Glucose Greater Than 400  before meals  ipratropium-albuterol 0.5 mg-2.5 mg/3 mL inhalation solution: 3 milliliter(s) inhaled every 6 hours As needed Shortness of Breath and/or Wheezing  melatonin 3 mg oral tablet: 1 tab(s) orally once a day (at bedtime) As needed Insomnia  methotrexate 2.5 mg oral tablet: 6 tab(s) orally  metoprolol succinate 25 mg oral capsule, extended release: 1 cap(s) orally once a day  nicotine 10 mg inhalation device: 1 inhaler(s) inhaled every 4 hours as needed for nicotine  OLANZapine 10 mg intramuscular injection: 2.5 milligram(s) intramuscular every 12 hours As needed agitation  OLANZapine 7.5 mg oral tablet: 1 tab(s) orally once a day  ondansetron 2 mg/mL injectable solution: 8 milligram(s) injectable every 8 hours as needed for  nausea  pantoprazole 40 mg oral delayed release tablet: 1 tab(s) orally once a day (before a meal)  predniSONE 10 mg oral tablet: 1 tab(s) orally once a day  spironolactone 25 mg oral tablet: 1 tab(s) orally once a day  traZODone 50 mg oral tablet: 1 tab(s) orally once a day (at bedtime) hold for sedation or low blood pressure MDD: 1   acetaminophen 325 mg oral tablet: 2 tab(s) orally every 6 hours As needed Temp greater or equal to 38C (100.4F), Mild Pain (1 - 3)  aluminum hydroxide-magnesium hydroxide 200 mg-200 mg/5 mL oral suspension: 30 milliliter(s) orally every 4 hours As needed Dyspepsia  aspirin 81 mg oral tablet: 1 tab(s) orally once a day  atorvastatin 80 mg oral tablet: 1 tab(s) orally once a day (at bedtime)  budesonide-formoterol 160 mcg-4.5 mcg/inh inhalation aerosol: 2 puff(s) inhaled 2 times a day  cholecalciferol oral tablet: 1000 unit(s) orally once a day  folic acid 1 mg oral tablet: 1 tab(s) orally once a day  furosemide 40 mg oral tablet: 1 tab(s) orally once a day  gabapentin 300 mg oral capsule: 1 cap(s) orally 3 times a day  insulin glargine 100 units/mL subcutaneous solution: 6 unit(s) subcutaneous once a day (at bedtime)  insulin lispro 100 units/mL injectable solution: 4 unit(s) injectable 3 times a day premeal  insulin lispro 100 units/mL injectable solution: 1 unit(s) injectable once a day (at bedtime) 0 Unit(s) if Glucose 61 - 250  2 Unit(s) if Glucose 251 - 300  4 Unit(s) if Glucose 301 - 350  6 Unit(s) if Glucose 351 - 400  8 Unit(s) if Glucose Greater Than 400  at bedtime  insulin lispro 100 units/mL injectable solution: 1 unit(s) injectable 3 times a day 2 Unit(s) if Glucose 151 - 200  4 Unit(s) if Glucose 201 - 250  6 Unit(s) if Glucose 251 - 300  8 Unit(s) if Glucose 301 - 350  10 Unit(s) if Glucose 351 - 400  12 Unit(s) if Glucose Greater Than 400  before meals  ipratropium-albuterol 0.5 mg-2.5 mg/3 mL inhalation solution: 3 milliliter(s) inhaled every 6 hours As needed Shortness of Breath and/or Wheezing  melatonin 3 mg oral tablet: 1 tab(s) orally once a day (at bedtime) As needed Insomnia  methotrexate 2.5 mg oral tablet: 6 tab(s) orally once a week friday at 9:00  metoprolol succinate 25 mg oral capsule, extended release: 1 cap(s) orally once a day  nicotine 10 mg inhalation device: 1 inhaler(s) inhaled every 4 hours as needed for nicotine  OLANZapine 10 mg intramuscular injection: 2.5 milligram(s) intramuscular every 12 hours As needed agitation  OLANZapine 7.5 mg oral tablet: 1 tab(s) orally once a day  ondansetron 2 mg/mL injectable solution: 8 milligram(s) injectable every 8 hours as needed for  nausea  pantoprazole 40 mg oral delayed release tablet: 1 tab(s) orally once a day (before a meal)  predniSONE 10 mg oral tablet: 1 tab(s) orally once a day  spironolactone 25 mg oral tablet: 1 tab(s) orally once a day  traZODone 50 mg oral tablet: 1 tab(s) orally once a day (at bedtime) hold for sedation or low blood pressure MDD: 1   acetaminophen 325 mg oral tablet: 2 tab(s) orally every 6 hours As needed Temp greater or equal to 38C (100.4F), Mild Pain (1 - 3)  aluminum hydroxide-magnesium hydroxide 200 mg-200 mg/5 mL oral suspension: 30 milliliter(s) orally every 4 hours As needed Dyspepsia  aspirin 81 mg oral tablet: 1 tab(s) orally once a day  atorvastatin 80 mg oral tablet: 1 tab(s) orally once a day (at bedtime)  budesonide-formoterol 160 mcg-4.5 mcg/inh inhalation aerosol: 2 puff(s) inhaled 2 times a day  cholecalciferol oral tablet: 1000 unit(s) orally once a day  folic acid 1 mg oral tablet: 1 tab(s) orally once a day  furosemide 40 mg oral tablet: 1 tab(s) orally once a day  gabapentin 300 mg oral capsule: 1 cap(s) orally 3 times a day  insulin glargine 100 units/mL subcutaneous solution: 6 unit(s) subcutaneous once a day (at bedtime)  insulin lispro 100 units/mL injectable solution: 4 unit(s) injectable 3 times a day premeal  insulin lispro 100 units/mL injectable solution: 1 unit(s) injectable once a day (at bedtime) 0 Unit(s) if Glucose 61 - 250  2 Unit(s) if Glucose 251 - 300  4 Unit(s) if Glucose 301 - 350  6 Unit(s) if Glucose 351 - 400  8 Unit(s) if Glucose Greater Than 400  at bedtime  insulin lispro 100 units/mL injectable solution: 1 unit(s) injectable 3 times a day 2 Unit(s) if Glucose 151 - 200  4 Unit(s) if Glucose 201 - 250  6 Unit(s) if Glucose 251 - 300  8 Unit(s) if Glucose 301 - 350  10 Unit(s) if Glucose 351 - 400  12 Unit(s) if Glucose Greater Than 400  before meals  ipratropium-albuterol 0.5 mg-2.5 mg/3 mL inhalation solution: 3 milliliter(s) inhaled every 6 hours As needed Shortness of Breath and/or Wheezing  Lovenox 80 mg/0.8 mL injectable solution: 85 milligram(s) subcutaneously 2 times a day  melatonin 3 mg oral tablet: 1 tab(s) orally once a day (at bedtime) As needed Insomnia  methotrexate 2.5 mg oral tablet: 6 tab(s) orally once a week friday at 9:00  metoprolol succinate 25 mg oral capsule, extended release: 1 cap(s) orally once a day  nicotine 10 mg inhalation device: 1 inhaler(s) inhaled every 4 hours as needed for nicotine  OLANZapine 10 mg intramuscular injection: 2.5 milligram(s) intramuscular every 12 hours As needed agitation  OLANZapine 7.5 mg oral tablet: 1 tab(s) orally once a day  ondansetron 2 mg/mL injectable solution: 8 milligram(s) injectable every 8 hours as needed for  nausea  pantoprazole 40 mg oral delayed release tablet: 1 tab(s) orally once a day (before a meal)  predniSONE 10 mg oral tablet: 1 tab(s) orally once a day  spironolactone 25 mg oral tablet: 1 tab(s) orally once a day  traZODone 50 mg oral tablet: 1 tab(s) orally once a day (at bedtime) hold for sedation or low blood pressure MDD: 1

## 2023-11-10 NOTE — PHYSICAL THERAPY INITIAL EVALUATION ADULT - ADDITIONAL COMMENTS
Pt. left in bed post PT Evaluation in no apparent distress, all lines intact, call bell within reach, family member present. RN made aware of pt. status. Pt. left in bed post PT Evaluation in no apparent distress, all lines intact, SpO2 97% on room air, call bell within reach, family member present. RN made aware of pt. status.

## 2023-11-10 NOTE — DIETITIAN INITIAL EVALUATION ADULT - PROBLEM SELECTOR PROBLEM 8
Corrected phone number---- [phone]  This writer left a message on mother's voicemail #[phone]. Requested that mother call the contact center [phone] to request patient's account be registered. CC will then forward the call to [facility] peds/behavioral health.    Type 2 diabetes mellitus with polyneuropathy

## 2023-11-10 NOTE — DIETITIAN INITIAL EVALUATION ADULT - PERTINENT LABORATORY DATA
11-10    142  |  109<H>  |  15  ----------------------------<  113<H>  4.0   |  22  |  0.61    Ca    9.0      10 Nov 2023 07:00  Phos  3.2     11-10  Mg     1.90     11-10    TPro  6.2  /  Alb  3.8  /  TBili  0.6  /  DBili  x   /  AST  13  /  ALT  16  /  AlkPhos  103  11-10  POCT Blood Glucose.: 372 mg/dL (11-10-23 @ 17:32)  A1C with Estimated Average Glucose Result: 7.5 % (11-09-23 @ 06:15)  A1C with Estimated Average Glucose Result: 7.6 % (11-08-23 @ 06:43)  A1C with Estimated Average Glucose Result: 6.1 % (07-11-23 @ 06:02)

## 2023-11-10 NOTE — DISCHARGE NOTE PROVIDER - CARE PROVIDER_API CALL
Lamar Justin  Physician Assistant Services  [address], [address]  [place] [address]  Phone: [phone]  Follow Up Time:

## 2023-11-10 NOTE — PATIENT PROFILE ADULT - FALL HARM RISK - HARM RISK INTERVENTIONS
Communicate Risk of Fall with Harm to all staff/Monitor gait and stability/Reinforce activity limits and safety measures with patient and family/Tailored Fall Risk Interventions/Visual Cue: Yellow wristband and red socks/Bed in lowest position, wheels locked, appropriate side rails in place/Call bell, personal items and telephone in reach/Instruct patient to call for assistance before getting out of bed or chair/Non-slip footwear when patient is out of bed/[place] to call system/Physically safe environment - no spills, clutter or unnecessary equipment/Purposeful Proactive Rounding/Room/bathroom lighting operational, light cord in reach

## 2023-11-10 NOTE — PROVIDER CONTACT NOTE (OTHER) - BACKGROUND
Patient admitted for weakness. PMH of smoking, DM, CVA with left sided weakness and left eye deviation, COPD.
Patient admitted for weakness. PMH of smoking, DM, CVA with left sided weakness and left eye deviation, COPD.

## 2023-11-10 NOTE — DIETITIAN INITIAL EVALUATION ADULT - OTHER INFO
58F R-handed PMHx acute ischemic strokes (Right BG infarct 7/2023 and another unknown infarct in 2022; residual Left-sided weakness/numbness), T2DM w/ peripheral neuropathy, COPD/Asthma, Breast Ca s/p ?RT, Bipolar depression, Rheumatoid Arthritis, Antiphospholipid syndrome, L eye uveitis/scleritis, and current tobacco use (7 cigarettes / day) who presents to the hospital with multiple complaints including worsening of Left-sided weakness and numbness, per chart.     Patient states that she has good appetite, stating that she does not like the food provided in the hospital. As per RN flow sheet, noted patient with 1x % and 1x 0-25% meal consumption documented. Patient denies any difficulty chewing or swallowing, any nausea, vomiting, diarrhea, constipation during visit. Reports last bowel movement 11/9. Food preferences obtained during visit, adequate po intake encouraged. Current weight: 86.2kg (11/8, per chart). [facility] weight history: 87.5kg (10/26), 77.1kg (7/13), 78.5kg (6/30). Noted patient has weight fluctuation of -1.3kg/-1.4%BWx 2weeks, +9.1kg/+11%BW x 4 months and +7.7kg/+9.8%BW x 5 months. Noted patient is on spirolactone, lasix. Fluid shift could cause weight changes. Continue to monitor weight trend. Noted patient is on cholecalciferol for micronutrient support. Last HgA1c- 7.5% (11/9), POCT- 117-254mg/dL(last 24 hrs). RD provided the patient with extensive verbal and written DM diet education; including, source of carbohydrates, Plate Method, label reading, meal planning, avoid concentrated sweets and beverages. Also discussed Heart Healthy diet recommendations, portion control and healthy food choices. Importance of having consistent carbohydrate with protein at each meals emphasized. Patient is receptive to the information provided. Patient also requested RD to call daughter to provide nutrition education. RD attempted to call daughter, daughter did not . RD provided written nutritional materials by bedside for family references.

## 2023-11-10 NOTE — PHYSICAL THERAPY INITIAL EVALUATION ADULT - PERTINENT HX OF CURRENT PROBLEM, REHAB EVAL
Per documentation, pt. admitted for multiple complaints including unsteady gait and paresthesia. Pt. has history of CVA with L side weakness. Per documentation, pt. admitted for multiple complaints including unsteady gait and paresthesia. Pt. has history of CVA with left side weakness.

## 2023-11-10 NOTE — DIETITIAN INITIAL EVALUATION ADULT - PERTINENT MEDS FT
MEDICATIONS  (STANDING):  apixaban 5 milliGRAM(s) Oral every 12 hours  aspirin enteric coated 81 milliGRAM(s) Oral daily  atorvastatin 80 milliGRAM(s) Oral at bedtime  budesonide 160 MICROgram(s)/formoterol 4.5 MICROgram(s) Inhaler 2 Puff(s) Inhalation two times a day  cholecalciferol 1000 Unit(s) Oral daily  dextrose 5%. 1000 milliLiter(s) (50 mL/Hr) IV Continuous <Continuous>  dextrose 5%. 1000 milliLiter(s) (100 mL/Hr) IV Continuous <Continuous>  dextrose 50% Injectable 25 Gram(s) IV Push once  dextrose 50% Injectable 25 Gram(s) IV Push once  dextrose 50% Injectable 12.5 Gram(s) IV Push once  folic acid 1 milliGRAM(s) Oral daily  gabapentin 300 milliGRAM(s) Oral three times a day  glucagon  Injectable 1 milliGRAM(s) IntraMuscular once  influenza   Vaccine 0.5 milliLiter(s) IntraMuscular once  insulin glargine Injectable (LANTUS) 6 Unit(s) SubCutaneous at bedtime  insulin lispro (ADMELOG) corrective regimen sliding scale   SubCutaneous three times a day before meals  insulin lispro (ADMELOG) corrective regimen sliding scale   SubCutaneous at bedtime  insulin lispro Injectable (ADMELOG) 2 Unit(s) SubCutaneous three times a day before meals  methotrexate (Non - oncologic) 15 milliGRAM(s) Oral <User Schedule>  metoprolol succinate ER 25 milliGRAM(s) Oral daily  nicotine - Inhaler 1 Each Inhalation every 4 hours  OLANZapine 5 milliGRAM(s) Oral at bedtime  pantoprazole    Tablet 40 milliGRAM(s) Oral before breakfast  predniSONE   Tablet 10 milliGRAM(s) Oral daily  spironolactone 25 milliGRAM(s) Oral daily  traZODone 50 milliGRAM(s) Oral at bedtime    MEDICATIONS  (PRN):  acetaminophen     Tablet .. 650 milliGRAM(s) Oral every 6 hours PRN Temp greater or equal to 38C (100.4F), Mild Pain (1 - 3)  albuterol/ipratropium for Nebulization 3 milliLiter(s) Nebulizer every 6 hours PRN Shortness of Breath and/or Wheezing  aluminum hydroxide/magnesium hydroxide/simethicone Suspension 30 milliLiter(s) Oral every 4 hours PRN Dyspepsia  dextrose Oral Gel 15 Gram(s) Oral once PRN Blood Glucose LESS THAN 70 milliGRAM(s)/deciliter  melatonin 3 milliGRAM(s) Oral at bedtime PRN Insomnia  OLANZapine Injectable 2.5 milliGRAM(s) IntraMuscular every 12 hours PRN agitation  ondansetron Injectable 4 milliGRAM(s) IV Push every 8 hours PRN Nausea and/or Vomiting

## 2023-11-11 LAB
ALBUMIN SERPL ELPH-MCNC: 3.7 G/DL — SIGNIFICANT CHANGE UP (ref 3.3–5)
ALBUMIN SERPL ELPH-MCNC: 3.7 G/DL — SIGNIFICANT CHANGE UP (ref 3.3–5)
ALP SERPL-CCNC: 102 U/L — SIGNIFICANT CHANGE UP (ref 40–120)
ALP SERPL-CCNC: 102 U/L — SIGNIFICANT CHANGE UP (ref 40–120)
ALT FLD-CCNC: 16 U/L — SIGNIFICANT CHANGE UP (ref 4–33)
ALT FLD-CCNC: 16 U/L — SIGNIFICANT CHANGE UP (ref 4–33)
ANION GAP SERPL CALC-SCNC: 12 MMOL/L — SIGNIFICANT CHANGE UP (ref 7–14)
ANION GAP SERPL CALC-SCNC: 12 MMOL/L — SIGNIFICANT CHANGE UP (ref 7–14)
AST SERPL-CCNC: 9 U/L — SIGNIFICANT CHANGE UP (ref 4–32)
AST SERPL-CCNC: 9 U/L — SIGNIFICANT CHANGE UP (ref 4–32)
B2 GLYCOPROT1 AB SER QL: NEGATIVE — SIGNIFICANT CHANGE UP
B2 GLYCOPROT1 AB SER QL: NEGATIVE — SIGNIFICANT CHANGE UP
BASOPHILS # BLD AUTO: 0.07 K/UL — SIGNIFICANT CHANGE UP (ref 0–0.2)
BASOPHILS # BLD AUTO: 0.07 K/UL — SIGNIFICANT CHANGE UP (ref 0–0.2)
BASOPHILS NFR BLD AUTO: 0.5 % — SIGNIFICANT CHANGE UP (ref 0–2)
BASOPHILS NFR BLD AUTO: 0.5 % — SIGNIFICANT CHANGE UP (ref 0–2)
BILIRUB SERPL-MCNC: 1 MG/DL — SIGNIFICANT CHANGE UP (ref 0.2–1.2)
BILIRUB SERPL-MCNC: 1 MG/DL — SIGNIFICANT CHANGE UP (ref 0.2–1.2)
BUN SERPL-MCNC: 18 MG/DL — SIGNIFICANT CHANGE UP (ref 7–23)
BUN SERPL-MCNC: 18 MG/DL — SIGNIFICANT CHANGE UP (ref 7–23)
CALCIUM SERPL-MCNC: 8.8 MG/DL — SIGNIFICANT CHANGE UP (ref 8.4–10.5)
CALCIUM SERPL-MCNC: 8.8 MG/DL — SIGNIFICANT CHANGE UP (ref 8.4–10.5)
CARDIOLIPIN AB SER-ACNC: NEGATIVE — SIGNIFICANT CHANGE UP
CARDIOLIPIN AB SER-ACNC: NEGATIVE — SIGNIFICANT CHANGE UP
CCP IGG SERPL-ACNC: <8 UNITS — SIGNIFICANT CHANGE UP
CCP IGG SERPL-ACNC: <8 UNITS — SIGNIFICANT CHANGE UP
CHLORIDE SERPL-SCNC: 106 MMOL/L — SIGNIFICANT CHANGE UP (ref 98–107)
CHLORIDE SERPL-SCNC: 106 MMOL/L — SIGNIFICANT CHANGE UP (ref 98–107)
CO2 SERPL-SCNC: 22 MMOL/L — SIGNIFICANT CHANGE UP (ref 22–31)
CO2 SERPL-SCNC: 22 MMOL/L — SIGNIFICANT CHANGE UP (ref 22–31)
CREAT SERPL-MCNC: 0.65 MG/DL — SIGNIFICANT CHANGE UP (ref 0.5–1.3)
CREAT SERPL-MCNC: 0.65 MG/DL — SIGNIFICANT CHANGE UP (ref 0.5–1.3)
EGFR: 101 ML/MIN/1.73M2 — SIGNIFICANT CHANGE UP
EGFR: 101 ML/MIN/1.73M2 — SIGNIFICANT CHANGE UP
EOSINOPHIL # BLD AUTO: 0.17 K/UL — SIGNIFICANT CHANGE UP (ref 0–0.5)
EOSINOPHIL # BLD AUTO: 0.17 K/UL — SIGNIFICANT CHANGE UP (ref 0–0.5)
EOSINOPHIL NFR BLD AUTO: 1.2 % — SIGNIFICANT CHANGE UP (ref 0–6)
EOSINOPHIL NFR BLD AUTO: 1.2 % — SIGNIFICANT CHANGE UP (ref 0–6)
GAMMA INTERFERON BACKGROUND BLD IA-ACNC: 0.01 IU/ML — SIGNIFICANT CHANGE UP
GAMMA INTERFERON BACKGROUND BLD IA-ACNC: 0.01 IU/ML — SIGNIFICANT CHANGE UP
GLUCOSE SERPL-MCNC: 138 MG/DL — HIGH (ref 70–99)
GLUCOSE SERPL-MCNC: 138 MG/DL — HIGH (ref 70–99)
HCT VFR BLD CALC: 39.4 % — SIGNIFICANT CHANGE UP (ref 34.5–45)
HCT VFR BLD CALC: 39.4 % — SIGNIFICANT CHANGE UP (ref 34.5–45)
HGB BLD-MCNC: 12.6 G/DL — SIGNIFICANT CHANGE UP (ref 11.5–15.5)
HGB BLD-MCNC: 12.6 G/DL — SIGNIFICANT CHANGE UP (ref 11.5–15.5)
IANC: 11.91 K/UL — HIGH (ref 1.8–7.4)
IANC: 11.91 K/UL — HIGH (ref 1.8–7.4)
IMM GRANULOCYTES NFR BLD AUTO: 0.6 % — SIGNIFICANT CHANGE UP (ref 0–0.9)
IMM GRANULOCYTES NFR BLD AUTO: 0.6 % — SIGNIFICANT CHANGE UP (ref 0–0.9)
LYMPHOCYTES # BLD AUTO: 1.85 K/UL — SIGNIFICANT CHANGE UP (ref 1–3.3)
LYMPHOCYTES # BLD AUTO: 1.85 K/UL — SIGNIFICANT CHANGE UP (ref 1–3.3)
LYMPHOCYTES # BLD AUTO: 12.6 % — LOW (ref 13–44)
LYMPHOCYTES # BLD AUTO: 12.6 % — LOW (ref 13–44)
M TB IFN-G BLD-IMP: NEGATIVE — SIGNIFICANT CHANGE UP
M TB IFN-G BLD-IMP: NEGATIVE — SIGNIFICANT CHANGE UP
M TB IFN-G CD4+ BCKGRND COR BLD-ACNC: 0 IU/ML — SIGNIFICANT CHANGE UP
M TB IFN-G CD4+ BCKGRND COR BLD-ACNC: 0 IU/ML — SIGNIFICANT CHANGE UP
M TB IFN-G CD4+CD8+ BCKGRND COR BLD-ACNC: 0 IU/ML — SIGNIFICANT CHANGE UP
M TB IFN-G CD4+CD8+ BCKGRND COR BLD-ACNC: 0 IU/ML — SIGNIFICANT CHANGE UP
MAGNESIUM SERPL-MCNC: 1.9 MG/DL — SIGNIFICANT CHANGE UP (ref 1.6–2.6)
MAGNESIUM SERPL-MCNC: 1.9 MG/DL — SIGNIFICANT CHANGE UP (ref 1.6–2.6)
MCHC RBC-ENTMCNC: 29 PG — SIGNIFICANT CHANGE UP (ref 27–34)
MCHC RBC-ENTMCNC: 29 PG — SIGNIFICANT CHANGE UP (ref 27–34)
MCHC RBC-ENTMCNC: 32 GM/DL — SIGNIFICANT CHANGE UP (ref 32–36)
MCHC RBC-ENTMCNC: 32 GM/DL — SIGNIFICANT CHANGE UP (ref 32–36)
MCV RBC AUTO: 90.8 FL — SIGNIFICANT CHANGE UP (ref 80–100)
MCV RBC AUTO: 90.8 FL — SIGNIFICANT CHANGE UP (ref 80–100)
MONOCYTES # BLD AUTO: 0.64 K/UL — SIGNIFICANT CHANGE UP (ref 0–0.9)
MONOCYTES # BLD AUTO: 0.64 K/UL — SIGNIFICANT CHANGE UP (ref 0–0.9)
MONOCYTES NFR BLD AUTO: 4.3 % — SIGNIFICANT CHANGE UP (ref 2–14)
MONOCYTES NFR BLD AUTO: 4.3 % — SIGNIFICANT CHANGE UP (ref 2–14)
NEUTROPHILS # BLD AUTO: 11.91 K/UL — HIGH (ref 1.8–7.4)
NEUTROPHILS # BLD AUTO: 11.91 K/UL — HIGH (ref 1.8–7.4)
NEUTROPHILS NFR BLD AUTO: 80.8 % — HIGH (ref 43–77)
NEUTROPHILS NFR BLD AUTO: 80.8 % — HIGH (ref 43–77)
NRBC # BLD: 0 /100 WBCS — SIGNIFICANT CHANGE UP (ref 0–0)
NRBC # BLD: 0 /100 WBCS — SIGNIFICANT CHANGE UP (ref 0–0)
NRBC # FLD: 0 K/UL — SIGNIFICANT CHANGE UP (ref 0–0)
NRBC # FLD: 0 K/UL — SIGNIFICANT CHANGE UP (ref 0–0)
PHOSPHATE SERPL-MCNC: 3.4 MG/DL — SIGNIFICANT CHANGE UP (ref 2.5–4.5)
PHOSPHATE SERPL-MCNC: 3.4 MG/DL — SIGNIFICANT CHANGE UP (ref 2.5–4.5)
PLATELET # BLD AUTO: 273 K/UL — SIGNIFICANT CHANGE UP (ref 150–400)
PLATELET # BLD AUTO: 273 K/UL — SIGNIFICANT CHANGE UP (ref 150–400)
POTASSIUM SERPL-MCNC: 4.2 MMOL/L — SIGNIFICANT CHANGE UP (ref 3.5–5.3)
POTASSIUM SERPL-MCNC: 4.2 MMOL/L — SIGNIFICANT CHANGE UP (ref 3.5–5.3)
POTASSIUM SERPL-SCNC: 4.2 MMOL/L — SIGNIFICANT CHANGE UP (ref 3.5–5.3)
POTASSIUM SERPL-SCNC: 4.2 MMOL/L — SIGNIFICANT CHANGE UP (ref 3.5–5.3)
PROT SERPL-MCNC: 6.3 G/DL — SIGNIFICANT CHANGE UP (ref 6–8.3)
PROT SERPL-MCNC: 6.3 G/DL — SIGNIFICANT CHANGE UP (ref 6–8.3)
QUANT TB PLUS MITOGEN MINUS NIL: 2.5 IU/ML — SIGNIFICANT CHANGE UP
QUANT TB PLUS MITOGEN MINUS NIL: 2.5 IU/ML — SIGNIFICANT CHANGE UP
RBC # BLD: 4.34 M/UL — SIGNIFICANT CHANGE UP (ref 3.8–5.2)
RBC # BLD: 4.34 M/UL — SIGNIFICANT CHANGE UP (ref 3.8–5.2)
RBC # FLD: 16.1 % — HIGH (ref 10.3–14.5)
RBC # FLD: 16.1 % — HIGH (ref 10.3–14.5)
RF+CCP IGG SER-IMP: NEGATIVE — SIGNIFICANT CHANGE UP
RF+CCP IGG SER-IMP: NEGATIVE — SIGNIFICANT CHANGE UP
SODIUM SERPL-SCNC: 140 MMOL/L — SIGNIFICANT CHANGE UP (ref 135–145)
SODIUM SERPL-SCNC: 140 MMOL/L — SIGNIFICANT CHANGE UP (ref 135–145)
WBC # BLD: 14.73 K/UL — HIGH (ref 3.8–10.5)
WBC # BLD: 14.73 K/UL — HIGH (ref 3.8–10.5)
WBC # FLD AUTO: 14.73 K/UL — HIGH (ref 3.8–10.5)
WBC # FLD AUTO: 14.73 K/UL — HIGH (ref 3.8–10.5)

## 2023-11-11 PROCEDURE — 99232 SBSQ HOSP IP/OBS MODERATE 35: CPT

## 2023-11-11 RX ORDER — ACETAMINOPHEN 500 MG
1000 TABLET ORAL ONCE
Refills: 0 | Status: COMPLETED | OUTPATIENT
Start: 2023-11-11 | End: 2023-11-11

## 2023-11-11 RX ORDER — FUROSEMIDE 40 MG
40 TABLET ORAL
Refills: 0 | Status: DISCONTINUED | OUTPATIENT
Start: 2023-11-11 | End: 2023-11-14

## 2023-11-11 RX ORDER — HYDROXYZINE HCL 10 MG
25 TABLET ORAL ONCE
Refills: 0 | Status: COMPLETED | OUTPATIENT
Start: 2023-11-11 | End: 2023-11-11

## 2023-11-11 RX ADMIN — OLANZAPINE 5 MILLIGRAM(S): 15 TABLET, FILM COATED ORAL at 21:58

## 2023-11-11 RX ADMIN — BUDESONIDE AND FORMOTEROL FUMARATE DIHYDRATE 2 PUFF(S): 160; 4.5 AEROSOL RESPIRATORY (INHALATION) at 21:58

## 2023-11-11 RX ADMIN — Medication 1 EACH: at 09:04

## 2023-11-11 RX ADMIN — Medication 4: at 18:09

## 2023-11-11 RX ADMIN — GABAPENTIN 300 MILLIGRAM(S): 400 CAPSULE ORAL at 11:25

## 2023-11-11 RX ADMIN — Medication 1000 MILLIGRAM(S): at 15:44

## 2023-11-11 RX ADMIN — Medication 25 MILLIGRAM(S): at 22:15

## 2023-11-11 RX ADMIN — Medication 0: at 12:47

## 2023-11-11 RX ADMIN — GABAPENTIN 300 MILLIGRAM(S): 400 CAPSULE ORAL at 05:09

## 2023-11-11 RX ADMIN — INSULIN GLARGINE 6 UNIT(S): 100 INJECTION, SOLUTION SUBCUTANEOUS at 22:16

## 2023-11-11 RX ADMIN — Medication 2: at 09:07

## 2023-11-11 RX ADMIN — Medication 1 MILLIGRAM(S): at 11:24

## 2023-11-11 RX ADMIN — Medication 1000 MILLIGRAM(S): at 01:22

## 2023-11-11 RX ADMIN — Medication 1 EACH: at 12:48

## 2023-11-11 RX ADMIN — Medication 4 UNIT(S): at 18:10

## 2023-11-11 RX ADMIN — Medication 1 EACH: at 16:08

## 2023-11-11 RX ADMIN — PANTOPRAZOLE SODIUM 40 MILLIGRAM(S): 20 TABLET, DELAYED RELEASE ORAL at 05:09

## 2023-11-11 RX ADMIN — Medication 400 MILLIGRAM(S): at 00:22

## 2023-11-11 RX ADMIN — Medication 4 UNIT(S): at 12:47

## 2023-11-11 RX ADMIN — APIXABAN 5 MILLIGRAM(S): 2.5 TABLET, FILM COATED ORAL at 05:09

## 2023-11-11 RX ADMIN — BUDESONIDE AND FORMOTEROL FUMARATE DIHYDRATE 2 PUFF(S): 160; 4.5 AEROSOL RESPIRATORY (INHALATION) at 09:03

## 2023-11-11 RX ADMIN — Medication 50 MILLIGRAM(S): at 21:58

## 2023-11-11 RX ADMIN — SPIRONOLACTONE 25 MILLIGRAM(S): 25 TABLET, FILM COATED ORAL at 05:09

## 2023-11-11 RX ADMIN — Medication 4 UNIT(S): at 09:07

## 2023-11-11 RX ADMIN — Medication 81 MILLIGRAM(S): at 11:24

## 2023-11-11 RX ADMIN — APIXABAN 5 MILLIGRAM(S): 2.5 TABLET, FILM COATED ORAL at 18:08

## 2023-11-11 RX ADMIN — Medication 25 MILLIGRAM(S): at 05:09

## 2023-11-11 RX ADMIN — Medication 10 MILLIGRAM(S): at 05:09

## 2023-11-11 RX ADMIN — Medication 1 EACH: at 21:57

## 2023-11-11 RX ADMIN — Medication 40 MILLIGRAM(S): at 16:08

## 2023-11-11 RX ADMIN — Medication 400 MILLIGRAM(S): at 12:48

## 2023-11-11 RX ADMIN — ATORVASTATIN CALCIUM 80 MILLIGRAM(S): 80 TABLET, FILM COATED ORAL at 22:15

## 2023-11-11 RX ADMIN — GABAPENTIN 300 MILLIGRAM(S): 400 CAPSULE ORAL at 21:58

## 2023-11-11 RX ADMIN — Medication 1000 UNIT(S): at 11:24

## 2023-11-11 RX ADMIN — Medication 3 MILLIGRAM(S): at 21:59

## 2023-11-11 NOTE — PROGRESS NOTE ADULT - PROBLEM SELECTOR PLAN 12
DVT ppx - on eliquis  Diet - DASH/CC regular diet (prior S&S eval in July 2023 recommended regular diet with thin liquids, pt herself also denies dysphagia currently)  Activity - OOB with assistance, increase as tolerated    Social work eval for patient's living situation and disposition eval    Aspiration and fall precautions

## 2023-11-12 DIAGNOSIS — I50.23 ACUTE ON CHRONIC SYSTOLIC (CONGESTIVE) HEART FAILURE: ICD-10-CM

## 2023-11-12 LAB
ALBUMIN SERPL ELPH-MCNC: 3.9 G/DL — SIGNIFICANT CHANGE UP (ref 3.3–5)
ALBUMIN SERPL ELPH-MCNC: 3.9 G/DL — SIGNIFICANT CHANGE UP (ref 3.3–5)
ALP SERPL-CCNC: 109 U/L — SIGNIFICANT CHANGE UP (ref 40–120)
ALP SERPL-CCNC: 109 U/L — SIGNIFICANT CHANGE UP (ref 40–120)
ALT FLD-CCNC: 14 U/L — SIGNIFICANT CHANGE UP (ref 4–33)
ALT FLD-CCNC: 14 U/L — SIGNIFICANT CHANGE UP (ref 4–33)
ANION GAP SERPL CALC-SCNC: 12 MMOL/L — SIGNIFICANT CHANGE UP (ref 7–14)
ANION GAP SERPL CALC-SCNC: 12 MMOL/L — SIGNIFICANT CHANGE UP (ref 7–14)
AST SERPL-CCNC: 11 U/L — SIGNIFICANT CHANGE UP (ref 4–32)
AST SERPL-CCNC: 11 U/L — SIGNIFICANT CHANGE UP (ref 4–32)
BASOPHILS # BLD AUTO: 0.08 K/UL — SIGNIFICANT CHANGE UP (ref 0–0.2)
BASOPHILS # BLD AUTO: 0.08 K/UL — SIGNIFICANT CHANGE UP (ref 0–0.2)
BASOPHILS NFR BLD AUTO: 0.6 % — SIGNIFICANT CHANGE UP (ref 0–2)
BASOPHILS NFR BLD AUTO: 0.6 % — SIGNIFICANT CHANGE UP (ref 0–2)
BILIRUB SERPL-MCNC: 0.8 MG/DL — SIGNIFICANT CHANGE UP (ref 0.2–1.2)
BILIRUB SERPL-MCNC: 0.8 MG/DL — SIGNIFICANT CHANGE UP (ref 0.2–1.2)
BUN SERPL-MCNC: 28 MG/DL — HIGH (ref 7–23)
BUN SERPL-MCNC: 28 MG/DL — HIGH (ref 7–23)
CALCIUM SERPL-MCNC: 9.3 MG/DL — SIGNIFICANT CHANGE UP (ref 8.4–10.5)
CALCIUM SERPL-MCNC: 9.3 MG/DL — SIGNIFICANT CHANGE UP (ref 8.4–10.5)
CHLORIDE SERPL-SCNC: 102 MMOL/L — SIGNIFICANT CHANGE UP (ref 98–107)
CHLORIDE SERPL-SCNC: 102 MMOL/L — SIGNIFICANT CHANGE UP (ref 98–107)
CO2 SERPL-SCNC: 26 MMOL/L — SIGNIFICANT CHANGE UP (ref 22–31)
CO2 SERPL-SCNC: 26 MMOL/L — SIGNIFICANT CHANGE UP (ref 22–31)
CREAT SERPL-MCNC: 0.75 MG/DL — SIGNIFICANT CHANGE UP (ref 0.5–1.3)
CREAT SERPL-MCNC: 0.75 MG/DL — SIGNIFICANT CHANGE UP (ref 0.5–1.3)
EGFR: 92 ML/MIN/1.73M2 — SIGNIFICANT CHANGE UP
EGFR: 92 ML/MIN/1.73M2 — SIGNIFICANT CHANGE UP
EOSINOPHIL # BLD AUTO: 0.2 K/UL — SIGNIFICANT CHANGE UP (ref 0–0.5)
EOSINOPHIL # BLD AUTO: 0.2 K/UL — SIGNIFICANT CHANGE UP (ref 0–0.5)
EOSINOPHIL NFR BLD AUTO: 1.5 % — SIGNIFICANT CHANGE UP (ref 0–6)
EOSINOPHIL NFR BLD AUTO: 1.5 % — SIGNIFICANT CHANGE UP (ref 0–6)
GLUCOSE SERPL-MCNC: 155 MG/DL — HIGH (ref 70–99)
GLUCOSE SERPL-MCNC: 155 MG/DL — HIGH (ref 70–99)
HCT VFR BLD CALC: 41.2 % — SIGNIFICANT CHANGE UP (ref 34.5–45)
HCT VFR BLD CALC: 41.2 % — SIGNIFICANT CHANGE UP (ref 34.5–45)
HGB BLD-MCNC: 13 G/DL — SIGNIFICANT CHANGE UP (ref 11.5–15.5)
HGB BLD-MCNC: 13 G/DL — SIGNIFICANT CHANGE UP (ref 11.5–15.5)
IANC: 8.7 K/UL — HIGH (ref 1.8–7.4)
IANC: 8.7 K/UL — HIGH (ref 1.8–7.4)
IMM GRANULOCYTES NFR BLD AUTO: 0.4 % — SIGNIFICANT CHANGE UP (ref 0–0.9)
IMM GRANULOCYTES NFR BLD AUTO: 0.4 % — SIGNIFICANT CHANGE UP (ref 0–0.9)
LYMPHOCYTES # BLD AUTO: 25.9 % — SIGNIFICANT CHANGE UP (ref 13–44)
LYMPHOCYTES # BLD AUTO: 25.9 % — SIGNIFICANT CHANGE UP (ref 13–44)
LYMPHOCYTES # BLD AUTO: 3.41 K/UL — HIGH (ref 1–3.3)
LYMPHOCYTES # BLD AUTO: 3.41 K/UL — HIGH (ref 1–3.3)
MAGNESIUM SERPL-MCNC: 2 MG/DL — SIGNIFICANT CHANGE UP (ref 1.6–2.6)
MAGNESIUM SERPL-MCNC: 2 MG/DL — SIGNIFICANT CHANGE UP (ref 1.6–2.6)
MCHC RBC-ENTMCNC: 29.3 PG — SIGNIFICANT CHANGE UP (ref 27–34)
MCHC RBC-ENTMCNC: 29.3 PG — SIGNIFICANT CHANGE UP (ref 27–34)
MCHC RBC-ENTMCNC: 31.6 GM/DL — LOW (ref 32–36)
MCHC RBC-ENTMCNC: 31.6 GM/DL — LOW (ref 32–36)
MCV RBC AUTO: 92.8 FL — SIGNIFICANT CHANGE UP (ref 80–100)
MCV RBC AUTO: 92.8 FL — SIGNIFICANT CHANGE UP (ref 80–100)
MONOCYTES # BLD AUTO: 0.71 K/UL — SIGNIFICANT CHANGE UP (ref 0–0.9)
MONOCYTES # BLD AUTO: 0.71 K/UL — SIGNIFICANT CHANGE UP (ref 0–0.9)
MONOCYTES NFR BLD AUTO: 5.4 % — SIGNIFICANT CHANGE UP (ref 2–14)
MONOCYTES NFR BLD AUTO: 5.4 % — SIGNIFICANT CHANGE UP (ref 2–14)
NEUTROPHILS # BLD AUTO: 8.7 K/UL — HIGH (ref 1.8–7.4)
NEUTROPHILS # BLD AUTO: 8.7 K/UL — HIGH (ref 1.8–7.4)
NEUTROPHILS NFR BLD AUTO: 66.2 % — SIGNIFICANT CHANGE UP (ref 43–77)
NEUTROPHILS NFR BLD AUTO: 66.2 % — SIGNIFICANT CHANGE UP (ref 43–77)
NRBC # BLD: 0 /100 WBCS — SIGNIFICANT CHANGE UP (ref 0–0)
NRBC # BLD: 0 /100 WBCS — SIGNIFICANT CHANGE UP (ref 0–0)
NRBC # FLD: 0 K/UL — SIGNIFICANT CHANGE UP (ref 0–0)
NRBC # FLD: 0 K/UL — SIGNIFICANT CHANGE UP (ref 0–0)
PHOSPHATE SERPL-MCNC: 4.3 MG/DL — SIGNIFICANT CHANGE UP (ref 2.5–4.5)
PHOSPHATE SERPL-MCNC: 4.3 MG/DL — SIGNIFICANT CHANGE UP (ref 2.5–4.5)
PLATELET # BLD AUTO: 265 K/UL — SIGNIFICANT CHANGE UP (ref 150–400)
PLATELET # BLD AUTO: 265 K/UL — SIGNIFICANT CHANGE UP (ref 150–400)
POTASSIUM SERPL-MCNC: 3.2 MMOL/L — LOW (ref 3.5–5.3)
POTASSIUM SERPL-MCNC: 3.2 MMOL/L — LOW (ref 3.5–5.3)
POTASSIUM SERPL-SCNC: 3.2 MMOL/L — LOW (ref 3.5–5.3)
POTASSIUM SERPL-SCNC: 3.2 MMOL/L — LOW (ref 3.5–5.3)
PROT SERPL-MCNC: 6.4 G/DL — SIGNIFICANT CHANGE UP (ref 6–8.3)
PROT SERPL-MCNC: 6.4 G/DL — SIGNIFICANT CHANGE UP (ref 6–8.3)
RBC # BLD: 4.44 M/UL — SIGNIFICANT CHANGE UP (ref 3.8–5.2)
RBC # BLD: 4.44 M/UL — SIGNIFICANT CHANGE UP (ref 3.8–5.2)
RBC # FLD: 16.2 % — HIGH (ref 10.3–14.5)
RBC # FLD: 16.2 % — HIGH (ref 10.3–14.5)
RNAP III AB SER-ACNC: 4 UNITS — SIGNIFICANT CHANGE UP (ref 0–19)
RNAP III AB SER-ACNC: 4 UNITS — SIGNIFICANT CHANGE UP (ref 0–19)
SODIUM SERPL-SCNC: 140 MMOL/L — SIGNIFICANT CHANGE UP (ref 135–145)
SODIUM SERPL-SCNC: 140 MMOL/L — SIGNIFICANT CHANGE UP (ref 135–145)
WBC # BLD: 13.15 K/UL — HIGH (ref 3.8–10.5)
WBC # BLD: 13.15 K/UL — HIGH (ref 3.8–10.5)
WBC # FLD AUTO: 13.15 K/UL — HIGH (ref 3.8–10.5)
WBC # FLD AUTO: 13.15 K/UL — HIGH (ref 3.8–10.5)

## 2023-11-12 PROCEDURE — 99232 SBSQ HOSP IP/OBS MODERATE 35: CPT

## 2023-11-12 RX ORDER — POTASSIUM CHLORIDE 20 MEQ
40 PACKET (EA) ORAL EVERY 4 HOURS
Refills: 0 | Status: COMPLETED | OUTPATIENT
Start: 2023-11-12 | End: 2023-11-12

## 2023-11-12 RX ORDER — ACETAMINOPHEN 500 MG
1000 TABLET ORAL ONCE
Refills: 0 | Status: COMPLETED | OUTPATIENT
Start: 2023-11-12 | End: 2023-11-12

## 2023-11-12 RX ADMIN — Medication 4 UNIT(S): at 12:38

## 2023-11-12 RX ADMIN — Medication 2: at 19:18

## 2023-11-12 RX ADMIN — Medication 1000 MILLIGRAM(S): at 03:11

## 2023-11-12 RX ADMIN — Medication 4 UNIT(S): at 19:18

## 2023-11-12 RX ADMIN — OLANZAPINE 5 MILLIGRAM(S): 15 TABLET, FILM COATED ORAL at 22:45

## 2023-11-12 RX ADMIN — Medication 40 MILLIGRAM(S): at 11:56

## 2023-11-12 RX ADMIN — Medication 4 UNIT(S): at 09:04

## 2023-11-12 RX ADMIN — Medication 40 MILLIGRAM(S): at 06:30

## 2023-11-12 RX ADMIN — Medication 1 MILLIGRAM(S): at 11:54

## 2023-11-12 RX ADMIN — Medication 10 MILLIGRAM(S): at 06:30

## 2023-11-12 RX ADMIN — Medication 40 MILLIEQUIVALENT(S): at 11:54

## 2023-11-12 RX ADMIN — Medication 1 EACH: at 06:32

## 2023-11-12 RX ADMIN — GABAPENTIN 300 MILLIGRAM(S): 400 CAPSULE ORAL at 06:30

## 2023-11-12 RX ADMIN — PANTOPRAZOLE SODIUM 40 MILLIGRAM(S): 20 TABLET, DELAYED RELEASE ORAL at 06:30

## 2023-11-12 RX ADMIN — Medication 1 EACH: at 09:03

## 2023-11-12 RX ADMIN — Medication 1 EACH: at 11:53

## 2023-11-12 RX ADMIN — APIXABAN 5 MILLIGRAM(S): 2.5 TABLET, FILM COATED ORAL at 19:19

## 2023-11-12 RX ADMIN — ATORVASTATIN CALCIUM 80 MILLIGRAM(S): 80 TABLET, FILM COATED ORAL at 22:45

## 2023-11-12 RX ADMIN — Medication 81 MILLIGRAM(S): at 11:54

## 2023-11-12 RX ADMIN — Medication 40 MILLIEQUIVALENT(S): at 19:19

## 2023-11-12 RX ADMIN — Medication 400 MILLIGRAM(S): at 02:11

## 2023-11-12 RX ADMIN — Medication 1 EACH: at 22:46

## 2023-11-12 RX ADMIN — Medication 1000 UNIT(S): at 11:54

## 2023-11-12 RX ADMIN — GABAPENTIN 300 MILLIGRAM(S): 400 CAPSULE ORAL at 22:44

## 2023-11-12 RX ADMIN — Medication 50 MILLIGRAM(S): at 22:45

## 2023-11-12 RX ADMIN — BUDESONIDE AND FORMOTEROL FUMARATE DIHYDRATE 2 PUFF(S): 160; 4.5 AEROSOL RESPIRATORY (INHALATION) at 09:03

## 2023-11-12 RX ADMIN — INSULIN GLARGINE 6 UNIT(S): 100 INJECTION, SOLUTION SUBCUTANEOUS at 22:43

## 2023-11-12 RX ADMIN — APIXABAN 5 MILLIGRAM(S): 2.5 TABLET, FILM COATED ORAL at 06:30

## 2023-11-12 RX ADMIN — Medication 1 EACH: at 19:20

## 2023-11-12 RX ADMIN — GABAPENTIN 300 MILLIGRAM(S): 400 CAPSULE ORAL at 11:55

## 2023-11-13 LAB
ALBUMIN SERPL ELPH-MCNC: 4.1 G/DL — SIGNIFICANT CHANGE UP (ref 3.3–5)
ALBUMIN SERPL ELPH-MCNC: 4.1 G/DL — SIGNIFICANT CHANGE UP (ref 3.3–5)
ALP SERPL-CCNC: 124 U/L — HIGH (ref 40–120)
ALP SERPL-CCNC: 124 U/L — HIGH (ref 40–120)
ALT FLD-CCNC: 15 U/L — SIGNIFICANT CHANGE UP (ref 4–33)
ALT FLD-CCNC: 15 U/L — SIGNIFICANT CHANGE UP (ref 4–33)
ANA TITR SER: NEGATIVE — SIGNIFICANT CHANGE UP
ANA TITR SER: NEGATIVE — SIGNIFICANT CHANGE UP
ANION GAP SERPL CALC-SCNC: 11 MMOL/L — SIGNIFICANT CHANGE UP (ref 7–14)
ANION GAP SERPL CALC-SCNC: 11 MMOL/L — SIGNIFICANT CHANGE UP (ref 7–14)
AST SERPL-CCNC: 12 U/L — SIGNIFICANT CHANGE UP (ref 4–32)
AST SERPL-CCNC: 12 U/L — SIGNIFICANT CHANGE UP (ref 4–32)
BASOPHILS # BLD AUTO: 0.12 K/UL — SIGNIFICANT CHANGE UP (ref 0–0.2)
BASOPHILS # BLD AUTO: 0.12 K/UL — SIGNIFICANT CHANGE UP (ref 0–0.2)
BASOPHILS NFR BLD AUTO: 0.8 % — SIGNIFICANT CHANGE UP (ref 0–2)
BASOPHILS NFR BLD AUTO: 0.8 % — SIGNIFICANT CHANGE UP (ref 0–2)
BILIRUB SERPL-MCNC: 0.8 MG/DL — SIGNIFICANT CHANGE UP (ref 0.2–1.2)
BILIRUB SERPL-MCNC: 0.8 MG/DL — SIGNIFICANT CHANGE UP (ref 0.2–1.2)
BLD GP AB SCN SERPL QL: NEGATIVE — SIGNIFICANT CHANGE UP
BLD GP AB SCN SERPL QL: NEGATIVE — SIGNIFICANT CHANGE UP
BUN SERPL-MCNC: 27 MG/DL — HIGH (ref 7–23)
BUN SERPL-MCNC: 27 MG/DL — HIGH (ref 7–23)
CALCIUM SERPL-MCNC: 9.4 MG/DL — SIGNIFICANT CHANGE UP (ref 8.4–10.5)
CALCIUM SERPL-MCNC: 9.4 MG/DL — SIGNIFICANT CHANGE UP (ref 8.4–10.5)
CHLORIDE SERPL-SCNC: 103 MMOL/L — SIGNIFICANT CHANGE UP (ref 98–107)
CHLORIDE SERPL-SCNC: 103 MMOL/L — SIGNIFICANT CHANGE UP (ref 98–107)
CO2 SERPL-SCNC: 23 MMOL/L — SIGNIFICANT CHANGE UP (ref 22–31)
CO2 SERPL-SCNC: 23 MMOL/L — SIGNIFICANT CHANGE UP (ref 22–31)
CREAT SERPL-MCNC: 0.75 MG/DL — SIGNIFICANT CHANGE UP (ref 0.5–1.3)
CREAT SERPL-MCNC: 0.75 MG/DL — SIGNIFICANT CHANGE UP (ref 0.5–1.3)
CULTURE RESULTS: SIGNIFICANT CHANGE UP
DSDNA AB SER-ACNC: <12 IU/ML — SIGNIFICANT CHANGE UP
DSDNA AB SER-ACNC: <12 IU/ML — SIGNIFICANT CHANGE UP
EGFR: 92 ML/MIN/1.73M2 — SIGNIFICANT CHANGE UP
EGFR: 92 ML/MIN/1.73M2 — SIGNIFICANT CHANGE UP
EOSINOPHIL # BLD AUTO: 0.28 K/UL — SIGNIFICANT CHANGE UP (ref 0–0.5)
EOSINOPHIL # BLD AUTO: 0.28 K/UL — SIGNIFICANT CHANGE UP (ref 0–0.5)
EOSINOPHIL NFR BLD AUTO: 1.9 % — SIGNIFICANT CHANGE UP (ref 0–6)
EOSINOPHIL NFR BLD AUTO: 1.9 % — SIGNIFICANT CHANGE UP (ref 0–6)
GLUCOSE BLDC GLUCOMTR-MCNC: 139 MG/DL — HIGH (ref 70–99)
GLUCOSE BLDC GLUCOMTR-MCNC: 139 MG/DL — HIGH (ref 70–99)
GLUCOSE BLDC GLUCOMTR-MCNC: 163 MG/DL — HIGH (ref 70–99)
GLUCOSE BLDC GLUCOMTR-MCNC: 163 MG/DL — HIGH (ref 70–99)
GLUCOSE BLDC GLUCOMTR-MCNC: 220 MG/DL — HIGH (ref 70–99)
GLUCOSE BLDC GLUCOMTR-MCNC: 220 MG/DL — HIGH (ref 70–99)
GLUCOSE SERPL-MCNC: 138 MG/DL — HIGH (ref 70–99)
GLUCOSE SERPL-MCNC: 138 MG/DL — HIGH (ref 70–99)
HCT VFR BLD CALC: 42.8 % — SIGNIFICANT CHANGE UP (ref 34.5–45)
HCT VFR BLD CALC: 42.8 % — SIGNIFICANT CHANGE UP (ref 34.5–45)
HGB BLD-MCNC: 13.8 G/DL — SIGNIFICANT CHANGE UP (ref 11.5–15.5)
HGB BLD-MCNC: 13.8 G/DL — SIGNIFICANT CHANGE UP (ref 11.5–15.5)
HLA-B27 QL NAA+PROBE: SIGNIFICANT CHANGE UP
HLA-B27 QL NAA+PROBE: SIGNIFICANT CHANGE UP
IANC: 10.28 K/UL — HIGH (ref 1.8–7.4)
IANC: 10.28 K/UL — HIGH (ref 1.8–7.4)
IMM GRANULOCYTES NFR BLD AUTO: 0.5 % — SIGNIFICANT CHANGE UP (ref 0–0.9)
IMM GRANULOCYTES NFR BLD AUTO: 0.5 % — SIGNIFICANT CHANGE UP (ref 0–0.9)
LYMPHOCYTES # BLD AUTO: 23.8 % — SIGNIFICANT CHANGE UP (ref 13–44)
LYMPHOCYTES # BLD AUTO: 23.8 % — SIGNIFICANT CHANGE UP (ref 13–44)
LYMPHOCYTES # BLD AUTO: 3.57 K/UL — HIGH (ref 1–3.3)
LYMPHOCYTES # BLD AUTO: 3.57 K/UL — HIGH (ref 1–3.3)
MAGNESIUM SERPL-MCNC: 2 MG/DL — SIGNIFICANT CHANGE UP (ref 1.6–2.6)
MAGNESIUM SERPL-MCNC: 2 MG/DL — SIGNIFICANT CHANGE UP (ref 1.6–2.6)
MCHC RBC-ENTMCNC: 29.4 PG — SIGNIFICANT CHANGE UP (ref 27–34)
MCHC RBC-ENTMCNC: 29.4 PG — SIGNIFICANT CHANGE UP (ref 27–34)
MCHC RBC-ENTMCNC: 32.2 GM/DL — SIGNIFICANT CHANGE UP (ref 32–36)
MCHC RBC-ENTMCNC: 32.2 GM/DL — SIGNIFICANT CHANGE UP (ref 32–36)
MCV RBC AUTO: 91.3 FL — SIGNIFICANT CHANGE UP (ref 80–100)
MCV RBC AUTO: 91.3 FL — SIGNIFICANT CHANGE UP (ref 80–100)
MONOCYTES # BLD AUTO: 0.65 K/UL — SIGNIFICANT CHANGE UP (ref 0–0.9)
MONOCYTES # BLD AUTO: 0.65 K/UL — SIGNIFICANT CHANGE UP (ref 0–0.9)
MONOCYTES NFR BLD AUTO: 4.3 % — SIGNIFICANT CHANGE UP (ref 2–14)
MONOCYTES NFR BLD AUTO: 4.3 % — SIGNIFICANT CHANGE UP (ref 2–14)
NEUTROPHILS # BLD AUTO: 10.28 K/UL — HIGH (ref 1.8–7.4)
NEUTROPHILS # BLD AUTO: 10.28 K/UL — HIGH (ref 1.8–7.4)
NEUTROPHILS NFR BLD AUTO: 68.7 % — SIGNIFICANT CHANGE UP (ref 43–77)
NEUTROPHILS NFR BLD AUTO: 68.7 % — SIGNIFICANT CHANGE UP (ref 43–77)
NRBC # BLD: 0 /100 WBCS — SIGNIFICANT CHANGE UP (ref 0–0)
NRBC # BLD: 0 /100 WBCS — SIGNIFICANT CHANGE UP (ref 0–0)
NRBC # FLD: 0 K/UL — SIGNIFICANT CHANGE UP (ref 0–0)
NRBC # FLD: 0 K/UL — SIGNIFICANT CHANGE UP (ref 0–0)
PHOSPHATE SERPL-MCNC: 2.9 MG/DL — SIGNIFICANT CHANGE UP (ref 2.5–4.5)
PHOSPHATE SERPL-MCNC: 2.9 MG/DL — SIGNIFICANT CHANGE UP (ref 2.5–4.5)
PLATELET # BLD AUTO: 289 K/UL — SIGNIFICANT CHANGE UP (ref 150–400)
PLATELET # BLD AUTO: 289 K/UL — SIGNIFICANT CHANGE UP (ref 150–400)
POTASSIUM SERPL-MCNC: 4.2 MMOL/L — SIGNIFICANT CHANGE UP (ref 3.5–5.3)
POTASSIUM SERPL-MCNC: 4.2 MMOL/L — SIGNIFICANT CHANGE UP (ref 3.5–5.3)
POTASSIUM SERPL-SCNC: 4.2 MMOL/L — SIGNIFICANT CHANGE UP (ref 3.5–5.3)
POTASSIUM SERPL-SCNC: 4.2 MMOL/L — SIGNIFICANT CHANGE UP (ref 3.5–5.3)
PROT SERPL-MCNC: 7.1 G/DL — SIGNIFICANT CHANGE UP (ref 6–8.3)
PROT SERPL-MCNC: 7.1 G/DL — SIGNIFICANT CHANGE UP (ref 6–8.3)
RBC # BLD: 4.69 M/UL — SIGNIFICANT CHANGE UP (ref 3.8–5.2)
RBC # BLD: 4.69 M/UL — SIGNIFICANT CHANGE UP (ref 3.8–5.2)
RBC # FLD: 16.3 % — HIGH (ref 10.3–14.5)
RBC # FLD: 16.3 % — HIGH (ref 10.3–14.5)
RH IG SCN BLD-IMP: POSITIVE — SIGNIFICANT CHANGE UP
RH IG SCN BLD-IMP: POSITIVE — SIGNIFICANT CHANGE UP
SODIUM SERPL-SCNC: 137 MMOL/L — SIGNIFICANT CHANGE UP (ref 135–145)
SODIUM SERPL-SCNC: 137 MMOL/L — SIGNIFICANT CHANGE UP (ref 135–145)
SPECIMEN SOURCE: SIGNIFICANT CHANGE UP
WBC # BLD: 14.97 K/UL — HIGH (ref 3.8–10.5)
WBC # BLD: 14.97 K/UL — HIGH (ref 3.8–10.5)
WBC # FLD AUTO: 14.97 K/UL — HIGH (ref 3.8–10.5)
WBC # FLD AUTO: 14.97 K/UL — HIGH (ref 3.8–10.5)

## 2023-11-13 PROCEDURE — 99232 SBSQ HOSP IP/OBS MODERATE 35: CPT

## 2023-11-13 RX ORDER — HYDROXYZINE HCL 10 MG
25 TABLET ORAL ONCE
Refills: 0 | Status: COMPLETED | OUTPATIENT
Start: 2023-11-13 | End: 2023-11-13

## 2023-11-13 RX ORDER — OLANZAPINE 15 MG/1
7.5 TABLET, FILM COATED ORAL DAILY
Refills: 0 | Status: DISCONTINUED | OUTPATIENT
Start: 2023-11-13 | End: 2023-11-14

## 2023-11-13 RX ADMIN — ATORVASTATIN CALCIUM 80 MILLIGRAM(S): 80 TABLET, FILM COATED ORAL at 21:42

## 2023-11-13 RX ADMIN — Medication 40 MILLIGRAM(S): at 06:48

## 2023-11-13 RX ADMIN — GABAPENTIN 300 MILLIGRAM(S): 400 CAPSULE ORAL at 13:13

## 2023-11-13 RX ADMIN — Medication 50 MILLIGRAM(S): at 21:42

## 2023-11-13 RX ADMIN — Medication 81 MILLIGRAM(S): at 13:12

## 2023-11-13 RX ADMIN — BUDESONIDE AND FORMOTEROL FUMARATE DIHYDRATE 2 PUFF(S): 160; 4.5 AEROSOL RESPIRATORY (INHALATION) at 21:40

## 2023-11-13 RX ADMIN — Medication 40 MILLIGRAM(S): at 13:13

## 2023-11-13 RX ADMIN — Medication 2: at 13:10

## 2023-11-13 RX ADMIN — BUDESONIDE AND FORMOTEROL FUMARATE DIHYDRATE 2 PUFF(S): 160; 4.5 AEROSOL RESPIRATORY (INHALATION) at 13:08

## 2023-11-13 RX ADMIN — Medication 2: at 09:01

## 2023-11-13 RX ADMIN — Medication 4 UNIT(S): at 13:09

## 2023-11-13 RX ADMIN — Medication 1000 UNIT(S): at 13:14

## 2023-11-13 RX ADMIN — Medication 4 UNIT(S): at 17:48

## 2023-11-13 RX ADMIN — GABAPENTIN 300 MILLIGRAM(S): 400 CAPSULE ORAL at 21:42

## 2023-11-13 RX ADMIN — Medication 10 MILLIGRAM(S): at 06:47

## 2023-11-13 RX ADMIN — PANTOPRAZOLE SODIUM 40 MILLIGRAM(S): 20 TABLET, DELAYED RELEASE ORAL at 06:47

## 2023-11-13 RX ADMIN — Medication 1 MILLIGRAM(S): at 13:13

## 2023-11-13 RX ADMIN — INSULIN GLARGINE 6 UNIT(S): 100 INJECTION, SOLUTION SUBCUTANEOUS at 22:03

## 2023-11-13 RX ADMIN — Medication 25 MILLIGRAM(S): at 00:50

## 2023-11-13 RX ADMIN — GABAPENTIN 300 MILLIGRAM(S): 400 CAPSULE ORAL at 06:47

## 2023-11-13 RX ADMIN — Medication 1 EACH: at 21:58

## 2023-11-13 NOTE — BH CONSULTATION LIAISON PROGRESS NOTE - CURRENT MEDICATION
MEDICATIONS  (STANDING):  apixaban 5 milliGRAM(s) Oral every 12 hours  aspirin enteric coated 81 milliGRAM(s) Oral daily  atorvastatin 80 milliGRAM(s) Oral at bedtime  budesonide 160 MICROgram(s)/formoterol 4.5 MICROgram(s) Inhaler 2 Puff(s) Inhalation two times a day  cholecalciferol 1000 Unit(s) Oral daily  dextrose 5%. 1000 milliLiter(s) (50 mL/Hr) IV Continuous <Continuous>  dextrose 5%. 1000 milliLiter(s) (100 mL/Hr) IV Continuous <Continuous>  dextrose 50% Injectable 25 Gram(s) IV Push once  dextrose 50% Injectable 25 Gram(s) IV Push once  dextrose 50% Injectable 12.5 Gram(s) IV Push once  folic acid 1 milliGRAM(s) Oral daily  furosemide   Injectable 40 milliGRAM(s) IV Push two times a day  gabapentin 300 milliGRAM(s) Oral three times a day  glucagon  Injectable 1 milliGRAM(s) IntraMuscular once  influenza   Vaccine 0.5 milliLiter(s) IntraMuscular once  insulin glargine Injectable (LANTUS) 6 Unit(s) SubCutaneous at bedtime  insulin lispro (ADMELOG) corrective regimen sliding scale   SubCutaneous three times a day before meals  insulin lispro (ADMELOG) corrective regimen sliding scale   SubCutaneous at bedtime  insulin lispro Injectable (ADMELOG) 4 Unit(s) SubCutaneous three times a day before meals  methotrexate (Non - oncologic) 15 milliGRAM(s) Oral <User Schedule>  metoprolol succinate ER 25 milliGRAM(s) Oral daily  nicotine - Inhaler 1 Each Inhalation every 4 hours  OLANZapine 5 milliGRAM(s) Oral at bedtime  pantoprazole    Tablet 40 milliGRAM(s) Oral before breakfast  predniSONE   Tablet 10 milliGRAM(s) Oral daily  spironolactone 25 milliGRAM(s) Oral daily  traZODone 50 milliGRAM(s) Oral at bedtime    MEDICATIONS  (PRN):  acetaminophen     Tablet .. 650 milliGRAM(s) Oral every 6 hours PRN Temp greater or equal to 38C (100.4F), Mild Pain (1 - 3)  albuterol/ipratropium for Nebulization 3 milliLiter(s) Nebulizer every 6 hours PRN Shortness of Breath and/or Wheezing  aluminum hydroxide/magnesium hydroxide/simethicone Suspension 30 milliLiter(s) Oral every 4 hours PRN Dyspepsia  dextrose Oral Gel 15 Gram(s) Oral once PRN Blood Glucose LESS THAN 70 milliGRAM(s)/deciliter  melatonin 3 milliGRAM(s) Oral at bedtime PRN Insomnia  OLANZapine Injectable 2.5 milliGRAM(s) IntraMuscular every 12 hours PRN agitation  ondansetron Injectable 4 milliGRAM(s) IV Push every 8 hours PRN Nausea and/or Vomiting

## 2023-11-13 NOTE — BH CONSULTATION LIAISON PROGRESS NOTE - NSBHASSESSMENTFT_PSY_ALL_CORE
This is a 58F with history of CVA with L sided weakness/numbness/L gaze deviation, DM2 with b/l peripheral neuropathy, COPD/Asthma, Breast Ca s/p ?RT, Bipolar depression, Rheumatoid Arthritis, Antiphospholipid syndrome, L eye uveitis/scleritis, Newly diagnosed DM2 (pt cannot remember which medication she takes for it), and current tobacco use who presents to the hospital with multiple complaints. Psych consulted for depression. Pt known to us from a recent similar admission 8/2023 with similar physical and psychiatric complaints. She endorses subsyndromal depressive sx heavily confounded by psychosocial stressors. She presents helpless but also help-rejecting. Present and past admission likely informed by some degree of secondary gain given lack of financial/domestic stability.     11/13: Patient reports difficulty sleeping and anxiety mainly due to her living situation, might benefit from increase in Olanzapine     Plan:   - Increase Zyprexa 7.5mg HS (was previously prescribed this) for mood, anxiety, poor sleep.   - C/W Zyprexa 2.5 IM Q 12 PRN severe agitation as ordered   - Avoid benzodiazepines/controlled substances whenever possible  - Medical w/u as you are  - No indication for sitter/1:1; routine observation appropriate.   - SW consult given financial/domestic concerns.  - Referral can be given to [facility] Services including the Walk-In [facility] ([phone]) though much depends on where she will reside after this  - Dispo: no role for inpt psychiatric admission; consider shelter/other referrals as indicated This is a 58F with history of CVA with L sided weakness/numbness/L gaze deviation, DM2 with b/l peripheral neuropathy, COPD/Asthma, Breast Ca s/p ?RT, Bipolar depression, Rheumatoid Arthritis, Antiphospholipid syndrome, L eye uveitis/scleritis, Newly diagnosed DM2 (pt cannot remember which medication she takes for it), and current tobacco use who presents to the hospital with multiple complaints. Psych consulted for depression. Pt known to us from a recent similar admission 8/2023 with similar physical and psychiatric complaints. She endorses subsyndromal depressive sx heavily confounded by psychosocial stressors. She presents helpless but also help-rejecting. Present and past admission likely informed by some degree of secondary gain given lack of financial/domestic stability.     11/13: Patient reports difficulty sleeping and anxiety mainly due to her living situation, might benefit from increase in Olanzapine     Plan:   - Increase Zyprexa to 7.5mg HS (was previously prescribed this) for mood, anxiety, poor sleep. Can be prescribed this whenever she discharges.  - C/W Zyprexa 2.5 IM Q 12 PRN severe agitation as ordered   - Avoid benzodiazepines/controlled substances whenever possible  - Medical w/u as you are  - No indication for sitter/1:1; routine observation appropriate.   -  consult given financial/domestic concerns.  - Referral can be given to [facility] Services including the Walk-In Clinic ([phone]) though much depends on where she will reside after this  - Dispo: no role for inpt psychiatric admission; consider shelter/other referrals as indicated

## 2023-11-13 NOTE — BH CONSULTATION LIAISON PROGRESS NOTE - NSBHATTESTCOMMENTATTENDFT_PSY_A_CORE
Chart reviewed. Seen with fellow. Presents as AA O x 3, mildly anxious, but not overtly distressed. Comfortable in hospital. No safety concerns. Helpless about her situation but also doesn't seem to work on any components of her social issues. Open to Zyprexa increase. Discussed case with primary team. Agree with above assessment/recs. We will sign off, but please call us back as needed.

## 2023-11-13 NOTE — BH CONSULTATION LIAISON PROGRESS NOTE - NSBHFUPINTERVALHXFT_PSY_A_CORE
Patient seen for depression. Chart reviewed. Patient reports anxiety and difficulty sleeping because of her living situation where she would like to find a place to live in that's not far from here. She states that the only medication that helped her in the past is Ambien and that she understand that she can't be on this medication. patient is agreeing to increase Olanzapine to help with sleep and anxiety. She denies SI and AVH.

## 2023-11-13 NOTE — BH CONSULTATION LIAISON PROGRESS NOTE - MSE UNSTRUCTURED FT
On exam, she is AA O x 3, pleasant, though a bit dramatic. Endorses various aforementioned physical sx but also her psychosocial stressors. She endorses current depressed mood in a vague manner tied to these psychosocial realities and endorses poor sleep from ruminating on these stressors, but denies hopelessness, SI, HI safety concerns, or AVH/ psychosis.

## 2023-11-13 NOTE — BH CONSULTATION LIAISON PROGRESS NOTE - NSBHCHARTREVIEWVS_PSY_A_CORE FT
Vital Signs Last 24 Hrs  T(C): 36.9 (13 Nov 2023 11:45), Max: 37.1 (12 Nov 2023 18:55)  T(F): 98.5 (13 Nov 2023 11:45), Max: 98.7 (12 Nov 2023 18:55)  HR: 91 (13 Nov 2023 11:45) (68 - 91)  BP: 102/72 (13 Nov 2023 11:45) (95/52 - 122/61)  BP(mean): --  RR: 17 (13 Nov 2023 11:45) (17 - 18)  SpO2: 96% (13 Nov 2023 11:45) (96% - 97%)    Parameters below as of 13 Nov 2023 06:45  Patient On (Oxygen Delivery Method): room air

## 2023-11-14 ENCOUNTER — INPATIENT (INPATIENT)
Facility: HOSPITAL | Age: 58
LOS: 19 days | Discharge: NURSING, REHAB OR LONG-TERM CARE | DRG: 219 | End: 2023-12-04
Attending: THORACIC SURGERY (CARDIOTHORACIC VASCULAR SURGERY) | Admitting: THORACIC SURGERY (CARDIOTHORACIC VASCULAR SURGERY)
Payer: MEDICAID

## 2023-11-14 VITALS
RESPIRATION RATE: 18 BRPM | SYSTOLIC BLOOD PRESSURE: 112 MMHG | OXYGEN SATURATION: 97 % | HEART RATE: 75 BPM | TEMPERATURE: 97 F

## 2023-11-14 VITALS
HEART RATE: 75 BPM | OXYGEN SATURATION: 96 % | TEMPERATURE: 98 F | SYSTOLIC BLOOD PRESSURE: 128 MMHG | WEIGHT: 191.58 LBS | RESPIRATION RATE: 18 BRPM | DIASTOLIC BLOOD PRESSURE: 85 MMHG

## 2023-11-14 DIAGNOSIS — I25.10 ATHEROSCLEROTIC HEART DISEASE OF NATIVE CORONARY ARTERY WITHOUT ANGINA PECTORIS: ICD-10-CM

## 2023-11-14 DIAGNOSIS — E11.9 TYPE 2 DIABETES MELLITUS WITHOUT COMPLICATIONS: ICD-10-CM

## 2023-11-14 DIAGNOSIS — I51.9 HEART DISEASE, UNSPECIFIED: ICD-10-CM

## 2023-11-14 DIAGNOSIS — Z90.710 ACQUIRED ABSENCE OF BOTH CERVIX AND UTERUS: Chronic | ICD-10-CM

## 2023-11-14 DIAGNOSIS — R53.1 WEAKNESS: ICD-10-CM

## 2023-11-14 DIAGNOSIS — D68.61 ANTIPHOSPHOLIPID SYNDROME: ICD-10-CM

## 2023-11-14 DIAGNOSIS — Z90.49 ACQUIRED ABSENCE OF OTHER SPECIFIED PARTS OF DIGESTIVE TRACT: Chronic | ICD-10-CM

## 2023-11-14 DIAGNOSIS — I63.9 CEREBRAL INFARCTION, UNSPECIFIED: ICD-10-CM

## 2023-11-14 LAB
ALBUMIN SERPL ELPH-MCNC: 4.2 G/DL — SIGNIFICANT CHANGE UP (ref 3.3–5)
ALBUMIN SERPL ELPH-MCNC: 4.2 G/DL — SIGNIFICANT CHANGE UP (ref 3.3–5)
ALP SERPL-CCNC: 120 U/L — SIGNIFICANT CHANGE UP (ref 40–120)
ALP SERPL-CCNC: 120 U/L — SIGNIFICANT CHANGE UP (ref 40–120)
ALT FLD-CCNC: 15 U/L — SIGNIFICANT CHANGE UP (ref 10–45)
ALT FLD-CCNC: 15 U/L — SIGNIFICANT CHANGE UP (ref 10–45)
ANION GAP SERPL CALC-SCNC: 12 MMOL/L — SIGNIFICANT CHANGE UP (ref 5–17)
ANION GAP SERPL CALC-SCNC: 12 MMOL/L — SIGNIFICANT CHANGE UP (ref 5–17)
ANION GAP SERPL CALC-SCNC: 15 MMOL/L — HIGH (ref 7–14)
ANION GAP SERPL CALC-SCNC: 15 MMOL/L — HIGH (ref 7–14)
APTT BLD: 31.2 SEC — SIGNIFICANT CHANGE UP (ref 24.5–35.6)
APTT BLD: 31.2 SEC — SIGNIFICANT CHANGE UP (ref 24.5–35.6)
APTT BLD: 35.3 SEC — SIGNIFICANT CHANGE UP (ref 24.5–35.6)
APTT BLD: 35.3 SEC — SIGNIFICANT CHANGE UP (ref 24.5–35.6)
AST SERPL-CCNC: 12 U/L — SIGNIFICANT CHANGE UP (ref 10–40)
AST SERPL-CCNC: 12 U/L — SIGNIFICANT CHANGE UP (ref 10–40)
BASOPHILS # BLD AUTO: 0.11 K/UL — SIGNIFICANT CHANGE UP (ref 0–0.2)
BASOPHILS # BLD AUTO: 0.11 K/UL — SIGNIFICANT CHANGE UP (ref 0–0.2)
BASOPHILS NFR BLD AUTO: 0.6 % — SIGNIFICANT CHANGE UP (ref 0–2)
BASOPHILS NFR BLD AUTO: 0.6 % — SIGNIFICANT CHANGE UP (ref 0–2)
BILIRUB SERPL-MCNC: 0.9 MG/DL — SIGNIFICANT CHANGE UP (ref 0.2–1.2)
BILIRUB SERPL-MCNC: 0.9 MG/DL — SIGNIFICANT CHANGE UP (ref 0.2–1.2)
BLD GP AB SCN SERPL QL: NEGATIVE — SIGNIFICANT CHANGE UP
BLD GP AB SCN SERPL QL: NEGATIVE — SIGNIFICANT CHANGE UP
BUN SERPL-MCNC: 25 MG/DL — HIGH (ref 7–23)
BUN SERPL-MCNC: 25 MG/DL — HIGH (ref 7–23)
BUN SERPL-MCNC: 31 MG/DL — HIGH (ref 7–23)
BUN SERPL-MCNC: 31 MG/DL — HIGH (ref 7–23)
CALCIUM SERPL-MCNC: 9.2 MG/DL — SIGNIFICANT CHANGE UP (ref 8.4–10.5)
CALCIUM SERPL-MCNC: 9.2 MG/DL — SIGNIFICANT CHANGE UP (ref 8.4–10.5)
CALCIUM SERPL-MCNC: 9.4 MG/DL — SIGNIFICANT CHANGE UP (ref 8.4–10.5)
CALCIUM SERPL-MCNC: 9.4 MG/DL — SIGNIFICANT CHANGE UP (ref 8.4–10.5)
CHLORIDE SERPL-SCNC: 100 MMOL/L — SIGNIFICANT CHANGE UP (ref 98–107)
CHLORIDE SERPL-SCNC: 100 MMOL/L — SIGNIFICANT CHANGE UP (ref 98–107)
CHLORIDE SERPL-SCNC: 101 MMOL/L — SIGNIFICANT CHANGE UP (ref 96–108)
CHLORIDE SERPL-SCNC: 101 MMOL/L — SIGNIFICANT CHANGE UP (ref 96–108)
CO2 SERPL-SCNC: 23 MMOL/L — SIGNIFICANT CHANGE UP (ref 22–31)
CO2 SERPL-SCNC: 23 MMOL/L — SIGNIFICANT CHANGE UP (ref 22–31)
CO2 SERPL-SCNC: 24 MMOL/L — SIGNIFICANT CHANGE UP (ref 22–31)
CO2 SERPL-SCNC: 24 MMOL/L — SIGNIFICANT CHANGE UP (ref 22–31)
CREAT SERPL-MCNC: 0.67 MG/DL — SIGNIFICANT CHANGE UP (ref 0.5–1.3)
CREAT SERPL-MCNC: 0.67 MG/DL — SIGNIFICANT CHANGE UP (ref 0.5–1.3)
CREAT SERPL-MCNC: 1.22 MG/DL — SIGNIFICANT CHANGE UP (ref 0.5–1.3)
CREAT SERPL-MCNC: 1.22 MG/DL — SIGNIFICANT CHANGE UP (ref 0.5–1.3)
EGFR: 101 ML/MIN/1.73M2 — SIGNIFICANT CHANGE UP
EGFR: 101 ML/MIN/1.73M2 — SIGNIFICANT CHANGE UP
EGFR: 51 ML/MIN/1.73M2 — LOW
EGFR: 51 ML/MIN/1.73M2 — LOW
EOSINOPHIL # BLD AUTO: 0.25 K/UL — SIGNIFICANT CHANGE UP (ref 0–0.5)
EOSINOPHIL # BLD AUTO: 0.25 K/UL — SIGNIFICANT CHANGE UP (ref 0–0.5)
EOSINOPHIL NFR BLD AUTO: 1.3 % — SIGNIFICANT CHANGE UP (ref 0–6)
EOSINOPHIL NFR BLD AUTO: 1.3 % — SIGNIFICANT CHANGE UP (ref 0–6)
G6PD RBC-CCNC: 16.3 U/G HGB — SIGNIFICANT CHANGE UP (ref 7–20.5)
G6PD RBC-CCNC: 16.3 U/G HGB — SIGNIFICANT CHANGE UP (ref 7–20.5)
GLUCOSE BLDC GLUCOMTR-MCNC: 130 MG/DL — HIGH (ref 70–99)
GLUCOSE BLDC GLUCOMTR-MCNC: 130 MG/DL — HIGH (ref 70–99)
GLUCOSE BLDC GLUCOMTR-MCNC: 148 MG/DL — HIGH (ref 70–99)
GLUCOSE BLDC GLUCOMTR-MCNC: 148 MG/DL — HIGH (ref 70–99)
GLUCOSE BLDC GLUCOMTR-MCNC: 156 MG/DL — HIGH (ref 70–99)
GLUCOSE BLDC GLUCOMTR-MCNC: 156 MG/DL — HIGH (ref 70–99)
GLUCOSE BLDC GLUCOMTR-MCNC: 335 MG/DL — HIGH (ref 70–99)
GLUCOSE BLDC GLUCOMTR-MCNC: 335 MG/DL — HIGH (ref 70–99)
GLUCOSE SERPL-MCNC: 131 MG/DL — HIGH (ref 70–99)
GLUCOSE SERPL-MCNC: 131 MG/DL — HIGH (ref 70–99)
GLUCOSE SERPL-MCNC: 98 MG/DL — SIGNIFICANT CHANGE UP (ref 70–99)
GLUCOSE SERPL-MCNC: 98 MG/DL — SIGNIFICANT CHANGE UP (ref 70–99)
HCG SERPL-ACNC: 5.9 MIU/ML — SIGNIFICANT CHANGE UP
HCG SERPL-ACNC: 5.9 MIU/ML — SIGNIFICANT CHANGE UP
HCT VFR BLD CALC: 40.2 % — SIGNIFICANT CHANGE UP (ref 34.5–45)
HCT VFR BLD CALC: 40.2 % — SIGNIFICANT CHANGE UP (ref 34.5–45)
HCT VFR BLD CALC: 43.8 % — SIGNIFICANT CHANGE UP (ref 34.5–45)
HCT VFR BLD CALC: 43.8 % — SIGNIFICANT CHANGE UP (ref 34.5–45)
HGB BLD-MCNC: 12.8 G/DL — SIGNIFICANT CHANGE UP (ref 11.5–15.5)
HGB BLD-MCNC: 12.8 G/DL — SIGNIFICANT CHANGE UP (ref 11.5–15.5)
HGB BLD-MCNC: 14.2 G/DL — SIGNIFICANT CHANGE UP (ref 11.5–15.5)
HGB BLD-MCNC: 14.2 G/DL — SIGNIFICANT CHANGE UP (ref 11.5–15.5)
IMM GRANULOCYTES NFR BLD AUTO: 0.5 % — SIGNIFICANT CHANGE UP (ref 0–0.9)
IMM GRANULOCYTES NFR BLD AUTO: 0.5 % — SIGNIFICANT CHANGE UP (ref 0–0.9)
INR BLD: 1.14 RATIO — SIGNIFICANT CHANGE UP (ref 0.85–1.18)
INR BLD: 1.14 RATIO — SIGNIFICANT CHANGE UP (ref 0.85–1.18)
INR BLD: 1.22 RATIO — HIGH (ref 0.85–1.18)
INR BLD: 1.22 RATIO — HIGH (ref 0.85–1.18)
LYMPHOCYTES # BLD AUTO: 19.4 % — SIGNIFICANT CHANGE UP (ref 13–44)
LYMPHOCYTES # BLD AUTO: 19.4 % — SIGNIFICANT CHANGE UP (ref 13–44)
LYMPHOCYTES # BLD AUTO: 3.62 K/UL — HIGH (ref 1–3.3)
LYMPHOCYTES # BLD AUTO: 3.62 K/UL — HIGH (ref 1–3.3)
MAGNESIUM SERPL-MCNC: 2.2 MG/DL — SIGNIFICANT CHANGE UP (ref 1.6–2.6)
MAGNESIUM SERPL-MCNC: 2.2 MG/DL — SIGNIFICANT CHANGE UP (ref 1.6–2.6)
MCHC RBC-ENTMCNC: 29.1 PG — SIGNIFICANT CHANGE UP (ref 27–34)
MCHC RBC-ENTMCNC: 29.1 PG — SIGNIFICANT CHANGE UP (ref 27–34)
MCHC RBC-ENTMCNC: 29.4 PG — SIGNIFICANT CHANGE UP (ref 27–34)
MCHC RBC-ENTMCNC: 29.4 PG — SIGNIFICANT CHANGE UP (ref 27–34)
MCHC RBC-ENTMCNC: 31.8 GM/DL — LOW (ref 32–36)
MCHC RBC-ENTMCNC: 31.8 GM/DL — LOW (ref 32–36)
MCHC RBC-ENTMCNC: 32.4 GM/DL — SIGNIFICANT CHANGE UP (ref 32–36)
MCHC RBC-ENTMCNC: 32.4 GM/DL — SIGNIFICANT CHANGE UP (ref 32–36)
MCV RBC AUTO: 90.7 FL — SIGNIFICANT CHANGE UP (ref 80–100)
MCV RBC AUTO: 90.7 FL — SIGNIFICANT CHANGE UP (ref 80–100)
MCV RBC AUTO: 91.4 FL — SIGNIFICANT CHANGE UP (ref 80–100)
MCV RBC AUTO: 91.4 FL — SIGNIFICANT CHANGE UP (ref 80–100)
MONOCYTES # BLD AUTO: 1.15 K/UL — HIGH (ref 0–0.9)
MONOCYTES # BLD AUTO: 1.15 K/UL — HIGH (ref 0–0.9)
MONOCYTES NFR BLD AUTO: 6.2 % — SIGNIFICANT CHANGE UP (ref 2–14)
MONOCYTES NFR BLD AUTO: 6.2 % — SIGNIFICANT CHANGE UP (ref 2–14)
NEUTROPHILS # BLD AUTO: 13.46 K/UL — HIGH (ref 1.8–7.4)
NEUTROPHILS # BLD AUTO: 13.46 K/UL — HIGH (ref 1.8–7.4)
NEUTROPHILS NFR BLD AUTO: 72 % — SIGNIFICANT CHANGE UP (ref 43–77)
NEUTROPHILS NFR BLD AUTO: 72 % — SIGNIFICANT CHANGE UP (ref 43–77)
NRBC # BLD: 0 /100 WBCS — SIGNIFICANT CHANGE UP (ref 0–0)
NRBC # FLD: 0 K/UL — SIGNIFICANT CHANGE UP (ref 0–0)
NRBC # FLD: 0 K/UL — SIGNIFICANT CHANGE UP (ref 0–0)
NT-PROBNP SERPL-SCNC: 697 PG/ML — HIGH (ref 0–300)
NT-PROBNP SERPL-SCNC: 697 PG/ML — HIGH (ref 0–300)
PHOSPHATE SERPL-MCNC: 4.3 MG/DL — SIGNIFICANT CHANGE UP (ref 2.5–4.5)
PHOSPHATE SERPL-MCNC: 4.3 MG/DL — SIGNIFICANT CHANGE UP (ref 2.5–4.5)
PLATELET # BLD AUTO: 310 K/UL — SIGNIFICANT CHANGE UP (ref 150–400)
PLATELET # BLD AUTO: 310 K/UL — SIGNIFICANT CHANGE UP (ref 150–400)
PLATELET # BLD AUTO: 317 K/UL — SIGNIFICANT CHANGE UP (ref 150–400)
PLATELET # BLD AUTO: 317 K/UL — SIGNIFICANT CHANGE UP (ref 150–400)
POTASSIUM SERPL-MCNC: 3.4 MMOL/L — LOW (ref 3.5–5.3)
POTASSIUM SERPL-MCNC: 3.4 MMOL/L — LOW (ref 3.5–5.3)
POTASSIUM SERPL-MCNC: 4.2 MMOL/L — SIGNIFICANT CHANGE UP (ref 3.5–5.3)
POTASSIUM SERPL-MCNC: 4.2 MMOL/L — SIGNIFICANT CHANGE UP (ref 3.5–5.3)
POTASSIUM SERPL-SCNC: 3.4 MMOL/L — LOW (ref 3.5–5.3)
POTASSIUM SERPL-SCNC: 3.4 MMOL/L — LOW (ref 3.5–5.3)
POTASSIUM SERPL-SCNC: 4.2 MMOL/L — SIGNIFICANT CHANGE UP (ref 3.5–5.3)
POTASSIUM SERPL-SCNC: 4.2 MMOL/L — SIGNIFICANT CHANGE UP (ref 3.5–5.3)
PROT SERPL-MCNC: 6.9 G/DL — SIGNIFICANT CHANGE UP (ref 6–8.3)
PROT SERPL-MCNC: 6.9 G/DL — SIGNIFICANT CHANGE UP (ref 6–8.3)
PROTHROM AB SERPL-ACNC: 12.7 SEC — SIGNIFICANT CHANGE UP (ref 9.5–13)
PROTHROM AB SERPL-ACNC: 12.7 SEC — SIGNIFICANT CHANGE UP (ref 9.5–13)
PROTHROM AB SERPL-ACNC: 12.8 SEC — SIGNIFICANT CHANGE UP (ref 9.5–13)
PROTHROM AB SERPL-ACNC: 12.8 SEC — SIGNIFICANT CHANGE UP (ref 9.5–13)
RBC # BLD: 4.4 M/UL — SIGNIFICANT CHANGE UP (ref 3.8–5.2)
RBC # BLD: 4.4 M/UL — SIGNIFICANT CHANGE UP (ref 3.8–5.2)
RBC # BLD: 4.83 M/UL — SIGNIFICANT CHANGE UP (ref 3.8–5.2)
RBC # BLD: 4.83 M/UL — SIGNIFICANT CHANGE UP (ref 3.8–5.2)
RBC # FLD: 15.8 % — HIGH (ref 10.3–14.5)
RBC # FLD: 15.8 % — HIGH (ref 10.3–14.5)
RBC # FLD: 15.9 % — HIGH (ref 10.3–14.5)
RBC # FLD: 15.9 % — HIGH (ref 10.3–14.5)
RH IG SCN BLD-IMP: POSITIVE — SIGNIFICANT CHANGE UP
RH IG SCN BLD-IMP: POSITIVE — SIGNIFICANT CHANGE UP
SODIUM SERPL-SCNC: 136 MMOL/L — SIGNIFICANT CHANGE UP (ref 135–145)
SODIUM SERPL-SCNC: 136 MMOL/L — SIGNIFICANT CHANGE UP (ref 135–145)
SODIUM SERPL-SCNC: 139 MMOL/L — SIGNIFICANT CHANGE UP (ref 135–145)
SODIUM SERPL-SCNC: 139 MMOL/L — SIGNIFICANT CHANGE UP (ref 135–145)
WBC # BLD: 14.5 K/UL — HIGH (ref 3.8–10.5)
WBC # BLD: 14.5 K/UL — HIGH (ref 3.8–10.5)
WBC # BLD: 18.68 K/UL — HIGH (ref 3.8–10.5)
WBC # BLD: 18.68 K/UL — HIGH (ref 3.8–10.5)
WBC # FLD AUTO: 14.5 K/UL — HIGH (ref 3.8–10.5)
WBC # FLD AUTO: 14.5 K/UL — HIGH (ref 3.8–10.5)
WBC # FLD AUTO: 18.68 K/UL — HIGH (ref 3.8–10.5)
WBC # FLD AUTO: 18.68 K/UL — HIGH (ref 3.8–10.5)

## 2023-11-14 PROCEDURE — 99223 1ST HOSP IP/OBS HIGH 75: CPT | Mod: 57

## 2023-11-14 PROCEDURE — 99232 SBSQ HOSP IP/OBS MODERATE 35: CPT

## 2023-11-14 PROCEDURE — 93010 ELECTROCARDIOGRAM REPORT: CPT

## 2023-11-14 RX ORDER — ENOXAPARIN SODIUM 100 MG/ML
85 INJECTION SUBCUTANEOUS EVERY 12 HOURS
Refills: 0 | Status: DISCONTINUED | OUTPATIENT
Start: 2023-11-14 | End: 2023-11-14

## 2023-11-14 RX ORDER — CHOLECALCIFEROL (VITAMIN D3) 125 MCG
1000 CAPSULE ORAL DAILY
Refills: 0 | Status: DISCONTINUED | OUTPATIENT
Start: 2023-11-14 | End: 2023-11-21

## 2023-11-14 RX ORDER — OLANZAPINE 15 MG/1
1 TABLET, FILM COATED ORAL
Qty: 0 | Refills: 0 | DISCHARGE
Start: 2023-11-14

## 2023-11-14 RX ORDER — ATORVASTATIN CALCIUM 80 MG/1
80 TABLET, FILM COATED ORAL AT BEDTIME
Refills: 0 | Status: DISCONTINUED | OUTPATIENT
Start: 2023-11-14 | End: 2023-11-21

## 2023-11-14 RX ORDER — FOLIC ACID 0.8 MG
1 TABLET ORAL DAILY
Refills: 0 | Status: DISCONTINUED | OUTPATIENT
Start: 2023-11-14 | End: 2023-11-21

## 2023-11-14 RX ORDER — PANTOPRAZOLE SODIUM 20 MG/1
40 TABLET, DELAYED RELEASE ORAL
Refills: 0 | Status: DISCONTINUED | OUTPATIENT
Start: 2023-11-14 | End: 2023-11-21

## 2023-11-14 RX ORDER — CHOLECALCIFEROL (VITAMIN D3) 125 MCG
1000 CAPSULE ORAL
Qty: 0 | Refills: 0 | DISCHARGE
Start: 2023-11-14

## 2023-11-14 RX ORDER — LANOLIN ALCOHOL/MO/W.PET/CERES
3 CREAM (GRAM) TOPICAL AT BEDTIME
Refills: 0 | Status: DISCONTINUED | OUTPATIENT
Start: 2023-11-14 | End: 2023-11-21

## 2023-11-14 RX ORDER — IPRATROPIUM/ALBUTEROL SULFATE 18-103MCG
3 AEROSOL WITH ADAPTER (GRAM) INHALATION
Qty: 0 | Refills: 0 | DISCHARGE
Start: 2023-11-14

## 2023-11-14 RX ORDER — OLANZAPINE 15 MG/1
7.5 TABLET, FILM COATED ORAL DAILY
Refills: 0 | Status: DISCONTINUED | OUTPATIENT
Start: 2023-11-14 | End: 2023-11-21

## 2023-11-14 RX ORDER — ENOXAPARIN SODIUM 100 MG/ML
90 INJECTION SUBCUTANEOUS EVERY 12 HOURS
Refills: 0 | Status: DISCONTINUED | OUTPATIENT
Start: 2023-11-14 | End: 2023-11-21

## 2023-11-14 RX ORDER — OLANZAPINE 15 MG/1
2.5 TABLET, FILM COATED ORAL EVERY 12 HOURS
Refills: 0 | Status: DISCONTINUED | OUTPATIENT
Start: 2023-11-14 | End: 2023-11-21

## 2023-11-14 RX ORDER — POTASSIUM CHLORIDE 20 MEQ
40 PACKET (EA) ORAL ONCE
Refills: 0 | Status: DISCONTINUED | OUTPATIENT
Start: 2023-11-14 | End: 2023-11-14

## 2023-11-14 RX ORDER — FUROSEMIDE 40 MG
40 TABLET ORAL DAILY
Refills: 0 | Status: DISCONTINUED | OUTPATIENT
Start: 2023-11-14 | End: 2023-11-14

## 2023-11-14 RX ORDER — SPIRONOLACTONE 25 MG/1
25 TABLET, FILM COATED ORAL DAILY
Refills: 0 | Status: DISCONTINUED | OUTPATIENT
Start: 2023-11-14 | End: 2023-11-21

## 2023-11-14 RX ORDER — POTASSIUM CHLORIDE 20 MEQ
40 PACKET (EA) ORAL ONCE
Refills: 0 | Status: COMPLETED | OUTPATIENT
Start: 2023-11-14 | End: 2023-11-14

## 2023-11-14 RX ORDER — LANOLIN ALCOHOL/MO/W.PET/CERES
1 CREAM (GRAM) TOPICAL
Qty: 0 | Refills: 0 | DISCHARGE
Start: 2023-11-14

## 2023-11-14 RX ORDER — INSULIN LISPRO 100/ML
VIAL (ML) SUBCUTANEOUS AT BEDTIME
Refills: 0 | Status: DISCONTINUED | OUTPATIENT
Start: 2023-11-14 | End: 2023-11-21

## 2023-11-14 RX ORDER — SPIRONOLACTONE 25 MG/1
1 TABLET, FILM COATED ORAL
Qty: 0 | Refills: 0 | DISCHARGE
Start: 2023-11-14

## 2023-11-14 RX ORDER — NICOTINE POLACRILEX 2 MG
1 GUM BUCCAL
Qty: 0 | Refills: 0 | DISCHARGE
Start: 2023-11-14

## 2023-11-14 RX ORDER — INSULIN LISPRO 100/ML
1 VIAL (ML) SUBCUTANEOUS
Qty: 0 | Refills: 0 | DISCHARGE
Start: 2023-11-14

## 2023-11-14 RX ORDER — METOPROLOL TARTRATE 50 MG
25 TABLET ORAL DAILY
Refills: 0 | Status: DISCONTINUED | OUTPATIENT
Start: 2023-11-14 | End: 2023-11-21

## 2023-11-14 RX ORDER — ONDANSETRON 8 MG/1
8 TABLET, FILM COATED ORAL
Qty: 0 | Refills: 0 | DISCHARGE
Start: 2023-11-14

## 2023-11-14 RX ORDER — SODIUM CHLORIDE 9 MG/ML
3 INJECTION INTRAMUSCULAR; INTRAVENOUS; SUBCUTANEOUS EVERY 8 HOURS
Refills: 0 | Status: DISCONTINUED | OUTPATIENT
Start: 2023-11-14 | End: 2023-11-21

## 2023-11-14 RX ORDER — METHOTREXATE 2.5 MG/1
6 TABLET ORAL
Qty: 0 | Refills: 0 | DISCHARGE
Start: 2023-11-14

## 2023-11-14 RX ORDER — OLANZAPINE 15 MG/1
2.5 TABLET, FILM COATED ORAL
Qty: 0 | Refills: 0 | DISCHARGE
Start: 2023-11-14

## 2023-11-14 RX ORDER — BUDESONIDE AND FORMOTEROL FUMARATE DIHYDRATE 160; 4.5 UG/1; UG/1
2 AEROSOL RESPIRATORY (INHALATION)
Refills: 0 | DISCHARGE

## 2023-11-14 RX ORDER — INSULIN LISPRO 100/ML
VIAL (ML) SUBCUTANEOUS
Refills: 0 | Status: DISCONTINUED | OUTPATIENT
Start: 2023-11-14 | End: 2023-11-21

## 2023-11-14 RX ORDER — APIXABAN 2.5 MG/1
1 TABLET, FILM COATED ORAL
Refills: 0 | DISCHARGE

## 2023-11-14 RX ORDER — CHOLECALCIFEROL (VITAMIN D3) 125 MCG
1 CAPSULE ORAL
Refills: 0 | DISCHARGE

## 2023-11-14 RX ORDER — ACETAMINOPHEN 500 MG
2 TABLET ORAL
Qty: 0 | Refills: 0 | DISCHARGE
Start: 2023-11-14

## 2023-11-14 RX ORDER — ENOXAPARIN SODIUM 100 MG/ML
90 INJECTION SUBCUTANEOUS EVERY 12 HOURS
Refills: 0 | Status: DISCONTINUED | OUTPATIENT
Start: 2023-11-14 | End: 2023-11-14

## 2023-11-14 RX ORDER — FOLIC ACID 0.8 MG
1 TABLET ORAL
Refills: 0 | DISCHARGE

## 2023-11-14 RX ORDER — GABAPENTIN 400 MG/1
300 CAPSULE ORAL THREE TIMES A DAY
Refills: 0 | Status: DISCONTINUED | OUTPATIENT
Start: 2023-11-14 | End: 2023-11-21

## 2023-11-14 RX ORDER — METHOTREXATE 2.5 MG/1
6 TABLET ORAL
Refills: 0 | DISCHARGE

## 2023-11-14 RX ORDER — ASPIRIN/CALCIUM CARB/MAGNESIUM 324 MG
81 TABLET ORAL DAILY
Refills: 0 | Status: DISCONTINUED | OUTPATIENT
Start: 2023-11-14 | End: 2023-11-21

## 2023-11-14 RX ORDER — INSULIN LISPRO 100/ML
4 VIAL (ML) SUBCUTANEOUS
Refills: 0 | Status: DISCONTINUED | OUTPATIENT
Start: 2023-11-14 | End: 2023-11-16

## 2023-11-14 RX ORDER — TRAZODONE HCL 50 MG
50 TABLET ORAL AT BEDTIME
Refills: 0 | Status: DISCONTINUED | OUTPATIENT
Start: 2023-11-14 | End: 2023-11-21

## 2023-11-14 RX ORDER — BUDESONIDE AND FORMOTEROL FUMARATE DIHYDRATE 160; 4.5 UG/1; UG/1
2 AEROSOL RESPIRATORY (INHALATION)
Refills: 0 | Status: DISCONTINUED | OUTPATIENT
Start: 2023-11-14 | End: 2023-11-21

## 2023-11-14 RX ORDER — INSULIN GLARGINE 100 [IU]/ML
6 INJECTION, SOLUTION SUBCUTANEOUS AT BEDTIME
Refills: 0 | Status: DISCONTINUED | OUTPATIENT
Start: 2023-11-14 | End: 2023-11-16

## 2023-11-14 RX ORDER — INSULIN LISPRO 100/ML
4 VIAL (ML) SUBCUTANEOUS
Qty: 0 | Refills: 0 | DISCHARGE
Start: 2023-11-14

## 2023-11-14 RX ORDER — BUDESONIDE AND FORMOTEROL FUMARATE DIHYDRATE 160; 4.5 UG/1; UG/1
2 AEROSOL RESPIRATORY (INHALATION)
Qty: 0 | Refills: 0 | DISCHARGE
Start: 2023-11-14

## 2023-11-14 RX ORDER — FUROSEMIDE 40 MG
1 TABLET ORAL
Qty: 0 | Refills: 0 | DISCHARGE
Start: 2023-11-14

## 2023-11-14 RX ORDER — INSULIN GLARGINE 100 [IU]/ML
6 INJECTION, SOLUTION SUBCUTANEOUS
Qty: 0 | Refills: 0 | DISCHARGE
Start: 2023-11-14

## 2023-11-14 RX ORDER — ENOXAPARIN SODIUM 100 MG/ML
86 INJECTION SUBCUTANEOUS
Qty: 1 | Refills: 0
Start: 2023-11-14

## 2023-11-14 RX ORDER — FUROSEMIDE 40 MG
40 TABLET ORAL DAILY
Refills: 0 | Status: DISCONTINUED | OUTPATIENT
Start: 2023-11-14 | End: 2023-11-21

## 2023-11-14 RX ORDER — FOLIC ACID 0.8 MG
1 TABLET ORAL
Qty: 0 | Refills: 0 | DISCHARGE
Start: 2023-11-14

## 2023-11-14 RX ORDER — ACETAMINOPHEN 500 MG
650 TABLET ORAL EVERY 6 HOURS
Refills: 0 | Status: DISCONTINUED | OUTPATIENT
Start: 2023-11-14 | End: 2023-11-21

## 2023-11-14 RX ADMIN — Medication 1 MILLIGRAM(S): at 13:19

## 2023-11-14 RX ADMIN — ATORVASTATIN CALCIUM 80 MILLIGRAM(S): 80 TABLET, FILM COATED ORAL at 23:56

## 2023-11-14 RX ADMIN — Medication 3 MILLIGRAM(S): at 23:57

## 2023-11-14 RX ADMIN — SPIRONOLACTONE 25 MILLIGRAM(S): 25 TABLET, FILM COATED ORAL at 05:21

## 2023-11-14 RX ADMIN — Medication 40 MILLIGRAM(S): at 05:20

## 2023-11-14 RX ADMIN — GABAPENTIN 300 MILLIGRAM(S): 400 CAPSULE ORAL at 13:19

## 2023-11-14 RX ADMIN — Medication 1 EACH: at 13:20

## 2023-11-14 RX ADMIN — Medication 4 UNIT(S): at 13:17

## 2023-11-14 RX ADMIN — Medication 81 MILLIGRAM(S): at 12:18

## 2023-11-14 RX ADMIN — GABAPENTIN 300 MILLIGRAM(S): 400 CAPSULE ORAL at 05:21

## 2023-11-14 RX ADMIN — PANTOPRAZOLE SODIUM 40 MILLIGRAM(S): 20 TABLET, DELAYED RELEASE ORAL at 05:20

## 2023-11-14 RX ADMIN — SODIUM CHLORIDE 3 MILLILITER(S): 9 INJECTION INTRAMUSCULAR; INTRAVENOUS; SUBCUTANEOUS at 22:31

## 2023-11-14 RX ADMIN — Medication 10 MILLIGRAM(S): at 05:21

## 2023-11-14 RX ADMIN — Medication 1000 UNIT(S): at 13:19

## 2023-11-14 RX ADMIN — Medication 50 MILLIGRAM(S): at 23:56

## 2023-11-14 RX ADMIN — Medication 25 MILLIGRAM(S): at 05:20

## 2023-11-14 RX ADMIN — Medication 2: at 12:19

## 2023-11-14 RX ADMIN — OLANZAPINE 7.5 MILLIGRAM(S): 15 TABLET, FILM COATED ORAL at 00:07

## 2023-11-14 RX ADMIN — Medication 40 MILLIEQUIVALENT(S): at 09:36

## 2023-11-14 RX ADMIN — GABAPENTIN 300 MILLIGRAM(S): 400 CAPSULE ORAL at 23:56

## 2023-11-14 NOTE — PROGRESS NOTE ADULT - PROVIDER SPECIALTY LIST ADULT
Cardiology
Cardiology
Neurology
Cardiology
Cardiology
Neurology
Neurology
Cardiology
Cardiology
Hospitalist

## 2023-11-14 NOTE — PROGRESS NOTE ADULT - PROBLEM SELECTOR PROBLEM 3
Paresthesias with subjective weakness
Paresthesias with subjective weakness
Shortness of breath
Paresthesias with subjective weakness
Shortness of breath

## 2023-11-14 NOTE — PROGRESS NOTE ADULT - PROBLEM SELECTOR PROBLEM 1
Acute on chronic systolic congestive heart failure
Paresthesias with subjective weakness

## 2023-11-14 NOTE — PROGRESS NOTE ADULT - SUBJECTIVE AND OBJECTIVE BOX
German Carrasco MD  Interventional Cardiology / Endovascular Specialist  [place] Office : [address] [place] [address] [place] [address]  Tel:   [place] Office : [address] [facility] [place] [address]  Tel: [phone]    Pt lying in bed in [facility]   	  MEDICATIONS:  apixaban 5 milliGRAM(s) Oral every 12 hours  aspirin enteric coated 81 milliGRAM(s) Oral daily  metoprolol succinate ER 25 milliGRAM(s) Oral daily  spironolactone 25 milliGRAM(s) Oral daily      albuterol/ipratropium for Nebulization 3 milliLiter(s) Nebulizer every 6 hours PRN  budesonide 160 MICROgram(s)/formoterol 4.5 MICROgram(s) Inhaler 2 Puff(s) Inhalation two times a day    acetaminophen     Tablet .. 650 milliGRAM(s) Oral every 6 hours PRN  gabapentin 300 milliGRAM(s) Oral three times a day  melatonin 3 milliGRAM(s) Oral at bedtime PRN  OLANZapine 5 milliGRAM(s) Oral at bedtime  OLANZapine Injectable 2.5 milliGRAM(s) IntraMuscular every 12 hours PRN  ondansetron Injectable 4 milliGRAM(s) IV Push every 8 hours PRN  traZODone 50 milliGRAM(s) Oral at bedtime    aluminum hydroxide/magnesium hydroxide/simethicone Suspension 30 milliLiter(s) Oral every 4 hours PRN  pantoprazole    Tablet 40 milliGRAM(s) Oral before breakfast    atorvastatin 80 milliGRAM(s) Oral at bedtime  dextrose 50% Injectable 25 Gram(s) IV Push once  dextrose 50% Injectable 25 Gram(s) IV Push once  dextrose 50% Injectable 12.5 Gram(s) IV Push once  dextrose Oral Gel 15 Gram(s) Oral once PRN  glucagon  Injectable 1 milliGRAM(s) IntraMuscular once  insulin glargine Injectable (LANTUS) 6 Unit(s) SubCutaneous at bedtime  insulin lispro (ADMELOG) corrective regimen sliding scale   SubCutaneous three times a day before meals  insulin lispro (ADMELOG) corrective regimen sliding scale   SubCutaneous at bedtime  insulin lispro Injectable (ADMELOG) 4 Unit(s) SubCutaneous three times a day before meals  predniSONE   Tablet 10 milliGRAM(s) Oral daily    cholecalciferol 1000 Unit(s) Oral daily  dextrose 5%. 1000 milliLiter(s) IV Continuous <Continuous>  dextrose 5%. 1000 milliLiter(s) IV Continuous <Continuous>  folic acid 1 milliGRAM(s) Oral daily  influenza   Vaccine 0.5 milliLiter(s) IntraMuscular once  methotrexate (Non - oncologic) 15 milliGRAM(s) Oral <User Schedule>      PAST MEDICAL/SURGICAL HISTORY  PAST MEDICAL & SURGICAL HISTORY:  Cigarette smoker      Rheumatoid arthritis      Other depression      Breast cancer      Migraines      History of multiple cerebrovascular accidents (CVAs)      Suicidal ideation      Paresthesia of left arm      Facial paresthesia      APS (antiphospholipid syndrome)      History of hysterectomy      History of cholecystectomy          SOCIAL HISTORY: Substance Use (street drugs): ( x ) never used  (  ) other:    FAMILY HISTORY:  Family history of breast cancer (Mother)    Family history of diabetes mellitus (DM) (Father)        REVIEW OF SYSTEMS:  CONSTITUTIONAL: No fever, weight loss, or fatigue  EYES: No eye pain, visual disturbances, or discharge  ENMT:  No difficulty hearing, tinnitus, vertigo; No sinus or throat pain  BREASTS: No pain, masses, or nipple discharge  GASTROINTESTINAL: No abdominal or epigastric pain. No nausea, vomiting, or hematemesis; No diarrhea or constipation. No melena or hematochezia.  GENITOURINARY: No dysuria, frequency, hematuria, or incontinence  NEUROLOGICAL: No headaches, memory loss, loss of strength, numbness, or tremors  ENDOCRINE: No heat or cold intolerance; No hair loss  MUSCULOSKELETAL: No joint pain or swelling; No muscle, back, or extremity pain  PSYCHIATRIC: No depression, anxiety, mood swings, or difficulty sleeping  HEME/LYMPH: No easy bruising, or bleeding gums  All others negative    PHYSICAL EXAM:  T(C): 36.8 (11-10-23 @ 19:50), Max: 36.8 (11-10-23 @ 19:50)  HR: 81 (11-10-23 @ 19:50) (61 - 86)  BP: 98/63 (11-10-23 @ 19:50) (82/51 - 123/54)  RR: 17 (11-10-23 @ 19:50) (17 - 18)  SpO2: 100% (11-10-23 @ 19:50) (94% - 100%)  Wt(kg): --  I&O's Summary    GENERAL: NAD  EYES: EOMI, PERRLA, conjunctiva and sclera clear  ENMT: No tonsillar erythema, exudates, or enlargement  Cardiovascular: Normal S1 S2, No JVD, Diastolic murmur, No edema  Respiratory: Lungs clear to auscultation	  Gastrointestinal:  Soft, Non-tender, + BS	  Extremities: No edema                                   12.0   12.66 )-----------( 273      ( 10 Nov 2023 07:00 )             37.0     11-10    142  |  109<H>  |  15  ----------------------------<  113<H>  4.0   |  22  |  0.61    Ca    9.0      10 Nov 2023 07:00  Phos  3.2     11-10  Mg     1.90     11-10    TPro  6.2  /  Alb  3.8  /  TBili  0.6  /  DBili  x   /  AST  13  /  ALT  16  /  AlkPhos  103  11-10    proBNP:   Lipid Profile:   HgA1c:   TSH: Thyroid Stimulating Hormone, Serum: 1.36 uIU/mL (11-10 @ 07:00)      Consultant(s) Notes Reviewed:  [x ] YES  [ ] NO    Care Discussed with Consultants/Other Providers [ x] YES  [ ] NO    Imaging Personally Reviewed independently:  [x] YES  [ ] NO    All labs, radiologic studies, vitals, orders and medications list reviewed. Patient is seen and examined at bedside. Case discussed with medical team.              
  German Carrasco MD  Interventional Cardiology / Endovascular Specialist  [place] Office : [address] [place] [address] Tel:   [place] Office : [address] [facility] [place] [address]  Tel: [phone]      Subjective/Overnight events: Patient lying in bed. No acute distress. Denies chest pain, SOB or palpitations  	  MEDICATIONS:  apixaban 5 milliGRAM(s) Oral every 12 hours  aspirin enteric coated 81 milliGRAM(s) Oral daily  metoprolol succinate ER 25 milliGRAM(s) Oral daily  spironolactone 25 milliGRAM(s) Oral daily      albuterol/ipratropium for Nebulization 3 milliLiter(s) Nebulizer every 6 hours PRN  budesonide 160 MICROgram(s)/formoterol 4.5 MICROgram(s) Inhaler 2 Puff(s) Inhalation two times a day    acetaminophen     Tablet .. 650 milliGRAM(s) Oral every 6 hours PRN  gabapentin 300 milliGRAM(s) Oral three times a day  melatonin 3 milliGRAM(s) Oral at bedtime PRN  OLANZapine 5 milliGRAM(s) Oral at bedtime  OLANZapine Injectable 2.5 milliGRAM(s) IntraMuscular every 12 hours PRN  ondansetron Injectable 4 milliGRAM(s) IV Push every 8 hours PRN  traZODone 50 milliGRAM(s) Oral at bedtime    aluminum hydroxide/magnesium hydroxide/simethicone Suspension 30 milliLiter(s) Oral every 4 hours PRN  pantoprazole    Tablet 40 milliGRAM(s) Oral before breakfast    atorvastatin 80 milliGRAM(s) Oral at bedtime  dextrose 50% Injectable 25 Gram(s) IV Push once  dextrose 50% Injectable 12.5 Gram(s) IV Push once  dextrose 50% Injectable 25 Gram(s) IV Push once  dextrose Oral Gel 15 Gram(s) Oral once PRN  glucagon  Injectable 1 milliGRAM(s) IntraMuscular once  insulin lispro (ADMELOG) corrective regimen sliding scale   SubCutaneous at bedtime  insulin lispro (ADMELOG) corrective regimen sliding scale   SubCutaneous three times a day before meals  predniSONE   Tablet 10 milliGRAM(s) Oral daily    cholecalciferol 1000 Unit(s) Oral daily  dextrose 5%. 1000 milliLiter(s) IV Continuous <Continuous>  dextrose 5%. 1000 milliLiter(s) IV Continuous <Continuous>  folic acid 1 milliGRAM(s) Oral daily  methotrexate (Non - oncologic) 15 milliGRAM(s) Oral <User Schedule>      PAST MEDICAL/SURGICAL HISTORY  PAST MEDICAL & SURGICAL HISTORY:  Cigarette smoker      Rheumatoid arthritis      Other depression      Breast cancer      Migraines      History of multiple cerebrovascular accidents (CVAs)      Suicidal ideation      Paresthesia of left arm      Facial paresthesia      APS (antiphospholipid syndrome)      History of hysterectomy      History of cholecystectomy          SOCIAL HISTORY: Substance Use (street drugs): ( x ) never used  (  ) other:    FAMILY HISTORY:  Family history of breast cancer (Mother)    Family history of diabetes mellitus (DM) (Father)            PHYSICAL EXAM:  T(C): 37.1 (11-09-23 @ 04:44), Max: 37.1 (11-09-23 @ 04:44)  HR: 76 (11-09-23 @ 04:44) (68 - 76)  BP: 102/64 (11-09-23 @ 04:44) (102/64 - 116/64)  RR: 18 (11-09-23 @ 04:44) (18 - 19)  SpO2: 97% (11-09-23 @ 04:44) (95% - 97%)  Wt(kg): --  I&O's Summary        GENERAL: NAD  EYES: EOMI, PERRLA, conjunctiva and sclera clear  ENMT: No tonsillar erythema, exudates, or enlargement  Cardiovascular: Normal S1 S2, No JVD, No murmurs, No edema  Respiratory: Lungs clear to auscultation	  Gastrointestinal:  Soft, Non-tender, + BS	  Extremities: No edema                                     12.3   13.72 )-----------( 292      ( 09 Nov 2023 06:15 )             37.8     11-09    140  |  107  |  14  ----------------------------<  138<H>  3.7   |  21<L>  |  0.63    Ca    9.0      09 Nov 2023 06:15  Phos  3.3     11-09  Mg     1.90     11-09    TPro  6.2  /  Alb  3.9  /  TBili  0.6  /  DBili  x   /  AST  14  /  ALT  18  /  AlkPhos  109  11-09    proBNP:   Lipid Profile:   HgA1c:   TSH:     Consultant(s) Notes Reviewed:  [x ] YES  [ ] NO    Care Discussed with Consultants/Other Providers [ x] YES  [ ] NO    Imaging Personally Reviewed independently:  [x] YES  [ ] NO    All labs, radiologic studies, vitals, orders and medications list reviewed. Patient is seen and examined at bedside. Case discussed with medical team.              
German Carrasco MD  Interventional Cardiology / Advance Heart Failure and Cardiac Transplant Specialist  [place] Office : [address] [place] [address] [place] [address]  Tel:   [place] Office : [address] [facility] [place] [address]  Tel: [phone]       Pt is lying in bed comfortable not in distress, no chest pains no SOB no palpitations  	  MEDICATIONS:  aspirin enteric coated 81 milliGRAM(s) Oral daily  furosemide   Injectable 40 milliGRAM(s) IV Push two times a day  metoprolol succinate ER 25 milliGRAM(s) Oral daily  spironolactone 25 milliGRAM(s) Oral daily  albuterol/ipratropium for Nebulization 3 milliLiter(s) Nebulizer every 6 hours PRN  budesonide 160 MICROgram(s)/formoterol 4.5 MICROgram(s) Inhaler 2 Puff(s) Inhalation two times a day  acetaminophen     Tablet .. 650 milliGRAM(s) Oral every 6 hours PRN  gabapentin 300 milliGRAM(s) Oral three times a day  melatonin 3 milliGRAM(s) Oral at bedtime PRN  OLANZapine 7.5 milliGRAM(s) Oral daily  OLANZapine Injectable 2.5 milliGRAM(s) IntraMuscular every 12 hours PRN  ondansetron Injectable 4 milliGRAM(s) IV Push every 8 hours PRN  traZODone 50 milliGRAM(s) Oral at bedtime  aluminum hydroxide/magnesium hydroxide/simethicone Suspension 30 milliLiter(s) Oral every 4 hours PRN  pantoprazole    Tablet 40 milliGRAM(s) Oral before breakfast  atorvastatin 80 milliGRAM(s) Oral at bedtime  dextrose 50% Injectable 25 Gram(s) IV Push once  dextrose 50% Injectable 25 Gram(s) IV Push once  dextrose 50% Injectable 12.5 Gram(s) IV Push once  dextrose Oral Gel 15 Gram(s) Oral once PRN  glucagon  Injectable 1 milliGRAM(s) IntraMuscular once  insulin glargine Injectable (LANTUS) 6 Unit(s) SubCutaneous at bedtime  insulin lispro (ADMELOG) corrective regimen sliding scale   SubCutaneous three times a day before meals  insulin lispro (ADMELOG) corrective regimen sliding scale   SubCutaneous at bedtime  insulin lispro Injectable (ADMELOG) 4 Unit(s) SubCutaneous three times a day before meals  predniSONE   Tablet 10 milliGRAM(s) Oral daily    cholecalciferol 1000 Unit(s) Oral daily  dextrose 5%. 1000 milliLiter(s) IV Continuous <Continuous>  dextrose 5%. 1000 milliLiter(s) IV Continuous <Continuous>  folic acid 1 milliGRAM(s) Oral daily  influenza   Vaccine 0.5 milliLiter(s) IntraMuscular once  methotrexate (Non - oncologic) 15 milliGRAM(s) Oral <User Schedule>      PAST MEDICAL/SURGICAL HISTORY  PAST MEDICAL & SURGICAL HISTORY:  Cigarette smoker      Rheumatoid arthritis      Other depression      Breast cancer      Migraines      History of multiple cerebrovascular accidents (CVAs)      Suicidal ideation      Paresthesia of left arm      Facial paresthesia      APS (antiphospholipid syndrome)      History of hysterectomy      History of cholecystectomy          SOCIAL HISTORY: Substance Use (street drugs): ( x ) never used  (  ) other:    FAMILY HISTORY:  Family history of breast cancer (Mother)    Family history of diabetes mellitus (DM) (Father)         PHYSICAL EXAM:  T(C): 36.7 (11-14-23 @ 05:20), Max: 36.7 (11-14-23 @ 05:20)  HR: 62 (11-14-23 @ 05:20) (62 - 90)  BP: 101/61 (11-14-23 @ 05:20) (101/61 - 122/65)  RR: 18 (11-14-23 @ 05:20) (18 - 18)  SpO2: 98% (11-14-23 @ 05:20) (98% - 98%)  Wt(kg): --  I&O's Summary    13 Nov 2023 07:01  -  14 Nov 2023 07:00  --------------------------------------------------------  IN: 175 mL / OUT: 0 mL / NET: 175 mL          EYES:   PERRLA   ENMT:   Moist mucous membranes, Good dentition, No lesions  Cardiovascular: Normal S1 S2, No JVD, No murmurs, No edema  Respiratory: Lungs clear to auscultation	  Gastrointestinal:  Soft, Non-tender, + BS	  Extremities: no edema                                    14.2   14.50 )-----------( 310      ( 14 Nov 2023 05:42 )             43.8     11-14    139  |  100  |  25<H>  ----------------------------<  98  3.4<L>   |  24  |  0.67    Ca    9.4      14 Nov 2023 05:42  Phos  4.3     11-14  Mg     2.20     11-14    TPro  7.1  /  Alb  4.1  /  TBili  0.8  /  DBili  x   /  AST  12  /  ALT  15  /  AlkPhos  124<H>  11-13    proBNP:   Lipid Profile:   HgA1c:   TSH:     Consultant(s) Notes Reviewed:  [x ] YES  [ ] NO    Care Discussed with Consultants/Other Providers [ x] YES  [ ] NO    Imaging Personally Reviewed independently:  [x] YES  [ ] NO    All labs, radiologic studies, vitals, orders and medications list reviewed. Patient is seen and examined at bedside. Case discussed with medical team.        
  German Carrasco MD  Interventional Cardiology / Endovascular Specialist  [place] Office : [address] [place] [address] [place] [address]  Tel:   [place] Office : [address] [facility] [place] [address]  Tel: [phone]    Pt lying in bed in [facility]   	  MEDICATIONS:  apixaban 5 milliGRAM(s) Oral every 12 hours  aspirin enteric coated 81 milliGRAM(s) Oral daily  furosemide   Injectable 40 milliGRAM(s) IV Push two times a day  metoprolol succinate ER 25 milliGRAM(s) Oral daily  spironolactone 25 milliGRAM(s) Oral daily      albuterol/ipratropium for Nebulization 3 milliLiter(s) Nebulizer every 6 hours PRN  budesonide 160 MICROgram(s)/formoterol 4.5 MICROgram(s) Inhaler 2 Puff(s) Inhalation two times a day    acetaminophen     Tablet .. 650 milliGRAM(s) Oral every 6 hours PRN  gabapentin 300 milliGRAM(s) Oral three times a day  melatonin 3 milliGRAM(s) Oral at bedtime PRN  OLANZapine 5 milliGRAM(s) Oral at bedtime  OLANZapine Injectable 2.5 milliGRAM(s) IntraMuscular every 12 hours PRN  ondansetron Injectable 4 milliGRAM(s) IV Push every 8 hours PRN  traZODone 50 milliGRAM(s) Oral at bedtime    aluminum hydroxide/magnesium hydroxide/simethicone Suspension 30 milliLiter(s) Oral every 4 hours PRN  pantoprazole    Tablet 40 milliGRAM(s) Oral before breakfast    atorvastatin 80 milliGRAM(s) Oral at bedtime  dextrose 50% Injectable 25 Gram(s) IV Push once  dextrose 50% Injectable 25 Gram(s) IV Push once  dextrose 50% Injectable 12.5 Gram(s) IV Push once  dextrose Oral Gel 15 Gram(s) Oral once PRN  glucagon  Injectable 1 milliGRAM(s) IntraMuscular once  insulin glargine Injectable (LANTUS) 6 Unit(s) SubCutaneous at bedtime  insulin lispro (ADMELOG) corrective regimen sliding scale   SubCutaneous three times a day before meals  insulin lispro (ADMELOG) corrective regimen sliding scale   SubCutaneous at bedtime  insulin lispro Injectable (ADMELOG) 4 Unit(s) SubCutaneous three times a day before meals  predniSONE   Tablet 10 milliGRAM(s) Oral daily    cholecalciferol 1000 Unit(s) Oral daily  dextrose 5%. 1000 milliLiter(s) IV Continuous <Continuous>  dextrose 5%. 1000 milliLiter(s) IV Continuous <Continuous>  folic acid 1 milliGRAM(s) Oral daily  influenza   Vaccine 0.5 milliLiter(s) IntraMuscular once  methotrexate (Non - oncologic) 15 milliGRAM(s) Oral <User Schedule>      PAST MEDICAL/SURGICAL HISTORY  PAST MEDICAL & SURGICAL HISTORY:  Cigarette smoker      Rheumatoid arthritis      Other depression      Breast cancer      Migraines      History of multiple cerebrovascular accidents (CVAs)      Suicidal ideation      Paresthesia of left arm      Facial paresthesia      APS (antiphospholipid syndrome)      History of hysterectomy      History of cholecystectomy          SOCIAL HISTORY: Substance Use (street drugs): ( x ) never used  (  ) other:    FAMILY HISTORY:  Family history of breast cancer (Mother)    Family history of diabetes mellitus (DM) (Father)        REVIEW OF SYSTEMS:  CONSTITUTIONAL: No fever, weight loss, or fatigue  EYES: No eye pain, visual disturbances, or discharge  ENMT:  No difficulty hearing, tinnitus, vertigo; No sinus or throat pain  BREASTS: No pain, masses, or nipple discharge  GASTROINTESTINAL: No abdominal or epigastric pain. No nausea, vomiting, or hematemesis; No diarrhea or constipation. No melena or hematochezia.  GENITOURINARY: No dysuria, frequency, hematuria, or incontinence  NEUROLOGICAL: No headaches, memory loss, loss of strength, numbness, or tremors  ENDOCRINE: No heat or cold intolerance; No hair loss  MUSCULOSKELETAL: No joint pain or swelling; No muscle, back, or extremity pain  PSYCHIATRIC: No depression, anxiety, mood swings, or difficulty sleeping  HEME/LYMPH: No easy bruising, or bleeding gums  All others negative    PHYSICAL EXAM:  T(C): 36.6 (11-11-23 @ 19:30), Max: 36.8 (11-11-23 @ 18:00)  HR: 80 (11-11-23 @ 19:30) (62 - 88)  BP: 126/59 (11-11-23 @ 19:30) (94/51 - 126/59)  RR: 17 (11-11-23 @ 19:30) (17 - 18)  SpO2: 98% (11-11-23 @ 19:30) (92% - 100%)  Wt(kg): --  I&O's Summary    GENERAL: NAD  EYES: EOMI, PERRLA, conjunctiva and sclera clear  ENMT: No tonsillar erythema, exudates, or enlargement  Cardiovascular: Normal S1 S2, No JVD, Diastolic murmur, No edema  Respiratory: Lungs clear to auscultation	  Gastrointestinal:  Soft, Non-tender, + BS	  Extremities: No edema                             12.6   14.73 )-----------( 273      ( 11 Nov 2023 07:23 )             39.4     11-11    140  |  106  |  18  ----------------------------<  138<H>  4.2   |  22  |  0.65    Ca    8.8      11 Nov 2023 07:23  Phos  3.4     11-11  Mg     1.90     11-11    TPro  6.3  /  Alb  3.7  /  TBili  1.0  /  DBili  x   /  AST  9   /  ALT  16  /  AlkPhos  102  11-11    proBNP:   Lipid Profile:   HgA1c:   TSH:     Consultant(s) Notes Reviewed:  [x ] YES  [ ] NO    Care Discussed with Consultants/Other Providers [ x] YES  [ ] NO    Imaging Personally Reviewed independently:  [x] YES  [ ] NO    All labs, radiologic studies, vitals, orders and medications list reviewed. Patient is seen and examined at bedside. Case discussed with medical team.              
  German Carrasco MD  Interventional Cardiology / Endovascular Specialist  [place] Office : [address] [place] [address] [place] [address]  Tel:   [place] Office : [address] [facility] [place] [address]  Tel: [phone]    Pt lying in bed in [facility]   	  MEDICATIONS:  apixaban 5 milliGRAM(s) Oral every 12 hours  aspirin enteric coated 81 milliGRAM(s) Oral daily  furosemide   Injectable 40 milliGRAM(s) IV Push two times a day  metoprolol succinate ER 25 milliGRAM(s) Oral daily  spironolactone 25 milliGRAM(s) Oral daily      albuterol/ipratropium for Nebulization 3 milliLiter(s) Nebulizer every 6 hours PRN  budesonide 160 MICROgram(s)/formoterol 4.5 MICROgram(s) Inhaler 2 Puff(s) Inhalation two times a day    acetaminophen     Tablet .. 650 milliGRAM(s) Oral every 6 hours PRN  gabapentin 300 milliGRAM(s) Oral three times a day  melatonin 3 milliGRAM(s) Oral at bedtime PRN  OLANZapine 5 milliGRAM(s) Oral at bedtime  OLANZapine Injectable 2.5 milliGRAM(s) IntraMuscular every 12 hours PRN  ondansetron Injectable 4 milliGRAM(s) IV Push every 8 hours PRN  traZODone 50 milliGRAM(s) Oral at bedtime    aluminum hydroxide/magnesium hydroxide/simethicone Suspension 30 milliLiter(s) Oral every 4 hours PRN  pantoprazole    Tablet 40 milliGRAM(s) Oral before breakfast    atorvastatin 80 milliGRAM(s) Oral at bedtime  dextrose 50% Injectable 25 Gram(s) IV Push once  dextrose 50% Injectable 12.5 Gram(s) IV Push once  dextrose 50% Injectable 25 Gram(s) IV Push once  dextrose Oral Gel 15 Gram(s) Oral once PRN  glucagon  Injectable 1 milliGRAM(s) IntraMuscular once  insulin glargine Injectable (LANTUS) 6 Unit(s) SubCutaneous at bedtime  insulin lispro (ADMELOG) corrective regimen sliding scale   SubCutaneous three times a day before meals  insulin lispro (ADMELOG) corrective regimen sliding scale   SubCutaneous at bedtime  insulin lispro Injectable (ADMELOG) 4 Unit(s) SubCutaneous three times a day before meals  predniSONE   Tablet 10 milliGRAM(s) Oral daily    cholecalciferol 1000 Unit(s) Oral daily  dextrose 5%. 1000 milliLiter(s) IV Continuous <Continuous>  dextrose 5%. 1000 milliLiter(s) IV Continuous <Continuous>  folic acid 1 milliGRAM(s) Oral daily  influenza   Vaccine 0.5 milliLiter(s) IntraMuscular once  methotrexate (Non - oncologic) 15 milliGRAM(s) Oral <User Schedule>  potassium chloride   Powder 40 milliEquivalent(s) Oral every 4 hours      PAST MEDICAL/SURGICAL HISTORY  PAST MEDICAL & SURGICAL HISTORY:  Cigarette smoker      Rheumatoid arthritis      Other depression      Breast cancer      Migraines      History of multiple cerebrovascular accidents (CVAs)      Suicidal ideation      Paresthesia of left arm      Facial paresthesia      APS (antiphospholipid syndrome)      History of hysterectomy      History of cholecystectomy          SOCIAL HISTORY: Substance Use (street drugs): ( x ) never used  (  ) other:    FAMILY HISTORY:  Family history of breast cancer (Mother)    Family history of diabetes mellitus (DM) (Father)        REVIEW OF SYSTEMS:  CONSTITUTIONAL: No fever, weight loss, or fatigue  EYES: No eye pain, visual disturbances, or discharge  ENMT:  No difficulty hearing, tinnitus, vertigo; No sinus or throat pain  BREASTS: No pain, masses, or nipple discharge  GASTROINTESTINAL: No abdominal or epigastric pain. No nausea, vomiting, or hematemesis; No diarrhea or constipation. No melena or hematochezia.  GENITOURINARY: No dysuria, frequency, hematuria, or incontinence  NEUROLOGICAL: No headaches, memory loss, loss of strength, numbness, or tremors  ENDOCRINE: No heat or cold intolerance; No hair loss  MUSCULOSKELETAL: No joint pain or swelling; No muscle, back, or extremity pain  PSYCHIATRIC: No depression, anxiety, mood swings, or difficulty sleeping  HEME/LYMPH: No easy bruising, or bleeding gums  All others negative    PHYSICAL EXAM:  T(C): 36.3 (11-12-23 @ 12:05), Max: 36.8 (11-11-23 @ 18:00)  HR: 68 (11-12-23 @ 12:05) (64 - 88)  BP: 137/83 (11-12-23 @ 12:05) (90/65 - 137/83)  RR: 18 (11-12-23 @ 12:05) (17 - 18)  SpO2: 98% (11-12-23 @ 12:05) (96% - 98%)  Wt(kg): --  I&O's Summary    GENERAL: NAD  EYES: EOMI, PERRLA, conjunctiva and sclera clear  ENMT: No tonsillar erythema, exudates, or enlargement  Cardiovascular: Normal S1 S2, No JVD, Diastolic murmur, No edema  Respiratory: Lungs clear to auscultation	  Gastrointestinal:  Soft, Non-tender, + BS	  Extremities: No edema                                 13.0   13.15 )-----------( 265      ( 12 Nov 2023 05:26 )             41.2     11-12    140  |  102  |  28<H>  ----------------------------<  155<H>  3.2<L>   |  26  |  0.75    Ca    9.3      12 Nov 2023 05:26  Phos  4.3     11-12  Mg     2.00     11-12    TPro  6.4  /  Alb  3.9  /  TBili  0.8  /  DBili  x   /  AST  11  /  ALT  14  /  AlkPhos  109  11-12    proBNP:   Lipid Profile:   HgA1c:   TSH:     Consultant(s) Notes Reviewed:  [x ] YES  [ ] NO    Care Discussed with Consultants/Other Providers [ x] YES  [ ] NO    Imaging Personally Reviewed independently:  [x] YES  [ ] NO    All labs, radiologic studies, vitals, orders and medications list reviewed. Patient is seen and examined at bedside. Case discussed with medical team.              
Neurology Progress Note    S: Patient seen and examined. Became hypoxic during LESLEY this AM, procedure aborted     Medication:  acetaminophen     Tablet .. 650 milliGRAM(s) Oral every 6 hours PRN  albuterol/ipratropium for Nebulization 3 milliLiter(s) Nebulizer every 6 hours PRN  aluminum hydroxide/magnesium hydroxide/simethicone Suspension 30 milliLiter(s) Oral every 4 hours PRN  apixaban 5 milliGRAM(s) Oral every 12 hours  aspirin enteric coated 81 milliGRAM(s) Oral daily  atorvastatin 80 milliGRAM(s) Oral at bedtime  budesonide 160 MICROgram(s)/formoterol 4.5 MICROgram(s) Inhaler 2 Puff(s) Inhalation two times a day  cholecalciferol 1000 Unit(s) Oral daily  dextrose 5%. 1000 milliLiter(s) IV Continuous <Continuous>  dextrose 5%. 1000 milliLiter(s) IV Continuous <Continuous>  dextrose 50% Injectable 25 Gram(s) IV Push once  dextrose 50% Injectable 12.5 Gram(s) IV Push once  dextrose 50% Injectable 25 Gram(s) IV Push once  dextrose Oral Gel 15 Gram(s) Oral once PRN  folic acid 1 milliGRAM(s) Oral daily  gabapentin 300 milliGRAM(s) Oral three times a day  glucagon  Injectable 1 milliGRAM(s) IntraMuscular once  influenza   Vaccine 0.5 milliLiter(s) IntraMuscular once  insulin glargine Injectable (LANTUS) 6 Unit(s) SubCutaneous at bedtime  insulin lispro (ADMELOG) corrective regimen sliding scale   SubCutaneous three times a day before meals  insulin lispro (ADMELOG) corrective regimen sliding scale   SubCutaneous at bedtime  insulin lispro Injectable (ADMELOG) 2 Unit(s) SubCutaneous three times a day before meals  melatonin 3 milliGRAM(s) Oral at bedtime PRN  methotrexate (Non - oncologic) 15 milliGRAM(s) Oral <User Schedule>  metoprolol succinate ER 25 milliGRAM(s) Oral daily  nicotine - Inhaler 1 Each Inhalation every 4 hours  OLANZapine 5 milliGRAM(s) Oral at bedtime  OLANZapine Injectable 2.5 milliGRAM(s) IntraMuscular every 12 hours PRN  ondansetron Injectable 4 milliGRAM(s) IV Push every 8 hours PRN  pantoprazole    Tablet 40 milliGRAM(s) Oral before breakfast  predniSONE   Tablet 10 milliGRAM(s) Oral daily  spironolactone 25 milliGRAM(s) Oral daily  traZODone 50 milliGRAM(s) Oral at bedtime      Vitals:  Vital Signs Last 24 Hrs  T(C): 36.6 (10 Nov 2023 06:04), Max: 36.8 (09 Nov 2023 13:30)  T(F): 97.8 (10 Nov 2023 06:04), Max: 98.2 (09 Nov 2023 13:30)  HR: 61 (10 Nov 2023 06:53) (61 - 75)  BP: 123/54 (10 Nov 2023 06:53) (82/51 - 128/60)  BP(mean): --  RR: 18 (10 Nov 2023 06:04) (18 - 18)  SpO2: 97% (10 Nov 2023 06:04) (97% - 100%)    Parameters below as of 10 Nov 2023 06:04  Patient On (Oxygen Delivery Method): room air       Physical Examination:  General - non-toxic appearing female in mild distress  Cardiovascular - peripheral pulses palpable, no edema  Respiratory - breathing comfortably with no increased work of breathing    Neurologic Exam:  Mental status - Awake, Alert, Oriented to person, place, and time. Speech fluent, repetition and naming intact. Follows simple and complex commands. Attention/concentration and fund of knowledge intact    Cranial nerves - PERRL (4mm -> 3mm b/l), VFF, EOMI (Left eye exotropic but returns to midline upon primary gaze), face sensation (V1-V3) decreased in all 3 distributions on Left-side, facial strength intact without asymmetry b/l, hearing intact b/l, palate with symmetric elevation, trapezius 5/5 strength b/l, tongue midline on protrusion with full lateral movement    Motor - Normal bulk and tone throughout. No pronator drift.    Strength testing            Deltoid      Biceps      Triceps     Wrist Extension    Wrist Flexion     Interossei         R            5              5               5               5                      5                        5                 5  L             5              5               5               5                      5                        5                 5              Hip Flexion    Hip Extension    Knee Flexion    Knee Extension    Dorsiflexion    Plantar Flexion  R              5                 5                       5                     5                            5                          5  L              5                 5                        5                    5                            5                          5    Sensation - Decreased sensation of Left arm and leg, otherwise, intact.     DTR's -             Biceps      Triceps     Brachioradialis      Patellar    Ankle    Toes/plantar response  R             1+           1+                 1+                 2+           2+                neutral  L              1+           1+                 1+                2+           2+                 up    Coordination - Finger to Nose intact b/l. No tremors appreciated    Gait and station - pt did not want to walk at the time of interview      I personally reviewed the below data/images/labs:      CBC Full  -  ( 10 Nov 2023 07:00 )  WBC Count : 12.66 K/uL  RBC Count : 4.08 M/uL  Hemoglobin : 12.0 g/dL  Hematocrit : 37.0 %  Platelet Count - Automated : 273 K/uL  Mean Cell Volume : 90.7 fL  Mean Cell Hemoglobin : 29.4 pg  Mean Cell Hemoglobin Concentration : 32.4 gm/dL  Auto Neutrophil # : 8.40 K/uL  Auto Lymphocyte # : 2.89 K/uL  Auto Monocyte # : 0.88 K/uL  Auto Eosinophil # : 0.32 K/uL  Auto Basophil # : 0.10 K/uL  Auto Neutrophil % : 66.3 %  Auto Lymphocyte % : 22.8 %  Auto Monocyte % : 7.0 %  Auto Eosinophil % : 2.5 %  Auto Basophil % : 0.8 %    11-10    142  |  109<H>  |  15  ----------------------------<  113<H>  4.0   |  22  |  0.61    Ca    9.0      10 Nov 2023 07:00  Phos  3.2     11-10  Mg     1.90     11-10    TPro  6.2  /  Alb  3.8  /  TBili  0.6  /  DBili  x   /  AST  13  /  ALT  16  /  AlkPhos  103  11-10    LIVER FUNCTIONS - ( 10 Nov 2023 07:00 )  Alb: 3.8 g/dL / Pro: 6.2 g/dL / ALK PHOS: 103 U/L / ALT: 16 U/L / AST: 13 U/L / GGT: x           PT/INR - ( 10 Nov 2023 07:00 )   PT: 17.1 sec;   INR: 1.53 ratio         PTT - ( 10 Nov 2023 07:00 )  PTT:34.3 sec  Urinalysis Basic - ( 10 Nov 2023 07:00 )    Color: x / Appearance: x / SG: x / pH: x  Gluc: 113 mg/dL / Ketone: x  / Bili: x / Urobili: x   Blood: x / Protein: x / Nitrite: x   Leuk Esterase: x / RBC: x / WBC x   Sq Epi: x / Non Sq Epi: x / Bacteria: x      Radiology:  CT Head No Cont:  (07 Nov 2023 21:19)  < from: CT Head No Cont (11.07.23 @ 21:19) >  FINDINGS:    There is no acute intracranial mass-effect, hemorrhage, midline shift, or   abnormal extra-axial fluid collection. Gray-white differentiation is   maintained.    Tiny chronic right basal ganglia lacunar infarction again noted.    Ventricles, sulci, and cisterns are normal in size for thepatient's age   without hydrocephalus. Basal cisterns are patent.    Visualized paranasal sinuses and mastoid air cells are clear. Calvarium   is intact.    IMPRESSION:    No acute intracranial bleeding.    < end of copied text >    
Neurology Progress Note    S: Patient seen and examined. Pending [facility] today.       Medications: MEDICATIONS  (STANDING):  apixaban 5 milliGRAM(s) Oral every 12 hours  aspirin enteric coated 81 milliGRAM(s) Oral daily  atorvastatin 80 milliGRAM(s) Oral at bedtime  budesonide 160 MICROgram(s)/formoterol 4.5 MICROgram(s) Inhaler 2 Puff(s) Inhalation two times a day  cholecalciferol 1000 Unit(s) Oral daily  dextrose 5%. 1000 milliLiter(s) (50 mL/Hr) IV Continuous <Continuous>  dextrose 5%. 1000 milliLiter(s) (100 mL/Hr) IV Continuous <Continuous>  dextrose 50% Injectable 25 Gram(s) IV Push once  dextrose 50% Injectable 12.5 Gram(s) IV Push once  dextrose 50% Injectable 25 Gram(s) IV Push once  folic acid 1 milliGRAM(s) Oral daily  furosemide   Injectable 40 milliGRAM(s) IV Push two times a day  gabapentin 300 milliGRAM(s) Oral three times a day  glucagon  Injectable 1 milliGRAM(s) IntraMuscular once  influenza   Vaccine 0.5 milliLiter(s) IntraMuscular once  insulin glargine Injectable (LANTUS) 6 Unit(s) SubCutaneous at bedtime  insulin lispro (ADMELOG) corrective regimen sliding scale   SubCutaneous three times a day before meals  insulin lispro (ADMELOG) corrective regimen sliding scale   SubCutaneous at bedtime  insulin lispro Injectable (ADMELOG) 4 Unit(s) SubCutaneous three times a day before meals  methotrexate (Non - oncologic) 15 milliGRAM(s) Oral <User Schedule>  metoprolol succinate ER 25 milliGRAM(s) Oral daily  nicotine - Inhaler 1 Each Inhalation every 4 hours  OLANZapine 5 milliGRAM(s) Oral at bedtime  pantoprazole    Tablet 40 milliGRAM(s) Oral before breakfast  predniSONE   Tablet 10 milliGRAM(s) Oral daily  spironolactone 25 milliGRAM(s) Oral daily  traZODone 50 milliGRAM(s) Oral at bedtime    MEDICATIONS  (PRN):  acetaminophen     Tablet .. 650 milliGRAM(s) Oral every 6 hours PRN Temp greater or equal to 38C (100.4F), Mild Pain (1 - 3)  albuterol/ipratropium for Nebulization 3 milliLiter(s) Nebulizer every 6 hours PRN Shortness of Breath and/or Wheezing  aluminum hydroxide/magnesium hydroxide/simethicone Suspension 30 milliLiter(s) Oral every 4 hours PRN Dyspepsia  dextrose Oral Gel 15 Gram(s) Oral once PRN Blood Glucose LESS THAN 70 milliGRAM(s)/deciliter  melatonin 3 milliGRAM(s) Oral at bedtime PRN Insomnia  OLANZapine Injectable 2.5 milliGRAM(s) IntraMuscular every 12 hours PRN agitation  ondansetron Injectable 4 milliGRAM(s) IV Push every 8 hours PRN Nausea and/or Vomiting       Vitals:  Vital Signs Last 24 Hrs  T(C): 36.9 (13 Nov 2023 11:45), Max: 37.1 (12 Nov 2023 18:55)  T(F): 98.5 (13 Nov 2023 11:45), Max: 98.7 (12 Nov 2023 18:55)  HR: 91 (13 Nov 2023 11:45) (68 - 91)  BP: 102/72 (13 Nov 2023 11:45) (95/52 - 122/61)  BP(mean): --  RR: 17 (13 Nov 2023 11:45) (17 - 18)  SpO2: 96% (13 Nov 2023 11:45) (96% - 97%)    Parameters below as of 13 Nov 2023 06:45  Patient On (Oxygen Delivery Method): room air             Physical Examination:  General - non-toxic appearing female in mild distress  Cardiovascular - peripheral pulses palpable, no edema  Respiratory - breathing comfortably with no increased work of breathing    Neurologic Exam:  Mental status - Awake, Alert, Oriented to person, place, and time. Speech fluent, repetition and naming intact. Follows simple and complex commands. Attention/concentration and fund of knowledge intact    Cranial nerves - PERRL (4mm -> 3mm b/l), VFF, EOMI (Left eye exotropic but returns to midline upon primary gaze), face sensation (V1-V3) decreased in all 3 distributions on Left-side, facial strength intact without asymmetry b/l, hearing intact b/l, palate with symmetric elevation, trapezius 5/5 strength b/l, tongue midline on protrusion with full lateral movement    Motor: LUE slight drift and pronation with slower fingertaps    Sensation - Decreased sensation of Left arm and leg, otherwise, intact.     DTR's -             Biceps      Triceps     Brachioradialis      Patellar    Ankle    Toes/plantar response  R             1+           1+                 1+                 2+           2+                neutral  L              1+           1+                 1+                2+           2+                 up    Coordination - Finger to Nose intact b/l. No tremors appreciated    Gait and station - pt did not want to walk at the time of interview      I personally reviewed the below data/images/labs:        Radiology:  CT Head No Cont:  (07 Nov 2023 21:19)  < from: CT Head No Cont (11.07.23 @ 21:19) >  FINDINGS:    There is no acute intracranial mass-effect, hemorrhage, midline shift, or   abnormal extra-axial fluid collection. Gray-white differentiation is   maintained.    Tiny chronic right basal ganglia lacunar infarction again noted.      LABS:                          13.8   14.97 )-----------( 289      ( 13 Nov 2023 07:29 )             42.8     11-13    137  |  103  |  27<H>  ----------------------------<  138<H>  4.2   |  23  |  0.75    Ca    9.4      13 Nov 2023 07:29  Phos  2.9     11-13  Mg     2.00     11-13    TPro  7.1  /  Alb  4.1  /  TBili  0.8  /  DBili  x   /  AST  12  /  ALT  15  /  AlkPhos  124<H>  11-13    LIVER FUNCTIONS - ( 13 Nov 2023 07:29 )  Alb: 4.1 g/dL / Pro: 7.1 g/dL / ALK PHOS: 124 U/L / ALT: 15 U/L / AST: 12 U/L / GGT: x             Urinalysis Basic - ( 13 Nov 2023 07:29 )    Color: x / Appearance: x / SG: x / pH: x  Gluc: 138 mg/dL / Ketone: x  / Bili: x / Urobili: x   Blood: x / Protein: x / Nitrite: x   Leuk Esterase: x / RBC: x / WBC x   Sq Epi: x / Non Sq Epi: x / Bacteria: x      Ventricles, sulci, and cisterns are normal in size for thepatient's age   without hydrocephalus. Basal cisterns are patent.    Visualized paranasal sinuses and mastoid air cells are clear. Calvarium   is intact.    IMPRESSION:    No acute intracranial bleeding.    < end of copied text >    
Neurology Progress Note    S: Patient seen and examined. s/p [facility]        Medications: MEDICATIONS  (STANDING):  aspirin enteric coated 81 milliGRAM(s) Oral daily  atorvastatin 80 milliGRAM(s) Oral at bedtime  budesonide 160 MICROgram(s)/formoterol 4.5 MICROgram(s) Inhaler 2 Puff(s) Inhalation two times a day  cholecalciferol 1000 Unit(s) Oral daily  dextrose 5%. 1000 milliLiter(s) (50 mL/Hr) IV Continuous <Continuous>  dextrose 5%. 1000 milliLiter(s) (100 mL/Hr) IV Continuous <Continuous>  dextrose 50% Injectable 25 Gram(s) IV Push once  dextrose 50% Injectable 25 Gram(s) IV Push once  dextrose 50% Injectable 12.5 Gram(s) IV Push once  enoxaparin Injectable 85 milliGRAM(s) SubCutaneous every 12 hours  folic acid 1 milliGRAM(s) Oral daily  furosemide    Tablet 40 milliGRAM(s) Oral daily  gabapentin 300 milliGRAM(s) Oral three times a day  glucagon  Injectable 1 milliGRAM(s) IntraMuscular once  influenza   Vaccine 0.5 milliLiter(s) IntraMuscular once  insulin glargine Injectable (LANTUS) 6 Unit(s) SubCutaneous at bedtime  insulin lispro (ADMELOG) corrective regimen sliding scale   SubCutaneous three times a day before meals  insulin lispro (ADMELOG) corrective regimen sliding scale   SubCutaneous at bedtime  insulin lispro Injectable (ADMELOG) 4 Unit(s) SubCutaneous three times a day before meals  methotrexate (Non - oncologic) 15 milliGRAM(s) Oral <User Schedule>  metoprolol succinate ER 25 milliGRAM(s) Oral daily  nicotine - Inhaler 1 Each Inhalation every 4 hours  OLANZapine 7.5 milliGRAM(s) Oral daily  pantoprazole    Tablet 40 milliGRAM(s) Oral before breakfast  predniSONE   Tablet 10 milliGRAM(s) Oral daily  spironolactone 25 milliGRAM(s) Oral daily  traZODone 50 milliGRAM(s) Oral at bedtime    MEDICATIONS  (PRN):  acetaminophen     Tablet .. 650 milliGRAM(s) Oral every 6 hours PRN Temp greater or equal to 38C (100.4F), Mild Pain (1 - 3)  albuterol/ipratropium for Nebulization 3 milliLiter(s) Nebulizer every 6 hours PRN Shortness of Breath and/or Wheezing  aluminum hydroxide/magnesium hydroxide/simethicone Suspension 30 milliLiter(s) Oral every 4 hours PRN Dyspepsia  dextrose Oral Gel 15 Gram(s) Oral once PRN Blood Glucose LESS THAN 70 milliGRAM(s)/deciliter  melatonin 3 milliGRAM(s) Oral at bedtime PRN Insomnia  OLANZapine Injectable 2.5 milliGRAM(s) IntraMuscular every 12 hours PRN agitation  ondansetron Injectable 4 milliGRAM(s) IV Push every 8 hours PRN Nausea and/or Vomiting       Vitals:  Vital Signs Last 24 Hrs  T(C): 36.3 (14 Nov 2023 19:19), Max: 36.7 (14 Nov 2023 05:20)  T(F): 97.4 (14 Nov 2023 19:19), Max: 98 (14 Nov 2023 05:20)  HR: 75 (14 Nov 2023 19:19) (62 - 75)  BP: 112/- (14 Nov 2023 19:19) (101/61 - 119/64)  BP(mean): 90 (14 Nov 2023 19:19) (90 - 90)  RR: 18 (14 Nov 2023 19:19) (18 - 18)  SpO2: 97% (14 Nov 2023 19:19) (97% - 99%)    Parameters below as of 14 Nov 2023 19:19  Patient On (Oxygen Delivery Method): room air      Physical Examination:  General - non-toxic appearing female in mild distress  Cardiovascular - peripheral pulses palpable, no edema  Respiratory - breathing comfortably with no increased work of breathing    Neurologic Exam:  Mental status - Awake, Alert, Oriented to person, place, and time. Speech fluent, repetition and naming intact. Follows simple and complex commands. Attention/concentration and fund of knowledge intact    Cranial nerves - PERRL (4mm -> 3mm b/l), VFF, EOMI (Left eye exotropic but returns to midline upon primary gaze), face sensation (V1-V3) decreased in all 3 distributions on Left-side, facial strength intact without asymmetry b/l, hearing intact b/l, palate with symmetric elevation, trapezius 5/5 strength b/l, tongue midline on protrusion with full lateral movement    Motor: LUE slight drift and pronation with slower fingertaps    Sensation - Decreased sensation of Left arm and leg, otherwise, intact.     DTR's -             Biceps      Triceps     Brachioradialis      Patellar    Ankle    Toes/plantar response  R             1+           1+                 1+                 2+           2+                neutral  L              1+           1+                 1+                2+           2+                 up    Coordination - Finger to Nose intact b/l. No tremors appreciated    Gait and station - pt did not want to walk at the time of interview      I personally reviewed the below data/images/labs:    LABS:                          14.2   14.50 )-----------( 310      ( 14 Nov 2023 05:42 )             43.8     11-14    139  |  100  |  25<H>  ----------------------------<  98  3.4<L>   |  24  |  0.67    Ca    9.4      14 Nov 2023 05:42  Phos  4.3     11-14  Mg     2.20     11-14    TPro  7.1  /  Alb  4.1  /  TBili  0.8  /  DBili  x   /  AST  12  /  ALT  15  /  AlkPhos  124<H>  11-13    LIVER FUNCTIONS - ( 13 Nov 2023 07:29 )  Alb: 4.1 g/dL / Pro: 7.1 g/dL / ALK PHOS: 124 U/L / ALT: 15 U/L / AST: 12 U/L / GGT: x           PT/INR - ( 14 Nov 2023 05:42 )   PT: 12.8 sec;   INR: 1.14 ratio         PTT - ( 14 Nov 2023 05:42 )  PTT:35.3 sec  Urinalysis Basic - ( 14 Nov 2023 05:42 )    Color: x / Appearance: x / SG: x / pH: x  Gluc: 98 mg/dL / Ketone: x  / Bili: x / Urobili: x   Blood: x / Protein: x / Nitrite: x   Leuk Esterase: x / RBC: x / WBC x   Sq Epi: x / Non Sq Epi: x / Bacteria: x        Radiology:  CT Head No Cont:  (07 Nov 2023 21:19)  < from: CT Head No Cont (11.07.23 @ 21:19) >  FINDINGS:    There is no acute intracranial mass-effect, hemorrhage, midline shift, or   abnormal extra-axial fluid collection. Gray-white differentiation is   maintained.    Tiny chronic right basal ganglia lacunar infarction again noted.  Ventricles, sulci, and cisterns are normal in size for thepatient's age   without hydrocephalus. Basal cisterns are patent.    Visualized paranasal sinuses and mastoid air cells are clear. Calvarium   is intact.    IMPRESSION:    No acute intracranial bleeding.    < end of copied text >    
German Carrasco MD  Interventional Cardiology / Advance Heart Failure and Cardiac Transplant Specialist  [place] Office : [phone] [place] [address] [place] [address]  Tel:   [place] Office : [phone] [facility] [place] [address]  Tel: [phone]       Pt is lying in bed comfortable not in distress, no chest pains no SOB no palpitations  	  MEDICATIONS:  aspirin enteric coated 81 milliGRAM(s) Oral daily  furosemide   Injectable 40 milliGRAM(s) IV Push two times a day  metoprolol succinate ER 25 milliGRAM(s) Oral daily  spironolactone 25 milliGRAM(s) Oral daily      albuterol/ipratropium for Nebulization 3 milliLiter(s) Nebulizer every 6 hours PRN  budesonide 160 MICROgram(s)/formoterol 4.5 MICROgram(s) Inhaler 2 Puff(s) Inhalation two times a day    acetaminophen     Tablet .. 650 milliGRAM(s) Oral every 6 hours PRN  gabapentin 300 milliGRAM(s) Oral three times a day  melatonin 3 milliGRAM(s) Oral at bedtime PRN  OLANZapine 5 milliGRAM(s) Oral at bedtime  OLANZapine Injectable 2.5 milliGRAM(s) IntraMuscular every 12 hours PRN  ondansetron Injectable 4 milliGRAM(s) IV Push every 8 hours PRN  traZODone 50 milliGRAM(s) Oral at bedtime    aluminum hydroxide/magnesium hydroxide/simethicone Suspension 30 milliLiter(s) Oral every 4 hours PRN  pantoprazole    Tablet 40 milliGRAM(s) Oral before breakfast    atorvastatin 80 milliGRAM(s) Oral at bedtime  dextrose 50% Injectable 25 Gram(s) IV Push once  dextrose 50% Injectable 25 Gram(s) IV Push once  dextrose 50% Injectable 12.5 Gram(s) IV Push once  dextrose Oral Gel 15 Gram(s) Oral once PRN  glucagon  Injectable 1 milliGRAM(s) IntraMuscular once  insulin glargine Injectable (LANTUS) 6 Unit(s) SubCutaneous at bedtime  insulin lispro (ADMELOG) corrective regimen sliding scale   SubCutaneous three times a day before meals  insulin lispro (ADMELOG) corrective regimen sliding scale   SubCutaneous at bedtime  insulin lispro Injectable (ADMELOG) 4 Unit(s) SubCutaneous three times a day before meals  predniSONE   Tablet 10 milliGRAM(s) Oral daily    cholecalciferol 1000 Unit(s) Oral daily  dextrose 5%. 1000 milliLiter(s) IV Continuous <Continuous>  dextrose 5%. 1000 milliLiter(s) IV Continuous <Continuous>  folic acid 1 milliGRAM(s) Oral daily  influenza   Vaccine 0.5 milliLiter(s) IntraMuscular once  methotrexate (Non - oncologic) 15 milliGRAM(s) Oral <User Schedule>      PAST MEDICAL/SURGICAL HISTORY  PAST MEDICAL & SURGICAL HISTORY:  Cigarette smoker      Rheumatoid arthritis      Other depression      Breast cancer      Migraines      History of multiple cerebrovascular accidents (CVAs)      Suicidal ideation      Paresthesia of left arm      Facial paresthesia      APS (antiphospholipid syndrome)      History of hysterectomy      History of cholecystectomy          SOCIAL HISTORY: Substance Use (street drugs): ( x ) never used  (  ) other:    FAMILY HISTORY:  Family history of breast cancer (Mother)    Family history of diabetes mellitus (DM) (Father)        PHYSICAL EXAM:  T(C): 36.9 (11-13-23 @ 11:45), Max: 37.1 (11-12-23 @ 18:55)  HR: 91 (11-13-23 @ 11:45) (68 - 91)  BP: 102/72 (11-13-23 @ 11:45) (95/52 - 122/61)  RR: 17 (11-13-23 @ 11:45) (17 - 18)  SpO2: 96% (11-13-23 @ 11:45) (96% - 97%)  Wt(kg): --  I&O's Summary        EYES:   PERRLA   ENMT:   Moist mucous membranes, Good dentition, No lesions  Cardiovascular: Normal S1 S2, No JVD, 2/6 diastolic murmurs, No edema  Respiratory: Lungs clear to auscultation	  Gastrointestinal:  Soft, Non-tender, + BS	  Extremities: no edema                                    13.8   14.97 )-----------( 289      ( 13 Nov 2023 07:29 )             42.8     11-13    137  |  103  |  27<H>  ----------------------------<  138<H>  4.2   |  23  |  0.75    Ca    9.4      13 Nov 2023 07:29  Phos  2.9     11-13  Mg     2.00     11-13    TPro  7.1  /  Alb  4.1  /  TBili  0.8  /  DBili  x   /  AST  12  /  ALT  15  /  AlkPhos  124<H>  11-13    proBNP:   Lipid Profile:   HgA1c:   TSH:     Consultant(s) Notes Reviewed:  [x ] YES  [ ] NO    Care Discussed with Consultants/Other Providers [ x] YES  [ ] NO    Imaging Personally Reviewed independently:  [x] YES  [ ] NO    All labs, radiologic studies, vitals, orders and medications list reviewed. Patient is seen and examined at bedside. Case discussed with medical team.        
[facility] Division of Hospital Medicine  Jonatan Martin MD  Pager [phone]      Patient is a 58y old  Female who presents with a chief complaint of Multiple complaints (2023 14:52)      SUBJECTIVE / OVERNIGHT EVENTS: Intermittent pain under her breast persisting. No SOB or CP. No abd pain or diarrhea    MEDICATIONS  (STANDING):  apixaban 5 milliGRAM(s) Oral every 12 hours  aspirin enteric coated 81 milliGRAM(s) Oral daily  atorvastatin 80 milliGRAM(s) Oral at bedtime  budesonide 160 MICROgram(s)/formoterol 4.5 MICROgram(s) Inhaler 2 Puff(s) Inhalation two times a day  cholecalciferol 1000 Unit(s) Oral daily  dextrose 5%. 1000 milliLiter(s) (50 mL/Hr) IV Continuous <Continuous>  dextrose 5%. 1000 milliLiter(s) (100 mL/Hr) IV Continuous <Continuous>  dextrose 50% Injectable 25 Gram(s) IV Push once  dextrose 50% Injectable 25 Gram(s) IV Push once  dextrose 50% Injectable 12.5 Gram(s) IV Push once  folic acid 1 milliGRAM(s) Oral daily  gabapentin 300 milliGRAM(s) Oral three times a day  glucagon  Injectable 1 milliGRAM(s) IntraMuscular once  insulin lispro (ADMELOG) corrective regimen sliding scale   SubCutaneous at bedtime  insulin lispro (ADMELOG) corrective regimen sliding scale   SubCutaneous three times a day before meals  methotrexate (Non - oncologic) 15 milliGRAM(s) Oral <User Schedule>  metoprolol succinate ER 25 milliGRAM(s) Oral daily  nicotine - Inhaler 1 Each Inhalation every 4 hours  OLANZapine 5 milliGRAM(s) Oral at bedtime  pantoprazole    Tablet 40 milliGRAM(s) Oral before breakfast  predniSONE   Tablet 10 milliGRAM(s) Oral daily  spironolactone 25 milliGRAM(s) Oral daily  traZODone 50 milliGRAM(s) Oral at bedtime    MEDICATIONS  (PRN):  acetaminophen     Tablet .. 650 milliGRAM(s) Oral every 6 hours PRN Temp greater or equal to 38C (100.4F), Mild Pain (1 - 3)  albuterol/ipratropium for Nebulization 3 milliLiter(s) Nebulizer every 6 hours PRN Shortness of Breath and/or Wheezing  aluminum hydroxide/magnesium hydroxide/simethicone Suspension 30 milliLiter(s) Oral every 4 hours PRN Dyspepsia  dextrose Oral Gel 15 Gram(s) Oral once PRN Blood Glucose LESS THAN 70 milliGRAM(s)/deciliter  melatonin 3 milliGRAM(s) Oral at bedtime PRN Insomnia  OLANZapine Injectable 2.5 milliGRAM(s) IntraMuscular every 12 hours PRN agitation  ondansetron Injectable 4 milliGRAM(s) IV Push every 8 hours PRN Nausea and/or Vomiting      CAPILLARY BLOOD GLUCOSE      POCT Blood Glucose.: 169 mg/dL (2023 09:08)  POCT Blood Glucose.: 119 mg/dL (2023 21:51)  POCT Blood Glucose.: 279 mg/dL (2023 17:08)  POCT Blood Glucose.: 149 mg/dL (2023 12:11)    I&O's Summary      PHYSICAL EXAM:  Vital Signs Last 24 Hrs  T(C): 37.1 (2023 04:44), Max: 37.1 (2023 04:44)  T(F): 98.7 (2023 04:44), Max: 98.7 (2023 04:44)  HR: 76 (2023 04:44) (68 - 76)  BP: 102/64 (2023 04:44) (102/64 - 116/64)  BP(mean): --  RR: 18 (2023 04:44) (18 - 19)  SpO2: 97% (2023 04:44) (95% - 98%)    Parameters below as of 2023 04:44  Patient On (Oxygen Delivery Method): room air      CONSTITUTIONAL: NAD  EYES: conjunctiva and sclera clear  ENMT: mmm  NECK: Supple,  RESPIRATORY: Normal respiratory effort; lungs are clear to auscultation bilaterally  CARDIOVASCULAR: Regular rate and rhythm, + S1 and S2  ABDOMEN: Nontender to palpation, normoactive bowel sounds, no rebound/guarding  PSYCH: A+O x 3    LABS:                        12.3   13.72 )-----------( 292      ( 2023 06:15 )             37.8     11-09    140  |  107  |  14  ----------------------------<  138<H>  3.7   |  21<L>  |  0.63    Ca    9.0      2023 06:15  Phos  3.3     11-  Mg     1.90     11-    TPro  6.2  /  Alb  3.9  /  TBili  0.6  /  DBili  x   /  AST  14  /  ALT  18  /  AlkPhos  109  11-09      CARDIAC MARKERS ( 2023 06:15 )  x     / x     / 103 U/L / x     / x          Urinalysis Basic - ( 2023 06:40 )    Color: Yellow / Appearance: Clear / S.017 / pH: x  Gluc: x / Ketone: Negative mg/dL  / Bili: Negative / Urobili: 1.0 mg/dL   Blood: x / Protein: Negative mg/dL / Nitrite: Negative   Leuk Esterase: Negative / RBC: 6 /HPF / WBC 1 /HPF   Sq Epi: x / Non Sq Epi: 2 /HPF / Bacteria: Negative /HPF        Culture - Blood (collected 2023 22:48)  Source: .Blood Blood  Preliminary Report (2023 04:02):    No growth at 24 hours    Culture - Blood (collected 2023 22:48)  Source: .Blood Blood  Preliminary Report (2023 04:02):    No growth at 24 hours    
Merlin Mathew, MD   Hospitalist  Pager #[phone]    PROGRESS NOTE:     Patient is a 58y old  Female who presents with a chief complaint of Multiple complaints (14 Nov 2023 11:33)      SUBJECTIVE / OVERNIGHT EVENTS: NEON   Patient feels fine, no complaints     ADDITIONAL REVIEW OF SYSTEMS:    MEDICATIONS  (STANDING):  aspirin enteric coated 81 milliGRAM(s) Oral daily  atorvastatin 80 milliGRAM(s) Oral at bedtime  budesonide 160 MICROgram(s)/formoterol 4.5 MICROgram(s) Inhaler 2 Puff(s) Inhalation two times a day  cholecalciferol 1000 Unit(s) Oral daily  dextrose 5%. 1000 milliLiter(s) (50 mL/Hr) IV Continuous <Continuous>  dextrose 5%. 1000 milliLiter(s) (100 mL/Hr) IV Continuous <Continuous>  dextrose 50% Injectable 25 Gram(s) IV Push once  dextrose 50% Injectable 12.5 Gram(s) IV Push once  dextrose 50% Injectable 25 Gram(s) IV Push once  folic acid 1 milliGRAM(s) Oral daily  furosemide    Tablet 40 milliGRAM(s) Oral daily  gabapentin 300 milliGRAM(s) Oral three times a day  glucagon  Injectable 1 milliGRAM(s) IntraMuscular once  influenza   Vaccine 0.5 milliLiter(s) IntraMuscular once  insulin glargine Injectable (LANTUS) 6 Unit(s) SubCutaneous at bedtime  insulin lispro (ADMELOG) corrective regimen sliding scale   SubCutaneous three times a day before meals  insulin lispro (ADMELOG) corrective regimen sliding scale   SubCutaneous at bedtime  insulin lispro Injectable (ADMELOG) 4 Unit(s) SubCutaneous three times a day before meals  methotrexate (Non - oncologic) 15 milliGRAM(s) Oral <User Schedule>  metoprolol succinate ER 25 milliGRAM(s) Oral daily  nicotine - Inhaler 1 Each Inhalation every 4 hours  OLANZapine 7.5 milliGRAM(s) Oral daily  pantoprazole    Tablet 40 milliGRAM(s) Oral before breakfast  predniSONE   Tablet 10 milliGRAM(s) Oral daily  spironolactone 25 milliGRAM(s) Oral daily  traZODone 50 milliGRAM(s) Oral at bedtime    MEDICATIONS  (PRN):  acetaminophen     Tablet .. 650 milliGRAM(s) Oral every 6 hours PRN Temp greater or equal to 38C (100.4F), Mild Pain (1 - 3)  albuterol/ipratropium for Nebulization 3 milliLiter(s) Nebulizer every 6 hours PRN Shortness of Breath and/or Wheezing  aluminum hydroxide/magnesium hydroxide/simethicone Suspension 30 milliLiter(s) Oral every 4 hours PRN Dyspepsia  dextrose Oral Gel 15 Gram(s) Oral once PRN Blood Glucose LESS THAN 70 milliGRAM(s)/deciliter  melatonin 3 milliGRAM(s) Oral at bedtime PRN Insomnia  OLANZapine Injectable 2.5 milliGRAM(s) IntraMuscular every 12 hours PRN agitation  ondansetron Injectable 4 milliGRAM(s) IV Push every 8 hours PRN Nausea and/or Vomiting      CAPILLARY BLOOD GLUCOSE      POCT Blood Glucose.: 130 mg/dL (14 Nov 2023 13:13)  POCT Blood Glucose.: 156 mg/dL (14 Nov 2023 12:05)  POCT Blood Glucose.: 148 mg/dL (14 Nov 2023 08:22)  POCT Blood Glucose.: 220 mg/dL (13 Nov 2023 21:56)  POCT Blood Glucose.: 139 mg/dL (13 Nov 2023 17:10)    I&O's Summary    13 Nov 2023 07:01  -  14 Nov 2023 07:00  --------------------------------------------------------  IN: 175 mL / OUT: 0 mL / NET: 175 mL        PHYSICAL EXAM:  Vital Signs Last 24 Hrs  T(C): 36.7 (14 Nov 2023 05:20), Max: 36.7 (14 Nov 2023 05:20)  T(F): 98 (14 Nov 2023 05:20), Max: 98 (14 Nov 2023 05:20)  HR: 62 (14 Nov 2023 05:20) (62 - 90)  BP: 101/61 (14 Nov 2023 05:20) (101/61 - 122/65)  BP(mean): --  RR: 18 (14 Nov 2023 05:20) (18 - 18)  SpO2: 98% (14 Nov 2023 05:20) (98% - 98%)    Parameters below as of 14 Nov 2023 05:20  Patient On (Oxygen Delivery Method): room air        CONSTITUTIONAL: NAD, resting comfortably   RESPIRATORY: Normal respiratory effort; lungs are clear to auscultation bilaterally  CARDIOVASCULAR: Regular rate and rhythm, normal S1 and S2, no murmur/rub/gallop; No lower extremity edema; Peripheral pulses are 2+ bilaterally  ABDOMEN: Nontender to palpation, normoactive bowel sounds, no rebound/guarding; No hepatosplenomegaly  MUSCULOSKELETAL no clubbing or cyanosis of digits; no joint swelling or tenderness to palpation  PSYCH: A+O to person, place, and time; affect appropriate    LABS:                        14.2   14.50 )-----------( 310      ( 14 Nov 2023 05:42 )             43.8     11-14    139  |  100  |  25<H>  ----------------------------<  98  3.4<L>   |  24  |  0.67    Ca    9.4      14 Nov 2023 05:42  Phos  4.3     11-14  Mg     2.20     11-14    TPro  7.1  /  Alb  4.1  /  TBili  0.8  /  DBili  x   /  AST  12  /  ALT  15  /  AlkPhos  124<H>  11-13    PT/INR - ( 14 Nov 2023 05:42 )   PT: 12.8 sec;   INR: 1.14 ratio         PTT - ( 14 Nov 2023 05:42 )  PTT:35.3 sec      Urinalysis Basic - ( 14 Nov 2023 05:42 )    Color: x / Appearance: x / SG: x / pH: x  Gluc: 98 mg/dL / Ketone: x  / Bili: x / Urobili: x   Blood: x / Protein: x / Nitrite: x   Leuk Esterase: x / RBC: x / WBC x   Sq Epi: x / Non Sq Epi: x / Bacteria: x          RADIOLOGY & ADDITIONAL TESTS:  Results Reviewed:   Imaging Personally Reviewed:  Electrocardiogram Personally Reviewed:    COORDINATION OF CARE:  Care Discussed with Consultants/Other Providers [Y/N]:  Prior or Outpatient Records Reviewed [Y/N]:  
**********************************************  [facility] Division of Hospital Medicine  Meagan Porter MD  Available via MS Teams  Pager: [phone]  **********************************************     Patient is a 58y old  Female who presents with a chief complaint of Multiple complaints (12 Nov 2023 13:18)    SUBJECTIVE / OVERNIGHT EVENTS: No acute events overnight. Patient examined at bedside this AM, with no subjective complaints. Reports improvement in LE edema and breathing. Denies CP, palpitations, SOB, n/v/d, abdominal pain.     OBJECTIVE:  Vital Signs Last 24 Hrs  T(C): 36.3 (12 Nov 2023 12:05), Max: 36.8 (11 Nov 2023 18:00)  T(F): 97.4 (12 Nov 2023 12:05), Max: 98.3 (11 Nov 2023 18:00)  HR: 68 (12 Nov 2023 12:05) (64 - 88)  BP: 137/83 (12 Nov 2023 12:05) (90/65 - 137/83)  BP(mean): --  RR: 18 (12 Nov 2023 12:05) (17 - 18)  SpO2: 98% (12 Nov 2023 12:05) (96% - 98%)    Parameters below as of 12 Nov 2023 12:05  Patient On (Oxygen Delivery Method): room air      Physical Exam:     General: No acute distress, well-appearing    Eyes: PERRL, EOMI     ENT: MMM, no oropharyngeal lesions or erythema appreciated     Pulm: No increased WOB. Mild end expiratory wheeze. Bibasilar crackles.     CV: RRR. S1&S2+. No M/R/G appreciated.     Abdomen: +BS. Soft, NTND. No organomegaly.     MSK: Nml ROM    Extremities: BLE edema     Neuro: A&Ox3, no focal deficits     Skin: Warm and dry. No visible rash.       Labs:  CAPILLARY BLOOD GLUCOSE      POCT Blood Glucose.: 136 mg/dL (12 Nov 2023 12:27)  POCT Blood Glucose.: 120 mg/dL (12 Nov 2023 08:46)  POCT Blood Glucose.: 88 mg/dL (11 Nov 2023 22:01)  POCT Blood Glucose.: 245 mg/dL (11 Nov 2023 17:53)                          13.0   13.15 )-----------( 265      ( 12 Nov 2023 05:26 )             41.2     11-12    140  |  102  |  28<H>  ----------------------------<  155<H>  3.2<L>   |  26  |  0.75    Ca    9.3      12 Nov 2023 05:26  Phos  4.3     11-12  Mg     2.00     11-12    TPro  6.4  /  Alb  3.9  /  TBili  0.8  /  DBili  x   /  AST  11  /  ALT  14  /  AlkPhos  109  11-12            Urinalysis Basic - ( 12 Nov 2023 05:26 )    Color: x / Appearance: x / SG: x / pH: x  Gluc: 155 mg/dL / Ketone: x  / Bili: x / Urobili: x   Blood: x / Protein: x / Nitrite: x   Leuk Esterase: x / RBC: x / WBC x   Sq Epi: x / Non Sq Epi: x / Bacteria: x      Imaging Personally Reviewed:      MEDICATIONS  (STANDING):  apixaban 5 milliGRAM(s) Oral every 12 hours  aspirin enteric coated 81 milliGRAM(s) Oral daily  atorvastatin 80 milliGRAM(s) Oral at bedtime  budesonide 160 MICROgram(s)/formoterol 4.5 MICROgram(s) Inhaler 2 Puff(s) Inhalation two times a day  cholecalciferol 1000 Unit(s) Oral daily  dextrose 5%. 1000 milliLiter(s) (50 mL/Hr) IV Continuous <Continuous>  dextrose 5%. 1000 milliLiter(s) (100 mL/Hr) IV Continuous <Continuous>  dextrose 50% Injectable 25 Gram(s) IV Push once  dextrose 50% Injectable 25 Gram(s) IV Push once  dextrose 50% Injectable 12.5 Gram(s) IV Push once  folic acid 1 milliGRAM(s) Oral daily  furosemide   Injectable 40 milliGRAM(s) IV Push two times a day  gabapentin 300 milliGRAM(s) Oral three times a day  glucagon  Injectable 1 milliGRAM(s) IntraMuscular once  influenza   Vaccine 0.5 milliLiter(s) IntraMuscular once  insulin glargine Injectable (LANTUS) 6 Unit(s) SubCutaneous at bedtime  insulin lispro (ADMELOG) corrective regimen sliding scale   SubCutaneous three times a day before meals  insulin lispro (ADMELOG) corrective regimen sliding scale   SubCutaneous at bedtime  insulin lispro Injectable (ADMELOG) 4 Unit(s) SubCutaneous three times a day before meals  methotrexate (Non - oncologic) 15 milliGRAM(s) Oral <User Schedule>  metoprolol succinate ER 25 milliGRAM(s) Oral daily  nicotine - Inhaler 1 Each Inhalation every 4 hours  OLANZapine 5 milliGRAM(s) Oral at bedtime  pantoprazole    Tablet 40 milliGRAM(s) Oral before breakfast  potassium chloride   Powder 40 milliEquivalent(s) Oral every 4 hours  predniSONE   Tablet 10 milliGRAM(s) Oral daily  spironolactone 25 milliGRAM(s) Oral daily  traZODone 50 milliGRAM(s) Oral at bedtime    MEDICATIONS  (PRN):  acetaminophen     Tablet .. 650 milliGRAM(s) Oral every 6 hours PRN Temp greater or equal to 38C (100.4F), Mild Pain (1 - 3)  albuterol/ipratropium for Nebulization 3 milliLiter(s) Nebulizer every 6 hours PRN Shortness of Breath and/or Wheezing  aluminum hydroxide/magnesium hydroxide/simethicone Suspension 30 milliLiter(s) Oral every 4 hours PRN Dyspepsia  dextrose Oral Gel 15 Gram(s) Oral once PRN Blood Glucose LESS THAN 70 milliGRAM(s)/deciliter  melatonin 3 milliGRAM(s) Oral at bedtime PRN Insomnia  OLANZapine Injectable 2.5 milliGRAM(s) IntraMuscular every 12 hours PRN agitation  ondansetron Injectable 4 milliGRAM(s) IV Push every 8 hours PRN Nausea and/or Vomiting  
**********************************************  [facility] Division of Hospital Medicine  Meagan Porter MD  Available via MS Teams  Pager: [phone]  **********************************************     Patient is a 58y old  Female who presents with a chief complaint of Multiple complaints (10 Nov 2023 21:34)    SUBJECTIVE / OVERNIGHT EVENTS: No acute events overnight. Patient examined at bedside this AM, with no subjective complaints. Denies CP, palpitations, SOB, n/v/d, abdominal pain.     OBJECTIVE:  Vital Signs Last 24 Hrs  T(C): 36.6 (11 Nov 2023 11:30), Max: 36.8 (10 Nov 2023 19:50)  T(F): 97.9 (11 Nov 2023 11:30), Max: 98.3 (10 Nov 2023 19:50)  HR: 62 (11 Nov 2023 11:30) (62 - 81)  BP: 94/51 (11 Nov 2023 11:30) (94/51 - 109/56)  BP(mean): --  RR: 18 (11 Nov 2023 11:30) (17 - 18)  SpO2: 92% (11 Nov 2023 11:30) (92% - 100%)    Parameters below as of 11 Nov 2023 11:30  Patient On (Oxygen Delivery Method): room air        Physical Exam:     General: No acute distress    Eyes: PERRL, EOMI     ENT: MMM, no oropharyngeal lesions or erythema appreciated     Pulm: No increased WOB. Crackles. No wheezing.     CV: RRR. S1&S2+. No M/R/G appreciated.     Abdomen: +BS. Soft, NTND. No organomegaly.     MSK: Nml ROM    Extremities: edema    Neuro: A&Ox3, no focal deficits     Skin: Warm and dry. No visible rash.     Labs:  CAPILLARY BLOOD GLUCOSE      POCT Blood Glucose.: 146 mg/dL (11 Nov 2023 12:29)  POCT Blood Glucose.: 160 mg/dL (11 Nov 2023 08:38)  POCT Blood Glucose.: 88 mg/dL (10 Nov 2023 22:44)  POCT Blood Glucose.: 372 mg/dL (10 Nov 2023 17:32)  POCT Blood Glucose.: 404 mg/dL (10 Nov 2023 17:05)                          12.6   14.73 )-----------( 273      ( 11 Nov 2023 07:23 )             39.4     11-11    140  |  106  |  18  ----------------------------<  138<H>  4.2   |  22  |  0.65    Ca    8.8      11 Nov 2023 07:23  Phos  3.4     11-11  Mg     1.90     11-11    TPro  6.3  /  Alb  3.7  /  TBili  1.0  /  DBili  x   /  AST  9   /  ALT  16  /  AlkPhos  102  11-11    PT/INR - ( 10 Nov 2023 07:00 )   PT: 17.1 sec;   INR: 1.53 ratio         PTT - ( 10 Nov 2023 07:00 )  PTT:34.3 sec        Urinalysis Basic - ( 11 Nov 2023 07:23 )    Color: x / Appearance: x / SG: x / pH: x  Gluc: 138 mg/dL / Ketone: x  / Bili: x / Urobili: x   Blood: x / Protein: x / Nitrite: x   Leuk Esterase: x / RBC: x / WBC x   Sq Epi: x / Non Sq Epi: x / Bacteria: x      Imaging Personally Reviewed:      MEDICATIONS  (STANDING):  apixaban 5 milliGRAM(s) Oral every 12 hours  aspirin enteric coated 81 milliGRAM(s) Oral daily  atorvastatin 80 milliGRAM(s) Oral at bedtime  budesonide 160 MICROgram(s)/formoterol 4.5 MICROgram(s) Inhaler 2 Puff(s) Inhalation two times a day  cholecalciferol 1000 Unit(s) Oral daily  dextrose 5%. 1000 milliLiter(s) (50 mL/Hr) IV Continuous <Continuous>  dextrose 5%. 1000 milliLiter(s) (100 mL/Hr) IV Continuous <Continuous>  dextrose 50% Injectable 25 Gram(s) IV Push once  dextrose 50% Injectable 25 Gram(s) IV Push once  dextrose 50% Injectable 12.5 Gram(s) IV Push once  folic acid 1 milliGRAM(s) Oral daily  furosemide   Injectable 40 milliGRAM(s) IV Push two times a day  gabapentin 300 milliGRAM(s) Oral three times a day  glucagon  Injectable 1 milliGRAM(s) IntraMuscular once  influenza   Vaccine 0.5 milliLiter(s) IntraMuscular once  insulin glargine Injectable (LANTUS) 6 Unit(s) SubCutaneous at bedtime  insulin lispro (ADMELOG) corrective regimen sliding scale   SubCutaneous three times a day before meals  insulin lispro (ADMELOG) corrective regimen sliding scale   SubCutaneous at bedtime  insulin lispro Injectable (ADMELOG) 4 Unit(s) SubCutaneous three times a day before meals  methotrexate (Non - oncologic) 15 milliGRAM(s) Oral <User Schedule>  metoprolol succinate ER 25 milliGRAM(s) Oral daily  nicotine - Inhaler 1 Each Inhalation every 4 hours  OLANZapine 5 milliGRAM(s) Oral at bedtime  pantoprazole    Tablet 40 milliGRAM(s) Oral before breakfast  predniSONE   Tablet 10 milliGRAM(s) Oral daily  spironolactone 25 milliGRAM(s) Oral daily  traZODone 50 milliGRAM(s) Oral at bedtime    MEDICATIONS  (PRN):  acetaminophen     Tablet .. 650 milliGRAM(s) Oral every 6 hours PRN Temp greater or equal to 38C (100.4F), Mild Pain (1 - 3)  albuterol/ipratropium for Nebulization 3 milliLiter(s) Nebulizer every 6 hours PRN Shortness of Breath and/or Wheezing  aluminum hydroxide/magnesium hydroxide/simethicone Suspension 30 milliLiter(s) Oral every 4 hours PRN Dyspepsia  dextrose Oral Gel 15 Gram(s) Oral once PRN Blood Glucose LESS THAN 70 milliGRAM(s)/deciliter  hydrOXYzine hydrochloride 25 milliGRAM(s) Oral once PRN Anxiety  melatonin 3 milliGRAM(s) Oral at bedtime PRN Insomnia  OLANZapine Injectable 2.5 milliGRAM(s) IntraMuscular every 12 hours PRN agitation  ondansetron Injectable 4 milliGRAM(s) IV Push every 8 hours PRN Nausea and/or Vomiting  
    SUBJECTIVE / OVERNIGHT EVENTS: Pt endorsing weakness, worsening paresthesia. endorsing anxiety and nicotine withdrawal    MEDICATIONS  (STANDING):  apixaban 5 milliGRAM(s) Oral every 12 hours  aspirin enteric coated 81 milliGRAM(s) Oral daily  atorvastatin 80 milliGRAM(s) Oral at bedtime  budesonide 160 MICROgram(s)/formoterol 4.5 MICROgram(s) Inhaler 2 Puff(s) Inhalation two times a day  cholecalciferol 1000 Unit(s) Oral daily  dextrose 5%. 1000 milliLiter(s) (100 mL/Hr) IV Continuous <Continuous>  dextrose 5%. 1000 milliLiter(s) (50 mL/Hr) IV Continuous <Continuous>  dextrose 50% Injectable 25 Gram(s) IV Push once  dextrose 50% Injectable 25 Gram(s) IV Push once  dextrose 50% Injectable 12.5 Gram(s) IV Push once  folic acid 1 milliGRAM(s) Oral daily  gabapentin 300 milliGRAM(s) Oral three times a day  glucagon  Injectable 1 milliGRAM(s) IntraMuscular once  insulin lispro (ADMELOG) corrective regimen sliding scale   SubCutaneous at bedtime  insulin lispro (ADMELOG) corrective regimen sliding scale   SubCutaneous three times a day before meals  magnesium oxide 400 milliGRAM(s) Oral three times a day with meals  metoprolol succinate ER 25 milliGRAM(s) Oral daily  OLANZapine 5 milliGRAM(s) Oral at bedtime  pantoprazole    Tablet 40 milliGRAM(s) Oral before breakfast  predniSONE   Tablet 10 milliGRAM(s) Oral daily  spironolactone 25 milliGRAM(s) Oral daily  traZODone 50 milliGRAM(s) Oral at bedtime    MEDICATIONS  (PRN):  acetaminophen     Tablet .. 650 milliGRAM(s) Oral every 6 hours PRN Temp greater or equal to 38C (100.4F), Mild Pain (1 - 3)  albuterol/ipratropium for Nebulization 3 milliLiter(s) Nebulizer every 6 hours PRN Shortness of Breath and/or Wheezing  aluminum hydroxide/magnesium hydroxide/simethicone Suspension 30 milliLiter(s) Oral every 4 hours PRN Dyspepsia  dextrose Oral Gel 15 Gram(s) Oral once PRN Blood Glucose LESS THAN 70 milliGRAM(s)/deciliter  melatonin 3 milliGRAM(s) Oral at bedtime PRN Insomnia  OLANZapine Injectable 2.5 milliGRAM(s) IntraMuscular every 12 hours PRN agitation  ondansetron Injectable 4 milliGRAM(s) IV Push every 8 hours PRN Nausea and/or Vomiting      CAPILLARY BLOOD GLUCOSE      POCT Blood Glucose.: 102 mg/dL (08 Nov 2023 08:41)  POCT Blood Glucose.: 99 mg/dL (07 Nov 2023 23:26)  POCT Blood Glucose.: 128 mg/dL (07 Nov 2023 17:13)    I&O's Summary      PHYSICAL EXAM:  Vital Signs Last 24 Hrs  T(C): 36.6 (08 Nov 2023 13:00), Max: 37.1 (07 Nov 2023 23:41)  T(F): 97.9 (08 Nov 2023 13:00), Max: 98.7 (07 Nov 2023 23:41)  HR: 69 (08 Nov 2023 13:00) (68 - 87)  BP: 103/58 (08 Nov 2023 13:00) (103/58 - 134/75)  BP(mean): --  RR: 19 (08 Nov 2023 13:00) (14 - 20)  SpO2: 98% (08 Nov 2023 13:00) (97% - 98%)    Parameters below as of 08 Nov 2023 13:00  Patient On (Oxygen Delivery Method): room air      CONSTITUTIONAL: NAD  EYES: conjunctiva and sclera clear  ENMT: mmm, poor dentition   NECK: Supple,  RESPIRATORY: Normal respiratory effort; lungs are clear to auscultation bilaterally  CARDIOVASCULAR: Regular rate and rhythm, + S1 and S2  ABDOMEN: Nontender to palpation, normoactive bowel sounds, no rebound/guarding  PSYCH: A+O x 3    LABS:                        13.2   15.76 )-----------( 294      ( 08 Nov 2023 06:43 )             41.4     11-08    139  |  103  |  13  ----------------------------<  96  3.5   |  19<L>  |  0.62    Ca    9.1      08 Nov 2023 06:43  Phos  3.5     11-08  Mg     1.30     11-08    TPro  6.8  /  Alb  4.1  /  TBili  1.2  /  DBili  x   /  AST  21  /  ALT  18  /  AlkPhos  111  11-08          Urinalysis Basic - ( 08 Nov 2023 06:43 )    Color: x / Appearance: x / SG: x / pH: x  Gluc: 96 mg/dL / Ketone: x  / Bili: x / Urobili: x   Blood: x / Protein: x / Nitrite: x   Leuk Esterase: x / RBC: x / WBC x   Sq Epi: x / Non Sq Epi: x / Bacteria: x  
[facility] Division of Hospital Medicine  Jonatan Martin MD  Pager [phone]      Patient is a 58y old  Female who presents with a chief complaint of Multiple complaints (10 Nov 2023 12:28)      SUBJECTIVE / OVERNIGHT EVENTS: No SOB or CP. Per signout, pt unable to tolerate LESLEY    MEDICATIONS  (STANDING):  apixaban 5 milliGRAM(s) Oral every 12 hours  aspirin enteric coated 81 milliGRAM(s) Oral daily  atorvastatin 80 milliGRAM(s) Oral at bedtime  budesonide 160 MICROgram(s)/formoterol 4.5 MICROgram(s) Inhaler 2 Puff(s) Inhalation two times a day  cholecalciferol 1000 Unit(s) Oral daily  dextrose 5%. 1000 milliLiter(s) (50 mL/Hr) IV Continuous <Continuous>  dextrose 5%. 1000 milliLiter(s) (100 mL/Hr) IV Continuous <Continuous>  dextrose 50% Injectable 25 Gram(s) IV Push once  dextrose 50% Injectable 25 Gram(s) IV Push once  dextrose 50% Injectable 12.5 Gram(s) IV Push once  folic acid 1 milliGRAM(s) Oral daily  gabapentin 300 milliGRAM(s) Oral three times a day  glucagon  Injectable 1 milliGRAM(s) IntraMuscular once  influenza   Vaccine 0.5 milliLiter(s) IntraMuscular once  insulin glargine Injectable (LANTUS) 6 Unit(s) SubCutaneous at bedtime  insulin lispro (ADMELOG) corrective regimen sliding scale   SubCutaneous three times a day before meals  insulin lispro (ADMELOG) corrective regimen sliding scale   SubCutaneous at bedtime  insulin lispro Injectable (ADMELOG) 2 Unit(s) SubCutaneous three times a day before meals  methotrexate (Non - oncologic) 15 milliGRAM(s) Oral <User Schedule>  metoprolol succinate ER 25 milliGRAM(s) Oral daily  nicotine - Inhaler 1 Each Inhalation every 4 hours  OLANZapine 5 milliGRAM(s) Oral at bedtime  pantoprazole    Tablet 40 milliGRAM(s) Oral before breakfast  predniSONE   Tablet 10 milliGRAM(s) Oral daily  spironolactone 25 milliGRAM(s) Oral daily  traZODone 50 milliGRAM(s) Oral at bedtime    MEDICATIONS  (PRN):  acetaminophen     Tablet .. 650 milliGRAM(s) Oral every 6 hours PRN Temp greater or equal to 38C (100.4F), Mild Pain (1 - 3)  albuterol/ipratropium for Nebulization 3 milliLiter(s) Nebulizer every 6 hours PRN Shortness of Breath and/or Wheezing  aluminum hydroxide/magnesium hydroxide/simethicone Suspension 30 milliLiter(s) Oral every 4 hours PRN Dyspepsia  dextrose Oral Gel 15 Gram(s) Oral once PRN Blood Glucose LESS THAN 70 milliGRAM(s)/deciliter  melatonin 3 milliGRAM(s) Oral at bedtime PRN Insomnia  OLANZapine Injectable 2.5 milliGRAM(s) IntraMuscular every 12 hours PRN agitation  ondansetron Injectable 4 milliGRAM(s) IV Push every 8 hours PRN Nausea and/or Vomiting      CAPILLARY BLOOD GLUCOSE      POCT Blood Glucose.: 148 mg/dL (10 Nov 2023 12:31)  POCT Blood Glucose.: 131 mg/dL (10 Nov 2023 08:17)  POCT Blood Glucose.: 117 mg/dL (10 Nov 2023 06:21)  POCT Blood Glucose.: 119 mg/dL (09 Nov 2023 21:55)  POCT Blood Glucose.: 242 mg/dL (09 Nov 2023 17:33)    I&O's Summary      PHYSICAL EXAM:  Vital Signs Last 24 Hrs  T(C): 36.6 (10 Nov 2023 06:04), Max: 36.6 (09 Nov 2023 22:30)  T(F): 97.8 (10 Nov 2023 06:04), Max: 97.8 (09 Nov 2023 22:30)  HR: 61 (10 Nov 2023 06:53) (61 - 74)  BP: 123/54 (10 Nov 2023 06:53) (82/51 - 123/54)  BP(mean): --  RR: 18 (10 Nov 2023 06:04) (18 - 18)  SpO2: 97% (10 Nov 2023 06:04) (97% - 97%)    Parameters below as of 10 Nov 2023 06:04  Patient On (Oxygen Delivery Method): room air      CONSTITUTIONAL: NAD  EYES: conjunctiva and sclera clear  ENMT: mmm  NECK: Supple,  RESPIRATORY: Normal respiratory effort; lungs are clear to auscultation bilaterally  CARDIOVASCULAR: Regular rate and rhythm, + S1 and S2  ABDOMEN: Nontender to palpation, normoactive bowel sounds, no rebound/guarding  PSYCH: A+O x 3    LABS:                        12.0   12.66 )-----------( 273      ( 10 Nov 2023 07:00 )             37.0     11-10    142  |  109<H>  |  15  ----------------------------<  113<H>  4.0   |  22  |  0.61    Ca    9.0      10 Nov 2023 07:00  Phos  3.2     11-10  Mg     1.90     11-10    TPro  6.2  /  Alb  3.8  /  TBili  0.6  /  DBili  x   /  AST  13  /  ALT  16  /  AlkPhos  103  11-10    PT/INR - ( 10 Nov 2023 07:00 )   PT: 17.1 sec;   INR: 1.53 ratio         PTT - ( 10 Nov 2023 07:00 )  PTT:34.3 sec  CARDIAC MARKERS ( 09 Nov 2023 06:15 )  x     / x     / 103 U/L / x     / x          Urinalysis Basic - ( 10 Nov 2023 07:00 )    Color: x / Appearance: x / SG: x / pH: x  Gluc: 113 mg/dL / Ketone: x  / Bili: x / Urobili: x   Blood: x / Protein: x / Nitrite: x   Leuk Esterase: x / RBC: x / WBC x   Sq Epi: x / Non Sq Epi: x / Bacteria: x        Culture - Blood (collected 07 Nov 2023 22:48)  Source: .Blood Blood  Preliminary Report (10 Nov 2023 04:01):    No growth at 48 Hours    Culture - Blood (collected 07 Nov 2023 22:48)  Source: .Blood Blood  Preliminary Report (10 Nov 2023 04:01):    No growth at 48 Hours          
Merlin Mathew, MD   Hospitalist  Pager #[phone]    PROGRESS NOTE:     Patient is a 58y old  Female who presents with a chief complaint of Multiple complaints (13 Nov 2023 13:53)      SUBJECTIVE / OVERNIGHT EVENTS:  NEON   Patient feels fine, feels hungry, anxious for [facility]   ADDITIONAL REVIEW OF SYSTEMS:    MEDICATIONS  (STANDING):  apixaban 5 milliGRAM(s) Oral every 12 hours  aspirin enteric coated 81 milliGRAM(s) Oral daily  atorvastatin 80 milliGRAM(s) Oral at bedtime  budesonide 160 MICROgram(s)/formoterol 4.5 MICROgram(s) Inhaler 2 Puff(s) Inhalation two times a day  cholecalciferol 1000 Unit(s) Oral daily  dextrose 5%. 1000 milliLiter(s) (100 mL/Hr) IV Continuous <Continuous>  dextrose 5%. 1000 milliLiter(s) (50 mL/Hr) IV Continuous <Continuous>  dextrose 50% Injectable 25 Gram(s) IV Push once  dextrose 50% Injectable 12.5 Gram(s) IV Push once  dextrose 50% Injectable 25 Gram(s) IV Push once  folic acid 1 milliGRAM(s) Oral daily  furosemide   Injectable 40 milliGRAM(s) IV Push two times a day  gabapentin 300 milliGRAM(s) Oral three times a day  glucagon  Injectable 1 milliGRAM(s) IntraMuscular once  influenza   Vaccine 0.5 milliLiter(s) IntraMuscular once  insulin glargine Injectable (LANTUS) 6 Unit(s) SubCutaneous at bedtime  insulin lispro (ADMELOG) corrective regimen sliding scale   SubCutaneous three times a day before meals  insulin lispro (ADMELOG) corrective regimen sliding scale   SubCutaneous at bedtime  insulin lispro Injectable (ADMELOG) 4 Unit(s) SubCutaneous three times a day before meals  methotrexate (Non - oncologic) 15 milliGRAM(s) Oral <User Schedule>  metoprolol succinate ER 25 milliGRAM(s) Oral daily  nicotine - Inhaler 1 Each Inhalation every 4 hours  OLANZapine 5 milliGRAM(s) Oral at bedtime  pantoprazole    Tablet 40 milliGRAM(s) Oral before breakfast  predniSONE   Tablet 10 milliGRAM(s) Oral daily  spironolactone 25 milliGRAM(s) Oral daily  traZODone 50 milliGRAM(s) Oral at bedtime    MEDICATIONS  (PRN):  acetaminophen     Tablet .. 650 milliGRAM(s) Oral every 6 hours PRN Temp greater or equal to 38C (100.4F), Mild Pain (1 - 3)  albuterol/ipratropium for Nebulization 3 milliLiter(s) Nebulizer every 6 hours PRN Shortness of Breath and/or Wheezing  aluminum hydroxide/magnesium hydroxide/simethicone Suspension 30 milliLiter(s) Oral every 4 hours PRN Dyspepsia  dextrose Oral Gel 15 Gram(s) Oral once PRN Blood Glucose LESS THAN 70 milliGRAM(s)/deciliter  melatonin 3 milliGRAM(s) Oral at bedtime PRN Insomnia  OLANZapine Injectable 2.5 milliGRAM(s) IntraMuscular every 12 hours PRN agitation  ondansetron Injectable 4 milliGRAM(s) IV Push every 8 hours PRN Nausea and/or Vomiting      CAPILLARY BLOOD GLUCOSE      POCT Blood Glucose.: 163 mg/dL (13 Nov 2023 12:38)  POCT Blood Glucose.: 169 mg/dL (13 Nov 2023 08:16)  POCT Blood Glucose.: 164 mg/dL (12 Nov 2023 22:03)  POCT Blood Glucose.: 163 mg/dL (12 Nov 2023 18:50)  POCT Blood Glucose.: 185 mg/dL (12 Nov 2023 17:40)    I&O's Summary      PHYSICAL EXAM:  Vital Signs Last 24 Hrs  T(C): 36.9 (13 Nov 2023 11:45), Max: 37.1 (12 Nov 2023 18:55)  T(F): 98.5 (13 Nov 2023 11:45), Max: 98.7 (12 Nov 2023 18:55)  HR: 91 (13 Nov 2023 11:45) (68 - 91)  BP: 102/72 (13 Nov 2023 11:45) (95/52 - 122/61)  BP(mean): --  RR: 17 (13 Nov 2023 11:45) (17 - 18)  SpO2: 96% (13 Nov 2023 11:45) (96% - 97%)    Parameters below as of 13 Nov 2023 06:45  Patient On (Oxygen Delivery Method): room air        CONSTITUTIONAL: NAD, resting comfortably   RESPIRATORY: Normal respiratory effort; lungs are clear to auscultation bilaterally  CARDIOVASCULAR: Regular rate and rhythm, normal S1 and S2, no murmur/rub/gallop; No lower extremity edema;  ABDOMEN: Nontender to palpation, normoactive bowel sounds, no rebound/guarding; No hepatosplenomegaly  MUSCULOSKELETAL no clubbing or cyanosis of digits; no joint swelling or tenderness to palpation  PSYCH: A+O to person, place, and time; affect appropriate    LABS:                        13.8   14.97 )-----------( 289      ( 13 Nov 2023 07:29 )             42.8     11-13    137  |  103  |  27<H>  ----------------------------<  138<H>  4.2   |  23  |  0.75    Ca    9.4      13 Nov 2023 07:29  Phos  2.9     11-13  Mg     2.00     11-13    TPro  7.1  /  Alb  4.1  /  TBili  0.8  /  DBili  x   /  AST  12  /  ALT  15  /  AlkPhos  124<H>  11-13          Urinalysis Basic - ( 13 Nov 2023 07:29 )    Color: x / Appearance: x / SG: x / pH: x  Gluc: 138 mg/dL / Ketone: x  / Bili: x / Urobili: x   Blood: x / Protein: x / Nitrite: x   Leuk Esterase: x / RBC: x / WBC x   Sq Epi: x / Non Sq Epi: x / Bacteria: x          RADIOLOGY & ADDITIONAL TESTS:  Results Reviewed:   Imaging Personally Reviewed:  Electrocardiogram Personally Reviewed:    COORDINATION OF CARE:  Care Discussed with Consultants/Other Providers [Y/N]:  Prior or Outpatient Records Reviewed [Y/N]:

## 2023-11-14 NOTE — PROGRESS NOTE ADULT - PROBLEM SELECTOR PLAN 1
- Patient with reports of worsening L sided numbness of the L side of her body with complaints of generalized weakness  - On examination noted to have decreased sensation to light touch on the L side of her body face/arm/leg, also with 4+/5 strength L arm/leg seems to be at baseline when compared to prior  - Given her prior history of acute CVA, Neuro was consulted: symptoms likely recrudescence, no need for repeat imaging, will dc MRI brain  - c/w aspirin, high dose statin therapy  - c/w outpatient follow up with Dr. Wendi Lares
- Patient with reports of worsening L sided numbness of the L side of her body with complaints of generalized weakness  - On examination noted to have decreased sensation to light touch on the L side of her body face/arm/leg, also with 4+/5 strength L arm/leg seems to be at baseline when compared to prior  - Given her prior history of acute CVA with mostly sensory complaints will obtain MR Head non-con  - Neurology consulted, if no plans for imaging will dc MR  - c/w aspirin, high dose statin therapy  - c/w outpatient follow up with Dr. Wedni Lares
Pt found to have new CHF in July, however, ischemic eval was postponed due to acute CVA. Now patient with daily chest pain and e/o volume overload.   -Prior TTE with a moderate to severe LV systolic dysfunction with an intracardiac shunt, [facility] attempted 11/10, canceled due to flash pulm edema, hypotension, limited pictures were taken  -Cardiology following, rec IV lasix 40mg BID   -Strict I/Os   -Daily standing weights  -LHC:  LAD prox 100% fills via right to left collateral   [ ] Transfer to [facility] for bypass & AVR
- Patient with reports of worsening L sided numbness of the L side of her body with complaints of generalized weakness  - On examination noted to have decreased sensation to light touch on the L side of her body face/arm/leg, also with 4+/5 strength L arm/leg seems to be at baseline when compared to prior  - Given her prior history of acute CVA, Neuro was consulted: symptoms likely recrudescence, no need for repeat imaging, will dc MRI brain  - c/w aspirin, high dose statin therapy  - c/w outpatient follow up with Dr. Wendi Lares
Pt found to have new CHF in July, however, ischemic eval was postponed due to acute CVA. Now patient with daily chest pain and e/o volume overload.   -Prior TTE with a moderate to severe LV systolic dysfunction with an intracardiac shunt, [facility]ESLEY attempted 11/10, canceled due to flash pulm edema, hypotension, limited pictures were taken  -Cardiology following, rec IV lasix 40mg BID   -Strict I/Os   -Daily standing weights  [ ] Plan for ischemic eval, [facility] on 11/13
Pt found to have new CHF in July, however, ischemic eval was postponed due to acute CVA. Now patient with daily chest pain and e/o volume overload.   -Prior TTE with a moderate to severe LV systolic dysfunction with an intracardiac shunt, [facility]ESLEY attempted 11/10, canceled due to flash pulm edema, hypotension, limited pictures were taken  -Cardiology following, rec IV lasix 40mg BID   -Strict I/Os   -Daily standing weights  -Plan for ischemic eval, [facility] on 11/13
- Patient with reports of worsening L sided numbness of the L side of her body with complaints of generalized weakness  - On examination noted to have decreased sensation to light touch on the L side of her body face/arm/leg, also with 4+/5 strength L arm/leg seems to be at baseline when compared to prior  - Given her prior history of acute CVA, Neuro was consulted: symptoms likely recrudescence, no need for repeat imaging, will dc MRI brain  - c/w aspirin, high dose statin therapy  - c/w outpatient follow up with Dr. Wendi Lares

## 2023-11-14 NOTE — PROGRESS NOTE ADULT - ASSESSMENT
58F R-handed PMHx acute ischemic strokes (Right BG infarct 7/2023 and another unknown infarct in 2022; residual Left-sided weakness/numbness), T2DM w/ peripheral neuropathy, COPD/Asthma, Breast Ca s/p ?RT, Bipolar depression, Rheumatoid Arthritis, Antiphospholipid syndrome, L eye uveitis/scleritis, and current tobacco use (7 cigarettes / day) who presents to the hospital with multiple complaints including worsening of Left-sided weakness and numbness.   On exam, pt has full strength symmetrically on manual motor exam and decreased sensation of face, arm and leg (80%) per pt. LUE is clumsy compared to R.   L eye esotropia   Vitals stable. CBC notable for WBC 15.76 but pt on prednisone and CMP WNL.   CTH w/o acute findings.   TTE with EF 30%, mod severe AR  ILR interrogated w/o events    IMPRESSION: Exacerbation of Left-sided subjective weakness/numbness likely 2/2 recrudescence of prior Right BG infarct, doubt new stroke but given worsening of sx can rule out if persists    RECOMMENDATIONS:  [] MRI brain unlikely to , can have done as outpatient if needed  [] Was on Lovenox -> changed to Eliquis for APLS? Sees Dr. Driver outpt; would have her f/u with Hematology  [] no need for ASA if on AC unless indicated from cardiac perspective.   [] Continue home Atorvastatin 80mg QHS  [] Check HbA1c (7.6) and lipid panel (LDL 41)  [] Cardiology following, LESLEY planned for today but pt unable to tolerate.   [] Consider Optho consult for blurry vision  [] MTX, steroids per Rheumatology   [] Smoking cessation counseling, nicotine patch  [] Rest of care per primary team .     Chloe Reyes DO  Vascular Neurology  Office [phone].  
58F with history of CVA with L sided weakness/numbness/L gaze deviation, DM2 with b/l peripheral neuropathy, COPD/Asthma, Breast Ca s/p ?RT, Bipolar depression, Rheumatoid Arthritis, Antiphospholipid syndrome, L eye uveitis/scleritis, Newly diagnosed DM2 (pt cannot remember which medication she takes for it), and current tobacco use who presents to the hospital with multiple complaints.     1. LV dysfunction  -new mod-severe segmental LV dysfunction in July has not had ischemic workup due to stroke at that time. if no stroke this admission will do LHC  -c/w metoprolol and aldactone  -TTE EF 30% now with mod-severe AR  - LESLEY not tolerated well grossly sever AI   - discussed with daughter and sister  - c/w lasix change to 40mg PO Daily   - cath shows LAD prox 100% fills via right to left collateral will need AVR and LIMA to LAD bypass    2. CVA  -history of multiple CVAs and is on eliquis   -CT scan of head negative for any acute findings  -ILR interrogation with no events  -per neuro no plans for MRI       3. Antiphospholipid syndrome  -on eliquis 5mg BID  
58F with history of CVA with L sided weakness/numbness/L gaze deviation, DM2 with b/l peripheral neuropathy, COPD/Asthma, Breast Ca s/p ?RT, Bipolar depression, Rheumatoid Arthritis, Antiphospholipid syndrome, L eye uveitis/scleritis, Newly diagnosed DM2 (pt cannot remember which medication she takes for it), and current tobacco use who presents to the hospital with multiple complaints.     1. LV dysfunction  -new mod-severe segmental LV dysfunction in July has not had ischemic workup due to stroke at that time. if no stroke this admission will do LHC  -c/w metoprolol and aldactone  -TTE EF 30% now with mod-severe AR  - LESLEY not tolerated well grossly sever AI   - will plan for LHC on monday in plans for AVR   - discussed with daughter   - c/w lasix 40 IV BID     2. CVA  -history of multiple CVAs and is on eliquis   -CT scan of head negative for any acute findings  -ILR interrogation with no events  -per neuro no plans for MRI       3. Antiphospholipid syndrome  -on eliquis 5mg BID  
This is a 58F with history as above who presents to the hospital with multiple complaints including unsteady gait and paresthesia
58F R-handed PMHx acute ischemic strokes (Right BG infarct 7/2023 and another unknown infarct in 2022; residual Left-sided weakness/numbness), T2DM w/ peripheral neuropathy, COPD/Asthma, Breast Ca s/p ?RT, Bipolar depression, Rheumatoid Arthritis, Antiphospholipid syndrome, L eye uveitis/scleritis, and current tobacco use (7 cigarettes / day) who presents to the hospital with multiple complaints including worsening of Left-sided weakness and numbness.   On exam, pt has full strength symmetrically on manual motor exam and decreased sensation of face, arm and leg (80%) per pt. LUE is clumsy compared to R.   L eye esotropia   Vitals stable. CBC notable for WBC 15.76 but pt on prednisone and CMP WNL.   CTH w/o acute findings.   TTE with EF 30%, mod severe AR  ILR interrogated w/o events    IMPRESSION: Exacerbation of Left-sided subjective weakness/numbness likely 2/2 recrudescence of prior Right BG infarct, doubt new stroke but given worsening of sx can rule out if persists    RECOMMENDATIONS:  [] MRI brain unlikely to , can have done as outpatient if needed or can hae done with admitted if symptoms have not improved  [] Was on Lovenox -> changed to Eliquis for APLS? Sees Dr. Driver outpt; would have her f/u with Hematology  [] no need for ASA if on AC unless indicated from cardiac perspective.   [] Continue home Atorvastatin 80mg QHS  [] Check HbA1c (7.6) and lipid panel (LDL 41)  [] Cardiology following, plan to transfer to [facility] for bypass surgery  [] Consider Optho consult for blurry vision  [] MTX, steroids per Rheumatology   [] Smoking cessation counseling, nicotine patch  [] Rest of care per primary team .     Chloe Reyes,   Vascular Neurology  Office [phone].  
58F with history of CVA with L sided weakness/numbness/L gaze deviation, DM2 with b/l peripheral neuropathy, COPD/Asthma, Breast Ca s/p ?RT, Bipolar depression, Rheumatoid Arthritis, Antiphospholipid syndrome, L eye uveitis/scleritis, Newly diagnosed DM2 (pt cannot remember which medication she takes for it), and current tobacco use who presents to the hospital with multiple complaints.     1. LV dysfunction  -new mod-severe segmental LV dysfunction in July has not had ischemic workup due to stroke at that time. if no stroke this admission will do LHC  -c/w metoprolol and aldactone  -TTE EF 30% now with mod-severe AR  - LESLEY not tolerated well grossly sever AI   - will plan for LHC tomorrow  - discussed with daughter   - c/w lasix 40 IV BID     2. CVA  -history of multiple CVAs and is on eliquis   -CT scan of head negative for any acute findings  -ILR interrogation with no events  -per neuro no plans for MRI       3. Antiphospholipid syndrome  -on eliquis 5mg BID  
This is a 58F with history as above who presents to the hospital with multiple complaints including unsteady gait and parethesia
58F R-handed PMHx acute ischemic strokes (Right BG infarct 7/2023 and another unknown infarct in 2022; residual Left-sided weakness/numbness), T2DM w/ peripheral neuropathy, COPD/Asthma, Breast Ca s/p ?RT, Bipolar depression, Rheumatoid Arthritis, Antiphospholipid syndrome, L eye uveitis/scleritis, and current tobacco use (7 cigarettes / day) who presents to the hospital with multiple complaints including worsening of Left-sided weakness and numbness.   On exam, pt has full strength symmetrically on manual motor exam and decreased sensation of face, arm and leg (80%) per pt. LUE is clumsy compared to R.   L eye esotropia   Vitals stable. CBC notable for WBC 15.76 but pt on prednisone and CMP WNL.   CTH w/o acute findings.   TTE with EF 30%, mod severe AR  ILR interrogated w/o events    IMPRESSION: Exacerbation of Left-sided subjective weakness/numbness likely 2/2 recrudescence of prior Right BG infarct, doubt new stroke but given worsening of sx can rule out if persists    RECOMMENDATIONS:  [] MRI brain unlikely to , can have done as outpatient if needed or can hae done with admitted if symptoms have not improved  [] Was on Lovenox -> changed to Eliquis for APLS? Sees Dr. Driver outpt; would have her f/u with Hematology  [] no need for ASA if on AC unless indicated from cardiac perspective.   [] Continue home Atorvastatin 80mg QHS  [] Check HbA1c (7.6) and lipid panel (LDL 41)  [] Cardiology following, [facility] planned for today  [] Consider Optho consult for blurry vision  [] MTX, steroids per Rheumatology   [] Smoking cessation counseling, nicotine patch  [] Rest of care per primary team .     Chloe Reyes,   Vascular Neurology  Office [phone].  
58F with history of CVA with L sided weakness/numbness/L gaze deviation, DM2 with b/l peripheral neuropathy, COPD/Asthma, Breast Ca s/p ?RT, Bipolar depression, Rheumatoid Arthritis, Antiphospholipid syndrome, L eye uveitis/scleritis, Newly diagnosed DM2 (pt cannot remember which medication she takes for it), and current tobacco use who presents to the hospital with multiple complaints.     1. LV dysfunction  -new mod-severe segmental LV dysfunction in July has not had ischemic workup due to stroke at that time. if no stroke this admission will do LHC  -c/w metoprolol and aldactone  -TTE EF 30% now with mod-severe AR  - LESLEY not tolerated well grossly sever AI   - will plan for LHC on monday in plans for AVR   - discussed with daughter   start lasix 40 IV BID     2. CVA  -history of multiple CVAs and is on eliquis   -CT scan of head negative for any acute findings  -ILR interrogation with no events  -per neuro no plans for MRI   -intracardiac shunt on TTE last admission, TTE this admission also now showing mod-severe AR, will obtain LESLEY    3. Antiphospholipid syndrome  -on eliquis 5mg BID
58F with history of CVA with L sided weakness/numbness/L gaze deviation, DM2 with b/l peripheral neuropathy, COPD/Asthma, Breast Ca s/p ?RT, Bipolar depression, Rheumatoid Arthritis, Antiphospholipid syndrome, L eye uveitis/scleritis, Newly diagnosed DM2 (pt cannot remember which medication she takes for it), and current tobacco use who presents to the hospital with multiple complaints.     1. LV dysfunction  -new mod-severe segmental LV dysfunction in July has not had ischemic workup due to stroke at that time. if no stroke this admission will do LHC  -c/w metoprolol and aldactone  -TTE EF 30% now with mod-severe AR planned for LESLEY    2. CVA  -history of multiple CVAs and is on eliquis   -CT scan of head negative for any acute findings  -ILR interrogation with no events  -per neuro no plans for MRI   -intracardiac shunt on TTE last admission, TTE this admission also now showing mod-severe AR, will obtain LESLEY    3. Antiphospholipid syndrome  -on eliquis 5mg BID  
58F smoker with PMH CVA with L sided weakness/numbness/L gaze deviation, DM2 with b/l peripheral neuropathy, COPD/Asthma, Breast Ca s/p ?RT, Bipolar depression, Rheumatoid Arthritis, Antiphospholipid syndrome, and L eye uveitis/scleritis, p/w multiple medical complaints. A/w exacerbation of L-sided subjective weakness/numbness likely 2/2 recrudescence of prior Right BG infarct. Cardiology following for new LV dysfunction since July, pending ischemic eval. Plan for [facility] on 11/13.     
58F with history of CVA with L sided weakness/numbness/L gaze deviation, DM2 with b/l peripheral neuropathy, COPD/Asthma, Breast Ca s/p ?RT, Bipolar depression, Rheumatoid Arthritis, Antiphospholipid syndrome, L eye uveitis/scleritis, Newly diagnosed DM2 (pt cannot remember which medication she takes for it), and current tobacco use who presents to the hospital with multiple complaints.     1. LV dysfunction  -new mod-severe segmental LV dysfunction in July has not had ischemic workup due to stroke at that time. if no stroke this admission will do LHC  -c/w metoprolol and aldactone  -TTE EF 30% now with mod-severe AR  - LESLEY not tolerated well grossly sever AI   - will plan for LHC on monday in plans for AVR   - called p daughter Flavia no answer will attempt again      2. CVA  -history of multiple CVAs and is on eliquis   -CT scan of head negative for any acute findings  -ILR interrogation with no events  -per neuro no plans for MRI   -intracardiac shunt on TTE last admission, TTE this admission also now showing mod-severe AR, will obtain LESLEY    3. Antiphospholipid syndrome  -on eliquis 5mg BID
58F smoker with PMH CVA with L sided weakness/numbness/L gaze deviation, DM2 with b/l peripheral neuropathy, COPD/Asthma, Breast Ca s/p ?RT, Bipolar depression, Rheumatoid Arthritis, Antiphospholipid syndrome, and L eye uveitis/scleritis, p/w multiple medical complaints. A/w exacerbation of L-sided subjective weakness/numbness likely 2/2 recrudescence of prior Right BG infarct. Cardiology following for new LV dysfunction since July, pending ischemic eval. Plan for [facility] on 11/13.     
This is a 58F with history as above who presents to the hospital with multiple complaints including unsteady gait and paresthesia
58F smoker with PMH CVA with L sided weakness/numbness/L gaze deviation, DM2 with b/l peripheral neuropathy, COPD/Asthma, Breast Ca s/p ?RT, Bipolar depression, Rheumatoid Arthritis, Antiphospholipid syndrome, and L eye uveitis/scleritis, p/w multiple medical complaints. A/w exacerbation of L-sided subjective weakness/numbness likely 2/2 recrudescence of prior Right BG infarct. Cardiology following for new LV dysfunction since July, pending ischemic eval. Plan for [facility] on 11/14.     
This is a 58F with history as above who presents to the hospital with multiple complaints including unsteady gait and paresthesia

## 2023-11-14 NOTE — PROGRESS NOTE ADULT - PROBLEM SELECTOR PROBLEM 8
Uveitis
Uveitis
Type 2 diabetes mellitus with polyneuropathy
Uveitis

## 2023-11-14 NOTE — PROGRESS NOTE ADULT - PROBLEM SELECTOR PLAN 9
H/o breast cancer s/p XRT now reportedly in remission.   -Pending outpatient [facility]
H/o breast cancer s/p XRT now reportedly in remission.   -Pending outpatient [facility]
- Changed from lovenox injs to eliquis 5mg BID -> will c/w eliquis 5mg BID  - c/w outpatient follow up with Dr. Tim Driver for her heme conditions
H/o breast cancer s/p XRT now reportedly in remission.   -Pending outpatient [facility]
- Changed from lovenox injs to eliquis 5mg BID -> will c/w eliquis 5mg BID  - c/w outpatient follow up with Dr. Tim Driver for her heme conditions

## 2023-11-14 NOTE — PROGRESS NOTE ADULT - PROBLEM SELECTOR PROBLEM 9
History of breast cancer
History of breast cancer
Antiphospholipid syndrome
History of breast cancer
Antiphospholipid syndrome

## 2023-11-14 NOTE — H&P ADULT - NSHPREVIEWOFSYSTEMS_GEN_ALL_CORE
REVIEW OF SYSTEMS:  CONSTITUTIONAL: Denies fever, weight loss or fatigue  RESPIRATORY:  + shortness of breath Denies cough, wheezing, chills, hemoptysis  CARDIOVASCULAR: Denies chest pain, palpitations, dizziness, or leg swelling  GASTROINTESTINAL: Denies abdominal or epigastric pain, nausea, vomiting, hematemesis, diarrhea or melena  GENITOURINARY: Denies dysuria, frequency, hematuria, or incontinence  NEUROLOGICAL: Denies headaches, memory loss, loss of strength, numbness or tremors  SKIN: Denies itching, burning, rashes, or lesions   LYMPH NODES: Denies enlarged glands  ENDOCRINE: Denies heat or cold intolerance or hair loss  MUSCULOSKELETAL: Denies joint pain or swelling, muscle, back or extremity pain  PSYCHIATRIC: Denies depression, anxiety, mood swings or difficulty sleeping  HEME/LYMPH: Denies easy bruising or bleeding gums  ALLERGY: Denies hives or eczema

## 2023-11-14 NOTE — PROGRESS NOTE ADULT - PROBLEM SELECTOR PLAN 6
- Patient with leukocytosis to 21k on presentation, likely related to chronic steroid use  - Did report nasal congestion but full RVP negative, also reports sputum but likely in setting of known COPD as CXR without opacities  - Reports diarrhea x2 but last episode on 11/7 AM and abdomen currently benign on examination  - No Gu complaints from patient  - Infectious workup neg  - s/p ceftriaxone and azithromycin in ED but low concern for PNA as patient without cough and without opacities on examination -> hold off on additional abx and monitor clinically
- Patient with leukocytosis to 21k on presentation, likely related to chronic steroid use  - Did report nasal congestion but full RVP negative, also reports sputum but likely in setting of known COPD as CXR without opacities  - Reports diarrhea x2 but last episode on 11/7 AM and abdomen currently benign on examination  - No Gu complaints from patient  - Given she is on MTX would want to r/o infectious issues ->  UA neg, monitor temp curve, trend WBC  - s/p ceftriaxone and azithromycin in ED but low concern for PNA as patient without cough and without opacities on examination -> hold off on additional abx and monitor clinically
- Patient with leukocytosis to 21k on presentation, likely related to chronic steroid use  - Did report nasal congestion but full RVP negative, also reports sputum but likely in setting of known COPD as CXR without opacities  - Reports diarrhea x2 but last episode on 11/7 AM and abdomen currently benign on examination  - No Gu complaints from patient  - Infectious workup neg  - s/p ceftriaxone and azithromycin in ED but low concern for PNA as patient without cough and without opacities on examination -> hold off on additional abx and monitor clinically
Recently diagnosed with T2DM. Not started on any medications.   - A1c 7.6%  - CC diet   - C/w lantus 6U qHS and admelog 4U qAC   - Low dose ISS
- Patient with leukocytosis to 21k on presentation, likely related to chronic steroid use  - Did report nasal congestion but full RVP negative, also reports sputum but likely in setting of known COPD as CXR without opacities  - Reports diarrhea x2 but last episode on 11/7 AM and abdomen currently benign on examination  - No Gu complaints from patient  - Infectious workup neg  - s/p ceftriaxone and azithromycin in ED but low concern for PNA as patient without cough and without opacities on examination -> hold off on additional abx and monitor clinically

## 2023-11-14 NOTE — PROGRESS NOTE ADULT - PROBLEM SELECTOR PLAN 2
- Reports daily chest pain, L sided, pressure like, radiation to R side at times, occurs randomly; also with significant KIRK  - No chest pain currently, EKG nonischemic, troponin indeterminant x2 but with positive downtrend  - Prior TTE with a moderate to severe LV systolic dysfunction with an intracardiac shunt, LESLEY attempted 11/10, canceled due to flash pulm edema, hypotension, limited oictures were taken  - Discussed with cards, plan for C 11/13 given decreased EF  - Cardiology consultation placed with Dr. German Carrasco (her outpatient cardiologist)
- Reports daily chest pain, L sided, pressure like, radiation to R side at times, occurs randomly; also with significant KIRK  - No chest pain currently, EKG nonischemic, troponins indeterminant x2 but with positive downtrend  - Prior TTE with a moderate to severe LV systolic dysfunction with an intracardiac shunt, LESLEY was deferred at that time 2/2 acute CVA  - Will recheck TTE to reassess LV function, intracardiac shunt  - Cardiology consultation placed with Dr. German Carrasco (her outpatient cardiologist)
- Reports daily chest pain, L sided, pressure like, radiation to R side at times, occurs randomly; also with significant KIRK  - No chest pain currently, EKG nonischemic, troponin indeterminant x2 but with positive downtrend  - Prior TTE with a moderate to severe LV systolic dysfunction with an intracardiac shunt, LESLEY was deferred at that time 2/2 acute CVA  - f/u repeat TTE to reassess LV function, ?plan for LESLEY vs CATH, f/u cards rec  - Cardiology consultation placed with Dr. German Carrasco (her outpatient cardiologist)
- Reports daily chest pain, L sided, pressure like, radiation to R side at times, occurs randomly; also with significant KIRK  - No chest pain currently, EKG nonischemic, troponin indeterminant x2 but with positive downtrend  - Prior TTE with a moderate to severe LV systolic dysfunction with an intracardiac shunt, LESLEY attempted 11/10, canceled due to flash pulm edema, hypotension, limited pictures were taken  - Discussed with cards, plan for LHC 11/13 given decreased EF.
- Reports daily chest pain, L sided, pressure like, radiation to R side at times, occurs randomly; also with significant KIRK  - No chest pain currently, EKG nonischemic, troponin indeterminant x2 but with positive downtrend  - Prior TTE with a moderate to severe LV systolic dysfunction with an intracardiac shunt, LESLEY attempted 11/10, canceled due to flash pulm edema, hypotension, limited pictures were taken  - [facility] as above
- Reports daily chest pain, L sided, pressure like, radiation to R side at times, occurs randomly; also with significant KIRK  - No chest pain currently, EKG nonischemic, troponin indeterminant x2 but with positive downtrend  - Prior TTE with a moderate to severe LV systolic dysfunction with an intracardiac shunt, LESLEY attempted 11/10, canceled due to flash pulm edema, hypotension, limited pictures were taken  - Discussed with cards, plan for LHC 11/13 given decreased EF.
- Reports daily chest pain, L sided, pressure like, radiation to R side at times, occurs randomly; also with significant KIRK  - No chest pain currently, EKG nonischemic, troponin indeterminant x2 but with positive downtrend  - Prior TTE with a moderate to severe LV systolic dysfunction with an intracardiac shunt, LESLEY attempted 11/10, canceled due to flash pulm edema, hypotension, limited oictures were taken  - Discussed with cards, plan for LHC 11/13 given decreased EF. Will increase lasix to 40 BID IV per Cards today 11/11.   - Cardiology consultation placed with Dr. German Carrasco (her outpatient cardiologist)

## 2023-11-14 NOTE — H&P ADULT - NSHPLABSRESULTS_GEN_ALL_CORE
< from: LESLEY W or WO Ultrasound Enhancing Agent (11.10.23 @ 08:36) >       CONCLUSIONS:      1. Left ventricular systolic function is severely decreased with an ejection fraction visually estimated at 30 to 35 %.   2. The right ventricle is not well visualized.   3. The study was terminated due to respiratory distress, desaturation, hypotension and tachycardia. Only 10 images were obtained. Grossly, there is severe aortic insufficiency (image 5), although this is a qualitative assessment only. There is at least moderate mitral regurgitation with SBP 80 mm Hg.   4. Severe aortic regurgitation.    < end of copied text >    < from: CT Head No Cont (11.07.23 @ 21:19) >      IMPRESSION:    No acute intracranial bleeding.    --- End of Report ---    < end of copied text >    < from: Xray Chest 2 Views PA/Lat (11.07.23 @ 20:49) >    IMPRESSION:  Clear lungs.    --- End of Report ---      < end of copied text >

## 2023-11-14 NOTE — PROGRESS NOTE ADULT - PROBLEM SELECTOR PROBLEM 6
Type 2 diabetes mellitus with polyneuropathy
Leukocytosis, unspecified type
Type 2 diabetes mellitus with polyneuropathy
Type 2 diabetes mellitus with polyneuropathy

## 2023-11-14 NOTE — PROGRESS NOTE ADULT - PROBLEM SELECTOR PLAN 10
- c/o b/l blurry vision  - On snellen her visual acuity is 20/40 on R and 20/50 on L  - Reports occasional double vision  - No eye discharge or pain noted  - Ophthalmology consult placed, pt declined exam and prefers outpt f/u  - c/w outpatient follow up with Dr. Naomi Goldberg
DVT ppx: Eliquis  Diet: DASH/TLC   Dispo: Pending cardiac w/u home with outpatient PT
DVT ppx: Eliquis  Diet: DASH/TLC   Dispo: Pending cardiac w/u home with outpatient PT
DVT ppx: Lovenox this PM   Diet: DASH/TLC   Dispo: [facility]
- c/o b/l blurry vision  - On snellen her visual acuity is 20/40 on R and 20/50 on L  - Reports occasional double vision  - No eye discharge or pain noted  - Ophthalmology consult placed, pt declined exam and prefers outpt f/u  - c/w outpatient follow up with Dr. Naomi Goldberg

## 2023-11-14 NOTE — CHART NOTE - NSCHARTNOTEFT_GEN_A_CORE
received call from cath lab acp     11/14 [facility]: Normal LM, prox LAD of 100% but fills via L->R collaterals, luminal LCx and RCA. LVEDP: 16. RFA access site.     change eliquis to therapeutic lovenox per Dr Weiner    ct sx -plan to transfer to NS for vlave replacement with dr weiner   6 hrs after removing sheath - removed at 11:30- start anticoagulation  -> start at 5:30pm if site check stable

## 2023-11-14 NOTE — PROGRESS NOTE ADULT - PROBLEM SELECTOR PROBLEM 10
Uveitis
Need for prophylactic measure
Need for prophylactic measure
Uveitis
Uveitis
Need for prophylactic measure
Uveitis

## 2023-11-14 NOTE — CHART NOTE - NSCHARTNOTEFT_GEN_A_CORE
right groin dressing c/d/i, 3x2 inch ecchymosis suprapubic, soft, full ROM RLE, thigh soft to palpation, offers no complaints   upon entering room pt had just urinated and was attempting to clean herself while standing   therapeutic lovenox started s/p LHC - right groin dressing c/d/i, 3x2 inch ecchymosis suprapubic, soft, full ROM RLE, thigh soft to palpation, offers no complaints   upon entering room pt had just urinated and was attempting to clean herself while standing   therapeutic lovenox started.      plan to transfer to NS for CT Sx Dr Loaiza - dispo prepped, transfer papers in front of chart

## 2023-11-14 NOTE — PROGRESS NOTE ADULT - PROBLEM SELECTOR PLAN 8
- New diagnosis of T2DM, unsure of what medications she is on currently for it  - Will place on low dose ISS, FS qAC, CC diet, check A1C. Lantus added for  uncontrolled FS, improved  - will need po meds on dc  - trend Fs
- New diagnosis of T2DM, unsure of what medications she is on currently for it  - Will place on low dose ISS, FS qAC, CC diet, check A1C. Lantus added for  uncontrolled FS, improved  - will need po meds on dc  - trend Fs
- New diagnosis of T2DM, unsure of what medications she is on currently for it  - Will place on low dose ISS, FS qAC, CC diet, check A1C
- c/o b/l blurry vision  - On snellen her visual acuity is 20/40 on R and 20/50 on L  - Reports occasional double vision  - No eye discharge or pain noted  - Ophthalmology consult placed, pt declined exam and prefers outpt f/u  - c/w outpatient follow up with Dr. Naomi Goldberg
- New diagnosis of T2DM, unsure of what medications she is on currently for it  - Will place on low dose ISS, FS qAC, CC diet, check A1C  - will need po meds on dc

## 2023-11-14 NOTE — PROGRESS NOTE ADULT - PROBLEM SELECTOR PLAN 4
Patient reports worsening depression with insomnia, poor appetite, anhedonia, poor social support  - previously was on lithium and depakote, currently on olanzapine and trazodone  - No SI/HI, no AVH  - psych C&L consult following, outpt f/u  - Patient also having difficulty obtaining outpatient psych f/u, states the earlier appointment she obtained was for March 2024, encourage follow up and see if patient can get an earlier appointment prior to discharge
- Reports abd pain with liquid diarrhea x2  - No significant abd pain noted on examination  - Will monitor for now, if recurrent liquid diarrhea then would obtain GI studies
- Reports abd pain with liquid diarrhea x2  - No significant abd pain noted on examination  - Also reports blood on her toilet paper when wiping, no blood in toilet bowl, no known episodes of melena  - Will monitor for now, if recurrent liquid diarrhea then would obtain GI studies  - Monitor for blood in BM, asked patient to show us her next BM to assess for blood
Patient reports worsening depression with insomnia, poor appetite, anhedonia, poor social support  - previously was on lithium and depakote, currently on olanzapine and trazodone  - No SI/HI, no AVH  - psych C&L consult following, outpt f/u  - Patient also having difficulty obtaining outpatient psych f/u, states the earlier appointment she obtained was for March 2024, encourage follow up and see if patient can get an earlier appointment prior to discharge
- Reports abd pain with liquid diarrhea x2  - No significant abd pain noted on examination  - Will monitor for now, if recurrent liquid diarrhea then would obtain GI studies
Patient reports worsening depression with insomnia, poor appetite, anhedonia, poor social support  - previously was on lithium and depakote, currently on olanzapine and trazodone  - No SI/HI, no AVH  - psych C&L consult following, outpt f/u  - Patient also having difficulty obtaining outpatient psych f/u, states the earlier appointment she obtained was for March 2024, encourage follow up and see if patient can get an earlier appointment prior to discharge
- Reports abd pain with liquid diarrhea x2  - No significant abd pain noted on examination  - Will monitor for now, if recurrent liquid diarrhea then would obtain GI studies

## 2023-11-14 NOTE — PROGRESS NOTE ADULT - PROBLEM SELECTOR PLAN 3
- Significant SOB with exertion with sputum (white) but no changes  - Significant wheezing throughout all lungs  - VBG without pCO2 retention  - Given no cough, no changes in her sputum production, and no pCO2 retention low concern for COPD exacerbation  - c/w celestine ATC for her wheezing, encourage continued tobacco cessation, c/w home inh therapy
- Significant SOB with exertion with sputum (white) but no changes  - Significant wheezing throughout all lungs  - VBG without pCO2 retention  - Given no cough, no changes in her sputum production, and no pCO2 retention low concern for COPD exacerbation  - Will place on duonebs ATC for her wheezing, encourage continued tobacco cessation, c/w home inh therapy
- Significant SOB with exertion with sputum (white) but no changes  - Significant wheezing throughout all lungs  - VBG without pCO2 retention  - Given no cough, no changes in her sputum production, and no pCO2 retention low concern for COPD exacerbation  - c/w celestine ATC for her wheezing, encourage continued tobacco cessation, c/w home inh therapy
- Patient with reports of worsening L sided numbness of the L side of her body with complaints of generalized weakness  - On examination noted to have decreased sensation to light touch on the L side of her body face/arm/leg, also with 4+/5 strength L arm/leg seems to be at baseline when compared to prior  - Given her prior history of acute CVA, Neuro was consulted: symptoms likely recrudescence, no need for repeat imaging  - c/w aspirin, high dose statin therapy  - c/w outpatient follow up with Dr. Wendi Lares
- Significant SOB with exertion with sputum (white) but no changes  - Significant wheezing throughout all lungs  - VBG without pCO2 retention  - Given no cough, no changes in her sputum production, and no pCO2 retention low concern for COPD exacerbation  - c/w celestine ATC for her wheezing, encourage continued tobacco cessation, c/w home inh therapy

## 2023-11-14 NOTE — H&P ADULT - NSHPSOCIALHISTORY_GEN_ALL_CORE
Chemo/Rad to chest: Yes Radiation 2/2 Breast CA 4 years ago  Implantable Devices: No  ADLs: uses a cane  Residence: Lives with Aunt, Unemployed

## 2023-11-14 NOTE — PROGRESS NOTE ADULT - PROBLEM SELECTOR PLAN 7
- Rheum consulted, rec MTX continuation  - c/w folic acid supplementation  - Outpatient f/u with Dr. Gerald Teresa
- C/w home eliquis   - Outpatient follow up with Dr. Tim Driver
- Rheum consulted, rec MTX continuation  - c/w folic acid supplementation  - Outpatient f/u with Dr. Gerald Teresa
- Rheum eval called to evaluate for MTX continuation  - c/w folic acid supplementation  - Outpatient f/u with Dr. Gerald Teresa
- Rheum consulted, rec MTX continuation  - c/w folic acid supplementation  - Outpatient f/u with Dr. Gerald Teresa
- C/w home eliquis   - Outpatient follow up with Dr. Tim Driver
- Eliquis on hold for bypass  - Lovenox    - Outpatient follow up with Dr. Tim Driver

## 2023-11-14 NOTE — H&P ADULT - NS ATTEND AMEND GEN_ALL_CORE FT
Pt seen and examined.  Acute systolic and diastolic HF NYHA class 4  severe AI, CAD mod MR  EF 35%.  Risks/benefits AVR CABG, pos MVR d/w pt and sister.  Agree to proceed.  STS risk 4-8%

## 2023-11-14 NOTE — PROGRESS NOTE ADULT - PROBLEM SELECTOR PROBLEM 5
Rheumatoid arthritis
Bipolar depression
Rheumatoid arthritis
Rheumatoid arthritis
Bipolar depression

## 2023-11-14 NOTE — H&P ADULT - HISTORY OF PRESENT ILLNESS
58F smoker with PMH CVA with L sided weakness/numbness/L gaze deviation, DM2 with b/l peripheral neuropathy, COPD/Asthma, Breast Ca s/p ?RT, Bipolar depression, Rheumatoid Arthritis, Antiphospholipid syndrome, and L eye uveitis/scleritis, Presents to [facility] transferred from [facility]. p/w multiple medical complaints. A/w exacerbation of L-sided subjective weakness/numbness likely 2/2 recrudescence of prior Right BG infarct. Cardiology following for new LV dysfunction since July, pending ischemic eval. TTE was done showing severe Aortic Insufficiency and moderated MR. Cardiac cath was done showing LAD prox 100% fills via right to left collateral. Patient now transferred to [facility] Dr. Loaiza for evaluation.    58F smoker with PMH CVA with L sided weakness/numbness/L gaze deviation, DM2 with b/l peripheral neuropathy, COPD/Asthma, Breast Ca s/p ?RT, Bipolar depression, Rheumatoid Arthritis, Antiphospholipid syndrome, and L eye uveitis/scleritis, Presents to [facility] transferred from [facility]. p/w multiple medical complaints. A/w exacerbation of L-sided subjective weakness/numbness likely 2/2 recrudescence of prior Right BG infarct. Cardiology following for new LV dysfunction since July, pending ischemic eval. LESLEY was done showing severe Aortic Insufficiency and moderated MR. Cardiac cath was done showing LAD prox 100% fills via right to left collateral. Patient now transferred to CTS Dr. Loaiza for evaluation.

## 2023-11-14 NOTE — PROGRESS NOTE ADULT - PROBLEM SELECTOR PLAN 5
Stable, not in flare.  - C/w home MTX, prednisone, and folic acid   - Outpatient f/u with Dr. Gerald Teresa
- Patient reports worsening depression with insomnia, poor appetite, anhedonia, poor social support  - previously was on lithium and depakote, currently on olanzapine and trazodone  - No SI/HI, no AVH  - psych C&L consult following, outpt f/u  - Patient also having difficulty obtaining outpatient psych f/u, states the earlier appointment she obtained was for March 2024, encourage follow up and see if patient can get an earlier appointment prior to discharge
- Patient reports worsening depression with insomnia, poor appetite, anhedonia, poor social support  - previously was on lithium and depakote, currently on olanzapine and trazodone  - No SI/HI, no AVH  - Concern for worsening depression in setting of social stressors, poor social support  - c/w current psych meds, psych C&L consult following,  - Patient also having difficulty obtaining outpatient psych f/u, states the earlier appointment she obtained was for March 2024, encourage follow up and see if patient can get an earlier appointment prior to discharge
Stable, not in flare.  - C/w home MTX, prednisone, and folic acid   - Outpatient f/u with Dr. Gerald Teresa
Stable, not in flare.  - C/w home MTX, prednisone, and folic acid   - Outpatient f/u with Dr. Gerald Teresa
- Patient reports worsening depression with insomnia, poor appetite, anhedonia, poor social support  - previously was on lithium and depakote, currently on olanzapine and trazodone  - No SI/HI, no AVH  - psych C&L consult following, outpt f/u  - Patient also having difficulty obtaining outpatient psych f/u, states the earlier appointment she obtained was for March 2024, encourage follow up and see if patient can get an earlier appointment prior to discharge
- Patient reports worsening depression with insomnia, poor appetite, anhedonia, poor social support  - previously was on lithium and depakote, currently on olanzapine and trazodone  - No SI/HI, no AVH  - psych C&L consult following, outpt f/u  - Patient also having difficulty obtaining outpatient psych f/u, states the earlier appointment she obtained was for March 2024, encourage follow up and see if patient can get an earlier appointment prior to discharge

## 2023-11-14 NOTE — H&P ADULT - ASSESSMENT
58F smoker with PMH CVA with L sided weakness/numbness/L gaze deviation, DM2 with b/l peripheral neuropathy, COPD/Asthma, Breast Ca s/p ?RT, Bipolar depression, Rheumatoid Arthritis, Antiphospholipid syndrome, and L eye uveitis/scleritis, Presents to [facility] transferred from [facility]. p/w multiple medical complaints. A/w exacerbation of L-sided subjective weakness/numbness likely 2/2 recrudescence of prior Right BG infarct. Cardiology following for new LV dysfunction since July, pending ischemic eval. TTE was done showing severe Aortic Insufficiency and moderated MR. Cardiac cath was done showing LAD prox 100% fills via right to left collateral. Patient now transferred to [facility] Dr. Loaiza for evaluation.    11/14 patient seen and examined at bedside. All labs and imaging to be reviewed by Dr. Loaiza  58F smoker with PMH CVA with L sided weakness/numbness/L gaze deviation, DM2 with b/l peripheral neuropathy, COPD/Asthma, Breast Ca s/p ?RT, Bipolar depression, Rheumatoid Arthritis, Antiphospholipid syndrome, and L eye uveitis/scleritis, Presents to [facility] transferred from [facility]. p/w multiple medical complaints. A/w exacerbation of L-sided subjective weakness/numbness likely 2/2 recrudescence of prior Right BG infarct. Cardiology following for new LV dysfunction since July, pending ischemic eval. LESLEY was done showing severe Aortic Insufficiency and moderated MR. Cardiac cath was done showing LAD prox 100% fills via right to left collateral. Patient now transferred to CTS Dr. Loaiza for evaluation.    11/14 patient seen and examined at bedside. All labs and imaging to be reviewed by Dr. Loaiza

## 2023-11-14 NOTE — H&P ADULT - NSHPPHYSICALEXAM_GEN_ALL_CORE
General: NAD  HEENT:  NC/AT  Neuro: A&Ox3,speech clear, no focal deficits noted  Respiratory: BS CTA b/l, no wheezes, rales or rhonchi noted  Cardiovascular: RRR, (+) S1/S2,  GI: Abd soft, NT/ND, (+) BSx4Q   Peripheral Vascular:  no LE edema b/l, 2+ peripheral pulses b/l, no varicosities/PVD noted. B/L groins soft no hematoma. Right groin with some ecchymosis  Musculoskeletal: B/L UE and LE 5/5 strength   Psychiatric: Normal mood, normal affect observed

## 2023-11-14 NOTE — H&P ADULT - PROBLEM SELECTOR PLAN 1
Admit to CTS   - Preop w/u initiated   -TTE EF 30% now with mod-severe AR  - LESLEY not tolerated well grossly sever AI, mod MR  - c/w lasix change to 40mg PO Daily -c/w metoprolol and atorvastatin 80mg Qd, ASA 81mg QD  - cath shows LAD prox 100% fills via right to left collateral will need AVR and LIMA to LAD bypass  - c/w LVNX 85mg Q12

## 2023-11-14 NOTE — PROGRESS NOTE ADULT - PROBLEM SELECTOR PROBLEM 4
Abdominal pain
Bipolar depression
Abdominal pain
Bipolar depression
Abdominal pain
Abdominal pain
Bipolar depression

## 2023-11-14 NOTE — H&P ADULT - PROBLEM SELECTOR PLAN 2
-history of multiple CVAs and is on eliquis   -CT scan of head negative for any acute findings  -ILR interrogation with no events  -per neuro no plans for MRI

## 2023-11-14 NOTE — PROGRESS NOTE ADULT - REASON FOR ADMISSION
Multiple complaints

## 2023-11-14 NOTE — PROGRESS NOTE ADULT - PROBLEM SELECTOR PROBLEM 7
Antiphospholipid syndrome
Rheumatoid arthritis
Antiphospholipid syndrome
Antiphospholipid syndrome
Rheumatoid arthritis

## 2023-11-15 LAB
A1C WITH ESTIMATED AVERAGE GLUCOSE RESULT: 7.6 % — HIGH (ref 4–5.6)
A1C WITH ESTIMATED AVERAGE GLUCOSE RESULT: 7.6 % — HIGH (ref 4–5.6)
ALBUMIN SERPL ELPH-MCNC: 4 G/DL — SIGNIFICANT CHANGE UP (ref 3.3–5)
ALBUMIN SERPL ELPH-MCNC: 4 G/DL — SIGNIFICANT CHANGE UP (ref 3.3–5)
ALP SERPL-CCNC: 116 U/L — SIGNIFICANT CHANGE UP (ref 40–120)
ALP SERPL-CCNC: 116 U/L — SIGNIFICANT CHANGE UP (ref 40–120)
ALT FLD-CCNC: 12 U/L — SIGNIFICANT CHANGE UP (ref 10–45)
ALT FLD-CCNC: 12 U/L — SIGNIFICANT CHANGE UP (ref 10–45)
ANION GAP SERPL CALC-SCNC: 11 MMOL/L — SIGNIFICANT CHANGE UP (ref 5–17)
ANION GAP SERPL CALC-SCNC: 11 MMOL/L — SIGNIFICANT CHANGE UP (ref 5–17)
APPEARANCE UR: CLEAR — SIGNIFICANT CHANGE UP
APPEARANCE UR: CLEAR — SIGNIFICANT CHANGE UP
AST SERPL-CCNC: 10 U/L — SIGNIFICANT CHANGE UP (ref 10–40)
AST SERPL-CCNC: 10 U/L — SIGNIFICANT CHANGE UP (ref 10–40)
BACTERIA # UR AUTO: NEGATIVE /HPF — SIGNIFICANT CHANGE UP
BACTERIA # UR AUTO: NEGATIVE /HPF — SIGNIFICANT CHANGE UP
BASOPHILS # BLD AUTO: 0.11 K/UL — SIGNIFICANT CHANGE UP (ref 0–0.2)
BASOPHILS # BLD AUTO: 0.11 K/UL — SIGNIFICANT CHANGE UP (ref 0–0.2)
BASOPHILS NFR BLD AUTO: 0.7 % — SIGNIFICANT CHANGE UP (ref 0–2)
BASOPHILS NFR BLD AUTO: 0.7 % — SIGNIFICANT CHANGE UP (ref 0–2)
BILIRUB SERPL-MCNC: 1.4 MG/DL — HIGH (ref 0.2–1.2)
BILIRUB SERPL-MCNC: 1.4 MG/DL — HIGH (ref 0.2–1.2)
BILIRUB UR-MCNC: NEGATIVE — SIGNIFICANT CHANGE UP
BILIRUB UR-MCNC: NEGATIVE — SIGNIFICANT CHANGE UP
BUN SERPL-MCNC: 30 MG/DL — HIGH (ref 7–23)
BUN SERPL-MCNC: 30 MG/DL — HIGH (ref 7–23)
CALCIUM SERPL-MCNC: 9.2 MG/DL — SIGNIFICANT CHANGE UP (ref 8.4–10.5)
CALCIUM SERPL-MCNC: 9.2 MG/DL — SIGNIFICANT CHANGE UP (ref 8.4–10.5)
CAST: 0 /LPF — SIGNIFICANT CHANGE UP (ref 0–4)
CAST: 0 /LPF — SIGNIFICANT CHANGE UP (ref 0–4)
CHLORIDE SERPL-SCNC: 101 MMOL/L — SIGNIFICANT CHANGE UP (ref 96–108)
CHLORIDE SERPL-SCNC: 101 MMOL/L — SIGNIFICANT CHANGE UP (ref 96–108)
CO2 SERPL-SCNC: 25 MMOL/L — SIGNIFICANT CHANGE UP (ref 22–31)
CO2 SERPL-SCNC: 25 MMOL/L — SIGNIFICANT CHANGE UP (ref 22–31)
COLOR SPEC: YELLOW — SIGNIFICANT CHANGE UP
COLOR SPEC: YELLOW — SIGNIFICANT CHANGE UP
CREAT SERPL-MCNC: 0.79 MG/DL — SIGNIFICANT CHANGE UP (ref 0.5–1.3)
CREAT SERPL-MCNC: 0.79 MG/DL — SIGNIFICANT CHANGE UP (ref 0.5–1.3)
DIFF PNL FLD: ABNORMAL
DIFF PNL FLD: ABNORMAL
EGFR: 86 ML/MIN/1.73M2 — SIGNIFICANT CHANGE UP
EGFR: 86 ML/MIN/1.73M2 — SIGNIFICANT CHANGE UP
EOSINOPHIL # BLD AUTO: 0.24 K/UL — SIGNIFICANT CHANGE UP (ref 0–0.5)
EOSINOPHIL # BLD AUTO: 0.24 K/UL — SIGNIFICANT CHANGE UP (ref 0–0.5)
EOSINOPHIL NFR BLD AUTO: 1.6 % — SIGNIFICANT CHANGE UP (ref 0–6)
EOSINOPHIL NFR BLD AUTO: 1.6 % — SIGNIFICANT CHANGE UP (ref 0–6)
ESTIMATED AVERAGE GLUCOSE: 171 MG/DL — HIGH (ref 68–114)
ESTIMATED AVERAGE GLUCOSE: 171 MG/DL — HIGH (ref 68–114)
GLUCOSE BLDC GLUCOMTR-MCNC: 109 MG/DL — HIGH (ref 70–99)
GLUCOSE BLDC GLUCOMTR-MCNC: 109 MG/DL — HIGH (ref 70–99)
GLUCOSE BLDC GLUCOMTR-MCNC: 159 MG/DL — HIGH (ref 70–99)
GLUCOSE BLDC GLUCOMTR-MCNC: 159 MG/DL — HIGH (ref 70–99)
GLUCOSE BLDC GLUCOMTR-MCNC: 187 MG/DL — HIGH (ref 70–99)
GLUCOSE BLDC GLUCOMTR-MCNC: 187 MG/DL — HIGH (ref 70–99)
GLUCOSE BLDC GLUCOMTR-MCNC: 216 MG/DL — HIGH (ref 70–99)
GLUCOSE BLDC GLUCOMTR-MCNC: 216 MG/DL — HIGH (ref 70–99)
GLUCOSE SERPL-MCNC: 128 MG/DL — HIGH (ref 70–99)
GLUCOSE SERPL-MCNC: 128 MG/DL — HIGH (ref 70–99)
GLUCOSE UR QL: NEGATIVE MG/DL — SIGNIFICANT CHANGE UP
GLUCOSE UR QL: NEGATIVE MG/DL — SIGNIFICANT CHANGE UP
HCT VFR BLD CALC: 38.8 % — SIGNIFICANT CHANGE UP (ref 34.5–45)
HCT VFR BLD CALC: 38.8 % — SIGNIFICANT CHANGE UP (ref 34.5–45)
HGB BLD-MCNC: 12.4 G/DL — SIGNIFICANT CHANGE UP (ref 11.5–15.5)
HGB BLD-MCNC: 12.4 G/DL — SIGNIFICANT CHANGE UP (ref 11.5–15.5)
IMM GRANULOCYTES NFR BLD AUTO: 0.5 % — SIGNIFICANT CHANGE UP (ref 0–0.9)
IMM GRANULOCYTES NFR BLD AUTO: 0.5 % — SIGNIFICANT CHANGE UP (ref 0–0.9)
KETONES UR-MCNC: NEGATIVE MG/DL — SIGNIFICANT CHANGE UP
KETONES UR-MCNC: NEGATIVE MG/DL — SIGNIFICANT CHANGE UP
LEUKOCYTE ESTERASE UR-ACNC: ABNORMAL
LEUKOCYTE ESTERASE UR-ACNC: ABNORMAL
LYMPHOCYTES # BLD AUTO: 23.1 % — SIGNIFICANT CHANGE UP (ref 13–44)
LYMPHOCYTES # BLD AUTO: 23.1 % — SIGNIFICANT CHANGE UP (ref 13–44)
LYMPHOCYTES # BLD AUTO: 3.43 K/UL — HIGH (ref 1–3.3)
LYMPHOCYTES # BLD AUTO: 3.43 K/UL — HIGH (ref 1–3.3)
MCHC RBC-ENTMCNC: 29.3 PG — SIGNIFICANT CHANGE UP (ref 27–34)
MCHC RBC-ENTMCNC: 29.3 PG — SIGNIFICANT CHANGE UP (ref 27–34)
MCHC RBC-ENTMCNC: 32 GM/DL — SIGNIFICANT CHANGE UP (ref 32–36)
MCHC RBC-ENTMCNC: 32 GM/DL — SIGNIFICANT CHANGE UP (ref 32–36)
MCV RBC AUTO: 91.7 FL — SIGNIFICANT CHANGE UP (ref 80–100)
MCV RBC AUTO: 91.7 FL — SIGNIFICANT CHANGE UP (ref 80–100)
MONOCYTES # BLD AUTO: 0.89 K/UL — SIGNIFICANT CHANGE UP (ref 0–0.9)
MONOCYTES # BLD AUTO: 0.89 K/UL — SIGNIFICANT CHANGE UP (ref 0–0.9)
MONOCYTES NFR BLD AUTO: 6 % — SIGNIFICANT CHANGE UP (ref 2–14)
MONOCYTES NFR BLD AUTO: 6 % — SIGNIFICANT CHANGE UP (ref 2–14)
MRSA PCR RESULT.: SIGNIFICANT CHANGE UP
MRSA PCR RESULT.: SIGNIFICANT CHANGE UP
NEUTROPHILS # BLD AUTO: 10.1 K/UL — HIGH (ref 1.8–7.4)
NEUTROPHILS # BLD AUTO: 10.1 K/UL — HIGH (ref 1.8–7.4)
NEUTROPHILS NFR BLD AUTO: 68.1 % — SIGNIFICANT CHANGE UP (ref 43–77)
NEUTROPHILS NFR BLD AUTO: 68.1 % — SIGNIFICANT CHANGE UP (ref 43–77)
NITRITE UR-MCNC: NEGATIVE — SIGNIFICANT CHANGE UP
NITRITE UR-MCNC: NEGATIVE — SIGNIFICANT CHANGE UP
NRBC # BLD: 0 /100 WBCS — SIGNIFICANT CHANGE UP (ref 0–0)
NRBC # BLD: 0 /100 WBCS — SIGNIFICANT CHANGE UP (ref 0–0)
PH UR: 6 — SIGNIFICANT CHANGE UP (ref 5–8)
PH UR: 6 — SIGNIFICANT CHANGE UP (ref 5–8)
PLATELET # BLD AUTO: 255 K/UL — SIGNIFICANT CHANGE UP (ref 150–400)
PLATELET # BLD AUTO: 255 K/UL — SIGNIFICANT CHANGE UP (ref 150–400)
POTASSIUM SERPL-MCNC: 4 MMOL/L — SIGNIFICANT CHANGE UP (ref 3.5–5.3)
POTASSIUM SERPL-MCNC: 4 MMOL/L — SIGNIFICANT CHANGE UP (ref 3.5–5.3)
POTASSIUM SERPL-SCNC: 4 MMOL/L — SIGNIFICANT CHANGE UP (ref 3.5–5.3)
POTASSIUM SERPL-SCNC: 4 MMOL/L — SIGNIFICANT CHANGE UP (ref 3.5–5.3)
PROT SERPL-MCNC: 6.4 G/DL — SIGNIFICANT CHANGE UP (ref 6–8.3)
PROT SERPL-MCNC: 6.4 G/DL — SIGNIFICANT CHANGE UP (ref 6–8.3)
PROT UR-MCNC: NEGATIVE MG/DL — SIGNIFICANT CHANGE UP
PROT UR-MCNC: NEGATIVE MG/DL — SIGNIFICANT CHANGE UP
RBC # BLD: 4.23 M/UL — SIGNIFICANT CHANGE UP (ref 3.8–5.2)
RBC # BLD: 4.23 M/UL — SIGNIFICANT CHANGE UP (ref 3.8–5.2)
RBC # FLD: 15.9 % — HIGH (ref 10.3–14.5)
RBC # FLD: 15.9 % — HIGH (ref 10.3–14.5)
RBC CASTS # UR COMP ASSIST: 5 /HPF — HIGH (ref 0–4)
RBC CASTS # UR COMP ASSIST: 5 /HPF — HIGH (ref 0–4)
REVIEW: SIGNIFICANT CHANGE UP
REVIEW: SIGNIFICANT CHANGE UP
S AUREUS DNA NOSE QL NAA+PROBE: SIGNIFICANT CHANGE UP
S AUREUS DNA NOSE QL NAA+PROBE: SIGNIFICANT CHANGE UP
SODIUM SERPL-SCNC: 137 MMOL/L — SIGNIFICANT CHANGE UP (ref 135–145)
SODIUM SERPL-SCNC: 137 MMOL/L — SIGNIFICANT CHANGE UP (ref 135–145)
SP GR SPEC: 1.02 — SIGNIFICANT CHANGE UP (ref 1–1.03)
SP GR SPEC: 1.02 — SIGNIFICANT CHANGE UP (ref 1–1.03)
SQUAMOUS # UR AUTO: 7 /HPF — HIGH (ref 0–5)
SQUAMOUS # UR AUTO: 7 /HPF — HIGH (ref 0–5)
T4 FREE SERPL-MCNC: 1.3 NG/DL — SIGNIFICANT CHANGE UP (ref 0.9–1.8)
T4 FREE SERPL-MCNC: 1.3 NG/DL — SIGNIFICANT CHANGE UP (ref 0.9–1.8)
TSH SERPL-MCNC: 1.28 UIU/ML — SIGNIFICANT CHANGE UP (ref 0.27–4.2)
TSH SERPL-MCNC: 1.28 UIU/ML — SIGNIFICANT CHANGE UP (ref 0.27–4.2)
UROBILINOGEN FLD QL: 1 MG/DL — SIGNIFICANT CHANGE UP (ref 0.2–1)
UROBILINOGEN FLD QL: 1 MG/DL — SIGNIFICANT CHANGE UP (ref 0.2–1)
WBC # BLD: 14.85 K/UL — HIGH (ref 3.8–10.5)
WBC # BLD: 14.85 K/UL — HIGH (ref 3.8–10.5)
WBC # FLD AUTO: 14.85 K/UL — HIGH (ref 3.8–10.5)
WBC # FLD AUTO: 14.85 K/UL — HIGH (ref 3.8–10.5)
WBC UR QL: 2 /HPF — SIGNIFICANT CHANGE UP (ref 0–5)
WBC UR QL: 2 /HPF — SIGNIFICANT CHANGE UP (ref 0–5)

## 2023-11-15 PROCEDURE — 99232 SBSQ HOSP IP/OBS MODERATE 35: CPT

## 2023-11-15 PROCEDURE — 71045 X-RAY EXAM CHEST 1 VIEW: CPT | Mod: 26

## 2023-11-15 PROCEDURE — 70450 CT HEAD/BRAIN W/O DYE: CPT | Mod: 26

## 2023-11-15 PROCEDURE — 93880 EXTRACRANIAL BILAT STUDY: CPT | Mod: 26

## 2023-11-15 PROCEDURE — 94010 BREATHING CAPACITY TEST: CPT | Mod: 26

## 2023-11-15 RX ORDER — ALPRAZOLAM 0.25 MG
0.25 TABLET ORAL ONCE
Refills: 0 | Status: DISCONTINUED | OUTPATIENT
Start: 2023-11-15 | End: 2023-11-15

## 2023-11-15 RX ADMIN — Medication 50 MILLIGRAM(S): at 21:48

## 2023-11-15 RX ADMIN — Medication 1000 UNIT(S): at 12:10

## 2023-11-15 RX ADMIN — GABAPENTIN 300 MILLIGRAM(S): 400 CAPSULE ORAL at 21:48

## 2023-11-15 RX ADMIN — ENOXAPARIN SODIUM 90 MILLIGRAM(S): 100 INJECTION SUBCUTANEOUS at 17:12

## 2023-11-15 RX ADMIN — SPIRONOLACTONE 25 MILLIGRAM(S): 25 TABLET, FILM COATED ORAL at 06:15

## 2023-11-15 RX ADMIN — PANTOPRAZOLE SODIUM 40 MILLIGRAM(S): 20 TABLET, DELAYED RELEASE ORAL at 06:15

## 2023-11-15 RX ADMIN — SODIUM CHLORIDE 3 MILLILITER(S): 9 INJECTION INTRAMUSCULAR; INTRAVENOUS; SUBCUTANEOUS at 13:43

## 2023-11-15 RX ADMIN — ATORVASTATIN CALCIUM 80 MILLIGRAM(S): 80 TABLET, FILM COATED ORAL at 22:25

## 2023-11-15 RX ADMIN — Medication 3 MILLIGRAM(S): at 21:48

## 2023-11-15 RX ADMIN — BUDESONIDE AND FORMOTEROL FUMARATE DIHYDRATE 2 PUFF(S): 160; 4.5 AEROSOL RESPIRATORY (INHALATION) at 17:12

## 2023-11-15 RX ADMIN — Medication 81 MILLIGRAM(S): at 12:10

## 2023-11-15 RX ADMIN — SODIUM CHLORIDE 3 MILLILITER(S): 9 INJECTION INTRAMUSCULAR; INTRAVENOUS; SUBCUTANEOUS at 06:56

## 2023-11-15 RX ADMIN — INSULIN GLARGINE 6 UNIT(S): 100 INJECTION, SOLUTION SUBCUTANEOUS at 22:29

## 2023-11-15 RX ADMIN — Medication 650 MILLIGRAM(S): at 06:15

## 2023-11-15 RX ADMIN — OLANZAPINE 7.5 MILLIGRAM(S): 15 TABLET, FILM COATED ORAL at 12:10

## 2023-11-15 RX ADMIN — Medication 1 MILLIGRAM(S): at 12:09

## 2023-11-15 RX ADMIN — Medication 650 MILLIGRAM(S): at 06:45

## 2023-11-15 RX ADMIN — GABAPENTIN 300 MILLIGRAM(S): 400 CAPSULE ORAL at 13:45

## 2023-11-15 RX ADMIN — ENOXAPARIN SODIUM 90 MILLIGRAM(S): 100 INJECTION SUBCUTANEOUS at 06:15

## 2023-11-15 RX ADMIN — Medication 10 MILLIGRAM(S): at 06:15

## 2023-11-15 RX ADMIN — Medication 1: at 10:57

## 2023-11-15 RX ADMIN — Medication 0.25 MILLIGRAM(S): at 16:41

## 2023-11-15 RX ADMIN — BUDESONIDE AND FORMOTEROL FUMARATE DIHYDRATE 2 PUFF(S): 160; 4.5 AEROSOL RESPIRATORY (INHALATION) at 06:16

## 2023-11-15 RX ADMIN — Medication 4 UNIT(S): at 10:58

## 2023-11-15 RX ADMIN — GABAPENTIN 300 MILLIGRAM(S): 400 CAPSULE ORAL at 06:15

## 2023-11-15 RX ADMIN — Medication 25 MILLIGRAM(S): at 06:15

## 2023-11-15 RX ADMIN — Medication 40 MILLIGRAM(S): at 06:15

## 2023-11-15 RX ADMIN — Medication 4 UNIT(S): at 17:14

## 2023-11-15 RX ADMIN — SODIUM CHLORIDE 3 MILLILITER(S): 9 INJECTION INTRAMUSCULAR; INTRAVENOUS; SUBCUTANEOUS at 22:25

## 2023-11-15 NOTE — PROGRESS NOTE ADULT - PROBLEM SELECTOR PLAN 2
-history of multiple CVAs and is on eliquis - Neuro consulted, CT head pending, f/u results/recs  -ILR interrogation with no events

## 2023-11-15 NOTE — PROGRESS NOTE ADULT - PROBLEM SELECTOR PLAN 1
-Plan OR friday CABG/AVR/MVR  -TTE EF 30% now with mod-severe AR  - LESLEY not tolerated well grossly sever AI, mod MR  - c/w lasix change to 40mg PO Daily -c/w metoprolol and atorvastatin 80mg Qd, ASA 81mg QD  - cath shows LAD prox 100% fills via right to left collateral will need AVR and LIMA to LAD bypass  - c/w LVNX 85mg Q12

## 2023-11-15 NOTE — CONSULT NOTE ADULT - SUBJECTIVE AND OBJECTIVE BOX
Neurology Consult    Reason for Consult: Patient is a 58y old  Female who presents with a chief complaint of     HPI:  58F smoker with PMH CVA with L sided weakness/numbness/L gaze deviation, DM2 with b/l peripheral neuropathy, COPD/Asthma, Breast Ca s/p ?RT, Bipolar depression, Rheumatoid Arthritis, Antiphospholipid syndrome, and L eye uveitis/scleritis, Presents to [facility] transferred from [facility]. p/w multiple medical complaints. A/w exacerbation of L-sided subjective weakness/numbness likely 2/2 recrudescence of prior Right BG infarct. Cardiology following for new LV dysfunction since July, pending ischemic eval. LESLEY was done showing severe Aortic Insufficiency and moderated MR. Cardiac cath was done showing LAD prox 100% fills via right to left collateral. Patient now transferred to CTS Dr. Loaiza for evaluation.    (14 Nov 2023 22:28)       PAST MEDICAL & SURGICAL HISTORY:  Cigarette smoker      Rheumatoid arthritis      Other depression      Breast cancer      Migraines      History of multiple cerebrovascular accidents (CVAs)      Suicidal ideation      Paresthesia of left arm      Facial paresthesia      APS (antiphospholipid syndrome)      History of hysterectomy      History of cholecystectomy          Allergies: Allergies    No Known Allergies    Intolerances        Social History: Denies toxic habits including tobacco, ETOH or illicit drugs.    Family History: FAMILY HISTORY:  Family history of breast cancer (Mother)    Family history of diabetes mellitus (DM) (Father)    . No family history of strokes    Medications: MEDICATIONS  (STANDING):  acetaminophen     Tablet .. 650 milliGRAM(s) Oral every 6 hours  aspirin enteric coated 81 milliGRAM(s) Oral daily  atorvastatin 80 milliGRAM(s) Oral at bedtime  budesonide 160 MICROgram(s)/formoterol 4.5 MICROgram(s) Inhaler 2 Puff(s) Inhalation two times a day  cholecalciferol 1000 Unit(s) Oral daily  enoxaparin Injectable 90 milliGRAM(s) SubCutaneous every 12 hours  folic acid 1 milliGRAM(s) Oral daily  furosemide    Tablet 40 milliGRAM(s) Oral daily  gabapentin 300 milliGRAM(s) Oral three times a day  insulin glargine Injectable (LANTUS) 6 Unit(s) SubCutaneous at bedtime  insulin lispro (ADMELOG) corrective regimen sliding scale   SubCutaneous three times a day before meals  insulin lispro (ADMELOG) corrective regimen sliding scale   SubCutaneous at bedtime  insulin lispro Injectable (ADMELOG) 4 Unit(s) SubCutaneous three times a day before meals  metoprolol succinate ER 25 milliGRAM(s) Oral daily  OLANZapine 7.5 milliGRAM(s) Oral daily  pantoprazole    Tablet 40 milliGRAM(s) Oral before breakfast  predniSONE   Tablet 10 milliGRAM(s) Oral daily  sodium chloride 0.9% lock flush 3 milliLiter(s) IV Push every 8 hours  spironolactone 25 milliGRAM(s) Oral daily  traZODone 50 milliGRAM(s) Oral at bedtime    MEDICATIONS  (PRN):  aluminum hydroxide/magnesium hydroxide/simethicone Suspension 30 milliLiter(s) Oral every 4 hours PRN Dyspepsia  melatonin 3 milliGRAM(s) Oral at bedtime PRN Insomnia  OLANZapine Injectable 2.5 milliGRAM(s) IntraMuscular every 12 hours PRN agitation      Review of Systems:  CONSTITUTIONAL:  No weight loss, fever, chills, weakness or fatigue.  HEENT:  Eyes:  No visual loss, blurred vision, double vision or yellow sclera. Ears, Nose, Throat:  No hearing loss, sneezing, congestion, runny nose or sore throat.  SKIN:  No rash or itching.  CARDIOVASCULAR:  No chest pain, chest pressure or chest discomfort. No palpitations or edema.  RESPIRATORY:  No shortness of breath, cough or sputum.  GASTROINTESTINAL:  No anorexia, nausea, vomiting or diarrhea. No abdominal pain or blood.  GENITOURINARY:  No burning on urination or incontinence   NEUROLOGICAL:  No headache, dizziness, syncope, paralysis, ataxia, numbness or tingling in the extremities. No change in bowel or bladder control. no limb weakness. no vision changes.   MUSCULOSKELETAL:  No muscle, back pain, joint pain or stiffness.  HEMATOLOGIC:  No anemia, bleeding or bruising.  LYMPHATICS:  No enlarged nodes. No history of splenectomy.  PSYCHIATRIC:  No history of depression or anxiety.  ENDOCRINOLOGIC:  No reports of sweating, cold or heat intolerance. No polyuria or polydipsia.      Vitals:  Vital Signs Last 24 Hrs  T(C): 36.9 (15 Nov 2023 13:50), Max: 36.9 (14 Nov 2023 21:02)  T(F): 98.4 (15 Nov 2023 13:50), Max: 98.4 (14 Nov 2023 21:02)  HR: 76 (15 Nov 2023 13:50) (73 - 76)  BP: 98/59 (15 Nov 2023 13:50) (98/59 - 128/85)  BP(mean): 90 (14 Nov 2023 19:19) (90 - 90)  RR: 18 (15 Nov 2023 13:50) (18 - 18)  SpO2: 96% (15 Nov 2023 13:50) (94% - 99%)    Parameters below as of 15 Nov 2023 13:50  Patient On (Oxygen Delivery Method): room air        General Exam:   General Appearance: Appropriately dressed and in no acute distress       Head: Normocephalic, atraumatic and no dysmorphic features  Ear, Nose, and Throat: Moist mucous membranes  CVS: S1S2+  Resp: No SOB, no wheeze or rhonchi  GI: soft NT/ND  Extremities: No edema or cyanosis  Skin: No bruises or rashes     Neurological Exam:  Mental Status: Awake, alert and oriented x 3.  Able to follow simple and complex verbal commands. Able to name and repeat. fluent speech. No obvious aphasia or dysarthria noted.   Cranial Nerves: PERRL, EOMI, VFFC, sensation V1-V3 decrease on L,  L facial asymmetry, equal elevation of palate, scm/trap 5/5, tongue is midline on protrusion. no obvious papilledema on fundoscopic exam. hearing is grossly intact.   Motor: Normal bulk, tone and strength throughout except mild L HP 5-/5   Sensation: decrease on L compared to R   Reflexes: 1+ throughout at biceps, brachioradialis, triceps, patellars and ankles bilaterally and equal. No clonus. R toe and L toe were both downgoing.  Coordination: No dysmetria on FNF   Gait: cane baseline     Data/Labs/Imaging which I personally reviewed.     Labs:     CBC Full  -  ( 15 Nov 2023 06:08 )  WBC Count : 14.85 K/uL  RBC Count : 4.23 M/uL  Hemoglobin : 12.4 g/dL  Hematocrit : 38.8 %  Platelet Count - Automated : 255 K/uL  Mean Cell Volume : 91.7 fl  Mean Cell Hemoglobin : 29.3 pg  Mean Cell Hemoglobin Concentration : 32.0 gm/dL  Auto Neutrophil # : 10.10 K/uL  Auto Lymphocyte # : 3.43 K/uL  Auto Monocyte # : 0.89 K/uL  Auto Eosinophil # : 0.24 K/uL  Auto Basophil # : 0.11 K/uL  Auto Neutrophil % : 68.1 %  Auto Lymphocyte % : 23.1 %  Auto Monocyte % : 6.0 %  Auto Eosinophil % : 1.6 %  Auto Basophil % : 0.7 %    11-15    137  |  101  |  30<H>  ----------------------------<  128<H>  4.0   |  25  |  0.79    Ca    9.2      15 Nov 2023 06:08  Phos  4.3     11-14  Mg     2.20     11-14    TPro  6.4  /  Alb  4.0  /  TBili  1.4<H>  /  DBili  x   /  AST  10  /  ALT  12  /  AlkPhos  116  11-15    LIVER FUNCTIONS - ( 15 Nov 2023 06:08 )  Alb: 4.0 g/dL / Pro: 6.4 g/dL / ALK PHOS: 116 U/L / ALT: 12 U/L / AST: 10 U/L / GGT: x           PT/INR - ( 14 Nov 2023 22:47 )   PT: 12.7 sec;   INR: 1.22 ratio         PTT - ( 14 Nov 2023 22:47 )  PTT:31.2 sec  Urinalysis Basic - ( 15 Nov 2023 06:08 )    Color: x / Appearance: x / SG: x / pH: x  Gluc: 128 mg/dL / Ketone: x  / Bili: x / Urobili: x   Blood: x / Protein: x / Nitrite: x   Leuk Esterase: x / RBC: x / WBC x   Sq Epi: x / Non Sq Epi: x / Bacteria: x

## 2023-11-15 NOTE — PROGRESS NOTE ADULT - SUBJECTIVE AND OBJECTIVE BOX
Patient discussed on morning rounds with Dr. Loaiza    Operation / Date:  Preop CABG/AVR/MVR Friday    SUBJECTIVE ASSESSMENT:  58y Female seen and examined. Feels well, offers no acute complaints. Denies fever, chest pain, SOB, abdominal pain, n/v.         Vital Signs Last 24 Hrs  T(C): 36.6 (15 Nov 2023 05:30), Max: 36.9 (14 Nov 2023 21:02)  T(F): 97.9 (15 Nov 2023 05:30), Max: 98.4 (14 Nov 2023 21:02)  HR: 73 (15 Nov 2023 05:30) (73 - 75)  BP: 106/72 (15 Nov 2023 05:30) (106/72 - 128/85)  BP(mean): 90 (14 Nov 2023 19:19) (90 - 90)  RR: 18 (15 Nov 2023 05:30) (18 - 18)  SpO2: 94% (15 Nov 2023 05:30) (94% - 99%)    Parameters below as of 15 Nov 2023 05:30  Patient On (Oxygen Delivery Method): room air      I&O's Detail    14 Nov 2023 07:01  -  15 Nov 2023 07:00  --------------------------------------------------------  IN:    Oral Fluid: 240 mL  Total IN: 240 mL    OUT:    Voided (mL): 800 mL  Total OUT: 800 mL    Total NET: -560 mL        PHYSICAL EXAM:    General: Patient lying comfortably in bed, no acute distress     Neurological: Alert and oriented. No focal neurological deficits     Cardiovascular: S1S2, RRR, no murmurs appreciated on exam     Respiratory: Clear to ausculation bilaterally, no wheeze/rhonchi/rales    Gastrointestinal: + BS, soft, non tender, non distended     Extremities: Warm and well perfused. No edema, no calf tenderness. b/l groin soft    Vascular: 2+ Peripheral pulses b/l     LABS:                        12.4   14.85 )-----------( 255      ( 15 Nov 2023 06:08 )             38.8       COUMADIN:  no    PT/INR - ( 14 Nov 2023 22:47 )   PT: 12.7 sec;   INR: 1.22 ratio         PTT - ( 14 Nov 2023 22:47 )  PTT:31.2 sec    11-15    137  |  101  |  30<H>  ----------------------------<  128<H>  4.0   |  25  |  0.79    Ca    9.2      15 Nov 2023 06:08  Phos  4.3     11-14  Mg     2.20     11-14    TPro  6.4  /  Alb  4.0  /  TBili  1.4<H>  /  DBili  x   /  AST  10  /  ALT  12  /  AlkPhos  116  11-15      Urinalysis Basic - ( 15 Nov 2023 06:08 )    Color: x / Appearance: x / SG: x / pH: x  Gluc: 128 mg/dL / Ketone: x  / Bili: x / Urobili: x   Blood: x / Protein: x / Nitrite: x   Leuk Esterase: x / RBC: x / WBC x   Sq Epi: x / Non Sq Epi: x / Bacteria: x        MEDICATIONS  (STANDING):  acetaminophen     Tablet .. 650 milliGRAM(s) Oral every 6 hours  aspirin enteric coated 81 milliGRAM(s) Oral daily  atorvastatin 80 milliGRAM(s) Oral at bedtime  budesonide 160 MICROgram(s)/formoterol 4.5 MICROgram(s) Inhaler 2 Puff(s) Inhalation two times a day  cholecalciferol 1000 Unit(s) Oral daily  enoxaparin Injectable 90 milliGRAM(s) SubCutaneous every 12 hours  folic acid 1 milliGRAM(s) Oral daily  furosemide    Tablet 40 milliGRAM(s) Oral daily  gabapentin 300 milliGRAM(s) Oral three times a day  insulin glargine Injectable (LANTUS) 6 Unit(s) SubCutaneous at bedtime  insulin lispro (ADMELOG) corrective regimen sliding scale   SubCutaneous three times a day before meals  insulin lispro (ADMELOG) corrective regimen sliding scale   SubCutaneous at bedtime  insulin lispro Injectable (ADMELOG) 4 Unit(s) SubCutaneous three times a day before meals  metoprolol succinate ER 25 milliGRAM(s) Oral daily  OLANZapine 7.5 milliGRAM(s) Oral daily  pantoprazole    Tablet 40 milliGRAM(s) Oral before breakfast  predniSONE   Tablet 10 milliGRAM(s) Oral daily  sodium chloride 0.9% lock flush 3 milliLiter(s) IV Push every 8 hours  spironolactone 25 milliGRAM(s) Oral daily  traZODone 50 milliGRAM(s) Oral at bedtime    MEDICATIONS  (PRN):  aluminum hydroxide/magnesium hydroxide/simethicone Suspension 30 milliLiter(s) Oral every 4 hours PRN Dyspepsia  melatonin 3 milliGRAM(s) Oral at bedtime PRN Insomnia  OLANZapine Injectable 2.5 milliGRAM(s) IntraMuscular every 12 hours PRN agitation        RADIOLOGY & ADDITIONAL TESTS:     Patient discussed on morning rounds with Dr. Loaiza    Operation / Date:  Preop CABG/AVR/MVR Friday    SUBJECTIVE ASSESSMENT:  58y Female seen and examined. Feels well, offers no acute complaints. Denies fever, chest pain, SOB, abdominal pain, n/v.         Vital Signs Last 24 Hrs  T(C): 36.6 (15 Nov 2023 05:30), Max: 36.9 (14 Nov 2023 21:02)  T(F): 97.9 (15 Nov 2023 05:30), Max: 98.4 (14 Nov 2023 21:02)  HR: 73 (15 Nov 2023 05:30) (73 - 75)  BP: 106/72 (15 Nov 2023 05:30) (106/72 - 128/85)  BP(mean): 90 (14 Nov 2023 19:19) (90 - 90)  RR: 18 (15 Nov 2023 05:30) (18 - 18)  SpO2: 94% (15 Nov 2023 05:30) (94% - 99%)    Parameters below as of 15 Nov 2023 05:30  Patient On (Oxygen Delivery Method): room air      I&O's Detail    14 Nov 2023 07:01  -  15 Nov 2023 07:00  --------------------------------------------------------  IN:    Oral Fluid: 240 mL  Total IN: 240 mL    OUT:    Voided (mL): 800 mL  Total OUT: 800 mL    Total NET: -560 mL        PHYSICAL EXAM:    General: Patient lying comfortably in bed, no acute distress     Neurological: Alert and oriented. L arm/leg 4/5 strength, L eye ptosis     Cardiovascular: S1S2, RRR, no murmurs appreciated on exam     Respiratory: Clear to ausculation bilaterally, no wheeze/rhonchi/rales    Gastrointestinal: + BS, soft, non tender, non distended     Extremities: Warm and well perfused. No edema, no calf tenderness. b/l groin soft    Vascular: 2+ Peripheral pulses b/l     LABS:                        12.4   14.85 )-----------( 255      ( 15 Nov 2023 06:08 )             38.8       COUMADIN:  no    PT/INR - ( 14 Nov 2023 22:47 )   PT: 12.7 sec;   INR: 1.22 ratio         PTT - ( 14 Nov 2023 22:47 )  PTT:31.2 sec    11-15    137  |  101  |  30<H>  ----------------------------<  128<H>  4.0   |  25  |  0.79    Ca    9.2      15 Nov 2023 06:08  Phos  4.3     11-14  Mg     2.20     11-14    TPro  6.4  /  Alb  4.0  /  TBili  1.4<H>  /  DBili  x   /  AST  10  /  ALT  12  /  AlkPhos  116  11-15      Urinalysis Basic - ( 15 Nov 2023 06:08 )    Color: x / Appearance: x / SG: x / pH: x  Gluc: 128 mg/dL / Ketone: x  / Bili: x / Urobili: x   Blood: x / Protein: x / Nitrite: x   Leuk Esterase: x / RBC: x / WBC x   Sq Epi: x / Non Sq Epi: x / Bacteria: x        MEDICATIONS  (STANDING):  acetaminophen     Tablet .. 650 milliGRAM(s) Oral every 6 hours  aspirin enteric coated 81 milliGRAM(s) Oral daily  atorvastatin 80 milliGRAM(s) Oral at bedtime  budesonide 160 MICROgram(s)/formoterol 4.5 MICROgram(s) Inhaler 2 Puff(s) Inhalation two times a day  cholecalciferol 1000 Unit(s) Oral daily  enoxaparin Injectable 90 milliGRAM(s) SubCutaneous every 12 hours  folic acid 1 milliGRAM(s) Oral daily  furosemide    Tablet 40 milliGRAM(s) Oral daily  gabapentin 300 milliGRAM(s) Oral three times a day  insulin glargine Injectable (LANTUS) 6 Unit(s) SubCutaneous at bedtime  insulin lispro (ADMELOG) corrective regimen sliding scale   SubCutaneous three times a day before meals  insulin lispro (ADMELOG) corrective regimen sliding scale   SubCutaneous at bedtime  insulin lispro Injectable (ADMELOG) 4 Unit(s) SubCutaneous three times a day before meals  metoprolol succinate ER 25 milliGRAM(s) Oral daily  OLANZapine 7.5 milliGRAM(s) Oral daily  pantoprazole    Tablet 40 milliGRAM(s) Oral before breakfast  predniSONE   Tablet 10 milliGRAM(s) Oral daily  sodium chloride 0.9% lock flush 3 milliLiter(s) IV Push every 8 hours  spironolactone 25 milliGRAM(s) Oral daily  traZODone 50 milliGRAM(s) Oral at bedtime    MEDICATIONS  (PRN):  aluminum hydroxide/magnesium hydroxide/simethicone Suspension 30 milliLiter(s) Oral every 4 hours PRN Dyspepsia  melatonin 3 milliGRAM(s) Oral at bedtime PRN Insomnia  OLANZapine Injectable 2.5 milliGRAM(s) IntraMuscular every 12 hours PRN agitation        RADIOLOGY & ADDITIONAL TESTS:

## 2023-11-15 NOTE — PATIENT PROFILE ADULT - FALL HARM RISK - HARM RISK INTERVENTIONS

## 2023-11-15 NOTE — CONSULT NOTE ADULT - SUBJECTIVE AND OBJECTIVE BOX
German Carrasco MD  Interventional Cardiology / Advance Heart Failure and Cardiac Transplant Specialist  [place] Office : [phone] [place] [address] [place] [address]  Tel:   [place] Office : [phone] [facility] [place] [address]  Tel: [phone]         HISTORY OF PRESENTING ILLNESS:  HPI:  58F smoker with PMH CVA with L sided weakness/numbness/L gaze deviation, DM2 with b/l peripheral neuropathy, COPD/Asthma, Breast Ca s/p ?RT, Bipolar depression, Rheumatoid Arthritis, Antiphospholipid syndrome, and L eye uveitis/scleritis, Presents to [facility] transferred from [facility]. p/w multiple medical complaints. A/w exacerbation of L-sided subjective weakness/numbness likely 2/2 recrudescence of prior Right BG infarct. Cardiology following for new LV dysfunction since July, pending ischemic eval. LESLEY was done showing severe Aortic Insufficiency and moderated MR. Cardiac cath was done showing LAD prox 100% fills via right to left collateral. Patient now transferred to [facility] Dr. Loaiza for evaluation.     PAST MEDICAL & SURGICAL HISTORY:  Cigarette smoker      Rheumatoid arthritis      Other depression      Breast cancer      Migraines      History of multiple cerebrovascular accidents (CVAs)      Suicidal ideation      Paresthesia of left arm      Facial paresthesia      APS (antiphospholipid syndrome)      History of hysterectomy      History of cholecystectomy          SOCIAL HISTORY: Substance Use (street drugs): ( x ) never used  (  ) other:    FAMILY HISTORY:  Family history of breast cancer (Mother)    Family history of diabetes mellitus (DM) (Father)        REVIEW OF SYSTEMS:  CONSTITUTIONAL: No fever, weight loss, or fatigue  EYES: No eye pain, visual disturbances, or discharge  ENMT:  No difficulty hearing, tinnitus, vertigo; No sinus or throat pain  BREASTS: No pain, masses, or nipple discharge  GASTROINTESTINAL: No abdominal or epigastric pain. No nausea, vomiting, or hematemesis; No diarrhea or constipation. No melena or hematochezia.  GENITOURINARY: No dysuria, frequency, hematuria, or incontinence  NEUROLOGICAL: No headaches, memory loss, loss of strength, numbness, or tremors  ENDOCRINE: No heat or cold intolerance; No hair loss  MUSCULOSKELETAL: No joint pain or swelling; No muscle, back, or extremity pain  PSYCHIATRIC: No depression, anxiety, mood swings, or difficulty sleeping  HEME/LYMPH: No easy bruising, or bleeding gums  All others negative    MEDICATIONS:  aspirin enteric coated 81 milliGRAM(s) Oral daily  enoxaparin Injectable 90 milliGRAM(s) SubCutaneous every 12 hours  furosemide    Tablet 40 milliGRAM(s) Oral daily  metoprolol succinate ER 25 milliGRAM(s) Oral daily  spironolactone 25 milliGRAM(s) Oral daily      budesonide 160 MICROgram(s)/formoterol 4.5 MICROgram(s) Inhaler 2 Puff(s) Inhalation two times a day    acetaminophen     Tablet .. 650 milliGRAM(s) Oral every 6 hours  gabapentin 300 milliGRAM(s) Oral three times a day  melatonin 3 milliGRAM(s) Oral at bedtime PRN  OLANZapine 7.5 milliGRAM(s) Oral daily  OLANZapine Injectable 2.5 milliGRAM(s) IntraMuscular every 12 hours PRN  traZODone 50 milliGRAM(s) Oral at bedtime    aluminum hydroxide/magnesium hydroxide/simethicone Suspension 30 milliLiter(s) Oral every 4 hours PRN  pantoprazole    Tablet 40 milliGRAM(s) Oral before breakfast    atorvastatin 80 milliGRAM(s) Oral at bedtime  insulin glargine Injectable (LANTUS) 6 Unit(s) SubCutaneous at bedtime  insulin lispro (ADMELOG) corrective regimen sliding scale   SubCutaneous three times a day before meals  insulin lispro (ADMELOG) corrective regimen sliding scale   SubCutaneous at bedtime  insulin lispro Injectable (ADMELOG) 4 Unit(s) SubCutaneous three times a day before meals  predniSONE   Tablet 10 milliGRAM(s) Oral daily    cholecalciferol 1000 Unit(s) Oral daily  folic acid 1 milliGRAM(s) Oral daily  sodium chloride 0.9% lock flush 3 milliLiter(s) IV Push every 8 hours      FAMILY HISTORY:  Family history of breast cancer (Mother)    Family history of diabetes mellitus (DM) (Father)          Allergies    No Known Allergies    Intolerances    	      PHYSICAL EXAM:  T(C): 36.9 (11-15-23 @ 13:50), Max: 36.9 (11-14-23 @ 21:02)  HR: 76 (11-15-23 @ 13:50) (73 - 76)  BP: 98/59 (11-15-23 @ 13:50) (98/59 - 128/85)  RR: 18 (11-15-23 @ 13:50) (18 - 18)  SpO2: 96% (11-15-23 @ 13:50) (94% - 97%)  Wt(kg): --  I&O's Summary    14 Nov 2023 07:01  -  15 Nov 2023 07:00  --------------------------------------------------------  IN: 240 mL / OUT: 800 mL / NET: -560 mL    15 Nov 2023 07:01  -  15 Nov 2023 16:46  --------------------------------------------------------  IN: 360 mL / OUT: 350 mL / NET: 10 mL        GENERAL: NAD   EYES: EOMI, PERRLA, conjunctiva and sclera clear  ENMT: No tonsillar erythema, exudates, or enlargement; Moist mucous membranes, Good dentition, No lesions  Cardiovascular: Normal S1 S2, No JVD, No murmurs, No edema  Respiratory: Lungs clear to auscultation	  Gastrointestinal:  Soft, Non-tender, + BS	  Extremities: Normal range of motion, No clubbing, cyanosis or edema  LYMPH: No lymphadenopathy noted  NERVOUS SYSTEM:  Alert & Oriented X3, Good concentration; Motor Strength 5/5 B/L upper and lower extremities; DTRs 2+ intact and symmetric      LABS:	 	    CARDIAC MARKERS:                                  12.4   14.85 )-----------( 255      ( 15 Nov 2023 06:08 )             38.8     11-15    137  |  101  |  30<H>  ----------------------------<  128<H>  4.0   |  25  |  0.79    Ca    9.2      15 Nov 2023 06:08  Phos  4.3     11-14  Mg     2.20     11-14    TPro  6.4  /  Alb  4.0  /  TBili  1.4<H>  /  DBili  x   /  AST  10  /  ALT  12  /  AlkPhos  116  11-15    proBNP:   Lipid Profile:   HgA1c:   TSH: Thyroid Stimulating Hormone, Serum: 1.28 uIU/mL (11-14 @ 22:46)      Consultant(s) Notes Reviewed:  [x ] YES  [ ] NO    Care Discussed with Consultants/Other Providers [ x] YES  [ ] NO    Imaging Personally Reviewed independently:  [x] YES  [ ] NO    All labs, radiologic studies, vitals, orders and medications list reviewed. Patient is seen and examined at bedside. Case discussed with medical team.    ASSESSMENT/PLAN:

## 2023-11-15 NOTE — CONSULT NOTE ADULT - ASSESSMENT
58F smoker with stroke x 2 (2022 and july 2023 with resid  L sided weakness/numbness  DM2 with b/l peripheral neuropathy, COPD/Asthma, Breast Ca s/p, Bipolar depression, Rheumatoid Arthritis, Antiphospholipid syndrome, and L eye uveitis/scleritis, Presents to [facility] transferred from [facility]. p/w multiple medical complaints and exacerbation of L-sided subjective weakness/numbness likely 2/2 recrudescence of prior Right BG infarct. Cardiology following for new LV dysfunction since July.Patient now transferred to CTS Dr. Loaiza for evaluation. neuro called for prior strokes   LESLEY was done showing severe Aortic Insufficiency and moderated MR.   Cardiac cath was done showing LAD prox 100% fills via right to left collateral.   CTH with tinght chronic appearing R BG infarct   TTE EF 30%   CD neg   A1c 7.6   LDL 37   ILR interrogated no events  NIHSS 3  premrs3   o/e with mild L facial and decrease sensation on L with mild 5-/5 weakness on L with slow FFM     Imprsesion:   prior nowe chronic appearing infarcts. R BG with residual L HP    - for secondary stroke prevention on asa 81mg and high dose statin.  also on full dose lovenox given APLS  - gabapentin 300mg TID for neuropathy   - f/u rheum and heme/onc   - low risk for cardiac surgery from neuro/stroke standpoint   - plan for OR on Friday    - telemetry  - PT/OT   - check FS, glucose control <180  - GI/DVT ppx  - Counseling on diet, exercise, and medication adherence was done  - Counseling on smoking cessation and alcohol consumption offered when appropriate.  - Pain assessed and judicious use of narcotics when appropriate was discussed.    - Stroke education given when appropriate.  - Importance of fall prevention discussed.   - Differential diagnosis and plan of care discussed with patient and/or family and primary team  - Thank you for allowing me to participate in the care of this patient. Call with questions.   - spoke [facility] CTS team/ACP   Charles Crespo MD  Vascular Neurology  Office: [phone]  58F smoker with stroke x 2 (2022 and july 2023 with resid  L sided weakness/numbness  DM2 with b/l peripheral neuropathy, COPD/Asthma, Breast Ca s/p, Bipolar depression, Rheumatoid Arthritis, Antiphospholipid syndrome, and L eye uveitis/scleritis, Presents to [facility] transferred from [facility]. p/w multiple medical complaints and exacerbation of L-sided subjective weakness/numbness likely 2/2 recrudescence of prior Right BG infarct. Cardiology following for new LV dysfunction since July.Patient now transferred to CTS Dr. Loaiza for evaluation. neuro called for prior strokes   LESLEY was done showing severe Aortic Insufficiency and moderated MR.   Cardiac cath was done showing LAD prox 100% fills via right to left collateral.   CTH with tinght chronic appearing R BG infarct   TTE EF 30%   CD neg   A1c 7.6   LDL 37   ILR interrogated no events  NIHSS 3  premrs3   o/e with mild L facial and decrease sensation on L with mild 5-/5 weakness on L with slow FFM     Imprsesion:   prior nowe chronic appearing infarcts. R BG with residual L HP  DM peripheral neuropathy     - for secondary stroke prevention on asa 81mg and high dose statin.  also on full dose lovenox given APLS  - gabapentin 300mg TID for neuropathy   - f/u rheum and heme/onc   - low risk for cardiac surgery from neuro/stroke standpoint   - plan for OR on Friday    - telemetry  - PT/OT   - check FS, glucose control <180  - GI/DVT ppx  - Counseling on diet, exercise, and medication adherence was done  - Counseling on smoking cessation and alcohol consumption offered when appropriate.  - Pain assessed and judicious use of narcotics when appropriate was discussed.    - Stroke education given when appropriate.  - Importance of fall prevention discussed.   - Differential diagnosis and plan of care discussed with patient and/or family and primary team  - Thank you for allowing me to participate in the care of this patient. Call with questions.   - spoke [facility] CTS team/ACP   Charles Crespo MD  Vascular Neurology  Office: [phone]

## 2023-11-15 NOTE — CONSULT NOTE ADULT - ASSESSMENT
58F with history of CVA with L sided weakness/numbness/L gaze deviation, DM2 with b/l peripheral neuropathy, COPD/Asthma, Breast Ca s/p ?RT, Bipolar depression, Rheumatoid Arthritis, Antiphospholipid syndrome, L eye uveitis/scleritis, Newly diagnosed DM2 (pt cannot remember which medication she takes for it), and current tobacco use who presents to the hospital with multiple complaints.     1. LV dysfunction  -new mod-severe segmental LV dysfunction in July has not had ischemic workup due to stroke at that time. if no stroke this admission will do LHC  -c/w metoprolol and aldactone  -TTE EF 30% now with mod-severe AR  - LESLEY not tolerated well grossly sever AI   - discussed with daughter and sister  - c/w lasix change to 40mg PO Daily   - cath shows LAD prox 100% fills via right to left collateral will need AVR MVR and LIMA to LAD bypass    2. CVA  -history of multiple CVAs and is on eliquis   -CT scan of head negative for any acute findings  -ILR interrogation with no events  -per neuro no plans for MRI       3. Antiphospholipid syndrome  -on lovenox

## 2023-11-15 NOTE — PROGRESS NOTE ADULT - ASSESSMENT
58F smoker with PMH CVA with L sided weakness/numbness/L gaze deviation, DM2 with b/l peripheral neuropathy, COPD/Asthma, Breast Ca s/p ?RT, Bipolar depression, Rheumatoid Arthritis, Antiphospholipid syndrome, and L eye uveitis/scleritis, Presents to [facility] transferred from [facility]. p/w multiple medical complaints. A/w exacerbation of L-sided subjective weakness/numbness likely 2/2 recrudescence of prior Right BG infarct. Cardiology following for new LV dysfunction since July, pending ischemic eval. LESLEY was done showing severe Aortic Insufficiency and moderated MR. Cardiac cath was done showing LAD prox 100% fills via right to left collateral. Patient now transferred to CTS Dr. Loaiza for evaluation.    11/14 patient seen and examined at bedside. All labs and imaging to be reviewed by Dr. Loaiza   11/15:VSS, neuro consult for hx cva L sided weakness, Preop for Friday

## 2023-11-16 DIAGNOSIS — I25.10 ATHEROSCLEROTIC HEART DISEASE OF NATIVE CORONARY ARTERY WITHOUT ANGINA PECTORIS: ICD-10-CM

## 2023-11-16 LAB
ALBUMIN SERPL ELPH-MCNC: 3.8 G/DL — SIGNIFICANT CHANGE UP (ref 3.3–5)
ALBUMIN SERPL ELPH-MCNC: 3.8 G/DL — SIGNIFICANT CHANGE UP (ref 3.3–5)
ALP SERPL-CCNC: 135 U/L — HIGH (ref 40–120)
ALP SERPL-CCNC: 135 U/L — HIGH (ref 40–120)
ALT FLD-CCNC: 14 U/L — SIGNIFICANT CHANGE UP (ref 10–45)
ALT FLD-CCNC: 14 U/L — SIGNIFICANT CHANGE UP (ref 10–45)
ANION GAP SERPL CALC-SCNC: 10 MMOL/L — SIGNIFICANT CHANGE UP (ref 5–17)
ANION GAP SERPL CALC-SCNC: 10 MMOL/L — SIGNIFICANT CHANGE UP (ref 5–17)
AST SERPL-CCNC: 11 U/L — SIGNIFICANT CHANGE UP (ref 10–40)
AST SERPL-CCNC: 11 U/L — SIGNIFICANT CHANGE UP (ref 10–40)
BASOPHILS # BLD AUTO: 0.09 K/UL — SIGNIFICANT CHANGE UP (ref 0–0.2)
BASOPHILS # BLD AUTO: 0.09 K/UL — SIGNIFICANT CHANGE UP (ref 0–0.2)
BASOPHILS NFR BLD AUTO: 0.7 % — SIGNIFICANT CHANGE UP (ref 0–2)
BASOPHILS NFR BLD AUTO: 0.7 % — SIGNIFICANT CHANGE UP (ref 0–2)
BILIRUB SERPL-MCNC: 0.7 MG/DL — SIGNIFICANT CHANGE UP (ref 0.2–1.2)
BILIRUB SERPL-MCNC: 0.7 MG/DL — SIGNIFICANT CHANGE UP (ref 0.2–1.2)
BUN SERPL-MCNC: 27 MG/DL — HIGH (ref 7–23)
BUN SERPL-MCNC: 27 MG/DL — HIGH (ref 7–23)
CALCIUM SERPL-MCNC: 9.1 MG/DL — SIGNIFICANT CHANGE UP (ref 8.4–10.5)
CALCIUM SERPL-MCNC: 9.1 MG/DL — SIGNIFICANT CHANGE UP (ref 8.4–10.5)
CHLORIDE SERPL-SCNC: 103 MMOL/L — SIGNIFICANT CHANGE UP (ref 96–108)
CHLORIDE SERPL-SCNC: 103 MMOL/L — SIGNIFICANT CHANGE UP (ref 96–108)
CO2 SERPL-SCNC: 25 MMOL/L — SIGNIFICANT CHANGE UP (ref 22–31)
CO2 SERPL-SCNC: 25 MMOL/L — SIGNIFICANT CHANGE UP (ref 22–31)
CREAT SERPL-MCNC: 0.68 MG/DL — SIGNIFICANT CHANGE UP (ref 0.5–1.3)
CREAT SERPL-MCNC: 0.68 MG/DL — SIGNIFICANT CHANGE UP (ref 0.5–1.3)
EGFR: 100 ML/MIN/1.73M2 — SIGNIFICANT CHANGE UP
EGFR: 100 ML/MIN/1.73M2 — SIGNIFICANT CHANGE UP
EOSINOPHIL # BLD AUTO: 0.28 K/UL — SIGNIFICANT CHANGE UP (ref 0–0.5)
EOSINOPHIL # BLD AUTO: 0.28 K/UL — SIGNIFICANT CHANGE UP (ref 0–0.5)
EOSINOPHIL NFR BLD AUTO: 2.2 % — SIGNIFICANT CHANGE UP (ref 0–6)
EOSINOPHIL NFR BLD AUTO: 2.2 % — SIGNIFICANT CHANGE UP (ref 0–6)
GLUCOSE BLDC GLUCOMTR-MCNC: 115 MG/DL — HIGH (ref 70–99)
GLUCOSE BLDC GLUCOMTR-MCNC: 115 MG/DL — HIGH (ref 70–99)
GLUCOSE BLDC GLUCOMTR-MCNC: 178 MG/DL — HIGH (ref 70–99)
GLUCOSE BLDC GLUCOMTR-MCNC: 178 MG/DL — HIGH (ref 70–99)
GLUCOSE BLDC GLUCOMTR-MCNC: 192 MG/DL — HIGH (ref 70–99)
GLUCOSE BLDC GLUCOMTR-MCNC: 192 MG/DL — HIGH (ref 70–99)
GLUCOSE BLDC GLUCOMTR-MCNC: 196 MG/DL — HIGH (ref 70–99)
GLUCOSE BLDC GLUCOMTR-MCNC: 196 MG/DL — HIGH (ref 70–99)
GLUCOSE BLDC GLUCOMTR-MCNC: 249 MG/DL — HIGH (ref 70–99)
GLUCOSE BLDC GLUCOMTR-MCNC: 249 MG/DL — HIGH (ref 70–99)
GLUCOSE SERPL-MCNC: 207 MG/DL — HIGH (ref 70–99)
GLUCOSE SERPL-MCNC: 207 MG/DL — HIGH (ref 70–99)
HCT VFR BLD CALC: 39.1 % — SIGNIFICANT CHANGE UP (ref 34.5–45)
HCT VFR BLD CALC: 39.1 % — SIGNIFICANT CHANGE UP (ref 34.5–45)
HGB BLD-MCNC: 12.1 G/DL — SIGNIFICANT CHANGE UP (ref 11.5–15.5)
HGB BLD-MCNC: 12.1 G/DL — SIGNIFICANT CHANGE UP (ref 11.5–15.5)
IMM GRANULOCYTES NFR BLD AUTO: 0.5 % — SIGNIFICANT CHANGE UP (ref 0–0.9)
IMM GRANULOCYTES NFR BLD AUTO: 0.5 % — SIGNIFICANT CHANGE UP (ref 0–0.9)
LYMPHOCYTES # BLD AUTO: 2.95 K/UL — SIGNIFICANT CHANGE UP (ref 1–3.3)
LYMPHOCYTES # BLD AUTO: 2.95 K/UL — SIGNIFICANT CHANGE UP (ref 1–3.3)
LYMPHOCYTES # BLD AUTO: 23 % — SIGNIFICANT CHANGE UP (ref 13–44)
LYMPHOCYTES # BLD AUTO: 23 % — SIGNIFICANT CHANGE UP (ref 13–44)
MCHC RBC-ENTMCNC: 28.9 PG — SIGNIFICANT CHANGE UP (ref 27–34)
MCHC RBC-ENTMCNC: 28.9 PG — SIGNIFICANT CHANGE UP (ref 27–34)
MCHC RBC-ENTMCNC: 30.9 GM/DL — LOW (ref 32–36)
MCHC RBC-ENTMCNC: 30.9 GM/DL — LOW (ref 32–36)
MCV RBC AUTO: 93.3 FL — SIGNIFICANT CHANGE UP (ref 80–100)
MCV RBC AUTO: 93.3 FL — SIGNIFICANT CHANGE UP (ref 80–100)
MONOCYTES # BLD AUTO: 0.84 K/UL — SIGNIFICANT CHANGE UP (ref 0–0.9)
MONOCYTES # BLD AUTO: 0.84 K/UL — SIGNIFICANT CHANGE UP (ref 0–0.9)
MONOCYTES NFR BLD AUTO: 6.6 % — SIGNIFICANT CHANGE UP (ref 2–14)
MONOCYTES NFR BLD AUTO: 6.6 % — SIGNIFICANT CHANGE UP (ref 2–14)
NEUTROPHILS # BLD AUTO: 8.57 K/UL — HIGH (ref 1.8–7.4)
NEUTROPHILS # BLD AUTO: 8.57 K/UL — HIGH (ref 1.8–7.4)
NEUTROPHILS NFR BLD AUTO: 67 % — SIGNIFICANT CHANGE UP (ref 43–77)
NEUTROPHILS NFR BLD AUTO: 67 % — SIGNIFICANT CHANGE UP (ref 43–77)
NRBC # BLD: 0 /100 WBCS — SIGNIFICANT CHANGE UP (ref 0–0)
NRBC # BLD: 0 /100 WBCS — SIGNIFICANT CHANGE UP (ref 0–0)
PLATELET # BLD AUTO: 241 K/UL — SIGNIFICANT CHANGE UP (ref 150–400)
PLATELET # BLD AUTO: 241 K/UL — SIGNIFICANT CHANGE UP (ref 150–400)
POTASSIUM SERPL-MCNC: 3.3 MMOL/L — LOW (ref 3.5–5.3)
POTASSIUM SERPL-MCNC: 3.3 MMOL/L — LOW (ref 3.5–5.3)
POTASSIUM SERPL-SCNC: 3.3 MMOL/L — LOW (ref 3.5–5.3)
POTASSIUM SERPL-SCNC: 3.3 MMOL/L — LOW (ref 3.5–5.3)
PROT SERPL-MCNC: 6.4 G/DL — SIGNIFICANT CHANGE UP (ref 6–8.3)
PROT SERPL-MCNC: 6.4 G/DL — SIGNIFICANT CHANGE UP (ref 6–8.3)
RBC # BLD: 4.19 M/UL — SIGNIFICANT CHANGE UP (ref 3.8–5.2)
RBC # BLD: 4.19 M/UL — SIGNIFICANT CHANGE UP (ref 3.8–5.2)
RBC # FLD: 15.8 % — HIGH (ref 10.3–14.5)
RBC # FLD: 15.8 % — HIGH (ref 10.3–14.5)
SODIUM SERPL-SCNC: 138 MMOL/L — SIGNIFICANT CHANGE UP (ref 135–145)
SODIUM SERPL-SCNC: 138 MMOL/L — SIGNIFICANT CHANGE UP (ref 135–145)
WBC # BLD: 12.8 K/UL — HIGH (ref 3.8–10.5)
WBC # BLD: 12.8 K/UL — HIGH (ref 3.8–10.5)
WBC # FLD AUTO: 12.8 K/UL — HIGH (ref 3.8–10.5)
WBC # FLD AUTO: 12.8 K/UL — HIGH (ref 3.8–10.5)

## 2023-11-16 PROCEDURE — 93306 TTE W/DOPPLER COMPLETE: CPT | Mod: 26

## 2023-11-16 RX ORDER — ACETAMINOPHEN 500 MG
1000 TABLET ORAL ONCE
Refills: 0 | Status: COMPLETED | OUTPATIENT
Start: 2023-11-16 | End: 2023-11-16

## 2023-11-16 RX ORDER — INSULIN GLARGINE 100 [IU]/ML
16 INJECTION, SOLUTION SUBCUTANEOUS AT BEDTIME
Refills: 0 | Status: DISCONTINUED | OUTPATIENT
Start: 2023-11-16 | End: 2023-11-21

## 2023-11-16 RX ORDER — ASCORBIC ACID 60 MG
2000 TABLET,CHEWABLE ORAL ONCE
Refills: 0 | Status: DISCONTINUED | OUTPATIENT
Start: 2023-11-16 | End: 2023-11-20

## 2023-11-16 RX ORDER — CEFUROXIME AXETIL 250 MG
1500 TABLET ORAL ONCE
Refills: 0 | Status: DISCONTINUED | OUTPATIENT
Start: 2023-11-16 | End: 2023-11-20

## 2023-11-16 RX ORDER — CHLORHEXIDINE GLUCONATE 213 G/1000ML
30 SOLUTION TOPICAL ONCE
Refills: 0 | Status: DISCONTINUED | OUTPATIENT
Start: 2023-11-16 | End: 2023-11-20

## 2023-11-16 RX ORDER — CHLORHEXIDINE GLUCONATE 213 G/1000ML
1 SOLUTION TOPICAL ONCE
Refills: 0 | Status: COMPLETED | OUTPATIENT
Start: 2023-11-16 | End: 2023-11-16

## 2023-11-16 RX ORDER — GABAPENTIN 400 MG/1
300 CAPSULE ORAL ONCE
Refills: 0 | Status: DISCONTINUED | OUTPATIENT
Start: 2023-11-16 | End: 2023-11-20

## 2023-11-16 RX ORDER — POTASSIUM CHLORIDE 20 MEQ
20 PACKET (EA) ORAL ONCE
Refills: 0 | Status: COMPLETED | OUTPATIENT
Start: 2023-11-16 | End: 2023-11-16

## 2023-11-16 RX ORDER — INSULIN LISPRO 100/ML
7 VIAL (ML) SUBCUTANEOUS
Refills: 0 | Status: DISCONTINUED | OUTPATIENT
Start: 2023-11-16 | End: 2023-11-17

## 2023-11-16 RX ORDER — ACETAMINOPHEN 500 MG
1000 TABLET ORAL ONCE
Refills: 0 | Status: DISCONTINUED | OUTPATIENT
Start: 2023-11-16 | End: 2023-11-16

## 2023-11-16 RX ADMIN — Medication 25 MILLIGRAM(S): at 13:50

## 2023-11-16 RX ADMIN — Medication 1000 UNIT(S): at 13:51

## 2023-11-16 RX ADMIN — Medication 4 UNIT(S): at 08:20

## 2023-11-16 RX ADMIN — OLANZAPINE 7.5 MILLIGRAM(S): 15 TABLET, FILM COATED ORAL at 13:50

## 2023-11-16 RX ADMIN — Medication 1000 MILLIGRAM(S): at 22:12

## 2023-11-16 RX ADMIN — ENOXAPARIN SODIUM 90 MILLIGRAM(S): 100 INJECTION SUBCUTANEOUS at 18:00

## 2023-11-16 RX ADMIN — Medication 40 MILLIGRAM(S): at 17:03

## 2023-11-16 RX ADMIN — PANTOPRAZOLE SODIUM 40 MILLIGRAM(S): 20 TABLET, DELAYED RELEASE ORAL at 06:03

## 2023-11-16 RX ADMIN — Medication 50 MILLIGRAM(S): at 21:43

## 2023-11-16 RX ADMIN — SODIUM CHLORIDE 3 MILLILITER(S): 9 INJECTION INTRAMUSCULAR; INTRAVENOUS; SUBCUTANEOUS at 14:07

## 2023-11-16 RX ADMIN — Medication 81 MILLIGRAM(S): at 13:50

## 2023-11-16 RX ADMIN — Medication 7 UNIT(S): at 17:04

## 2023-11-16 RX ADMIN — Medication 1 MILLIGRAM(S): at 13:51

## 2023-11-16 RX ADMIN — Medication 1: at 08:19

## 2023-11-16 RX ADMIN — BUDESONIDE AND FORMOTEROL FUMARATE DIHYDRATE 2 PUFF(S): 160; 4.5 AEROSOL RESPIRATORY (INHALATION) at 06:04

## 2023-11-16 RX ADMIN — CHLORHEXIDINE GLUCONATE 1 APPLICATION(S): 213 SOLUTION TOPICAL at 20:44

## 2023-11-16 RX ADMIN — INSULIN GLARGINE 16 UNIT(S): 100 INJECTION, SOLUTION SUBCUTANEOUS at 21:42

## 2023-11-16 RX ADMIN — SPIRONOLACTONE 25 MILLIGRAM(S): 25 TABLET, FILM COATED ORAL at 17:03

## 2023-11-16 RX ADMIN — GABAPENTIN 300 MILLIGRAM(S): 400 CAPSULE ORAL at 21:43

## 2023-11-16 RX ADMIN — Medication 10 MILLIGRAM(S): at 06:03

## 2023-11-16 RX ADMIN — BUDESONIDE AND FORMOTEROL FUMARATE DIHYDRATE 2 PUFF(S): 160; 4.5 AEROSOL RESPIRATORY (INHALATION) at 18:01

## 2023-11-16 RX ADMIN — ENOXAPARIN SODIUM 90 MILLIGRAM(S): 100 INJECTION SUBCUTANEOUS at 06:03

## 2023-11-16 RX ADMIN — Medication 20 MILLIEQUIVALENT(S): at 08:58

## 2023-11-16 RX ADMIN — SODIUM CHLORIDE 3 MILLILITER(S): 9 INJECTION INTRAMUSCULAR; INTRAVENOUS; SUBCUTANEOUS at 06:08

## 2023-11-16 RX ADMIN — SODIUM CHLORIDE 3 MILLILITER(S): 9 INJECTION INTRAMUSCULAR; INTRAVENOUS; SUBCUTANEOUS at 21:31

## 2023-11-16 RX ADMIN — ATORVASTATIN CALCIUM 80 MILLIGRAM(S): 80 TABLET, FILM COATED ORAL at 21:43

## 2023-11-16 RX ADMIN — Medication 1: at 17:03

## 2023-11-16 RX ADMIN — GABAPENTIN 300 MILLIGRAM(S): 400 CAPSULE ORAL at 08:58

## 2023-11-16 RX ADMIN — Medication 400 MILLIGRAM(S): at 21:42

## 2023-11-16 NOTE — PRE PROCEDURE NOTE - PROCEDURE SERVICE
CTS Erythromycin Pregnancy And Lactation Text: This medication is Pregnancy Category B and is considered safe during pregnancy. It is also excreted in breast milk.

## 2023-11-16 NOTE — PROGRESS NOTE ADULT - ASSESSMENT
58F with history of CVA with L sided weakness/numbness/L gaze deviation, DM2 with b/l peripheral neuropathy, COPD/Asthma, Breast Ca s/p ?RT, Bipolar depression, Rheumatoid Arthritis, Antiphospholipid syndrome, L eye uveitis/scleritis, Newly diagnosed DM2 (pt cannot remember which medication she takes for it), and current tobacco use who presents to the hospital with multiple complaints.     1. LV dysfunction  -new mod-severe segmental LV dysfunction in July has not had ischemic workup due to stroke at that time. if no stroke this admission will do LHC  -c/w metoprolol and aldactone  -TTE EF 30% now with mod-severe AR  - LESLEY not tolerated well grossly sever AI   - discussed with daughter and sister  - c/w lasix change to 40mg PO Daily   - cath shows LAD prox 100% fills via right to left collateral    - repeat echo shows AR is mild to mod, MR is mild , will review echos, surgery cancelled, will get viability study LAD territory     2. CVA  -history of multiple CVAs and is on eliquis   -CT scan of head negative for any acute findings  -ILR interrogation with no events  -per neuro no plans for MRI       3. Antiphospholipid syndrome  -on lovenox

## 2023-11-16 NOTE — CONSULT NOTE ADULT - PROBLEM SELECTOR RECOMMENDATION 9
Will need tight glycemic control for postop wound healing.  IV insulin immediate postop   Will increase Lantus to 16 units at bed time.  Will increase Admelog to 7 units before each meal in addition to Admelog correction scale coverage.  Will continue monitoring FS, log, and glucose trends, will Follow up.  Patient counseled for compliance with consistent low carb diet and exercise as tolerated outpatient.

## 2023-11-16 NOTE — CONSULT NOTE ADULT - ASSESSMENT
58F smoker with PMH CVA with L sided weakness/numbness/L gaze deviation, DM2 with b/l peripheral neuropathy, COPD/Asthma, Breast Ca s/p ?RT, Bipolar depression, Rheumatoid Arthritis, Antiphospholipid syndrome, and L eye uveitis/scleritis, Presents to [facility] transferred from [facility]. CTS eval       Assessment  DMT2: 58y Female with DM T2 with hyperglycemia, A1C 7.6% , was on oral meds at home, now on basal bolus insulin with coverage, blood sugars running high. Preop CTS  Will need tight glycemic control for postop wound healing.   Severe AR: CTS eval, LESLEY, preop replacement.   CAD: on medications, stable, monitored.  HTN: on antihypertensive medications, monitored, asymptomatic.  Obesity: No strict exercise routines, not on any weight loss program, neither on low calorie diet.        Discussed plan and management wit Dr Ladarius Durand MD  Cell: [phone]  Office: [phone]             58F smoker with PMH CVA with L sided weakness/numbness/L gaze deviation, DM2 with b/l peripheral neuropathy, COPD/Asthma, Breast Ca s/p ?RT, Bipolar depression, Rheumatoid Arthritis, Antiphospholipid syndrome, and L eye uveitis/scleritis, Presents to [facility] transferred from [facility]. CTS eval       Assessment  DMT2: 58y Female with DM T2 with hyperglycemia, A1C 7.6% , was on oral meds at home, now on basal bolus insulin with coverage, blood sugars running high. Preop CTS  Non compliant with low carb diet, snacking in between.   Will need tight glycemic control for postop wound healing.   Severe AR: CTS eval, LESLEY, preop replacement.   CAD: on medications, stable, monitored.  HTN: on antihypertensive medications, monitored, asymptomatic.  Obesity: No strict exercise routines, not on any weight loss program, neither on low calorie diet.        Discussed plan and management wit Dr Ladarius Durand MD  Cell: [phone]  Office: [phone]

## 2023-11-16 NOTE — CONSULT NOTE ADULT - SUBJECTIVE AND OBJECTIVE BOX
HPI:  58F smoker with PMH CVA with L sided weakness/numbness/L gaze deviation, DM2 with b/l peripheral neuropathy, COPD/Asthma, Breast Ca s/p ?RT, Bipolar depression, Rheumatoid Arthritis, Antiphospholipid syndrome, and L eye uveitis/scleritis, Presents to [facility] transferred from [facility]. p/w multiple medical complaints. A/w exacerbation of L-sided subjective weakness/numbness likely 2/2 recrudescence of prior Right BG infarct. Cardiology following for new LV dysfunction since July, pending ischemic eval. LESLEY was done showing severe Aortic Insufficiency and moderated MR. Cardiac cath was done showing LAD prox 100% fills via right to left collateral. Patient now transferred to [facility] Dr. Loaiza for evaluation.    (14 Nov 2023 22:28)      Patient has history of diabetes, A1C 7.6 % was recently diagnosed outpatient and was started on Metformin.   Endo was consulted for glycemic control.      PAST MEDICAL & SURGICAL HISTORY:  Cigarette smoker      Rheumatoid arthritis      Other depression      Breast cancer      Migraines      History of multiple cerebrovascular accidents (CVAs)      Suicidal ideation      Paresthesia of left arm      Facial paresthesia      APS (antiphospholipid syndrome)      History of hysterectomy      History of cholecystectomy          FAMILY HISTORY:  Family history of breast cancer (Mother)    Family history of diabetes mellitus (DM) (Father)        Social History:  Chemo/Rad to chest: Yes Radiation 2/2 Breast CA 4 years ago  Implantable Devices: No  ADLs: uses a cane  Residence: Lives with Aunt, Unemployed (14 Nov 2023 22:28)            HOME MEDICATIONS:  Home Medications:  acetaminophen 325 mg oral tablet: 2 tab(s) orally every 6 hours As needed Temp greater or equal to 38C (100.4F), Mild Pain (1 - 3) (14 Nov 2023 17:40)  aluminum hydroxide-magnesium hydroxide 200 mg-200 mg/5 mL oral suspension: 30 milliliter(s) orally every 4 hours As needed Dyspepsia (14 Nov 2023 17:40)  aspirin 81 mg oral tablet: 1 tab(s) orally once a day (08 Nov 2023 04:05)  budesonide-formoterol 160 mcg-4.5 mcg/inh inhalation aerosol: 2 puff(s) inhaled 2 times a day (14 Nov 2023 17:40)  cholecalciferol oral tablet: 1000 unit(s) orally once a day (14 Nov 2023 17:40)  folic acid 1 mg oral tablet: 1 tab(s) orally once a day (14 Nov 2023 17:40)  furosemide 40 mg oral tablet: 1 tab(s) orally once a day (14 Nov 2023 17:40)  gabapentin 300 mg oral capsule: 1 cap(s) orally 3 times a day (08 Nov 2023 08:18)  insulin glargine 100 units/mL subcutaneous solution: 6 unit(s) subcutaneous once a day (at bedtime) (14 Nov 2023 17:40)  insulin lispro 100 units/mL injectable solution: 4 unit(s) injectable 3 times a day premeal (14 Nov 2023 17:40)  insulin lispro 100 units/mL injectable solution: 1 unit(s) injectable once a day (at bedtime) 0 Unit(s) if Glucose 61 - 250  2 Unit(s) if Glucose 251 - 300  4 Unit(s) if Glucose 301 - 350  6 Unit(s) if Glucose 351 - 400  8 Unit(s) if Glucose Greater Than 400  at bedtime (14 Nov 2023 17:40)  insulin lispro 100 units/mL injectable solution: 1 unit(s) injectable 3 times a day 2 Unit(s) if Glucose 151 - 200  4 Unit(s) if Glucose 201 - 250  6 Unit(s) if Glucose 251 - 300  8 Unit(s) if Glucose 301 - 350  10 Unit(s) if Glucose 351 - 400  12 Unit(s) if Glucose Greater Than 400  before meals (14 Nov 2023 17:40)  ipratropium-albuterol 0.5 mg-2.5 mg/3 mL inhalation solution: 3 milliliter(s) inhaled every 6 hours As needed Shortness of Breath and/or Wheezing (14 Nov 2023 17:40)  Lovenox 80 mg/0.8 mL injectable solution: 85 milligram(s) subcutaneously 2 times a day (14 Nov 2023 18:50)  melatonin 3 mg oral tablet: 1 tab(s) orally once a day (at bedtime) As needed Insomnia (14 Nov 2023 17:40)  methotrexate 2.5 mg oral tablet: 6 tab(s) orally once a week friday at 9:00 (14 Nov 2023 18:15)  metoprolol succinate 25 mg oral capsule, extended release: 1 cap(s) orally once a day (08 Nov 2023 12:04)  nicotine 10 mg inhalation device: 1 inhaler(s) inhaled every 4 hours as needed for nicotine (14 Nov 2023 17:40)  OLANZapine 10 mg intramuscular injection: 2.5 milligram(s) intramuscular every 12 hours As needed agitation (14 Nov 2023 17:40)  OLANZapine 7.5 mg oral tablet: 1 tab(s) orally once a day (14 Nov 2023 17:40)  ondansetron 2 mg/mL injectable solution: 8 milligram(s) injectable every 8 hours as needed for  nausea (14 Nov 2023 17:40)  spironolactone 25 mg oral tablet: 1 tab(s) orally once a day (14 Nov 2023 17:40)            MEDICATIONS  (STANDING):  acetaminophen     Tablet .. 650 milliGRAM(s) Oral every 6 hours  aspirin enteric coated 81 milliGRAM(s) Oral daily  atorvastatin 80 milliGRAM(s) Oral at bedtime  budesonide 160 MICROgram(s)/formoterol 4.5 MICROgram(s) Inhaler 2 Puff(s) Inhalation two times a day  cholecalciferol 1000 Unit(s) Oral daily  enoxaparin Injectable 90 milliGRAM(s) SubCutaneous every 12 hours  folic acid 1 milliGRAM(s) Oral daily  furosemide    Tablet 40 milliGRAM(s) Oral daily  gabapentin 300 milliGRAM(s) Oral three times a day  insulin glargine Injectable (LANTUS) 16 Unit(s) SubCutaneous at bedtime  insulin lispro (ADMELOG) corrective regimen sliding scale   SubCutaneous three times a day before meals  insulin lispro (ADMELOG) corrective regimen sliding scale   SubCutaneous at bedtime  insulin lispro Injectable (ADMELOG) 7 Unit(s) SubCutaneous three times a day before meals  metoprolol succinate ER 25 milliGRAM(s) Oral daily  OLANZapine 7.5 milliGRAM(s) Oral daily  pantoprazole    Tablet 40 milliGRAM(s) Oral before breakfast  predniSONE   Tablet 10 milliGRAM(s) Oral daily  sodium chloride 0.9% lock flush 3 milliLiter(s) IV Push every 8 hours  spironolactone 25 milliGRAM(s) Oral daily  traZODone 50 milliGRAM(s) Oral at bedtime    MEDICATIONS  (PRN):  aluminum hydroxide/magnesium hydroxide/simethicone Suspension 30 milliLiter(s) Oral every 4 hours PRN Dyspepsia  melatonin 3 milliGRAM(s) Oral at bedtime PRN Insomnia  OLANZapine Injectable 2.5 milliGRAM(s) IntraMuscular every 12 hours PRN agitation      Allergies    No Known Allergies    Intolerances        Review of Systems:  Neuro: No HA, no dizziness  Cardiovascular: No chest pain, no palpitations  Respiratory: no SOB, no cough  GI: No nausea, vomiting, abdominal pain  MSK: Denies joint/muscle pain      ALL OTHER SYSTEMS REVIEWED AND NEGATIVE      PHYSICAL EXAM:  VITALS: T(C): 36.4 (11-16-23 @ 04:33)  T(F): 97.6 (11-16-23 @ 04:33), Max: 98.4 (11-15-23 @ 13:50)  HR: 62 (11-16-23 @ 08:13) (62 - 82)  BP: 90/55 (11-16-23 @ 08:13) (90/54 - 103/66)  RR:  (18 - 18)  SpO2:  (95% - 96%)  Wt(kg): --  GENERAL: NAD, well-groomed, well-developed  NEURO:  alert and oriented  RESPIRATORY: Clear to auscultation bilaterally; No rales, rhonchi, wheezing  CARDIOVASCULAR: Si S2  GI: Soft, non distended, normal bowel sounds  MUSCULOSKELETAL: Moves all extremities equally       POCT Blood Glucose.: 249 mg/dL (11-16-23 @ 11:08)  POCT Blood Glucose.: 178 mg/dL (11-16-23 @ 07:34)  POCT Blood Glucose.: 196 mg/dL (11-16-23 @ 06:01)  POCT Blood Glucose.: 216 mg/dL (11-15-23 @ 22:02)  POCT Blood Glucose.: 109 mg/dL (11-15-23 @ 16:35)  POCT Blood Glucose.: 187 mg/dL (11-15-23 @ 10:08)  POCT Blood Glucose.: 159 mg/dL (11-15-23 @ 00:09)  POCT Blood Glucose.: 335 mg/dL (11-14-23 @ 17:58)  POCT Blood Glucose.: 130 mg/dL (11-14-23 @ 13:13)  POCT Blood Glucose.: 156 mg/dL (11-14-23 @ 12:05)  POCT Blood Glucose.: 148 mg/dL (11-14-23 @ 08:22)  POCT Blood Glucose.: 220 mg/dL (11-13-23 @ 21:56)  POCT Blood Glucose.: 139 mg/dL (11-13-23 @ 17:10)  POCT Blood Glucose.: 163 mg/dL (11-13-23 @ 12:38)                            12.1   12.80 )-----------( 241      ( 16 Nov 2023 06:15 )             39.1       11-16    138  |  103  |  27<H>  ----------------------------<  207<H>  3.3<L>   |  25  |  0.68    eGFR: 100    Ca    9.1      11-16  Mg     2.20     11-14  Phos  4.3     11-14    TPro  6.4  /  Alb  3.8  /  TBili  0.7  /  DBili  x   /  AST  11  /  ALT  14  /  AlkPhos  135<H>  11-16      Thyroid Function Tests:  11-14 @ 22:46 TSH 1.28 FreeT4 1.3 T3 -- Anti TPO -- Anti Thyroglobulin Ab -- TSI --  11-10 @ 07:00 TSH 1.36 FreeT4 -- T3 -- Anti TPO -- Anti Thyroglobulin Ab -- TSI --    Diet, DASH/TLC:   Sodium & Cholesterol Restricted  Consistent Carbohydrate No Snacks (CSTCHO) (11-15-23 @ 10:41) [Active]        11-09 Chol 97 Direct LDL -- LDL calculated 37 HDL 44<L> Trig 80, 11-08 Chol 100 Direct LDL -- LDL calculated 41 HDL 43<L> Trig 79  A1C with Estimated Average Glucose Result: 7.6 % (11-14-23 @ 22:46)  A1C with Estimated Average Glucose Result: 7.5 % (11-09-23 @ 06:15)  A1C with Estimated Average Glucose Result: 7.6 % (11-08-23 @ 06:43)  A1C with Estimated Average Glucose Result: 6.1 % (07-11-23 @ 06:02)                 HPI:  58F smoker with PMH CVA with L sided weakness/numbness/L gaze deviation, DM2 with b/l peripheral neuropathy, COPD/Asthma, Breast Ca s/p ?RT, Bipolar depression, Rheumatoid Arthritis, Antiphospholipid syndrome, and L eye uveitis/scleritis, Presents to [facility] transferred from [facility]. p/w multiple medical complaints. A/w exacerbation of L-sided subjective weakness/numbness likely 2/2 recrudescence of prior Right BG infarct. Cardiology following for new LV dysfunction since July, pending ischemic eval. LESLEY was done showing severe Aortic Insufficiency and moderated MR. Cardiac cath was done showing LAD prox 100% fills via right to left collateral. Patient now transferred to [facility] Dr. Loaiza for evaluation.    (14 Nov 2023 22:28)      Patient has history of diabetes, A1C 7.6 % was recently diagnosed outpatient and was started on Metformin.   Endo was consulted for glycemic control.      PAST MEDICAL & SURGICAL HISTORY:  Cigarette smoker      Rheumatoid arthritis      Other depression      Breast cancer      Migraines      History of multiple cerebrovascular accidents (CVAs)      Suicidal ideation      Paresthesia of left arm      Facial paresthesia      APS (antiphospholipid syndrome)      History of hysterectomy      History of cholecystectomy          FAMILY HISTORY:  Family history of breast cancer (Mother)    Family history of diabetes mellitus (DM) (Father)        Social History:  Chemo/Rad to chest: Yes Radiation 2/2 Breast CA 4 years ago  Implantable Devices: No  ADLs: uses a cane  Residence: Lives with Aunt, Unemployed (14 Nov 2023 22:28)            HOME MEDICATIONS:  Home Medications:  acetaminophen 325 mg oral tablet: 2 tab(s) orally every 6 hours As needed Temp greater or equal to 38C (100.4F), Mild Pain (1 - 3) (14 Nov 2023 17:40)  aluminum hydroxide-magnesium hydroxide 200 mg-200 mg/5 mL oral suspension: 30 milliliter(s) orally every 4 hours As needed Dyspepsia (14 Nov 2023 17:40)  aspirin 81 mg oral tablet: 1 tab(s) orally once a day (08 Nov 2023 04:05)  budesonide-formoterol 160 mcg-4.5 mcg/inh inhalation aerosol: 2 puff(s) inhaled 2 times a day (14 Nov 2023 17:40)  cholecalciferol oral tablet: 1000 unit(s) orally once a day (14 Nov 2023 17:40)  folic acid 1 mg oral tablet: 1 tab(s) orally once a day (14 Nov 2023 17:40)  furosemide 40 mg oral tablet: 1 tab(s) orally once a day (14 Nov 2023 17:40)  gabapentin 300 mg oral capsule: 1 cap(s) orally 3 times a day (08 Nov 2023 08:18)  insulin glargine 100 units/mL subcutaneous solution: 6 unit(s) subcutaneous once a day (at bedtime) (14 Nov 2023 17:40)  insulin lispro 100 units/mL injectable solution: 4 unit(s) injectable 3 times a day premeal (14 Nov 2023 17:40)  insulin lispro 100 units/mL injectable solution: 1 unit(s) injectable once a day (at bedtime) 0 Unit(s) if Glucose 61 - 250  2 Unit(s) if Glucose 251 - 300  4 Unit(s) if Glucose 301 - 350  6 Unit(s) if Glucose 351 - 400  8 Unit(s) if Glucose Greater Than 400  at bedtime (14 Nov 2023 17:40)  insulin lispro 100 units/mL injectable solution: 1 unit(s) injectable 3 times a day 2 Unit(s) if Glucose 151 - 200  4 Unit(s) if Glucose 201 - 250  6 Unit(s) if Glucose 251 - 300  8 Unit(s) if Glucose 301 - 350  10 Unit(s) if Glucose 351 - 400  12 Unit(s) if Glucose Greater Than 400  before meals (14 Nov 2023 17:40)  ipratropium-albuterol 0.5 mg-2.5 mg/3 mL inhalation solution: 3 milliliter(s) inhaled every 6 hours As needed Shortness of Breath and/or Wheezing (14 Nov 2023 17:40)  Lovenox 80 mg/0.8 mL injectable solution: 85 milligram(s) subcutaneously 2 times a day (14 Nov 2023 18:50)  melatonin 3 mg oral tablet: 1 tab(s) orally once a day (at bedtime) As needed Insomnia (14 Nov 2023 17:40)  methotrexate 2.5 mg oral tablet: 6 tab(s) orally once a week friday at 9:00 (14 Nov 2023 18:15)  metoprolol succinate 25 mg oral capsule, extended release: 1 cap(s) orally once a day (08 Nov 2023 12:04)  nicotine 10 mg inhalation device: 1 inhaler(s) inhaled every 4 hours as needed for nicotine (14 Nov 2023 17:40)  OLANZapine 10 mg intramuscular injection: 2.5 milligram(s) intramuscular every 12 hours As needed agitation (14 Nov 2023 17:40)  OLANZapine 7.5 mg oral tablet: 1 tab(s) orally once a day (14 Nov 2023 17:40)  ondansetron 2 mg/mL injectable solution: 8 milligram(s) injectable every 8 hours as needed for  nausea (14 Nov 2023 17:40)  spironolactone 25 mg oral tablet: 1 tab(s) orally once a day (14 Nov 2023 17:40)            MEDICATIONS  (STANDING):  acetaminophen     Tablet .. 650 milliGRAM(s) Oral every 6 hours  aspirin enteric coated 81 milliGRAM(s) Oral daily  atorvastatin 80 milliGRAM(s) Oral at bedtime  budesonide 160 MICROgram(s)/formoterol 4.5 MICROgram(s) Inhaler 2 Puff(s) Inhalation two times a day  cholecalciferol 1000 Unit(s) Oral daily  enoxaparin Injectable 90 milliGRAM(s) SubCutaneous every 12 hours  folic acid 1 milliGRAM(s) Oral daily  furosemide    Tablet 40 milliGRAM(s) Oral daily  gabapentin 300 milliGRAM(s) Oral three times a day  insulin glargine Injectable (LANTUS) 16 Unit(s) SubCutaneous at bedtime  insulin lispro (ADMELOG) corrective regimen sliding scale   SubCutaneous three times a day before meals  insulin lispro (ADMELOG) corrective regimen sliding scale   SubCutaneous at bedtime  insulin lispro Injectable (ADMELOG) 7 Unit(s) SubCutaneous three times a day before meals  metoprolol succinate ER 25 milliGRAM(s) Oral daily  OLANZapine 7.5 milliGRAM(s) Oral daily  pantoprazole    Tablet 40 milliGRAM(s) Oral before breakfast  predniSONE   Tablet 10 milliGRAM(s) Oral daily  sodium chloride 0.9% lock flush 3 milliLiter(s) IV Push every 8 hours  spironolactone 25 milliGRAM(s) Oral daily  traZODone 50 milliGRAM(s) Oral at bedtime    MEDICATIONS  (PRN):  aluminum hydroxide/magnesium hydroxide/simethicone Suspension 30 milliLiter(s) Oral every 4 hours PRN Dyspepsia  melatonin 3 milliGRAM(s) Oral at bedtime PRN Insomnia  OLANZapine Injectable 2.5 milliGRAM(s) IntraMuscular every 12 hours PRN agitation      Allergies    No Known Allergies    Intolerances        Review of Systems:  Neuro: No HA, no dizziness  Cardiovascular: No chest pain, no palpitations  Respiratory: no SOB, no cough  GI: No nausea, vomiting, abdominal pain  MSK: Denies joint/muscle pain      ALL OTHER SYSTEMS REVIEWED AND NEGATIVE      PHYSICAL EXAM:  VITALS: T(C): 36.4 (11-16-23 @ 04:33)  T(F): 97.6 (11-16-23 @ 04:33), Max: 98.4 (11-15-23 @ 13:50)  HR: 62 (11-16-23 @ 08:13) (62 - 82)  BP: 90/55 (11-16-23 @ 08:13) (90/54 - 103/66)  RR:  (18 - 18)  SpO2:  (95% - 96%)  Wt(kg): --  GENERAL: NAD, well-groomed, well-developed  NEURO:  alert and oriented  RESPIRATORY: Clear to auscultation bilaterally; No rales, rhonchi, wheezing  CARDIOVASCULAR: Si S2  GI: Soft, non distended, normal bowel sounds  MUSCULOSKELETAL: Moves all extremities equally       POCT Blood Glucose.: 249 mg/dL (11-16-23 @ 11:08)  POCT Blood Glucose.: 178 mg/dL (11-16-23 @ 07:34)  POCT Blood Glucose.: 196 mg/dL (11-16-23 @ 06:01)  POCT Blood Glucose.: 216 mg/dL (11-15-23 @ 22:02)  POCT Blood Glucose.: 109 mg/dL (11-15-23 @ 16:35)  POCT Blood Glucose.: 187 mg/dL (11-15-23 @ 10:08)  POCT Blood Glucose.: 159 mg/dL (11-15-23 @ 00:09)  POCT Blood Glucose.: 335 mg/dL (11-14-23 @ 17:58)  POCT Blood Glucose.: 130 mg/dL (11-14-23 @ 13:13)  POCT Blood Glucose.: 156 mg/dL (11-14-23 @ 12:05)  POCT Blood Glucose.: 148 mg/dL (11-14-23 @ 08:22)  POCT Blood Glucose.: 220 mg/dL (11-13-23 @ 21:56)  POCT Blood Glucose.: 139 mg/dL (11-13-23 @ 17:10)  POCT Blood Glucose.: 163 mg/dL (11-13-23 @ 12:38)                            12.1   12.80 )-----------( 241      ( 16 Nov 2023 06:15 )             39.1       11-16    138  |  103  |  27<H>  ----------------------------<  207<H>  3.3<L>   |  25  |  0.68    eGFR: 100    Ca    9.1      11-16  Mg     2.20     11-14  Phos  4.3     11-14    TPro  6.4  /  Alb  3.8  /  TBili  0.7  /  DBili  x   /  AST  11  /  ALT  14  /  AlkPhos  135<H>  11-16      Thyroid Function Tests:  11-14 @ 22:46 TSH 1.28 FreeT4 1.3 T3 -- Anti TPO -- Anti Thyroglobulin Ab -- TSI --  11-10 @ 07:00 TSH 1.36 FreeT4 -- T3 -- Anti TPO -- Anti Thyroglobulin Ab -- TSI --    Diet, DASH/TLC:   Sodium & Cholesterol Restricted  Consistent Carbohydrate No Snacks (CSTCHO) (11-15-23 @ 10:41) [Active]        11-09 Chol 97 Direct LDL -- LDL calculated 37 HDL 44<L> Trig 80, 11-08 Chol 100 Direct LDL -- LDL calculated 41 HDL 43<L> Trig 79  A1C with Estimated Average Glucose Result: 7.6 % (11-14-23 @ 22:46)  A1C with Estimated Average Glucose Result: 7.5 % (11-09-23 @ 06:15)  A1C with Estimated Average Glucose Result: 7.6 % (11-08-23 @ 06:43)  A1C with Estimated Average Glucose Result: 6.1 % (07-11-23 @ 06:02)

## 2023-11-16 NOTE — PRE PROCEDURE NOTE - PRE PROCEDURE EVALUATION
Cardiac Surgery Pre-op Note:    CC: Patient is a 58y old  Female who presents with a chief complaint of                                                                                                            Surgeon: Fadia    Procedure: (Date) (Procedure)  CABG AVR Poss MVR    Allergies    No Known Allergies    Intolerances        HPI:  58F smoker with PMH CVA with L sided weakness/numbness/L gaze deviation, DM2 with b/l peripheral neuropathy, COPD/Asthma, Breast Ca s/p ?RT, Bipolar depression, Rheumatoid Arthritis, Antiphospholipid syndrome, and L eye uveitis/scleritis, Presents to [facility] transferred from [facility]. p/w multiple medical complaints. A/w exacerbation of L-sided subjective weakness/numbness likely 2/2 recrudescence of prior Right BG infarct. Cardiology following for new LV dysfunction since July, pending ischemic eval. LESLEY was done showing severe Aortic Insufficiency and moderated MR. Cardiac cath was done showing LAD prox 100% fills via right to left collateral. Patient now transferred to CTS Dr. Loaiza for evaluation.    (14 Nov 2023 22:28)      PAST MEDICAL & SURGICAL HISTORY:  Cigarette smoker      Rheumatoid arthritis      Other depression      Breast cancer      Migraines      History of multiple cerebrovascular accidents (CVAs)      Suicidal ideation      Paresthesia of left arm      Facial paresthesia      APS (antiphospholipid syndrome)      History of hysterectomy      History of cholecystectomy          MEDICATIONS  (STANDING):  acetaminophen     Tablet .. 650 milliGRAM(s) Oral every 6 hours  aspirin enteric coated 81 milliGRAM(s) Oral daily  atorvastatin 80 milliGRAM(s) Oral at bedtime  budesonide 160 MICROgram(s)/formoterol 4.5 MICROgram(s) Inhaler 2 Puff(s) Inhalation two times a day  cholecalciferol 1000 Unit(s) Oral daily  enoxaparin Injectable 90 milliGRAM(s) SubCutaneous every 12 hours  folic acid 1 milliGRAM(s) Oral daily  furosemide    Tablet 40 milliGRAM(s) Oral daily  gabapentin 300 milliGRAM(s) Oral three times a day  insulin glargine Injectable (LANTUS) 16 Unit(s) SubCutaneous at bedtime  insulin lispro (ADMELOG) corrective regimen sliding scale   SubCutaneous three times a day before meals  insulin lispro (ADMELOG) corrective regimen sliding scale   SubCutaneous at bedtime  insulin lispro Injectable (ADMELOG) 7 Unit(s) SubCutaneous three times a day before meals  metoprolol succinate ER 25 milliGRAM(s) Oral daily  OLANZapine 7.5 milliGRAM(s) Oral daily  pantoprazole    Tablet 40 milliGRAM(s) Oral before breakfast  predniSONE   Tablet 10 milliGRAM(s) Oral daily  sodium chloride 0.9% lock flush 3 milliLiter(s) IV Push every 8 hours  spironolactone 25 milliGRAM(s) Oral daily  traZODone 50 milliGRAM(s) Oral at bedtime    MEDICATIONS  (PRN):  aluminum hydroxide/magnesium hydroxide/simethicone Suspension 30 milliLiter(s) Oral every 4 hours PRN Dyspepsia  melatonin 3 milliGRAM(s) Oral at bedtime PRN Insomnia  OLANZapine Injectable 2.5 milliGRAM(s) IntraMuscular every 12 hours PRN agitation        Labs:                        12.1   12.80 )-----------( 241      ( 16 Nov 2023 06:15 )             39.1     11-16    138  |  103  |  27<H>  ----------------------------<  207<H>  3.3<L>   |  25  |  0.68    Ca    9.1      16 Nov 2023 06:15    TPro  6.4  /  Alb  3.8  /  TBili  0.7  /  DBili  x   /  AST  11  /  ALT  14  /  AlkPhos  135<H>  11-16    PT/INR - ( 14 Nov 2023 22:47 )   PT: 12.7 sec;   INR: 1.22 ratio         PTT - ( 14 Nov 2023 22:47 )  PTT:31.2 sec    Blood Type: ABO Interpretation: O (11-14 @ 22:36)    HGB A1C: 7.8  Thyroid Panel: 11-14 @ 22:46/1.28  1.3/--/--  11-10 @ 07:00/1.36  --/--/--    MRSA: MRSA PCR Result.: NotDetec (11-14 @ 22:44)   / MSSA:   Urinalysis Basic - ( 16 Nov 2023 06:15 )    Color: x / Appearance: x / SG: x / pH: x  Gluc: 207 mg/dL / Ketone: x  / Bili: x / Urobili: x   Blood: x / Protein: x / Nitrite: x   Leuk Esterase: x / RBC: x / WBC x   Sq Epi: x / Non Sq Epi: x / Bacteria: x        CXR:  Clear lungs.      EKG: SINUS RHYTHM WITH 1ST DEGREE A-V BLOCK LEFT AXIS DEVIATION SEPTAL INFARCT , AGE UNDETERMINED ABNORMAL ECG    Carotid Duplex:  No significant hemodynamic stenosis of either carotid artery.    Echocardiogram:    1. Left ventricular systolic function is severely decreased with an ejection fraction visually estimated at 30 to 35 %.   2. The right ventricle is not well visualized.   3. The study was terminated due to respiratory distress, desaturation, hypotension and tachycardia. Only 10 images were obtained. Grossly, there is severe aortic insufficiency (image 5), although this is a qualitative assessment only. There is at least moderate mitral regurgitation with SBP 80 mm Hg.   4. Severe aortic regurgitation.      Cardiac catheterization:    LM   Left main artery: Angiography shows no disease.      LAD   Proximal left anterior descending: fills via right to left  collaterals. There is a 100 % stenosis.    CX   Circumflex: Angiography shows minor irregularities.      RCA   Right coronary artery: Angiography shows no disease.              Gen: WN/WD NAD  Neuro: AAOx3, nonfocal  Pulm: CTA B/L  CV: RRR, S1S2  Abd: Soft, NT, ND +BS  Ext: No edema, + peripheral pulses      Pt has AICD/PPM [ ] Yes   ] No             Brand Name:  Pre-op Beta Blocker ordered within 24 hrs of surgery (CABG ONLY)?   ] Yes  [ ] No  If not, Why?  Type & Cross   ] Yes  [ ] No  NPO after Midnight [] Yes  [ ] No  Pre-op ABX ordered, to be taped on chart:   ] Yes  [ ] No     Hibiclens/Peridex ordered  ] Yes  [ ] No  LESLEY [x ]   Consent obtained  [ ] Yes  [ ] No

## 2023-11-16 NOTE — PROGRESS NOTE ADULT - SUBJECTIVE AND OBJECTIVE BOX
German Carrasco MD  Interventional Cardiology / Advance Heart Failure and Cardiac Transplant Specialist  [place] Office : [address] [place] [address] [place] [address]  Tel:   [place] Office : [address] [facility] [place] [address]  Tel: [phone]       Pt is lying in bed comfortable not in distress, no chest pains no SOB no palpitations  	  MEDICATIONS:  aspirin enteric coated 81 milliGRAM(s) Oral daily  enoxaparin Injectable 90 milliGRAM(s) SubCutaneous every 12 hours  furosemide    Tablet 40 milliGRAM(s) Oral daily  metoprolol succinate ER 25 milliGRAM(s) Oral daily  spironolactone 25 milliGRAM(s) Oral daily    cefuroxime  IVPB 1500 milliGRAM(s) IV Intermittent once PRN    budesonide 160 MICROgram(s)/formoterol 4.5 MICROgram(s) Inhaler 2 Puff(s) Inhalation two times a day    acetaminophen     Tablet .. 650 milliGRAM(s) Oral every 6 hours  acetaminophen     Tablet .. 1000 milliGRAM(s) Oral once  gabapentin 300 milliGRAM(s) Oral three times a day  gabapentin 300 milliGRAM(s) Oral once  melatonin 3 milliGRAM(s) Oral at bedtime PRN  OLANZapine 7.5 milliGRAM(s) Oral daily  OLANZapine Injectable 2.5 milliGRAM(s) IntraMuscular every 12 hours PRN  traZODone 50 milliGRAM(s) Oral at bedtime    aluminum hydroxide/magnesium hydroxide/simethicone Suspension 30 milliLiter(s) Oral every 4 hours PRN  pantoprazole    Tablet 40 milliGRAM(s) Oral before breakfast    atorvastatin 80 milliGRAM(s) Oral at bedtime  insulin glargine Injectable (LANTUS) 16 Unit(s) SubCutaneous at bedtime  insulin lispro (ADMELOG) corrective regimen sliding scale   SubCutaneous three times a day before meals  insulin lispro (ADMELOG) corrective regimen sliding scale   SubCutaneous at bedtime  insulin lispro Injectable (ADMELOG) 7 Unit(s) SubCutaneous three times a day before meals  predniSONE   Tablet 10 milliGRAM(s) Oral daily    ascorbic acid 2000 milliGRAM(s) Oral once  chlorhexidine 0.12% Liquid 30 milliLiter(s) Swish and Spit once  chlorhexidine 4% Liquid 1 Application(s) Topical once  cholecalciferol 1000 Unit(s) Oral daily  folic acid 1 milliGRAM(s) Oral daily  sodium chloride 0.9% lock flush 3 milliLiter(s) IV Push every 8 hours      PAST MEDICAL/SURGICAL HISTORY  PAST MEDICAL & SURGICAL HISTORY:  Cigarette smoker      Rheumatoid arthritis      Other depression      Breast cancer      Migraines      History of multiple cerebrovascular accidents (CVAs)      Suicidal ideation      Paresthesia of left arm      Facial paresthesia      APS (antiphospholipid syndrome)      History of hysterectomy      History of cholecystectomy          SOCIAL HISTORY: Substance Use (street drugs): ( x ) never used  (  ) other:    FAMILY HISTORY:  Family history of breast cancer (Mother)    Family history of diabetes mellitus (DM) (Father)        REVIEW OF SYSTEMS:  CONSTITUTIONAL: No fever, weight loss, or fatigue  EYES: No eye pain, visual disturbances, or discharge  ENMT:  No difficulty hearing, tinnitus, vertigo; No sinus or throat pain  BREASTS: No pain, masses, or nipple discharge  GASTROINTESTINAL: No abdominal or epigastric pain. No nausea, vomiting, or hematemesis; No diarrhea or constipation. No melena or hematochezia.  GENITOURINARY: No dysuria, frequency, hematuria, or incontinence  NEUROLOGICAL: No headaches, memory loss, loss of strength, numbness, or tremors  ENDOCRINE: No heat or cold intolerance; No hair loss  MUSCULOSKELETAL: No joint pain or swelling; No muscle, back, or extremity pain  PSYCHIATRIC: No depression, anxiety, mood swings, or difficulty sleeping  HEME/LYMPH: No easy bruising, or bleeding gums       PHYSICAL EXAM:  T(C): 36.8 (11-16-23 @ 13:43), Max: 36.8 (11-16-23 @ 13:43)  HR: 72 (11-16-23 @ 16:55) (62 - 82)  BP: 105/71 (11-16-23 @ 16:55) (90/54 - 108/77)  RR: 18 (11-16-23 @ 13:43) (18 - 18)  SpO2: 96% (11-16-23 @ 16:55) (95% - 96%)  Wt(kg): --  I&O's Summary    15 Nov 2023 07:01  -  16 Nov 2023 07:00  --------------------------------------------------------  IN: 480 mL / OUT: 1050 mL / NET: -570 mL    16 Nov 2023 07:01  -  16 Nov 2023 19:25  --------------------------------------------------------  IN: 240 mL / OUT: 500 mL / NET: -260 mL          GENERAL: NAD  EYES:   PERRLA   ENMT:   Moist mucous membranes, Good dentition, No lesions  Cardiovascular: Normal S1 S2, No JVD, No murmurs, No edema  Respiratory: Lungs clear to auscultation	  Gastrointestinal:  Soft, Non-tender, + BS	  Extremities: no edema                                    12.1   12.80 )-----------( 241      ( 16 Nov 2023 06:15 )             39.1     11-16    138  |  103  |  27<H>  ----------------------------<  207<H>  3.3<L>   |  25  |  0.68    Ca    9.1      16 Nov 2023 06:15    TPro  6.4  /  Alb  3.8  /  TBili  0.7  /  DBili  x   /  AST  11  /  ALT  14  /  AlkPhos  135<H>  11-16    proBNP:   Lipid Profile:   HgA1c:   TSH:     Consultant(s) Notes Reviewed:  [x ] YES  [ ] NO    Care Discussed with Consultants/Other Providers [ x] YES  [ ] NO    Imaging Personally Reviewed independently:  [x] YES  [ ] NO    All labs, radiologic studies, vitals, orders and medications list reviewed. Patient is seen and examined at bedside. Case discussed with medical team.

## 2023-11-17 ENCOUNTER — APPOINTMENT (OUTPATIENT)
Dept: CARDIOTHORACIC SURGERY | Facility: HOSPITAL | Age: 58
End: 2023-11-17

## 2023-11-17 LAB
ALBUMIN SERPL ELPH-MCNC: 3.8 G/DL — SIGNIFICANT CHANGE UP (ref 3.3–5)
ALBUMIN SERPL ELPH-MCNC: 3.8 G/DL — SIGNIFICANT CHANGE UP (ref 3.3–5)
ALP SERPL-CCNC: 117 U/L — SIGNIFICANT CHANGE UP (ref 40–120)
ALP SERPL-CCNC: 117 U/L — SIGNIFICANT CHANGE UP (ref 40–120)
ALT FLD-CCNC: 15 U/L — SIGNIFICANT CHANGE UP (ref 10–45)
ALT FLD-CCNC: 15 U/L — SIGNIFICANT CHANGE UP (ref 10–45)
ANION GAP SERPL CALC-SCNC: 10 MMOL/L — SIGNIFICANT CHANGE UP (ref 5–17)
ANION GAP SERPL CALC-SCNC: 10 MMOL/L — SIGNIFICANT CHANGE UP (ref 5–17)
AST SERPL-CCNC: 9 U/L — LOW (ref 10–40)
AST SERPL-CCNC: 9 U/L — LOW (ref 10–40)
BASOPHILS # BLD AUTO: 0.08 K/UL — SIGNIFICANT CHANGE UP (ref 0–0.2)
BASOPHILS # BLD AUTO: 0.08 K/UL — SIGNIFICANT CHANGE UP (ref 0–0.2)
BASOPHILS NFR BLD AUTO: 0.7 % — SIGNIFICANT CHANGE UP (ref 0–2)
BASOPHILS NFR BLD AUTO: 0.7 % — SIGNIFICANT CHANGE UP (ref 0–2)
BILIRUB SERPL-MCNC: 0.7 MG/DL — SIGNIFICANT CHANGE UP (ref 0.2–1.2)
BILIRUB SERPL-MCNC: 0.7 MG/DL — SIGNIFICANT CHANGE UP (ref 0.2–1.2)
BUN SERPL-MCNC: 28 MG/DL — HIGH (ref 7–23)
BUN SERPL-MCNC: 28 MG/DL — HIGH (ref 7–23)
CALCIUM SERPL-MCNC: 8.8 MG/DL — SIGNIFICANT CHANGE UP (ref 8.4–10.5)
CALCIUM SERPL-MCNC: 8.8 MG/DL — SIGNIFICANT CHANGE UP (ref 8.4–10.5)
CHLORIDE SERPL-SCNC: 103 MMOL/L — SIGNIFICANT CHANGE UP (ref 96–108)
CHLORIDE SERPL-SCNC: 103 MMOL/L — SIGNIFICANT CHANGE UP (ref 96–108)
CO2 SERPL-SCNC: 26 MMOL/L — SIGNIFICANT CHANGE UP (ref 22–31)
CO2 SERPL-SCNC: 26 MMOL/L — SIGNIFICANT CHANGE UP (ref 22–31)
CREAT SERPL-MCNC: 0.82 MG/DL — SIGNIFICANT CHANGE UP (ref 0.5–1.3)
CREAT SERPL-MCNC: 0.82 MG/DL — SIGNIFICANT CHANGE UP (ref 0.5–1.3)
EGFR: 82 ML/MIN/1.73M2 — SIGNIFICANT CHANGE UP
EGFR: 82 ML/MIN/1.73M2 — SIGNIFICANT CHANGE UP
EOSINOPHIL # BLD AUTO: 0.2 K/UL — SIGNIFICANT CHANGE UP (ref 0–0.5)
EOSINOPHIL # BLD AUTO: 0.2 K/UL — SIGNIFICANT CHANGE UP (ref 0–0.5)
EOSINOPHIL NFR BLD AUTO: 1.7 % — SIGNIFICANT CHANGE UP (ref 0–6)
EOSINOPHIL NFR BLD AUTO: 1.7 % — SIGNIFICANT CHANGE UP (ref 0–6)
GLUCOSE BLDC GLUCOMTR-MCNC: 157 MG/DL — HIGH (ref 70–99)
GLUCOSE BLDC GLUCOMTR-MCNC: 157 MG/DL — HIGH (ref 70–99)
GLUCOSE BLDC GLUCOMTR-MCNC: 186 MG/DL — HIGH (ref 70–99)
GLUCOSE BLDC GLUCOMTR-MCNC: 186 MG/DL — HIGH (ref 70–99)
GLUCOSE BLDC GLUCOMTR-MCNC: 208 MG/DL — HIGH (ref 70–99)
GLUCOSE BLDC GLUCOMTR-MCNC: 208 MG/DL — HIGH (ref 70–99)
GLUCOSE BLDC GLUCOMTR-MCNC: 296 MG/DL — HIGH (ref 70–99)
GLUCOSE BLDC GLUCOMTR-MCNC: 296 MG/DL — HIGH (ref 70–99)
GLUCOSE BLDC GLUCOMTR-MCNC: 327 MG/DL — HIGH (ref 70–99)
GLUCOSE BLDC GLUCOMTR-MCNC: 327 MG/DL — HIGH (ref 70–99)
GLUCOSE SERPL-MCNC: 156 MG/DL — HIGH (ref 70–99)
GLUCOSE SERPL-MCNC: 156 MG/DL — HIGH (ref 70–99)
HCT VFR BLD CALC: 34.3 % — LOW (ref 34.5–45)
HCT VFR BLD CALC: 34.3 % — LOW (ref 34.5–45)
HGB BLD-MCNC: 10.8 G/DL — LOW (ref 11.5–15.5)
HGB BLD-MCNC: 10.8 G/DL — LOW (ref 11.5–15.5)
IMM GRANULOCYTES NFR BLD AUTO: 0.3 % — SIGNIFICANT CHANGE UP (ref 0–0.9)
IMM GRANULOCYTES NFR BLD AUTO: 0.3 % — SIGNIFICANT CHANGE UP (ref 0–0.9)
LYMPHOCYTES # BLD AUTO: 2.98 K/UL — SIGNIFICANT CHANGE UP (ref 1–3.3)
LYMPHOCYTES # BLD AUTO: 2.98 K/UL — SIGNIFICANT CHANGE UP (ref 1–3.3)
LYMPHOCYTES # BLD AUTO: 26 % — SIGNIFICANT CHANGE UP (ref 13–44)
LYMPHOCYTES # BLD AUTO: 26 % — SIGNIFICANT CHANGE UP (ref 13–44)
MCHC RBC-ENTMCNC: 29.4 PG — SIGNIFICANT CHANGE UP (ref 27–34)
MCHC RBC-ENTMCNC: 29.4 PG — SIGNIFICANT CHANGE UP (ref 27–34)
MCHC RBC-ENTMCNC: 31.5 GM/DL — LOW (ref 32–36)
MCHC RBC-ENTMCNC: 31.5 GM/DL — LOW (ref 32–36)
MCV RBC AUTO: 93.5 FL — SIGNIFICANT CHANGE UP (ref 80–100)
MCV RBC AUTO: 93.5 FL — SIGNIFICANT CHANGE UP (ref 80–100)
MONOCYTES # BLD AUTO: 0.89 K/UL — SIGNIFICANT CHANGE UP (ref 0–0.9)
MONOCYTES # BLD AUTO: 0.89 K/UL — SIGNIFICANT CHANGE UP (ref 0–0.9)
MONOCYTES NFR BLD AUTO: 7.8 % — SIGNIFICANT CHANGE UP (ref 2–14)
MONOCYTES NFR BLD AUTO: 7.8 % — SIGNIFICANT CHANGE UP (ref 2–14)
NEUTROPHILS # BLD AUTO: 7.28 K/UL — SIGNIFICANT CHANGE UP (ref 1.8–7.4)
NEUTROPHILS # BLD AUTO: 7.28 K/UL — SIGNIFICANT CHANGE UP (ref 1.8–7.4)
NEUTROPHILS NFR BLD AUTO: 63.5 % — SIGNIFICANT CHANGE UP (ref 43–77)
NEUTROPHILS NFR BLD AUTO: 63.5 % — SIGNIFICANT CHANGE UP (ref 43–77)
NRBC # BLD: 0 /100 WBCS — SIGNIFICANT CHANGE UP (ref 0–0)
NRBC # BLD: 0 /100 WBCS — SIGNIFICANT CHANGE UP (ref 0–0)
PLATELET # BLD AUTO: 230 K/UL — SIGNIFICANT CHANGE UP (ref 150–400)
PLATELET # BLD AUTO: 230 K/UL — SIGNIFICANT CHANGE UP (ref 150–400)
POTASSIUM SERPL-MCNC: 3.7 MMOL/L — SIGNIFICANT CHANGE UP (ref 3.5–5.3)
POTASSIUM SERPL-MCNC: 3.7 MMOL/L — SIGNIFICANT CHANGE UP (ref 3.5–5.3)
POTASSIUM SERPL-SCNC: 3.7 MMOL/L — SIGNIFICANT CHANGE UP (ref 3.5–5.3)
POTASSIUM SERPL-SCNC: 3.7 MMOL/L — SIGNIFICANT CHANGE UP (ref 3.5–5.3)
PROT SERPL-MCNC: 6 G/DL — SIGNIFICANT CHANGE UP (ref 6–8.3)
PROT SERPL-MCNC: 6 G/DL — SIGNIFICANT CHANGE UP (ref 6–8.3)
RBC # BLD: 3.67 M/UL — LOW (ref 3.8–5.2)
RBC # BLD: 3.67 M/UL — LOW (ref 3.8–5.2)
RBC # FLD: 15.9 % — HIGH (ref 10.3–14.5)
RBC # FLD: 15.9 % — HIGH (ref 10.3–14.5)
SODIUM SERPL-SCNC: 139 MMOL/L — SIGNIFICANT CHANGE UP (ref 135–145)
SODIUM SERPL-SCNC: 139 MMOL/L — SIGNIFICANT CHANGE UP (ref 135–145)
WBC # BLD: 11.47 K/UL — HIGH (ref 3.8–10.5)
WBC # BLD: 11.47 K/UL — HIGH (ref 3.8–10.5)
WBC # FLD AUTO: 11.47 K/UL — HIGH (ref 3.8–10.5)
WBC # FLD AUTO: 11.47 K/UL — HIGH (ref 3.8–10.5)

## 2023-11-17 PROCEDURE — 75561 CARDIAC MRI FOR MORPH W/DYE: CPT | Mod: 26

## 2023-11-17 PROCEDURE — 99231 SBSQ HOSP IP/OBS SF/LOW 25: CPT

## 2023-11-17 RX ORDER — INSULIN LISPRO 100/ML
8 VIAL (ML) SUBCUTANEOUS
Refills: 0 | Status: DISCONTINUED | OUTPATIENT
Start: 2023-11-17 | End: 2023-11-21

## 2023-11-17 RX ORDER — POTASSIUM CHLORIDE 20 MEQ
40 PACKET (EA) ORAL ONCE
Refills: 0 | Status: COMPLETED | OUTPATIENT
Start: 2023-11-17 | End: 2023-11-17

## 2023-11-17 RX ADMIN — SPIRONOLACTONE 25 MILLIGRAM(S): 25 TABLET, FILM COATED ORAL at 05:52

## 2023-11-17 RX ADMIN — GABAPENTIN 300 MILLIGRAM(S): 400 CAPSULE ORAL at 22:15

## 2023-11-17 RX ADMIN — ATORVASTATIN CALCIUM 80 MILLIGRAM(S): 80 TABLET, FILM COATED ORAL at 22:15

## 2023-11-17 RX ADMIN — ENOXAPARIN SODIUM 90 MILLIGRAM(S): 100 INJECTION SUBCUTANEOUS at 05:53

## 2023-11-17 RX ADMIN — SODIUM CHLORIDE 3 MILLILITER(S): 9 INJECTION INTRAMUSCULAR; INTRAVENOUS; SUBCUTANEOUS at 22:18

## 2023-11-17 RX ADMIN — BUDESONIDE AND FORMOTEROL FUMARATE DIHYDRATE 2 PUFF(S): 160; 4.5 AEROSOL RESPIRATORY (INHALATION) at 18:17

## 2023-11-17 RX ADMIN — Medication 650 MILLIGRAM(S): at 12:59

## 2023-11-17 RX ADMIN — Medication 650 MILLIGRAM(S): at 05:52

## 2023-11-17 RX ADMIN — Medication 25 MILLIGRAM(S): at 05:52

## 2023-11-17 RX ADMIN — Medication 81 MILLIGRAM(S): at 12:59

## 2023-11-17 RX ADMIN — SODIUM CHLORIDE 3 MILLILITER(S): 9 INJECTION INTRAMUSCULAR; INTRAVENOUS; SUBCUTANEOUS at 14:38

## 2023-11-17 RX ADMIN — SODIUM CHLORIDE 3 MILLILITER(S): 9 INJECTION INTRAMUSCULAR; INTRAVENOUS; SUBCUTANEOUS at 05:46

## 2023-11-17 RX ADMIN — Medication 40 MILLIEQUIVALENT(S): at 13:00

## 2023-11-17 RX ADMIN — Medication 7 UNIT(S): at 08:18

## 2023-11-17 RX ADMIN — ENOXAPARIN SODIUM 90 MILLIGRAM(S): 100 INJECTION SUBCUTANEOUS at 18:10

## 2023-11-17 RX ADMIN — GABAPENTIN 300 MILLIGRAM(S): 400 CAPSULE ORAL at 14:16

## 2023-11-17 RX ADMIN — Medication 40 MILLIGRAM(S): at 05:52

## 2023-11-17 RX ADMIN — Medication 7 UNIT(S): at 14:12

## 2023-11-17 RX ADMIN — Medication 50 MILLIGRAM(S): at 22:15

## 2023-11-17 RX ADMIN — INSULIN GLARGINE 16 UNIT(S): 100 INJECTION, SOLUTION SUBCUTANEOUS at 22:14

## 2023-11-17 RX ADMIN — GABAPENTIN 300 MILLIGRAM(S): 400 CAPSULE ORAL at 05:52

## 2023-11-17 RX ADMIN — PANTOPRAZOLE SODIUM 40 MILLIGRAM(S): 20 TABLET, DELAYED RELEASE ORAL at 05:52

## 2023-11-17 RX ADMIN — Medication 2: at 14:12

## 2023-11-17 RX ADMIN — Medication 3 MILLIGRAM(S): at 00:45

## 2023-11-17 RX ADMIN — Medication 1000 UNIT(S): at 12:59

## 2023-11-17 RX ADMIN — OLANZAPINE 7.5 MILLIGRAM(S): 15 TABLET, FILM COATED ORAL at 12:59

## 2023-11-17 RX ADMIN — Medication 1: at 08:17

## 2023-11-17 RX ADMIN — Medication 1 MILLIGRAM(S): at 12:59

## 2023-11-17 RX ADMIN — Medication 8 UNIT(S): at 18:18

## 2023-11-17 RX ADMIN — Medication 3: at 18:17

## 2023-11-17 RX ADMIN — Medication 10 MILLIGRAM(S): at 05:52

## 2023-11-17 NOTE — PROGRESS NOTE ADULT - ASSESSMENT
58F with history of CVA with L sided weakness/numbness/L gaze deviation, DM2 with b/l peripheral neuropathy, COPD/Asthma, Breast Ca s/p ?RT, Bipolar depression, Rheumatoid Arthritis, Antiphospholipid syndrome, L eye uveitis/scleritis, Newly diagnosed DM2 (pt cannot remember which medication she takes for it), and current tobacco use who presents to the hospital with multiple complaints.     1. LV dysfunction  -new mod-severe segmental LV dysfunction in July has not had ischemic workup due to stroke at that time. if no stroke this admission will do LHC  -c/w metoprolol and aldactone  -TTE EF 30% now with mod-severe AR  - LESLEY not tolerated well grossly sever AI   - discussed with daughter and sister  - c/w lasix change to 40mg PO Daily   - cath shows LAD prox 100% fills via right to left collateral    - repeat echo shows AR is mild to mod, MR is mild ,  reviewed all echos, LESLEY showed worse AR then TTE here, LESLEY was incomplete as pt had hypotension and bradycardia during, will get LESLEY repeated again     2. CVA  -history of multiple CVAs and is on eliquis   -CT scan of head negative for any acute findings  -ILR interrogation with no events  -per neuro no plans for MRI       3. Antiphospholipid syndrome  -on lovenox

## 2023-11-17 NOTE — PROGRESS NOTE ADULT - SUBJECTIVE AND OBJECTIVE BOX
Chief complaint  Patient is a 58y old  Female who presents with a chief complaint of        Labs and Fingersticks  CAPILLARY BLOOD GLUCOSE      POCT Blood Glucose.: 186 mg/dL (17 Nov 2023 07:46)  POCT Blood Glucose.: 115 mg/dL (16 Nov 2023 21:32)  POCT Blood Glucose.: 192 mg/dL (16 Nov 2023 16:53)      Anion Gap: 10 (11-17 @ 05:52)  Anion Gap: 10 (11-16 @ 06:15)      Calcium: 8.8 (11-17 @ 05:52)  Calcium: 9.1 (11-16 @ 06:15)  Albumin: 3.8 (11-17 @ 05:52)  Albumin: 3.8 (11-16 @ 06:15)    Alanine Aminotransferase (ALT/SGPT): 15 (11-17 @ 05:52)  Alanine Aminotransferase (ALT/SGPT): 14 (11-16 @ 06:15)  Alkaline Phosphatase: 117 (11-17 @ 05:52)  Alkaline Phosphatase: 135 *H* (11-16 @ 06:15)  Aspartate Aminotransferase (AST/SGOT): 9 *L* (11-17 @ 05:52)  Aspartate Aminotransferase (AST/SGOT): 11 (11-16 @ 06:15)        11-17    139  |  103  |  28<H>  ----------------------------<  156<H>  3.7   |  26  |  0.82    Ca    8.8      17 Nov 2023 05:52    TPro  6.0  /  Alb  3.8  /  TBili  0.7  /  DBili  x   /  AST  9<L>  /  ALT  15  /  AlkPhos  117  11-17                        10.8   11.47 )-----------( 230      ( 17 Nov 2023 05:52 )             34.3     Medications  MEDICATIONS  (STANDING):  acetaminophen     Tablet .. 650 milliGRAM(s) Oral every 6 hours  ascorbic acid 2000 milliGRAM(s) Oral once  aspirin enteric coated 81 milliGRAM(s) Oral daily  atorvastatin 80 milliGRAM(s) Oral at bedtime  budesonide 160 MICROgram(s)/formoterol 4.5 MICROgram(s) Inhaler 2 Puff(s) Inhalation two times a day  chlorhexidine 0.12% Liquid 30 milliLiter(s) Swish and Spit once  cholecalciferol 1000 Unit(s) Oral daily  enoxaparin Injectable 90 milliGRAM(s) SubCutaneous every 12 hours  folic acid 1 milliGRAM(s) Oral daily  furosemide    Tablet 40 milliGRAM(s) Oral daily  gabapentin 300 milliGRAM(s) Oral three times a day  gabapentin 300 milliGRAM(s) Oral once  insulin glargine Injectable (LANTUS) 16 Unit(s) SubCutaneous at bedtime  insulin lispro (ADMELOG) corrective regimen sliding scale   SubCutaneous three times a day before meals  insulin lispro (ADMELOG) corrective regimen sliding scale   SubCutaneous at bedtime  insulin lispro Injectable (ADMELOG) 7 Unit(s) SubCutaneous three times a day before meals  metoprolol succinate ER 25 milliGRAM(s) Oral daily  OLANZapine 7.5 milliGRAM(s) Oral daily  pantoprazole    Tablet 40 milliGRAM(s) Oral before breakfast  potassium chloride    Tablet ER 40 milliEquivalent(s) Oral once  predniSONE   Tablet 10 milliGRAM(s) Oral daily  sodium chloride 0.9% lock flush 3 milliLiter(s) IV Push every 8 hours  spironolactone 25 milliGRAM(s) Oral daily  traZODone 50 milliGRAM(s) Oral at bedtime      Physical Exam  General: Patient comfortable in bed  Vital Signs Last 12 Hrs  T(F): 97.6 (11-17-23 @ 10:38), Max: 98 (11-17-23 @ 05:20)  HR: 68 (11-17-23 @ 10:38) (68 - 73)  BP: 96/62 (11-17-23 @ 10:38) (96/62 - 109/71)  BP(mean): --  RR: 18 (11-17-23 @ 10:38) (18 - 18)  SpO2: 95% (11-17-23 @ 10:38) (93% - 95%)    CVS: S1S2   Respiratory: No wheezing, no crepitations  GI: Abdomen soft, bowel sounds positive  Musculoskeletal:  moves all extremities  : Voiding        Chief complaint  Patient is a 58y old  Female who presents with a chief complaint of        Labs and Fingersticks  CAPILLARY BLOOD GLUCOSE      POCT Blood Glucose.: 186 mg/dL (17 Nov 2023 07:46)  POCT Blood Glucose.: 115 mg/dL (16 Nov 2023 21:32)  POCT Blood Glucose.: 192 mg/dL (16 Nov 2023 16:53)      Anion Gap: 10 (11-17 @ 05:52)  Anion Gap: 10 (11-16 @ 06:15)      Calcium: 8.8 (11-17 @ 05:52)  Calcium: 9.1 (11-16 @ 06:15)  Albumin: 3.8 (11-17 @ 05:52)  Albumin: 3.8 (11-16 @ 06:15)    Alanine Aminotransferase (ALT/SGPT): 15 (11-17 @ 05:52)  Alanine Aminotransferase (ALT/SGPT): 14 (11-16 @ 06:15)  Alkaline Phosphatase: 117 (11-17 @ 05:52)  Alkaline Phosphatase: 135 *H* (11-16 @ 06:15)  Aspartate Aminotransferase (AST/SGOT): 9 *L* (11-17 @ 05:52)  Aspartate Aminotransferase (AST/SGOT): 11 (11-16 @ 06:15)        11-17    139  |  103  |  28<H>  ----------------------------<  156<H>  3.7   |  26  |  0.82    Ca    8.8      17 Nov 2023 05:52    TPro  6.0  /  Alb  3.8  /  TBili  0.7  /  DBili  x   /  AST  9<L>  /  ALT  15  /  AlkPhos  117  11-17                        10.8   11.47 )-----------( 230      ( 17 Nov 2023 05:52 )             34.3     Medications  MEDICATIONS  (STANDING):  acetaminophen     Tablet .. 650 milliGRAM(s) Oral every 6 hours  ascorbic acid 2000 milliGRAM(s) Oral once  aspirin enteric coated 81 milliGRAM(s) Oral daily  atorvastatin 80 milliGRAM(s) Oral at bedtime  budesonide 160 MICROgram(s)/formoterol 4.5 MICROgram(s) Inhaler 2 Puff(s) Inhalation two times a day  chlorhexidine 0.12% Liquid 30 milliLiter(s) Swish and Spit once  cholecalciferol 1000 Unit(s) Oral daily  enoxaparin Injectable 90 milliGRAM(s) SubCutaneous every 12 hours  folic acid 1 milliGRAM(s) Oral daily  furosemide    Tablet 40 milliGRAM(s) Oral daily  gabapentin 300 milliGRAM(s) Oral three times a day  gabapentin 300 milliGRAM(s) Oral once  insulin glargine Injectable (LANTUS) 16 Unit(s) SubCutaneous at bedtime  insulin lispro (ADMELOG) corrective regimen sliding scale   SubCutaneous three times a day before meals  insulin lispro (ADMELOG) corrective regimen sliding scale   SubCutaneous at bedtime  insulin lispro Injectable (ADMELOG) 7 Unit(s) SubCutaneous three times a day before meals  metoprolol succinate ER 25 milliGRAM(s) Oral daily  OLANZapine 7.5 milliGRAM(s) Oral daily  pantoprazole    Tablet 40 milliGRAM(s) Oral before breakfast  potassium chloride    Tablet ER 40 milliEquivalent(s) Oral once  predniSONE   Tablet 10 milliGRAM(s) Oral daily  sodium chloride 0.9% lock flush 3 milliLiter(s) IV Push every 8 hours  spironolactone 25 milliGRAM(s) Oral daily  traZODone 50 milliGRAM(s) Oral at bedtime      Physical Exam  General: Patient comfortable in bed  Vital Signs Last 12 Hrs  T(F): 97.6 (11-17-23 @ 10:38), Max: 98 (11-17-23 @ 05:20)  HR: 68 (11-17-23 @ 10:38) (68 - 73)  BP: 96/62 (11-17-23 @ 10:38) (96/62 - 109/71)  BP(mean): --  RR: 18 (11-17-23 @ 10:38) (18 - 18)  SpO2: 95% (11-17-23 @ 10:38) (93% - 95%)    CVS: S1S2   Respiratory: No wheezing, no crepitations  GI: Abdomen soft, bowel sounds positive  Musculoskeletal:  moves all extremities  : Voiding

## 2023-11-17 NOTE — PROGRESS NOTE ADULT - PROBLEM SELECTOR PLAN 1
Will increase Lantus to 16 units at bed time.  Will increase Admelog to 7  units before each meal in addition to Admelog correction scale coverage.  Will continue monitoring FS, log, and glucose trends, will Follow up.  Patient counseled for compliance with consistent low carb diet and exercise as tolerated outpatient.

## 2023-11-17 NOTE — PROGRESS NOTE ADULT - PROBLEM SELECTOR PLAN 1
-Plan OR friday CABG/AVR/MVR  -TTE EF 30% now with mod-severe AR  - LESLEY not tolerated well grossly sever AI, mod MR  - c/w lasix change to 40mg PO Daily -c/w metoprolol and atorvastatin 80mg Qd, ASA 81mg QD  - cath shows LAD prox 100% fills via right to left collateral will need AVR and LIMA to LAD bypass  - c/w LVNX 85mg Q12 -No OR planned as of yet.    -TTE EF 30% now with mod-severe AR  - LESLEY not tolerated well grossly sever AI, mod MR  - c/w lasix change to 40mg PO Daily -c/w metoprolol and atorvastatin 80mg Qd, ASA 81mg QD  - cath shows LAD prox 100% fills via right to left collateral will need AVR and LIMA to LAD bypass  - c/w LVNX 85mg Q12    MR viability planned 11/17/23

## 2023-11-17 NOTE — PROGRESS NOTE ADULT - PROBLEM SELECTOR PLAN 2
On medications,  no chest pain, stable, monitored and followed up by primary cardiothoracic team/cardiology team. preop CTS

## 2023-11-17 NOTE — PROGRESS NOTE ADULT - PROBLEM SELECTOR PLAN 2
-history of multiple CVAs and is on eliquis - Neuro consulted, CT head pending, f/u results/recs  -ILR interrogation with no events -history of multiple CVAs and is on Eliquis - Neuro consulted, CT head pending, f/u results/recs  -ILR interrogation with no events

## 2023-11-17 NOTE — PROGRESS NOTE ADULT - SUBJECTIVE AND OBJECTIVE BOX
Patient discussed on morning rounds with Dr. Loaiza      SUBJECTIVE ASSESSMENT:  Patient c/o being "very tired" today.  She didn't get any sleep last night due to the bed being very uncomfortable.  Otherwise she states she walks well on her own and eating normally.  Denies CP/SOB/N/V/D/dizziness/cough/fever/chills.  I explained to her that she won't be getting surgery on her valves, but that she needs an MRI of her heart to assess her heart function.     Vital Signs Last 24 Hrs  T(C): 36.7 (17 Nov 2023 05:20), Max: 36.8 (16 Nov 2023 13:43)  T(F): 98 (17 Nov 2023 05:20), Max: 98.2 (16 Nov 2023 13:43)  HR: 73 (17 Nov 2023 05:20) (72 - 76)  BP: 109/71 (17 Nov 2023 05:20) (105/50 - 109/71)  BP(mean): 68 (16 Nov 2023 19:00) (68 - 68)  RR: 18 (17 Nov 2023 05:20) (18 - 18)  SpO2: 93% (17 Nov 2023 05:20) (93% - 96%)    Parameters below as of 17 Nov 2023 05:20  Patient On (Oxygen Delivery Method): room air      I&O's Detail    16 Nov 2023 07:01  -  17 Nov 2023 07:00  --------------------------------------------------------  IN:    IV PiggyBack: 100 mL    Oral Fluid: 480 mL  Total IN: 580 mL    OUT:    Voided (mL): 800 mL  Total OUT: 800 mL    Total NET: -220 mL      PHYSICAL EXAM:    GEN: NAD, looks comfortable; Pleasant woman, heavy set body habitus  Psych: Mood appropriate  Neuro: A&Ox3.  No focal deficits.  Moving all extremities.   HEENT: No obvious abnormalities  CV: S1S2, regular, no murmurs appreciated.  No carotid bruits.  No JVD  Lungs: Clear B/L.  No wheezing, rales or rhonchi  ABD: Soft, non-tender, non-distended.  +Bowel sounds  EXT: Warm and well perfused.  No peripheral edema noted  Musculoskeletal: Moving all extremities with normal ROM, no joint swelling  PV: Pedal pulses palpable      LABS:                        10.8   11.47 )-----------( 230      ( 17 Nov 2023 05:52 )             34.3       11-17    139  |  103  |  28<H>  ----------------------------<  156<H>  3.7   |  26  |  0.82    Ca    8.8      17 Nov 2023 05:52    TPro  6.0  /  Alb  3.8  /  TBili  0.7  /  DBili  x   /  AST  9<L>  /  ALT  15  /  AlkPhos  117  11-17      Urinalysis Basic - ( 17 Nov 2023 05:52 )    Color: x / Appearance: x / SG: x / pH: x  Gluc: 156 mg/dL / Ketone: x  / Bili: x / Urobili: x   Blood: x / Protein: x / Nitrite: x   Leuk Esterase: x / RBC: x / WBC x   Sq Epi: x / Non Sq Epi: x / Bacteria: x        MEDICATIONS  (STANDING):  acetaminophen     Tablet .. 650 milliGRAM(s) Oral every 6 hours  ascorbic acid 2000 milliGRAM(s) Oral once  aspirin enteric coated 81 milliGRAM(s) Oral daily  atorvastatin 80 milliGRAM(s) Oral at bedtime  budesonide 160 MICROgram(s)/formoterol 4.5 MICROgram(s) Inhaler 2 Puff(s) Inhalation two times a day  chlorhexidine 0.12% Liquid 30 milliLiter(s) Swish and Spit once  cholecalciferol 1000 Unit(s) Oral daily  enoxaparin Injectable 90 milliGRAM(s) SubCutaneous every 12 hours  folic acid 1 milliGRAM(s) Oral daily  furosemide    Tablet 40 milliGRAM(s) Oral daily  gabapentin 300 milliGRAM(s) Oral three times a day  gabapentin 300 milliGRAM(s) Oral once  insulin glargine Injectable (LANTUS) 16 Unit(s) SubCutaneous at bedtime  insulin lispro (ADMELOG) corrective regimen sliding scale   SubCutaneous three times a day before meals  insulin lispro (ADMELOG) corrective regimen sliding scale   SubCutaneous at bedtime  insulin lispro Injectable (ADMELOG) 7 Unit(s) SubCutaneous three times a day before meals  metoprolol succinate ER 25 milliGRAM(s) Oral daily  OLANZapine 7.5 milliGRAM(s) Oral daily  pantoprazole    Tablet 40 milliGRAM(s) Oral before breakfast  potassium chloride    Tablet ER 40 milliEquivalent(s) Oral once  predniSONE   Tablet 10 milliGRAM(s) Oral daily  sodium chloride 0.9% lock flush 3 milliLiter(s) IV Push every 8 hours  spironolactone 25 milliGRAM(s) Oral daily  traZODone 50 milliGRAM(s) Oral at bedtime    MEDICATIONS  (PRN):  aluminum hydroxide/magnesium hydroxide/simethicone Suspension 30 milliLiter(s) Oral every 4 hours PRN Dyspepsia  cefuroxime  IVPB 1500 milliGRAM(s) IV Intermittent once PRN prophylaxis  melatonin 3 milliGRAM(s) Oral at bedtime PRN Insomnia  OLANZapine Injectable 2.5 milliGRAM(s) IntraMuscular every 12 hours PRN agitation        RADIOLOGY & ADDITIONAL TESTS:

## 2023-11-17 NOTE — PROGRESS NOTE ADULT - SUBJECTIVE AND OBJECTIVE BOX
German Carrasco MD  Interventional Cardiology / Advance Heart Failure and Cardiac Transplant Specialist  [place] Office : [address] [place] [address] [place] [address]  Tel:   [place] Office : [address] [facility] [place] [address]  Tel: [phone]       Pt is lying in bed comfortable not in distress, no chest pains no SOB no palpitations  	  MEDICATIONS:  aspirin enteric coated 81 milliGRAM(s) Oral daily  enoxaparin Injectable 90 milliGRAM(s) SubCutaneous every 12 hours  furosemide    Tablet 40 milliGRAM(s) Oral daily  metoprolol succinate ER 25 milliGRAM(s) Oral daily  spironolactone 25 milliGRAM(s) Oral daily    cefuroxime  IVPB 1500 milliGRAM(s) IV Intermittent once PRN    budesonide 160 MICROgram(s)/formoterol 4.5 MICROgram(s) Inhaler 2 Puff(s) Inhalation two times a day    acetaminophen     Tablet .. 650 milliGRAM(s) Oral every 6 hours  gabapentin 300 milliGRAM(s) Oral three times a day  gabapentin 300 milliGRAM(s) Oral once  melatonin 3 milliGRAM(s) Oral at bedtime PRN  OLANZapine 7.5 milliGRAM(s) Oral daily  OLANZapine Injectable 2.5 milliGRAM(s) IntraMuscular every 12 hours PRN  traZODone 50 milliGRAM(s) Oral at bedtime    aluminum hydroxide/magnesium hydroxide/simethicone Suspension 30 milliLiter(s) Oral every 4 hours PRN  pantoprazole    Tablet 40 milliGRAM(s) Oral before breakfast    atorvastatin 80 milliGRAM(s) Oral at bedtime  insulin glargine Injectable (LANTUS) 16 Unit(s) SubCutaneous at bedtime  insulin lispro (ADMELOG) corrective regimen sliding scale   SubCutaneous three times a day before meals  insulin lispro (ADMELOG) corrective regimen sliding scale   SubCutaneous at bedtime  insulin lispro Injectable (ADMELOG) 8 Unit(s) SubCutaneous three times a day before meals  predniSONE   Tablet 10 milliGRAM(s) Oral daily    ascorbic acid 2000 milliGRAM(s) Oral once  chlorhexidine 0.12% Liquid 30 milliLiter(s) Swish and Spit once  cholecalciferol 1000 Unit(s) Oral daily  folic acid 1 milliGRAM(s) Oral daily  sodium chloride 0.9% lock flush 3 milliLiter(s) IV Push every 8 hours      PAST MEDICAL/SURGICAL HISTORY  PAST MEDICAL & SURGICAL HISTORY:  Cigarette smoker      Rheumatoid arthritis      Other depression      Breast cancer      Migraines      History of multiple cerebrovascular accidents (CVAs)      Suicidal ideation      Paresthesia of left arm      Facial paresthesia      APS (antiphospholipid syndrome)      History of hysterectomy      History of cholecystectomy          SOCIAL HISTORY: Substance Use (street drugs): ( x ) never used  (  ) other:    FAMILY HISTORY:  Family history of breast cancer (Mother)    Family history of diabetes mellitus (DM) (Father)      PHYSICAL EXAM:  T(C): 36.7 (11-17-23 @ 19:00), Max: 36.7 (11-17-23 @ 05:20)  HR: 75 (11-17-23 @ 19:00) (68 - 75)  BP: 95/62 (11-17-23 @ 19:00) (95/62 - 109/71)  RR: 18 (11-17-23 @ 19:00) (18 - 18)  SpO2: 95% (11-17-23 @ 19:00) (93% - 95%)  Wt(kg): --  I&O's Summary    16 Nov 2023 07:01  -  17 Nov 2023 07:00  --------------------------------------------------------  IN: 700 mL / OUT: 800 mL / NET: -100 mL    17 Nov 2023 07:01  -  17 Nov 2023 22:38  --------------------------------------------------------  IN: 390 mL / OUT: 600 mL / NET: -210 mL        EYES:   PERRLA   ENMT:   Moist mucous membranes, Good dentition, No lesions  Cardiovascular: Normal S1 S2, No JVD, No murmurs, No edema  Respiratory: Lungs clear to auscultation	  Gastrointestinal:  Soft, Non-tender, + BS	  Extremities: no edema                                    10.8   11.47 )-----------( 230      ( 17 Nov 2023 05:52 )             34.3     11-17    139  |  103  |  28<H>  ----------------------------<  156<H>  3.7   |  26  |  0.82    Ca    8.8      17 Nov 2023 05:52    TPro  6.0  /  Alb  3.8  /  TBili  0.7  /  DBili  x   /  AST  9<L>  /  ALT  15  /  AlkPhos  117  11-17    proBNP:   Lipid Profile:   HgA1c:   TSH:     Consultant(s) Notes Reviewed:  [x ] YES  [ ] NO    Care Discussed with Consultants/Other Providers [ x] YES  [ ] NO    Imaging Personally Reviewed independently:  [x] YES  [ ] NO    All labs, radiologic studies, vitals, orders and medications list reviewed. Patient is seen and examined at bedside. Case discussed with medical team.

## 2023-11-17 NOTE — PROGRESS NOTE ADULT - ASSESSMENT
58 female smoker with PMH CVA with L sided weakness/numbness/L gaze deviation, DM2 with b/l peripheral neuropathy, COPD/Asthma, Breast Ca s/p ?RT, Bipolar depression, Rheumatoid Arthritis, Antiphospholipid syndrome, and L eye uveitis/scleritis, Presents to [facility] transferred from [facility]. p/w multiple medical complaints. A/w exacerbation of L-sided subjective weakness/numbness likely 2/2 recrudescence of prior Right BG infarct. Cardiology following for new LV dysfunction since July, pending ischemic eval. LESLEY was done showing severe Aortic Insufficiency and moderated MR. Cardiac cath was done showing LAD prox 100% fills via right to left collateral. Patient now transferred to [facility] Dr. Loaiza for evaluation.    11/14 patient seen and examined at bedside. All labs and imaging to be reviewed by Dr. Loaiza   11/15:VSS, neuro consult for hx cva L sided weakness, Preop for Friday 11/16 Repeat echo shows only mild valve pathology  11/17 Valve surgery canceled.  Getting MR viability             58 female smoker with PMH CVA with L sided weakness/numbness/L gaze deviation, DM2 with b/l peripheral neuropathy, COPD/Asthma, Breast Ca s/p ?RT, Bipolar depression, Rheumatoid Arthritis, Antiphospholipid syndrome, and L eye uveitis/scleritis, Presents to [facility] transferred from [facility]. p/w multiple medical complaints. A/w exacerbation of L-sided subjective weakness/numbness likely 2/2 recrudescence of prior Right BG infarct. Cardiology following for new LV dysfunction since July, pending ischemic eval. LESLEY was done showing severe Aortic Insufficiency and moderated MR. Cardiac cath was done showing LAD prox 100% fills via right to left collateral. Patient now transferred to [facility] Dr. Loaiza for evaluation.    11/14 patient seen and examined at bedside. All labs and imaging to be reviewed by Dr. Loaiza   11/15:VSS, neuro consult for hx cva L sided weakness, Preop for Friday 11/16 Repeat echo shows only mild valve pathology  11/17 No valve surgery planned in light of repeat echo.  Getting MR viability for CAD and depressed LV function.

## 2023-11-17 NOTE — PROGRESS NOTE ADULT - ASSESSMENT
58F smoker with PMH CVA with L sided weakness/numbness/L gaze deviation, DM2 with b/l peripheral neuropathy, COPD/Asthma, Breast Ca s/p ?RT, Bipolar depression, Rheumatoid Arthritis, Antiphospholipid syndrome, and L eye uveitis/scleritis, Presents to [facility] transferred from [facility]. CTS eval     Assessment  DMT2: 58y Female with DM T2 with hyperglycemia, A1C 7.6% , was on oral meds at home, now on basal bolus insulin with coverage, blood sugars running high. Preop CTS  Non compliant with low carb diet, snacking in between.   Severe [place]: CTS eval, LESLEY, preop CTS.   CAD: on medications, stable, monitored. Cardiac MR viability   HTN: on antihypertensive medications, monitored, asymptomatic.  Obesity: No strict exercise routines, not on any weight loss program, neither on low calorie diet.        Discussed plan and management wit Dr Ladarius Durand MD  Cell: [phone]  Office: [phone]             58F smoker with PMH CVA with L sided weakness/numbness/L gaze deviation, DM2 with b/l peripheral neuropathy, COPD/Asthma, Breast Ca s/p ?RT,  Bipolar depression, Rheumatoid Arthritis, Antiphospholipid syndrome, and L eye uveitis/scleritis, Presents to [facility] transferred from [facility]. CTS eval     Assessment  DMT2: 58y Female with DM T2 with hyperglycemia, A1C 7.6% , was on oral meds at home, now on basal bolus insulin with coverage, blood sugars running high. Preop CTS  Non compliant with low carb diet, snacking in between.   Severe [place]: CTS eval, LESLEY, preop CTS.   CAD: on medications, stable, monitored. Cardiac MR viability   HTN: on antihypertensive medications, monitored, asymptomatic.  Obesity: No strict exercise routines, not on any weight loss program, neither on low calorie diet.        Discussed plan and management wit Dr Ladarius Durand MD  Cell: [phone]  Office: [phone]

## 2023-11-18 LAB
BUN SERPL-MCNC: 20 MG/DL — SIGNIFICANT CHANGE UP (ref 7–23)
BUN SERPL-MCNC: 20 MG/DL — SIGNIFICANT CHANGE UP (ref 7–23)
CALCIUM SERPL-MCNC: 9.1 MG/DL — SIGNIFICANT CHANGE UP (ref 8.4–10.5)
CALCIUM SERPL-MCNC: 9.1 MG/DL — SIGNIFICANT CHANGE UP (ref 8.4–10.5)
CHLORIDE SERPL-SCNC: 103 MMOL/L — SIGNIFICANT CHANGE UP (ref 96–108)
CHLORIDE SERPL-SCNC: 103 MMOL/L — SIGNIFICANT CHANGE UP (ref 96–108)
CO2 SERPL-SCNC: 24 MMOL/L — SIGNIFICANT CHANGE UP (ref 22–31)
CO2 SERPL-SCNC: 24 MMOL/L — SIGNIFICANT CHANGE UP (ref 22–31)
CREAT SERPL-MCNC: 0.67 MG/DL — SIGNIFICANT CHANGE UP (ref 0.5–1.3)
CREAT SERPL-MCNC: 0.67 MG/DL — SIGNIFICANT CHANGE UP (ref 0.5–1.3)
EGFR: 101 ML/MIN/1.73M2 — SIGNIFICANT CHANGE UP
EGFR: 101 ML/MIN/1.73M2 — SIGNIFICANT CHANGE UP
GLUCOSE BLDC GLUCOMTR-MCNC: 167 MG/DL — HIGH (ref 70–99)
GLUCOSE BLDC GLUCOMTR-MCNC: 167 MG/DL — HIGH (ref 70–99)
GLUCOSE BLDC GLUCOMTR-MCNC: 220 MG/DL — HIGH (ref 70–99)
GLUCOSE BLDC GLUCOMTR-MCNC: 220 MG/DL — HIGH (ref 70–99)
GLUCOSE BLDC GLUCOMTR-MCNC: 277 MG/DL — HIGH (ref 70–99)
GLUCOSE BLDC GLUCOMTR-MCNC: 277 MG/DL — HIGH (ref 70–99)
GLUCOSE BLDC GLUCOMTR-MCNC: 95 MG/DL — SIGNIFICANT CHANGE UP (ref 70–99)
GLUCOSE BLDC GLUCOMTR-MCNC: 95 MG/DL — SIGNIFICANT CHANGE UP (ref 70–99)
GLUCOSE SERPL-MCNC: 101 MG/DL — HIGH (ref 70–99)
GLUCOSE SERPL-MCNC: 101 MG/DL — HIGH (ref 70–99)
HCT VFR BLD CALC: 33.8 % — LOW (ref 34.5–45)
HCT VFR BLD CALC: 33.8 % — LOW (ref 34.5–45)
HGB BLD-MCNC: 10.7 G/DL — LOW (ref 11.5–15.5)
HGB BLD-MCNC: 10.7 G/DL — LOW (ref 11.5–15.5)
MAGNESIUM SERPL-MCNC: 2 MG/DL — SIGNIFICANT CHANGE UP (ref 1.6–2.6)
MAGNESIUM SERPL-MCNC: 2 MG/DL — SIGNIFICANT CHANGE UP (ref 1.6–2.6)
MCHC RBC-ENTMCNC: 29.6 PG — SIGNIFICANT CHANGE UP (ref 27–34)
MCHC RBC-ENTMCNC: 29.6 PG — SIGNIFICANT CHANGE UP (ref 27–34)
MCHC RBC-ENTMCNC: 31.7 GM/DL — LOW (ref 32–36)
MCHC RBC-ENTMCNC: 31.7 GM/DL — LOW (ref 32–36)
MCV RBC AUTO: 93.4 FL — SIGNIFICANT CHANGE UP (ref 80–100)
MCV RBC AUTO: 93.4 FL — SIGNIFICANT CHANGE UP (ref 80–100)
NRBC # BLD: 0 /100 WBCS — SIGNIFICANT CHANGE UP (ref 0–0)
NRBC # BLD: 0 /100 WBCS — SIGNIFICANT CHANGE UP (ref 0–0)
PLATELET # BLD AUTO: 225 K/UL — SIGNIFICANT CHANGE UP (ref 150–400)
PLATELET # BLD AUTO: 225 K/UL — SIGNIFICANT CHANGE UP (ref 150–400)
POTASSIUM SERPL-MCNC: 4.2 MMOL/L — SIGNIFICANT CHANGE UP (ref 3.5–5.3)
POTASSIUM SERPL-MCNC: 4.2 MMOL/L — SIGNIFICANT CHANGE UP (ref 3.5–5.3)
POTASSIUM SERPL-SCNC: 4.2 MMOL/L — SIGNIFICANT CHANGE UP (ref 3.5–5.3)
POTASSIUM SERPL-SCNC: 4.2 MMOL/L — SIGNIFICANT CHANGE UP (ref 3.5–5.3)
RBC # BLD: 3.62 M/UL — LOW (ref 3.8–5.2)
RBC # BLD: 3.62 M/UL — LOW (ref 3.8–5.2)
RBC # FLD: 16 % — HIGH (ref 10.3–14.5)
RBC # FLD: 16 % — HIGH (ref 10.3–14.5)
SODIUM SERPL-SCNC: 140 MMOL/L — SIGNIFICANT CHANGE UP (ref 135–145)
SODIUM SERPL-SCNC: 140 MMOL/L — SIGNIFICANT CHANGE UP (ref 135–145)
WBC # BLD: 13.33 K/UL — HIGH (ref 3.8–10.5)
WBC # BLD: 13.33 K/UL — HIGH (ref 3.8–10.5)
WBC # FLD AUTO: 13.33 K/UL — HIGH (ref 3.8–10.5)
WBC # FLD AUTO: 13.33 K/UL — HIGH (ref 3.8–10.5)

## 2023-11-18 PROCEDURE — 99232 SBSQ HOSP IP/OBS MODERATE 35: CPT

## 2023-11-18 RX ORDER — ACETAMINOPHEN 500 MG
1000 TABLET ORAL ONCE
Refills: 0 | Status: COMPLETED | OUTPATIENT
Start: 2023-11-18 | End: 2023-11-18

## 2023-11-18 RX ADMIN — Medication 25 MILLIGRAM(S): at 05:37

## 2023-11-18 RX ADMIN — Medication 3: at 12:04

## 2023-11-18 RX ADMIN — SODIUM CHLORIDE 3 MILLILITER(S): 9 INJECTION INTRAMUSCULAR; INTRAVENOUS; SUBCUTANEOUS at 05:35

## 2023-11-18 RX ADMIN — PANTOPRAZOLE SODIUM 40 MILLIGRAM(S): 20 TABLET, DELAYED RELEASE ORAL at 05:37

## 2023-11-18 RX ADMIN — SODIUM CHLORIDE 3 MILLILITER(S): 9 INJECTION INTRAMUSCULAR; INTRAVENOUS; SUBCUTANEOUS at 22:00

## 2023-11-18 RX ADMIN — Medication 1000 UNIT(S): at 12:05

## 2023-11-18 RX ADMIN — BUDESONIDE AND FORMOTEROL FUMARATE DIHYDRATE 2 PUFF(S): 160; 4.5 AEROSOL RESPIRATORY (INHALATION) at 05:38

## 2023-11-18 RX ADMIN — Medication 8 UNIT(S): at 17:36

## 2023-11-18 RX ADMIN — ENOXAPARIN SODIUM 90 MILLIGRAM(S): 100 INJECTION SUBCUTANEOUS at 17:35

## 2023-11-18 RX ADMIN — Medication 3 MILLIGRAM(S): at 22:25

## 2023-11-18 RX ADMIN — Medication 81 MILLIGRAM(S): at 12:06

## 2023-11-18 RX ADMIN — ATORVASTATIN CALCIUM 80 MILLIGRAM(S): 80 TABLET, FILM COATED ORAL at 22:25

## 2023-11-18 RX ADMIN — Medication 1 MILLIGRAM(S): at 12:05

## 2023-11-18 RX ADMIN — SODIUM CHLORIDE 3 MILLILITER(S): 9 INJECTION INTRAMUSCULAR; INTRAVENOUS; SUBCUTANEOUS at 14:51

## 2023-11-18 RX ADMIN — GABAPENTIN 300 MILLIGRAM(S): 400 CAPSULE ORAL at 14:44

## 2023-11-18 RX ADMIN — Medication 40 MILLIGRAM(S): at 05:37

## 2023-11-18 RX ADMIN — Medication 1000 MILLIGRAM(S): at 06:00

## 2023-11-18 RX ADMIN — Medication 8 UNIT(S): at 12:04

## 2023-11-18 RX ADMIN — BUDESONIDE AND FORMOTEROL FUMARATE DIHYDRATE 2 PUFF(S): 160; 4.5 AEROSOL RESPIRATORY (INHALATION) at 17:35

## 2023-11-18 RX ADMIN — OLANZAPINE 7.5 MILLIGRAM(S): 15 TABLET, FILM COATED ORAL at 12:06

## 2023-11-18 RX ADMIN — ENOXAPARIN SODIUM 90 MILLIGRAM(S): 100 INJECTION SUBCUTANEOUS at 05:37

## 2023-11-18 RX ADMIN — Medication 50 MILLIGRAM(S): at 22:25

## 2023-11-18 RX ADMIN — Medication 8 UNIT(S): at 08:20

## 2023-11-18 RX ADMIN — INSULIN GLARGINE 16 UNIT(S): 100 INJECTION, SOLUTION SUBCUTANEOUS at 22:38

## 2023-11-18 RX ADMIN — GABAPENTIN 300 MILLIGRAM(S): 400 CAPSULE ORAL at 05:36

## 2023-11-18 RX ADMIN — Medication 10 MILLIGRAM(S): at 05:37

## 2023-11-18 RX ADMIN — SPIRONOLACTONE 25 MILLIGRAM(S): 25 TABLET, FILM COATED ORAL at 05:37

## 2023-11-18 RX ADMIN — Medication 400 MILLIGRAM(S): at 05:36

## 2023-11-18 RX ADMIN — GABAPENTIN 300 MILLIGRAM(S): 400 CAPSULE ORAL at 22:26

## 2023-11-18 RX ADMIN — Medication 1: at 08:20

## 2023-11-18 NOTE — PROGRESS NOTE ADULT - SUBJECTIVE AND OBJECTIVE BOX
VITAL SIGNS-Telemetry:  SB/SR   Vital Signs Last 24 Hrs  T(C): 36.8 (23 @ 04:47), Max: 36.8 (23 @ 04:47)  T(F): 98.2 (23 @ 04:47), Max: 98.2 (23 @ 04:47)  HR: 65 (23 @ 04:47) (65 - 88)  BP: 92/57 (23 @ 04:47) (90/60 - 95/62)  RR: 18 (23 @ 04:47) (18 - 18)  SpO2: 98% (23 @ 04:47) (95% - 98%)          @ 07:01  -   @ 07:00  --------------------------------------------------------  IN: 690 mL / OUT: 700 mL / NET: -10 mL     @ 07:01  -   @ 11:33  --------------------------------------------------------  IN: 200 mL / OUT: 0 mL / NET: 200 mL    Daily     Daily Weight in k.3 (2023 08:51)    CAPILLARY BLOOD GLUCOSE  POCT Blood Glucose.: 167 mg/dL (2023 07:54)  POCT Blood Glucose.: 157 mg/dL (2023 21:55)  POCT Blood Glucose.: 296 mg/dL (2023 18:09)  POCT Blood Glucose.: 327 mg/dL (2023 16:46)  POCT Blood Glucose.: 208 mg/dL (2023 14:10)        PHYSICAL EXAM:  Neurology: alert and oriented x 3, nonfocal, no gross deficits  CV : S1S2  Lungs: cta  Abdomen: soft, nontender, nondistended, positive bowel sounds, last bowel movement         Extremities:     no edema no calf tenderness    acetaminophen     Tablet .. 650 milliGRAM(s) Oral every 6 hours  aluminum hydroxide/magnesium hydroxide/simethicone Suspension 30 milliLiter(s) Oral every 4 hours PRN  ascorbic acid 2000 milliGRAM(s) Oral once  aspirin enteric coated 81 milliGRAM(s) Oral daily  atorvastatin 80 milliGRAM(s) Oral at bedtime  budesonide 160 MICROgram(s)/formoterol 4.5 MICROgram(s) Inhaler 2 Puff(s) Inhalation two times a day  cefuroxime  IVPB 1500 milliGRAM(s) IV Intermittent once PRN  chlorhexidine 0.12% Liquid 30 milliLiter(s) Swish and Spit once  cholecalciferol 1000 Unit(s) Oral daily  enoxaparin Injectable 90 milliGRAM(s) SubCutaneous every 12 hours  folic acid 1 milliGRAM(s) Oral daily  furosemide    Tablet 40 milliGRAM(s) Oral daily  gabapentin 300 milliGRAM(s) Oral once  gabapentin 300 milliGRAM(s) Oral three times a day  insulin glargine Injectable (LANTUS) 16 Unit(s) SubCutaneous at bedtime  insulin lispro (ADMELOG) corrective regimen sliding scale   SubCutaneous three times a day before meals  insulin lispro (ADMELOG) corrective regimen sliding scale   SubCutaneous at bedtime  insulin lispro Injectable (ADMELOG) 8 Unit(s) SubCutaneous three times a day before meals  melatonin 3 milliGRAM(s) Oral at bedtime PRN  metoprolol succinate ER 25 milliGRAM(s) Oral daily  OLANZapine 7.5 milliGRAM(s) Oral daily  OLANZapine Injectable 2.5 milliGRAM(s) IntraMuscular every 12 hours PRN  pantoprazole    Tablet 40 milliGRAM(s) Oral before breakfast  predniSONE   Tablet 10 milliGRAM(s) Oral daily  sodium chloride 0.9% lock flush 3 milliLiter(s) IV Push every 8 hours  spironolactone 25 milliGRAM(s) Oral daily  traZODone 50 milliGRAM(s) Oral at bedtime      Physical Therapy Rec:   Home  [ x ]   Home w/ PT  [  ]  Rehab  [  ]  Discussed with Cardiothoracic Team at AM rounds.

## 2023-11-18 NOTE — PROGRESS NOTE ADULT - ASSESSMENT
58F smoker with PMH CVA with L sided weakness/numbness/L gaze deviation, DM2 with b/l peripheral neuropathy, COPD/Asthma, Breast Ca s/p ?RT,  Bipolar depression, Rheumatoid Arthritis, Antiphospholipid syndrome, and L eye uveitis/scleritis, Presents to [facility] transferred from [facility]. CTS eval     Assessment  DMT2: 58y Female with DM T2 with hyperglycemia, A1C 7.6% , was on oral meds at home, now on basal bolus insulin with coverage, blood sugars trending down, eating meals.  Non compliant with low carb diet, snacking in between.   Severe [place]: CTS eval, LESLEY, preop CTS.   CAD: on medications, stable, monitored. Cardiac MR viability   HTN: on antihypertensive medications, monitored, asymptomatic.  Obesity: No strict exercise routines, not on any weight loss program, neither on low calorie diet.        Skye Durand MD  Cell: [phone]  Office: [phone]

## 2023-11-18 NOTE — PROGRESS NOTE ADULT - PROBLEM SELECTOR PLAN 1
11/18 hct 34- epogen - pci next week?  -TTE EF 30% now with mod-severe [place]  - LESLEY not tolerated well grossly sever AI, mod MR  - c/w lasix change to 40mg PO Daily -c/w metoprolol and atorvastatin 80mg Qd, ASA 81mg QD  - cath shows LAD prox 100% fills via right to left collateral will need AVR and LIMA to LAD bypass  - c/w LVNX 85mg Q12    MR viability planned 11/17/23

## 2023-11-18 NOTE — PROGRESS NOTE ADULT - ASSESSMENT
58 female smoker with PMH CVA with L sided weakness/numbness/L gaze deviation, DM2 with b/l peripheral neuropathy, COPD/Asthma, Breast Ca s/p ?RT, Bipolar depression, Rheumatoid Arthritis, Antiphospholipid syndrome, and L eye uveitis/scleritis, Presents to [facility] transferred from [facility]. p/w multiple medical complaints. A/w exacerbation of L-sided subjective weakness/numbness likely 2/2 recrudescence of prior Right BG infarct. Cardiology following for new LV dysfunction since July, pending ischemic eval. LESLEY was done showing severe Aortic Insufficiency and moderated MR. Cardiac cath was done showing LAD prox 100% fills via right to left collateral. Patient now transferred to [facility] Dr. Loaiza for evaluation.    11/14 patient seen and examined at bedside. All labs and imaging to be reviewed by Dr. Loaiza   11/15:VSS, neuro consult for hx cva L sided weakness, Preop for Friday 11/16 Repeat echo shows only mild valve pathology  11/17 No valve surgery planned in light of repeat echo.  Getting MR viability for CAD and depressed LV function.  11/18  VSS hct 34 - decreasing- spoke with Dr. Loaiza- will give Epogen 10,000 tiw to get hct up w/ iron - pt is most likely PCI candidate             58 female smoker with PMH CVA with L sided weakness/numbness/L gaze deviation, DM2 with b/l peripheral neuropathy, COPD/Asthma, Breast Ca s/p ?RT, Bipolar depression, Rheumatoid Arthritis, Antiphospholipid syndrome, and L eye uveitis/scleritis, Presents to [facility] transferred from [facility]. p/w multiple medical complaints. A/w exacerbation of L-sided subjective weakness/numbness likely 2/2 recrudescence of prior Right BG infarct. Cardiology following for new LV dysfunction since July, pending ischemic eval. GABBY was done showing severe Aortic Insufficiency and moderated MR. Cardiac cath was done showing LAD prox 100% fills via right to left collateral. Patient now transferred to [facility] Dr. Loaiza for evaluation.    11/14 patient seen and examined at bedside. All labs and imaging to be reviewed by Dr. Loaiza   11/15:VSS, neuro consult for hx cva L sided weakness, Preop for Friday 11/16 Repeat echo shows only mild valve pathology  11/17 No valve surgery planned in light of repeat echo.  Getting MR viability for CAD and depressed LV function.  11/18  VSS rpt gabby on mon to assess valves

## 2023-11-18 NOTE — PROGRESS NOTE ADULT - SUBJECTIVE AND OBJECTIVE BOX
Chief complaint    Patient is a 58y old  Female who presents with a chief complaint of  Review of systems  Patient appears comfortable.    Labs and Fingersticks  CAPILLARY BLOOD GLUCOSE      POCT Blood Glucose.: 167 mg/dL (18 Nov 2023 07:54)  POCT Blood Glucose.: 157 mg/dL (17 Nov 2023 21:55)  POCT Blood Glucose.: 296 mg/dL (17 Nov 2023 18:09)  POCT Blood Glucose.: 327 mg/dL (17 Nov 2023 16:46)  POCT Blood Glucose.: 208 mg/dL (17 Nov 2023 14:10)      Anion Gap: 10 (11-17 @ 05:52)      Calcium: 9.1 (11-18 @ 07:14)  Calcium: 8.8 (11-17 @ 05:52)  Albumin: 3.8 (11-17 @ 05:52)    Alanine Aminotransferase (ALT/SGPT): 15 (11-17 @ 05:52)  Alkaline Phosphatase: 117 (11-17 @ 05:52)  Aspartate Aminotransferase (AST/SGOT): 9 *L* (11-17 @ 05:52)        11-18    140  |  103  |  20  ----------------------------<  101<H>  4.2   |  24  |  0.67    Ca    9.1      18 Nov 2023 07:14  Mg     2.0     11-18    TPro  6.0  /  Alb  3.8  /  TBili  0.7  /  DBili  x   /  AST  9<L>  /  ALT  15  /  AlkPhos  117  11-17                        10.7   13.33 )-----------( 225      ( 18 Nov 2023 07:14 )             33.8     Medications  MEDICATIONS  (STANDING):  acetaminophen     Tablet .. 650 milliGRAM(s) Oral every 6 hours  ascorbic acid 2000 milliGRAM(s) Oral once  aspirin enteric coated 81 milliGRAM(s) Oral daily  atorvastatin 80 milliGRAM(s) Oral at bedtime  budesonide 160 MICROgram(s)/formoterol 4.5 MICROgram(s) Inhaler 2 Puff(s) Inhalation two times a day  chlorhexidine 0.12% Liquid 30 milliLiter(s) Swish and Spit once  cholecalciferol 1000 Unit(s) Oral daily  enoxaparin Injectable 90 milliGRAM(s) SubCutaneous every 12 hours  folic acid 1 milliGRAM(s) Oral daily  furosemide    Tablet 40 milliGRAM(s) Oral daily  gabapentin 300 milliGRAM(s) Oral once  gabapentin 300 milliGRAM(s) Oral three times a day  insulin glargine Injectable (LANTUS) 16 Unit(s) SubCutaneous at bedtime  insulin lispro (ADMELOG) corrective regimen sliding scale   SubCutaneous three times a day before meals  insulin lispro (ADMELOG) corrective regimen sliding scale   SubCutaneous at bedtime  insulin lispro Injectable (ADMELOG) 8 Unit(s) SubCutaneous three times a day before meals  metoprolol succinate ER 25 milliGRAM(s) Oral daily  OLANZapine 7.5 milliGRAM(s) Oral daily  pantoprazole    Tablet 40 milliGRAM(s) Oral before breakfast  predniSONE   Tablet 10 milliGRAM(s) Oral daily  sodium chloride 0.9% lock flush 3 milliLiter(s) IV Push every 8 hours  spironolactone 25 milliGRAM(s) Oral daily  traZODone 50 milliGRAM(s) Oral at bedtime      Physical Exam  General: Patient appears comfortable.  Vital Signs Last 12 Hrs  T(F): 98.2 (11-18-23 @ 04:47), Max: 98.2 (11-18-23 @ 04:47)  HR: 65 (11-18-23 @ 04:47) (65 - 88)  BP: 92/57 (11-18-23 @ 04:47) (90/60 - 92/57)  BP(mean): --  RR: 18 (11-18-23 @ 04:47) (18 - 18)  SpO2: 98% (11-18-23 @ 04:47) (98% - 98%)  Neck: No palpable thyroid nodules.  CVS: S1S2, No murmurs  Respiratory: No wheezing, no crepitations  GI: Abdomen soft, non tender.    Diagnostics        Radiology:

## 2023-11-18 NOTE — PROGRESS NOTE ADULT - ASSESSMENT
58F with history of CVA with L sided weakness/numbness/L gaze deviation, DM2 with b/l peripheral neuropathy, COPD/Asthma, Breast Ca s/p ?RT, Bipolar depression, Rheumatoid Arthritis, Antiphospholipid syndrome, L eye uveitis/scleritis, Newly diagnosed DM2 (pt cannot remember which medication she takes for it), and current tobacco use who presents to the hospital with multiple complaints.     1. LV dysfunction  -new mod-severe segmental LV dysfunction in July has not had ischemic workup due to stroke at that time.  -c/w metoprolol and aldactone  -TTE EF 30% now with mod-severe AR  -c/w lasix 40mg PO Daily   -cath shows LAD prox 100% fills via right to left collateral   -CMR 11/17: Mild thinning and hypokinesis of the mid to apical anterior and  anteroseptal segments.  The left ventricular ejection fraction measures  43%.Findings consistent with ischemic cardiomyopathy.  -repeat echo shows AR is mild to mod, MR is mild ,  reviewed all echos,LESLEY showed worse AR then TTE here, LESLEY was incomplete as pt had hypotension and bradycardia during, will get LESLEY repeated again     2. CVA  -history of multiple CVAs and is on eliquis   -CT scan of head negative for any acute findings  -ILR interrogation with no events  -per neuro no plans for MRI       3. Antiphospholipid syndrome  -on lovenox

## 2023-11-18 NOTE — PROGRESS NOTE ADULT - SUBJECTIVE AND OBJECTIVE BOX
German Carrasco MD  Interventional Cardiology / Advance Heart Failure and Cardiac Transplant Specialist  [place] Office : [address] [place] [address]  Tel:   [place] Office : [address] [facility] [address]  Tel: [phone]      Subjective/Overnight events: Pt is lying in bed not in distress, no chest pains no SOB no palpitations  	  MEDICATIONS:  aspirin enteric coated 81 milliGRAM(s) Oral daily  enoxaparin Injectable 90 milliGRAM(s) SubCutaneous every 12 hours  furosemide    Tablet 40 milliGRAM(s) Oral daily  metoprolol succinate ER 25 milliGRAM(s) Oral daily  spironolactone 25 milliGRAM(s) Oral daily    cefuroxime  IVPB 1500 milliGRAM(s) IV Intermittent once PRN    budesonide 160 MICROgram(s)/formoterol 4.5 MICROgram(s) Inhaler 2 Puff(s) Inhalation two times a day    acetaminophen     Tablet .. 650 milliGRAM(s) Oral every 6 hours  gabapentin 300 milliGRAM(s) Oral once  gabapentin 300 milliGRAM(s) Oral three times a day  melatonin 3 milliGRAM(s) Oral at bedtime PRN  OLANZapine 7.5 milliGRAM(s) Oral daily  OLANZapine Injectable 2.5 milliGRAM(s) IntraMuscular every 12 hours PRN  traZODone 50 milliGRAM(s) Oral at bedtime    aluminum hydroxide/magnesium hydroxide/simethicone Suspension 30 milliLiter(s) Oral every 4 hours PRN  pantoprazole    Tablet 40 milliGRAM(s) Oral before breakfast    atorvastatin 80 milliGRAM(s) Oral at bedtime  insulin glargine Injectable (LANTUS) 16 Unit(s) SubCutaneous at bedtime  insulin lispro (ADMELOG) corrective regimen sliding scale   SubCutaneous three times a day before meals  insulin lispro (ADMELOG) corrective regimen sliding scale   SubCutaneous at bedtime  insulin lispro Injectable (ADMELOG) 8 Unit(s) SubCutaneous three times a day before meals  predniSONE   Tablet 10 milliGRAM(s) Oral daily    ascorbic acid 2000 milliGRAM(s) Oral once  chlorhexidine 0.12% Liquid 30 milliLiter(s) Swish and Spit once  cholecalciferol 1000 Unit(s) Oral daily  folic acid 1 milliGRAM(s) Oral daily  sodium chloride 0.9% lock flush 3 milliLiter(s) IV Push every 8 hours      PAST MEDICAL/SURGICAL HISTORY  PAST MEDICAL & SURGICAL HISTORY:  Cigarette smoker      Rheumatoid arthritis      Other depression      Breast cancer      Migraines      History of multiple cerebrovascular accidents (CVAs)      Suicidal ideation      Paresthesia of left arm      Facial paresthesia      APS (antiphospholipid syndrome)      History of hysterectomy      History of cholecystectomy          SOCIAL HISTORY: Substance Use (street drugs): ( x ) never used  (  ) other:    FAMILY HISTORY:  Family history of breast cancer (Mother)    Family history of diabetes mellitus (DM) (Father)            PHYSICAL EXAM:  T(C): 36.8 (11-18-23 @ 04:47), Max: 36.8 (11-18-23 @ 04:47)  HR: 65 (11-18-23 @ 04:47) (65 - 88)  BP: 92/57 (11-18-23 @ 04:47) (90/60 - 96/62)  RR: 18 (11-18-23 @ 04:47) (18 - 18)  SpO2: 98% (11-18-23 @ 04:47) (95% - 98%)  Wt(kg): --  I&O's Summary    17 Nov 2023 07:01  -  18 Nov 2023 07:00  --------------------------------------------------------  IN: 690 mL / OUT: 700 mL / NET: -10 mL    18 Nov 2023 07:01  -  18 Nov 2023 10:17  --------------------------------------------------------  IN: 200 mL / OUT: 0 mL / NET: 200 mL          GENERAL: NAD  EYES: EOMI, PERRLA, conjunctiva and sclera clear  ENMT: No tonsillar erythema, exudates, or enlargement  Cardiovascular: Normal S1 S2, No JVD, No murmurs, No edema  Respiratory: Lungs clear to auscultation	  Gastrointestinal:  Soft, Non-tender, + BS	  Extremities: No edema                                     10.7   13.33 )-----------( 225      ( 18 Nov 2023 07:14 )             33.8     11-18    140  |  103  |  20  ----------------------------<  101<H>  4.2   |  24  |  0.67    Ca    9.1      18 Nov 2023 07:14  Mg     2.0     11-18    TPro  6.0  /  Alb  3.8  /  TBili  0.7  /  DBili  x   /  AST  9<L>  /  ALT  15  /  AlkPhos  117  11-17    proBNP:   Lipid Profile:   HgA1c:   TSH:     Consultant(s) Notes Reviewed:  [x ] YES  [ ] NO    Care Discussed with Consultants/Other Providers [ x] YES  [ ] NO    Imaging Personally Reviewed independently:  [x] YES  [ ] NO    All labs, radiologic studies, vitals, orders and medications list reviewed. Patient is seen and examined at bedside. Case discussed with medical team.

## 2023-11-19 LAB
ANION GAP SERPL CALC-SCNC: 12 MMOL/L — SIGNIFICANT CHANGE UP (ref 5–17)
ANION GAP SERPL CALC-SCNC: 12 MMOL/L — SIGNIFICANT CHANGE UP (ref 5–17)
BUN SERPL-MCNC: 28 MG/DL — HIGH (ref 7–23)
BUN SERPL-MCNC: 28 MG/DL — HIGH (ref 7–23)
CALCIUM SERPL-MCNC: 9.3 MG/DL — SIGNIFICANT CHANGE UP (ref 8.4–10.5)
CALCIUM SERPL-MCNC: 9.3 MG/DL — SIGNIFICANT CHANGE UP (ref 8.4–10.5)
CHLORIDE SERPL-SCNC: 102 MMOL/L — SIGNIFICANT CHANGE UP (ref 96–108)
CHLORIDE SERPL-SCNC: 102 MMOL/L — SIGNIFICANT CHANGE UP (ref 96–108)
CO2 SERPL-SCNC: 23 MMOL/L — SIGNIFICANT CHANGE UP (ref 22–31)
CO2 SERPL-SCNC: 23 MMOL/L — SIGNIFICANT CHANGE UP (ref 22–31)
CREAT SERPL-MCNC: 0.7 MG/DL — SIGNIFICANT CHANGE UP (ref 0.5–1.3)
CREAT SERPL-MCNC: 0.7 MG/DL — SIGNIFICANT CHANGE UP (ref 0.5–1.3)
EGFR: 100 ML/MIN/1.73M2 — SIGNIFICANT CHANGE UP
EGFR: 100 ML/MIN/1.73M2 — SIGNIFICANT CHANGE UP
GLUCOSE BLDC GLUCOMTR-MCNC: 128 MG/DL — HIGH (ref 70–99)
GLUCOSE BLDC GLUCOMTR-MCNC: 128 MG/DL — HIGH (ref 70–99)
GLUCOSE BLDC GLUCOMTR-MCNC: 173 MG/DL — HIGH (ref 70–99)
GLUCOSE BLDC GLUCOMTR-MCNC: 173 MG/DL — HIGH (ref 70–99)
GLUCOSE BLDC GLUCOMTR-MCNC: 186 MG/DL — HIGH (ref 70–99)
GLUCOSE BLDC GLUCOMTR-MCNC: 186 MG/DL — HIGH (ref 70–99)
GLUCOSE BLDC GLUCOMTR-MCNC: 336 MG/DL — HIGH (ref 70–99)
GLUCOSE BLDC GLUCOMTR-MCNC: 336 MG/DL — HIGH (ref 70–99)
GLUCOSE BLDC GLUCOMTR-MCNC: 343 MG/DL — HIGH (ref 70–99)
GLUCOSE BLDC GLUCOMTR-MCNC: 343 MG/DL — HIGH (ref 70–99)
GLUCOSE SERPL-MCNC: 144 MG/DL — HIGH (ref 70–99)
GLUCOSE SERPL-MCNC: 144 MG/DL — HIGH (ref 70–99)
HCT VFR BLD CALC: 33.8 % — LOW (ref 34.5–45)
HCT VFR BLD CALC: 33.8 % — LOW (ref 34.5–45)
HGB BLD-MCNC: 10.7 G/DL — LOW (ref 11.5–15.5)
HGB BLD-MCNC: 10.7 G/DL — LOW (ref 11.5–15.5)
MAGNESIUM SERPL-MCNC: 2 MG/DL — SIGNIFICANT CHANGE UP (ref 1.6–2.6)
MAGNESIUM SERPL-MCNC: 2 MG/DL — SIGNIFICANT CHANGE UP (ref 1.6–2.6)
MCHC RBC-ENTMCNC: 29.4 PG — SIGNIFICANT CHANGE UP (ref 27–34)
MCHC RBC-ENTMCNC: 29.4 PG — SIGNIFICANT CHANGE UP (ref 27–34)
MCHC RBC-ENTMCNC: 31.7 GM/DL — LOW (ref 32–36)
MCHC RBC-ENTMCNC: 31.7 GM/DL — LOW (ref 32–36)
MCV RBC AUTO: 92.9 FL — SIGNIFICANT CHANGE UP (ref 80–100)
MCV RBC AUTO: 92.9 FL — SIGNIFICANT CHANGE UP (ref 80–100)
NRBC # BLD: 0 /100 WBCS — SIGNIFICANT CHANGE UP (ref 0–0)
NRBC # BLD: 0 /100 WBCS — SIGNIFICANT CHANGE UP (ref 0–0)
PLATELET # BLD AUTO: 250 K/UL — SIGNIFICANT CHANGE UP (ref 150–400)
PLATELET # BLD AUTO: 250 K/UL — SIGNIFICANT CHANGE UP (ref 150–400)
POTASSIUM SERPL-MCNC: 4 MMOL/L — SIGNIFICANT CHANGE UP (ref 3.5–5.3)
POTASSIUM SERPL-MCNC: 4 MMOL/L — SIGNIFICANT CHANGE UP (ref 3.5–5.3)
POTASSIUM SERPL-SCNC: 4 MMOL/L — SIGNIFICANT CHANGE UP (ref 3.5–5.3)
POTASSIUM SERPL-SCNC: 4 MMOL/L — SIGNIFICANT CHANGE UP (ref 3.5–5.3)
RBC # BLD: 3.64 M/UL — LOW (ref 3.8–5.2)
RBC # BLD: 3.64 M/UL — LOW (ref 3.8–5.2)
RBC # FLD: 15.9 % — HIGH (ref 10.3–14.5)
RBC # FLD: 15.9 % — HIGH (ref 10.3–14.5)
SODIUM SERPL-SCNC: 137 MMOL/L — SIGNIFICANT CHANGE UP (ref 135–145)
SODIUM SERPL-SCNC: 137 MMOL/L — SIGNIFICANT CHANGE UP (ref 135–145)
WBC # BLD: 13.7 K/UL — HIGH (ref 3.8–10.5)
WBC # BLD: 13.7 K/UL — HIGH (ref 3.8–10.5)
WBC # FLD AUTO: 13.7 K/UL — HIGH (ref 3.8–10.5)
WBC # FLD AUTO: 13.7 K/UL — HIGH (ref 3.8–10.5)

## 2023-11-19 PROCEDURE — 99232 SBSQ HOSP IP/OBS MODERATE 35: CPT

## 2023-11-19 RX ADMIN — OLANZAPINE 7.5 MILLIGRAM(S): 15 TABLET, FILM COATED ORAL at 11:05

## 2023-11-19 RX ADMIN — ENOXAPARIN SODIUM 90 MILLIGRAM(S): 100 INJECTION SUBCUTANEOUS at 17:08

## 2023-11-19 RX ADMIN — BUDESONIDE AND FORMOTEROL FUMARATE DIHYDRATE 2 PUFF(S): 160; 4.5 AEROSOL RESPIRATORY (INHALATION) at 05:56

## 2023-11-19 RX ADMIN — Medication 650 MILLIGRAM(S): at 17:08

## 2023-11-19 RX ADMIN — Medication 650 MILLIGRAM(S): at 05:27

## 2023-11-19 RX ADMIN — Medication 50 MILLIGRAM(S): at 22:59

## 2023-11-19 RX ADMIN — Medication 650 MILLIGRAM(S): at 05:57

## 2023-11-19 RX ADMIN — Medication 1: at 17:09

## 2023-11-19 RX ADMIN — Medication 650 MILLIGRAM(S): at 00:32

## 2023-11-19 RX ADMIN — PANTOPRAZOLE SODIUM 40 MILLIGRAM(S): 20 TABLET, DELAYED RELEASE ORAL at 05:56

## 2023-11-19 RX ADMIN — Medication 8 UNIT(S): at 17:09

## 2023-11-19 RX ADMIN — SODIUM CHLORIDE 3 MILLILITER(S): 9 INJECTION INTRAMUSCULAR; INTRAVENOUS; SUBCUTANEOUS at 21:14

## 2023-11-19 RX ADMIN — GABAPENTIN 300 MILLIGRAM(S): 400 CAPSULE ORAL at 15:16

## 2023-11-19 RX ADMIN — SODIUM CHLORIDE 3 MILLILITER(S): 9 INJECTION INTRAMUSCULAR; INTRAVENOUS; SUBCUTANEOUS at 06:00

## 2023-11-19 RX ADMIN — ATORVASTATIN CALCIUM 80 MILLIGRAM(S): 80 TABLET, FILM COATED ORAL at 22:59

## 2023-11-19 RX ADMIN — Medication 40 MILLIGRAM(S): at 05:56

## 2023-11-19 RX ADMIN — Medication 10 MILLIGRAM(S): at 05:56

## 2023-11-19 RX ADMIN — SODIUM CHLORIDE 3 MILLILITER(S): 9 INJECTION INTRAMUSCULAR; INTRAVENOUS; SUBCUTANEOUS at 13:01

## 2023-11-19 RX ADMIN — Medication 1000 UNIT(S): at 11:04

## 2023-11-19 RX ADMIN — GABAPENTIN 300 MILLIGRAM(S): 400 CAPSULE ORAL at 22:59

## 2023-11-19 RX ADMIN — SPIRONOLACTONE 25 MILLIGRAM(S): 25 TABLET, FILM COATED ORAL at 05:55

## 2023-11-19 RX ADMIN — Medication 8 UNIT(S): at 11:29

## 2023-11-19 RX ADMIN — Medication 4: at 22:58

## 2023-11-19 RX ADMIN — Medication 1 MILLIGRAM(S): at 11:05

## 2023-11-19 RX ADMIN — Medication 25 MILLIGRAM(S): at 05:56

## 2023-11-19 RX ADMIN — INSULIN GLARGINE 16 UNIT(S): 100 INJECTION, SOLUTION SUBCUTANEOUS at 22:58

## 2023-11-19 RX ADMIN — GABAPENTIN 300 MILLIGRAM(S): 400 CAPSULE ORAL at 05:56

## 2023-11-19 RX ADMIN — Medication 1: at 11:28

## 2023-11-19 RX ADMIN — Medication 650 MILLIGRAM(S): at 00:02

## 2023-11-19 RX ADMIN — Medication 81 MILLIGRAM(S): at 11:04

## 2023-11-19 RX ADMIN — Medication 8 UNIT(S): at 08:15

## 2023-11-19 RX ADMIN — Medication 650 MILLIGRAM(S): at 11:04

## 2023-11-19 RX ADMIN — ENOXAPARIN SODIUM 90 MILLIGRAM(S): 100 INJECTION SUBCUTANEOUS at 05:56

## 2023-11-19 RX ADMIN — BUDESONIDE AND FORMOTEROL FUMARATE DIHYDRATE 2 PUFF(S): 160; 4.5 AEROSOL RESPIRATORY (INHALATION) at 17:08

## 2023-11-19 NOTE — PROGRESS NOTE ADULT - ASSESSMENT
58F smoker with stroke x 2 (2022 and july 2023 with resid  L sided weakness/numbness  DM2 with b/l peripheral neuropathy, COPD/Asthma, Breast Ca s/p, Bipolar depression, Rheumatoid Arthritis, Antiphospholipid syndrome, and L eye uveitis/scleritis, Presents to [facility] transferred from [facility]. p/w multiple medical complaints and exacerbation of L-sided subjective weakness/numbness likely 2/2 recrudescence of prior Right BG infarct. Cardiology following for new LV dysfunction since July.Patient now transferred to [facility] Dr. Loaiza for evaluation. neuro called for prior strokes   LESLEY was done showing severe Aortic Insufficiency and moderated MR.   Cardiac cath was done showing LAD prox 100% fills via right to left collateral.   CTH with tinght chronic appearing R BG infarct   TTE EF 30%   CD neg   A1c 7.6   LDL 37   ILR interrogated no events  NIHSS 3  premrs3   cath shows LAD prox 100% fills via right to left collateral will need AVR and LIMA to LAD bypass  o/e with mild L facial and decrease sensation on L with mild 5-/5 weakness on L with slow FFM     Imprsesion:   prior nowe chronic appearing infarcts. R BG with residual L HP  DM peripheral neuropathy     - plan for LESLEY tomorrow   - for secondary stroke prevention on asa 81mg and high dose statin.  also on full dose lovenox given APLS  - gabapentin 300mg TID for neuropathy   - f/u rheum and heme/onc   - low risk for cardiac surgery from neuro/stroke standpoint   - telemetry  - PT/OT   - check FS, glucose control <180  - GI/DVT ppx   - Thank you for allowing me to participate in the care of this patient. Call with questions.   - spoke [facility] CTS team/ACP   Charles Crespo MD  Vascular Neurology  Office: [phone]

## 2023-11-19 NOTE — PROGRESS NOTE ADULT - SUBJECTIVE AND OBJECTIVE BOX
Neurology        S: patient seen doing okay.        Medications: MEDICATIONS  (STANDING):  acetaminophen     Tablet .. 650 milliGRAM(s) Oral every 6 hours  ascorbic acid 2000 milliGRAM(s) Oral once  aspirin enteric coated 81 milliGRAM(s) Oral daily  atorvastatin 80 milliGRAM(s) Oral at bedtime  budesonide 160 MICROgram(s)/formoterol 4.5 MICROgram(s) Inhaler 2 Puff(s) Inhalation two times a day  chlorhexidine 0.12% Liquid 30 milliLiter(s) Swish and Spit once  cholecalciferol 1000 Unit(s) Oral daily  enoxaparin Injectable 90 milliGRAM(s) SubCutaneous every 12 hours  folic acid 1 milliGRAM(s) Oral daily  furosemide    Tablet 40 milliGRAM(s) Oral daily  gabapentin 300 milliGRAM(s) Oral three times a day  gabapentin 300 milliGRAM(s) Oral once  insulin glargine Injectable (LANTUS) 16 Unit(s) SubCutaneous at bedtime  insulin lispro (ADMELOG) corrective regimen sliding scale   SubCutaneous three times a day before meals  insulin lispro (ADMELOG) corrective regimen sliding scale   SubCutaneous at bedtime  insulin lispro Injectable (ADMELOG) 8 Unit(s) SubCutaneous three times a day before meals  metoprolol succinate ER 25 milliGRAM(s) Oral daily  OLANZapine 7.5 milliGRAM(s) Oral daily  pantoprazole    Tablet 40 milliGRAM(s) Oral before breakfast  predniSONE   Tablet 10 milliGRAM(s) Oral daily  sodium chloride 0.9% lock flush 3 milliLiter(s) IV Push every 8 hours  spironolactone 25 milliGRAM(s) Oral daily  traZODone 50 milliGRAM(s) Oral at bedtime    MEDICATIONS  (PRN):  aluminum hydroxide/magnesium hydroxide/simethicone Suspension 30 milliLiter(s) Oral every 4 hours PRN Dyspepsia  cefuroxime  IVPB 1500 milliGRAM(s) IV Intermittent once PRN prophylaxis  melatonin 3 milliGRAM(s) Oral at bedtime PRN Insomnia  OLANZapine Injectable 2.5 milliGRAM(s) IntraMuscular every 12 hours PRN agitation       Vitals:  Vital Signs Last 24 Hrs  T(C): 36.6 (19 Nov 2023 12:11), Max: 36.6 (18 Nov 2023 20:17)  T(F): 97.9 (19 Nov 2023 12:11), Max: 97.9 (18 Nov 2023 20:17)  HR: 86 (19 Nov 2023 12:11) (66 - 86)  BP: 107/75 (19 Nov 2023 12:11) (99/65 - 119/74)  BP(mean): 76 (19 Nov 2023 05:25) (76 - 89)  RR: 18 (19 Nov 2023 12:11) (18 - 18)  SpO2: 97% (19 Nov 2023 12:11) (92% - 97%)    Parameters below as of 19 Nov 2023 12:11  Patient On (Oxygen Delivery Method): room air              General Exam:   General Appearance: Appropriately dressed and in no acute distress       Head: Normocephalic, atraumatic and no dysmorphic features  Ear, Nose, and Throat: Moist mucous membranes  CVS: S1S2+  Resp: No SOB, no wheeze or rhonchi  GI: soft NT/ND  Extremities: No edema or cyanosis  Skin: No bruises or rashes     Neurological Exam:  Mental Status: Awake, alert and oriented x 3.  Able to follow simple and complex verbal commands. Able to name and repeat. fluent speech. No obvious aphasia or dysarthria noted.   Cranial Nerves: PERRL, EOMI, VFFC, sensation V1-V3 decrease on L,  L facial asymmetry, equal elevation of palate, scm/trap 5/5, tongue is midline on protrusion. no obvious papilledema on fundoscopic exam. hearing is grossly intact.   Motor: Normal bulk, tone and strength throughout except mild L HP 5-/5   Sensation: decrease on L compared to R   Reflexes: 1+ throughout at biceps, brachioradialis, triceps, patellars and ankles bilaterally and equal. No clonus. R toe and L toe were both downgoing.  Coordination: No dysmetria on FNF   Gait: cane baseline     Data/Labs/Imaging which I personally reviewed.     Labs:    LABS:                          10.7   13.70 )-----------( 250      ( 19 Nov 2023 05:35 )             33.8     11-19    137  |  102  |  28<H>  ----------------------------<  144<H>  4.0   |  23  |  0.70    Ca    9.3      19 Nov 2023 05:34  Mg     2.0     11-19          Urinalysis Basic - ( 19 Nov 2023 05:34 )    Color: x / Appearance: x / SG: x / pH: x  Gluc: 144 mg/dL / Ketone: x  / Bili: x / Urobili: x   Blood: x / Protein: x / Nitrite: x   Leuk Esterase: x / RBC: x / WBC x   Sq Epi: x / Non Sq Epi: x / Bacteria: x

## 2023-11-19 NOTE — PROGRESS NOTE ADULT - SUBJECTIVE AND OBJECTIVE BOX
German Carrasco MD  Interventional Cardiology / Advance Heart Failure and Cardiac Transplant Specialist  [place] Office : [address] [place] [address]  Tel:   [place] Office : [address] [facility] [address]  Tel: [phone]      Subjective/Overnight events: Pt is lying in bed not in distress  	  MEDICATIONS:  aspirin enteric coated 81 milliGRAM(s) Oral daily  enoxaparin Injectable 90 milliGRAM(s) SubCutaneous every 12 hours  furosemide    Tablet 40 milliGRAM(s) Oral daily  metoprolol succinate ER 25 milliGRAM(s) Oral daily  spironolactone 25 milliGRAM(s) Oral daily    cefuroxime  IVPB 1500 milliGRAM(s) IV Intermittent once PRN    budesonide 160 MICROgram(s)/formoterol 4.5 MICROgram(s) Inhaler 2 Puff(s) Inhalation two times a day    acetaminophen     Tablet .. 650 milliGRAM(s) Oral every 6 hours  gabapentin 300 milliGRAM(s) Oral three times a day  gabapentin 300 milliGRAM(s) Oral once  melatonin 3 milliGRAM(s) Oral at bedtime PRN  OLANZapine 7.5 milliGRAM(s) Oral daily  OLANZapine Injectable 2.5 milliGRAM(s) IntraMuscular every 12 hours PRN  traZODone 50 milliGRAM(s) Oral at bedtime    aluminum hydroxide/magnesium hydroxide/simethicone Suspension 30 milliLiter(s) Oral every 4 hours PRN  pantoprazole    Tablet 40 milliGRAM(s) Oral before breakfast    atorvastatin 80 milliGRAM(s) Oral at bedtime  insulin glargine Injectable (LANTUS) 16 Unit(s) SubCutaneous at bedtime  insulin lispro (ADMELOG) corrective regimen sliding scale   SubCutaneous three times a day before meals  insulin lispro (ADMELOG) corrective regimen sliding scale   SubCutaneous at bedtime  insulin lispro Injectable (ADMELOG) 8 Unit(s) SubCutaneous three times a day before meals  predniSONE   Tablet 10 milliGRAM(s) Oral daily    ascorbic acid 2000 milliGRAM(s) Oral once  chlorhexidine 0.12% Liquid 30 milliLiter(s) Swish and Spit once  cholecalciferol 1000 Unit(s) Oral daily  folic acid 1 milliGRAM(s) Oral daily  sodium chloride 0.9% lock flush 3 milliLiter(s) IV Push every 8 hours      PAST MEDICAL/SURGICAL HISTORY  PAST MEDICAL & SURGICAL HISTORY:  Cigarette smoker      Rheumatoid arthritis      Other depression      Breast cancer      Migraines      History of multiple cerebrovascular accidents (CVAs)      Suicidal ideation      Paresthesia of left arm      Facial paresthesia      APS (antiphospholipid syndrome)      History of hysterectomy      History of cholecystectomy          SOCIAL HISTORY: Substance Use (street drugs): ( x ) never used  (  ) other:    FAMILY HISTORY:  Family history of breast cancer (Mother)    Family history of diabetes mellitus (DM) (Father)            PHYSICAL EXAM:  T(C): 36.6 (11-19-23 @ 05:25), Max: 37.3 (11-18-23 @ 12:17)  HR: 66 (11-19-23 @ 05:25) (66 - 80)  BP: 99/65 (11-19-23 @ 05:25) (99/65 - 119/74)  RR: 18 (11-19-23 @ 05:25) (18 - 18)  SpO2: 95% (11-19-23 @ 05:25) (92% - 97%)  Wt(kg): --  I&O's Summary    18 Nov 2023 07:01  -  19 Nov 2023 07:00  --------------------------------------------------------  IN: 1100 mL / OUT: 400 mL / NET: 700 mL    19 Nov 2023 07:01  -  19 Nov 2023 11:53  --------------------------------------------------------  IN: 120 mL / OUT: 0 mL / NET: 120 mL          GENERAL: NAD  EYES: EOMI, PERRLA, conjunctiva and sclera clear  ENMT: No tonsillar erythema, exudates, or enlargement  Cardiovascular: Normal S1 S2, No JVD, No murmurs, No edema  Respiratory: Lungs clear to auscultation	  Gastrointestinal:  Soft, Non-tender, + BS	  Extremities: No edema                                     10.7   13.70 )-----------( 250      ( 19 Nov 2023 05:35 )             33.8     11-19    137  |  102  |  28<H>  ----------------------------<  144<H>  4.0   |  23  |  0.70    Ca    9.3      19 Nov 2023 05:34  Mg     2.0     11-19      proBNP:   Lipid Profile:   HgA1c:   TSH:     Consultant(s) Notes Reviewed:  [x ] YES  [ ] NO    Care Discussed with Consultants/Other Providers [ x] YES  [ ] NO    Imaging Personally Reviewed independently:  [x] YES  [ ] NO    All labs, radiologic studies, vitals, orders and medications list reviewed. Patient is seen and examined at bedside. Case discussed with medical team.

## 2023-11-19 NOTE — PROGRESS NOTE ADULT - ASSESSMENT
58F smoker with PMH CVA with L sided weakness/numbness/L gaze deviation, DM2 with b/l peripheral neuropathy, COPD/Asthma, Breast Ca s/p ?RT,  Bipolar depression, Rheumatoid Arthritis, Antiphospholipid syndrome, and L eye uveitis/scleritis, Presents to [facility] transferred from [facility]. CTS eval     Assessment  DMT2: 58y Female with DM T2 with hyperglycemia, A1C 7.6% , was on oral meds at home, now on basal bolus insulin with coverage, blood sugars are improving, eating meals.  Non compliant with low carb diet, snacking in between.   Severe AR: CTS eval, LESLEY, preop CTS.   CAD: on medications, stable, monitored. Cardiac MR viability   HTN: on antihypertensive medications, monitored, asymptomatic.  Obesity: No strict exercise routines, not on any weight loss program, neither on low calorie diet.        Skye Durand MD  Cell: [phone]  Office: [phone]

## 2023-11-19 NOTE — PROGRESS NOTE ADULT - ASSESSMENT
58 female smoker with PMH CVA with L sided weakness/numbness/L gaze deviation, DM2 with b/l peripheral neuropathy, COPD/Asthma, Breast Ca s/p ?RT, Bipolar depression, Rheumatoid Arthritis, Antiphospholipid syndrome, and L eye uveitis/scleritis, Presents to [facility] transferred from [facility]. p/w multiple medical complaints. A/w exacerbation of L-sided subjective weakness/numbness likely 2/2 recrudescence of prior Right BG infarct. Cardiology following for new LV dysfunction since July, pending ischemic eval. GABBY was done showing severe Aortic Insufficiency and moderated MR. Cardiac cath was done showing LAD prox 100% fills via right to left collateral. Patient now transferred to [facility] Dr. Loaiza for evaluation.    11/14 patient seen and examined at bedside. All labs and imaging to be reviewed by Dr. Loaiza   11/15:VSS, neuro consult for hx cva L sided weakness, Preop for Friday 11/16 Repeat echo shows only mild valve pathology  11/17 No valve surgery planned in light of repeat echo.  Getting MR viability for CAD and depressed LV function.  11/18  vss for rpt gabby mon  11/19  npo after midnight for gabby tomorrow

## 2023-11-19 NOTE — PROGRESS NOTE ADULT - SUBJECTIVE AND OBJECTIVE BOX
VITAL SIGNS-Telemetry: SR 60s   Vital Signs Last 24 Hrs  T(C): 36.6 (23 @ 05:25), Max: 37.3 (23 @ 12:17)  T(F): 97.9 (23 @ 05:25), Max: 99.1 (23 @ 12:17)  HR: 66 (23 @ 05:25) (66 - 80)  BP: 99/65 (23 @ 05:25) (99/65 - 119/74)  RR: 18 (23 @ 05:25) (18 - 18)  SpO2: 95% (23 @ 05:25) (92% - 97%)          @ 07:01  -   @ 07:00  --------------------------------------------------------  IN: 690 mL / OUT: 700 mL / NET: -10 mL     @ 07:01  -   @ 05:28  --------------------------------------------------------  IN: 1100 mL / OUT: 400 mL / NET: 700 mL    Daily     Daily Weight in k.3 (2023 08:51)    CAPILLARY BLOOD GLUCOSE  POCT Blood Glucose.: 220 mg/dL (2023 21:18)  POCT Blood Glucose.: 95 mg/dL (2023 16:25)  POCT Blood Glucose.: 277 mg/dL (2023 11:45)  POCT Blood Glucose.: 167 mg/dL (2023 07:54)             PHYSICAL EXAM:  Neurology: alert and oriented x 3, nonfocal, no gross deficits  CV : S1S2  Lungs:cta  Abdomen: soft, nontender, nondistended, positive bowel sounds, last bowel movement         Extremities:     no edema    acetaminophen     Tablet .. 650 milliGRAM(s) Oral every 6 hours  aluminum hydroxide/magnesium hydroxide/simethicone Suspension 30 milliLiter(s) Oral every 4 hours PRN  ascorbic acid 2000 milliGRAM(s) Oral once  aspirin enteric coated 81 milliGRAM(s) Oral daily  atorvastatin 80 milliGRAM(s) Oral at bedtime  budesonide 160 MICROgram(s)/formoterol 4.5 MICROgram(s) Inhaler 2 Puff(s) Inhalation two times a day  cefuroxime  IVPB 1500 milliGRAM(s) IV Intermittent once PRN  chlorhexidine 0.12% Liquid 30 milliLiter(s) Swish and Spit once  cholecalciferol 1000 Unit(s) Oral daily  enoxaparin Injectable 90 milliGRAM(s) SubCutaneous every 12 hours  folic acid 1 milliGRAM(s) Oral daily  furosemide    Tablet 40 milliGRAM(s) Oral daily  gabapentin 300 milliGRAM(s) Oral three times a day  gabapentin 300 milliGRAM(s) Oral once  insulin glargine Injectable (LANTUS) 16 Unit(s) SubCutaneous at bedtime  insulin lispro (ADMELOG) corrective regimen sliding scale   SubCutaneous three times a day before meals  insulin lispro (ADMELOG) corrective regimen sliding scale   SubCutaneous at bedtime  insulin lispro Injectable (ADMELOG) 8 Unit(s) SubCutaneous three times a day before meals  melatonin 3 milliGRAM(s) Oral at bedtime PRN  metoprolol succinate ER 25 milliGRAM(s) Oral daily  OLANZapine 7.5 milliGRAM(s) Oral daily  OLANZapine Injectable 2.5 milliGRAM(s) IntraMuscular every 12 hours PRN  pantoprazole    Tablet 40 milliGRAM(s) Oral before breakfast  predniSONE   Tablet 10 milliGRAM(s) Oral daily  sodium chloride 0.9% lock flush 3 milliLiter(s) IV Push every 8 hours  spironolactone 25 milliGRAM(s) Oral daily  traZODone 50 milliGRAM(s) Oral at bedtime      Physical Therapy Rec:   Home  [ x ]   Home w/ PT  [  ]  Rehab  [  ]  Discussed with Cardiothoracic Team at AM rounds.

## 2023-11-20 ENCOUNTER — TRANSCRIPTION ENCOUNTER (OUTPATIENT)
Age: 58
End: 2023-11-20

## 2023-11-20 LAB
ANION GAP SERPL CALC-SCNC: 14 MMOL/L — SIGNIFICANT CHANGE UP (ref 5–17)
ANION GAP SERPL CALC-SCNC: 14 MMOL/L — SIGNIFICANT CHANGE UP (ref 5–17)
APPEARANCE UR: CLEAR — SIGNIFICANT CHANGE UP
APPEARANCE UR: CLEAR — SIGNIFICANT CHANGE UP
BILIRUB UR-MCNC: NEGATIVE — SIGNIFICANT CHANGE UP
BILIRUB UR-MCNC: NEGATIVE — SIGNIFICANT CHANGE UP
BUN SERPL-MCNC: 30 MG/DL — HIGH (ref 7–23)
BUN SERPL-MCNC: 30 MG/DL — HIGH (ref 7–23)
CALCIUM SERPL-MCNC: 9.4 MG/DL — SIGNIFICANT CHANGE UP (ref 8.4–10.5)
CALCIUM SERPL-MCNC: 9.4 MG/DL — SIGNIFICANT CHANGE UP (ref 8.4–10.5)
CHLORIDE SERPL-SCNC: 99 MMOL/L — SIGNIFICANT CHANGE UP (ref 96–108)
CHLORIDE SERPL-SCNC: 99 MMOL/L — SIGNIFICANT CHANGE UP (ref 96–108)
CO2 SERPL-SCNC: 24 MMOL/L — SIGNIFICANT CHANGE UP (ref 22–31)
CO2 SERPL-SCNC: 24 MMOL/L — SIGNIFICANT CHANGE UP (ref 22–31)
COLOR SPEC: YELLOW — SIGNIFICANT CHANGE UP
COLOR SPEC: YELLOW — SIGNIFICANT CHANGE UP
CREAT SERPL-MCNC: 0.75 MG/DL — SIGNIFICANT CHANGE UP (ref 0.5–1.3)
CREAT SERPL-MCNC: 0.75 MG/DL — SIGNIFICANT CHANGE UP (ref 0.5–1.3)
DIFF PNL FLD: NEGATIVE — SIGNIFICANT CHANGE UP
DIFF PNL FLD: NEGATIVE — SIGNIFICANT CHANGE UP
EGFR: 92 ML/MIN/1.73M2 — SIGNIFICANT CHANGE UP
EGFR: 92 ML/MIN/1.73M2 — SIGNIFICANT CHANGE UP
GLUCOSE BLDC GLUCOMTR-MCNC: 103 MG/DL — HIGH (ref 70–99)
GLUCOSE BLDC GLUCOMTR-MCNC: 103 MG/DL — HIGH (ref 70–99)
GLUCOSE BLDC GLUCOMTR-MCNC: 152 MG/DL — HIGH (ref 70–99)
GLUCOSE BLDC GLUCOMTR-MCNC: 152 MG/DL — HIGH (ref 70–99)
GLUCOSE BLDC GLUCOMTR-MCNC: 158 MG/DL — HIGH (ref 70–99)
GLUCOSE BLDC GLUCOMTR-MCNC: 158 MG/DL — HIGH (ref 70–99)
GLUCOSE BLDC GLUCOMTR-MCNC: 207 MG/DL — HIGH (ref 70–99)
GLUCOSE BLDC GLUCOMTR-MCNC: 207 MG/DL — HIGH (ref 70–99)
GLUCOSE BLDC GLUCOMTR-MCNC: 351 MG/DL — HIGH (ref 70–99)
GLUCOSE BLDC GLUCOMTR-MCNC: 351 MG/DL — HIGH (ref 70–99)
GLUCOSE SERPL-MCNC: 115 MG/DL — HIGH (ref 70–99)
GLUCOSE SERPL-MCNC: 115 MG/DL — HIGH (ref 70–99)
GLUCOSE UR QL: NEGATIVE MG/DL — SIGNIFICANT CHANGE UP
GLUCOSE UR QL: NEGATIVE MG/DL — SIGNIFICANT CHANGE UP
HCT VFR BLD CALC: 34.3 % — LOW (ref 34.5–45)
HCT VFR BLD CALC: 34.3 % — LOW (ref 34.5–45)
HGB BLD-MCNC: 10.9 G/DL — LOW (ref 11.5–15.5)
HGB BLD-MCNC: 10.9 G/DL — LOW (ref 11.5–15.5)
KETONES UR-MCNC: NEGATIVE MG/DL — SIGNIFICANT CHANGE UP
KETONES UR-MCNC: NEGATIVE MG/DL — SIGNIFICANT CHANGE UP
LEUKOCYTE ESTERASE UR-ACNC: NEGATIVE — SIGNIFICANT CHANGE UP
LEUKOCYTE ESTERASE UR-ACNC: NEGATIVE — SIGNIFICANT CHANGE UP
MCHC RBC-ENTMCNC: 29.5 PG — SIGNIFICANT CHANGE UP (ref 27–34)
MCHC RBC-ENTMCNC: 29.5 PG — SIGNIFICANT CHANGE UP (ref 27–34)
MCHC RBC-ENTMCNC: 31.8 GM/DL — LOW (ref 32–36)
MCHC RBC-ENTMCNC: 31.8 GM/DL — LOW (ref 32–36)
MCV RBC AUTO: 93 FL — SIGNIFICANT CHANGE UP (ref 80–100)
MCV RBC AUTO: 93 FL — SIGNIFICANT CHANGE UP (ref 80–100)
NITRITE UR-MCNC: NEGATIVE — SIGNIFICANT CHANGE UP
NITRITE UR-MCNC: NEGATIVE — SIGNIFICANT CHANGE UP
NRBC # BLD: 0 /100 WBCS — SIGNIFICANT CHANGE UP (ref 0–0)
NRBC # BLD: 0 /100 WBCS — SIGNIFICANT CHANGE UP (ref 0–0)
PH UR: 7 — SIGNIFICANT CHANGE UP (ref 5–8)
PH UR: 7 — SIGNIFICANT CHANGE UP (ref 5–8)
PLATELET # BLD AUTO: 285 K/UL — SIGNIFICANT CHANGE UP (ref 150–400)
PLATELET # BLD AUTO: 285 K/UL — SIGNIFICANT CHANGE UP (ref 150–400)
POTASSIUM SERPL-MCNC: 4 MMOL/L — SIGNIFICANT CHANGE UP (ref 3.5–5.3)
POTASSIUM SERPL-MCNC: 4 MMOL/L — SIGNIFICANT CHANGE UP (ref 3.5–5.3)
POTASSIUM SERPL-SCNC: 4 MMOL/L — SIGNIFICANT CHANGE UP (ref 3.5–5.3)
POTASSIUM SERPL-SCNC: 4 MMOL/L — SIGNIFICANT CHANGE UP (ref 3.5–5.3)
PROT UR-MCNC: NEGATIVE MG/DL — SIGNIFICANT CHANGE UP
PROT UR-MCNC: NEGATIVE MG/DL — SIGNIFICANT CHANGE UP
RBC # BLD: 3.69 M/UL — LOW (ref 3.8–5.2)
RBC # BLD: 3.69 M/UL — LOW (ref 3.8–5.2)
RBC # FLD: 15.9 % — HIGH (ref 10.3–14.5)
RBC # FLD: 15.9 % — HIGH (ref 10.3–14.5)
SODIUM SERPL-SCNC: 137 MMOL/L — SIGNIFICANT CHANGE UP (ref 135–145)
SODIUM SERPL-SCNC: 137 MMOL/L — SIGNIFICANT CHANGE UP (ref 135–145)
SP GR SPEC: 1.01 — SIGNIFICANT CHANGE UP (ref 1–1.03)
SP GR SPEC: 1.01 — SIGNIFICANT CHANGE UP (ref 1–1.03)
UROBILINOGEN FLD QL: 0.2 MG/DL — SIGNIFICANT CHANGE UP (ref 0.2–1)
UROBILINOGEN FLD QL: 0.2 MG/DL — SIGNIFICANT CHANGE UP (ref 0.2–1)
WBC # BLD: 16.04 K/UL — HIGH (ref 3.8–10.5)
WBC # BLD: 16.04 K/UL — HIGH (ref 3.8–10.5)
WBC # FLD AUTO: 16.04 K/UL — HIGH (ref 3.8–10.5)
WBC # FLD AUTO: 16.04 K/UL — HIGH (ref 3.8–10.5)

## 2023-11-20 PROCEDURE — 93320 DOPPLER ECHO COMPLETE: CPT | Mod: 26

## 2023-11-20 PROCEDURE — 93325 DOPPLER ECHO COLOR FLOW MAPG: CPT | Mod: 26

## 2023-11-20 PROCEDURE — 93312 ECHO TRANSESOPHAGEAL: CPT | Mod: 26

## 2023-11-20 PROCEDURE — 99232 SBSQ HOSP IP/OBS MODERATE 35: CPT

## 2023-11-20 RX ORDER — CHLORHEXIDINE GLUCONATE 213 G/1000ML
30 SOLUTION TOPICAL ONCE
Refills: 0 | Status: DISCONTINUED | OUTPATIENT
Start: 2023-11-20 | End: 2023-11-21

## 2023-11-20 RX ORDER — ASCORBIC ACID 60 MG
2000 TABLET,CHEWABLE ORAL ONCE
Refills: 0 | Status: COMPLETED | OUTPATIENT
Start: 2023-11-20 | End: 2023-11-20

## 2023-11-20 RX ORDER — CEFUROXIME AXETIL 250 MG
1500 TABLET ORAL ONCE
Refills: 0 | Status: COMPLETED | OUTPATIENT
Start: 2023-11-20 | End: 2023-11-21

## 2023-11-20 RX ORDER — CHLORHEXIDINE GLUCONATE 213 G/1000ML
1 SOLUTION TOPICAL ONCE
Refills: 0 | Status: COMPLETED | OUTPATIENT
Start: 2023-11-20 | End: 2023-11-20

## 2023-11-20 RX ORDER — ACETAMINOPHEN 500 MG
1000 TABLET ORAL ONCE
Refills: 0 | Status: COMPLETED | OUTPATIENT
Start: 2023-11-20 | End: 2023-11-21

## 2023-11-20 RX ORDER — GABAPENTIN 400 MG/1
300 CAPSULE ORAL ONCE
Refills: 0 | Status: COMPLETED | OUTPATIENT
Start: 2023-11-20 | End: 2023-11-21

## 2023-11-20 RX ADMIN — Medication 650 MILLIGRAM(S): at 01:00

## 2023-11-20 RX ADMIN — INSULIN GLARGINE 16 UNIT(S): 100 INJECTION, SOLUTION SUBCUTANEOUS at 22:16

## 2023-11-20 RX ADMIN — Medication 25 MILLIGRAM(S): at 05:42

## 2023-11-20 RX ADMIN — Medication 1000 UNIT(S): at 11:07

## 2023-11-20 RX ADMIN — Medication 40 MILLIGRAM(S): at 05:41

## 2023-11-20 RX ADMIN — Medication 81 MILLIGRAM(S): at 11:07

## 2023-11-20 RX ADMIN — OLANZAPINE 7.5 MILLIGRAM(S): 15 TABLET, FILM COATED ORAL at 11:07

## 2023-11-20 RX ADMIN — SPIRONOLACTONE 25 MILLIGRAM(S): 25 TABLET, FILM COATED ORAL at 05:42

## 2023-11-20 RX ADMIN — Medication 1: at 07:42

## 2023-11-20 RX ADMIN — BUDESONIDE AND FORMOTEROL FUMARATE DIHYDRATE 2 PUFF(S): 160; 4.5 AEROSOL RESPIRATORY (INHALATION) at 05:43

## 2023-11-20 RX ADMIN — Medication 6: at 22:17

## 2023-11-20 RX ADMIN — ENOXAPARIN SODIUM 90 MILLIGRAM(S): 100 INJECTION SUBCUTANEOUS at 17:56

## 2023-11-20 RX ADMIN — SODIUM CHLORIDE 3 MILLILITER(S): 9 INJECTION INTRAMUSCULAR; INTRAVENOUS; SUBCUTANEOUS at 22:23

## 2023-11-20 RX ADMIN — Medication 50 MILLIGRAM(S): at 22:15

## 2023-11-20 RX ADMIN — Medication 650 MILLIGRAM(S): at 11:07

## 2023-11-20 RX ADMIN — Medication 650 MILLIGRAM(S): at 00:55

## 2023-11-20 RX ADMIN — ENOXAPARIN SODIUM 90 MILLIGRAM(S): 100 INJECTION SUBCUTANEOUS at 05:41

## 2023-11-20 RX ADMIN — GABAPENTIN 300 MILLIGRAM(S): 400 CAPSULE ORAL at 13:45

## 2023-11-20 RX ADMIN — Medication 650 MILLIGRAM(S): at 17:56

## 2023-11-20 RX ADMIN — SODIUM CHLORIDE 3 MILLILITER(S): 9 INJECTION INTRAMUSCULAR; INTRAVENOUS; SUBCUTANEOUS at 14:00

## 2023-11-20 RX ADMIN — GABAPENTIN 300 MILLIGRAM(S): 400 CAPSULE ORAL at 05:42

## 2023-11-20 RX ADMIN — CHLORHEXIDINE GLUCONATE 1 APPLICATION(S): 213 SOLUTION TOPICAL at 22:23

## 2023-11-20 RX ADMIN — Medication 650 MILLIGRAM(S): at 22:46

## 2023-11-20 RX ADMIN — ATORVASTATIN CALCIUM 80 MILLIGRAM(S): 80 TABLET, FILM COATED ORAL at 22:15

## 2023-11-20 RX ADMIN — Medication 10 MILLIGRAM(S): at 05:41

## 2023-11-20 RX ADMIN — Medication 1 MILLIGRAM(S): at 11:07

## 2023-11-20 RX ADMIN — SODIUM CHLORIDE 3 MILLILITER(S): 9 INJECTION INTRAMUSCULAR; INTRAVENOUS; SUBCUTANEOUS at 06:25

## 2023-11-20 RX ADMIN — Medication 8 UNIT(S): at 18:00

## 2023-11-20 RX ADMIN — BUDESONIDE AND FORMOTEROL FUMARATE DIHYDRATE 2 PUFF(S): 160; 4.5 AEROSOL RESPIRATORY (INHALATION) at 17:56

## 2023-11-20 RX ADMIN — GABAPENTIN 300 MILLIGRAM(S): 400 CAPSULE ORAL at 22:16

## 2023-11-20 RX ADMIN — Medication 2000 MILLIGRAM(S): at 22:16

## 2023-11-20 RX ADMIN — Medication 2: at 11:40

## 2023-11-20 RX ADMIN — PANTOPRAZOLE SODIUM 40 MILLIGRAM(S): 20 TABLET, DELAYED RELEASE ORAL at 05:42

## 2023-11-20 RX ADMIN — Medication 650 MILLIGRAM(S): at 22:16

## 2023-11-20 NOTE — PROGRESS NOTE ADULT - ASSESSMENT
58F smoker with stroke x 2 (2022 and july 2023 with resid  L sided weakness/numbness  DM2 with b/l peripheral neuropathy, COPD/Asthma, Breast Ca s/p, Bipolar depression, Rheumatoid Arthritis, Antiphospholipid syndrome, and L eye uveitis/scleritis, Presents to [facility] transferred from [facility]. p/w multiple medical complaints and exacerbation of L-sided subjective weakness/numbness likely 2/2 recrudescence of prior Right BG infarct. Cardiology following for new LV dysfunction since July.Patient now transferred to CTS Dr. Loaiza for evaluation. neuro called for prior strokes   LESLEY was done showing severe Aortic Insufficiency and moderated MR.   Cardiac cath was done showing LAD prox 100% fills via right to left collateral.   CTH with tinght chronic appearing R BG infarct   TTE EF 30%   CD neg   A1c 7.6   LDL 37   ILR interrogated no events  NIHSS 3  premrs3   cath shows LAD prox 100% fills via right to left collateral will need AVR and LIMA to LAD bypass  o/e with mild L facial and decrease sensation on L with mild 5-/5 weakness on L with slow FFM     Imprsesion:   prior nowe chronic appearing infarcts. R BG with residual L HP  DM peripheral neuropathy     - plan for LESLEY today   - for secondary stroke prevention on asa 81mg and high dose statin.  also on full dose lovenox given APLS  - gabapentin 300mg TID for neuropathy   - f/u rheum and heme/onc   - low risk for cardiac surgery from neuro/stroke standpoint   - telemetry  - PT/OT   - check FS, glucose control <180  - GI/DVT ppx   - Thank you for allowing me to participate in the care of this patient. Call with questions.   - spoke [facility] CTS team/ACP   Charles Crespo MD  Vascular Neurology  Office: [phone]

## 2023-11-20 NOTE — PROGRESS NOTE ADULT - SUBJECTIVE AND OBJECTIVE BOX
Neurology        S: patient seen doing okay.  LESLEY today     Medications: MEDICATIONS  (STANDING):  acetaminophen     Tablet .. 650 milliGRAM(s) Oral every 6 hours  ascorbic acid 2000 milliGRAM(s) Oral once  aspirin enteric coated 81 milliGRAM(s) Oral daily  atorvastatin 80 milliGRAM(s) Oral at bedtime  budesonide 160 MICROgram(s)/formoterol 4.5 MICROgram(s) Inhaler 2 Puff(s) Inhalation two times a day  chlorhexidine 0.12% Liquid 30 milliLiter(s) Swish and Spit once  cholecalciferol 1000 Unit(s) Oral daily  enoxaparin Injectable 90 milliGRAM(s) SubCutaneous every 12 hours  folic acid 1 milliGRAM(s) Oral daily  furosemide    Tablet 40 milliGRAM(s) Oral daily  gabapentin 300 milliGRAM(s) Oral three times a day  gabapentin 300 milliGRAM(s) Oral once  insulin glargine Injectable (LANTUS) 16 Unit(s) SubCutaneous at bedtime  insulin lispro (ADMELOG) corrective regimen sliding scale   SubCutaneous three times a day before meals  insulin lispro (ADMELOG) corrective regimen sliding scale   SubCutaneous at bedtime  insulin lispro Injectable (ADMELOG) 8 Unit(s) SubCutaneous three times a day before meals  metoprolol succinate ER 25 milliGRAM(s) Oral daily  OLANZapine 7.5 milliGRAM(s) Oral daily  pantoprazole    Tablet 40 milliGRAM(s) Oral before breakfast  predniSONE   Tablet 10 milliGRAM(s) Oral daily  sodium chloride 0.9% lock flush 3 milliLiter(s) IV Push every 8 hours  spironolactone 25 milliGRAM(s) Oral daily  traZODone 50 milliGRAM(s) Oral at bedtime    MEDICATIONS  (PRN):  aluminum hydroxide/magnesium hydroxide/simethicone Suspension 30 milliLiter(s) Oral every 4 hours PRN Dyspepsia  cefuroxime  IVPB 1500 milliGRAM(s) IV Intermittent once PRN prophylaxis  melatonin 3 milliGRAM(s) Oral at bedtime PRN Insomnia  OLANZapine Injectable 2.5 milliGRAM(s) IntraMuscular every 12 hours PRN agitation       Vitals:  Vital Signs Last 24 Hrs  T(C): 36.5 (20 Nov 2023 04:51), Max: 36.8 (19 Nov 2023 19:43)  T(F): 97.7 (20 Nov 2023 04:51), Max: 98.3 (19 Nov 2023 19:43)  HR: 65 (20 Nov 2023 04:51) (65 - 86)  BP: 102/67 (20 Nov 2023 04:51) (102/67 - 121/74)  BP(mean): --  RR: 18 (20 Nov 2023 04:51) (18 - 18)  SpO2: 94% (20 Nov 2023 04:51) (93% - 97%)    Parameters below as of 19 Nov 2023 19:43  Patient On (Oxygen Delivery Method): room air              General Exam:   General Appearance: Appropriately dressed and in no acute distress       Head: Normocephalic, atraumatic and no dysmorphic features  Ear, Nose, and Throat: Moist mucous membranes  CVS: S1S2+  Resp: No SOB, no wheeze or rhonchi  GI: soft NT/ND  Extremities: No edema or cyanosis  Skin: No bruises or rashes     Neurological Exam:  Mental Status: Awake, alert and oriented x 3.  Able to follow simple and complex verbal commands. Able to name and repeat. fluent speech. No obvious aphasia or dysarthria noted.   Cranial Nerves: PERRL, EOMI, VFFC, sensation V1-V3 decrease on L,  L facial asymmetry, equal elevation of palate, scm/trap 5/5, tongue is midline on protrusion. no obvious papilledema on fundoscopic exam. hearing is grossly intact.   Motor: Normal bulk, tone and strength throughout except mild L HP 5-/5   Sensation: decrease on L compared to R   Reflexes: 1+ throughout at biceps, brachioradialis, triceps, patellars and ankles bilaterally and equal. No clonus. R toe and L toe were both downgoing.  Coordination: No dysmetria on FNF   Gait: cane baseline     Data/Labs/Imaging which I personally reviewed.     Labs:      LABS:                          10.9   16.04 )-----------( 285      ( 20 Nov 2023 06:18 )             34.3     11-20    137  |  99  |  30<H>  ----------------------------<  115<H>  4.0   |  24  |  0.75    Ca    9.4      20 Nov 2023 06:18  Mg     2.0     11-19          Urinalysis Basic - ( 20 Nov 2023 06:18 )    Color: x / Appearance: x / SG: x / pH: x  Gluc: 115 mg/dL / Ketone: x  / Bili: x / Urobili: x   Blood: x / Protein: x / Nitrite: x   Leuk Esterase: x / RBC: x / WBC x   Sq Epi: x / Non Sq Epi: x / Bacteria: x

## 2023-11-20 NOTE — PROGRESS NOTE ADULT - ASSESSMENT
58 female smoker with PMH CVA with L sided weakness/numbness/L gaze deviation, DM2 with b/l peripheral neuropathy, COPD/Asthma, Breast Ca s/p ?RT, Bipolar depression, Rheumatoid Arthritis, Antiphospholipid syndrome, and L eye uveitis/scleritis, Presents to [facility] transferred from [facility]. p/w multiple medical complaints. A/w exacerbation of L-sided subjective weakness/numbness likely 2/2 recrudescence of prior Right BG infarct. Cardiology following for new LV dysfunction since July, pending ischemic eval. GABBY was done showing severe Aortic Insufficiency and moderated MR. Cardiac cath was done showing LAD prox 100% fills via right to left collateral. Patient now transferred to [facility] Dr. Loaiza for evaluation.    11/14 patient seen and examined at bedside. All labs and imaging to be reviewed by Dr. Loaiza   11/15:VSS, neuro consult for hx cva L sided weakness, Preop for Friday 11/16 Repeat echo shows only mild valve pathology  11/17 No valve surgery planned in light of repeat echo.  Getting MR viability for CAD and depressed LV function.  11/18  vss for rpt gabby mon  11/19  npo after midnight for gabby tomorrow  11/20 VSS NPO for GABBY this am - to assess valves- plan: if no valvular surgery warranted - pt will be discharged home & return for a midcab w/ Dr. Loaiza

## 2023-11-20 NOTE — PROGRESS NOTE ADULT - ASSESSMENT
58F smoker with PMH CVA with L sided weakness/numbness/L gaze deviation, DM2 with b/l peripheral neuropathy, COPD/Asthma, Breast Ca s/p ?RT,  Bipolar depression, Rheumatoid Arthritis, Antiphospholipid syndrome, and L eye uveitis/scleritis, Presents to [facility] transferred from [facility]. CTS eval   Assessment  DMT2: 58y Female with DM T2 with hyperglycemia, A1C 7.6% , was on oral meds at home, now on basal bolus insulin with coverage, blood sugars are fluctuating , eating meals. NPO today for LESLEY  Non compliant with low carb diet, snacking in between.   Severe AR: CTS eval, LESLEY, preop CTS.   CAD: on medications, stable, monitored. Cardiac MR viability   HTN: on antihypertensive medications, monitored, asymptomatic.  Obesity: No strict exercise routines, not on any weight loss program, neither on low calorie diet.          Discussed plan and management with Dr Ladairus Rivera NP - TEAMS  Skye Durand MD  Cell: [phone]  Office: [phone]          58F smoker with PMH CVA with L sided weakness/numbness/L gaze deviation, DM2 with b/l peripheral neuropathy, COPD/Asthma, Breast Ca s/p ?RT,  Bipolar depression, Rheumatoid Arthritis, Antiphospholipid syndrome, and L eye uveitis/scleritis, Presents to [facility] transferred from [facility]. CTS eval   Assessment  DMT2: 58y Female with DM T2 with hyperglycemia, A1C 7.6% , was on oral meds at home, now on basal bolus insulin with coverage,  blood sugars are fluctuating , eating meals. NPO today for LESLEY  Non compliant with low carb diet, snacking in between.   Severe AR: CTS eval, LESLEY, preop CTS.   CAD: on medications, stable, monitored. Cardiac MR viability   HTN: on antihypertensive medications, monitored, asymptomatic.  Obesity: No strict exercise routines, not on any weight loss program, neither on low calorie diet.          Discussed plan and management with Dr Ladarius Rivera NP - TEAMS  Skey Durand MD  Cell: [phone]  Office: [phone]

## 2023-11-20 NOTE — PROGRESS NOTE ADULT - SUBJECTIVE AND OBJECTIVE BOX
German Carrasco MD  Interventional Cardiology / Endovascular Specialist  [place] Office : [address] [place] [id number] Tel:   [place] Office : [address] [facility] [address]  Tel: [phone]      Subjective/Overnight events: Patient lying in bed comfortably. No acute distress.   	  MEDICATIONS:  aspirin enteric coated 81 milliGRAM(s) Oral daily  enoxaparin Injectable 90 milliGRAM(s) SubCutaneous every 12 hours  furosemide    Tablet 40 milliGRAM(s) Oral daily  metoprolol succinate ER 25 milliGRAM(s) Oral daily  spironolactone 25 milliGRAM(s) Oral daily    cefuroxime  IVPB 1500 milliGRAM(s) IV Intermittent once PRN    budesonide 160 MICROgram(s)/formoterol 4.5 MICROgram(s) Inhaler 2 Puff(s) Inhalation two times a day    acetaminophen     Tablet .. 650 milliGRAM(s) Oral every 6 hours  gabapentin 300 milliGRAM(s) Oral once  gabapentin 300 milliGRAM(s) Oral three times a day  melatonin 3 milliGRAM(s) Oral at bedtime PRN  OLANZapine 7.5 milliGRAM(s) Oral daily  OLANZapine Injectable 2.5 milliGRAM(s) IntraMuscular every 12 hours PRN  traZODone 50 milliGRAM(s) Oral at bedtime    aluminum hydroxide/magnesium hydroxide/simethicone Suspension 30 milliLiter(s) Oral every 4 hours PRN  pantoprazole    Tablet 40 milliGRAM(s) Oral before breakfast    atorvastatin 80 milliGRAM(s) Oral at bedtime  insulin glargine Injectable (LANTUS) 16 Unit(s) SubCutaneous at bedtime  insulin lispro (ADMELOG) corrective regimen sliding scale   SubCutaneous at bedtime  insulin lispro (ADMELOG) corrective regimen sliding scale   SubCutaneous three times a day before meals  insulin lispro Injectable (ADMELOG) 8 Unit(s) SubCutaneous three times a day before meals  predniSONE   Tablet 10 milliGRAM(s) Oral daily    ascorbic acid 2000 milliGRAM(s) Oral once  chlorhexidine 0.12% Liquid 30 milliLiter(s) Swish and Spit once  cholecalciferol 1000 Unit(s) Oral daily  folic acid 1 milliGRAM(s) Oral daily  sodium chloride 0.9% lock flush 3 milliLiter(s) IV Push every 8 hours      PAST MEDICAL/SURGICAL HISTORY  PAST MEDICAL & SURGICAL HISTORY:  Cigarette smoker      Rheumatoid arthritis      Other depression      Breast cancer      Migraines      History of multiple cerebrovascular accidents (CVAs)      Suicidal ideation      Paresthesia of left arm      Facial paresthesia      APS (antiphospholipid syndrome)      History of hysterectomy      History of cholecystectomy          SOCIAL HISTORY: Substance Use (street drugs): ( x ) never used  (  ) other:    FAMILY HISTORY:  Family history of breast cancer (Mother)    Family history of diabetes mellitus (DM) (Father)          PHYSICAL EXAM:  T(C): 36.4 (11-20-23 @ 11:57), Max: 36.8 (11-19-23 @ 19:43)  HR: 66 (11-20-23 @ 11:57) (65 - 86)  BP: 128/84 (11-20-23 @ 11:57) (102/67 - 128/84)  RR: 18 (11-20-23 @ 11:57) (18 - 18)  SpO2: 95% (11-20-23 @ 11:57) (93% - 97%)  Wt(kg): --  I&O's Summary    19 Nov 2023 07:01  -  20 Nov 2023 07:00  --------------------------------------------------------  IN: 510 mL / OUT: 0 mL / NET: 510 mL    20 Nov 2023 07:01  -  20 Nov 2023 12:04  --------------------------------------------------------  IN: 0 mL / OUT: 0 mL / NET: 0 mL          GENERAL: NAD  EYES: EOMI, PERRLA, conjunctiva and sclera clear  ENMT: No tonsillar erythema, exudates, or enlargement  Cardiovascular: Normal S1 S2, No JVD, No murmurs, No edema  Respiratory: Lungs clear to auscultation	  Gastrointestinal:  Soft, Non-tender, + BS	  Extremities: No edema                                     10.9   16.04 )-----------( 285      ( 20 Nov 2023 06:18 )             34.3     11-20    137  |  99  |  30<H>  ----------------------------<  115<H>  4.0   |  24  |  0.75    Ca    9.4      20 Nov 2023 06:18  Mg     2.0     11-19      proBNP:   Lipid Profile:   HgA1c:   TSH:     Consultant(s) Notes Reviewed:  [x ] YES  [ ] NO    Care Discussed with Consultants/Other Providers [ x] YES  [ ] NO    Imaging Personally Reviewed independently:  [x] YES  [ ] NO    All labs, radiologic studies, vitals, orders and medications list reviewed. Patient is seen and examined at bedside. Case discussed with medical team.

## 2023-11-20 NOTE — PROGRESS NOTE ADULT - SUBJECTIVE AND OBJECTIVE BOX
Chief complaint  Patient is a 58y old  Female who presents with a chief complaint of sob (20 Nov 2023 06:55)         Labs and Fingersticks  CAPILLARY BLOOD GLUCOSE      POCT Blood Glucose.: 207 mg/dL (20 Nov 2023 11:29)  POCT Blood Glucose.: 152 mg/dL (20 Nov 2023 07:27)  POCT Blood Glucose.: 158 mg/dL (20 Nov 2023 01:51)  POCT Blood Glucose.: 343 mg/dL (19 Nov 2023 22:44)  POCT Blood Glucose.: 336 mg/dL (19 Nov 2023 21:43)  POCT Blood Glucose.: 173 mg/dL (19 Nov 2023 16:35)      Anion Gap: 14 (11-20 @ 06:18)  Anion Gap: 12 (11-19 @ 05:34)      Calcium: 9.4 (11-20 @ 06:18)  Calcium: 9.3 (11-19 @ 05:34)          11-20    137  |  99  |  30<H>  ----------------------------<  115<H>  4.0   |  24  |  0.75    Ca    9.4      20 Nov 2023 06:18  Mg     2.0     11-19                          10.9   16.04 )-----------( 285      ( 20 Nov 2023 06:18 )             34.3     Medications  MEDICATIONS  (STANDING):  acetaminophen     Tablet .. 650 milliGRAM(s) Oral every 6 hours  ascorbic acid 2000 milliGRAM(s) Oral once  aspirin enteric coated 81 milliGRAM(s) Oral daily  atorvastatin 80 milliGRAM(s) Oral at bedtime  budesonide 160 MICROgram(s)/formoterol 4.5 MICROgram(s) Inhaler 2 Puff(s) Inhalation two times a day  chlorhexidine 0.12% Liquid 30 milliLiter(s) Swish and Spit once  cholecalciferol 1000 Unit(s) Oral daily  enoxaparin Injectable 90 milliGRAM(s) SubCutaneous every 12 hours  folic acid 1 milliGRAM(s) Oral daily  furosemide    Tablet 40 milliGRAM(s) Oral daily  gabapentin 300 milliGRAM(s) Oral three times a day  gabapentin 300 milliGRAM(s) Oral once  insulin glargine Injectable (LANTUS) 16 Unit(s) SubCutaneous at bedtime  insulin lispro (ADMELOG) corrective regimen sliding scale   SubCutaneous three times a day before meals  insulin lispro (ADMELOG) corrective regimen sliding scale   SubCutaneous at bedtime  insulin lispro Injectable (ADMELOG) 8 Unit(s) SubCutaneous three times a day before meals  metoprolol succinate ER 25 milliGRAM(s) Oral daily  OLANZapine 7.5 milliGRAM(s) Oral daily  pantoprazole    Tablet 40 milliGRAM(s) Oral before breakfast  predniSONE   Tablet 10 milliGRAM(s) Oral daily  sodium chloride 0.9% lock flush 3 milliLiter(s) IV Push every 8 hours  spironolactone 25 milliGRAM(s) Oral daily  traZODone 50 milliGRAM(s) Oral at bedtime      Physical Exam  General: Patient comfortable in bed  Vital Signs Last 12 Hrs  T(F): 97.6 (11-20-23 @ 11:57), Max: 97.7 (11-20-23 @ 04:51)  HR: 66 (11-20-23 @ 11:57) (65 - 66)  BP: 128/84 (11-20-23 @ 11:57) (102/67 - 128/84)  BP(mean): --  RR: 18 (11-20-23 @ 11:57) (18 - 18)  SpO2: 95% (11-20-23 @ 11:57) (94% - 95%)    CVS: S1S2   Respiratory: No wheezing, no crepitations  GI: Abdomen soft, bowel sounds positive  Musculoskeletal:  moves all extremities  : Voiding        Chief complaint  Patient is a 58y old  Female who presents with a chief complaint of sob (20 Nov 2023 06:55)         Labs and Fingersticks  CAPILLARY BLOOD GLUCOSE      POCT Blood Glucose.: 207 mg/dL (20 Nov 2023 11:29)  POCT Blood Glucose.: 152 mg/dL (20 Nov 2023 07:27)  POCT Blood Glucose.: 158 mg/dL (20 Nov 2023 01:51)  POCT Blood Glucose.: 343 mg/dL (19 Nov 2023 22:44)  POCT Blood Glucose.: 336 mg/dL (19 Nov 2023 21:43)  POCT Blood Glucose.: 173 mg/dL (19 Nov 2023 16:35)      Anion Gap: 14 (11-20 @ 06:18)  Anion Gap: 12 (11-19 @ 05:34)      Calcium: 9.4 (11-20 @ 06:18)  Calcium: 9.3 (11-19 @ 05:34)          11-20    137  |  99  |  30<H>  ----------------------------<  115<H>  4.0   |  24  |  0.75    Ca    9.4      20 Nov 2023 06:18  Mg     2.0     11-19                          10.9   16.04 )-----------( 285      ( 20 Nov 2023 06:18 )             34.3     Medications  MEDICATIONS  (STANDING):  acetaminophen     Tablet .. 650 milliGRAM(s) Oral every 6 hours  ascorbic acid 2000 milliGRAM(s) Oral once  aspirin enteric coated 81 milliGRAM(s) Oral daily  atorvastatin 80 milliGRAM(s) Oral at bedtime  budesonide 160 MICROgram(s)/formoterol 4.5 MICROgram(s) Inhaler 2 Puff(s) Inhalation two times a day  chlorhexidine 0.12% Liquid 30 milliLiter(s) Swish and Spit once  cholecalciferol 1000 Unit(s) Oral daily  enoxaparin Injectable 90 milliGRAM(s) SubCutaneous every 12 hours  folic acid 1 milliGRAM(s) Oral daily  furosemide    Tablet 40 milliGRAM(s) Oral daily  gabapentin 300 milliGRAM(s) Oral three times a day  gabapentin 300 milliGRAM(s) Oral once  insulin glargine Injectable (LANTUS) 16 Unit(s) SubCutaneous at bedtime  insulin lispro (ADMELOG) corrective regimen sliding scale   SubCutaneous three times a day before meals  insulin lispro (ADMELOG) corrective regimen sliding scale   SubCutaneous at bedtime  insulin lispro Injectable (ADMELOG) 8 Unit(s) SubCutaneous three times a day before meals  metoprolol succinate ER 25 milliGRAM(s) Oral daily  OLANZapine 7.5 milliGRAM(s) Oral daily  pantoprazole    Tablet 40 milliGRAM(s) Oral before breakfast  predniSONE   Tablet 10 milliGRAM(s) Oral daily  sodium chloride 0.9% lock flush 3 milliLiter(s) IV Push every 8 hours  spironolactone 25 milliGRAM(s) Oral daily  traZODone 50 milliGRAM(s) Oral at bedtime      Physical Exam  General: Patient comfortable in bed  Vital Signs Last 12 Hrs  T(F): 97.6 (11-20-23 @ 11:57), Max: 97.7 (11-20-23 @ 04:51)  HR: 66 (11-20-23 @ 11:57) (65 - 66)  BP: 128/84 (11-20-23 @ 11:57) (102/67 - 128/84)  BP(mean): --  RR: 18 (11-20-23 @ 11:57) (18 - 18)  SpO2: 95% (11-20-23 @ 11:57) (94% - 95%)    CVS: S1S2   Respiratory: No wheezing, no crepitations  GI: Abdomen soft, bowel sounds positive  Musculoskeletal:  moves all extremities  : Voiding

## 2023-11-20 NOTE — PROGRESS NOTE ADULT - SUBJECTIVE AND OBJECTIVE BOX
VITAL SIGNS-Telemetry:  SR 60-90  Vital Signs Last 24 Hrs  T(C): 36.5 (23 @ 04:51), Max: 36.8 (23 @ 19:43)  T(F): 97.7 (23 @ 04:51), Max: 98.3 (23 @ 19:43)  HR: 65 (23 @ 04:51) (65 - 86)  BP: 102/67 (23 @ 04:51) (102/67 - 121/74)  RR: 18 (23 @ 04:51) (18 - 18)  SpO2: 94% (23 @ 04:51) (93% - 97%)          @ 07:01  -   @ 07:00  --------------------------------------------------------  IN: 1100 mL / OUT: 400 mL / NET: 700 mL     @ 07:01  -   @ 06:58  --------------------------------------------------------  IN: 510 mL / OUT: 0 mL / NET: 510 mL    Daily     Daily Weight in k.6 (2023 08:24)    CAPILLARY BLOOD GLUCOSE  POCT Blood Glucose.: 158 mg/dL (2023 01:51)  POCT Blood Glucose.: 343 mg/dL (2023 22:44)  POCT Blood Glucose.: 336 mg/dL (2023 21:43)  POCT Blood Glucose.: 173 mg/dL (2023 16:35)       PHYSICAL EXAM:  Neurology: alert and oriented x 3, nonfocal, no gross deficits  CV : S1S2  Lungs: cta  Abdomen: soft, nontender, nondistended, positive bowel sounds, last bowel movement         Extremities:     no edema, no calf tenderness    acetaminophen     Tablet .. 650 milliGRAM(s) Oral every 6 hours  aluminum hydroxide/magnesium hydroxide/simethicone Suspension 30 milliLiter(s) Oral every 4 hours PRN  ascorbic acid 2000 milliGRAM(s) Oral once  aspirin enteric coated 81 milliGRAM(s) Oral daily  atorvastatin 80 milliGRAM(s) Oral at bedtime  budesonide 160 MICROgram(s)/formoterol 4.5 MICROgram(s) Inhaler 2 Puff(s) Inhalation two times a day  cefuroxime  IVPB 1500 milliGRAM(s) IV Intermittent once PRN  chlorhexidine 0.12% Liquid 30 milliLiter(s) Swish and Spit once  cholecalciferol 1000 Unit(s) Oral daily  enoxaparin Injectable 90 milliGRAM(s) SubCutaneous every 12 hours  folic acid 1 milliGRAM(s) Oral daily  furosemide    Tablet 40 milliGRAM(s) Oral daily  gabapentin 300 milliGRAM(s) Oral three times a day  gabapentin 300 milliGRAM(s) Oral once  insulin glargine Injectable (LANTUS) 16 Unit(s) SubCutaneous at bedtime  insulin lispro (ADMELOG) corrective regimen sliding scale   SubCutaneous three times a day before meals  insulin lispro (ADMELOG) corrective regimen sliding scale   SubCutaneous at bedtime  insulin lispro Injectable (ADMELOG) 8 Unit(s) SubCutaneous three times a day before meals  melatonin 3 milliGRAM(s) Oral at bedtime PRN  metoprolol succinate ER 25 milliGRAM(s) Oral daily  OLANZapine 7.5 milliGRAM(s) Oral daily  OLANZapine Injectable 2.5 milliGRAM(s) IntraMuscular every 12 hours PRN  pantoprazole    Tablet 40 milliGRAM(s) Oral before breakfast  predniSONE   Tablet 10 milliGRAM(s) Oral daily  sodium chloride 0.9% lock flush 3 milliLiter(s) IV Push every 8 hours  spironolactone 25 milliGRAM(s) Oral daily  traZODone 50 milliGRAM(s) Oral at bedtime    Physical Therapy Rec:   Home  [ x ]   Home w/ PT  [  ]  Rehab  [  ]  Discussed with Cardiothoracic Team at AM rounds. VITAL SIGNS-Telemetry:  SR 60-90  Vital Signs Last 24 Hrs  T(C): 36.5 (23 @ 04:51), Max: 36.8 (23 @ 19:43)  T(F): 97.7 (23 @ 04:51), Max: 98.3 (23 @ 19:43)  HR: 65 (23 @ 04:51) (65 - 86)  BP: 102/67 (23 @ 04:51) (102/67 - 121/74)  RR: 18 (23 @ 04:51) (18 - 18)  SpO2: 94% (23 @ 04:51) (93% - 97%)          @ 07:01  -   @ 07:00  --------------------------------------------------------  IN: 1100 mL / OUT: 400 mL / NET: 700 mL     @ 07:01  -   @ 06:58  --------------------------------------------------------  IN: 510 mL / OUT: 0 mL / NET: 510 mL    Daily     Daily Weight in k.6 (2023 08:24)    CAPILLARY BLOOD GLUCOSE  POCT Blood Glucose.: 158 mg/dL (2023 01:51)  POCT Blood Glucose.: 343 mg/dL (2023 22:44)  POCT Blood Glucose.: 336 mg/dL (2023 21:43)  POCT Blood Glucose.: 173 mg/dL (2023 16:35)       PHYSICAL EXAM:  Neurology: alert and oriented x 3, nonfocal, no gross deficits  CV : S1S2  Lungs: cta  Abdomen: soft, nontender, nondistended, positive bowel sounds, last bowel movement         Extremities:     no edema, no calf tenderness  right groin ecchymosis no bruit    acetaminophen     Tablet .. 650 milliGRAM(s) Oral every 6 hours  aluminum hydroxide/magnesium hydroxide/simethicone Suspension 30 milliLiter(s) Oral every 4 hours PRN  ascorbic acid 2000 milliGRAM(s) Oral once  aspirin enteric coated 81 milliGRAM(s) Oral daily  atorvastatin 80 milliGRAM(s) Oral at bedtime  budesonide 160 MICROgram(s)/formoterol 4.5 MICROgram(s) Inhaler 2 Puff(s) Inhalation two times a day  cefuroxime  IVPB 1500 milliGRAM(s) IV Intermittent once PRN  chlorhexidine 0.12% Liquid 30 milliLiter(s) Swish and Spit once  cholecalciferol 1000 Unit(s) Oral daily  enoxaparin Injectable 90 milliGRAM(s) SubCutaneous every 12 hours  folic acid 1 milliGRAM(s) Oral daily  furosemide    Tablet 40 milliGRAM(s) Oral daily  gabapentin 300 milliGRAM(s) Oral three times a day  gabapentin 300 milliGRAM(s) Oral once  insulin glargine Injectable (LANTUS) 16 Unit(s) SubCutaneous at bedtime  insulin lispro (ADMELOG) corrective regimen sliding scale   SubCutaneous three times a day before meals  insulin lispro (ADMELOG) corrective regimen sliding scale   SubCutaneous at bedtime  insulin lispro Injectable (ADMELOG) 8 Unit(s) SubCutaneous three times a day before meals  melatonin 3 milliGRAM(s) Oral at bedtime PRN  metoprolol succinate ER 25 milliGRAM(s) Oral daily  OLANZapine 7.5 milliGRAM(s) Oral daily  OLANZapine Injectable 2.5 milliGRAM(s) IntraMuscular every 12 hours PRN  pantoprazole    Tablet 40 milliGRAM(s) Oral before breakfast  predniSONE   Tablet 10 milliGRAM(s) Oral daily  sodium chloride 0.9% lock flush 3 milliLiter(s) IV Push every 8 hours  spironolactone 25 milliGRAM(s) Oral daily  traZODone 50 milliGRAM(s) Oral at bedtime    Physical Therapy Rec:   Home  [ x ]   Home w/ PT  [  ]  Rehab  [  ]  Discussed with Cardiothoracic Team at AM rounds.

## 2023-11-20 NOTE — PRE PROCEDURE NOTE - PRE PROCEDURE EVALUATION
Cardiac Surgery Pre-op Note:     Surgeon: Diya Loaiza    Procedure: 23 C1, AVR, Poss MAZE    HPI:  58F smoker with PMH CVA with L sided weakness/numbness/L gaze deviation, DM2 with b/l peripheral neuropathy, COPD/Asthma, Breast Ca s/p ?RT, Bipolar depression, Rheumatoid Arthritis, Antiphospholipid syndrome, and L eye uveitis/scleritis, Presents to [facility] transferred from [facility]. p/w multiple medical complaints. A/w exacerbation of L-sided subjective weakness/numbness likely 2/2 recrudescence of prior Right BG infarct. Cardiology following for new LV dysfunction since July, pending ischemic eval. LESLEY was done showing severe Aortic Insufficiency and moderated MR. Cardiac cath was done showing LAD prox 100% fills via right to left collateral. Patient now transferred to [facility] Dr. Loaiza for evaluation.    (2023 22:28)      PAST MEDICAL & SURGICAL HISTORY:  Cigarette smoker      Rheumatoid arthritis      Other depression      Breast cancer      Migraines      History of multiple cerebrovascular accidents (CVAs)      Suicidal ideation      Paresthesia of left arm      Facial paresthesia      APS (antiphospholipid syndrome)      History of hysterectomy      History of cholecystectomy          MEDICATIONS  (STANDING):  acetaminophen     Tablet .. 1000 milliGRAM(s) Oral once  acetaminophen     Tablet .. 650 milliGRAM(s) Oral every 6 hours  ascorbic acid 2000 milliGRAM(s) Oral once  aspirin enteric coated 81 milliGRAM(s) Oral daily  atorvastatin 80 milliGRAM(s) Oral at bedtime  budesonide 160 MICROgram(s)/formoterol 4.5 MICROgram(s) Inhaler 2 Puff(s) Inhalation two times a day  cefuroxime  IVPB 1500 milliGRAM(s) IV Intermittent once  chlorhexidine 0.12% Liquid 30 milliLiter(s) Swish and Spit once  chlorhexidine 0.12% Liquid 30 milliLiter(s) Swish and Spit once  chlorhexidine 4% Liquid 1 Application(s) Topical once  cholecalciferol 1000 Unit(s) Oral daily  enoxaparin Injectable 90 milliGRAM(s) SubCutaneous every 12 hours  folic acid 1 milliGRAM(s) Oral daily  furosemide    Tablet 40 milliGRAM(s) Oral daily  gabapentin 300 milliGRAM(s) Oral three times a day  gabapentin 300 milliGRAM(s) Oral once  insulin glargine Injectable (LANTUS) 16 Unit(s) SubCutaneous at bedtime  insulin lispro (ADMELOG) corrective regimen sliding scale   SubCutaneous three times a day before meals  insulin lispro (ADMELOG) corrective regimen sliding scale   SubCutaneous at bedtime  insulin lispro Injectable (ADMELOG) 8 Unit(s) SubCutaneous three times a day before meals  metoprolol succinate ER 25 milliGRAM(s) Oral daily  OLANZapine 7.5 milliGRAM(s) Oral daily  pantoprazole    Tablet 40 milliGRAM(s) Oral before breakfast  predniSONE   Tablet 10 milliGRAM(s) Oral daily  sodium chloride 0.9% lock flush 3 milliLiter(s) IV Push every 8 hours  spironolactone 25 milliGRAM(s) Oral daily  traZODone 50 milliGRAM(s) Oral at bedtime    MEDICATIONS  (PRN):  aluminum hydroxide/magnesium hydroxide/simethicone Suspension 30 milliLiter(s) Oral every 4 hours PRN Dyspepsia  melatonin 3 milliGRAM(s) Oral at bedtime PRN Insomnia  OLANZapine Injectable 2.5 milliGRAM(s) IntraMuscular every 12 hours PRN agitation      Vital Signs Last 24 Hrs  T(C): 36.4 (23 @ 20:17), Max: 36.7 (23 @ 18:00)  T(F): 97.5 (-23 @ 20:17), Max: 98 (-23 @ 18:00)  HR: 74 (-23 @ 20:17) (63 - 74)  BP: 120/65 (-23 @ 20:17) (102/67 - 128/84)  RR: 18 (-23 @ 20:17) (16 - 18)  SpO2: 95% (-23 @ 20:17) (94% - 99%)             Daily Height in cm: 155 (2023 15:27)    Daily Weight in k.4 (2023 08:15)  Admit Wt: Drug Dosing Weight  Height (cm): 155 (2023 15:27)  Weight (kg): 86.9 (2023 15:27)  BMI (kg/m2): 36.2 (2023 15:27)  BSA (m2): 1.86 (2023 15:27)    Labs:                        10.9   16.04 )-----------( 285      ( 2023 06:18 )             34.3         137  |  99  |  30<H>  ----------------------------<  115<H>  4.0   |  24  |  0.75    Ca    9.4      2023 06:18  Mg     2.0                     Thyroid Panel:  @ 22:46/1.28  1.3/--/--    MRSA:  / MSSA:   Urinalysis Basic - ( 2023 06:18 )    Color: x / Appearance: x / SG: x / pH: x  Gluc: 115 mg/dL / Ketone: x  / Bili: x / Urobili: x   Blood: x / Protein: x / Nitrite: x   Leuk Esterase: x / RBC: x / WBC x   Sq Epi: x / Non Sq Epi: x / Bacteria: x        CXR:   No evidence of acute infiltrate or pleural effusion. No significant   interval change..      Carotid Duplex:      PFT's: done    Echocardiogram: CONCLUSIONS:      1. Left ventricular systolic function is moderately decreased with an ejection fraction visually estimated at 35 %.   2. Normal right ventricular cavity size, wall thickness, and systolic function.   3. Symmetric mitral valve leaflet tethering.   4. Mild mitral regurgitation.   5. Trileaflet aortic valve with normal systolic excursion. focal calcification of the aortic valve leaflets.   6. Severe aortic regurgitation.   7. No evidence of left atrial or left atrial appendage thrombus.The left atrial appendage emptying velocity is normal at 120 cm/s.   8. Compared to the transthoracic echocardiogram performed on 2023 more significant aortic regurgitation is seen on this study.    CTH:   IMPRESSION: Limited by beam hardening artifact. No acute intracranial   pathology. Punctate nonacute right basal ganglial lacunar infarct. No   large arterial distribution acute infarct.      Cardiac catheterization:   Diagnostic Findings:     Coronary Angiography   LM   Left main artery: Angiography shows no disease.      LAD   Proximal left anterior descending: fills via right to left  collaterals. There is a 100 % stenosis.    CX   Circumflex: Angiography shows minor irregularities.      RCA   Right coronary artery: Angiography shows no disease.          PHYSICAL EXAM:  Neuro: A&Ox3, NAD  Pulm: B/L BS CTA  CV: RRR,+S1S2  Abd: Soft, NT/ND +BSX4Q  Ext: B/L LE no edema, +PP, no calf tenderness    Pt has AICD/PPM [ ] Yes  [x ] No             Pre-op Beta Blocker ordered within 24 hrs of surgery (CABG ONLY)?  [x ] Yes  [ ] No  If not, Why?  Type & Cross  [X ] Yes  [ ] No  NPO after Midnight [x ] Yes  [ ] No  Pre-op ABX ordered, to be taped on chart:  [ x] Yes  [ ] No     Hibiclens/Peridex ordered [x ] Yes  [ ] No  Intraop on Hold: PRBCs, CXR, LESLEY [x ]   Consent obtained  [x ] Yes  [ ] No

## 2023-11-21 ENCOUNTER — RESULT REVIEW (OUTPATIENT)
Age: 58
End: 2023-11-21

## 2023-11-21 ENCOUNTER — APPOINTMENT (OUTPATIENT)
Dept: CARDIOTHORACIC SURGERY | Facility: HOSPITAL | Age: 58
End: 2023-11-21

## 2023-11-21 LAB
ALBUMIN SERPL ELPH-MCNC: 3.6 G/DL — SIGNIFICANT CHANGE UP (ref 3.3–5)
ALBUMIN SERPL ELPH-MCNC: 3.6 G/DL — SIGNIFICANT CHANGE UP (ref 3.3–5)
ALP SERPL-CCNC: 65 U/L — SIGNIFICANT CHANGE UP (ref 40–120)
ALP SERPL-CCNC: 65 U/L — SIGNIFICANT CHANGE UP (ref 40–120)
ALT FLD-CCNC: 27 U/L — SIGNIFICANT CHANGE UP (ref 10–45)
ALT FLD-CCNC: 27 U/L — SIGNIFICANT CHANGE UP (ref 10–45)
ANION GAP SERPL CALC-SCNC: 11 MMOL/L — SIGNIFICANT CHANGE UP (ref 5–17)
ANION GAP SERPL CALC-SCNC: 11 MMOL/L — SIGNIFICANT CHANGE UP (ref 5–17)
APTT BLD: 28.9 SEC — SIGNIFICANT CHANGE UP (ref 24.5–35.6)
APTT BLD: 28.9 SEC — SIGNIFICANT CHANGE UP (ref 24.5–35.6)
AST SERPL-CCNC: 55 U/L — HIGH (ref 10–40)
AST SERPL-CCNC: 55 U/L — HIGH (ref 10–40)
BASE EXCESS BLDMV CALC-SCNC: -2 MMOL/L — SIGNIFICANT CHANGE UP (ref -3–3)
BASE EXCESS BLDMV CALC-SCNC: -2 MMOL/L — SIGNIFICANT CHANGE UP (ref -3–3)
BASE EXCESS BLDMV CALC-SCNC: -6.7 MMOL/L — LOW (ref -3–3)
BASE EXCESS BLDMV CALC-SCNC: -6.7 MMOL/L — LOW (ref -3–3)
BASOPHILS # BLD AUTO: 0 K/UL — SIGNIFICANT CHANGE UP (ref 0–0.2)
BASOPHILS # BLD AUTO: 0 K/UL — SIGNIFICANT CHANGE UP (ref 0–0.2)
BASOPHILS NFR BLD AUTO: 0 % — SIGNIFICANT CHANGE UP (ref 0–2)
BASOPHILS NFR BLD AUTO: 0 % — SIGNIFICANT CHANGE UP (ref 0–2)
BILIRUB SERPL-MCNC: 1.8 MG/DL — HIGH (ref 0.2–1.2)
BILIRUB SERPL-MCNC: 1.8 MG/DL — HIGH (ref 0.2–1.2)
BLD GP AB SCN SERPL QL: NEGATIVE — SIGNIFICANT CHANGE UP
BLD GP AB SCN SERPL QL: NEGATIVE — SIGNIFICANT CHANGE UP
BUN SERPL-MCNC: 19 MG/DL — SIGNIFICANT CHANGE UP (ref 7–23)
BUN SERPL-MCNC: 19 MG/DL — SIGNIFICANT CHANGE UP (ref 7–23)
CALCIUM SERPL-MCNC: 7.9 MG/DL — LOW (ref 8.4–10.5)
CALCIUM SERPL-MCNC: 7.9 MG/DL — LOW (ref 8.4–10.5)
CHLORIDE SERPL-SCNC: 104 MMOL/L — SIGNIFICANT CHANGE UP (ref 96–108)
CHLORIDE SERPL-SCNC: 104 MMOL/L — SIGNIFICANT CHANGE UP (ref 96–108)
CK MB BLD-MCNC: 9.3 % — HIGH (ref 0–3.5)
CK MB BLD-MCNC: 9.3 % — HIGH (ref 0–3.5)
CK MB CFR SERPL CALC: 35.8 NG/ML — HIGH (ref 0–3.8)
CK MB CFR SERPL CALC: 35.8 NG/ML — HIGH (ref 0–3.8)
CK SERPL-CCNC: 384 U/L — HIGH (ref 25–170)
CK SERPL-CCNC: 384 U/L — HIGH (ref 25–170)
CO2 BLDMV-SCNC: 24 MMOL/L — SIGNIFICANT CHANGE UP (ref 21–29)
CO2 BLDMV-SCNC: 24 MMOL/L — SIGNIFICANT CHANGE UP (ref 21–29)
CO2 BLDMV-SCNC: 25 MMOL/L — SIGNIFICANT CHANGE UP (ref 21–29)
CO2 BLDMV-SCNC: 25 MMOL/L — SIGNIFICANT CHANGE UP (ref 21–29)
CO2 SERPL-SCNC: 26 MMOL/L — SIGNIFICANT CHANGE UP (ref 22–31)
CO2 SERPL-SCNC: 26 MMOL/L — SIGNIFICANT CHANGE UP (ref 22–31)
CREAT SERPL-MCNC: 0.58 MG/DL — SIGNIFICANT CHANGE UP (ref 0.5–1.3)
CREAT SERPL-MCNC: 0.58 MG/DL — SIGNIFICANT CHANGE UP (ref 0.5–1.3)
EGFR: 104 ML/MIN/1.73M2 — SIGNIFICANT CHANGE UP
EGFR: 104 ML/MIN/1.73M2 — SIGNIFICANT CHANGE UP
EOSINOPHIL # BLD AUTO: 0 K/UL — SIGNIFICANT CHANGE UP (ref 0–0.5)
EOSINOPHIL # BLD AUTO: 0 K/UL — SIGNIFICANT CHANGE UP (ref 0–0.5)
EOSINOPHIL NFR BLD AUTO: 0 % — SIGNIFICANT CHANGE UP (ref 0–6)
EOSINOPHIL NFR BLD AUTO: 0 % — SIGNIFICANT CHANGE UP (ref 0–6)
FIBRINOGEN PPP-MCNC: 220 MG/DL — SIGNIFICANT CHANGE UP (ref 200–445)
FIBRINOGEN PPP-MCNC: 220 MG/DL — SIGNIFICANT CHANGE UP (ref 200–445)
GAS PNL BLDA: SIGNIFICANT CHANGE UP
GAS PNL BLDMV: SIGNIFICANT CHANGE UP
GLUCOSE BLDC GLUCOMTR-MCNC: 128 MG/DL — HIGH (ref 70–99)
GLUCOSE BLDC GLUCOMTR-MCNC: 128 MG/DL — HIGH (ref 70–99)
GLUCOSE BLDC GLUCOMTR-MCNC: 144 MG/DL — HIGH (ref 70–99)
GLUCOSE BLDC GLUCOMTR-MCNC: 144 MG/DL — HIGH (ref 70–99)
GLUCOSE BLDC GLUCOMTR-MCNC: 165 MG/DL — HIGH (ref 70–99)
GLUCOSE BLDC GLUCOMTR-MCNC: 165 MG/DL — HIGH (ref 70–99)
GLUCOSE BLDC GLUCOMTR-MCNC: 176 MG/DL — HIGH (ref 70–99)
GLUCOSE BLDC GLUCOMTR-MCNC: 176 MG/DL — HIGH (ref 70–99)
GLUCOSE BLDC GLUCOMTR-MCNC: 204 MG/DL — HIGH (ref 70–99)
GLUCOSE BLDC GLUCOMTR-MCNC: 204 MG/DL — HIGH (ref 70–99)
GLUCOSE BLDC GLUCOMTR-MCNC: 226 MG/DL — HIGH (ref 70–99)
GLUCOSE BLDC GLUCOMTR-MCNC: 226 MG/DL — HIGH (ref 70–99)
GLUCOSE BLDC GLUCOMTR-MCNC: 244 MG/DL — HIGH (ref 70–99)
GLUCOSE BLDC GLUCOMTR-MCNC: 244 MG/DL — HIGH (ref 70–99)
GLUCOSE BLDC GLUCOMTR-MCNC: 270 MG/DL — HIGH (ref 70–99)
GLUCOSE BLDC GLUCOMTR-MCNC: 270 MG/DL — HIGH (ref 70–99)
GLUCOSE SERPL-MCNC: 234 MG/DL — HIGH (ref 70–99)
GLUCOSE SERPL-MCNC: 234 MG/DL — HIGH (ref 70–99)
HCO3 BLDMV-SCNC: 22 MMOL/L — SIGNIFICANT CHANGE UP (ref 20–28)
HCO3 BLDMV-SCNC: 22 MMOL/L — SIGNIFICANT CHANGE UP (ref 20–28)
HCO3 BLDMV-SCNC: 24 MMOL/L — SIGNIFICANT CHANGE UP (ref 20–28)
HCO3 BLDMV-SCNC: 24 MMOL/L — SIGNIFICANT CHANGE UP (ref 20–28)
HCT VFR BLD CALC: 26 % — LOW (ref 34.5–45)
HCT VFR BLD CALC: 26 % — LOW (ref 34.5–45)
HEPARINASE TEG R TIME: 4.9 MIN — SIGNIFICANT CHANGE UP (ref 4.3–8.3)
HEPARINASE TEG R TIME: 4.9 MIN — SIGNIFICANT CHANGE UP (ref 4.3–8.3)
HGB BLD-MCNC: 8.4 G/DL — LOW (ref 11.5–15.5)
HGB BLD-MCNC: 8.4 G/DL — LOW (ref 11.5–15.5)
HOROWITZ INDEX BLDMV+IHG-RTO: 100 — SIGNIFICANT CHANGE UP
HOROWITZ INDEX BLDMV+IHG-RTO: 100 — SIGNIFICANT CHANGE UP
INR BLD: 1.29 RATIO — HIGH (ref 0.85–1.18)
INR BLD: 1.29 RATIO — HIGH (ref 0.85–1.18)
LYMPHOCYTES # BLD AUTO: 1.62 K/UL — SIGNIFICANT CHANGE UP (ref 1–3.3)
LYMPHOCYTES # BLD AUTO: 1.62 K/UL — SIGNIFICANT CHANGE UP (ref 1–3.3)
LYMPHOCYTES # BLD AUTO: 6.2 % — LOW (ref 13–44)
LYMPHOCYTES # BLD AUTO: 6.2 % — LOW (ref 13–44)
MAGNESIUM SERPL-MCNC: 2.8 MG/DL — HIGH (ref 1.6–2.6)
MAGNESIUM SERPL-MCNC: 2.8 MG/DL — HIGH (ref 1.6–2.6)
MANUAL SMEAR VERIFICATION: SIGNIFICANT CHANGE UP
MANUAL SMEAR VERIFICATION: SIGNIFICANT CHANGE UP
MCHC RBC-ENTMCNC: 30.4 PG — SIGNIFICANT CHANGE UP (ref 27–34)
MCHC RBC-ENTMCNC: 30.4 PG — SIGNIFICANT CHANGE UP (ref 27–34)
MCHC RBC-ENTMCNC: 32.3 GM/DL — SIGNIFICANT CHANGE UP (ref 32–36)
MCHC RBC-ENTMCNC: 32.3 GM/DL — SIGNIFICANT CHANGE UP (ref 32–36)
MCV RBC AUTO: 94.2 FL — SIGNIFICANT CHANGE UP (ref 80–100)
MCV RBC AUTO: 94.2 FL — SIGNIFICANT CHANGE UP (ref 80–100)
MONOCYTES # BLD AUTO: 0.47 K/UL — SIGNIFICANT CHANGE UP (ref 0–0.9)
MONOCYTES # BLD AUTO: 0.47 K/UL — SIGNIFICANT CHANGE UP (ref 0–0.9)
MONOCYTES NFR BLD AUTO: 1.8 % — LOW (ref 2–14)
MONOCYTES NFR BLD AUTO: 1.8 % — LOW (ref 2–14)
NEUTROPHILS # BLD AUTO: 24.04 K/UL — HIGH (ref 1.8–7.4)
NEUTROPHILS # BLD AUTO: 24.04 K/UL — HIGH (ref 1.8–7.4)
NEUTROPHILS NFR BLD AUTO: 88.4 % — HIGH (ref 43–77)
NEUTROPHILS NFR BLD AUTO: 88.4 % — HIGH (ref 43–77)
NEUTS BAND # BLD: 3.6 % — SIGNIFICANT CHANGE UP (ref 0–8)
NEUTS BAND # BLD: 3.6 % — SIGNIFICANT CHANGE UP (ref 0–8)
O2 CT VFR BLD CALC: 43 MMHG — SIGNIFICANT CHANGE UP (ref 30–65)
O2 CT VFR BLD CALC: 43 MMHG — SIGNIFICANT CHANGE UP (ref 30–65)
O2 CT VFR BLD CALC: 45 MMHG — SIGNIFICANT CHANGE UP (ref 30–65)
O2 CT VFR BLD CALC: 45 MMHG — SIGNIFICANT CHANGE UP (ref 30–65)
PCO2 BLDMV: 43 MMHG — SIGNIFICANT CHANGE UP (ref 30–65)
PCO2 BLDMV: 43 MMHG — SIGNIFICANT CHANGE UP (ref 30–65)
PCO2 BLDMV: 56 MMHG — SIGNIFICANT CHANGE UP (ref 30–65)
PCO2 BLDMV: 56 MMHG — SIGNIFICANT CHANGE UP (ref 30–65)
PH BLDMV: 7.2 — CRITICAL LOW (ref 7.32–7.45)
PH BLDMV: 7.2 — CRITICAL LOW (ref 7.32–7.45)
PH BLDMV: 7.35 — SIGNIFICANT CHANGE UP (ref 7.32–7.45)
PH BLDMV: 7.35 — SIGNIFICANT CHANGE UP (ref 7.32–7.45)
PHOSPHATE SERPL-MCNC: 3.8 MG/DL — SIGNIFICANT CHANGE UP (ref 2.5–4.5)
PHOSPHATE SERPL-MCNC: 3.8 MG/DL — SIGNIFICANT CHANGE UP (ref 2.5–4.5)
PLAT MORPH BLD: NORMAL — SIGNIFICANT CHANGE UP
PLAT MORPH BLD: NORMAL — SIGNIFICANT CHANGE UP
PLATELET # BLD AUTO: 174 K/UL — SIGNIFICANT CHANGE UP (ref 150–400)
PLATELET # BLD AUTO: 174 K/UL — SIGNIFICANT CHANGE UP (ref 150–400)
POTASSIUM SERPL-MCNC: 4.7 MMOL/L — SIGNIFICANT CHANGE UP (ref 3.5–5.3)
POTASSIUM SERPL-MCNC: 4.7 MMOL/L — SIGNIFICANT CHANGE UP (ref 3.5–5.3)
POTASSIUM SERPL-SCNC: 4.7 MMOL/L — SIGNIFICANT CHANGE UP (ref 3.5–5.3)
POTASSIUM SERPL-SCNC: 4.7 MMOL/L — SIGNIFICANT CHANGE UP (ref 3.5–5.3)
PROT SERPL-MCNC: 5 G/DL — LOW (ref 6–8.3)
PROT SERPL-MCNC: 5 G/DL — LOW (ref 6–8.3)
PROTHROM AB SERPL-ACNC: 14.1 SEC — HIGH (ref 9.5–13)
PROTHROM AB SERPL-ACNC: 14.1 SEC — HIGH (ref 9.5–13)
RAPIDTEG MAXIMUM AMPLITUDE: 58.5 MM — SIGNIFICANT CHANGE UP (ref 52–70)
RAPIDTEG MAXIMUM AMPLITUDE: 58.5 MM — SIGNIFICANT CHANGE UP (ref 52–70)
RBC # BLD: 2.76 M/UL — LOW (ref 3.8–5.2)
RBC # BLD: 2.76 M/UL — LOW (ref 3.8–5.2)
RBC # FLD: 15.2 % — HIGH (ref 10.3–14.5)
RBC # FLD: 15.2 % — HIGH (ref 10.3–14.5)
RBC BLD AUTO: SIGNIFICANT CHANGE UP
RBC BLD AUTO: SIGNIFICANT CHANGE UP
RH IG SCN BLD-IMP: POSITIVE — SIGNIFICANT CHANGE UP
RH IG SCN BLD-IMP: POSITIVE — SIGNIFICANT CHANGE UP
SAO2 % BLDMV: 67.5 — SIGNIFICANT CHANGE UP (ref 60–90)
SAO2 % BLDMV: 67.5 — SIGNIFICANT CHANGE UP (ref 60–90)
SAO2 % BLDMV: 76.6 — SIGNIFICANT CHANGE UP (ref 60–90)
SAO2 % BLDMV: 76.6 — SIGNIFICANT CHANGE UP (ref 60–90)
SODIUM SERPL-SCNC: 141 MMOL/L — SIGNIFICANT CHANGE UP (ref 135–145)
SODIUM SERPL-SCNC: 141 MMOL/L — SIGNIFICANT CHANGE UP (ref 135–145)
TEG FUNCTIONAL FIBRINOGEN: 18 MM — SIGNIFICANT CHANGE UP (ref 15–32)
TEG FUNCTIONAL FIBRINOGEN: 18 MM — SIGNIFICANT CHANGE UP (ref 15–32)
TEG MAXIMUM AMPLITUDE: 60.8 MM — SIGNIFICANT CHANGE UP (ref 52–69)
TEG MAXIMUM AMPLITUDE: 60.8 MM — SIGNIFICANT CHANGE UP (ref 52–69)
TEG REACTION TIME: 5.3 MIN — SIGNIFICANT CHANGE UP (ref 4.6–9.1)
TEG REACTION TIME: 5.3 MIN — SIGNIFICANT CHANGE UP (ref 4.6–9.1)
TROPONIN T, HIGH SENSITIVITY RESULT: 416 NG/L — HIGH (ref 0–51)
TROPONIN T, HIGH SENSITIVITY RESULT: 416 NG/L — HIGH (ref 0–51)
WBC # BLD: 26.13 K/UL — HIGH (ref 3.8–10.5)
WBC # BLD: 26.13 K/UL — HIGH (ref 3.8–10.5)
WBC # FLD AUTO: 26.13 K/UL — HIGH (ref 3.8–10.5)
WBC # FLD AUTO: 26.13 K/UL — HIGH (ref 3.8–10.5)

## 2023-11-21 PROCEDURE — 99291 CRITICAL CARE FIRST HOUR: CPT

## 2023-11-21 PROCEDURE — 71045 X-RAY EXAM CHEST 1 VIEW: CPT | Mod: 26,76

## 2023-11-21 PROCEDURE — 33405 REPLACEMENT AORTIC VALVE OPN: CPT

## 2023-11-21 PROCEDURE — 88305 TISSUE EXAM BY PATHOLOGIST: CPT | Mod: 26

## 2023-11-21 PROCEDURE — 33533 CABG ARTERIAL SINGLE: CPT

## 2023-11-21 PROCEDURE — 88312 SPECIAL STAINS GROUP 1: CPT | Mod: 26

## 2023-11-21 PROCEDURE — 93010 ELECTROCARDIOGRAM REPORT: CPT

## 2023-11-21 DEVICE — CANNULA ANTEGRADE W/VENT 12GA: Type: IMPLANTABLE DEVICE | Status: FUNCTIONAL

## 2023-11-21 DEVICE — LIGATING CLIPS WECK HORIZON SMALL-WIDE (RED) 24: Type: IMPLANTABLE DEVICE | Status: FUNCTIONAL

## 2023-11-21 DEVICE — CANNULA AORTIC PERFUSION EZ GLIDE STRAIGHT 21FR X 35CM VENTED: Type: IMPLANTABLE DEVICE | Status: FUNCTIONAL

## 2023-11-21 DEVICE — LIGATING CLIPS WECK HORIZON MEDIUM (BLUE) 24: Type: IMPLANTABLE DEVICE | Status: FUNCTIONAL

## 2023-11-21 DEVICE — CANNULA VENOUS 2 STAGE THIN FLEX 36/46FR X 1/2" WITH RETURN DIAL: Type: IMPLANTABLE DEVICE | Status: FUNCTIONAL

## 2023-11-21 DEVICE — CHEST DRAIN PLEUR-EVAC 28FR STRAIGHT: Type: IMPLANTABLE DEVICE | Status: FUNCTIONAL

## 2023-11-21 DEVICE — CANNULA PEAK HEART VENT 18FR LT: Type: IMPLANTABLE DEVICE | Status: FUNCTIONAL

## 2023-11-21 DEVICE — KIT A-LINE 1LUM 20G X 12CM SAFE KIT: Type: IMPLANTABLE DEVICE | Status: FUNCTIONAL

## 2023-11-21 DEVICE — ATRICLIP LAA EXCLUSION DEVICE 40MM FLEX-V: Type: IMPLANTABLE DEVICE | Status: FUNCTIONAL

## 2023-11-21 DEVICE — COR-KNOT QUICK LOAD SINGLES: Type: IMPLANTABLE DEVICE | Status: FUNCTIONAL

## 2023-11-21 DEVICE — MEDIASTINAL CATH DRAIN 9MM: Type: IMPLANTABLE DEVICE | Status: FUNCTIONAL

## 2023-11-21 DEVICE — KIT CVC 2LUM MAC 9FR CHG: Type: IMPLANTABLE DEVICE | Status: FUNCTIONAL

## 2023-11-21 DEVICE — IMPLANTABLE DEVICE: Type: IMPLANTABLE DEVICE | Status: FUNCTIONAL

## 2023-11-21 DEVICE — PACING WIRE WHITE M-24 BAREWIRE 37MM X 89MM: Type: IMPLANTABLE DEVICE | Status: FUNCTIONAL

## 2023-11-21 DEVICE — SURGICEL 2 X 14": Type: IMPLANTABLE DEVICE | Status: FUNCTIONAL

## 2023-11-21 DEVICE — CATH THERMAL OXI SWAN GANZ DILUTION 7.5FR: Type: IMPLANTABLE DEVICE | Status: FUNCTIONAL

## 2023-11-21 DEVICE — CATH VENT LEFT HEART PVC15 18FR NON-VENTED: Type: IMPLANTABLE DEVICE | Status: FUNCTIONAL

## 2023-11-21 DEVICE — COR-KNOT MINI DEVICE COMBO KIT: Type: IMPLANTABLE DEVICE | Status: FUNCTIONAL

## 2023-11-21 DEVICE — VALVE AORTIC INSPIRIS RESILIA 23MM: Type: IMPLANTABLE DEVICE | Status: FUNCTIONAL

## 2023-11-21 RX ORDER — POTASSIUM CHLORIDE 20 MEQ
10 PACKET (EA) ORAL
Refills: 0 | Status: DISCONTINUED | OUTPATIENT
Start: 2023-11-21 | End: 2023-11-23

## 2023-11-21 RX ORDER — PROPOFOL 10 MG/ML
10 INJECTION, EMULSION INTRAVENOUS
Qty: 500 | Refills: 0 | Status: DISCONTINUED | OUTPATIENT
Start: 2023-11-21 | End: 2023-11-22

## 2023-11-21 RX ORDER — DOBUTAMINE HCL 250MG/20ML
1 VIAL (ML) INTRAVENOUS
Qty: 500 | Refills: 0 | Status: DISCONTINUED | OUTPATIENT
Start: 2023-11-21 | End: 2023-11-24

## 2023-11-21 RX ORDER — CALCIUM GLUCONATE 100 MG/ML
1 VIAL (ML) INTRAVENOUS ONCE
Refills: 0 | Status: DISCONTINUED | OUTPATIENT
Start: 2023-11-21 | End: 2023-11-21

## 2023-11-21 RX ORDER — INSULIN HUMAN 100 [IU]/ML
3 INJECTION, SOLUTION SUBCUTANEOUS
Qty: 100 | Refills: 0 | Status: DISCONTINUED | OUTPATIENT
Start: 2023-11-21 | End: 2023-11-23

## 2023-11-21 RX ORDER — ACETAMINOPHEN 500 MG
650 TABLET ORAL EVERY 6 HOURS
Refills: 0 | Status: DISCONTINUED | OUTPATIENT
Start: 2023-11-24 | End: 2023-11-26

## 2023-11-21 RX ORDER — CHLORHEXIDINE GLUCONATE 213 G/1000ML
15 SOLUTION TOPICAL EVERY 12 HOURS
Refills: 0 | Status: DISCONTINUED | OUTPATIENT
Start: 2023-11-21 | End: 2023-11-22

## 2023-11-21 RX ORDER — CEFUROXIME AXETIL 250 MG
1500 TABLET ORAL EVERY 8 HOURS
Refills: 0 | Status: DISCONTINUED | OUTPATIENT
Start: 2023-11-21 | End: 2023-11-22

## 2023-11-21 RX ORDER — ACETAMINOPHEN 500 MG
650 TABLET ORAL EVERY 6 HOURS
Refills: 0 | Status: COMPLETED | OUTPATIENT
Start: 2023-11-21 | End: 2023-11-24

## 2023-11-21 RX ORDER — DEXMEDETOMIDINE HYDROCHLORIDE IN 0.9% SODIUM CHLORIDE 4 UG/ML
0.8 INJECTION INTRAVENOUS
Qty: 200 | Refills: 0 | Status: DISCONTINUED | OUTPATIENT
Start: 2023-11-21 | End: 2023-11-22

## 2023-11-21 RX ORDER — CALCIUM CHLORIDE
1000 POWDER (GRAM) MISCELLANEOUS ONCE
Refills: 0 | Status: COMPLETED | OUTPATIENT
Start: 2023-11-21 | End: 2023-11-21

## 2023-11-21 RX ORDER — OXYCODONE HYDROCHLORIDE 5 MG/1
10 TABLET ORAL EVERY 4 HOURS
Refills: 0 | Status: DISCONTINUED | OUTPATIENT
Start: 2023-11-21 | End: 2023-11-22

## 2023-11-21 RX ORDER — POLYETHYLENE GLYCOL 3350 17 G/17G
17 POWDER, FOR SOLUTION ORAL DAILY
Refills: 0 | Status: DISCONTINUED | OUTPATIENT
Start: 2023-11-22 | End: 2023-12-04

## 2023-11-21 RX ORDER — FENTANYL CITRATE 50 UG/ML
50 INJECTION INTRAVENOUS ONCE
Refills: 0 | Status: DISCONTINUED | OUTPATIENT
Start: 2023-11-21 | End: 2023-11-21

## 2023-11-21 RX ORDER — MILRINONE LACTATE 1 MG/ML
0.25 INJECTION, SOLUTION INTRAVENOUS
Qty: 20 | Refills: 0 | Status: DISCONTINUED | OUTPATIENT
Start: 2023-11-21 | End: 2023-11-22

## 2023-11-21 RX ORDER — DEXTROSE 50 % IN WATER 50 %
25 SYRINGE (ML) INTRAVENOUS
Refills: 0 | Status: DISCONTINUED | OUTPATIENT
Start: 2023-11-21 | End: 2023-12-04

## 2023-11-21 RX ORDER — FAMOTIDINE 10 MG/ML
20 INJECTION INTRAVENOUS EVERY 12 HOURS
Refills: 0 | Status: DISCONTINUED | OUTPATIENT
Start: 2023-11-21 | End: 2023-11-22

## 2023-11-21 RX ORDER — ASPIRIN/CALCIUM CARB/MAGNESIUM 324 MG
300 TABLET ORAL ONCE
Refills: 0 | Status: DISCONTINUED | OUTPATIENT
Start: 2023-11-21 | End: 2023-11-22

## 2023-11-21 RX ORDER — HYDROMORPHONE HYDROCHLORIDE 2 MG/ML
0.5 INJECTION INTRAMUSCULAR; INTRAVENOUS; SUBCUTANEOUS EVERY 6 HOURS
Refills: 0 | Status: DISCONTINUED | OUTPATIENT
Start: 2023-11-21 | End: 2023-11-22

## 2023-11-21 RX ORDER — ALBUMIN HUMAN 25 %
250 VIAL (ML) INTRAVENOUS ONCE
Refills: 0 | Status: COMPLETED | OUTPATIENT
Start: 2023-11-21 | End: 2023-11-21

## 2023-11-21 RX ORDER — VASOPRESSIN 20 [USP'U]/ML
0.04 INJECTION INTRAVENOUS
Qty: 40 | Refills: 0 | Status: DISCONTINUED | OUTPATIENT
Start: 2023-11-21 | End: 2023-11-22

## 2023-11-21 RX ORDER — NOREPINEPHRINE BITARTRATE/D5W 8 MG/250ML
0.02 PLASTIC BAG, INJECTION (ML) INTRAVENOUS
Qty: 8 | Refills: 0 | Status: DISCONTINUED | OUTPATIENT
Start: 2023-11-21 | End: 2023-11-22

## 2023-11-21 RX ORDER — CHLORHEXIDINE GLUCONATE 213 G/1000ML
1 SOLUTION TOPICAL DAILY
Refills: 0 | Status: DISCONTINUED | OUTPATIENT
Start: 2023-11-21 | End: 2023-12-04

## 2023-11-21 RX ORDER — ALPRAZOLAM 0.25 MG
0.25 TABLET ORAL ONCE
Refills: 0 | Status: DISCONTINUED | OUTPATIENT
Start: 2023-11-21 | End: 2023-11-21

## 2023-11-21 RX ORDER — ASPIRIN/CALCIUM CARB/MAGNESIUM 324 MG
81 TABLET ORAL DAILY
Refills: 0 | Status: DISCONTINUED | OUTPATIENT
Start: 2023-11-21 | End: 2023-12-04

## 2023-11-21 RX ORDER — SENNA PLUS 8.6 MG/1
2 TABLET ORAL AT BEDTIME
Refills: 0 | Status: DISCONTINUED | OUTPATIENT
Start: 2023-11-22 | End: 2023-12-04

## 2023-11-21 RX ORDER — PROPOFOL 10 MG/ML
5 INJECTION, EMULSION INTRAVENOUS
Qty: 1000 | Refills: 0 | Status: DISCONTINUED | OUTPATIENT
Start: 2023-11-21 | End: 2023-11-21

## 2023-11-21 RX ORDER — GABAPENTIN 400 MG/1
100 CAPSULE ORAL EVERY 8 HOURS
Refills: 0 | Status: DISCONTINUED | OUTPATIENT
Start: 2023-11-21 | End: 2023-11-22

## 2023-11-21 RX ORDER — OXYCODONE HYDROCHLORIDE 5 MG/1
5 TABLET ORAL EVERY 4 HOURS
Refills: 0 | Status: DISCONTINUED | OUTPATIENT
Start: 2023-11-21 | End: 2023-11-22

## 2023-11-21 RX ORDER — DEXTROSE 50 % IN WATER 50 %
50 SYRINGE (ML) INTRAVENOUS
Refills: 0 | Status: DISCONTINUED | OUTPATIENT
Start: 2023-11-21 | End: 2023-12-04

## 2023-11-21 RX ORDER — ASCORBIC ACID 60 MG
500 TABLET,CHEWABLE ORAL
Refills: 0 | Status: COMPLETED | OUTPATIENT
Start: 2023-11-21 | End: 2023-11-26

## 2023-11-21 RX ORDER — SODIUM CHLORIDE 9 MG/ML
500 INJECTION, SOLUTION INTRAVENOUS ONCE
Refills: 0 | Status: COMPLETED | OUTPATIENT
Start: 2023-11-21 | End: 2023-11-21

## 2023-11-21 RX ORDER — ALBUMIN HUMAN 25 %
250 VIAL (ML) INTRAVENOUS ONCE
Refills: 0 | Status: COMPLETED | OUTPATIENT
Start: 2023-11-21 | End: 2023-11-22

## 2023-11-21 RX ORDER — SODIUM CHLORIDE 9 MG/ML
1000 INJECTION INTRAMUSCULAR; INTRAVENOUS; SUBCUTANEOUS
Refills: 0 | Status: DISCONTINUED | OUTPATIENT
Start: 2023-11-21 | End: 2023-11-25

## 2023-11-21 RX ORDER — AMIODARONE HYDROCHLORIDE 400 MG/1
400 TABLET ORAL
Refills: 0 | Status: COMPLETED | OUTPATIENT
Start: 2023-11-21 | End: 2023-11-24

## 2023-11-21 RX ORDER — SODIUM CHLORIDE 9 MG/ML
500 INJECTION INTRAMUSCULAR; INTRAVENOUS; SUBCUTANEOUS ONCE
Refills: 0 | Status: COMPLETED | OUTPATIENT
Start: 2023-11-21 | End: 2023-11-21

## 2023-11-21 RX ADMIN — SODIUM CHLORIDE 500 MILLILITER(S): 9 INJECTION INTRAMUSCULAR; INTRAVENOUS; SUBCUTANEOUS at 15:50

## 2023-11-21 RX ADMIN — SODIUM CHLORIDE 500 MILLILITER(S): 9 INJECTION, SOLUTION INTRAVENOUS at 16:00

## 2023-11-21 RX ADMIN — Medication 1000 MILLIGRAM(S): at 15:50

## 2023-11-21 RX ADMIN — PANTOPRAZOLE SODIUM 40 MILLIGRAM(S): 20 TABLET, DELAYED RELEASE ORAL at 05:55

## 2023-11-21 RX ADMIN — Medication 125 MILLILITER(S): at 17:00

## 2023-11-21 RX ADMIN — Medication 0.25 MILLIGRAM(S): at 00:45

## 2023-11-21 RX ADMIN — Medication 25 MILLIGRAM(S): at 05:56

## 2023-11-21 RX ADMIN — DEXMEDETOMIDINE HYDROCHLORIDE IN 0.9% SODIUM CHLORIDE 17.4 MICROGRAM(S)/KG/HR: 4 INJECTION INTRAVENOUS at 19:39

## 2023-11-21 RX ADMIN — FENTANYL CITRATE 50 MICROGRAM(S): 50 INJECTION INTRAVENOUS at 15:40

## 2023-11-21 RX ADMIN — Medication 125 MILLILITER(S): at 20:51

## 2023-11-21 RX ADMIN — Medication 3.26 MICROGRAM(S)/KG/MIN: at 19:38

## 2023-11-21 RX ADMIN — INSULIN HUMAN 3 UNIT(S)/HR: 100 INJECTION, SOLUTION SUBCUTANEOUS at 19:38

## 2023-11-21 RX ADMIN — BUDESONIDE AND FORMOTEROL FUMARATE DIHYDRATE 2 PUFF(S): 160; 4.5 AEROSOL RESPIRATORY (INHALATION) at 06:06

## 2023-11-21 RX ADMIN — PROPOFOL 5.21 MICROGRAM(S)/KG/MIN: 10 INJECTION, EMULSION INTRAVENOUS at 19:38

## 2023-11-21 RX ADMIN — FENTANYL CITRATE 50 MICROGRAM(S): 50 INJECTION INTRAVENOUS at 15:55

## 2023-11-21 RX ADMIN — Medication 125 MILLILITER(S): at 20:52

## 2023-11-21 RX ADMIN — DEXMEDETOMIDINE HYDROCHLORIDE IN 0.9% SODIUM CHLORIDE 17.4 MICROGRAM(S)/KG/HR: 4 INJECTION INTRAVENOUS at 18:46

## 2023-11-21 RX ADMIN — Medication 3.26 MICROGRAM(S)/KG/MIN: at 18:48

## 2023-11-21 RX ADMIN — VASOPRESSIN 6 UNIT(S)/MIN: 20 INJECTION INTRAVENOUS at 18:47

## 2023-11-21 RX ADMIN — INSULIN HUMAN 3 UNIT(S)/HR: 100 INJECTION, SOLUTION SUBCUTANEOUS at 18:47

## 2023-11-21 RX ADMIN — SPIRONOLACTONE 25 MILLIGRAM(S): 25 TABLET, FILM COATED ORAL at 05:55

## 2023-11-21 RX ADMIN — Medication 40 MILLIGRAM(S): at 05:55

## 2023-11-21 RX ADMIN — Medication 13 MICROGRAM(S)/KG/MIN: at 18:47

## 2023-11-21 RX ADMIN — Medication 13 MICROGRAM(S)/KG/MIN: at 19:38

## 2023-11-21 RX ADMIN — Medication 100 MILLIGRAM(S): at 21:07

## 2023-11-21 RX ADMIN — FENTANYL CITRATE 50 MICROGRAM(S): 50 INJECTION INTRAVENOUS at 16:00

## 2023-11-21 RX ADMIN — FENTANYL CITRATE 50 MICROGRAM(S): 50 INJECTION INTRAVENOUS at 16:15

## 2023-11-21 RX ADMIN — Medication 1000 MILLIGRAM(S): at 05:54

## 2023-11-21 RX ADMIN — Medication 3 MILLIGRAM(S): at 00:45

## 2023-11-21 RX ADMIN — GABAPENTIN 300 MILLIGRAM(S): 400 CAPSULE ORAL at 05:55

## 2023-11-21 RX ADMIN — SODIUM CHLORIDE 3 MILLILITER(S): 9 INJECTION INTRAMUSCULAR; INTRAVENOUS; SUBCUTANEOUS at 06:06

## 2023-11-21 RX ADMIN — Medication 10 MILLIGRAM(S): at 05:55

## 2023-11-21 RX ADMIN — Medication 1000 MILLIGRAM(S): at 15:55

## 2023-11-21 RX ADMIN — FAMOTIDINE 20 MILLIGRAM(S): 10 INJECTION INTRAVENOUS at 18:47

## 2023-11-21 NOTE — PRE-ANESTHESIA EVALUATION ADULT - NSANTHPEFT_GEN_ALL_CORE
Gen: WNWD, NAD, cushingnoid appearance  Neuro: AAOx4, moves all four extremities spontaneously  Resp: bilateral expiratory wheezing anterior lung fields  CV: RRR  Ext: WWP, no edema

## 2023-11-21 NOTE — PRE-ANESTHESIA EVALUATION ADULT - MALLAMPATI CLASS
Class IV (difficult) - the soft palate is not visible at all
Class I (easy) - visualization of the soft palate, fauces, uvula, and both anterior and posterior pillars

## 2023-11-21 NOTE — BRIEF OPERATIVE NOTE - COMMENTS
100 *** ESTIMATED BLOOD LOSS NOT APPLICABLE DUE TO USE OF CARDIOTOMY AND CELL SAVER SUCTION ***     No unexpected foreign bodies were apparent on preliminary review of post-op x-ray. Reviewed by SHAMEKA Loaiza MD

## 2023-11-21 NOTE — PRE-ANESTHESIA EVALUATION ADULT - NSANTHPMHFT_GEN_ALL_CORE
58F with history of CVA with L sided weakness/numbness/L gaze deviation, DM2 with b/l peripheral neuropathy, COPD/Asthma, Breast Ca s/p ?RT, Bipolar depression, Rheumatoid Arthritis on chronic prednisone (10mg/day), Antiphospholipid syndrome on Lovenox, L eye uveitis/scleritis, Newly diagnosed DM2 (pt cannot remember which medication she takes for it), and current tobacco use who presents to the hospital with multiple complaints. 58F with history of CVA with L sided weakness/numbness/L gaze deviation, DM2 with b/l peripheral neuropathy, COPD/Asthma, Breast Ca s/p ?RT, Bipolar depression, Rheumatoid Arthritis on chronic prednisone (10mg/day), Antiphospholipid syndrome on Lovenox, L eye uveitis/scleritis, Newly diagnosed DM2 (pt cannot remember which medication she takes for it), and current tobacco use who was found to have 100% LAD occlusion and severe AI, and CHF (LVEF 30-35%)

## 2023-11-21 NOTE — PROGRESS NOTE ADULT - SUBJECTIVE AND OBJECTIVE BOX
CRITICAL CARE ATTENDING - CTICU    MEDICATIONS  (STANDING):  acetaminophen     Tablet .. 650 milliGRAM(s) Oral every 6 hours  aMIOdarone    Tablet 400 milliGRAM(s) Oral two times a day  ascorbic acid 500 milliGRAM(s) Oral two times a day  aspirin enteric coated 81 milliGRAM(s) Oral daily  aspirin Suppository 300 milliGRAM(s) Rectal once  cefuroxime  IVPB 1500 milliGRAM(s) IV Intermittent once  chlorhexidine 0.12% Liquid 15 milliLiter(s) Oral Mucosa every 12 hours  chlorhexidine 2% Cloths 1 Application(s) Topical daily  dextrose 50% Injectable 50 milliLiter(s) IV Push every 15 minutes  dextrose 50% Injectable 25 milliLiter(s) IV Push every 15 minutes  famotidine Injectable 20 milliGRAM(s) IV Push every 12 hours  gabapentin 100 milliGRAM(s) Oral every 8 hours  insulin regular Infusion 3 Unit(s)/Hr (3 mL/Hr) IV Continuous <Continuous>  potassium chloride  10 mEq/50 mL IVPB 10 milliEquivalent(s) IV Intermittent every 1 hour  potassium chloride  10 mEq/50 mL IVPB 10 milliEquivalent(s) IV Intermittent every 1 hour  potassium chloride  10 mEq/50 mL IVPB 10 milliEquivalent(s) IV Intermittent every 1 hour  sodium chloride 0.9%. 1000 milliLiter(s) (10 mL/Hr) IV Continuous <Continuous>                                    10.9   16.04 )-----------( 285      ( 20 Nov 2023 06:18 )             34.3       11-20    137  |  99  |  30<H>  ----------------------------<  115<H>  4.0   |  24  |  0.75    Ca    9.4      20 Nov 2023 06:18                Daily Height in cm: 155 (21 Nov 2023 08:00)    Daily       11-20 @ 07:01  -  11-21 @ 07:00  --------------------------------------------------------  IN: 345 mL / OUT: 300 mL / NET: 45 mL        Critically Ill patient  : [ ] preoperative ,   [ x] post operative    Requires :  [ x] Arterial Line   [x ] Central Line  [x ] PA catheter  [ ] IABP  [ ] ECMO  [ ] LVAD  [x ] Ventilator  [x ] pacemaker - TPM  [ ] Impella.                      [ x] ABG's     [x ] Pulse Oxymetry Monitoring  Bedside evaluation , monitoring , treatment of hemodynamics , fluids , IVP/ IVCD meds.        Diagnosis:     Anterior Wall MI     Op day - AVR / CABG X 1 L / LAAL    Hypotension     Hypovolemia     Hemodynamic lability,  instability. Requires IVCD [ x] vasopressors [ x] inotropes  [ ] vasodilator  [x ]IVSS fluid  to maintain MAP, perfusion, C.I.     CHF- acute [x ]   chronic [x ]    systolic [x ]   diastolic [ ]  Valvular [x ]          - Echo- EF - 35% / AI            [ ] RV dysfunction          - Cxr-cardiomegally, edema          - Clinical-  [ x]inotropes   [x ]pressors   [ ]diuresis   [ ]IABP   [ ]ECMO   [ ]LVAD   [x ] Respiratory insuffiencey     Respiratory insuffiencey - remains intubated pos op     Ventilator Management:  [ x]AC-rest post op    [ x]CPAP-PS Wean  this PM    [ ]Trach Collar     [ ]Extubate    [ ] T-Piece  [ ]peep>5     Requires chest PT, pulmonary toilet, ambu bagging, suctioning to maintain SaO2,  patent airway and treat atelectasis.     Difficult weaning process - multiple organ system involvement in critically ill patient     May require extubation to BIPAP     COPD            - on Chronic  Steroids   RA    [place] catheter interpretation and therapeutic management of unstable hemodynamics     Chest Tube Drainage / Management     Temporary pacemaker (TPM) interrogation and setting.     Obesity OHS / SARAH     DM - IVCD Insulin     h/o CVA's in past - L -  Hemiparesis                            -                     Discussed with CT surgeon, Physician's Assistant - Nurse Practitioner- Critical care medicine team.   Discussed at  AM / PM rounds.   Chart, labs , films reviewed.    Cumulative Critical Care Time Given Today : 30 min

## 2023-11-21 NOTE — PROGRESS NOTE ADULT - ASSESSMENT
58F with history of CVA with L sided weakness/numbness/L gaze deviation, DM2 with b/l peripheral neuropathy, COPD/Asthma, Breast Ca s/p ?RT, Bipolar depression, Rheumatoid Arthritis, Antiphospholipid syndrome, L eye uveitis/scleritis, Newly diagnosed DM2 (pt cannot remember which medication she takes for it), and current tobacco use who presents to the hospital with multiple complaints.     1. LV dysfunction  -new mod-severe segmental LV dysfunction in July has not had ischemic workup due to stroke at that time.  -c/w metoprolol and aldactone  -TTE EF 30% now with mod-severe AR  -c/w lasix 40mg PO Daily   -cath shows LAD prox 100% fills via right to left collateral   -CMR 11/17: Mild thinning and hypokinesis of the mid to apical anterior and  anteroseptal segments.  The left ventricular ejection fraction measures  43%.Findings consistent with ischemic cardiomyopathy.  LESLEY with sever AR   - s/p AVR     2. CVA  -history of multiple CVAs and is on eliquis   -CT scan of head negative for any acute findings  -ILR interrogation with no events  -per neuro no plans for MRI       3. Antiphospholipid syndrome  -on lovenox

## 2023-11-21 NOTE — BRIEF OPERATIVE NOTE - NSICDXBRIEFPOSTOP_GEN_ALL_CORE_FT
POST-OP DIAGNOSIS:  Aortic insufficiency 21-Nov-2023 15:17:07  Franco Guido  CAD (coronary artery disease) 21-Nov-2023 15:17:12  Franco Guido

## 2023-11-21 NOTE — BRIEF OPERATIVE NOTE - NSICDXBRIEFPROCEDURE_GEN_ALL_CORE_FT
PROCEDURES:  CABG, with LESLEY 21-Nov-2023 15:16:52  Franco Guido  Replacement, aortic valve, with ELSLEY 21-Nov-2023 15:16:58  Franco Guido

## 2023-11-21 NOTE — BRIEF OPERATIVE NOTE - OPERATION/FINDINGS
Aortic XClamp: 90    |    Total CPB: 115  Procedure:  LIMA-LAD  AVR(t) - Inspiris 23mm  LAAL with AtriClip 40mm

## 2023-11-21 NOTE — PROGRESS NOTE ADULT - PROBLEM SELECTOR PLAN 1
IV insulin immediate postop   Will continue current insulin regimen for now.   Will continue monitoring  blood sugars, will Follow up.  Patient counseled for compliance with consistent low carb diet.

## 2023-11-21 NOTE — BRIEF OPERATIVE NOTE - NSICDXBRIEFPREOP_GEN_ALL_CORE_FT
PRE-OP DIAGNOSIS:  CAD (coronary artery disease) 21-Nov-2023 15:17:17  Franco Guido  Aortic insufficiency 21-Nov-2023 15:17:23  Franco Guido

## 2023-11-21 NOTE — PROGRESS NOTE ADULT - SUBJECTIVE AND OBJECTIVE BOX
German Carrasco MD  Interventional Cardiology / Endovascular Specialist  [place] Office : [address] [place] [address] [place] [address]  Tel:   [place] Office : [address] [facility] [place] [address]  Tel: [phone]    Subjective/Overnight events: Patient lying in bed intubated and sedated.   	  MEDICATIONS:  aMIOdarone    Tablet 400 milliGRAM(s) Oral two times a day  aspirin enteric coated 81 milliGRAM(s) Oral daily  DOBUTamine Infusion 4 MICROgram(s)/kG/Min IV Continuous <Continuous>  milrinone Infusion 0.25 MICROgram(s)/kG/Min IV Continuous <Continuous>  norepinephrine Infusion 0.02 MICROgram(s)/kG/Min IV Continuous <Continuous>    cefuroxime  IVPB 1500 milliGRAM(s) IV Intermittent every 8 hours    albuterol/ipratropium for Nebulization 3 milliLiter(s) Nebulizer every 6 hours  budesonide 160 MICROgram(s)/formoterol 4.5 MICROgram(s) Inhaler 2 Puff(s) Inhalation two times a day    acetaminophen     Tablet .. 650 milliGRAM(s) Oral every 6 hours  aspirin Suppository 300 milliGRAM(s) Rectal once  dexMEDEtomidine Infusion 0.8 MICROgram(s)/kG/Hr IV Continuous <Continuous>  gabapentin 300 milliGRAM(s) Oral every 8 hours  HYDROmorphone PCA (1 mG/mL) 30 milliLiter(s) PCA Continuous PCA Continuous  HYDROmorphone PCA (1 mG/mL) Rescue Clinician Bolus 0.5 milliGRAM(s) IV Push every 15 minutes PRN  OLANZapine 5 milliGRAM(s) Oral daily  ondansetron Injectable 4 milliGRAM(s) IV Push every 6 hours PRN  propofol Infusion 10 MICROgram(s)/kG/Min IV Continuous <Continuous>  traZODone 50 milliGRAM(s) Oral at bedtime    famotidine    Tablet 20 milliGRAM(s) Oral two times a day  polyethylene glycol 3350 17 Gram(s) Oral daily  senna 2 Tablet(s) Oral at bedtime    atorvastatin 80 milliGRAM(s) Oral at bedtime  dextrose 50% Injectable 50 milliLiter(s) IV Push every 15 minutes  dextrose 50% Injectable 25 milliLiter(s) IV Push every 15 minutes  insulin regular Infusion 3 Unit(s)/Hr IV Continuous <Continuous>  predniSONE   Tablet 10 milliGRAM(s) Oral daily  vasopressin Infusion 0.04 Unit(s)/Min IV Continuous <Continuous>    albumin human  5% IVPB 250 milliLiter(s) IV Intermittent once  ascorbic acid 500 milliGRAM(s) Oral two times a day  chlorhexidine 2% Cloths 1 Application(s) Topical daily  potassium chloride  10 mEq/50 mL IVPB 10 milliEquivalent(s) IV Intermittent every 1 hour  potassium chloride  10 mEq/50 mL IVPB 10 milliEquivalent(s) IV Intermittent every 1 hour  potassium chloride  10 mEq/50 mL IVPB 10 milliEquivalent(s) IV Intermittent every 1 hour  sodium chloride 0.9%. 1000 milliLiter(s) IV Continuous <Continuous>      PAST MEDICAL/SURGICAL HISTORY  PAST MEDICAL & SURGICAL HISTORY:  Cigarette smoker      Rheumatoid arthritis      Other depression      Breast cancer      Migraines      History of multiple cerebrovascular accidents (CVAs)      Suicidal ideation      Paresthesia of left arm      Facial paresthesia      APS (antiphospholipid syndrome)      History of hysterectomy      History of cholecystectomy          SOCIAL HISTORY: Substance Use (street drugs): ( x ) never used  (  ) other:    FAMILY HISTORY:  Family history of breast cancer (Mother)    Family history of diabetes mellitus (DM) (Father)        REVIEW OF SYSTEMS:  CONSTITUTIONAL: No fever, weight loss, or fatigue  EYES: No eye pain, visual disturbances, or discharge  ENMT:  No difficulty hearing, tinnitus, vertigo; No sinus or throat pain  BREASTS: No pain, masses, or nipple discharge  GASTROINTESTINAL: No abdominal or epigastric pain. No nausea, vomiting, or hematemesis; No diarrhea or constipation. No melena or hematochezia.  GENITOURINARY: No dysuria, frequency, hematuria, or incontinence  NEUROLOGICAL: No headaches, memory loss, loss of strength, numbness, or tremors  ENDOCRINE: No heat or cold intolerance; No hair loss  MUSCULOSKELETAL: No joint pain or swelling; No muscle, back, or extremity pain  PSYCHIATRIC: No depression, anxiety, mood swings, or difficulty sleeping  HEME/LYMPH: No easy bruising, or bleeding gums  All others negative    PHYSICAL EXAM:  T(C): 37.4 (11-22-23 @ 08:00), Max: 37.7 (11-21-23 @ 20:00)  HR: 74 (11-22-23 @ 11:15) (66 - 90)  BP: --  RR: 15 (11-22-23 @ 11:15) (12 - 34)  SpO2: 100% (11-22-23 @ 11:15) (92% - 100%)  Wt(kg): --  I&O's Summary    21 Nov 2023 07:01  -  22 Nov 2023 07:00  --------------------------------------------------------  IN: 3685.4 mL / OUT: 3985 mL / NET: -299.6 mL    22 Nov 2023 07:01  -  22 Nov 2023 11:40  --------------------------------------------------------  IN: 85.3 mL / OUT: 200 mL / NET: -114.7 mL      GENERAL:  intubated and sedated   EYES: conjunctiva and sclera clear  ENMT: No tonsillar erythema, exudates, or enlargement  Cardiovascular: Normal S1 S2, No JVD, No murmurs, No edema  Respiratory: Lungs clear to auscultation	  Gastrointestinal:  Soft, Non-tender, + BS	  Extremities: No edema                                8.4    16.12 )-----------( 108      ( 22 Nov 2023 00:15 )             24.6     11-22    142  |  104  |  17  ----------------------------<  124<H>  4.3   |  26  |  0.67    Ca    9.0      22 Nov 2023 00:15  Phos  3.6     11-22  Mg     2.3     11-22    TPro  5.8<L>  /  Alb  4.4  /  TBili  1.9<H>  /  DBili  x   /  AST  33  /  ALT  20  /  AlkPhos  56  11-22    proBNP:   Lipid Profile:   HgA1c:   TSH:     Consultant(s) Notes Reviewed:  [x ] YES  [ ] NO    Care Discussed with Consultants/Other Providers [ x] YES  [ ] NO    Imaging Personally Reviewed independently:  [x] YES  [ ] NO    All labs, radiologic studies, vitals, orders and medications list reviewed. Patient is seen and examined at bedside. Case discussed with medical team.

## 2023-11-21 NOTE — PROGRESS NOTE ADULT - ASSESSMENT
58F smoker with PMH CVA with L sided weakness/numbness/L gaze deviation, DM2 with b/l peripheral neuropathy, COPD/Asthma, Breast Ca s/p ?RT,  Bipolar depression, Rheumatoid Arthritis, Antiphospholipid syndrome, and L eye uveitis/scleritis, Presents to [facility] transferred from [facility]. CTS eval   Assessment  DMT2: 58y Female with DM T2 with hyperglycemia, A1C 7.6% , was on oral meds at home, now on basal bolus insulin with coverage,  blood sugars are fluctuating , eating meals. NPO today for CT surgery   Non compliant with low carb diet, snacking in between.   Severe AR: CTS eval, LESLEY, preop CTS.   CAD: on medications, stable, monitored. Cardiac MR viability   HTN: on antihypertensive medications, monitored, asymptomatic.  Obesity: No strict exercise routines, not on any weight loss program, neither on low calorie diet.          Discussed plan and management with Dr Ladarius Rivera NP - TEAMS  Skye Durand MD  Cell: [phone]  Office: [phone]

## 2023-11-21 NOTE — PROGRESS NOTE ADULT - SUBJECTIVE AND OBJECTIVE BOX
Patient seen and examined at the bedside.    Remained critically ill on continuous ICU monitoring.    OBJECTIVE:  Vital Signs Last 24 Hrs  T(C): 36.6 (21 Nov 2023 08:00), Max: 36.7 (20 Nov 2023 18:00)  T(F): 97.9 (21 Nov 2023 06:32), Max: 98 (20 Nov 2023 18:00)  HR: 69 (21 Nov 2023 16:00) (63 - 74)  BP: 101/66 (21 Nov 2023 08:00) (101/66 - 124/74)  BP(mean): 83 (21 Nov 2023 08:00) (83 - 83)  RR: 19 (21 Nov 2023 16:00) (13 - 21)  SpO2: 97% (21 Nov 2023 16:00) (92% - 99%)    Parameters below as of 21 Nov 2023 05:30  Patient On (Oxygen Delivery Method): room air          Physical Exam:   General: NAD  Neurology: Sedated   Eyes: bilateral pupils equal and reactive   ENT/Neck: Neck supple, trachea midline, No JVD   Respiratory: Clear bilaterally   CV: S1S2, no murmurs        [x] Sternal dressing, [x] Mediastinal CT, [x] Pleural CT        [x] Sinus rhythm, [x] TPM  Abdominal: Soft, NT, ND +BS   Extremities: 1-2+ pedal edema noted, + peripheral pulses   Skin: No Rashes, Hematoma, Ecchymosis                         ============================I/O===========================   I&O's Detail    20 Nov 2023 07:01  -  21 Nov 2023 07:00  --------------------------------------------------------  IN:    Oral Fluid: 345 mL  Total IN: 345 mL    OUT:    Voided (mL): 300 mL  Total OUT: 300 mL    Total NET: 45 mL  ============================ LABS =========================                        8.4    26.13 )-----------( 174      ( 21 Nov 2023 15:25 )             26.0     11-21    141  |  104  |  19  ----------------------------<  234<H>  4.7   |  26  |  0.58    Ca    7.9<L>      21 Nov 2023 15:25  Phos  3.8     11-21  Mg     2.8     11-21    TPro  5.0<L>  /  Alb  3.6  /  TBili  1.8<H>  /  DBili  x   /  AST  55<H>  /  ALT  27  /  AlkPhos  65  11-21    LIVER FUNCTIONS - ( 21 Nov 2023 15:25 )  Alb: 3.6 g/dL / Pro: 5.0 g/dL / ALK PHOS: 65 U/L / ALT: 27 U/L / AST: 55 U/L / GGT: x           PT/INR - ( 21 Nov 2023 15:25 )   PT: 14.1 sec;   INR: 1.29 ratio      PTT - ( 21 Nov 2023 15:25 )  PTT:28.9 sec  ABG - ( 21 Nov 2023 16:00 )  pH, Arterial: 7.27  pH, Blood: x     /  pCO2: 55    /  pO2: 79    / HCO3: 25    / Base Excess: -1.7  /  SaO2: 96.9      Urinalysis Basic - ( 21 Nov 2023 15:25 )    Color: x / Appearance: x / SG: x / pH: x  Gluc: 234 mg/dL / Ketone: x  / Bili: x / Urobili: x   Blood: x / Protein: x / Nitrite: x   Leuk Esterase: x / RBC: x / WBC x   Sq Epi: x / Non Sq Epi: x / Bacteria: x    ======================Micro/Rad/Cardio=================  Culture: Reviewed   CXR: Reviewed  Echo: Reviewed  ======================================================  PAST MEDICAL & SURGICAL HISTORY:  Cigarette smoker  Rheumatoid arthritis  Other depression  Breast cancer  Migraines  History of multiple cerebrovascular accidents (CVAs)  Suicidal ideation  Paresthesia of left arm  Facial paresthesia  APS (antiphospholipid syndrome)  History of hysterectomy  History of cholecystectomy  ======================================================    Assessment:  57 yo F with PMHx of CVA with L sided weakness/numbness/L gaze deviation, DM2 with b/l peripheral neuropathy, COPD/Asthma, Breast Ca s/p ?RT, Bipolar depression, Rheumatoid Arthritis, Antiphospholipid syndrome, and L eye uveitis/scleritis.    CAD s/p CABG on 11/21/23  Hemodynamic instability  Lactic acidosis  Post op respiratory insufficiency  Acute blood loss anemia  Leukocytosis    ======================================================  Plan:   ***Neuro***  [x] Sedated with [x] Precedex   Post operative neuro assessment   Pain management with Tylenol, Dilaudid, Gabapentin, and Oxycodone     ***Cardiovascular***  Invasive hemodynamic monitoring, assess perfusion indices   SR / / MAP 74/ PAP 34/ CI 2.7/ Hct 26.0/ Lactate 2.4  [x] Levophed 0.02 mcgs [x] Vasopressin 0.04 units [x] Primacor 0.25 mcgs [x] Dobutamine 5 mcgs   [x]  IABP   Volume: 2u PRBCs, 1 FFP, 1 cryo in OR 2u PRBCs, 1u plasma and 500u FEIBA in CTU   Reassessment of hemodynamics post resuscitation  PO Amio for afib ppx  Monitor chest tube outputs   [x]  ASA daily    Serial EKG and cardiac enzymes     ***Pulmonary***  Post op vent management   Titration of FiO2 and PEEP, follow SpO2, CXR, blood gasses     Mode: AC/ CMV (Assist Control/ Continuous Mandatory Ventilation)  RR (machine): 16  TV (machine): 500  FiO2: 100  PEEP: 8  ITime: 1  MAP: 11  PIP: 20              ***GI***  [x] NPO for now  [x] Pepcid with stress ulcer ppx    ***Renal***  Continue to monitor I/Os, BUN/Creatinine.   Replete lytes PRN  Pleitez present     ***ID***  Perioperative coverage with Cefuroxime     ***Endocrine***  [x] DM2: HbA1c 7.6%                - [x] Insulin gtt             - Need tight glycemic control to prevent wound infection.              Patient requires continuous monitoring with:  bedside rhythm monitoring, arterial line, pulse oximetry, ventilator management and monitoring; intermittent blood gas analysis.  Care plan discussed with ICU care team.  patient remain critical; required more than usual post op care; I have spent 45 minutes providing non routine post op care, revaluated multiple times during the day .      Care Plan discussed with the ICU care team. Patient remained critical, at risk for life threatening decompensation.     By signing my name below, I, J Luis Shay, attest that this documentation has been prepared under the direction and in the presence of Elisabeth Butts MD.  Electronically signed: Bucky Allen, 11-21-23 @ 17:11    I, Beckie Barber, personally performed the services described in this documentation. all medical record entries made by the scribe were at my direction and in my presence. I have reviewed the chart and agree that the record reflects my personal performance and is accurate and complete  Electronically signed: Elisabeth Butts MD. Patient seen and examined at the bedside.    Remained critically ill on continuous ICU monitoring.    OBJECTIVE:  Vital Signs Last 24 Hrs  T(C): 36.6 (21 Nov 2023 08:00), Max: 36.7 (20 Nov 2023 18:00)  T(F): 97.9 (21 Nov 2023 06:32), Max: 98 (20 Nov 2023 18:00)  HR: 69 (21 Nov 2023 16:00) (63 - 74)  BP: 101/66 (21 Nov 2023 08:00) (101/66 - 124/74)  BP(mean): 83 (21 Nov 2023 08:00) (83 - 83)  RR: 19 (21 Nov 2023 16:00) (13 - 21)  SpO2: 97% (21 Nov 2023 16:00) (92% - 99%)    Parameters below as of 21 Nov 2023 05:30  Patient On (Oxygen Delivery Method): room air          Physical Exam:   General: NAD  Neurology: Sedated   Eyes: bilateral pupils equal and reactive   ENT/Neck: Neck supple, trachea midline, No JVD   Respiratory: Clear bilaterally   CV: S1S2, no murmurs        [x] Sternal dressing, [x] Mediastinal CT, [x] Pleural CT        [x] Sinus rhythm, [x] TPM VVI 40  Abdominal: Soft, NT, ND +BS   Extremities: 1-2+ pedal edema noted, + peripheral pulses   Skin: No Rashes, Hematoma, Ecchymosis                         ============================I/O===========================   I&O's Detail    20 Nov 2023 07:01  -  21 Nov 2023 07:00  --------------------------------------------------------  IN:    Oral Fluid: 345 mL  Total IN: 345 mL    OUT:    Voided (mL): 300 mL  Total OUT: 300 mL    Total NET: 45 mL  ============================ LABS =========================                        8.4    26.13 )-----------( 174      ( 21 Nov 2023 15:25 )             26.0     11-21    141  |  104  |  19  ----------------------------<  234<H>  4.7   |  26  |  0.58    Ca    7.9<L>      21 Nov 2023 15:25  Phos  3.8     11-21  Mg     2.8     11-21    TPro  5.0<L>  /  Alb  3.6  /  TBili  1.8<H>  /  DBili  x   /  AST  55<H>  /  ALT  27  /  AlkPhos  65  11-21    LIVER FUNCTIONS - ( 21 Nov 2023 15:25 )  Alb: 3.6 g/dL / Pro: 5.0 g/dL / ALK PHOS: 65 U/L / ALT: 27 U/L / AST: 55 U/L / GGT: x           PT/INR - ( 21 Nov 2023 15:25 )   PT: 14.1 sec;   INR: 1.29 ratio      PTT - ( 21 Nov 2023 15:25 )  PTT:28.9 sec  ABG - ( 21 Nov 2023 16:00 )  pH, Arterial: 7.27  pH, Blood: x     /  pCO2: 55    /  pO2: 79    / HCO3: 25    / Base Excess: -1.7  /  SaO2: 96.9      Urinalysis Basic - ( 21 Nov 2023 15:25 )    Color: x / Appearance: x / SG: x / pH: x  Gluc: 234 mg/dL / Ketone: x  / Bili: x / Urobili: x   Blood: x / Protein: x / Nitrite: x   Leuk Esterase: x / RBC: x / WBC x   Sq Epi: x / Non Sq Epi: x / Bacteria: x    ======================Micro/Rad/Cardio=================  Culture: Reviewed   CXR: Reviewed  Echo: Reviewed  ======================================================  PAST MEDICAL & SURGICAL HISTORY:  Cigarette smoker  Rheumatoid arthritis  Other depression  Breast cancer  Migraines  History of multiple cerebrovascular accidents (CVAs)  Suicidal ideation  Paresthesia of left arm  Facial paresthesia  APS (antiphospholipid syndrome)  History of hysterectomy  History of cholecystectomy  ======================================================    Assessment:  59 yo F with PMHx of CVA with L sided weakness/numbness/L gaze deviation, DM2 with b/l peripheral neuropathy, COPD/Asthma, Breast Ca s/p ?RT, Bipolar depression, Rheumatoid Arthritis, Antiphospholipid syndrome, and L eye uveitis/scleritis.    CAD s/p CABG on 11/21/23  Hemodynamic instability  Lactic acidosis  Post op respiratory insufficiency  Acute blood loss anemia  Leukocytosis    ======================================================  Plan:   ***Neuro***  [x] Hx CVA, L sided weakness   [x] Sedated with [x] Precedex   Post operative neuro assessment   Pain management with Tylenol, Dilaudid, Gabapentin, and Oxycodone     ***Cardiovascular***  Invasive hemodynamic monitoring, assess perfusion indices   SR / / MAP 74/ PAP 34/ CI 2.7/ Hct 26.0/ Lactate 2.4  [x] Levophed 0.02 mcgs (increased) [x] Vasopressin 0.04 units - off [x] Primacor 0.25 mcgs - switched to Dobutamine 5 mcgs   Volume: 2u PRBCs, 1 FFP, 1 cryo in OR 2u PRBCs, 1u plasma and 500u FEIBA in CTU   Reassessment of hemodynamics post resuscitation  PO Amio for afib ppx  Monitor chest tube outputs   [x]  ASA daily    Serial EKG and cardiac enzymes     ***Pulmonary***  Post op vent management   Titration of FiO2 and PEEP, follow SpO2, CXR, blood gasses     Mode: AC/ CMV (Assist Control/ Continuous Mandatory Ventilation)  RR (machine): 16  TV (machine): 500  FiO2: 100  PEEP: 8  ITime: 1  MAP: 11  PIP: 20              ***GI***  [x] NPO for now  [x] Pepcid with stress ulcer ppx    ***Renal***  Continue to monitor I/Os, BUN/Creatinine.   Replete lytes PRN  Pleitez present     ***ID***  Perioperative coverage with Cefuroxime     ***Endocrine***  [x] DM2: HbA1c 7.6%                - [x] Insulin gtt             - Need tight glycemic control to prevent wound infection.            Patient requires continuous monitoring with:  bedside rhythm monitoring, arterial line, pulse oximetry, ventilator management and monitoring; intermittent blood gas analysis.  Care plan discussed with ICU care team.  patient remain critical; required more than usual post op care; I have spent 45 minutes providing non routine post op care, revaluated multiple times during the day .      Care Plan discussed with the ICU care team. Patient remained critical, at risk for life threatening decompensation.     By signing my name below, I, J Luis Shay, attest that this documentation has been prepared under the direction and in the presence of Elisabeth Butts MD.  Electronically signed: Bucky Allen, 11-21-23 @ 17:11    I, Beckie Barber, personally performed the services described in this documentation. all medical record entries made by the scribe were at my direction and in my presence. I have reviewed the chart and agree that the record reflects my personal performance and is accurate and complete  Electronically signed: Elisabeth Butts MD.

## 2023-11-21 NOTE — PROGRESS NOTE ADULT - SUBJECTIVE AND OBJECTIVE BOX
NPO  OR today CTS    Chief complaint  Patient is a 58y old  Female who presents with a chief complaint of sob (20 Nov 2023 06:55)         Labs and Fingersticks  CAPILLARY BLOOD GLUCOSE      POCT Blood Glucose.: 351 mg/dL (20 Nov 2023 21:47)  POCT Blood Glucose.: 103 mg/dL (20 Nov 2023 17:55)  POCT Blood Glucose.: 207 mg/dL (20 Nov 2023 11:29)      Anion Gap: 14 (11-20 @ 06:18)      Calcium: 9.4 (11-20 @ 06:18)          11-20    137  |  99  |  30<H>  ----------------------------<  115<H>  4.0   |  24  |  0.75    Ca    9.4      20 Nov 2023 06:18                          10.9   16.04 )-----------( 285      ( 20 Nov 2023 06:18 )             34.3     Medications  MEDICATIONS  (STANDING):  cefuroxime  IVPB 1500 milliGRAM(s) IV Intermittent once      Physical Exam  General: Patient comfortable in bed   Vital Signs Last 12 Hrs  T(F): 97.9 (11-21-23 @ 06:32), Max: 97.9 (11-21-23 @ 05:30)  HR: 74 (11-21-23 @ 08:00) (74 - 74)  BP: 101/66 (11-21-23 @ 08:00) (101/66 - 101/66)  BP(mean): 83 (11-21-23 @ 08:00) (83 - 83)  RR: 18 (11-21-23 @ 08:00) (18 - 18)  SpO2: 96% (11-21-23 @ 08:00) (96% - 96%)    CVS: S1S2   Respiratory: No wheezing, no crepitations  GI: Abdomen soft, bowel sounds positive  Musculoskeletal:  moves all extremities  : Voiding     NPO  OR today CTS  Chief complaint  Patient is a 58y old  Female who presents with a chief complaint of sob (20 Nov 2023 06:55)         Labs and Fingersticks  CAPILLARY BLOOD GLUCOSE      POCT Blood Glucose.: 351 mg/dL (20 Nov 2023 21:47)  POCT Blood Glucose.: 103 mg/dL (20 Nov 2023 17:55)  POCT Blood Glucose.: 207 mg/dL (20 Nov 2023 11:29)      Anion Gap: 14 (11-20 @ 06:18)      Calcium: 9.4 (11-20 @ 06:18)          11-20    137  |  99  |  30<H>  ----------------------------<  115<H>  4.0   |  24  |  0.75    Ca    9.4      20 Nov 2023 06:18                          10.9   16.04 )-----------( 285      ( 20 Nov 2023 06:18 )             34.3     Medications  MEDICATIONS  (STANDING):  cefuroxime  IVPB 1500 milliGRAM(s) IV Intermittent once      Physical Exam  General: Patient comfortable in bed   Vital Signs Last 12 Hrs  T(F): 97.9 (11-21-23 @ 06:32), Max: 97.9 (11-21-23 @ 05:30)  HR: 74 (11-21-23 @ 08:00) (74 - 74)  BP: 101/66 (11-21-23 @ 08:00) (101/66 - 101/66)  BP(mean): 83 (11-21-23 @ 08:00) (83 - 83)  RR: 18 (11-21-23 @ 08:00) (18 - 18)  SpO2: 96% (11-21-23 @ 08:00) (96% - 96%)    CVS: S1S2   Respiratory: No wheezing, no crepitations  GI: Abdomen soft, bowel sounds positive  Musculoskeletal:  moves all extremities  : Voiding

## 2023-11-22 LAB
ALBUMIN SERPL ELPH-MCNC: 4.4 G/DL — SIGNIFICANT CHANGE UP (ref 3.3–5)
ALBUMIN SERPL ELPH-MCNC: 4.4 G/DL — SIGNIFICANT CHANGE UP (ref 3.3–5)
ALP SERPL-CCNC: 56 U/L — SIGNIFICANT CHANGE UP (ref 40–120)
ALP SERPL-CCNC: 56 U/L — SIGNIFICANT CHANGE UP (ref 40–120)
ALT FLD-CCNC: 20 U/L — SIGNIFICANT CHANGE UP (ref 10–45)
ALT FLD-CCNC: 20 U/L — SIGNIFICANT CHANGE UP (ref 10–45)
ANION GAP SERPL CALC-SCNC: 12 MMOL/L — SIGNIFICANT CHANGE UP (ref 5–17)
ANION GAP SERPL CALC-SCNC: 12 MMOL/L — SIGNIFICANT CHANGE UP (ref 5–17)
APTT BLD: 25.4 SEC — SIGNIFICANT CHANGE UP (ref 24.5–35.6)
APTT BLD: 25.4 SEC — SIGNIFICANT CHANGE UP (ref 24.5–35.6)
AST SERPL-CCNC: 33 U/L — SIGNIFICANT CHANGE UP (ref 10–40)
AST SERPL-CCNC: 33 U/L — SIGNIFICANT CHANGE UP (ref 10–40)
BASE EXCESS BLDMV CALC-SCNC: 1.7 MMOL/L — SIGNIFICANT CHANGE UP (ref -3–3)
BASE EXCESS BLDMV CALC-SCNC: 1.7 MMOL/L — SIGNIFICANT CHANGE UP (ref -3–3)
BASE EXCESS BLDV CALC-SCNC: -0.4 MMOL/L — SIGNIFICANT CHANGE UP (ref -2–3)
BASE EXCESS BLDV CALC-SCNC: -0.4 MMOL/L — SIGNIFICANT CHANGE UP (ref -2–3)
BASE EXCESS BLDV CALC-SCNC: 1.5 MMOL/L — SIGNIFICANT CHANGE UP (ref -2–3)
BASE EXCESS BLDV CALC-SCNC: 1.5 MMOL/L — SIGNIFICANT CHANGE UP (ref -2–3)
BASOPHILS # BLD AUTO: 0.01 K/UL — SIGNIFICANT CHANGE UP (ref 0–0.2)
BASOPHILS # BLD AUTO: 0.01 K/UL — SIGNIFICANT CHANGE UP (ref 0–0.2)
BASOPHILS NFR BLD AUTO: 0.1 % — SIGNIFICANT CHANGE UP (ref 0–2)
BASOPHILS NFR BLD AUTO: 0.1 % — SIGNIFICANT CHANGE UP (ref 0–2)
BILIRUB SERPL-MCNC: 1.9 MG/DL — HIGH (ref 0.2–1.2)
BILIRUB SERPL-MCNC: 1.9 MG/DL — HIGH (ref 0.2–1.2)
BUN SERPL-MCNC: 17 MG/DL — SIGNIFICANT CHANGE UP (ref 7–23)
BUN SERPL-MCNC: 17 MG/DL — SIGNIFICANT CHANGE UP (ref 7–23)
CALCIUM SERPL-MCNC: 9 MG/DL — SIGNIFICANT CHANGE UP (ref 8.4–10.5)
CALCIUM SERPL-MCNC: 9 MG/DL — SIGNIFICANT CHANGE UP (ref 8.4–10.5)
CHLORIDE SERPL-SCNC: 104 MMOL/L — SIGNIFICANT CHANGE UP (ref 96–108)
CHLORIDE SERPL-SCNC: 104 MMOL/L — SIGNIFICANT CHANGE UP (ref 96–108)
CO2 BLDMV-SCNC: 28 MMOL/L — SIGNIFICANT CHANGE UP (ref 21–29)
CO2 BLDMV-SCNC: 28 MMOL/L — SIGNIFICANT CHANGE UP (ref 21–29)
CO2 BLDV-SCNC: 27 MMOL/L — HIGH (ref 22–26)
CO2 BLDV-SCNC: 27 MMOL/L — HIGH (ref 22–26)
CO2 BLDV-SCNC: 28 MMOL/L — HIGH (ref 22–26)
CO2 BLDV-SCNC: 28 MMOL/L — HIGH (ref 22–26)
CO2 SERPL-SCNC: 26 MMOL/L — SIGNIFICANT CHANGE UP (ref 22–31)
CO2 SERPL-SCNC: 26 MMOL/L — SIGNIFICANT CHANGE UP (ref 22–31)
CREAT SERPL-MCNC: 0.67 MG/DL — SIGNIFICANT CHANGE UP (ref 0.5–1.3)
CREAT SERPL-MCNC: 0.67 MG/DL — SIGNIFICANT CHANGE UP (ref 0.5–1.3)
EGFR: 101 ML/MIN/1.73M2 — SIGNIFICANT CHANGE UP
EGFR: 101 ML/MIN/1.73M2 — SIGNIFICANT CHANGE UP
EOSINOPHIL # BLD AUTO: 0 K/UL — SIGNIFICANT CHANGE UP (ref 0–0.5)
EOSINOPHIL # BLD AUTO: 0 K/UL — SIGNIFICANT CHANGE UP (ref 0–0.5)
EOSINOPHIL NFR BLD AUTO: 0 % — SIGNIFICANT CHANGE UP (ref 0–6)
EOSINOPHIL NFR BLD AUTO: 0 % — SIGNIFICANT CHANGE UP (ref 0–6)
FIBRINOGEN PPP-MCNC: 204 MG/DL — SIGNIFICANT CHANGE UP (ref 200–445)
FIBRINOGEN PPP-MCNC: 204 MG/DL — SIGNIFICANT CHANGE UP (ref 200–445)
GAS PNL BLDA: SIGNIFICANT CHANGE UP
GAS PNL BLDMV: SIGNIFICANT CHANGE UP
GAS PNL BLDMV: SIGNIFICANT CHANGE UP
GAS PNL BLDV: SIGNIFICANT CHANGE UP
GLUCOSE BLDC GLUCOMTR-MCNC: 106 MG/DL — HIGH (ref 70–99)
GLUCOSE BLDC GLUCOMTR-MCNC: 107 MG/DL — HIGH (ref 70–99)
GLUCOSE BLDC GLUCOMTR-MCNC: 107 MG/DL — HIGH (ref 70–99)
GLUCOSE BLDC GLUCOMTR-MCNC: 127 MG/DL — HIGH (ref 70–99)
GLUCOSE BLDC GLUCOMTR-MCNC: 127 MG/DL — HIGH (ref 70–99)
GLUCOSE BLDC GLUCOMTR-MCNC: 129 MG/DL — HIGH (ref 70–99)
GLUCOSE BLDC GLUCOMTR-MCNC: 129 MG/DL — HIGH (ref 70–99)
GLUCOSE BLDC GLUCOMTR-MCNC: 136 MG/DL — HIGH (ref 70–99)
GLUCOSE BLDC GLUCOMTR-MCNC: 136 MG/DL — HIGH (ref 70–99)
GLUCOSE BLDC GLUCOMTR-MCNC: 137 MG/DL — HIGH (ref 70–99)
GLUCOSE BLDC GLUCOMTR-MCNC: 137 MG/DL — HIGH (ref 70–99)
GLUCOSE BLDC GLUCOMTR-MCNC: 140 MG/DL — HIGH (ref 70–99)
GLUCOSE BLDC GLUCOMTR-MCNC: 140 MG/DL — HIGH (ref 70–99)
GLUCOSE BLDC GLUCOMTR-MCNC: 145 MG/DL — HIGH (ref 70–99)
GLUCOSE BLDC GLUCOMTR-MCNC: 145 MG/DL — HIGH (ref 70–99)
GLUCOSE BLDC GLUCOMTR-MCNC: 146 MG/DL — HIGH (ref 70–99)
GLUCOSE BLDC GLUCOMTR-MCNC: 146 MG/DL — HIGH (ref 70–99)
GLUCOSE BLDC GLUCOMTR-MCNC: 159 MG/DL — HIGH (ref 70–99)
GLUCOSE BLDC GLUCOMTR-MCNC: 159 MG/DL — HIGH (ref 70–99)
GLUCOSE BLDC GLUCOMTR-MCNC: 163 MG/DL — HIGH (ref 70–99)
GLUCOSE BLDC GLUCOMTR-MCNC: 163 MG/DL — HIGH (ref 70–99)
GLUCOSE BLDC GLUCOMTR-MCNC: 165 MG/DL — HIGH (ref 70–99)
GLUCOSE BLDC GLUCOMTR-MCNC: 165 MG/DL — HIGH (ref 70–99)
GLUCOSE BLDC GLUCOMTR-MCNC: 180 MG/DL — HIGH (ref 70–99)
GLUCOSE BLDC GLUCOMTR-MCNC: 180 MG/DL — HIGH (ref 70–99)
GLUCOSE BLDC GLUCOMTR-MCNC: 183 MG/DL — HIGH (ref 70–99)
GLUCOSE BLDC GLUCOMTR-MCNC: 183 MG/DL — HIGH (ref 70–99)
GLUCOSE BLDC GLUCOMTR-MCNC: 194 MG/DL — HIGH (ref 70–99)
GLUCOSE BLDC GLUCOMTR-MCNC: 194 MG/DL — HIGH (ref 70–99)
GLUCOSE BLDC GLUCOMTR-MCNC: 210 MG/DL — HIGH (ref 70–99)
GLUCOSE BLDC GLUCOMTR-MCNC: 210 MG/DL — HIGH (ref 70–99)
GLUCOSE BLDC GLUCOMTR-MCNC: 218 MG/DL — HIGH (ref 70–99)
GLUCOSE BLDC GLUCOMTR-MCNC: 218 MG/DL — HIGH (ref 70–99)
GLUCOSE BLDC GLUCOMTR-MCNC: 86 MG/DL — SIGNIFICANT CHANGE UP (ref 70–99)
GLUCOSE BLDC GLUCOMTR-MCNC: 86 MG/DL — SIGNIFICANT CHANGE UP (ref 70–99)
GLUCOSE SERPL-MCNC: 124 MG/DL — HIGH (ref 70–99)
GLUCOSE SERPL-MCNC: 124 MG/DL — HIGH (ref 70–99)
HAV IGM SER-ACNC: SIGNIFICANT CHANGE UP
HAV IGM SER-ACNC: SIGNIFICANT CHANGE UP
HBV CORE IGM SER-ACNC: SIGNIFICANT CHANGE UP
HBV CORE IGM SER-ACNC: SIGNIFICANT CHANGE UP
HBV SURFACE AG SER-ACNC: SIGNIFICANT CHANGE UP
HBV SURFACE AG SER-ACNC: SIGNIFICANT CHANGE UP
HCO3 BLDMV-SCNC: 27 MMOL/L — SIGNIFICANT CHANGE UP (ref 20–28)
HCO3 BLDMV-SCNC: 27 MMOL/L — SIGNIFICANT CHANGE UP (ref 20–28)
HCO3 BLDV-SCNC: 25 MMOL/L — SIGNIFICANT CHANGE UP (ref 22–29)
HCO3 BLDV-SCNC: 25 MMOL/L — SIGNIFICANT CHANGE UP (ref 22–29)
HCO3 BLDV-SCNC: 27 MMOL/L — SIGNIFICANT CHANGE UP (ref 22–29)
HCO3 BLDV-SCNC: 27 MMOL/L — SIGNIFICANT CHANGE UP (ref 22–29)
HCT VFR BLD CALC: 24.6 % — LOW (ref 34.5–45)
HCT VFR BLD CALC: 24.6 % — LOW (ref 34.5–45)
HCT VFR BLD CALC: 25.3 % — LOW (ref 34.5–45)
HCT VFR BLD CALC: 25.3 % — LOW (ref 34.5–45)
HCV AB S/CO SERPL IA: 0.05 S/CO — SIGNIFICANT CHANGE UP (ref 0–0.99)
HCV AB S/CO SERPL IA: 0.05 S/CO — SIGNIFICANT CHANGE UP (ref 0–0.99)
HCV AB SERPL-IMP: SIGNIFICANT CHANGE UP
HCV AB SERPL-IMP: SIGNIFICANT CHANGE UP
HGB BLD-MCNC: 8.4 G/DL — LOW (ref 11.5–15.5)
HGB BLD-MCNC: 8.4 G/DL — LOW (ref 11.5–15.5)
HGB BLD-MCNC: 8.5 G/DL — LOW (ref 11.5–15.5)
HGB BLD-MCNC: 8.5 G/DL — LOW (ref 11.5–15.5)
HIV 1+2 AB+HIV1 P24 AG SERPL QL IA: SIGNIFICANT CHANGE UP
HIV 1+2 AB+HIV1 P24 AG SERPL QL IA: SIGNIFICANT CHANGE UP
HOROWITZ INDEX BLDMV+IHG-RTO: 40 — SIGNIFICANT CHANGE UP
HOROWITZ INDEX BLDMV+IHG-RTO: 40 — SIGNIFICANT CHANGE UP
HOROWITZ INDEX BLDV+IHG-RTO: 40 — SIGNIFICANT CHANGE UP
IMM GRANULOCYTES NFR BLD AUTO: 1.4 % — HIGH (ref 0–0.9)
IMM GRANULOCYTES NFR BLD AUTO: 1.4 % — HIGH (ref 0–0.9)
INR BLD: 1.06 RATIO — SIGNIFICANT CHANGE UP (ref 0.85–1.18)
INR BLD: 1.06 RATIO — SIGNIFICANT CHANGE UP (ref 0.85–1.18)
LYMPHOCYTES # BLD AUTO: 0.62 K/UL — LOW (ref 1–3.3)
LYMPHOCYTES # BLD AUTO: 0.62 K/UL — LOW (ref 1–3.3)
LYMPHOCYTES # BLD AUTO: 3.8 % — LOW (ref 13–44)
LYMPHOCYTES # BLD AUTO: 3.8 % — LOW (ref 13–44)
MAGNESIUM SERPL-MCNC: 2.3 MG/DL — SIGNIFICANT CHANGE UP (ref 1.6–2.6)
MAGNESIUM SERPL-MCNC: 2.3 MG/DL — SIGNIFICANT CHANGE UP (ref 1.6–2.6)
MCHC RBC-ENTMCNC: 29.9 PG — SIGNIFICANT CHANGE UP (ref 27–34)
MCHC RBC-ENTMCNC: 29.9 PG — SIGNIFICANT CHANGE UP (ref 27–34)
MCHC RBC-ENTMCNC: 30.7 PG — SIGNIFICANT CHANGE UP (ref 27–34)
MCHC RBC-ENTMCNC: 30.7 PG — SIGNIFICANT CHANGE UP (ref 27–34)
MCHC RBC-ENTMCNC: 33.6 GM/DL — SIGNIFICANT CHANGE UP (ref 32–36)
MCHC RBC-ENTMCNC: 33.6 GM/DL — SIGNIFICANT CHANGE UP (ref 32–36)
MCHC RBC-ENTMCNC: 34.1 GM/DL — SIGNIFICANT CHANGE UP (ref 32–36)
MCHC RBC-ENTMCNC: 34.1 GM/DL — SIGNIFICANT CHANGE UP (ref 32–36)
MCV RBC AUTO: 89.1 FL — SIGNIFICANT CHANGE UP (ref 80–100)
MCV RBC AUTO: 89.1 FL — SIGNIFICANT CHANGE UP (ref 80–100)
MCV RBC AUTO: 89.8 FL — SIGNIFICANT CHANGE UP (ref 80–100)
MCV RBC AUTO: 89.8 FL — SIGNIFICANT CHANGE UP (ref 80–100)
MONOCYTES # BLD AUTO: 0.95 K/UL — HIGH (ref 0–0.9)
MONOCYTES # BLD AUTO: 0.95 K/UL — HIGH (ref 0–0.9)
MONOCYTES NFR BLD AUTO: 5.9 % — SIGNIFICANT CHANGE UP (ref 2–14)
MONOCYTES NFR BLD AUTO: 5.9 % — SIGNIFICANT CHANGE UP (ref 2–14)
NEUTROPHILS # BLD AUTO: 14.32 K/UL — HIGH (ref 1.8–7.4)
NEUTROPHILS # BLD AUTO: 14.32 K/UL — HIGH (ref 1.8–7.4)
NEUTROPHILS NFR BLD AUTO: 88.8 % — HIGH (ref 43–77)
NEUTROPHILS NFR BLD AUTO: 88.8 % — HIGH (ref 43–77)
NRBC # BLD: 0 /100 WBCS — SIGNIFICANT CHANGE UP (ref 0–0)
O2 CT VFR BLD CALC: 37 MMHG — SIGNIFICANT CHANGE UP (ref 30–65)
O2 CT VFR BLD CALC: 37 MMHG — SIGNIFICANT CHANGE UP (ref 30–65)
PCO2 BLDMV: 42 MMHG — SIGNIFICANT CHANGE UP (ref 30–65)
PCO2 BLDMV: 42 MMHG — SIGNIFICANT CHANGE UP (ref 30–65)
PCO2 BLDV: 43 MMHG — HIGH (ref 39–42)
PCO2 BLDV: 43 MMHG — HIGH (ref 39–42)
PCO2 BLDV: 45 MMHG — HIGH (ref 39–42)
PCO2 BLDV: 45 MMHG — HIGH (ref 39–42)
PH BLDMV: 7.41 — SIGNIFICANT CHANGE UP (ref 7.32–7.45)
PH BLDMV: 7.41 — SIGNIFICANT CHANGE UP (ref 7.32–7.45)
PH BLDV: 7.36 — SIGNIFICANT CHANGE UP (ref 7.32–7.43)
PH BLDV: 7.36 — SIGNIFICANT CHANGE UP (ref 7.32–7.43)
PH BLDV: 7.4 — SIGNIFICANT CHANGE UP (ref 7.32–7.43)
PH BLDV: 7.4 — SIGNIFICANT CHANGE UP (ref 7.32–7.43)
PHOSPHATE SERPL-MCNC: 3.6 MG/DL — SIGNIFICANT CHANGE UP (ref 2.5–4.5)
PHOSPHATE SERPL-MCNC: 3.6 MG/DL — SIGNIFICANT CHANGE UP (ref 2.5–4.5)
PLATELET # BLD AUTO: 108 K/UL — LOW (ref 150–400)
PLATELET # BLD AUTO: 108 K/UL — LOW (ref 150–400)
PLATELET # BLD AUTO: 87 K/UL — LOW (ref 150–400)
PLATELET # BLD AUTO: 87 K/UL — LOW (ref 150–400)
PO2 BLDV: 36 MMHG — SIGNIFICANT CHANGE UP (ref 25–45)
PO2 BLDV: 36 MMHG — SIGNIFICANT CHANGE UP (ref 25–45)
PO2 BLDV: 42 MMHG — SIGNIFICANT CHANGE UP (ref 25–45)
PO2 BLDV: 42 MMHG — SIGNIFICANT CHANGE UP (ref 25–45)
POTASSIUM SERPL-MCNC: 4.3 MMOL/L — SIGNIFICANT CHANGE UP (ref 3.5–5.3)
POTASSIUM SERPL-MCNC: 4.3 MMOL/L — SIGNIFICANT CHANGE UP (ref 3.5–5.3)
POTASSIUM SERPL-SCNC: 4.3 MMOL/L — SIGNIFICANT CHANGE UP (ref 3.5–5.3)
POTASSIUM SERPL-SCNC: 4.3 MMOL/L — SIGNIFICANT CHANGE UP (ref 3.5–5.3)
PROT SERPL-MCNC: 5.8 G/DL — LOW (ref 6–8.3)
PROT SERPL-MCNC: 5.8 G/DL — LOW (ref 6–8.3)
PROTHROM AB SERPL-ACNC: 11.6 SEC — SIGNIFICANT CHANGE UP (ref 9.5–13)
PROTHROM AB SERPL-ACNC: 11.6 SEC — SIGNIFICANT CHANGE UP (ref 9.5–13)
RBC # BLD: 2.74 M/UL — LOW (ref 3.8–5.2)
RBC # BLD: 2.74 M/UL — LOW (ref 3.8–5.2)
RBC # BLD: 2.84 M/UL — LOW (ref 3.8–5.2)
RBC # BLD: 2.84 M/UL — LOW (ref 3.8–5.2)
RBC # FLD: 14.6 % — HIGH (ref 10.3–14.5)
RBC # FLD: 14.6 % — HIGH (ref 10.3–14.5)
RBC # FLD: 15.5 % — HIGH (ref 10.3–14.5)
RBC # FLD: 15.5 % — HIGH (ref 10.3–14.5)
SAO2 % BLDMV: 67.2 — SIGNIFICANT CHANGE UP (ref 60–90)
SAO2 % BLDMV: 67.2 — SIGNIFICANT CHANGE UP (ref 60–90)
SAO2 % BLDV: 59.7 % — LOW (ref 67–88)
SAO2 % BLDV: 59.7 % — LOW (ref 67–88)
SAO2 % BLDV: 63.5 % — LOW (ref 67–88)
SAO2 % BLDV: 63.5 % — LOW (ref 67–88)
SODIUM SERPL-SCNC: 142 MMOL/L — SIGNIFICANT CHANGE UP (ref 135–145)
SODIUM SERPL-SCNC: 142 MMOL/L — SIGNIFICANT CHANGE UP (ref 135–145)
WBC # BLD: 16.12 K/UL — HIGH (ref 3.8–10.5)
WBC # BLD: 16.12 K/UL — HIGH (ref 3.8–10.5)
WBC # BLD: 18.17 K/UL — HIGH (ref 3.8–10.5)
WBC # BLD: 18.17 K/UL — HIGH (ref 3.8–10.5)
WBC # FLD AUTO: 16.12 K/UL — HIGH (ref 3.8–10.5)
WBC # FLD AUTO: 16.12 K/UL — HIGH (ref 3.8–10.5)
WBC # FLD AUTO: 18.17 K/UL — HIGH (ref 3.8–10.5)
WBC # FLD AUTO: 18.17 K/UL — HIGH (ref 3.8–10.5)

## 2023-11-22 PROCEDURE — 99291 CRITICAL CARE FIRST HOUR: CPT

## 2023-11-22 PROCEDURE — 71045 X-RAY EXAM CHEST 1 VIEW: CPT | Mod: 26

## 2023-11-22 PROCEDURE — 93010 ELECTROCARDIOGRAM REPORT: CPT

## 2023-11-22 RX ORDER — CEFUROXIME AXETIL 250 MG
1500 TABLET ORAL EVERY 8 HOURS
Refills: 0 | Status: COMPLETED | OUTPATIENT
Start: 2023-11-22 | End: 2023-11-23

## 2023-11-22 RX ORDER — ACETAMINOPHEN 500 MG
1000 TABLET ORAL ONCE
Refills: 0 | Status: COMPLETED | OUTPATIENT
Start: 2023-11-22 | End: 2023-11-22

## 2023-11-22 RX ORDER — TRAZODONE HCL 50 MG
50 TABLET ORAL AT BEDTIME
Refills: 0 | Status: DISCONTINUED | OUTPATIENT
Start: 2023-11-22 | End: 2023-12-04

## 2023-11-22 RX ORDER — ONDANSETRON 8 MG/1
4 TABLET, FILM COATED ORAL EVERY 6 HOURS
Refills: 0 | Status: DISCONTINUED | OUTPATIENT
Start: 2023-11-22 | End: 2023-11-24

## 2023-11-22 RX ORDER — OLANZAPINE 15 MG/1
5 TABLET, FILM COATED ORAL DAILY
Refills: 0 | Status: DISCONTINUED | OUTPATIENT
Start: 2023-11-22 | End: 2023-11-23

## 2023-11-22 RX ORDER — IPRATROPIUM/ALBUTEROL SULFATE 18-103MCG
3 AEROSOL WITH ADAPTER (GRAM) INHALATION EVERY 6 HOURS
Refills: 0 | Status: DISCONTINUED | OUTPATIENT
Start: 2023-11-22 | End: 2023-12-04

## 2023-11-22 RX ORDER — ALBUMIN HUMAN 25 %
250 VIAL (ML) INTRAVENOUS ONCE
Refills: 0 | Status: COMPLETED | OUTPATIENT
Start: 2023-11-22 | End: 2023-11-22

## 2023-11-22 RX ORDER — HYDROMORPHONE HYDROCHLORIDE 2 MG/ML
0.5 INJECTION INTRAMUSCULAR; INTRAVENOUS; SUBCUTANEOUS
Refills: 0 | Status: DISCONTINUED | OUTPATIENT
Start: 2023-11-22 | End: 2023-11-24

## 2023-11-22 RX ORDER — GABAPENTIN 400 MG/1
300 CAPSULE ORAL EVERY 8 HOURS
Refills: 0 | Status: DISCONTINUED | OUTPATIENT
Start: 2023-11-22 | End: 2023-12-04

## 2023-11-22 RX ORDER — ALBUMIN HUMAN 25 %
250 VIAL (ML) INTRAVENOUS ONCE
Refills: 0 | Status: DISCONTINUED | OUTPATIENT
Start: 2023-11-22 | End: 2023-11-23

## 2023-11-22 RX ORDER — HYDROMORPHONE HYDROCHLORIDE 2 MG/ML
0.5 INJECTION INTRAMUSCULAR; INTRAVENOUS; SUBCUTANEOUS ONCE
Refills: 0 | Status: DISCONTINUED | OUTPATIENT
Start: 2023-11-22 | End: 2023-11-22

## 2023-11-22 RX ORDER — OLANZAPINE 15 MG/1
5 TABLET, FILM COATED ORAL DAILY
Refills: 0 | Status: DISCONTINUED | OUTPATIENT
Start: 2023-11-22 | End: 2023-11-22

## 2023-11-22 RX ORDER — ATORVASTATIN CALCIUM 80 MG/1
80 TABLET, FILM COATED ORAL AT BEDTIME
Refills: 0 | Status: DISCONTINUED | OUTPATIENT
Start: 2023-11-22 | End: 2023-12-04

## 2023-11-22 RX ORDER — FAMOTIDINE 10 MG/ML
20 INJECTION INTRAVENOUS
Refills: 0 | Status: DISCONTINUED | OUTPATIENT
Start: 2023-11-22 | End: 2023-12-04

## 2023-11-22 RX ORDER — BUDESONIDE AND FORMOTEROL FUMARATE DIHYDRATE 160; 4.5 UG/1; UG/1
2 AEROSOL RESPIRATORY (INHALATION)
Refills: 0 | Status: DISCONTINUED | OUTPATIENT
Start: 2023-11-22 | End: 2023-12-04

## 2023-11-22 RX ORDER — BUDESONIDE, MICRONIZED 100 %
0.5 POWDER (GRAM) MISCELLANEOUS EVERY 12 HOURS
Refills: 0 | Status: DISCONTINUED | OUTPATIENT
Start: 2023-11-22 | End: 2023-11-22

## 2023-11-22 RX ORDER — NALOXONE HYDROCHLORIDE 4 MG/.1ML
0.1 SPRAY NASAL
Refills: 0 | Status: DISCONTINUED | OUTPATIENT
Start: 2023-11-22 | End: 2023-11-24

## 2023-11-22 RX ORDER — HYDROMORPHONE HYDROCHLORIDE 2 MG/ML
30 INJECTION INTRAMUSCULAR; INTRAVENOUS; SUBCUTANEOUS
Refills: 0 | Status: DISCONTINUED | OUTPATIENT
Start: 2023-11-22 | End: 2023-11-24

## 2023-11-22 RX ADMIN — HYDROMORPHONE HYDROCHLORIDE 0.5 MILLIGRAM(S): 2 INJECTION INTRAMUSCULAR; INTRAVENOUS; SUBCUTANEOUS at 05:45

## 2023-11-22 RX ADMIN — AMIODARONE HYDROCHLORIDE 400 MILLIGRAM(S): 400 TABLET ORAL at 05:29

## 2023-11-22 RX ADMIN — GABAPENTIN 100 MILLIGRAM(S): 400 CAPSULE ORAL at 05:31

## 2023-11-22 RX ADMIN — HYDROMORPHONE HYDROCHLORIDE 0.5 MILLIGRAM(S): 2 INJECTION INTRAMUSCULAR; INTRAVENOUS; SUBCUTANEOUS at 03:24

## 2023-11-22 RX ADMIN — Medication 13 MICROGRAM(S)/KG/MIN: at 07:44

## 2023-11-22 RX ADMIN — HYDROMORPHONE HYDROCHLORIDE 30 MILLILITER(S): 2 INJECTION INTRAMUSCULAR; INTRAVENOUS; SUBCUTANEOUS at 19:10

## 2023-11-22 RX ADMIN — Medication 650 MILLIGRAM(S): at 17:29

## 2023-11-22 RX ADMIN — CHLORHEXIDINE GLUCONATE 1 APPLICATION(S): 213 SOLUTION TOPICAL at 12:11

## 2023-11-22 RX ADMIN — Medication 81 MILLIGRAM(S): at 11:58

## 2023-11-22 RX ADMIN — FAMOTIDINE 20 MILLIGRAM(S): 10 INJECTION INTRAVENOUS at 17:29

## 2023-11-22 RX ADMIN — Medication 100 MILLIGRAM(S): at 13:05

## 2023-11-22 RX ADMIN — Medication 0.5 MILLIGRAM(S): at 05:02

## 2023-11-22 RX ADMIN — Medication 100 MILLIGRAM(S): at 21:46

## 2023-11-22 RX ADMIN — Medication 1000 MILLIGRAM(S): at 02:05

## 2023-11-22 RX ADMIN — Medication 10 MILLIGRAM(S): at 12:53

## 2023-11-22 RX ADMIN — GABAPENTIN 300 MILLIGRAM(S): 400 CAPSULE ORAL at 21:46

## 2023-11-22 RX ADMIN — Medication 650 MILLIGRAM(S): at 12:31

## 2023-11-22 RX ADMIN — HYDROMORPHONE HYDROCHLORIDE 0.5 MILLIGRAM(S): 2 INJECTION INTRAMUSCULAR; INTRAVENOUS; SUBCUTANEOUS at 05:30

## 2023-11-22 RX ADMIN — HYDROMORPHONE HYDROCHLORIDE 30 MILLILITER(S): 2 INJECTION INTRAMUSCULAR; INTRAVENOUS; SUBCUTANEOUS at 11:28

## 2023-11-22 RX ADMIN — POLYETHYLENE GLYCOL 3350 17 GRAM(S): 17 POWDER, FOR SOLUTION ORAL at 11:59

## 2023-11-22 RX ADMIN — Medication 650 MILLIGRAM(S): at 05:30

## 2023-11-22 RX ADMIN — HYDROMORPHONE HYDROCHLORIDE 0.5 MILLIGRAM(S): 2 INJECTION INTRAMUSCULAR; INTRAVENOUS; SUBCUTANEOUS at 23:35

## 2023-11-22 RX ADMIN — AMIODARONE HYDROCHLORIDE 400 MILLIGRAM(S): 400 TABLET ORAL at 17:29

## 2023-11-22 RX ADMIN — HYDROMORPHONE HYDROCHLORIDE 0.5 MILLIGRAM(S): 2 INJECTION INTRAMUSCULAR; INTRAVENOUS; SUBCUTANEOUS at 07:05

## 2023-11-22 RX ADMIN — BUDESONIDE AND FORMOTEROL FUMARATE DIHYDRATE 2 PUFF(S): 160; 4.5 AEROSOL RESPIRATORY (INHALATION) at 17:13

## 2023-11-22 RX ADMIN — Medication 500 MILLIGRAM(S): at 05:30

## 2023-11-22 RX ADMIN — OLANZAPINE 5 MILLIGRAM(S): 15 TABLET, FILM COATED ORAL at 11:59

## 2023-11-22 RX ADMIN — HYDROMORPHONE HYDROCHLORIDE 0.5 MILLIGRAM(S): 2 INJECTION INTRAMUSCULAR; INTRAVENOUS; SUBCUTANEOUS at 04:00

## 2023-11-22 RX ADMIN — Medication 3 MILLILITER(S): at 05:02

## 2023-11-22 RX ADMIN — Medication 125 MILLILITER(S): at 01:50

## 2023-11-22 RX ADMIN — Medication 3 MILLILITER(S): at 23:48

## 2023-11-22 RX ADMIN — Medication 650 MILLIGRAM(S): at 06:09

## 2023-11-22 RX ADMIN — Medication 50 MILLIGRAM(S): at 21:46

## 2023-11-22 RX ADMIN — ATORVASTATIN CALCIUM 80 MILLIGRAM(S): 80 TABLET, FILM COATED ORAL at 21:46

## 2023-11-22 RX ADMIN — GABAPENTIN 300 MILLIGRAM(S): 400 CAPSULE ORAL at 13:00

## 2023-11-22 RX ADMIN — Medication 7.82 MICROGRAM(S)/KG/MIN: at 21:47

## 2023-11-22 RX ADMIN — Medication 400 MILLIGRAM(S): at 01:50

## 2023-11-22 RX ADMIN — Medication 3 MILLILITER(S): at 11:53

## 2023-11-22 RX ADMIN — FAMOTIDINE 20 MILLIGRAM(S): 10 INJECTION INTRAVENOUS at 05:29

## 2023-11-22 RX ADMIN — Medication 100 MILLIGRAM(S): at 05:29

## 2023-11-22 RX ADMIN — Medication 500 MILLIGRAM(S): at 17:29

## 2023-11-22 RX ADMIN — SENNA PLUS 2 TABLET(S): 8.6 TABLET ORAL at 21:46

## 2023-11-22 RX ADMIN — HYDROMORPHONE HYDROCHLORIDE 0.5 MILLIGRAM(S): 2 INJECTION INTRAMUSCULAR; INTRAVENOUS; SUBCUTANEOUS at 06:50

## 2023-11-22 RX ADMIN — HYDROMORPHONE HYDROCHLORIDE 0.5 MILLIGRAM(S): 2 INJECTION INTRAMUSCULAR; INTRAVENOUS; SUBCUTANEOUS at 11:45

## 2023-11-22 RX ADMIN — Medication 650 MILLIGRAM(S): at 17:59

## 2023-11-22 RX ADMIN — Medication 3 MILLILITER(S): at 17:12

## 2023-11-22 RX ADMIN — INSULIN HUMAN 3 UNIT(S)/HR: 100 INJECTION, SOLUTION SUBCUTANEOUS at 21:47

## 2023-11-22 RX ADMIN — Medication 125 MILLILITER(S): at 17:30

## 2023-11-22 RX ADMIN — Medication 650 MILLIGRAM(S): at 11:59

## 2023-11-22 NOTE — OCCUPATIONAL THERAPY INITIAL EVALUATION ADULT - PLANNED THERAPY INTERVENTIONS, OT EVAL
ADL retraining/balance training/bed mobility training/cognitive, visual perceptual/transfer training - - -

## 2023-11-22 NOTE — PHYSICAL THERAPY INITIAL EVALUATION ADULT - GENERAL OBSERVATIONS, REHAB EVAL
Pt rec'd seated in recliner NAD, +UE IVL, +CTx2, +soliz, +5LNC, +ext pacer, +prevena, +ICU monitoring, +a-line.

## 2023-11-22 NOTE — OCCUPATIONAL THERAPY INITIAL EVALUATION ADULT - ADL RETRAINING, OT EVAL
GOAL: pt will perform UB dressing while maintaining sternal precautions independently in 4 weeks. GOAL: pt will perform LB dressing independently in 4 weeks.

## 2023-11-22 NOTE — OCCUPATIONAL THERAPY INITIAL EVALUATION ADULT - PERTINENT HX OF CURRENT PROBLEM, REHAB EVAL
58F smoker with PMH CVA with L sided weakness/numbness/L gaze deviation, DM2 with b/l peripheral neuropathy, COPD/Asthma, Breast Ca s/p ?RT, Bipolar depression, Rheumatoid Arthritis, Antiphospholipid syndrome, and L eye uveitis/scleritis, Presents to [facility] transferred from [facility]. p/w multiple medical complaints. A/w exacerbation of L-sided subjective weakness/numbness likely 2/2 recrudescence of prior Right BG infarct. Cardiology following for new LV dysfunction since July, pending ischemic eval. LESLEY was done showing severe Aortic Insufficiency and moderated MR. Cardiac cath was done showing LAD prox 100% fills via right to left collateral. Patient now transferred to CTS Dr. Loaiza for evaluation. Now s/p CABG, with LESLEY. CT Head:  Limited by beam hardening artifact. No acute intracranial pathology. Punctate nonacute right basal ganglial lacunar infarct. No large arterial distribution acute infarct. MR Cardiac: Mild thinning and hypokinesis of the mid to apical anterior and anteroseptal segments. The left ventricular ejection fraction measures 43%. Findings consistent with ischemic cardiomyopathy. No myocardial scarring is appreciated on postcontrast imaging, suggestive of viable myocardium. Mild to moderate aortic regurgitation by qualitative assessment. VA Duplex Caratid (-) Chest X Ray: ET tube in right bronchus. Later study done at 3:53 shows ET Tube above the ligia 58F smoker with PMH CVA with L sided weakness/numbness/L gaze deviation, DM2 with b/l peripheral neuropathy, COPD/Asthma, Breast Ca s/p ?RT, Bipolar depression, Rheumatoid Arthritis, Antiphospholipid syndrome, and L eye uveitis/scleritis, Presents to [facility] transferred from [facility]. p/w multiple medical complaints. A/w exacerbation of L-sided subjective weakness/numbness likely 2/2 recrudescence of prior Right BG infarct. Cardiology following for new LV dysfunction since July, pending ischemic eval. LESLEY was done showing severe Aortic Insufficiency and moderated MR. Cardiac cath was done showing LAD prox 100% fills via right to left collateral. Patient now transferred to CTS Dr. Loaiza for evaluation. Now s/p CABG, with LESLEY. CT Head:  Limited by beam hardening artifact. No acute intracranial pathology. Punctate nonacute right basal ganglial lacunar infarct. No large arterial distribution acute infarct. MR Cardiac: Mild thinning and hypokinesis of the mid to apical anterior and anteroseptal segments. The left ventricular ejection fraction measures 43%. Findings consistent with ischemic cardiomyopathy. No myocardial scarring is appreciated on postcontrast imaging, suggestive of viable myocardium. Mild to moderate aortic regurgitation by qualitative assessment. now s/p CABG, with LESLEY, Replacement, aortic valve, with LESLEY. VA Duplex Caratid (-) Chest X Ray: ET tube in right bronchus. Later study done at 3:53 shows ET Tube above the ligia

## 2023-11-22 NOTE — DIETITIAN INITIAL EVALUATION ADULT - ADD RECOMMEND
1. continue current diet as tolerated of: consistent carbohydrate diet  2. encourage PO intake, protein source with each meal as tolerated  3. monitor PO intake, weight trend, electrolytes, blood glucose levels, labs, BMs

## 2023-11-22 NOTE — OCCUPATIONAL THERAPY INITIAL EVALUATION ADULT - DIAGNOSIS, OT EVAL
Pt presents with impaired cognition, vision, sensation decreased strength, balance, endurance, impacting ability to perform ADL and mobility.

## 2023-11-22 NOTE — DIETITIAN INITIAL EVALUATION ADULT - PERTINENT LABORATORY DATA
11-22    142  |  104  |  17  ----------------------------<  124<H>  4.3   |  26  |  0.67    Ca    9.0      22 Nov 2023 00:15  Phos  3.6     11-22  Mg     2.3     11-22    TPro  5.8<L>  /  Alb  4.4  /  TBili  1.9<H>  /  DBili  x   /  AST  33  /  ALT  20  /  AlkPhos  56  11-22  POCT Blood Glucose.: 145 mg/dL (11-22-23 @ 15:10)  A1C with Estimated Average Glucose Result: 7.6 % (11-14-23 @ 22:46)  A1C with Estimated Average Glucose Result: 7.5 % (11-09-23 @ 06:15)  A1C with Estimated Average Glucose Result: 7.6 % (11-08-23 @ 06:43)

## 2023-11-22 NOTE — DIETITIAN INITIAL EVALUATION ADULT - REASON
Patient eating well PTA w/ recent unintentional weight gain, currently w/ fair PO intake post-OP w/ taking first meals today, will monitor parameters

## 2023-11-22 NOTE — PROGRESS NOTE ADULT - PROBLEM SELECTOR PLAN 2
On medications,  no chest pain, stable, monitored and followed up by primary cardiothoracic team/cardiology team. preop CTS On medications,  no chest pain, stable, monitored and followed up by primary cardiothoracic team/cardiology team. post op CTS

## 2023-11-22 NOTE — DIETITIAN INITIAL EVALUATION ADULT - NSFNSGIIOFT_GEN_A_CORE
11-21-23 @ 07:01  -  11-22-23 @ 07:00  --------------------------------------------------------  OUT:    Chest Tube (mL): 200 mL    Chest Tube (mL): 1200 mL  Total OUT: 1400 mL    Total NET: -1400 mL      11-22-23 @ 07:01  -  11-22-23 @ 15:15  --------------------------------------------------------  OUT:    Chest Tube (mL): 0 mL    Chest Tube (mL): 160 mL  Total OUT: 160 mL    Total NET: -160 mL

## 2023-11-22 NOTE — DIETITIAN INITIAL EVALUATION ADULT - NS FNS REASON FOR WEIGHT CHANG
pt reports requiring prednisone for management of COPD and asthma PTA influencing unintentional weight gain

## 2023-11-22 NOTE — PROGRESS NOTE ADULT - SUBJECTIVE AND OBJECTIVE BOX
Called to evaluate for PCA. POD 1 - AVR / CABG X 1   Medical record reviewed. NKDA. Denies prehospitalization opioid use, occasional use marijuana last used 2 week ago.  OOB in chair. Endorsing incisional and chest tube site pain. Medicated with hydromorphone 0.5mg IVP prn with some effectiveness.  PCA explained to patient. Will initiate PCA hydromorphone 0.2mg/ 6minutes / no basal / 4mg four hour limit.  Acute pain management to follow.

## 2023-11-22 NOTE — DIETITIAN INITIAL EVALUATION ADULT - PERTINENT MEDS FT
MEDICATIONS  (STANDING):  acetaminophen     Tablet .. 650 milliGRAM(s) Oral every 6 hours  albumin human  5% IVPB 250 milliLiter(s) IV Intermittent once  albuterol/ipratropium for Nebulization 3 milliLiter(s) Nebulizer every 6 hours  aMIOdarone    Tablet 400 milliGRAM(s) Oral two times a day  ascorbic acid 500 milliGRAM(s) Oral two times a day  aspirin enteric coated 81 milliGRAM(s) Oral daily  atorvastatin 80 milliGRAM(s) Oral at bedtime  budesonide 160 MICROgram(s)/formoterol 4.5 MICROgram(s) Inhaler 2 Puff(s) Inhalation two times a day  cefuroxime  IVPB 1500 milliGRAM(s) IV Intermittent every 8 hours  chlorhexidine 2% Cloths 1 Application(s) Topical daily  dextrose 50% Injectable 50 milliLiter(s) IV Push every 15 minutes  dextrose 50% Injectable 25 milliLiter(s) IV Push every 15 minutes  DOBUTamine Infusion 4 MICROgram(s)/kG/Min (10.4 mL/Hr) IV Continuous <Continuous>  famotidine    Tablet 20 milliGRAM(s) Oral two times a day  gabapentin 300 milliGRAM(s) Oral every 8 hours  HYDROmorphone PCA (1 mG/mL) 30 milliLiter(s) PCA Continuous PCA Continuous  insulin regular Infusion 3 Unit(s)/Hr (3 mL/Hr) IV Continuous <Continuous>  OLANZapine 5 milliGRAM(s) Oral daily  polyethylene glycol 3350 17 Gram(s) Oral daily  potassium chloride  10 mEq/50 mL IVPB 10 milliEquivalent(s) IV Intermittent every 1 hour  potassium chloride  10 mEq/50 mL IVPB 10 milliEquivalent(s) IV Intermittent every 1 hour  potassium chloride  10 mEq/50 mL IVPB 10 milliEquivalent(s) IV Intermittent every 1 hour  predniSONE   Tablet 10 milliGRAM(s) Oral daily  senna 2 Tablet(s) Oral at bedtime  sodium chloride 0.9%. 1000 milliLiter(s) (10 mL/Hr) IV Continuous <Continuous>  traZODone 50 milliGRAM(s) Oral at bedtime    MEDICATIONS  (PRN):  HYDROmorphone PCA (1 mG/mL) Rescue Clinician Bolus 0.5 milliGRAM(s) IV Push every 15 minutes PRN for Pain Scale GREATER THAN 6  naloxone Injectable 0.1 milliGRAM(s) IV Push every 3 minutes PRN For ANY of the following changes in patient status:  A. RR LESS THAN 10 breaths per minute, B. Oxygen saturation LESS THAN 90%, C. Sedation score of 6  ondansetron Injectable 4 milliGRAM(s) IV Push every 6 hours PRN Nausea

## 2023-11-22 NOTE — OCCUPATIONAL THERAPY INITIAL EVALUATION ADULT - ADDITIONAL COMMENTS
Pt states she was mostly able to complete ADLs independently but stated at times she would require minimal assistance.

## 2023-11-22 NOTE — DIETITIAN INITIAL EVALUATION ADULT - REASON FOR ADMISSION
Atherosclerosis of native coronary artery without angina pectoris    Per chart, patient is a 57 y/o female with PMH including h/o CVA (w/ L sided weakness/numbness/L gave deviation), T2DM w/ b/l peripheral neuropathy, COPD/asthma, h/o breast cancer (s/p ?RT), bipolar depression, rheumatoid arthritis, antiphospholipid syndrome, L eye uveitis/scleritis. Patient presents to [facility] as a transfer from [facility] for CTS evaluation. S/p CABG, AV replacement 11/21.

## 2023-11-22 NOTE — AIRWAY REMOVAL NOTE  ADULT & PEDS - ARTIFICAL AIRWAY REMOVAL COMMENTS
Written order for extubation verified. The patient was identified by full name and birth date compared to the identification band. Present during the procedure was RN Carline

## 2023-11-22 NOTE — PROGRESS NOTE ADULT - SUBJECTIVE AND OBJECTIVE BOX
Chief complaint  Patient is a 58y old  Female who presents with a chief complaint of sob (20 Nov 2023 06:55)         Labs and Fingersticks  CAPILLARY BLOOD GLUCOSE      POCT Blood Glucose.: 194 mg/dL (22 Nov 2023 13:43)  POCT Blood Glucose.: 210 mg/dL (22 Nov 2023 13:04)  POCT Blood Glucose.: 218 mg/dL (22 Nov 2023 11:49)  POCT Blood Glucose.: 165 mg/dL (22 Nov 2023 09:50)  POCT Blood Glucose.: 129 mg/dL (22 Nov 2023 07:45)  POCT Blood Glucose.: 180 mg/dL (22 Nov 2023 06:49)  POCT Blood Glucose.: 146 mg/dL (22 Nov 2023 05:57)  POCT Blood Glucose.: 106 mg/dL (22 Nov 2023 03:55)  POCT Blood Glucose.: 106 mg/dL (22 Nov 2023 02:52)  POCT Blood Glucose.: 107 mg/dL (22 Nov 2023 01:47)  POCT Blood Glucose.: 127 mg/dL (22 Nov 2023 00:57)  POCT Blood Glucose.: 128 mg/dL (21 Nov 2023 23:58)  POCT Blood Glucose.: 144 mg/dL (21 Nov 2023 22:51)  POCT Blood Glucose.: 165 mg/dL (21 Nov 2023 21:55)  POCT Blood Glucose.: 176 mg/dL (21 Nov 2023 20:56)  POCT Blood Glucose.: 204 mg/dL (21 Nov 2023 19:52)  POCT Blood Glucose.: 226 mg/dL (21 Nov 2023 18:55)  POCT Blood Glucose.: 244 mg/dL (21 Nov 2023 18:05)  POCT Blood Glucose.: 270 mg/dL (21 Nov 2023 17:07)      Anion Gap: 12 (11-22 @ 00:15)  Anion Gap: 11 (11-21 @ 15:25)      Calcium: 9.0 (11-22 @ 00:15)  Calcium: 7.9 *L* (11-21 @ 15:25)  Albumin: 4.4 (11-22 @ 00:15)  Albumin: 3.6 (11-21 @ 15:25)    Alanine Aminotransferase (ALT/SGPT): 20 (11-22 @ 00:15)  Alanine Aminotransferase (ALT/SGPT): 27 (11-21 @ 15:25)  Alkaline Phosphatase: 56 (11-22 @ 00:15)  Alkaline Phosphatase: 65 (11-21 @ 15:25)  Aspartate Aminotransferase (AST/SGOT): 33 (11-22 @ 00:15)  Aspartate Aminotransferase (AST/SGOT): 55 *H* (11-21 @ 15:25)        11-22    142  |  104  |  17  ----------------------------<  124<H>  4.3   |  26  |  0.67    Ca    9.0      22 Nov 2023 00:15  Phos  3.6     11-22  Mg     2.3     11-22    TPro  5.8<L>  /  Alb  4.4  /  TBili  1.9<H>  /  DBili  x   /  AST  33  /  ALT  20  /  AlkPhos  56  11-22                        8.4    16.12 )-----------( 108      ( 22 Nov 2023 00:15 )             24.6     Medications  MEDICATIONS  (STANDING):  acetaminophen     Tablet .. 650 milliGRAM(s) Oral every 6 hours  albumin human  5% IVPB 250 milliLiter(s) IV Intermittent once  albuterol/ipratropium for Nebulization 3 milliLiter(s) Nebulizer every 6 hours  aMIOdarone    Tablet 400 milliGRAM(s) Oral two times a day  ascorbic acid 500 milliGRAM(s) Oral two times a day  aspirin enteric coated 81 milliGRAM(s) Oral daily  atorvastatin 80 milliGRAM(s) Oral at bedtime  budesonide 160 MICROgram(s)/formoterol 4.5 MICROgram(s) Inhaler 2 Puff(s) Inhalation two times a day  cefuroxime  IVPB 1500 milliGRAM(s) IV Intermittent every 8 hours  chlorhexidine 2% Cloths 1 Application(s) Topical daily  dextrose 50% Injectable 50 milliLiter(s) IV Push every 15 minutes  dextrose 50% Injectable 25 milliLiter(s) IV Push every 15 minutes  DOBUTamine Infusion 4 MICROgram(s)/kG/Min (10.4 mL/Hr) IV Continuous <Continuous>  famotidine    Tablet 20 milliGRAM(s) Oral two times a day  gabapentin 300 milliGRAM(s) Oral every 8 hours  HYDROmorphone PCA (1 mG/mL) 30 milliLiter(s) PCA Continuous PCA Continuous  insulin regular Infusion 3 Unit(s)/Hr (3 mL/Hr) IV Continuous <Continuous>  OLANZapine 5 milliGRAM(s) Oral daily  polyethylene glycol 3350 17 Gram(s) Oral daily  potassium chloride  10 mEq/50 mL IVPB 10 milliEquivalent(s) IV Intermittent every 1 hour  potassium chloride  10 mEq/50 mL IVPB 10 milliEquivalent(s) IV Intermittent every 1 hour  potassium chloride  10 mEq/50 mL IVPB 10 milliEquivalent(s) IV Intermittent every 1 hour  predniSONE   Tablet 10 milliGRAM(s) Oral daily  senna 2 Tablet(s) Oral at bedtime  sodium chloride 0.9%. 1000 milliLiter(s) (10 mL/Hr) IV Continuous <Continuous>  traZODone 50 milliGRAM(s) Oral at bedtime      Physical Exam  General: Patient comfortable in bed   Vital Signs Last 12 Hrs  T(F): 97.9 (11-22-23 @ 12:00), Max: 99.3 (11-22-23 @ 08:00)  HR: 86 (11-22-23 @ 14:15) (73 - 91)  BP: --  BP(mean): --  RR: 27 (11-22-23 @ 14:15) (14 - 34)  SpO2: 98% (11-22-23 @ 14:15) (94% - 100%)    CVS: S1S2   Respiratory: No wheezing, no crepitations  GI: Abdomen soft, bowel sounds positive  Musculoskeletal:  moves all extremities         Chief complaint  Patient is a 58y old  Female who presents with a chief complaint of sob (20 Nov 2023 06:55)         Labs and Fingersticks  CAPILLARY BLOOD GLUCOSE    POCT Blood Glucose.: 194 mg/dL (22 Nov 2023 13:43)  POCT Blood Glucose.: 210 mg/dL (22 Nov 2023 13:04)  POCT Blood Glucose.: 218 mg/dL (22 Nov 2023 11:49)  POCT Blood Glucose.: 165 mg/dL (22 Nov 2023 09:50)  POCT Blood Glucose.: 129 mg/dL (22 Nov 2023 07:45)  POCT Blood Glucose.: 180 mg/dL (22 Nov 2023 06:49)  POCT Blood Glucose.: 146 mg/dL (22 Nov 2023 05:57)  POCT Blood Glucose.: 106 mg/dL (22 Nov 2023 03:55)  POCT Blood Glucose.: 106 mg/dL (22 Nov 2023 02:52)  POCT Blood Glucose.: 107 mg/dL (22 Nov 2023 01:47)  POCT Blood Glucose.: 127 mg/dL (22 Nov 2023 00:57)  POCT Blood Glucose.: 128 mg/dL (21 Nov 2023 23:58)  POCT Blood Glucose.: 144 mg/dL (21 Nov 2023 22:51)  POCT Blood Glucose.: 165 mg/dL (21 Nov 2023 21:55)  POCT Blood Glucose.: 176 mg/dL (21 Nov 2023 20:56)  POCT Blood Glucose.: 204 mg/dL (21 Nov 2023 19:52)  POCT Blood Glucose.: 226 mg/dL (21 Nov 2023 18:55)  POCT Blood Glucose.: 244 mg/dL (21 Nov 2023 18:05)  POCT Blood Glucose.: 270 mg/dL (21 Nov 2023 17:07)      Anion Gap: 12 (11-22 @ 00:15)  Anion Gap: 11 (11-21 @ 15:25)      Calcium: 9.0 (11-22 @ 00:15)  Calcium: 7.9 *L* (11-21 @ 15:25)  Albumin: 4.4 (11-22 @ 00:15)  Albumin: 3.6 (11-21 @ 15:25)    Alanine Aminotransferase (ALT/SGPT): 20 (11-22 @ 00:15)  Alanine Aminotransferase (ALT/SGPT): 27 (11-21 @ 15:25)  Alkaline Phosphatase: 56 (11-22 @ 00:15)  Alkaline Phosphatase: 65 (11-21 @ 15:25)  Aspartate Aminotransferase (AST/SGOT): 33 (11-22 @ 00:15)  Aspartate Aminotransferase (AST/SGOT): 55 *H* (11-21 @ 15:25)        11-22    142  |  104  |  17  ----------------------------<  124<H>  4.3   |  26  |  0.67    Ca    9.0      22 Nov 2023 00:15  Phos  3.6     11-22  Mg     2.3     11-22    TPro  5.8<L>  /  Alb  4.4  /  TBili  1.9<H>  /  DBili  x   /  AST  33  /  ALT  20  /  AlkPhos  56  11-22                        8.4    16.12 )-----------( 108      ( 22 Nov 2023 00:15 )             24.6     Medications  MEDICATIONS  (STANDING):  acetaminophen     Tablet .. 650 milliGRAM(s) Oral every 6 hours  albumin human  5% IVPB 250 milliLiter(s) IV Intermittent once  albuterol/ipratropium for Nebulization 3 milliLiter(s) Nebulizer every 6 hours  aMIOdarone    Tablet 400 milliGRAM(s) Oral two times a day  ascorbic acid 500 milliGRAM(s) Oral two times a day  aspirin enteric coated 81 milliGRAM(s) Oral daily  atorvastatin 80 milliGRAM(s) Oral at bedtime  budesonide 160 MICROgram(s)/formoterol 4.5 MICROgram(s) Inhaler 2 Puff(s) Inhalation two times a day  cefuroxime  IVPB 1500 milliGRAM(s) IV Intermittent every 8 hours  chlorhexidine 2% Cloths 1 Application(s) Topical daily  dextrose 50% Injectable 50 milliLiter(s) IV Push every 15 minutes  dextrose 50% Injectable 25 milliLiter(s) IV Push every 15 minutes  DOBUTamine Infusion 4 MICROgram(s)/kG/Min (10.4 mL/Hr) IV Continuous <Continuous>  famotidine    Tablet 20 milliGRAM(s) Oral two times a day  gabapentin 300 milliGRAM(s) Oral every 8 hours  HYDROmorphone PCA (1 mG/mL) 30 milliLiter(s) PCA Continuous PCA Continuous  insulin regular Infusion 3 Unit(s)/Hr (3 mL/Hr) IV Continuous <Continuous>  OLANZapine 5 milliGRAM(s) Oral daily  polyethylene glycol 3350 17 Gram(s) Oral daily  potassium chloride  10 mEq/50 mL IVPB 10 milliEquivalent(s) IV Intermittent every 1 hour  potassium chloride  10 mEq/50 mL IVPB 10 milliEquivalent(s) IV Intermittent every 1 hour  potassium chloride  10 mEq/50 mL IVPB 10 milliEquivalent(s) IV Intermittent every 1 hour  predniSONE   Tablet 10 milliGRAM(s) Oral daily  senna 2 Tablet(s) Oral at bedtime  sodium chloride 0.9%. 1000 milliLiter(s) (10 mL/Hr) IV Continuous <Continuous>  traZODone 50 milliGRAM(s) Oral at bedtime      Physical Exam  General: Patient comfortable in bed   Vital Signs Last 12 Hrs  T(F): 97.9 (11-22-23 @ 12:00), Max: 99.3 (11-22-23 @ 08:00)  HR: 86 (11-22-23 @ 14:15) (73 - 91)  BP: --  BP(mean): --  RR: 27 (11-22-23 @ 14:15) (14 - 34)  SpO2: 98% (11-22-23 @ 14:15) (94% - 100%)    CVS: S1S2   Respiratory: No wheezing, no crepitations  GI: Abdomen soft, bowel sounds positive  Musculoskeletal:  moves all extremities

## 2023-11-22 NOTE — PROGRESS NOTE ADULT - SUBJECTIVE AND OBJECTIVE BOX
German Carrasco MD  Interventional Cardiology / Endovascular Specialist  [place] Office : [phone] [place] [address] [place] [address]  Tel:   [place] Office : [address] [facility] [place] [address]  Tel: [phone]    Pt lying in bed in NAD extubated     PAST MEDICAL & SURGICAL HISTORY:  Cigarette smoker      Rheumatoid arthritis      Other depression      Breast cancer      Migraines      History of multiple cerebrovascular accidents (CVAs)      Suicidal ideation      Paresthesia of left arm      Facial paresthesia      APS (antiphospholipid syndrome)      History of hysterectomy      History of cholecystectomy          SOCIAL HISTORY: Substance Use (street drugs): ( x ) never used  (  ) other:    FAMILY HISTORY:  Family history of breast cancer (Mother)    Family history of diabetes mellitus (DM) (Father)        REVIEW OF SYSTEMS:  CONSTITUTIONAL: No fever, weight loss, or fatigue  EYES: No eye pain, visual disturbances, or discharge  ENMT:  No difficulty hearing, tinnitus, vertigo; No sinus or throat pain  BREASTS: No pain, masses, or nipple discharge  GASTROINTESTINAL: No abdominal or epigastric pain. No nausea, vomiting, or hematemesis; No diarrhea or constipation. No melena or hematochezia.  GENITOURINARY: No dysuria, frequency, hematuria, or incontinence  NEUROLOGICAL: No headaches, memory loss, loss of strength, numbness, or tremors  ENDOCRINE: No heat or cold intolerance; No hair loss  MUSCULOSKELETAL: No joint pain or swelling; No muscle, back, or extremity pain  PSYCHIATRIC: No depression, anxiety, mood swings, or difficulty sleeping  HEME/LYMPH: No easy bruising, or bleeding gums  All others negative    MEDICATIONS:  aspirin enteric coated 81 milliGRAM(s) Oral daily  furosemide    Tablet 40 milliGRAM(s) Oral daily  metoprolol tartrate 25 milliGRAM(s) Oral two times a day      albuterol/ipratropium for Nebulization 3 milliLiter(s) Nebulizer every 6 hours  budesonide 160 MICROgram(s)/formoterol 4.5 MICROgram(s) Inhaler 2 Puff(s) Inhalation two times a day    acetaminophen     Tablet .. 975 milliGRAM(s) Oral every 8 hours  gabapentin 300 milliGRAM(s) Oral every 8 hours  HYDROmorphone   Tablet 2 milliGRAM(s) Oral every 4 hours PRN  HYDROmorphone   Tablet 4 milliGRAM(s) Oral every 4 hours PRN  traZODone 50 milliGRAM(s) Oral at bedtime    bisacodyl Suppository 10 milliGRAM(s) Rectal once  famotidine    Tablet 20 milliGRAM(s) Oral two times a day  polyethylene glycol 3350 17 Gram(s) Oral daily  senna 2 Tablet(s) Oral at bedtime    atorvastatin 80 milliGRAM(s) Oral at bedtime  dextrose 50% Injectable 50 milliLiter(s) IV Push every 15 minutes  dextrose 50% Injectable 25 milliLiter(s) IV Push every 15 minutes  dextrose Oral Gel 15 Gram(s) Oral once  glucagon  Injectable 1 milliGRAM(s) IntraMuscular once  insulin glargine Injectable (LANTUS) 33 Unit(s) SubCutaneous at bedtime  insulin lispro (ADMELOG) corrective regimen sliding scale   SubCutaneous at bedtime  insulin lispro (ADMELOG) corrective regimen sliding scale   SubCutaneous three times a day before meals  insulin lispro Injectable (ADMELOG) 8 Unit(s) SubCutaneous three times a day before meals  predniSONE   Tablet 10 milliGRAM(s) Oral daily    chlorhexidine 2% Cloths 1 Application(s) Topical daily      FAMILY HISTORY:  Family history of breast cancer (Mother)    Family history of diabetes mellitus (DM) (Father)          Allergies    No Known Allergies    Intolerances    	      PHYSICAL EXAM:  T(C): 36.9 (11-26-23 @ 23:08), Max: 37.3 (11-26-23 @ 10:50)  HR: 81 (11-26-23 @ 23:08) (79 - 99)  BP: 110/67 (11-26-23 @ 23:08) (92/65 - 117/79)  RR: 18 (11-26-23 @ 23:08) (18 - 18)  SpO2: 99% (11-26-23 @ 23:08) (95% - 100%)  Wt(kg): --  I&O's Summary    25 Nov 2023 07:01  -  26 Nov 2023 07:00  --------------------------------------------------------  IN: 780 mL / OUT: 2000 mL / NET: -1220 mL    26 Nov 2023 07:01  -  26 Nov 2023 23:38  --------------------------------------------------------  IN: 780 mL / OUT: 3650 mL / NET: -2870 mL    Appearance: In no distress	  HEENT:    PERRL, EOMI	  Cardiovascular:  S1 S2, No JVD  Respiratory: Lungs clear to auscultation	  Gastrointestinal:  Soft, Non-tender, + BS	  Vascularature:  No edema of LE  Psychiatric: Appropriate affect   Neuro: no acute focal deficits       LABS:	 	    CARDIAC MARKERS:                                  9.8    20.25 )-----------( 194      ( 26 Nov 2023 06:53 )             30.9     11-26    136  |  96  |  21  ----------------------------<  114<H>  3.9   |  27  |  0.69    Ca    9.0      26 Nov 2023 06:53  Phos  3.4     11-26  Mg     2.2     11-26      proBNP:   Lipid Profile:   HgA1c:   TSH:     Consultant(s) Notes Reviewed:  [x ] YES  [ ] NO    Care Discussed with Consultants/Other Providers [ x] YES  [ ] NO    Imaging Personally Reviewed independently:  [x] YES  [ ] NO    All labs, radiologic studies, vitals, orders and medications list reviewed. Patient is seen and examined at bedside. Case discussed with medical team.    ASSESSMENT/PLAN:

## 2023-11-22 NOTE — DIETITIAN INITIAL EVALUATION ADULT - ETIOLOGY
endocrine dysfunction (DM) excessive caloric intake, reduced physical activity w/ patient reporting unintentional weight gain

## 2023-11-22 NOTE — DIETITIAN INITIAL EVALUATION ADULT - PERSON TAUGHT/METHOD
adequate caloric/protein intake, heart healthy and DM diet guidelines w/ literature provided as reference, food preferences, all questions were answered/and family at bedside/verbal instruction/written material/teach back - (Patient repeats in own words)/patient instructed

## 2023-11-22 NOTE — PHYSICAL THERAPY INITIAL EVALUATION ADULT - ADDITIONAL COMMENTS
Pt with unclear social history stating she stays with different family members including her sister and aunt. With her aunt she lives in a pvt basement apt with ~8 steps to enter with a unilateral handrail. With her sister, she lives in a pvt house with unclear amount of steps to enter and more than 1 flight of steps once inside with handrails. Pt states that she was ambulating with a straight cane intermittently 2/2 balance impairments prior to admission.

## 2023-11-22 NOTE — PHYSICAL THERAPY INITIAL EVALUATION ADULT - PLANNED THERAPY INTERVENTIONS, PT EVAL
stair training: GOAL: Pt will negotiate up/down 8 steps with 1 handrail ascending independently in 2 weeks./balance training/gait training/strengthening/transfer training

## 2023-11-22 NOTE — PROGRESS NOTE ADULT - ASSESSMENT
58F smoker with stroke x 2 (2022 and july 2023 with resid  L sided weakness/numbness  DM2 with b/l peripheral neuropathy, COPD/Asthma, Breast Ca s/p, Bipolar depression, Rheumatoid Arthritis, Antiphospholipid syndrome, and L eye uveitis/scleritis, Presents to [facility] transferred from [facility]. p/w multiple medical complaints and exacerbation of L-sided subjective weakness/numbness likely 2/2 recrudescence of prior Right BG infarct. Cardiology following for new LV dysfunction since July.Patient now transferred to CTS Dr. Loaiza for evaluation. neuro called for prior strokes   LESLEY was done showing severe Aortic Insufficiency and moderated MR.   Cardiac cath was done showing LAD prox 100% fills via right to left collateral.   CTH with tinght chronic appearing R BG infarct   TTE EF 30%   CD neg   A1c 7.6   LDL 37   ILR interrogated no events  NIHSS 3  premrs3   cath shows LAD prox 100% fills via right to left collateral will need AVR and LIMA to LAD bypass  o/e with mild L facial and decrease sensation on L with mild 5-/5 weakness on L with slow FFM   s/p OR 11/21  extubated  post op no neuro changes     Imprsesion:   prior nowe chronic appearing infarcts. R BG with residual L HP  DM peripheral neuropathy      - for secondary stroke prevention on asa 81mg and high dose statin.  was also on full dose lovenox given APLS; would resume when able or place on heparin drip   - gabapentin 300mg TID for neuropathy   - f/u rheum and heme/onc   - low risk for cardiac surgery from neuro/stroke standpoint   - telemetry  - PT/OT   - check FS, glucose control <180  - GI/DVT ppx   - Thank you for allowing me to participate in the care of this patient. Call with questions.   - spoke [facility] CTS team/ACP   Charles Crespo MD  Vascular Neurology  Office: [phone]  58F smoker with stroke x 2 (2022 and july 2023 with resid  L sided weakness/numbness  DM2 with b/l peripheral neuropathy, COPD/Asthma, Breast Ca s/p, Bipolar depression, Rheumatoid Arthritis, Antiphospholipid syndrome, and L eye uveitis/scleritis, Presents to [facility] transferred from [facility]. p/w multiple medical complaints and exacerbation of L-sided subjective weakness/numbness likely 2/2 recrudescence of prior Right BG infarct. Cardiology following for new LV dysfunction since July.Patient now transferred to CTS Dr. Loaiza for evaluation. neuro called for prior strokes   LESLEY was done showing severe Aortic Insufficiency and moderated MR.   Cardiac cath was done showing LAD prox 100% fills via right to left collateral.   CTH with tinght chronic appearing R BG infarct   TTE EF 30%   CD neg   A1c 7.6   LDL 37   ILR interrogated no events  NIHSS 3  premrs3   cath shows LAD prox 100% fills via right to left collateral will need AVR and LIMA to LAD bypass  o/e with mild L facial and decrease sensation on L with mild 5-/5 weakness on L with slow FFM   s/p OR 11/21  extubated  post op no neuro changes   c/o HA.     Imprsesion:   prior nowe chronic appearing infarcts. R BG with residual L HP  DM peripheral neuropathy      - for secondary stroke prevention on asa 81mg and high dose statin.  was also on full dose lovenox given APLS; would resume when able or place on heparin drip   - gabapentin 300mg TID for neuropathy   - f/u rheum and heme/onc   - low risk for cardiac surgery from neuro/stroke standpoint   - telemetry  - PT/OT   - check FS, glucose control <180  - GI/DVT ppx   - Thank you for allowing me to participate in the care of this patient. Call with questions.   - spoke [facility] CTS team/ACP   Charles Crespo MD  Vascular Neurology  Office: [phone]

## 2023-11-22 NOTE — PROGRESS NOTE ADULT - PROBLEM SELECTOR PLAN 1
IV insulin immediate postop   Will continue current insulin regimen for now.   Will continue monitoring  blood sugars, will Follow up.  Patient counseled for compliance with consistent low carb diet. Pls continue IV insulin-Still had mod/high requirements    Will continue current insulin regimen for now.   Will continue monitoring  blood sugars, will Follow up.  Patient counseled for compliance with consistent low carb diet.

## 2023-11-22 NOTE — PROGRESS NOTE ADULT - SUBJECTIVE AND OBJECTIVE BOX
CRITICAL CARE ATTENDING - CTICU    MEDICATIONS  (STANDING):  acetaminophen     Tablet .. 650 milliGRAM(s) Oral every 6 hours  albumin human  5% IVPB 250 milliLiter(s) IV Intermittent once  albuterol/ipratropium for Nebulization 3 milliLiter(s) Nebulizer every 6 hours  aMIOdarone    Tablet 400 milliGRAM(s) Oral two times a day  ascorbic acid 500 milliGRAM(s) Oral two times a day  aspirin enteric coated 81 milliGRAM(s) Oral daily  aspirin Suppository 300 milliGRAM(s) Rectal once  buDESOnide    Inhalation Suspension 0.5 milliGRAM(s) Inhalation every 12 hours  cefuroxime  IVPB 1500 milliGRAM(s) IV Intermittent every 8 hours  chlorhexidine 2% Cloths 1 Application(s) Topical daily  dexMEDEtomidine Infusion 0.8 MICROgram(s)/kG/Hr (17.4 mL/Hr) IV Continuous <Continuous>  dextrose 50% Injectable 50 milliLiter(s) IV Push every 15 minutes  dextrose 50% Injectable 25 milliLiter(s) IV Push every 15 minutes  DOBUTamine Infusion 5 MICROgram(s)/kG/Min (13 mL/Hr) IV Continuous <Continuous>  famotidine Injectable 20 milliGRAM(s) IV Push every 12 hours  gabapentin 100 milliGRAM(s) Oral every 8 hours  insulin regular Infusion 3 Unit(s)/Hr (3 mL/Hr) IV Continuous <Continuous>  milrinone Infusion 0.25 MICROgram(s)/kG/Min (6.52 mL/Hr) IV Continuous <Continuous>  norepinephrine Infusion 0.02 MICROgram(s)/kG/Min (3.26 mL/Hr) IV Continuous <Continuous>  polyethylene glycol 3350 17 Gram(s) Oral daily  potassium chloride  10 mEq/50 mL IVPB 10 milliEquivalent(s) IV Intermittent every 1 hour  potassium chloride  10 mEq/50 mL IVPB 10 milliEquivalent(s) IV Intermittent every 1 hour  potassium chloride  10 mEq/50 mL IVPB 10 milliEquivalent(s) IV Intermittent every 1 hour  propofol Infusion 10 MICROgram(s)/kG/Min (5.21 mL/Hr) IV Continuous <Continuous>  senna 2 Tablet(s) Oral at bedtime  sodium chloride 0.9%. 1000 milliLiter(s) (10 mL/Hr) IV Continuous <Continuous>  vasopressin Infusion 0.04 Unit(s)/Min (6 mL/Hr) IV Continuous <Continuous>                                    8.4    16.12 )-----------( 108      ( 2023 00:15 )             24.6           142  |  104  |  17  ----------------------------<  124<H>  4.3   |  26  |  0.67    Ca    9.0      2023 00:15  Phos  3.6       Mg     2.3         TPro  5.8<L>  /  Alb  4.4  /  TBili  1.9<H>  /  DBili  x   /  AST  33  /  ALT  20  /  AlkPhos  56        PT/INR - ( 2023 00:15 )   PT: 11.6 sec;   INR: 1.06 ratio         PTT - ( 2023 00:15 )  PTT:25.4 sec    Mode: CPAP with PS  FiO2: 40  PEEP: 5  PS: 5  ITime: 1  MAP: 8  PIP: 11      Daily Height in cm: 155 (2023 08:00)    Daily Weight in k (2023 00:00)       @ 07:  -   @ 07:00  --------------------------------------------------------  IN: 345 mL / OUT: 300 mL / NET: 45 mL     @ 07:  -   @ 06:43  --------------------------------------------------------  IN: 3357.3 mL / OUT: 3890 mL / NET: -532.7 mL        Critically Ill patient  : [ ] preoperative ,   [ x] post operative    Requires :  [ x] Arterial Line   [x ] Central Line  [x ] PA catheter  [ ] IABP  [ ] ECMO  [ ] LVAD  [ ] Ventilator  [x ] pacemaker - TPM  [ ] Impella.   [x] Aerosol Mask                      [ x] ABG's     [x ] Pulse Oxymetry Monitoring  Bedside evaluation , monitoring , treatment of hemodynamics , fluids , IVP/ IVCD meds.        Diagnosis:     Anterior Wall MI     POD 1 - AVR / CABG X 1 L / LAAL -     h/o CVA's in past - L -  Hemiparesis    Hypotension     COPD            - on Chronic  Steroids     Chest Tube Drainage / Management     Requires chest PT, pulmonary toilet, suctioning to maintain SaO2,  patent airway and treat atelectasis.     [place] Floyd catheter interpretation and therapeutic management of unstable hemodynamics     Respiratory insuffiencey    Hypoxemia - Requires [ ] BIPAP  [ ] HF O2 [x] Aerosol Mask    Temporary pacemaker (TPM) interrogation and setting.     CHF- acute [x ]   chronic [x ]    systolic [x ]   diastolic [ ]  Valvular [x ]          - Echo- EF - 50% / AI            [ ] RV dysfunction          - Cxr-cardiomegally, edema          - Clinical-  [ x]inotropes   [x ]pressors   [ ]diuresis   [ ]IABP   [ ]ECMO   [ ]LVAD   [x ] Respiratory insuffiencey     Hemodynamic lability,  instability. Requires IVCD [ x] vasopressors [ x] inotropes  [ ] vasodilator  [x ]IVSS fluid  to maintain MAP, perfusion, C.I.     DM - IVCD Insulin     IVCD Precedex/Propofol for comfort    Hypovolemia     Thrombocytopenia    Requires bedside physical therapy, mobilization and total detention care.     Obesity OHS / SARAH             By signing my name below, I, Brenna Frank, attest that this documentation has been prepared under the direction and in the presence of Sivakumar Pollard MD.   Electronically Signed: Bucky Pedraza 23 @ 06:43    I, Sivakumar Pollard, personally performed the services described in this documentation. All medical record entries made by the scribe were at my direction and in my presence. I have reviewed the chart and agree that the record reflects my personal performance and is accurate and complete.   Sivakumar Pollard MD.       Discussed with CT surgeon, Physician Assistant - Nurse Practitioner- Critical care medicine team.   Discussed at  AM / PM rounds.   Chart, labs , films reviewed.    Cumulative Critical Care Time Given Today:  CRITICAL CARE ATTENDING - CTICU    MEDICATIONS  (STANDING):  acetaminophen     Tablet .. 650 milliGRAM(s) Oral every 6 hours  albumin human  5% IVPB 250 milliLiter(s) IV Intermittent once  albuterol/ipratropium for Nebulization 3 milliLiter(s) Nebulizer every 6 hours  aMIOdarone    Tablet 400 milliGRAM(s) Oral two times a day  ascorbic acid 500 milliGRAM(s) Oral two times a day  aspirin enteric coated 81 milliGRAM(s) Oral daily  aspirin Suppository 300 milliGRAM(s) Rectal once  buDESOnide    Inhalation Suspension 0.5 milliGRAM(s) Inhalation every 12 hours  cefuroxime  IVPB 1500 milliGRAM(s) IV Intermittent every 8 hours  chlorhexidine 2% Cloths 1 Application(s) Topical daily  dexMEDEtomidine Infusion 0.8 MICROgram(s)/kG/Hr (17.4 mL/Hr) IV Continuous <Continuous>  dextrose 50% Injectable 50 milliLiter(s) IV Push every 15 minutes  dextrose 50% Injectable 25 milliLiter(s) IV Push every 15 minutes  DOBUTamine Infusion 5 MICROgram(s)/kG/Min (13 mL/Hr) IV Continuous <Continuous>  famotidine Injectable 20 milliGRAM(s) IV Push every 12 hours  gabapentin 100 milliGRAM(s) Oral every 8 hours  insulin regular Infusion 3 Unit(s)/Hr (3 mL/Hr) IV Continuous <Continuous>  milrinone Infusion 0.25 MICROgram(s)/kG/Min (6.52 mL/Hr) IV Continuous <Continuous>  norepinephrine Infusion 0.02 MICROgram(s)/kG/Min (3.26 mL/Hr) IV Continuous <Continuous>  polyethylene glycol 3350 17 Gram(s) Oral daily  potassium chloride  10 mEq/50 mL IVPB 10 milliEquivalent(s) IV Intermittent every 1 hour  potassium chloride  10 mEq/50 mL IVPB 10 milliEquivalent(s) IV Intermittent every 1 hour  potassium chloride  10 mEq/50 mL IVPB 10 milliEquivalent(s) IV Intermittent every 1 hour  propofol Infusion 10 MICROgram(s)/kG/Min (5.21 mL/Hr) IV Continuous <Continuous>  senna 2 Tablet(s) Oral at bedtime  sodium chloride 0.9%. 1000 milliLiter(s) (10 mL/Hr) IV Continuous <Continuous>  vasopressin Infusion 0.04 Unit(s)/Min (6 mL/Hr) IV Continuous <Continuous>                                    8.4    16.12 )-----------( 108      ( 2023 00:15 )             24.6           142  |  104  |  17  ----------------------------<  124<H>  4.3   |  26  |  0.67    Ca    9.0      2023 00:15  Phos  3.6       Mg     2.3         TPro  5.8<L>  /  Alb  4.4  /  TBili  1.9<H>  /  DBili  x   /  AST  33  /  ALT  20  /  AlkPhos  56        PT/INR - ( 2023 00:15 )   PT: 11.6 sec;   INR: 1.06 ratio         PTT - ( 2023 00:15 )  PTT:25.4 sec    Mode: CPAP with PS  FiO2: 40  PEEP: 5  PS: 5  ITime: 1  MAP: 8  PIP: 11      Daily Height in cm: 155 (2023 08:00)    Daily Weight in k (2023 00:00)       @ 07:  -   @ 07:00  --------------------------------------------------------  IN: 345 mL / OUT: 300 mL / NET: 45 mL     @ 07:  -   @ 06:43  --------------------------------------------------------  IN: 3357.3 mL / OUT: 3890 mL / NET: -532.7 mL        Critically Ill patient  : [ ] preoperative ,   [ x] post operative    Requires :  [ x] Arterial Line   [x ] Central Line  [x ] PA catheter  [ ] IABP  [ ] ECMO  [ ] LVAD  [ ] Ventilator  [x ] pacemaker - TPM  [ ] Impella.   [x] Aerosol Mask                      [ x] ABG's     [x ] Pulse Oxymetry Monitoring  Bedside evaluation , monitoring , treatment of hemodynamics , fluids , IVP/ IVCD meds.        Diagnosis:     Anterior Wall MI     POD 1 - AVR / CABG X 1 L / LAAL -     Post op bleeding    h/o CVA's in past - L -  Hemiparesis    Hypotension     Hypovolemia     COPD            - on Chronic  Steroids  RA     Chest Tube Drainage / Management     Requires chest PT, pulmonary toilet, suctioning to maintain SaO2,  patent airway and treat atelectasis.     [place] Floyd catheter interpretation and therapeutic management of unstable hemodynamics     Respiratory insuffiencey    Hypoxemia - Requires [ ] BIPAP  [ ] HF O2 [x] Aerosol Mask    Temporary pacemaker (TPM) interrogation and setting.     CHF- acute [x ]   chronic [x ]    systolic [x ]   diastolic [ ]  Valvular [x ]          - Echo- EF -  35% / AI            [ ] RV dysfunction          - Cxr-cardiomegally, edema          - Clinical-  [ x]inotropes   [x ]pressors   [ ]diuresis   [ ]IABP   [ ]ECMO   [ ]LVAD   [x ] Respiratory insuffiencey     Hemodynamic lability,  instability. Requires IVCD [ x] vasopressors [ x] inotropes  [ ] vasodilator  [x ]IVSS fluid / blood products  to maintain MAP, perfusion, C.I.     DM - IVCD Insulin     Thrombocytopenia    Requires bedside physical therapy, mobilization and total care home care.     Obesity OHS / SARAH             By signing my name below, I, Brenna Frank, attest that this documentation has been prepared under the direction and in the presence of Sivakumar Pollard MD.   Electronically Signed: Bucky Pedraza 23 @ 06:43    ISivakumar, personally performed the services described in this documentation. All medical record entries made by the scribe were at my direction and in my presence. I have reviewed the chart and agree that the record reflects my personal performance and is accurate and complete.   Sivakumar Pollard MD.       Discussed with CT surgeon, Physician Assistant - Nurse Practitioner- Critical care medicine team.   Discussed at  AM / PM rounds.   Chart, labs , films reviewed.    Cumulative Critical Care Time Given Today:  30 min

## 2023-11-22 NOTE — OCCUPATIONAL THERAPY INITIAL EVALUATION ADULT - SHORT TERM MEMORY, REHAB EVAL
Pt scored 14 error points on the short blessed test (cognitive assessment) indicating a severe cognitive impairment./impaired

## 2023-11-22 NOTE — PROGRESS NOTE ADULT - SUBJECTIVE AND OBJECTIVE BOX
Neurology        S: patient seen doing okay in ICU c/o HA       Medications: MEDICATIONS  (STANDING):  acetaminophen     Tablet .. 650 milliGRAM(s) Oral every 6 hours  albumin human  5% IVPB 250 milliLiter(s) IV Intermittent once  albuterol/ipratropium for Nebulization 3 milliLiter(s) Nebulizer every 6 hours  aMIOdarone    Tablet 400 milliGRAM(s) Oral two times a day  ascorbic acid 500 milliGRAM(s) Oral two times a day  aspirin enteric coated 81 milliGRAM(s) Oral daily  aspirin Suppository 300 milliGRAM(s) Rectal once  buDESOnide    Inhalation Suspension 0.5 milliGRAM(s) Inhalation every 12 hours  cefuroxime  IVPB 1500 milliGRAM(s) IV Intermittent every 8 hours  chlorhexidine 2% Cloths 1 Application(s) Topical daily  dexMEDEtomidine Infusion 0.8 MICROgram(s)/kG/Hr (17.4 mL/Hr) IV Continuous <Continuous>  dextrose 50% Injectable 50 milliLiter(s) IV Push every 15 minutes  dextrose 50% Injectable 25 milliLiter(s) IV Push every 15 minutes  DOBUTamine Infusion 5 MICROgram(s)/kG/Min (13 mL/Hr) IV Continuous <Continuous>  famotidine Injectable 20 milliGRAM(s) IV Push every 12 hours  gabapentin 100 milliGRAM(s) Oral every 8 hours  insulin regular Infusion 3 Unit(s)/Hr (3 mL/Hr) IV Continuous <Continuous>  milrinone Infusion 0.25 MICROgram(s)/kG/Min (6.52 mL/Hr) IV Continuous <Continuous>  norepinephrine Infusion 0.02 MICROgram(s)/kG/Min (3.26 mL/Hr) IV Continuous <Continuous>  polyethylene glycol 3350 17 Gram(s) Oral daily  potassium chloride  10 mEq/50 mL IVPB 10 milliEquivalent(s) IV Intermittent every 1 hour  potassium chloride  10 mEq/50 mL IVPB 10 milliEquivalent(s) IV Intermittent every 1 hour  potassium chloride  10 mEq/50 mL IVPB 10 milliEquivalent(s) IV Intermittent every 1 hour  propofol Infusion 10 MICROgram(s)/kG/Min (5.21 mL/Hr) IV Continuous <Continuous>  senna 2 Tablet(s) Oral at bedtime  sodium chloride 0.9%. 1000 milliLiter(s) (10 mL/Hr) IV Continuous <Continuous>  vasopressin Infusion 0.04 Unit(s)/Min (6 mL/Hr) IV Continuous <Continuous>    MEDICATIONS  (PRN):  HYDROmorphone  Injectable 0.5 milliGRAM(s) IV Push every 6 hours PRN Breakthrough Pain  oxyCODONE    IR 5 milliGRAM(s) Oral every 4 hours PRN Moderate Pain (4 - 6)  oxyCODONE    IR 10 milliGRAM(s) Oral every 4 hours PRN Severe Pain (7 - 10)       Vitals:  Vital Signs Last 24 Hrs  T(C): 37.4 (22 Nov 2023 08:00), Max: 37.7 (21 Nov 2023 20:00)  T(F): 99.3 (22 Nov 2023 08:00), Max: 99.9 (21 Nov 2023 20:00)  HR: 85 (22 Nov 2023 10:00) (66 - 90)  BP: --  BP(mean): --  RR: 21 (22 Nov 2023 10:00) (12 - 34)  SpO2: 99% (22 Nov 2023 10:00) (92% - 100%)    Parameters below as of 22 Nov 2023 08:00  Patient On (Oxygen Delivery Method): nasal cannula  O2 Flow (L/min): 5            General Exam:   General Appearance: Appropriately dressed and in no acute distress       Head: Normocephalic, atraumatic and no dysmorphic features  Ear, Nose, and Throat: Moist mucous membranes  CVS: S1S2+  Resp: No SOB, no wheeze or rhonchi  GI: soft NT/ND  Extremities: No edema or cyanosis  Skin: No bruises or rashes     Neurological Exam:  Mental Status: Awake, alert and oriented x 3.  Able to follow simple and complex verbal commands. Able to name and repeat. fluent speech. No obvious aphasia or dysarthria noted.   Cranial Nerves: PERRL, EOMI, VFFC, sensation V1-V3 decrease on L,  L facial asymmetry, equal elevation of palate, scm/trap 5/5, tongue is midline on protrusion. no obvious papilledema on fundoscopic exam. hearing is grossly intact.   Motor: Normal bulk, tone and strength throughout except mild L HP 5-/5   Sensation: decrease on L compared to R   Reflexes: 1+ throughout at biceps, brachioradialis, triceps, patellars and ankles bilaterally and equal. No clonus. R toe and L toe were both downgoing.  Coordination: No dysmetria on FNF   Gait: cane baseline     Data/Labs/Imaging which I personally reviewed.       LABS:                          8.4    16.12 )-----------( 108      ( 22 Nov 2023 00:15 )             24.6     11-22    142  |  104  |  17  ----------------------------<  124<H>  4.3   |  26  |  0.67    Ca    9.0      22 Nov 2023 00:15  Phos  3.6     11-22  Mg     2.3     11-22    TPro  5.8<L>  /  Alb  4.4  /  TBili  1.9<H>  /  DBili  x   /  AST  33  /  ALT  20  /  AlkPhos  56  11-22    LIVER FUNCTIONS - ( 22 Nov 2023 00:15 )  Alb: 4.4 g/dL / Pro: 5.8 g/dL / ALK PHOS: 56 U/L / ALT: 20 U/L / AST: 33 U/L / GGT: x           PT/INR - ( 22 Nov 2023 00:15 )   PT: 11.6 sec;   INR: 1.06 ratio         PTT - ( 22 Nov 2023 00:15 )  PTT:25.4 sec  Urinalysis Basic - ( 22 Nov 2023 00:15 )    Color: x / Appearance: x / SG: x / pH: x  Gluc: 124 mg/dL / Ketone: x  / Bili: x / Urobili: x   Blood: x / Protein: x / Nitrite: x   Leuk Esterase: x / RBC: x / WBC x   Sq Epi: x / Non Sq Epi: x / Bacteria: x

## 2023-11-22 NOTE — DIETITIAN INITIAL EVALUATION ADULT - OTHER INFO
NKFA per patient. Patient reports her height to be 5'1". Patient endorses significant unintentional weight gain with taking prednisone PTA for COPD and asthma, upwards of 40lbs. Reports her UBW prior to this was in the 140lbs range. Patient's admission weights of 189-191lbs likely closer to patient's 'dry weight' before recent surgery and w/ current BW of 216lbs today skewed by recent surgery and +1 edema. Will monitor weight trend. Using patient's reported height, patient's updated BMI would be >40 (40.8).    - Inotropic support w/ dobutamine.  - Remains on insulin gtt. HgbA1C 7.6% 11/14; patient w/ h/o DM per chart.

## 2023-11-22 NOTE — DIETITIAN INITIAL EVALUATION ADULT - ORAL INTAKE PTA/DIET HISTORY
Patient reports she normally eats well PTA. Denied chewing/swallowing impairment (noted h/o CVA), no nausea/vomiting PTA, no issues with feeding herself (noted w/ L sided weakness following CVA per chart). Patient reports her diagnosis of DM is new to her as of recent before surgery and feels it may have been influenced by being prescribed prednisone PTA for management of COPD and asthma. Patient endorses significant unintentional weight gain when using prednisone. Patient additionally reports being a picky eater as her baseline.

## 2023-11-22 NOTE — PROGRESS NOTE ADULT - ASSESSMENT
58F smoker with PMH CVA with L sided weakness/numbness/L gaze deviation, DM2 with b/l peripheral neuropathy, COPD/Asthma, Breast Ca s/p ?RT,  Bipolar depression, Rheumatoid Arthritis, Antiphospholipid syndrome, and L eye uveitis/scleritis, Presents to [facility] transferred from [facility]. CTS eval   Assessment  DMT2: 58y Female with DM T2 with hyperglycemia, A1C 7.6% , was on oral meds at home, now postop, having hyperglycemias, was restarted on IV insulin, on prednisone.   Severe AR: CTS eval, LESLEY, preop CTS.   CAD: on medications, stable, monitored. Cardiac MR viability   HTN: on antihypertensive medications, monitored, asymptomatic.  Obesity: No strict exercise routines, not on any weight loss program, neither on low calorie diet.          Discussed plan and management with Dr Ladarius Rivera NP - TEAMS  Skye Durand MD  Cell: [phone]  Office: [phone]          58F smoker with PMH CVA with L sided weakness/numbness/L gaze deviation, DM2 with b/l peripheral neuropathy, COPD/Asthma, Breast Ca s/p ?RT,  Bipolar depression, Rheumatoid Arthritis, Antiphospholipid syndrome, and L eye uveitis/scleritis, Presents to [facility] transferred from [facility]. CTS eval   Assessment  DMT2: 58y Female with DM T2 with hyperglycemia, A1C 7.6% , was on oral meds at home, now postop, having hyperglycemias, was restarted on IV insulin, on prednisone.   Severe AR: CTS eval, LESLEY, preop CTS.   CAD: on medications, stable, monitored. Cardiac MR viability , postop CABG  HTN: on antihypertensive medications, monitored, asymptomatic.  Obesity: No strict exercise routines, not on any weight loss program, neither on low calorie diet.          Discussed plan and management with Dr Ladarius Rivera NP - TEAMS  Skye Durand MD  Cell: [phone]  Office: [phone]          58F smoker with PMH CVA with L sided weakness/numbness/L gaze deviation, DM2 with b/l peripheral neuropathy, COPD/Asthma, Breast Ca s/p ?RT,  Bipolar depression, Rheumatoid Arthritis,  Antiphospholipid syndrome, and L eye uveitis/scleritis, Presents to [facility] transferred from [facility]. CTS eval   Assessment  DMT2: 58y Female with DM T2 with hyperglycemia, A1C 7.6% , was on oral meds at home, now postop, having hyperglycemias, was restarted on IV insulin, on prednisone.   Severe AR: CTS eval, LESLEY, preop CTS.   CAD: on medications, stable, monitored. Cardiac MR viability , postop CABG  HTN: on antihypertensive medications, monitored, asymptomatic.  Obesity: No strict exercise routines, not on any weight loss program, neither on low calorie diet.          Discussed plan and management with Dr Ladarius Rivera NP - TEAMS  Skye Durand MD  Cell: [phone]  Office: [phone]

## 2023-11-22 NOTE — PHYSICAL THERAPY INITIAL EVALUATION ADULT - PERTINENT HX OF CURRENT PROBLEM, REHAB EVAL
58F smoker with PMH CVA with L sided weakness/numbness/L gaze deviation, DM2 with b/l peripheral neuropathy, COPD/Asthma, Breast Ca s/p ?RT, Bipolar depression, Rheumatoid Arthritis, Antiphospholipid syndrome, and L eye uveitis/scleritis, Presents to [facility] transferred from [facility]. p/w multiple medical complaints. A/w exacerbation of L-sided subjective weakness/numbness likely 2/2 recrudescence of prior Right BG infarct. Cardiology following for new LV dysfunction since July, pending ischemic eval. LESLEY was done showing severe Aortic Insufficiency and moderated MR. Cardiac cath was done showing LAD prox 100% fills via right to left collateral. Patient now transferred to CTS Dr. Loaiza for evaluation. Now s/p CABG, with LESLEY. CT Head:  Limited by beam hardening artifact. No acute intracranial pathology. Punctate nonacute right basal ganglial lacunar infarct. No large arterial distribution acute infarct. MR Cardiac: Mild thinning and hypokinesis of the mid to apical anterior and anteroseptal segments. The left ventricular ejection fraction measures 43%. Findings consistent with ischemic cardiomyopathy. No myocardial scarring is appreciated on postcontrast imaging, suggestive of viable myocardium. Mild to moderate aortic regurgitation by qualitative assessment. now s/p CABG, with LESLEY, Replacement, aortic valve, with LESLEY. VA Duplex Caratid (-) Chest X Ray: ET tube in right bronchus. Now s/p CABGx1 with AVRt on 11/21.

## 2023-11-22 NOTE — OCCUPATIONAL THERAPY INITIAL EVALUATION ADULT - LIVES WITH, PROFILE
Unclear living situation presently. Per chart pt lives in an apt with a friend, however pt states prior to admission the pt was living with her aunt in a basement apt +2 flights no handrails to get to. Pt also states she might go to her sisters house after discharge which has no NATACHA but "many stairs inside." Pt states she occasionally required assist for ADLs and occasionally used a cane for mobility.

## 2023-11-22 NOTE — OCCUPATIONAL THERAPY INITIAL EVALUATION ADULT - GENERAL OBSERVATIONS, REHAB EVAL
Pt received seated in chair +Chest tubes (x2) +soliz +5L SpO2 via NC +EPM +PCA Pump +IV +prevena vac +ICU monitoring +Katy.

## 2023-11-22 NOTE — DIETITIAN INITIAL EVALUATION ADULT - OTHER CALCULATIONS
Defer fluid needs to team. Calculations based on IBW + 10% and w/ consideration for recent surgery (CABG, AV replacement)

## 2023-11-22 NOTE — DIETITIAN INITIAL EVALUATION ADULT - ENERGY INTAKE
Patient currently w/ fair PO intake/appetite s/p extubation. Pre-OP most meals documented as % per nutrition intake flowsheet. Fair (50-75%)

## 2023-11-23 LAB
ALBUMIN SERPL ELPH-MCNC: 4.1 G/DL — SIGNIFICANT CHANGE UP (ref 3.3–5)
ALBUMIN SERPL ELPH-MCNC: 4.1 G/DL — SIGNIFICANT CHANGE UP (ref 3.3–5)
ALP SERPL-CCNC: 50 U/L — SIGNIFICANT CHANGE UP (ref 40–120)
ALP SERPL-CCNC: 50 U/L — SIGNIFICANT CHANGE UP (ref 40–120)
ALT FLD-CCNC: 18 U/L — SIGNIFICANT CHANGE UP (ref 10–45)
ALT FLD-CCNC: 18 U/L — SIGNIFICANT CHANGE UP (ref 10–45)
ANION GAP SERPL CALC-SCNC: 11 MMOL/L — SIGNIFICANT CHANGE UP (ref 5–17)
ANION GAP SERPL CALC-SCNC: 11 MMOL/L — SIGNIFICANT CHANGE UP (ref 5–17)
AST SERPL-CCNC: 26 U/L — SIGNIFICANT CHANGE UP (ref 10–40)
AST SERPL-CCNC: 26 U/L — SIGNIFICANT CHANGE UP (ref 10–40)
BASE EXCESS BLDV CALC-SCNC: -0.1 MMOL/L — SIGNIFICANT CHANGE UP (ref -2–3)
BASE EXCESS BLDV CALC-SCNC: -0.1 MMOL/L — SIGNIFICANT CHANGE UP (ref -2–3)
BASE EXCESS BLDV CALC-SCNC: -4.5 MMOL/L — LOW (ref -2–3)
BASE EXCESS BLDV CALC-SCNC: -4.5 MMOL/L — LOW (ref -2–3)
BASE EXCESS BLDV CALC-SCNC: 0.7 MMOL/L — SIGNIFICANT CHANGE UP (ref -2–3)
BASE EXCESS BLDV CALC-SCNC: 0.7 MMOL/L — SIGNIFICANT CHANGE UP (ref -2–3)
BASOPHILS # BLD AUTO: 0.02 K/UL — SIGNIFICANT CHANGE UP (ref 0–0.2)
BASOPHILS # BLD AUTO: 0.02 K/UL — SIGNIFICANT CHANGE UP (ref 0–0.2)
BASOPHILS NFR BLD AUTO: 0.1 % — SIGNIFICANT CHANGE UP (ref 0–2)
BASOPHILS NFR BLD AUTO: 0.1 % — SIGNIFICANT CHANGE UP (ref 0–2)
BILIRUB SERPL-MCNC: 1.6 MG/DL — HIGH (ref 0.2–1.2)
BILIRUB SERPL-MCNC: 1.6 MG/DL — HIGH (ref 0.2–1.2)
BUN SERPL-MCNC: 17 MG/DL — SIGNIFICANT CHANGE UP (ref 7–23)
BUN SERPL-MCNC: 17 MG/DL — SIGNIFICANT CHANGE UP (ref 7–23)
CA-I SERPL-SCNC: 1.07 MMOL/L — LOW (ref 1.15–1.33)
CA-I SERPL-SCNC: 1.07 MMOL/L — LOW (ref 1.15–1.33)
CA-I SERPL-SCNC: 1.12 MMOL/L — LOW (ref 1.15–1.33)
CA-I SERPL-SCNC: 1.12 MMOL/L — LOW (ref 1.15–1.33)
CA-I SERPL-SCNC: 1.19 MMOL/L — SIGNIFICANT CHANGE UP (ref 1.15–1.33)
CA-I SERPL-SCNC: 1.19 MMOL/L — SIGNIFICANT CHANGE UP (ref 1.15–1.33)
CALCIUM SERPL-MCNC: 8.5 MG/DL — SIGNIFICANT CHANGE UP (ref 8.4–10.5)
CALCIUM SERPL-MCNC: 8.5 MG/DL — SIGNIFICANT CHANGE UP (ref 8.4–10.5)
CHLORIDE BLDV-SCNC: 102 MMOL/L — SIGNIFICANT CHANGE UP (ref 96–108)
CHLORIDE BLDV-SCNC: 102 MMOL/L — SIGNIFICANT CHANGE UP (ref 96–108)
CHLORIDE BLDV-SCNC: 103 MMOL/L — SIGNIFICANT CHANGE UP (ref 96–108)
CHLORIDE BLDV-SCNC: 103 MMOL/L — SIGNIFICANT CHANGE UP (ref 96–108)
CHLORIDE BLDV-SCNC: 104 MMOL/L — SIGNIFICANT CHANGE UP (ref 96–108)
CHLORIDE BLDV-SCNC: 104 MMOL/L — SIGNIFICANT CHANGE UP (ref 96–108)
CHLORIDE SERPL-SCNC: 103 MMOL/L — SIGNIFICANT CHANGE UP (ref 96–108)
CHLORIDE SERPL-SCNC: 103 MMOL/L — SIGNIFICANT CHANGE UP (ref 96–108)
CO2 BLDV-SCNC: 22 MMOL/L — SIGNIFICANT CHANGE UP (ref 22–26)
CO2 BLDV-SCNC: 22 MMOL/L — SIGNIFICANT CHANGE UP (ref 22–26)
CO2 BLDV-SCNC: 27 MMOL/L — HIGH (ref 22–26)
CO2 SERPL-SCNC: 24 MMOL/L — SIGNIFICANT CHANGE UP (ref 22–31)
CO2 SERPL-SCNC: 24 MMOL/L — SIGNIFICANT CHANGE UP (ref 22–31)
CREAT SERPL-MCNC: 0.69 MG/DL — SIGNIFICANT CHANGE UP (ref 0.5–1.3)
CREAT SERPL-MCNC: 0.69 MG/DL — SIGNIFICANT CHANGE UP (ref 0.5–1.3)
EGFR: 100 ML/MIN/1.73M2 — SIGNIFICANT CHANGE UP
EGFR: 100 ML/MIN/1.73M2 — SIGNIFICANT CHANGE UP
EOSINOPHIL # BLD AUTO: 0 K/UL — SIGNIFICANT CHANGE UP (ref 0–0.5)
EOSINOPHIL # BLD AUTO: 0 K/UL — SIGNIFICANT CHANGE UP (ref 0–0.5)
EOSINOPHIL NFR BLD AUTO: 0 % — SIGNIFICANT CHANGE UP (ref 0–6)
EOSINOPHIL NFR BLD AUTO: 0 % — SIGNIFICANT CHANGE UP (ref 0–6)
GAS PNL BLDV: 131 MMOL/L — LOW (ref 136–145)
GAS PNL BLDV: 131 MMOL/L — LOW (ref 136–145)
GAS PNL BLDV: 134 MMOL/L — LOW (ref 136–145)
GAS PNL BLDV: 134 MMOL/L — LOW (ref 136–145)
GAS PNL BLDV: 136 MMOL/L — SIGNIFICANT CHANGE UP (ref 136–145)
GAS PNL BLDV: 136 MMOL/L — SIGNIFICANT CHANGE UP (ref 136–145)
GAS PNL BLDV: SIGNIFICANT CHANGE UP
GLUCOSE BLDC GLUCOMTR-MCNC: 102 MG/DL — HIGH (ref 70–99)
GLUCOSE BLDC GLUCOMTR-MCNC: 102 MG/DL — HIGH (ref 70–99)
GLUCOSE BLDC GLUCOMTR-MCNC: 122 MG/DL — HIGH (ref 70–99)
GLUCOSE BLDC GLUCOMTR-MCNC: 122 MG/DL — HIGH (ref 70–99)
GLUCOSE BLDC GLUCOMTR-MCNC: 125 MG/DL — HIGH (ref 70–99)
GLUCOSE BLDC GLUCOMTR-MCNC: 125 MG/DL — HIGH (ref 70–99)
GLUCOSE BLDC GLUCOMTR-MCNC: 127 MG/DL — HIGH (ref 70–99)
GLUCOSE BLDC GLUCOMTR-MCNC: 127 MG/DL — HIGH (ref 70–99)
GLUCOSE BLDC GLUCOMTR-MCNC: 128 MG/DL — HIGH (ref 70–99)
GLUCOSE BLDC GLUCOMTR-MCNC: 128 MG/DL — HIGH (ref 70–99)
GLUCOSE BLDC GLUCOMTR-MCNC: 129 MG/DL — HIGH (ref 70–99)
GLUCOSE BLDC GLUCOMTR-MCNC: 129 MG/DL — HIGH (ref 70–99)
GLUCOSE BLDC GLUCOMTR-MCNC: 130 MG/DL — HIGH (ref 70–99)
GLUCOSE BLDC GLUCOMTR-MCNC: 130 MG/DL — HIGH (ref 70–99)
GLUCOSE BLDC GLUCOMTR-MCNC: 136 MG/DL — HIGH (ref 70–99)
GLUCOSE BLDC GLUCOMTR-MCNC: 137 MG/DL — HIGH (ref 70–99)
GLUCOSE BLDC GLUCOMTR-MCNC: 137 MG/DL — HIGH (ref 70–99)
GLUCOSE BLDC GLUCOMTR-MCNC: 141 MG/DL — HIGH (ref 70–99)
GLUCOSE BLDC GLUCOMTR-MCNC: 141 MG/DL — HIGH (ref 70–99)
GLUCOSE BLDC GLUCOMTR-MCNC: 146 MG/DL — HIGH (ref 70–99)
GLUCOSE BLDC GLUCOMTR-MCNC: 146 MG/DL — HIGH (ref 70–99)
GLUCOSE BLDC GLUCOMTR-MCNC: 160 MG/DL — HIGH (ref 70–99)
GLUCOSE BLDC GLUCOMTR-MCNC: 160 MG/DL — HIGH (ref 70–99)
GLUCOSE BLDC GLUCOMTR-MCNC: 162 MG/DL — HIGH (ref 70–99)
GLUCOSE BLDC GLUCOMTR-MCNC: 162 MG/DL — HIGH (ref 70–99)
GLUCOSE BLDC GLUCOMTR-MCNC: 163 MG/DL — HIGH (ref 70–99)
GLUCOSE BLDC GLUCOMTR-MCNC: 163 MG/DL — HIGH (ref 70–99)
GLUCOSE BLDC GLUCOMTR-MCNC: 174 MG/DL — HIGH (ref 70–99)
GLUCOSE BLDC GLUCOMTR-MCNC: 174 MG/DL — HIGH (ref 70–99)
GLUCOSE BLDC GLUCOMTR-MCNC: 182 MG/DL — HIGH (ref 70–99)
GLUCOSE BLDC GLUCOMTR-MCNC: 182 MG/DL — HIGH (ref 70–99)
GLUCOSE BLDC GLUCOMTR-MCNC: 187 MG/DL — HIGH (ref 70–99)
GLUCOSE BLDC GLUCOMTR-MCNC: 187 MG/DL — HIGH (ref 70–99)
GLUCOSE BLDC GLUCOMTR-MCNC: 190 MG/DL — HIGH (ref 70–99)
GLUCOSE BLDC GLUCOMTR-MCNC: 190 MG/DL — HIGH (ref 70–99)
GLUCOSE BLDC GLUCOMTR-MCNC: 260 MG/DL — HIGH (ref 70–99)
GLUCOSE BLDC GLUCOMTR-MCNC: 260 MG/DL — HIGH (ref 70–99)
GLUCOSE BLDC GLUCOMTR-MCNC: 329 MG/DL — HIGH (ref 70–99)
GLUCOSE BLDC GLUCOMTR-MCNC: 329 MG/DL — HIGH (ref 70–99)
GLUCOSE BLDC GLUCOMTR-MCNC: 99 MG/DL — SIGNIFICANT CHANGE UP (ref 70–99)
GLUCOSE BLDC GLUCOMTR-MCNC: 99 MG/DL — SIGNIFICANT CHANGE UP (ref 70–99)
GLUCOSE BLDV-MCNC: 127 MG/DL — HIGH (ref 70–99)
GLUCOSE BLDV-MCNC: 127 MG/DL — HIGH (ref 70–99)
GLUCOSE BLDV-MCNC: 194 MG/DL — HIGH (ref 70–99)
GLUCOSE BLDV-MCNC: 194 MG/DL — HIGH (ref 70–99)
GLUCOSE BLDV-MCNC: 354 MG/DL — HIGH (ref 70–99)
GLUCOSE BLDV-MCNC: 354 MG/DL — HIGH (ref 70–99)
GLUCOSE SERPL-MCNC: 132 MG/DL — HIGH (ref 70–99)
GLUCOSE SERPL-MCNC: 132 MG/DL — HIGH (ref 70–99)
HCO3 BLDV-SCNC: 21 MMOL/L — LOW (ref 22–29)
HCO3 BLDV-SCNC: 21 MMOL/L — LOW (ref 22–29)
HCO3 BLDV-SCNC: 26 MMOL/L — SIGNIFICANT CHANGE UP (ref 22–29)
HCT VFR BLD CALC: 23.7 % — LOW (ref 34.5–45)
HCT VFR BLD CALC: 23.7 % — LOW (ref 34.5–45)
HCT VFR BLDA CALC: 24 % — LOW (ref 34.5–46.5)
HCT VFR BLDA CALC: 24 % — LOW (ref 34.5–46.5)
HCT VFR BLDA CALC: 26 % — LOW (ref 34.5–46.5)
HGB BLD CALC-MCNC: 8.1 G/DL — LOW (ref 11.7–16.1)
HGB BLD CALC-MCNC: 8.1 G/DL — LOW (ref 11.7–16.1)
HGB BLD CALC-MCNC: 8.8 G/DL — LOW (ref 11.7–16.1)
HGB BLD-MCNC: 7.8 G/DL — LOW (ref 11.5–15.5)
HGB BLD-MCNC: 7.8 G/DL — LOW (ref 11.5–15.5)
HOROWITZ INDEX BLDV+IHG-RTO: 40 — SIGNIFICANT CHANGE UP
IMM GRANULOCYTES NFR BLD AUTO: 0.5 % — SIGNIFICANT CHANGE UP (ref 0–0.9)
IMM GRANULOCYTES NFR BLD AUTO: 0.5 % — SIGNIFICANT CHANGE UP (ref 0–0.9)
LACTATE BLDV-MCNC: 1.4 MMOL/L — SIGNIFICANT CHANGE UP (ref 0.5–2)
LACTATE BLDV-MCNC: 1.4 MMOL/L — SIGNIFICANT CHANGE UP (ref 0.5–2)
LACTATE BLDV-MCNC: 1.6 MMOL/L — SIGNIFICANT CHANGE UP (ref 0.5–2)
LACTATE BLDV-MCNC: 1.6 MMOL/L — SIGNIFICANT CHANGE UP (ref 0.5–2)
LACTATE BLDV-MCNC: 3.1 MMOL/L — HIGH (ref 0.5–2)
LACTATE BLDV-MCNC: 3.1 MMOL/L — HIGH (ref 0.5–2)
LYMPHOCYTES # BLD AUTO: 1.22 K/UL — SIGNIFICANT CHANGE UP (ref 1–3.3)
LYMPHOCYTES # BLD AUTO: 1.22 K/UL — SIGNIFICANT CHANGE UP (ref 1–3.3)
LYMPHOCYTES # BLD AUTO: 8.1 % — LOW (ref 13–44)
LYMPHOCYTES # BLD AUTO: 8.1 % — LOW (ref 13–44)
MAGNESIUM SERPL-MCNC: 2.1 MG/DL — SIGNIFICANT CHANGE UP (ref 1.6–2.6)
MAGNESIUM SERPL-MCNC: 2.1 MG/DL — SIGNIFICANT CHANGE UP (ref 1.6–2.6)
MCHC RBC-ENTMCNC: 30.1 PG — SIGNIFICANT CHANGE UP (ref 27–34)
MCHC RBC-ENTMCNC: 30.1 PG — SIGNIFICANT CHANGE UP (ref 27–34)
MCHC RBC-ENTMCNC: 32.9 GM/DL — SIGNIFICANT CHANGE UP (ref 32–36)
MCHC RBC-ENTMCNC: 32.9 GM/DL — SIGNIFICANT CHANGE UP (ref 32–36)
MCV RBC AUTO: 91.5 FL — SIGNIFICANT CHANGE UP (ref 80–100)
MCV RBC AUTO: 91.5 FL — SIGNIFICANT CHANGE UP (ref 80–100)
MONOCYTES # BLD AUTO: 1.63 K/UL — HIGH (ref 0–0.9)
MONOCYTES # BLD AUTO: 1.63 K/UL — HIGH (ref 0–0.9)
MONOCYTES NFR BLD AUTO: 10.9 % — SIGNIFICANT CHANGE UP (ref 2–14)
MONOCYTES NFR BLD AUTO: 10.9 % — SIGNIFICANT CHANGE UP (ref 2–14)
NEUTROPHILS # BLD AUTO: 12.03 K/UL — HIGH (ref 1.8–7.4)
NEUTROPHILS # BLD AUTO: 12.03 K/UL — HIGH (ref 1.8–7.4)
NEUTROPHILS NFR BLD AUTO: 80.4 % — HIGH (ref 43–77)
NEUTROPHILS NFR BLD AUTO: 80.4 % — HIGH (ref 43–77)
NRBC # BLD: 0 /100 WBCS — SIGNIFICANT CHANGE UP (ref 0–0)
NRBC # BLD: 0 /100 WBCS — SIGNIFICANT CHANGE UP (ref 0–0)
PCO2 BLDV: 40 MMHG — SIGNIFICANT CHANGE UP (ref 39–42)
PCO2 BLDV: 40 MMHG — SIGNIFICANT CHANGE UP (ref 39–42)
PCO2 BLDV: 44 MMHG — HIGH (ref 39–42)
PCO2 BLDV: 44 MMHG — HIGH (ref 39–42)
PCO2 BLDV: 48 MMHG — HIGH (ref 39–42)
PCO2 BLDV: 48 MMHG — HIGH (ref 39–42)
PH BLDV: 7.33 — SIGNIFICANT CHANGE UP (ref 7.32–7.43)
PH BLDV: 7.33 — SIGNIFICANT CHANGE UP (ref 7.32–7.43)
PH BLDV: 7.34 — SIGNIFICANT CHANGE UP (ref 7.32–7.43)
PH BLDV: 7.34 — SIGNIFICANT CHANGE UP (ref 7.32–7.43)
PH BLDV: 7.38 — SIGNIFICANT CHANGE UP (ref 7.32–7.43)
PH BLDV: 7.38 — SIGNIFICANT CHANGE UP (ref 7.32–7.43)
PHOSPHATE SERPL-MCNC: 2.4 MG/DL — LOW (ref 2.5–4.5)
PHOSPHATE SERPL-MCNC: 2.4 MG/DL — LOW (ref 2.5–4.5)
PLATELET # BLD AUTO: 76 K/UL — LOW (ref 150–400)
PLATELET # BLD AUTO: 76 K/UL — LOW (ref 150–400)
PO2 BLDV: 39 MMHG — SIGNIFICANT CHANGE UP (ref 25–45)
PO2 BLDV: 39 MMHG — SIGNIFICANT CHANGE UP (ref 25–45)
PO2 BLDV: 42 MMHG — SIGNIFICANT CHANGE UP (ref 25–45)
PO2 BLDV: 42 MMHG — SIGNIFICANT CHANGE UP (ref 25–45)
PO2 BLDV: 43 MMHG — SIGNIFICANT CHANGE UP (ref 25–45)
PO2 BLDV: 43 MMHG — SIGNIFICANT CHANGE UP (ref 25–45)
POTASSIUM BLDV-SCNC: 3.9 MMOL/L — SIGNIFICANT CHANGE UP (ref 3.5–5.1)
POTASSIUM BLDV-SCNC: 3.9 MMOL/L — SIGNIFICANT CHANGE UP (ref 3.5–5.1)
POTASSIUM BLDV-SCNC: 4.1 MMOL/L — SIGNIFICANT CHANGE UP (ref 3.5–5.1)
POTASSIUM BLDV-SCNC: 4.1 MMOL/L — SIGNIFICANT CHANGE UP (ref 3.5–5.1)
POTASSIUM BLDV-SCNC: 4.3 MMOL/L — SIGNIFICANT CHANGE UP (ref 3.5–5.1)
POTASSIUM BLDV-SCNC: 4.3 MMOL/L — SIGNIFICANT CHANGE UP (ref 3.5–5.1)
POTASSIUM SERPL-MCNC: 4.2 MMOL/L — SIGNIFICANT CHANGE UP (ref 3.5–5.3)
POTASSIUM SERPL-MCNC: 4.2 MMOL/L — SIGNIFICANT CHANGE UP (ref 3.5–5.3)
POTASSIUM SERPL-SCNC: 4.2 MMOL/L — SIGNIFICANT CHANGE UP (ref 3.5–5.3)
POTASSIUM SERPL-SCNC: 4.2 MMOL/L — SIGNIFICANT CHANGE UP (ref 3.5–5.3)
PROT SERPL-MCNC: 5.6 G/DL — LOW (ref 6–8.3)
PROT SERPL-MCNC: 5.6 G/DL — LOW (ref 6–8.3)
RBC # BLD: 2.59 M/UL — LOW (ref 3.8–5.2)
RBC # BLD: 2.59 M/UL — LOW (ref 3.8–5.2)
RBC # FLD: 15.9 % — HIGH (ref 10.3–14.5)
RBC # FLD: 15.9 % — HIGH (ref 10.3–14.5)
SAO2 % BLDV: 64.8 % — LOW (ref 67–88)
SAO2 % BLDV: 64.8 % — LOW (ref 67–88)
SAO2 % BLDV: 67.6 % — SIGNIFICANT CHANGE UP (ref 67–88)
SAO2 % BLDV: 67.6 % — SIGNIFICANT CHANGE UP (ref 67–88)
SAO2 % BLDV: 67.9 % — SIGNIFICANT CHANGE UP (ref 67–88)
SAO2 % BLDV: 67.9 % — SIGNIFICANT CHANGE UP (ref 67–88)
SODIUM SERPL-SCNC: 138 MMOL/L — SIGNIFICANT CHANGE UP (ref 135–145)
SODIUM SERPL-SCNC: 138 MMOL/L — SIGNIFICANT CHANGE UP (ref 135–145)
WBC # BLD: 14.98 K/UL — HIGH (ref 3.8–10.5)
WBC # BLD: 14.98 K/UL — HIGH (ref 3.8–10.5)
WBC # FLD AUTO: 14.98 K/UL — HIGH (ref 3.8–10.5)
WBC # FLD AUTO: 14.98 K/UL — HIGH (ref 3.8–10.5)

## 2023-11-23 PROCEDURE — 71045 X-RAY EXAM CHEST 1 VIEW: CPT | Mod: 26

## 2023-11-23 PROCEDURE — 99291 CRITICAL CARE FIRST HOUR: CPT

## 2023-11-23 PROCEDURE — 93010 ELECTROCARDIOGRAM REPORT: CPT

## 2023-11-23 RX ORDER — FUROSEMIDE 40 MG
20 TABLET ORAL ONCE
Refills: 0 | Status: COMPLETED | OUTPATIENT
Start: 2023-11-23 | End: 2023-11-23

## 2023-11-23 RX ORDER — GLUCAGON INJECTION, SOLUTION 0.5 MG/.1ML
1 INJECTION, SOLUTION SUBCUTANEOUS ONCE
Refills: 0 | Status: DISCONTINUED | OUTPATIENT
Start: 2023-11-23 | End: 2023-12-04

## 2023-11-23 RX ORDER — OLANZAPINE 15 MG/1
2.5 TABLET, FILM COATED ORAL DAILY
Refills: 0 | Status: DISCONTINUED | OUTPATIENT
Start: 2023-11-23 | End: 2023-11-26

## 2023-11-23 RX ORDER — INSULIN LISPRO 100/ML
VIAL (ML) SUBCUTANEOUS
Refills: 0 | Status: DISCONTINUED | OUTPATIENT
Start: 2023-11-23 | End: 2023-12-04

## 2023-11-23 RX ORDER — INSULIN GLARGINE 100 [IU]/ML
38 INJECTION, SOLUTION SUBCUTANEOUS AT BEDTIME
Refills: 0 | Status: DISCONTINUED | OUTPATIENT
Start: 2023-11-23 | End: 2023-11-24

## 2023-11-23 RX ORDER — INSULIN LISPRO 100/ML
9 VIAL (ML) SUBCUTANEOUS
Refills: 0 | Status: DISCONTINUED | OUTPATIENT
Start: 2023-11-23 | End: 2023-11-24

## 2023-11-23 RX ORDER — ACETAMINOPHEN 500 MG
1000 TABLET ORAL ONCE
Refills: 0 | Status: COMPLETED | OUTPATIENT
Start: 2023-11-23 | End: 2023-11-24

## 2023-11-23 RX ORDER — DEXTROSE 50 % IN WATER 50 %
15 SYRINGE (ML) INTRAVENOUS ONCE
Refills: 0 | Status: DISCONTINUED | OUTPATIENT
Start: 2023-11-23 | End: 2023-12-04

## 2023-11-23 RX ORDER — INSULIN LISPRO 100/ML
VIAL (ML) SUBCUTANEOUS AT BEDTIME
Refills: 0 | Status: DISCONTINUED | OUTPATIENT
Start: 2023-11-23 | End: 2023-12-04

## 2023-11-23 RX ADMIN — Medication 500 MILLIGRAM(S): at 05:41

## 2023-11-23 RX ADMIN — HYDROMORPHONE HYDROCHLORIDE 30 MILLILITER(S): 2 INJECTION INTRAMUSCULAR; INTRAVENOUS; SUBCUTANEOUS at 19:21

## 2023-11-23 RX ADMIN — Medication 650 MILLIGRAM(S): at 05:41

## 2023-11-23 RX ADMIN — Medication 3 MILLILITER(S): at 11:16

## 2023-11-23 RX ADMIN — FAMOTIDINE 20 MILLIGRAM(S): 10 INJECTION INTRAVENOUS at 05:40

## 2023-11-23 RX ADMIN — Medication 20 MILLIGRAM(S): at 11:16

## 2023-11-23 RX ADMIN — SODIUM CHLORIDE 10 MILLILITER(S): 9 INJECTION INTRAMUSCULAR; INTRAVENOUS; SUBCUTANEOUS at 21:58

## 2023-11-23 RX ADMIN — OLANZAPINE 2.5 MILLIGRAM(S): 15 TABLET, FILM COATED ORAL at 01:50

## 2023-11-23 RX ADMIN — CHLORHEXIDINE GLUCONATE 1 APPLICATION(S): 213 SOLUTION TOPICAL at 11:18

## 2023-11-23 RX ADMIN — GABAPENTIN 300 MILLIGRAM(S): 400 CAPSULE ORAL at 14:10

## 2023-11-23 RX ADMIN — SENNA PLUS 2 TABLET(S): 8.6 TABLET ORAL at 21:59

## 2023-11-23 RX ADMIN — Medication 7.82 MICROGRAM(S)/KG/MIN: at 08:21

## 2023-11-23 RX ADMIN — BUDESONIDE AND FORMOTEROL FUMARATE DIHYDRATE 2 PUFF(S): 160; 4.5 AEROSOL RESPIRATORY (INHALATION) at 17:17

## 2023-11-23 RX ADMIN — Medication 650 MILLIGRAM(S): at 12:17

## 2023-11-23 RX ADMIN — Medication 3 MILLILITER(S): at 06:54

## 2023-11-23 RX ADMIN — Medication 3 MILLILITER(S): at 23:45

## 2023-11-23 RX ADMIN — INSULIN GLARGINE 38 UNIT(S): 100 INJECTION, SOLUTION SUBCUTANEOUS at 22:00

## 2023-11-23 RX ADMIN — Medication 20 MILLIGRAM(S): at 04:45

## 2023-11-23 RX ADMIN — GABAPENTIN 300 MILLIGRAM(S): 400 CAPSULE ORAL at 21:59

## 2023-11-23 RX ADMIN — AMIODARONE HYDROCHLORIDE 400 MILLIGRAM(S): 400 TABLET ORAL at 05:41

## 2023-11-23 RX ADMIN — Medication 100 MILLIGRAM(S): at 06:29

## 2023-11-23 RX ADMIN — Medication 650 MILLIGRAM(S): at 17:42

## 2023-11-23 RX ADMIN — SODIUM CHLORIDE 10 MILLILITER(S): 9 INJECTION INTRAMUSCULAR; INTRAVENOUS; SUBCUTANEOUS at 08:21

## 2023-11-23 RX ADMIN — Medication 500 MILLIGRAM(S): at 17:41

## 2023-11-23 RX ADMIN — GABAPENTIN 300 MILLIGRAM(S): 400 CAPSULE ORAL at 05:41

## 2023-11-23 RX ADMIN — Medication 3 MILLILITER(S): at 17:18

## 2023-11-23 RX ADMIN — POLYETHYLENE GLYCOL 3350 17 GRAM(S): 17 POWDER, FOR SOLUTION ORAL at 11:17

## 2023-11-23 RX ADMIN — AMIODARONE HYDROCHLORIDE 400 MILLIGRAM(S): 400 TABLET ORAL at 17:41

## 2023-11-23 RX ADMIN — Medication 10 MILLIGRAM(S): at 05:41

## 2023-11-23 RX ADMIN — Medication 650 MILLIGRAM(S): at 06:10

## 2023-11-23 RX ADMIN — Medication 20 MILLIGRAM(S): at 22:18

## 2023-11-23 RX ADMIN — Medication 650 MILLIGRAM(S): at 18:42

## 2023-11-23 RX ADMIN — FAMOTIDINE 20 MILLIGRAM(S): 10 INJECTION INTRAVENOUS at 17:41

## 2023-11-23 RX ADMIN — Medication 81 MILLIGRAM(S): at 11:17

## 2023-11-23 RX ADMIN — HYDROMORPHONE HYDROCHLORIDE 30 MILLILITER(S): 2 INJECTION INTRAMUSCULAR; INTRAVENOUS; SUBCUTANEOUS at 07:22

## 2023-11-23 RX ADMIN — ATORVASTATIN CALCIUM 80 MILLIGRAM(S): 80 TABLET, FILM COATED ORAL at 21:59

## 2023-11-23 RX ADMIN — Medication 5.21 MICROGRAM(S)/KG/MIN: at 21:58

## 2023-11-23 RX ADMIN — Medication 650 MILLIGRAM(S): at 11:17

## 2023-11-23 RX ADMIN — Medication 50 MILLIGRAM(S): at 21:59

## 2023-11-23 RX ADMIN — BUDESONIDE AND FORMOTEROL FUMARATE DIHYDRATE 2 PUFF(S): 160; 4.5 AEROSOL RESPIRATORY (INHALATION) at 06:53

## 2023-11-23 NOTE — PROGRESS NOTE ADULT - PROBLEM SELECTOR PLAN 1
Continue IV insulin-Still had mod/high requirements  Basal insulin ordered for tonight     Will continue current insulin regimen for now.   Will continue monitoring  blood sugars, will Follow up.  Patient counseled for compliance with consistent low carb diet.

## 2023-11-23 NOTE — PROGRESS NOTE ADULT - PROBLEM SELECTOR PLAN 2
On medications,  no chest pain, stable, monitored and followed up by primary cardiothoracic team/cardiology team. post op CTS

## 2023-11-23 NOTE — PROGRESS NOTE ADULT - ASSESSMENT
58F smoker with stroke x 2 (2022 and july 2023 with resid  L sided weakness/numbness  DM2 with b/l peripheral neuropathy, COPD/Asthma, Breast Ca s/p, Bipolar depression, Rheumatoid Arthritis, Antiphospholipid syndrome, and L eye uveitis/scleritis, Presents to [facility] transferred from [facility]. p/w multiple medical complaints and exacerbation of L-sided subjective weakness/numbness likely 2/2 recrudescence of prior Right BG infarct. Cardiology following for new LV dysfunction since July.Patient now transferred to CTS Dr. Loaiza for evaluation. neuro called for prior strokes   LESLEY was done showing severe Aortic Insufficiency and moderated MR.   Cardiac cath was done showing LAD prox 100% fills via right to left collateral.   CTH with tinght chronic appearing R BG infarct   TTE EF 30%   CD neg   A1c 7.6   LDL 37   ILR interrogated no events  NIHSS 3  premrs3   cath shows LAD prox 100% fills via right to left collateral will need AVR and LIMA to LAD bypass  o/e with mild L facial and decrease sensation on L with mild 5-/5 weakness on L with slow FFM ; dysconjugate gaze   s/p OR 11/21  extubated  post op no neuro changes   c/o HA.     Imprsesion:   prior nowe chronic appearing infarcts. R BG with residual L HP  DM peripheral neuropathy      - for secondary stroke prevention on asa 81mg and high dose statin.  was also on full dose lovenox given APLS; would resume when able or place on heparin drip   - gabapentin 300mg TID for neuropathy   - f/u rheum and heme/onc   - low risk for cardiac surgery from neuro/stroke standpoint   - telemetry  - PT/OT   - check FS, glucose control <180  - GI/DVT ppx   - Thank you for allowing me to participate in the care of this patient. Call with questions.   - spoke [facility] CTS team/ACP   Charles Crespo MD  Vascular Neurology  Office: [phone]

## 2023-11-23 NOTE — PROGRESS NOTE ADULT - SUBJECTIVE AND OBJECTIVE BOX
Day 1 of Anesthesia Pain Management Service    SUBJECTIVE: Patient is doing well with IV PCA    Pain Scale Score:	[X] Refer to charted pain scores    THERAPY:    [ ] IV PCA Morphine		[ ] 5 mg/mL	[ ] 1 mg/mL  [X] IV PCA Hydromorphone	[ ] 5 mg/mL	[X] 1 mg/mL  [ ] IV PCA Fentanyl		[ ] 50 micrograms/mL    Demand dose: 0.2 mg     Lockout: 6 minutes   Continuous Rate: 0 mg/hr  4 Hour Limit: 4 mg    MEDICATIONS  (STANDING):  acetaminophen     Tablet .. 650 milliGRAM(s) Oral every 6 hours  albumin human  5% IVPB 250 milliLiter(s) IV Intermittent once  albuterol/ipratropium for Nebulization 3 milliLiter(s) Nebulizer every 6 hours  aMIOdarone    Tablet 400 milliGRAM(s) Oral two times a day  ascorbic acid 500 milliGRAM(s) Oral two times a day  aspirin enteric coated 81 milliGRAM(s) Oral daily  atorvastatin 80 milliGRAM(s) Oral at bedtime  bisacodyl Suppository 10 milliGRAM(s) Rectal once  budesonide 160 MICROgram(s)/formoterol 4.5 MICROgram(s) Inhaler 2 Puff(s) Inhalation two times a day  chlorhexidine 2% Cloths 1 Application(s) Topical daily  dextrose 50% Injectable 50 milliLiter(s) IV Push every 15 minutes  dextrose 50% Injectable 25 milliLiter(s) IV Push every 15 minutes  dextrose Oral Gel 15 Gram(s) Oral once  DOBUTamine Infusion 2 MICROgram(s)/kG/Min (5.21 mL/Hr) IV Continuous <Continuous>  famotidine    Tablet 20 milliGRAM(s) Oral two times a day  gabapentin 300 milliGRAM(s) Oral every 8 hours  glucagon  Injectable 1 milliGRAM(s) IntraMuscular once  HYDROmorphone PCA (1 mG/mL) 30 milliLiter(s) PCA Continuous PCA Continuous  insulin glargine Injectable (LANTUS) 38 Unit(s) SubCutaneous at bedtime  insulin lispro (ADMELOG) corrective regimen sliding scale   SubCutaneous at bedtime  insulin lispro (ADMELOG) corrective regimen sliding scale   SubCutaneous three times a day before meals  insulin lispro Injectable (ADMELOG) 9 Unit(s) SubCutaneous three times a day before meals  OLANZapine 2.5 milliGRAM(s) Oral daily  polyethylene glycol 3350 17 Gram(s) Oral daily  potassium chloride  10 mEq/50 mL IVPB 10 milliEquivalent(s) IV Intermittent every 1 hour  potassium chloride  10 mEq/50 mL IVPB 10 milliEquivalent(s) IV Intermittent every 1 hour  potassium chloride  10 mEq/50 mL IVPB 10 milliEquivalent(s) IV Intermittent every 1 hour  predniSONE   Tablet 10 milliGRAM(s) Oral daily  senna 2 Tablet(s) Oral at bedtime  sodium chloride 0.9%. 1000 milliLiter(s) (10 mL/Hr) IV Continuous <Continuous>  traZODone 50 milliGRAM(s) Oral at bedtime    MEDICATIONS  (PRN):  HYDROmorphone PCA (1 mG/mL) Rescue Clinician Bolus 0.5 milliGRAM(s) IV Push every 15 minutes PRN for Pain Scale GREATER THAN 6  naloxone Injectable 0.1 milliGRAM(s) IV Push every 3 minutes PRN For ANY of the following changes in patient status:  A. RR LESS THAN 10 breaths per minute, B. Oxygen saturation LESS THAN 90%, C. Sedation score of 6  ondansetron Injectable 4 milliGRAM(s) IV Push every 6 hours PRN Nausea      OBJECTIVE:    Sedation Score:	[ X] Alert	[ ] Drowsy 	[ ] Arousable	[ ] Asleep	[ ] Unresponsive    Side Effects:	[X ] None	[ ] Nausea	[ ] Vomiting	[ ] Pruritus  		[ ] Other:    Vital Signs Last 24 Hrs  T(C): 36.9 (23 Nov 2023 08:00), Max: 36.9 (22 Nov 2023 20:00)  T(F): 98.5 (23 Nov 2023 08:00), Max: 98.5 (23 Nov 2023 08:00)  HR: 111 (23 Nov 2023 08:00) (74 - 115)  BP: 118/70 (23 Nov 2023 08:00) (117/70 - 136/67)  BP(mean): 90 (23 Nov 2023 08:00) (82 - 100)  RR: 24 (23 Nov 2023 08:00) (15 - 37)  SpO2: 93% (23 Nov 2023 08:00) (92% - 100%)    Parameters below as of 23 Nov 2023 08:00  Patient On (Oxygen Delivery Method): nasal cannula  O2 Flow (L/min): 5      ASSESSMENT/ PLAN    Therapy to  be:               [X] Continued   [ ] Discontinued   [ ] Changed to PRN Analgesics    Documentation and Verification of current medications:   [X] Done	[ ] Not done, not eligible    Comments: Doing well on PCA

## 2023-11-23 NOTE — PROGRESS NOTE ADULT - SUBJECTIVE AND OBJECTIVE BOX
CRITICAL CARE ATTENDING - CTICU    MEDICATIONS  (STANDING):  acetaminophen     Tablet .. 650 milliGRAM(s) Oral every 6 hours  albumin human  5% IVPB 250 milliLiter(s) IV Intermittent once  albuterol/ipratropium for Nebulization 3 milliLiter(s) Nebulizer every 6 hours  aMIOdarone    Tablet 400 milliGRAM(s) Oral two times a day  ascorbic acid 500 milliGRAM(s) Oral two times a day  aspirin enteric coated 81 milliGRAM(s) Oral daily  atorvastatin 80 milliGRAM(s) Oral at bedtime  bisacodyl Suppository 10 milliGRAM(s) Rectal once  budesonide 160 MICROgram(s)/formoterol 4.5 MICROgram(s) Inhaler 2 Puff(s) Inhalation two times a day  cefuroxime  IVPB 1500 milliGRAM(s) IV Intermittent every 8 hours  chlorhexidine 2% Cloths 1 Application(s) Topical daily  dextrose 50% Injectable 50 milliLiter(s) IV Push every 15 minutes  dextrose 50% Injectable 25 milliLiter(s) IV Push every 15 minutes  DOBUTamine Infusion 3 MICROgram(s)/kG/Min (7.82 mL/Hr) IV Continuous <Continuous>  famotidine    Tablet 20 milliGRAM(s) Oral two times a day  furosemide   Injectable 20 milliGRAM(s) IV Push once  gabapentin 300 milliGRAM(s) Oral every 8 hours  HYDROmorphone PCA (1 mG/mL) 30 milliLiter(s) PCA Continuous PCA Continuous  insulin regular Infusion 3 Unit(s)/Hr (3 mL/Hr) IV Continuous <Continuous>  OLANZapine 2.5 milliGRAM(s) Oral daily  polyethylene glycol 3350 17 Gram(s) Oral daily  potassium chloride  10 mEq/50 mL IVPB 10 milliEquivalent(s) IV Intermittent every 1 hour  potassium chloride  10 mEq/50 mL IVPB 10 milliEquivalent(s) IV Intermittent every 1 hour  potassium chloride  10 mEq/50 mL IVPB 10 milliEquivalent(s) IV Intermittent every 1 hour  predniSONE   Tablet 10 milliGRAM(s) Oral daily  senna 2 Tablet(s) Oral at bedtime  sodium chloride 0.9%. 1000 milliLiter(s) (10 mL/Hr) IV Continuous <Continuous>  traZODone 50 milliGRAM(s) Oral at bedtime                                    7.8    14.98 )-----------( 76       ( 2023 00:24 )             23.7           138  |  103  |  17  ----------------------------<  132<H>  4.2   |  24  |  0.69    Ca    8.5      2023 00:24  Phos  2.4       Mg     2.1         TPro  5.6<L>  /  Alb  4.1  /  TBili  1.6<H>  /  DBili  x   /  AST  26  /  ALT  18  /  AlkPhos  50        PT/INR - ( 2023 00:15 )   PT: 11.6 sec;   INR: 1.06 ratio         PTT - ( 2023 00:15 )  PTT:25.4 sec        Daily     Daily Weight in k.6 (2023 00:00)       @ 07:  -   @ 07:00  --------------------------------------------------------  IN: 3685.4 mL / OUT: 3985 mL / NET: -299.6 mL     @ :  -   @ 05:17  --------------------------------------------------------  IN: 1349.3 mL / OUT: 1920 mL / NET: -570.7 mL        Critically Ill patient  : [ ] preoperative ,   [ x] post operative    Requires :  [ x] Arterial Line   [x ] Central Line  [x ] PA catheter  [ ] IABP  [ ] ECMO  [ ] LVAD  [ ] Ventilator  [x ] pacemaker - TPM  [ ] Impella.   [x] Nasal Cannula - 5 L                      [ x] ABG's     [x ] Pulse Oxymetry Monitoring  Bedside evaluation , monitoring , treatment of hemodynamics , fluids , IVP/ IVCD meds.        Diagnosis:     Anterior Wall MI     POD 2 - AVR / CABG X 1 L / LAAL -     Post op bleeding    h/o CVA's in past - L -  Hemiparesis    Hypotension     Hypovolemia     COPD            - on Chronic  Steroids  RA     Chest Tube Drainage / Management     Requires chest PT, pulmonary toilet, suctioning to maintain SaO2,  patent airway and treat atelectasis.     [place] Floyd catheter interpretation and therapeutic management of unstable hemodynamics     Respiratory insuffiencey    Hypoxemia - Requires [ ] BIPAP  [ ] HF O2 [ ] Aerosol Mask    Temporary pacemaker (TPM) interrogation and setting.     CHF- acute [x ]   chronic [x ]    systolic [x ]   diastolic [ ]  Valvular [x ]          - Echo- EF -  35% / AI            [ ] RV dysfunction          - Cxr-cardiomegally, edema          - Clinical-  [ x]inotropes   [x ]pressors   [ ]diuresis   [ ]IABP   [ ]ECMO   [ ]LVAD   [x ] Respiratory insuffiencey     Hemodynamic lability,  instability. Requires IVCD [ x] vasopressors [ x] inotropes  [ ] vasodilator  [x ]IVSS fluid / blood products  to maintain MAP, perfusion, C.I.     DM - IVCD Insulin     Fluid Overload     Thrombocytopenia    Requires bedside physical therapy, mobilization and total intermediate care.     Obesity OHS / SARAH             I, Sivakumar Pollard, personally performed the services described in this documentation. All medical record entries made by the scribe were at my direction and in my presence. I have reviewed the chart and agree that the record reflects my personal performance and is accurate and complete.   Sivakumar Pollard MD.       By signing my name below, I, Jaiden Agee, attest that this documentation has been prepared under the direction and in the presence of Sivakumar Pollard MD.   Electronically Signed: Bucky Roman 23 @ 05:17        Discussed with CT surgeon, Physician Assistant - Nurse Practitioner- Critical care medicine team.   Dicussed at  AM / PM rounds.   Chart, labs , films reviewed.    Cumulative Critical Care Time Given Today:  CRITICAL CARE ATTENDING - CTICU    MEDICATIONS  (STANDING):  acetaminophen     Tablet .. 650 milliGRAM(s) Oral every 6 hours  albumin human  5% IVPB 250 milliLiter(s) IV Intermittent once  albuterol/ipratropium for Nebulization 3 milliLiter(s) Nebulizer every 6 hours  aMIOdarone    Tablet 400 milliGRAM(s) Oral two times a day  ascorbic acid 500 milliGRAM(s) Oral two times a day  aspirin enteric coated 81 milliGRAM(s) Oral daily  atorvastatin 80 milliGRAM(s) Oral at bedtime  bisacodyl Suppository 10 milliGRAM(s) Rectal once  budesonide 160 MICROgram(s)/formoterol 4.5 MICROgram(s) Inhaler 2 Puff(s) Inhalation two times a day  cefuroxime  IVPB 1500 milliGRAM(s) IV Intermittent every 8 hours  chlorhexidine 2% Cloths 1 Application(s) Topical daily  dextrose 50% Injectable 50 milliLiter(s) IV Push every 15 minutes  dextrose 50% Injectable 25 milliLiter(s) IV Push every 15 minutes  DOBUTamine Infusion 3 MICROgram(s)/kG/Min (7.82 mL/Hr) IV Continuous <Continuous>  famotidine    Tablet 20 milliGRAM(s) Oral two times a day  furosemide   Injectable 20 milliGRAM(s) IV Push once  gabapentin 300 milliGRAM(s) Oral every 8 hours  HYDROmorphone PCA (1 mG/mL) 30 milliLiter(s) PCA Continuous PCA Continuous  insulin regular Infusion 3 Unit(s)/Hr (3 mL/Hr) IV Continuous <Continuous>  OLANZapine 2.5 milliGRAM(s) Oral daily  polyethylene glycol 3350 17 Gram(s) Oral daily  potassium chloride  10 mEq/50 mL IVPB 10 milliEquivalent(s) IV Intermittent every 1 hour  potassium chloride  10 mEq/50 mL IVPB 10 milliEquivalent(s) IV Intermittent every 1 hour  potassium chloride  10 mEq/50 mL IVPB 10 milliEquivalent(s) IV Intermittent every 1 hour  predniSONE   Tablet 10 milliGRAM(s) Oral daily  senna 2 Tablet(s) Oral at bedtime  sodium chloride 0.9%. 1000 milliLiter(s) (10 mL/Hr) IV Continuous <Continuous>  traZODone 50 milliGRAM(s) Oral at bedtime                                    7.8    14.98 )-----------( 76       ( 2023 00:24 )             23.7           138  |  103  |  17  ----------------------------<  132<H>  4.2   |  24  |  0.69    Ca    8.5      2023 00:24  Phos  2.4       Mg     2.1         TPro  5.6<L>  /  Alb  4.1  /  TBili  1.6<H>  /  DBili  x   /  AST  26  /  ALT  18  /  AlkPhos  50        PT/INR - ( 2023 00:15 )   PT: 11.6 sec;   INR: 1.06 ratio         PTT - ( 2023 00:15 )  PTT:25.4 sec        Daily     Daily Weight in k.6 (2023 00:00)       @ 07:  -   @ 07:00  --------------------------------------------------------  IN: 3685.4 mL / OUT: 3985 mL / NET: -299.6 mL     @ :  -   @ 05:17  --------------------------------------------------------  IN: 1349.3 mL / OUT: 1920 mL / NET: -570.7 mL        Critically Ill patient  : [ ] preoperative ,   [ x] post operative    Requires :  [ x] Arterial Line   [x ] Central Line  [x ] PA catheter  [ ] IABP  [ ] ECMO  [ ] LVAD  [ ] Ventilator  [x ] pacemaker - TPM  [ ] Impella.   [x] Nasal Cannula - 5 L                      [ x] ABG's     [x ] Pulse Oxymetry Monitoring  Bedside evaluation , monitoring , treatment of hemodynamics , fluids , IVP/ IVCD meds.        Diagnosis:     Anterior Wall MI     POD 2 - AVR / CABG X 1 L / LAAL -     Post op bleeding    h/o CVA's in past - L -  Hemiparesis    Hypotension     Hypovolemia     COPD            - on Chronic  Steroids  RA     Chest Tube Drainage / Management     Requires chest PT, pulmonary toilet, suctioning to maintain SaO2,  patent airway and treat atelectasis.     [place] Floyd catheter interpretation and therapeutic management of unstable hemodynamics     Respiratory insuffiencey    Hypoxemia - Requires [ ] BIPAP  [x ] HF O2 [x ] Aerosol Mask / NC     Temporary pacemaker (TPM) interrogation and setting.     CHF- acute [x ]   chronic [x ]    systolic [x ]   diastolic [ ]  Valvular [x ]          - Echo- EF -  35% / AI            [ ] RV dysfunction          - Cxr-cardiomegally, edema          - Clinical-  [ x]inotropes   [x ]pressors - on/off   [ x]diuresis   [ ]IABP   [ ]ECMO   [ ]LVAD   [x ] Respiratory insuffiencey     Hemodynamic lability,  instability. Requires IVCD [ x] vasopressors [ x] inotropes  [ ] vasodilator  [x ]IVSS fluid / blood products  to maintain MAP, perfusion, C.I.     DM - IVCD Insulin     Fluid Overload     Thrombocytopenia    Requires bedside physical therapy, mobilization and total nursing home care.     Obesity OHS / SARAH             I, Sivakumar Pollard, personally performed the services described in this documentation. All medical record entries made by the scribe were at my direction and in my presence. I have reviewed the chart and agree that the record reflects my personal performance and is accurate and complete.   Sivakumar Pollard MD.       By signing my name below, I, Jaiden Agee, attest that this documentation has been prepared under the direction and in the presence of Sivakumar Pollard MD.   Electronically Signed: Bucky Roman 23 @ 05:17        Discussed with CT surgeon, Physician Assistant - Nurse Practitioner- Critical care medicine team.   Dicussed at  AM / PM rounds.   Chart, labs , films reviewed.    Cumulative Critical Care Time Given Today:  30 min

## 2023-11-23 NOTE — PROGRESS NOTE ADULT - SUBJECTIVE AND OBJECTIVE BOX
Chief complaint  Patient is a 58y old  Female who presents with a chief complaint of Atherosclerosis of native coronary artery without angina pectoris    Per chart, patient is a 59 y/o female with PMH including h/o CVA (w/ L sided weakness/numbness/L gave deviation), T2DM w/ b/l peripheral neuropathy, COPD/asthma, h/o breast cancer (s/p ?RT), bipolar depression, rheumatoid arthritis, antiphospholipid syndrome, L eye uveitis/scleritis. Patient presents to [facility] as a transfer from [facility] for CTS evaluation. S/p CABG, AV replacement 11/21. (22 Nov 2023 15:15)         Labs and Fingersticks  CAPILLARY BLOOD GLUCOSE      POCT Blood Glucose.: 182 mg/dL (23 Nov 2023 13:13)  POCT Blood Glucose.: 260 mg/dL (23 Nov 2023 11:59)  POCT Blood Glucose.: 329 mg/dL (23 Nov 2023 10:39)  POCT Blood Glucose.: 125 mg/dL (23 Nov 2023 06:46)  POCT Blood Glucose.: 127 mg/dL (23 Nov 2023 06:20)  POCT Blood Glucose.: 128 mg/dL (23 Nov 2023 05:02)  POCT Blood Glucose.: 122 mg/dL (23 Nov 2023 04:25)  POCT Blood Glucose.: 141 mg/dL (23 Nov 2023 03:11)  POCT Blood Glucose.: 130 mg/dL (23 Nov 2023 01:55)  POCT Blood Glucose.: 136 mg/dL (23 Nov 2023 01:01)  POCT Blood Glucose.: 136 mg/dL (23 Nov 2023 00:05)  POCT Blood Glucose.: 183 mg/dL (22 Nov 2023 23:22)  POCT Blood Glucose.: 137 mg/dL (22 Nov 2023 22:02)  POCT Blood Glucose.: 163 mg/dL (22 Nov 2023 21:07)  POCT Blood Glucose.: 159 mg/dL (22 Nov 2023 20:11)  POCT Blood Glucose.: 140 mg/dL (22 Nov 2023 18:56)  POCT Blood Glucose.: 136 mg/dL (22 Nov 2023 17:55)  POCT Blood Glucose.: 86 mg/dL (22 Nov 2023 17:17)  POCT Blood Glucose.: 145 mg/dL (22 Nov 2023 15:10)  POCT Blood Glucose.: 194 mg/dL (22 Nov 2023 13:43)      Anion Gap: 11 (11-23 @ 00:24)  Anion Gap: 12 (11-22 @ 00:15)  Anion Gap: 11 (11-21 @ 15:25)      Calcium: 8.5 (11-23 @ 00:24)  Calcium: 9.0 (11-22 @ 00:15)  Calcium: 7.9 *L* (11-21 @ 15:25)  Albumin: 4.1 (11-23 @ 00:24)  Albumin: 4.4 (11-22 @ 00:15)  Albumin: 3.6 (11-21 @ 15:25)    Alanine Aminotransferase (ALT/SGPT): 18 (11-23 @ 00:24)  Alanine Aminotransferase (ALT/SGPT): 20 (11-22 @ 00:15)  Alanine Aminotransferase (ALT/SGPT): 27 (11-21 @ 15:25)  Alkaline Phosphatase: 50 (11-23 @ 00:24)  Alkaline Phosphatase: 56 (11-22 @ 00:15)  Alkaline Phosphatase: 65 (11-21 @ 15:25)  Aspartate Aminotransferase (AST/SGOT): 26 (11-23 @ 00:24)  Aspartate Aminotransferase (AST/SGOT): 33 (11-22 @ 00:15)  Aspartate Aminotransferase (AST/SGOT): 55 *H* (11-21 @ 15:25)        11-23    138  |  103  |  17  ----------------------------<  132<H>  4.2   |  24  |  0.69    Ca    8.5      23 Nov 2023 00:24  Phos  2.4     11-23  Mg     2.1     11-23    TPro  5.6<L>  /  Alb  4.1  /  TBili  1.6<H>  /  DBili  x   /  AST  26  /  ALT  18  /  AlkPhos  50  11-23                        7.8    14.98 )-----------( 76       ( 23 Nov 2023 00:24 )             23.7     Medications  MEDICATIONS  (STANDING):  acetaminophen     Tablet .. 650 milliGRAM(s) Oral every 6 hours  albumin human  5% IVPB 250 milliLiter(s) IV Intermittent once  albuterol/ipratropium for Nebulization 3 milliLiter(s) Nebulizer every 6 hours  aMIOdarone    Tablet 400 milliGRAM(s) Oral two times a day  ascorbic acid 500 milliGRAM(s) Oral two times a day  aspirin enteric coated 81 milliGRAM(s) Oral daily  atorvastatin 80 milliGRAM(s) Oral at bedtime  bisacodyl Suppository 10 milliGRAM(s) Rectal once  budesonide 160 MICROgram(s)/formoterol 4.5 MICROgram(s) Inhaler 2 Puff(s) Inhalation two times a day  chlorhexidine 2% Cloths 1 Application(s) Topical daily  dextrose 50% Injectable 50 milliLiter(s) IV Push every 15 minutes  dextrose 50% Injectable 25 milliLiter(s) IV Push every 15 minutes  dextrose Oral Gel 15 Gram(s) Oral once  DOBUTamine Infusion 2 MICROgram(s)/kG/Min (5.21 mL/Hr) IV Continuous <Continuous>  famotidine    Tablet 20 milliGRAM(s) Oral two times a day  gabapentin 300 milliGRAM(s) Oral every 8 hours  glucagon  Injectable 1 milliGRAM(s) IntraMuscular once  HYDROmorphone PCA (1 mG/mL) 30 milliLiter(s) PCA Continuous PCA Continuous  insulin glargine Injectable (LANTUS) 38 Unit(s) SubCutaneous at bedtime  insulin lispro (ADMELOG) corrective regimen sliding scale   SubCutaneous at bedtime  insulin lispro (ADMELOG) corrective regimen sliding scale   SubCutaneous three times a day before meals  insulin lispro Injectable (ADMELOG) 9 Unit(s) SubCutaneous three times a day before meals  OLANZapine 2.5 milliGRAM(s) Oral daily  polyethylene glycol 3350 17 Gram(s) Oral daily  potassium chloride  10 mEq/50 mL IVPB 10 milliEquivalent(s) IV Intermittent every 1 hour  potassium chloride  10 mEq/50 mL IVPB 10 milliEquivalent(s) IV Intermittent every 1 hour  potassium chloride  10 mEq/50 mL IVPB 10 milliEquivalent(s) IV Intermittent every 1 hour  predniSONE   Tablet 10 milliGRAM(s) Oral daily  senna 2 Tablet(s) Oral at bedtime  sodium chloride 0.9%. 1000 milliLiter(s) (10 mL/Hr) IV Continuous <Continuous>  traZODone 50 milliGRAM(s) Oral at bedtime      Physical Exam  General: Patient comfortable in bed   Vital Signs Last 12 Hrs  T(F): 98.4 (11-23-23 @ 12:00), Max: 98.5 (11-23-23 @ 08:00)  HR: 91 (11-23-23 @ 13:00) (78 - 115)  BP: 125/68 (11-23-23 @ 13:00) (109/66 - 136/67)  BP(mean): 89 (11-23-23 @ 13:00) (81 - 111)  RR: 23 (11-23-23 @ 13:00) (17 - 32)  SpO2: 95% (11-23-23 @ 13:00) (92% - 96%)    CVS: S1S2   Respiratory: No wheezing, no crepitations  GI: Abdomen soft, bowel sounds positive  Musculoskeletal:  moves all extremities  :  [place]

## 2023-11-23 NOTE — PROGRESS NOTE ADULT - SUBJECTIVE AND OBJECTIVE BOX
DATE OF SERVICE: 11-23-23 @ 15:02    Patient is a 58y old  Female who presents with a chief complaint of Atherosclerosis of native coronary artery without angina pectoris    Per chart, patient is a 59 y/o female with PMH including h/o CVA (w/ L sided weakness/numbness/L gave deviation), T2DM w/ b/l peripheral neuropathy, COPD/asthma, h/o breast cancer (s/p ?RT), bipolar depression, rheumatoid arthritis, antiphospholipid syndrome, L eye uveitis/scleritis. Patient presents to [facility] as a transfer from [facility] for CTS evaluation. S/p CABG, AV replacement 11/21. (22 Nov 2023 15:15)      INTERVAL HISTORY: no complaints     REVIEW OF SYSTEMS:  CONSTITUTIONAL: No weakness  EYES/ENT: No visual changes;  No throat pain   NECK: No pain or stiffness  RESPIRATORY: No cough, wheezing; No shortness of breath  CARDIOVASCULAR: No chest pain or palpitations  GASTROINTESTINAL: No abdominal  pain. No nausea, vomiting, or hematemesis  GENITOURINARY: No dysuria, frequency or hematuria  NEUROLOGICAL: No stroke like symptoms  SKIN: No rashes    	  MEDICATIONS:  aMIOdarone    Tablet 400 milliGRAM(s) Oral two times a day  DOBUTamine Infusion 2 MICROgram(s)/kG/Min IV Continuous <Continuous>        PHYSICAL EXAM:  T(C): 36.9 (11-23-23 @ 12:00), Max: 36.9 (11-22-23 @ 20:00)  HR: 91 (11-23-23 @ 14:00) (77 - 115)  BP: 97/66 (11-23-23 @ 14:00) (97/66 - 136/67)  RR: 22 (11-23-23 @ 14:00) (16 - 37)  SpO2: 95% (11-23-23 @ 14:00) (92% - 100%)  Wt(kg): --  I&O's Summary    22 Nov 2023 07:01  -  23 Nov 2023 07:00  --------------------------------------------------------  IN: 1461.7 mL / OUT: 3445 mL / NET: -1983.3 mL    23 Nov 2023 07:01  -  23 Nov 2023 15:02  --------------------------------------------------------  IN: 546.8 mL / OUT: 960 mL / NET: -413.2 mL          Appearance: In no distress	  HEENT:    PERRL, EOMI	  Cardiovascular:  S1 S2, No JVD  Respiratory: Lungs clear to auscultation	  Gastrointestinal:  Soft, Non-tender, + BS	  Vascularature:  No edema of LE  Psychiatric: Appropriate affect   Neuro: no acute focal deficits                               7.8    14.98 )-----------( 76       ( 23 Nov 2023 00:24 )             23.7     11-23    138  |  103  |  17  ----------------------------<  132<H>  4.2   |  24  |  0.69    Ca    8.5      23 Nov 2023 00:24  Phos  2.4     11-23  Mg     2.1     11-23    TPro  5.6<L>  /  Alb  4.1  /  TBili  1.6<H>  /  DBili  x   /  AST  26  /  ALT  18  /  AlkPhos  50  11-23        Labs personally reviewed      ASSESSMENT/PLAN: 	    58F with history of CVA with L sided weakness/numbness/L gaze deviation, DM2 with b/l peripheral neuropathy, COPD/Asthma, Breast Ca s/p ?RT, Bipolar depression, Rheumatoid Arthritis, Antiphospholipid syndrome, L eye uveitis/scleritis, Newly diagnosed DM2 (pt cannot remember which medication she takes for it), and current tobacco use who presents to the hospital with multiple complaints.     1. LV dysfunction  -new mod-severe segmental LV dysfunction in July has not had ischemic workup due to stroke at that time.  -c/w metoprolol and aldactone  -TTE EF 30% now with mod-severe AR  -c/w lasix 40mg PO Daily   -cath shows LAD prox 100% fills via right to left collateral   -CMR 11/17: Mild thinning and hypokinesis of the mid to apical anterior and  anteroseptal segments.  The left ventricular ejection fraction measures  43%.Findings consistent with ischemic cardiomyopathy.  LESLEY with sever AR   - s/p AVR     2. CVA  -history of multiple CVAs and is on eliquis   -CT scan of head negative for any acute findings  -ILR interrogation with no events  -per neuro no plans for MRI       3. Antiphospholipid syndrome  -on lovenox            GOOD Aquino DO [facility]  Cardiovascular Medicine  [address], [address]  Office: [phone]  Available via call/text on Microsoft Teams

## 2023-11-23 NOTE — PROGRESS NOTE ADULT - ASSESSMENT
58F smoker with PMH CVA with L sided weakness/numbness/L gaze deviation, DM2 with b/l peripheral neuropathy, COPD/Asthma, Breast Ca s/p ?RT,  Bipolar depression, Rheumatoid Arthritis,  Antiphospholipid syndrome, and L eye uveitis/scleritis, Presents to [facility] transferred from [facility]. CTS eval   Assessment  DMT2: 58y Female with DM T2 with hyperglycemia, A1C 7.6% , was on oral meds at home, now postop, having hyperglycemias, was restarted on IV insulin, on prednisone.   Severe AR: CTS eval, LESLEY, s/p ct surgery.   CAD: on medications, stable, monitored. Cardiac MR viability , postop CABG  HTN: on antihypertensive medications, monitored, asymptomatic.  Obesity: No strict exercise routines, not on any weight loss program, neither on low calorie diet.          Discussed plan and management with Dr Ladarius Rivera NP - TEAMS  Skye Durand MD  Cell: [phone]  Office: [phone]

## 2023-11-23 NOTE — PROGRESS NOTE ADULT - SUBJECTIVE AND OBJECTIVE BOX
Neurology        S: patient seen doing okay in ICU ; HA better         Medications: MEDICATIONS  (STANDING):  acetaminophen     Tablet .. 650 milliGRAM(s) Oral every 6 hours  albumin human  5% IVPB 250 milliLiter(s) IV Intermittent once  albuterol/ipratropium for Nebulization 3 milliLiter(s) Nebulizer every 6 hours  aMIOdarone    Tablet 400 milliGRAM(s) Oral two times a day  ascorbic acid 500 milliGRAM(s) Oral two times a day  aspirin enteric coated 81 milliGRAM(s) Oral daily  atorvastatin 80 milliGRAM(s) Oral at bedtime  bisacodyl Suppository 10 milliGRAM(s) Rectal once  budesonide 160 MICROgram(s)/formoterol 4.5 MICROgram(s) Inhaler 2 Puff(s) Inhalation two times a day  chlorhexidine 2% Cloths 1 Application(s) Topical daily  dextrose 50% Injectable 50 milliLiter(s) IV Push every 15 minutes  dextrose 50% Injectable 25 milliLiter(s) IV Push every 15 minutes  dextrose Oral Gel 15 Gram(s) Oral once  DOBUTamine Infusion 2 MICROgram(s)/kG/Min (5.21 mL/Hr) IV Continuous <Continuous>  famotidine    Tablet 20 milliGRAM(s) Oral two times a day  gabapentin 300 milliGRAM(s) Oral every 8 hours  glucagon  Injectable 1 milliGRAM(s) IntraMuscular once  HYDROmorphone PCA (1 mG/mL) 30 milliLiter(s) PCA Continuous PCA Continuous  insulin glargine Injectable (LANTUS) 38 Unit(s) SubCutaneous at bedtime  insulin lispro (ADMELOG) corrective regimen sliding scale   SubCutaneous at bedtime  insulin lispro (ADMELOG) corrective regimen sliding scale   SubCutaneous three times a day before meals  insulin lispro Injectable (ADMELOG) 9 Unit(s) SubCutaneous three times a day before meals  OLANZapine 2.5 milliGRAM(s) Oral daily  polyethylene glycol 3350 17 Gram(s) Oral daily  potassium chloride  10 mEq/50 mL IVPB 10 milliEquivalent(s) IV Intermittent every 1 hour  potassium chloride  10 mEq/50 mL IVPB 10 milliEquivalent(s) IV Intermittent every 1 hour  potassium chloride  10 mEq/50 mL IVPB 10 milliEquivalent(s) IV Intermittent every 1 hour  predniSONE   Tablet 10 milliGRAM(s) Oral daily  senna 2 Tablet(s) Oral at bedtime  sodium chloride 0.9%. 1000 milliLiter(s) (10 mL/Hr) IV Continuous <Continuous>  traZODone 50 milliGRAM(s) Oral at bedtime    MEDICATIONS  (PRN):  HYDROmorphone PCA (1 mG/mL) Rescue Clinician Bolus 0.5 milliGRAM(s) IV Push every 15 minutes PRN for Pain Scale GREATER THAN 6  naloxone Injectable 0.1 milliGRAM(s) IV Push every 3 minutes PRN For ANY of the following changes in patient status:  A. RR LESS THAN 10 breaths per minute, B. Oxygen saturation LESS THAN 90%, C. Sedation score of 6  ondansetron Injectable 4 milliGRAM(s) IV Push every 6 hours PRN Nausea       Vitals:  Vital Signs Last 24 Hrs  T(C): 36.9 (23 Nov 2023 08:00), Max: 36.9 (22 Nov 2023 20:00)  T(F): 98.5 (23 Nov 2023 08:00), Max: 98.5 (23 Nov 2023 08:00)  HR: 103 (23 Nov 2023 10:00) (74 - 115)  BP: 116/64 (23 Nov 2023 10:00) (109/66 - 136/67)  BP(mean): 82 (23 Nov 2023 10:00) (81 - 100)  RR: 23 (23 Nov 2023 10:00) (15 - 37)  SpO2: 94% (23 Nov 2023 10:00) (92% - 100%)    Parameters below as of 23 Nov 2023 08:00  Patient On (Oxygen Delivery Method): nasal cannula  O2 Flow (L/min): 5                  General Exam:   General Appearance: Appropriately dressed and in no acute distress       Head: Normocephalic, atraumatic and no dysmorphic features  Ear, Nose, and Throat: Moist mucous membranes  CVS: S1S2+  Resp: No SOB, no wheeze or rhonchi  GI: soft NT/ND  Extremities: No edema or cyanosis  Skin: No bruises or rashes     Neurological Exam:  Mental Status: Awake, alert and oriented x 3.  Able to follow simple and complex verbal commands. Able to name and repeat. fluent speech. No obvious aphasia or dysarthria noted.   Cranial Nerves: PERRL, dysconugate gaze , VFFC, sensation V1-V3 decrease on L,  L facial asymmetry, equal elevation of palate, scm/trap 5/5, tongue is midline on protrusion. no obvious papilledema on fundoscopic exam. hearing is grossly intact.   Motor: Normal bulk, tone and strength throughout except mild L HP 5-/5   Sensation: decrease on L compared to R   Reflexes: 1+ throughout at biceps, brachioradialis, triceps, patellars and ankles bilaterally and equal. No clonus. R toe and L toe were both downgoing.  Coordination: No dysmetria on FNF   Gait: cane baseline     Data/Labs/Imaging which I personally reviewed.       LABS:                          7.8    14.98 )-----------( 76       ( 23 Nov 2023 00:24 )             23.7     11-23    138  |  103  |  17  ----------------------------<  132<H>  4.2   |  24  |  0.69    Ca    8.5      23 Nov 2023 00:24  Phos  2.4     11-23  Mg     2.1     11-23    TPro  5.6<L>  /  Alb  4.1  /  TBili  1.6<H>  /  DBili  x   /  AST  26  /  ALT  18  /  AlkPhos  50  11-23    LIVER FUNCTIONS - ( 23 Nov 2023 00:24 )  Alb: 4.1 g/dL / Pro: 5.6 g/dL / ALK PHOS: 50 U/L / ALT: 18 U/L / AST: 26 U/L / GGT: x           PT/INR - ( 22 Nov 2023 00:15 )   PT: 11.6 sec;   INR: 1.06 ratio         PTT - ( 22 Nov 2023 00:15 )  PTT:25.4 sec  Urinalysis Basic - ( 23 Nov 2023 00:24 )    Color: x / Appearance: x / SG: x / pH: x  Gluc: 132 mg/dL / Ketone: x  / Bili: x / Urobili: x   Blood: x / Protein: x / Nitrite: x   Leuk Esterase: x / RBC: x / WBC x   Sq Epi: x / Non Sq Epi: x / Bacteria: x

## 2023-11-24 DIAGNOSIS — Z95.1 PRESENCE OF AORTOCORONARY BYPASS GRAFT: ICD-10-CM

## 2023-11-24 DIAGNOSIS — Z95.2 PRESENCE OF PROSTHETIC HEART VALVE: ICD-10-CM

## 2023-11-24 LAB
ALBUMIN SERPL ELPH-MCNC: 3.8 G/DL — SIGNIFICANT CHANGE UP (ref 3.3–5)
ALBUMIN SERPL ELPH-MCNC: 3.8 G/DL — SIGNIFICANT CHANGE UP (ref 3.3–5)
ALP SERPL-CCNC: 59 U/L — SIGNIFICANT CHANGE UP (ref 40–120)
ALP SERPL-CCNC: 59 U/L — SIGNIFICANT CHANGE UP (ref 40–120)
ALT FLD-CCNC: 18 U/L — SIGNIFICANT CHANGE UP (ref 10–45)
ALT FLD-CCNC: 18 U/L — SIGNIFICANT CHANGE UP (ref 10–45)
ANION GAP SERPL CALC-SCNC: 11 MMOL/L — SIGNIFICANT CHANGE UP (ref 5–17)
ANION GAP SERPL CALC-SCNC: 11 MMOL/L — SIGNIFICANT CHANGE UP (ref 5–17)
AST SERPL-CCNC: 18 U/L — SIGNIFICANT CHANGE UP (ref 10–40)
AST SERPL-CCNC: 18 U/L — SIGNIFICANT CHANGE UP (ref 10–40)
BASE EXCESS BLDV CALC-SCNC: -2 MMOL/L — SIGNIFICANT CHANGE UP (ref -2–3)
BASE EXCESS BLDV CALC-SCNC: -2 MMOL/L — SIGNIFICANT CHANGE UP (ref -2–3)
BASE EXCESS BLDV CALC-SCNC: -2.9 MMOL/L — LOW (ref -2–3)
BASE EXCESS BLDV CALC-SCNC: -2.9 MMOL/L — LOW (ref -2–3)
BASE EXCESS BLDV CALC-SCNC: 2 MMOL/L — SIGNIFICANT CHANGE UP (ref -2–3)
BASE EXCESS BLDV CALC-SCNC: 2 MMOL/L — SIGNIFICANT CHANGE UP (ref -2–3)
BASE EXCESS BLDV CALC-SCNC: 2.8 MMOL/L — SIGNIFICANT CHANGE UP (ref -2–3)
BASE EXCESS BLDV CALC-SCNC: 2.8 MMOL/L — SIGNIFICANT CHANGE UP (ref -2–3)
BASE EXCESS BLDV CALC-SCNC: 4.4 MMOL/L — HIGH (ref -2–3)
BASE EXCESS BLDV CALC-SCNC: 4.4 MMOL/L — HIGH (ref -2–3)
BASE EXCESS BLDV CALC-SCNC: 4.9 MMOL/L — HIGH (ref -2–3)
BASE EXCESS BLDV CALC-SCNC: 4.9 MMOL/L — HIGH (ref -2–3)
BASOPHILS # BLD AUTO: 0.02 K/UL — SIGNIFICANT CHANGE UP (ref 0–0.2)
BASOPHILS # BLD AUTO: 0.02 K/UL — SIGNIFICANT CHANGE UP (ref 0–0.2)
BASOPHILS NFR BLD AUTO: 0.1 % — SIGNIFICANT CHANGE UP (ref 0–2)
BASOPHILS NFR BLD AUTO: 0.1 % — SIGNIFICANT CHANGE UP (ref 0–2)
BILIRUB SERPL-MCNC: 2.2 MG/DL — HIGH (ref 0.2–1.2)
BILIRUB SERPL-MCNC: 2.2 MG/DL — HIGH (ref 0.2–1.2)
BUN SERPL-MCNC: 16 MG/DL — SIGNIFICANT CHANGE UP (ref 7–23)
BUN SERPL-MCNC: 16 MG/DL — SIGNIFICANT CHANGE UP (ref 7–23)
CA-I SERPL-SCNC: 1.07 MMOL/L — LOW (ref 1.15–1.33)
CA-I SERPL-SCNC: 1.07 MMOL/L — LOW (ref 1.15–1.33)
CA-I SERPL-SCNC: 1.09 MMOL/L — LOW (ref 1.15–1.33)
CA-I SERPL-SCNC: 1.09 MMOL/L — LOW (ref 1.15–1.33)
CA-I SERPL-SCNC: 1.12 MMOL/L — LOW (ref 1.15–1.33)
CA-I SERPL-SCNC: 1.12 MMOL/L — LOW (ref 1.15–1.33)
CA-I SERPL-SCNC: 1.14 MMOL/L — LOW (ref 1.15–1.33)
CA-I SERPL-SCNC: 1.14 MMOL/L — LOW (ref 1.15–1.33)
CA-I SERPL-SCNC: 1.16 MMOL/L — SIGNIFICANT CHANGE UP (ref 1.15–1.33)
CA-I SERPL-SCNC: 1.16 MMOL/L — SIGNIFICANT CHANGE UP (ref 1.15–1.33)
CALCIUM SERPL-MCNC: 8.4 MG/DL — SIGNIFICANT CHANGE UP (ref 8.4–10.5)
CALCIUM SERPL-MCNC: 8.4 MG/DL — SIGNIFICANT CHANGE UP (ref 8.4–10.5)
CHLORIDE BLDV-SCNC: 102 MMOL/L — SIGNIFICANT CHANGE UP (ref 96–108)
CHLORIDE BLDV-SCNC: 104 MMOL/L — SIGNIFICANT CHANGE UP (ref 96–108)
CHLORIDE BLDV-SCNC: 104 MMOL/L — SIGNIFICANT CHANGE UP (ref 96–108)
CHLORIDE BLDV-SCNC: 105 MMOL/L — SIGNIFICANT CHANGE UP (ref 96–108)
CHLORIDE BLDV-SCNC: 105 MMOL/L — SIGNIFICANT CHANGE UP (ref 96–108)
CHLORIDE SERPL-SCNC: 99 MMOL/L — SIGNIFICANT CHANGE UP (ref 96–108)
CHLORIDE SERPL-SCNC: 99 MMOL/L — SIGNIFICANT CHANGE UP (ref 96–108)
CO2 BLDV-SCNC: 24 MMOL/L — SIGNIFICANT CHANGE UP (ref 22–26)
CO2 BLDV-SCNC: 24 MMOL/L — SIGNIFICANT CHANGE UP (ref 22–26)
CO2 BLDV-SCNC: 25 MMOL/L — SIGNIFICANT CHANGE UP (ref 22–26)
CO2 BLDV-SCNC: 25 MMOL/L — SIGNIFICANT CHANGE UP (ref 22–26)
CO2 BLDV-SCNC: 29 MMOL/L — HIGH (ref 22–26)
CO2 BLDV-SCNC: 29 MMOL/L — HIGH (ref 22–26)
CO2 BLDV-SCNC: 30 MMOL/L — HIGH (ref 22–26)
CO2 BLDV-SCNC: 30 MMOL/L — HIGH (ref 22–26)
CO2 BLDV-SCNC: 31 MMOL/L — HIGH (ref 22–26)
CO2 BLDV-SCNC: 31 MMOL/L — HIGH (ref 22–26)
CO2 BLDV-SCNC: 32 MMOL/L — HIGH (ref 22–26)
CO2 BLDV-SCNC: 32 MMOL/L — HIGH (ref 22–26)
CO2 SERPL-SCNC: 26 MMOL/L — SIGNIFICANT CHANGE UP (ref 22–31)
CO2 SERPL-SCNC: 26 MMOL/L — SIGNIFICANT CHANGE UP (ref 22–31)
CREAT SERPL-MCNC: 0.55 MG/DL — SIGNIFICANT CHANGE UP (ref 0.5–1.3)
CREAT SERPL-MCNC: 0.55 MG/DL — SIGNIFICANT CHANGE UP (ref 0.5–1.3)
EGFR: 106 ML/MIN/1.73M2 — SIGNIFICANT CHANGE UP
EGFR: 106 ML/MIN/1.73M2 — SIGNIFICANT CHANGE UP
EOSINOPHIL # BLD AUTO: 0.1 K/UL — SIGNIFICANT CHANGE UP (ref 0–0.5)
EOSINOPHIL # BLD AUTO: 0.1 K/UL — SIGNIFICANT CHANGE UP (ref 0–0.5)
EOSINOPHIL NFR BLD AUTO: 0.5 % — SIGNIFICANT CHANGE UP (ref 0–6)
EOSINOPHIL NFR BLD AUTO: 0.5 % — SIGNIFICANT CHANGE UP (ref 0–6)
GAS PNL BLDV: 133 MMOL/L — LOW (ref 136–145)
GAS PNL BLDV: 133 MMOL/L — LOW (ref 136–145)
GAS PNL BLDV: 134 MMOL/L — LOW (ref 136–145)
GAS PNL BLDV: 134 MMOL/L — LOW (ref 136–145)
GAS PNL BLDV: 135 MMOL/L — LOW (ref 136–145)
GAS PNL BLDV: SIGNIFICANT CHANGE UP
GLUCOSE BLDC GLUCOMTR-MCNC: 105 MG/DL — HIGH (ref 70–99)
GLUCOSE BLDC GLUCOMTR-MCNC: 105 MG/DL — HIGH (ref 70–99)
GLUCOSE BLDC GLUCOMTR-MCNC: 106 MG/DL — HIGH (ref 70–99)
GLUCOSE BLDC GLUCOMTR-MCNC: 106 MG/DL — HIGH (ref 70–99)
GLUCOSE BLDC GLUCOMTR-MCNC: 114 MG/DL — HIGH (ref 70–99)
GLUCOSE BLDC GLUCOMTR-MCNC: 117 MG/DL — HIGH (ref 70–99)
GLUCOSE BLDC GLUCOMTR-MCNC: 117 MG/DL — HIGH (ref 70–99)
GLUCOSE BLDC GLUCOMTR-MCNC: 125 MG/DL — HIGH (ref 70–99)
GLUCOSE BLDC GLUCOMTR-MCNC: 125 MG/DL — HIGH (ref 70–99)
GLUCOSE BLDC GLUCOMTR-MCNC: 134 MG/DL — HIGH (ref 70–99)
GLUCOSE BLDC GLUCOMTR-MCNC: 134 MG/DL — HIGH (ref 70–99)
GLUCOSE BLDC GLUCOMTR-MCNC: 152 MG/DL — HIGH (ref 70–99)
GLUCOSE BLDC GLUCOMTR-MCNC: 152 MG/DL — HIGH (ref 70–99)
GLUCOSE BLDC GLUCOMTR-MCNC: 154 MG/DL — HIGH (ref 70–99)
GLUCOSE BLDC GLUCOMTR-MCNC: 154 MG/DL — HIGH (ref 70–99)
GLUCOSE BLDC GLUCOMTR-MCNC: 162 MG/DL — HIGH (ref 70–99)
GLUCOSE BLDC GLUCOMTR-MCNC: 170 MG/DL — HIGH (ref 70–99)
GLUCOSE BLDC GLUCOMTR-MCNC: 170 MG/DL — HIGH (ref 70–99)
GLUCOSE BLDC GLUCOMTR-MCNC: 216 MG/DL — HIGH (ref 70–99)
GLUCOSE BLDC GLUCOMTR-MCNC: 216 MG/DL — HIGH (ref 70–99)
GLUCOSE BLDC GLUCOMTR-MCNC: 278 MG/DL — HIGH (ref 70–99)
GLUCOSE BLDC GLUCOMTR-MCNC: 278 MG/DL — HIGH (ref 70–99)
GLUCOSE BLDC GLUCOMTR-MCNC: 99 MG/DL — SIGNIFICANT CHANGE UP (ref 70–99)
GLUCOSE BLDC GLUCOMTR-MCNC: 99 MG/DL — SIGNIFICANT CHANGE UP (ref 70–99)
GLUCOSE BLDV-MCNC: 139 MG/DL — HIGH (ref 70–99)
GLUCOSE BLDV-MCNC: 139 MG/DL — HIGH (ref 70–99)
GLUCOSE BLDV-MCNC: 154 MG/DL — HIGH (ref 70–99)
GLUCOSE BLDV-MCNC: 154 MG/DL — HIGH (ref 70–99)
GLUCOSE BLDV-MCNC: 155 MG/DL — HIGH (ref 70–99)
GLUCOSE BLDV-MCNC: 155 MG/DL — HIGH (ref 70–99)
GLUCOSE BLDV-MCNC: 291 MG/DL — HIGH (ref 70–99)
GLUCOSE BLDV-MCNC: 291 MG/DL — HIGH (ref 70–99)
GLUCOSE BLDV-MCNC: 315 MG/DL — HIGH (ref 70–99)
GLUCOSE BLDV-MCNC: 315 MG/DL — HIGH (ref 70–99)
GLUCOSE SERPL-MCNC: 142 MG/DL — HIGH (ref 70–99)
GLUCOSE SERPL-MCNC: 142 MG/DL — HIGH (ref 70–99)
HCO3 BLDV-SCNC: 23 MMOL/L — SIGNIFICANT CHANGE UP (ref 22–29)
HCO3 BLDV-SCNC: 23 MMOL/L — SIGNIFICANT CHANGE UP (ref 22–29)
HCO3 BLDV-SCNC: 24 MMOL/L — SIGNIFICANT CHANGE UP (ref 22–29)
HCO3 BLDV-SCNC: 24 MMOL/L — SIGNIFICANT CHANGE UP (ref 22–29)
HCO3 BLDV-SCNC: 28 MMOL/L — SIGNIFICANT CHANGE UP (ref 22–29)
HCO3 BLDV-SCNC: 29 MMOL/L — SIGNIFICANT CHANGE UP (ref 22–29)
HCO3 BLDV-SCNC: 29 MMOL/L — SIGNIFICANT CHANGE UP (ref 22–29)
HCO3 BLDV-SCNC: 30 MMOL/L — HIGH (ref 22–29)
HCO3 BLDV-SCNC: 30 MMOL/L — HIGH (ref 22–29)
HCT VFR BLD CALC: 27.7 % — LOW (ref 34.5–45)
HCT VFR BLD CALC: 27.7 % — LOW (ref 34.5–45)
HCT VFR BLDA CALC: 25 % — LOW (ref 34.5–46.5)
HCT VFR BLDA CALC: 26 % — LOW (ref 34.5–46.5)
HCT VFR BLDA CALC: 26 % — LOW (ref 34.5–46.5)
HCT VFR BLDA CALC: 28 % — LOW (ref 34.5–46.5)
HGB BLD CALC-MCNC: 8.3 G/DL — LOW (ref 11.7–16.1)
HGB BLD CALC-MCNC: 8.3 G/DL — LOW (ref 11.7–16.1)
HGB BLD CALC-MCNC: 8.4 G/DL — LOW (ref 11.7–16.1)
HGB BLD CALC-MCNC: 8.4 G/DL — LOW (ref 11.7–16.1)
HGB BLD CALC-MCNC: 8.8 G/DL — LOW (ref 11.7–16.1)
HGB BLD CALC-MCNC: 8.8 G/DL — LOW (ref 11.7–16.1)
HGB BLD CALC-MCNC: 9.2 G/DL — LOW (ref 11.7–16.1)
HGB BLD CALC-MCNC: 9.2 G/DL — LOW (ref 11.7–16.1)
HGB BLD CALC-MCNC: 9.3 G/DL — LOW (ref 11.7–16.1)
HGB BLD CALC-MCNC: 9.3 G/DL — LOW (ref 11.7–16.1)
HGB BLD-MCNC: 9 G/DL — LOW (ref 11.5–15.5)
HGB BLD-MCNC: 9 G/DL — LOW (ref 11.5–15.5)
HOROWITZ INDEX BLDV+IHG-RTO: 40 — SIGNIFICANT CHANGE UP
IMM GRANULOCYTES NFR BLD AUTO: 0.8 % — SIGNIFICANT CHANGE UP (ref 0–0.9)
IMM GRANULOCYTES NFR BLD AUTO: 0.8 % — SIGNIFICANT CHANGE UP (ref 0–0.9)
LACTATE BLDV-MCNC: 1.1 MMOL/L — SIGNIFICANT CHANGE UP (ref 0.5–2)
LACTATE BLDV-MCNC: 1.1 MMOL/L — SIGNIFICANT CHANGE UP (ref 0.5–2)
LACTATE BLDV-MCNC: 1.2 MMOL/L — SIGNIFICANT CHANGE UP (ref 0.5–2)
LACTATE BLDV-MCNC: 1.2 MMOL/L — SIGNIFICANT CHANGE UP (ref 0.5–2)
LACTATE BLDV-MCNC: 1.4 MMOL/L — SIGNIFICANT CHANGE UP (ref 0.5–2)
LACTATE BLDV-MCNC: 1.4 MMOL/L — SIGNIFICANT CHANGE UP (ref 0.5–2)
LACTATE BLDV-MCNC: 1.5 MMOL/L — SIGNIFICANT CHANGE UP (ref 0.5–2)
LACTATE BLDV-MCNC: 1.5 MMOL/L — SIGNIFICANT CHANGE UP (ref 0.5–2)
LACTATE BLDV-MCNC: 2.8 MMOL/L — HIGH (ref 0.5–2)
LACTATE BLDV-MCNC: 2.8 MMOL/L — HIGH (ref 0.5–2)
LYMPHOCYTES # BLD AUTO: 1.82 K/UL — SIGNIFICANT CHANGE UP (ref 1–3.3)
LYMPHOCYTES # BLD AUTO: 1.82 K/UL — SIGNIFICANT CHANGE UP (ref 1–3.3)
LYMPHOCYTES # BLD AUTO: 9.6 % — LOW (ref 13–44)
LYMPHOCYTES # BLD AUTO: 9.6 % — LOW (ref 13–44)
MAGNESIUM SERPL-MCNC: 1.9 MG/DL — SIGNIFICANT CHANGE UP (ref 1.6–2.6)
MAGNESIUM SERPL-MCNC: 1.9 MG/DL — SIGNIFICANT CHANGE UP (ref 1.6–2.6)
MCHC RBC-ENTMCNC: 30.1 PG — SIGNIFICANT CHANGE UP (ref 27–34)
MCHC RBC-ENTMCNC: 30.1 PG — SIGNIFICANT CHANGE UP (ref 27–34)
MCHC RBC-ENTMCNC: 32.5 GM/DL — SIGNIFICANT CHANGE UP (ref 32–36)
MCHC RBC-ENTMCNC: 32.5 GM/DL — SIGNIFICANT CHANGE UP (ref 32–36)
MCV RBC AUTO: 92.6 FL — SIGNIFICANT CHANGE UP (ref 80–100)
MCV RBC AUTO: 92.6 FL — SIGNIFICANT CHANGE UP (ref 80–100)
MONOCYTES # BLD AUTO: 1.61 K/UL — HIGH (ref 0–0.9)
MONOCYTES # BLD AUTO: 1.61 K/UL — HIGH (ref 0–0.9)
MONOCYTES NFR BLD AUTO: 8.5 % — SIGNIFICANT CHANGE UP (ref 2–14)
MONOCYTES NFR BLD AUTO: 8.5 % — SIGNIFICANT CHANGE UP (ref 2–14)
NEUTROPHILS # BLD AUTO: 15.23 K/UL — HIGH (ref 1.8–7.4)
NEUTROPHILS # BLD AUTO: 15.23 K/UL — HIGH (ref 1.8–7.4)
NEUTROPHILS NFR BLD AUTO: 80.5 % — HIGH (ref 43–77)
NEUTROPHILS NFR BLD AUTO: 80.5 % — HIGH (ref 43–77)
NRBC # BLD: 0 /100 WBCS — SIGNIFICANT CHANGE UP (ref 0–0)
NRBC # BLD: 0 /100 WBCS — SIGNIFICANT CHANGE UP (ref 0–0)
PCO2 BLDV: 42 MMHG — SIGNIFICANT CHANGE UP (ref 39–42)
PCO2 BLDV: 42 MMHG — SIGNIFICANT CHANGE UP (ref 39–42)
PCO2 BLDV: 44 MMHG — HIGH (ref 39–42)
PCO2 BLDV: 47 MMHG — HIGH (ref 39–42)
PCO2 BLDV: 47 MMHG — HIGH (ref 39–42)
PCO2 BLDV: 48 MMHG — HIGH (ref 39–42)
PCO2 BLDV: 48 MMHG — HIGH (ref 39–42)
PCO2 BLDV: 49 MMHG — HIGH (ref 39–42)
PCO2 BLDV: 49 MMHG — HIGH (ref 39–42)
PH BLDV: 7.34 — SIGNIFICANT CHANGE UP (ref 7.32–7.43)
PH BLDV: 7.37 — SIGNIFICANT CHANGE UP (ref 7.32–7.43)
PH BLDV: 7.37 — SIGNIFICANT CHANGE UP (ref 7.32–7.43)
PH BLDV: 7.39 — SIGNIFICANT CHANGE UP (ref 7.32–7.43)
PH BLDV: 7.39 — SIGNIFICANT CHANGE UP (ref 7.32–7.43)
PH BLDV: 7.4 — SIGNIFICANT CHANGE UP (ref 7.32–7.43)
PH BLDV: 7.4 — SIGNIFICANT CHANGE UP (ref 7.32–7.43)
PH BLDV: 7.43 — SIGNIFICANT CHANGE UP (ref 7.32–7.43)
PH BLDV: 7.43 — SIGNIFICANT CHANGE UP (ref 7.32–7.43)
PHOSPHATE SERPL-MCNC: 2.7 MG/DL — SIGNIFICANT CHANGE UP (ref 2.5–4.5)
PHOSPHATE SERPL-MCNC: 2.7 MG/DL — SIGNIFICANT CHANGE UP (ref 2.5–4.5)
PLATELET # BLD AUTO: 82 K/UL — LOW (ref 150–400)
PLATELET # BLD AUTO: 82 K/UL — LOW (ref 150–400)
PO2 BLDV: 38 MMHG — SIGNIFICANT CHANGE UP (ref 25–45)
PO2 BLDV: 38 MMHG — SIGNIFICANT CHANGE UP (ref 25–45)
PO2 BLDV: 40 MMHG — SIGNIFICANT CHANGE UP (ref 25–45)
PO2 BLDV: 40 MMHG — SIGNIFICANT CHANGE UP (ref 25–45)
PO2 BLDV: 41 MMHG — SIGNIFICANT CHANGE UP (ref 25–45)
PO2 BLDV: 41 MMHG — SIGNIFICANT CHANGE UP (ref 25–45)
PO2 BLDV: 42 MMHG — SIGNIFICANT CHANGE UP (ref 25–45)
PO2 BLDV: 42 MMHG — SIGNIFICANT CHANGE UP (ref 25–45)
PO2 BLDV: 44 MMHG — SIGNIFICANT CHANGE UP (ref 25–45)
POTASSIUM BLDV-SCNC: 3.8 MMOL/L — SIGNIFICANT CHANGE UP (ref 3.5–5.1)
POTASSIUM BLDV-SCNC: 3.8 MMOL/L — SIGNIFICANT CHANGE UP (ref 3.5–5.1)
POTASSIUM BLDV-SCNC: 4 MMOL/L — SIGNIFICANT CHANGE UP (ref 3.5–5.1)
POTASSIUM BLDV-SCNC: 4 MMOL/L — SIGNIFICANT CHANGE UP (ref 3.5–5.1)
POTASSIUM BLDV-SCNC: 4.2 MMOL/L — SIGNIFICANT CHANGE UP (ref 3.5–5.1)
POTASSIUM BLDV-SCNC: 4.2 MMOL/L — SIGNIFICANT CHANGE UP (ref 3.5–5.1)
POTASSIUM BLDV-SCNC: 4.5 MMOL/L — SIGNIFICANT CHANGE UP (ref 3.5–5.1)
POTASSIUM BLDV-SCNC: 4.5 MMOL/L — SIGNIFICANT CHANGE UP (ref 3.5–5.1)
POTASSIUM BLDV-SCNC: 4.7 MMOL/L — SIGNIFICANT CHANGE UP (ref 3.5–5.1)
POTASSIUM BLDV-SCNC: 4.7 MMOL/L — SIGNIFICANT CHANGE UP (ref 3.5–5.1)
POTASSIUM SERPL-MCNC: 3.6 MMOL/L — SIGNIFICANT CHANGE UP (ref 3.5–5.3)
POTASSIUM SERPL-MCNC: 3.6 MMOL/L — SIGNIFICANT CHANGE UP (ref 3.5–5.3)
POTASSIUM SERPL-SCNC: 3.6 MMOL/L — SIGNIFICANT CHANGE UP (ref 3.5–5.3)
POTASSIUM SERPL-SCNC: 3.6 MMOL/L — SIGNIFICANT CHANGE UP (ref 3.5–5.3)
PROT SERPL-MCNC: 6.1 G/DL — SIGNIFICANT CHANGE UP (ref 6–8.3)
PROT SERPL-MCNC: 6.1 G/DL — SIGNIFICANT CHANGE UP (ref 6–8.3)
RBC # BLD: 2.99 M/UL — LOW (ref 3.8–5.2)
RBC # BLD: 2.99 M/UL — LOW (ref 3.8–5.2)
RBC # FLD: 16.8 % — HIGH (ref 10.3–14.5)
RBC # FLD: 16.8 % — HIGH (ref 10.3–14.5)
SAO2 % BLDV: 57.9 % — LOW (ref 67–88)
SAO2 % BLDV: 57.9 % — LOW (ref 67–88)
SAO2 % BLDV: 61.8 % — LOW (ref 67–88)
SAO2 % BLDV: 61.8 % — LOW (ref 67–88)
SAO2 % BLDV: 63.5 % — LOW (ref 67–88)
SAO2 % BLDV: 63.5 % — LOW (ref 67–88)
SAO2 % BLDV: 64.2 % — LOW (ref 67–88)
SAO2 % BLDV: 64.2 % — LOW (ref 67–88)
SAO2 % BLDV: 68.7 % — SIGNIFICANT CHANGE UP (ref 67–88)
SAO2 % BLDV: 68.7 % — SIGNIFICANT CHANGE UP (ref 67–88)
SAO2 % BLDV: 69.7 % — SIGNIFICANT CHANGE UP (ref 67–88)
SAO2 % BLDV: 69.7 % — SIGNIFICANT CHANGE UP (ref 67–88)
SODIUM SERPL-SCNC: 136 MMOL/L — SIGNIFICANT CHANGE UP (ref 135–145)
SODIUM SERPL-SCNC: 136 MMOL/L — SIGNIFICANT CHANGE UP (ref 135–145)
WBC # BLD: 18.94 K/UL — HIGH (ref 3.8–10.5)
WBC # BLD: 18.94 K/UL — HIGH (ref 3.8–10.5)
WBC # FLD AUTO: 18.94 K/UL — HIGH (ref 3.8–10.5)
WBC # FLD AUTO: 18.94 K/UL — HIGH (ref 3.8–10.5)

## 2023-11-24 PROCEDURE — 99291 CRITICAL CARE FIRST HOUR: CPT

## 2023-11-24 PROCEDURE — 71045 X-RAY EXAM CHEST 1 VIEW: CPT | Mod: 26

## 2023-11-24 RX ORDER — FUROSEMIDE 40 MG
10 TABLET ORAL ONCE
Refills: 0 | Status: COMPLETED | OUTPATIENT
Start: 2023-11-24 | End: 2023-11-24

## 2023-11-24 RX ORDER — POTASSIUM CHLORIDE 20 MEQ
10 PACKET (EA) ORAL
Refills: 0 | Status: COMPLETED | OUTPATIENT
Start: 2023-11-24 | End: 2023-11-24

## 2023-11-24 RX ORDER — FUROSEMIDE 40 MG
20 TABLET ORAL
Refills: 0 | Status: DISCONTINUED | OUTPATIENT
Start: 2023-11-24 | End: 2023-11-24

## 2023-11-24 RX ORDER — HYDROMORPHONE HYDROCHLORIDE 2 MG/ML
0.25 INJECTION INTRAMUSCULAR; INTRAVENOUS; SUBCUTANEOUS EVERY 6 HOURS
Refills: 0 | Status: DISCONTINUED | OUTPATIENT
Start: 2023-11-24 | End: 2023-11-26

## 2023-11-24 RX ORDER — INSULIN GLARGINE 100 [IU]/ML
33 INJECTION, SOLUTION SUBCUTANEOUS AT BEDTIME
Refills: 0 | Status: DISCONTINUED | OUTPATIENT
Start: 2023-11-24 | End: 2023-11-27

## 2023-11-24 RX ORDER — INSULIN LISPRO 100/ML
7 VIAL (ML) SUBCUTANEOUS
Refills: 0 | Status: DISCONTINUED | OUTPATIENT
Start: 2023-11-24 | End: 2023-11-26

## 2023-11-24 RX ORDER — MAGNESIUM SULFATE 500 MG/ML
2 VIAL (ML) INJECTION ONCE
Refills: 0 | Status: COMPLETED | OUTPATIENT
Start: 2023-11-24 | End: 2023-11-24

## 2023-11-24 RX ORDER — INSULIN HUMAN 100 [IU]/ML
3 INJECTION, SOLUTION SUBCUTANEOUS
Qty: 100 | Refills: 0 | Status: DISCONTINUED | OUTPATIENT
Start: 2023-11-24 | End: 2023-11-25

## 2023-11-24 RX ORDER — FUROSEMIDE 40 MG
40 TABLET ORAL DAILY
Refills: 0 | Status: DISCONTINUED | OUTPATIENT
Start: 2023-11-24 | End: 2023-12-01

## 2023-11-24 RX ORDER — HYDROMORPHONE HYDROCHLORIDE 2 MG/ML
2 INJECTION INTRAMUSCULAR; INTRAVENOUS; SUBCUTANEOUS EVERY 4 HOURS
Refills: 0 | Status: DISCONTINUED | OUTPATIENT
Start: 2023-11-24 | End: 2023-12-01

## 2023-11-24 RX ORDER — ACETAMINOPHEN 500 MG
1000 TABLET ORAL ONCE
Refills: 0 | Status: COMPLETED | OUTPATIENT
Start: 2023-11-24 | End: 2023-11-24

## 2023-11-24 RX ORDER — METOPROLOL TARTRATE 50 MG
12.5 TABLET ORAL
Refills: 0 | Status: DISCONTINUED | OUTPATIENT
Start: 2023-11-25 | End: 2023-11-26

## 2023-11-24 RX ORDER — HYDROMORPHONE HYDROCHLORIDE 2 MG/ML
4 INJECTION INTRAMUSCULAR; INTRAVENOUS; SUBCUTANEOUS EVERY 4 HOURS
Refills: 0 | Status: DISCONTINUED | OUTPATIENT
Start: 2023-11-24 | End: 2023-12-01

## 2023-11-24 RX ADMIN — Medication 10 MILLIGRAM(S): at 05:16

## 2023-11-24 RX ADMIN — Medication 40 MILLIGRAM(S): at 21:04

## 2023-11-24 RX ADMIN — INSULIN GLARGINE 33 UNIT(S): 100 INJECTION, SOLUTION SUBCUTANEOUS at 21:53

## 2023-11-24 RX ADMIN — BUDESONIDE AND FORMOTEROL FUMARATE DIHYDRATE 2 PUFF(S): 160; 4.5 AEROSOL RESPIRATORY (INHALATION) at 06:24

## 2023-11-24 RX ADMIN — Medication 25 GRAM(S): at 02:04

## 2023-11-24 RX ADMIN — Medication 50 MILLIEQUIVALENT(S): at 03:09

## 2023-11-24 RX ADMIN — HYDROMORPHONE HYDROCHLORIDE 2 MILLIGRAM(S): 2 INJECTION INTRAMUSCULAR; INTRAVENOUS; SUBCUTANEOUS at 14:55

## 2023-11-24 RX ADMIN — POLYETHYLENE GLYCOL 3350 17 GRAM(S): 17 POWDER, FOR SOLUTION ORAL at 11:03

## 2023-11-24 RX ADMIN — AMIODARONE HYDROCHLORIDE 400 MILLIGRAM(S): 400 TABLET ORAL at 05:16

## 2023-11-24 RX ADMIN — HYDROMORPHONE HYDROCHLORIDE 2 MILLIGRAM(S): 2 INJECTION INTRAMUSCULAR; INTRAVENOUS; SUBCUTANEOUS at 14:25

## 2023-11-24 RX ADMIN — FAMOTIDINE 20 MILLIGRAM(S): 10 INJECTION INTRAVENOUS at 05:16

## 2023-11-24 RX ADMIN — HYDROMORPHONE HYDROCHLORIDE 30 MILLILITER(S): 2 INJECTION INTRAMUSCULAR; INTRAVENOUS; SUBCUTANEOUS at 07:05

## 2023-11-24 RX ADMIN — FAMOTIDINE 20 MILLIGRAM(S): 10 INJECTION INTRAVENOUS at 17:50

## 2023-11-24 RX ADMIN — Medication 650 MILLIGRAM(S): at 11:04

## 2023-11-24 RX ADMIN — Medication 400 MILLIGRAM(S): at 18:29

## 2023-11-24 RX ADMIN — SENNA PLUS 2 TABLET(S): 8.6 TABLET ORAL at 21:04

## 2023-11-24 RX ADMIN — Medication 1: at 07:25

## 2023-11-24 RX ADMIN — Medication 9 UNIT(S): at 07:25

## 2023-11-24 RX ADMIN — Medication 500 MILLIGRAM(S): at 05:21

## 2023-11-24 RX ADMIN — Medication 3 MILLILITER(S): at 23:19

## 2023-11-24 RX ADMIN — Medication 2.61 MICROGRAM(S)/KG/MIN: at 03:08

## 2023-11-24 RX ADMIN — CHLORHEXIDINE GLUCONATE 1 APPLICATION(S): 213 SOLUTION TOPICAL at 11:03

## 2023-11-24 RX ADMIN — Medication 3 MILLILITER(S): at 17:44

## 2023-11-24 RX ADMIN — Medication 1000 MILLIGRAM(S): at 00:15

## 2023-11-24 RX ADMIN — Medication 650 MILLIGRAM(S): at 12:05

## 2023-11-24 RX ADMIN — GABAPENTIN 300 MILLIGRAM(S): 400 CAPSULE ORAL at 05:16

## 2023-11-24 RX ADMIN — Medication 50 MILLIGRAM(S): at 21:04

## 2023-11-24 RX ADMIN — Medication 2.61 MICROGRAM(S)/KG/MIN: at 03:42

## 2023-11-24 RX ADMIN — HYDROMORPHONE HYDROCHLORIDE 0.25 MILLIGRAM(S): 2 INJECTION INTRAMUSCULAR; INTRAVENOUS; SUBCUTANEOUS at 23:19

## 2023-11-24 RX ADMIN — Medication 81 MILLIGRAM(S): at 11:04

## 2023-11-24 RX ADMIN — GABAPENTIN 300 MILLIGRAM(S): 400 CAPSULE ORAL at 14:30

## 2023-11-24 RX ADMIN — BUDESONIDE AND FORMOTEROL FUMARATE DIHYDRATE 2 PUFF(S): 160; 4.5 AEROSOL RESPIRATORY (INHALATION) at 17:50

## 2023-11-24 RX ADMIN — Medication 650 MILLIGRAM(S): at 05:15

## 2023-11-24 RX ADMIN — Medication 400 MILLIGRAM(S): at 00:00

## 2023-11-24 RX ADMIN — HYDROMORPHONE HYDROCHLORIDE 4 MILLIGRAM(S): 2 INJECTION INTRAMUSCULAR; INTRAVENOUS; SUBCUTANEOUS at 21:05

## 2023-11-24 RX ADMIN — Medication 3 MILLILITER(S): at 12:12

## 2023-11-24 RX ADMIN — GABAPENTIN 300 MILLIGRAM(S): 400 CAPSULE ORAL at 21:03

## 2023-11-24 RX ADMIN — OLANZAPINE 2.5 MILLIGRAM(S): 15 TABLET, FILM COATED ORAL at 11:04

## 2023-11-24 RX ADMIN — Medication 10 MILLIGRAM(S): at 03:42

## 2023-11-24 RX ADMIN — HYDROMORPHONE HYDROCHLORIDE 0.25 MILLIGRAM(S): 2 INJECTION INTRAMUSCULAR; INTRAVENOUS; SUBCUTANEOUS at 23:45

## 2023-11-24 RX ADMIN — Medication 20 MILLIGRAM(S): at 10:58

## 2023-11-24 RX ADMIN — ATORVASTATIN CALCIUM 80 MILLIGRAM(S): 80 TABLET, FILM COATED ORAL at 21:03

## 2023-11-24 RX ADMIN — Medication 50 MILLIEQUIVALENT(S): at 04:21

## 2023-11-24 RX ADMIN — HYDROMORPHONE HYDROCHLORIDE 4 MILLIGRAM(S): 2 INJECTION INTRAMUSCULAR; INTRAVENOUS; SUBCUTANEOUS at 21:30

## 2023-11-24 RX ADMIN — Medication 3 MILLILITER(S): at 05:58

## 2023-11-24 RX ADMIN — Medication 650 MILLIGRAM(S): at 05:45

## 2023-11-24 RX ADMIN — Medication 50 MILLIEQUIVALENT(S): at 02:04

## 2023-11-24 RX ADMIN — Medication 500 MILLIGRAM(S): at 17:45

## 2023-11-24 RX ADMIN — Medication 1000 MILLIGRAM(S): at 19:00

## 2023-11-24 NOTE — PROGRESS NOTE ADULT - SUBJECTIVE AND OBJECTIVE BOX
Patient transferred to SDU from CTU. POD#3.     SUBJECTIVE: "I'm okay." Patient denies any acute chest pain, palpitation, shortness of breath, abdominal pain, n/v, fever, chills, or dizziness at this time.    VITAL SIGNS:  Vital Signs Last 24 Hrs  T(C): 36.1 (23 @ 12:00), Max: 37.3 (23 @ 20:00)  T(F): 97 (23 @ 12:00), Max: 99.1 (23 @ 20:00)  HR: 98 (23 @ 13:14) (80 - 102)  BP: 113/66 (23 @ 13:14) (99/59 - 143/77)  RR: 18 (23 @ 13:14) (13 - 31)  SpO2: 95% (23 @ 13:14) (93% - 100%)           INPUT/OUTPUT:     @ 07:01  -   @ 07:00  --------------------------------------------------------  IN: 2069.2 mL / OUT: 3610 mL / NET: -1540.8 mL     @ 07:01  -   @ 14:57  --------------------------------------------------------  IN: 550 mL / OUT: 655 mL / NET: -105 mL        LABS:      136  |  99  |  16  ----------------------------<  142<H>  3.6   |  26  |  0.55    Ca    8.4      2023 00:28  Phos  2.7       Mg     1.9         TPro  6.1  /  Alb  3.8  /  TBili  2.2<H>  /  DBili  x   /  AST  18  /  ALT  18  /  AlkPhos  59  11-24               9.0    18.94 )-----------( 82       ( 2023 00:29 )             27.7              Daily Weight in k.7 (2023 00:00)      CAPILLARY BLOOD GLUCOSE  POCT Blood Glucose.: 106 mg/dL (2023 14:18)  POCT Blood Glucose.: 170 mg/dL (2023 13:03)  POCT Blood Glucose.: 216 mg/dL (2023 12:04)  POCT Blood Glucose.: 278 mg/dL (2023 11:05)  POCT Blood Glucose.: 162 mg/dL (2023 06:35)  POCT Blood Glucose.: 152 mg/dL (2023 03:05)  POCT Blood Glucose.: 146 mg/dL (2023 23:56)  POCT Blood Glucose.: 129 mg/dL (2023 23:12)  POCT Blood Glucose.: 99 mg/dL (2023 21:57)  POCT Blood Glucose.: 102 mg/dL (2023 20:52)  POCT Blood Glucose.: 163 mg/dL (2023 19:54)  POCT Blood Glucose.: 160 mg/dL (2023 18:46)  POCT Blood Glucose.: 187 mg/dL (2023 17:47)  POCT Blood Glucose.: 162 mg/dL (2023 17:03)  POCT Blood Glucose.: 190 mg/dL (2023 16:26)  POCT Blood Glucose.: 174 mg/dL (2023 15:06)            PHYSICAL EXAM:  GEN: NAD, sitting in chair  NEURO: AAOx3, residual left-sided weakness (reports from  CVA).  CVS: s1, s2  Sternal Wound: (+) Prevena dressing, C/D/I; sternum stable. (+) PW => EPM (40/10/0.8)  RESP: Breath sounds equal bilaterally, non-labored breathing, on NC  GI: abdomen soft, +BS in all four quadrants, +flatus  EXT/MSK: +residual L-sided weakness, peripheral pulses intact, +edema b/l LE, (+) RIJ with occlusive dressing, c/d/i      ACTIVE MEDICATIONS:  acetaminophen     Tablet .. 650 milliGRAM(s) Oral every 6 hours PRN  albuterol/ipratropium for Nebulization 3 milliLiter(s) Nebulizer every 6 hours  ascorbic acid 500 milliGRAM(s) Oral two times a day  aspirin enteric coated 81 milliGRAM(s) Oral daily  atorvastatin 80 milliGRAM(s) Oral at bedtime  bisacodyl Suppository 10 milliGRAM(s) Rectal once  budesonide 160 MICROgram(s)/formoterol 4.5 MICROgram(s) Inhaler 2 Puff(s) Inhalation two times a day  chlorhexidine 2% Cloths 1 Application(s) Topical daily  dextrose 50% Injectable 50 milliLiter(s) IV Push every 15 minutes  dextrose 50% Injectable 25 milliLiter(s) IV Push every 15 minutes  dextrose Oral Gel 15 Gram(s) Oral once  famotidine    Tablet 20 milliGRAM(s) Oral two times a day  furosemide   Injectable 20 milliGRAM(s) IV Push two times a day  gabapentin 300 milliGRAM(s) Oral every 8 hours  glucagon  Injectable 1 milliGRAM(s) IntraMuscular once  HYDROmorphone   Tablet 2 milliGRAM(s) Oral every 4 hours PRN  HYDROmorphone PCA (1 mG/mL) 30 milliLiter(s) PCA Continuous PCA Continuous  HYDROmorphone PCA (1 mG/mL) Rescue Clinician Bolus 0.5 milliGRAM(s) IV Push every 15 minutes PRN  insulin glargine Injectable (LANTUS) 33 Unit(s) SubCutaneous at bedtime  insulin lispro (ADMELOG) corrective regimen sliding scale   SubCutaneous three times a day before meals  insulin lispro (ADMELOG) corrective regimen sliding scale   SubCutaneous at bedtime  insulin lispro Injectable (ADMELOG) 7 Unit(s) SubCutaneous three times a day before meals  insulin regular Infusion 3 Unit(s)/Hr IV Continuous <Continuous>  OLANZapine 2.5 milliGRAM(s) Oral daily  polyethylene glycol 3350 17 Gram(s) Oral daily  predniSONE   Tablet 10 milliGRAM(s) Oral daily  senna 2 Tablet(s) Oral at bedtime  sodium chloride 0.9%. 1000 milliLiter(s) IV Continuous <Continuous>  traZODone 50 milliGRAM(s) Oral at bedtime      Case discussed in detail with Cardiothoracic Team and Attending Dr. Loaiza. Plan below as per discussion.

## 2023-11-24 NOTE — PROGRESS NOTE ADULT - SUBJECTIVE AND OBJECTIVE BOX
DATE OF SERVICE: 11-24-23 @ 14:06    Patient is a 58y old  Female who presents with a chief complaint of Atherosclerosis of native coronary artery without angina pectoris    Per chart, patient is a 59 y/o female with PMH including h/o CVA (w/ L sided weakness/numbness/L gave deviation), T2DM w/ b/l peripheral neuropathy, COPD/asthma, h/o breast cancer (s/p ?RT), bipolar depression, rheumatoid arthritis, antiphospholipid syndrome, L eye uveitis/scleritis. Patient presents to [facility] as a transfer from [facility] for CTS evaluation. S/p CABG, AV replacement 11/21. (22 Nov 2023 15:15)      INTERVAL HISTORY: Feels ok. Now downgraded to 2COHEN. Seen OOB to chair.     REVIEW OF SYSTEMS:  CONSTITUTIONAL: No weakness  EYES/ENT: No visual changes;  No throat pain   NECK: No pain or stiffness  RESPIRATORY: No cough, wheezing; No shortness of breath  CARDIOVASCULAR: No chest pain or palpitations  GASTROINTESTINAL: No abdominal  pain. No nausea, vomiting, or hematemesis  GENITOURINARY: No dysuria, frequency or hematuria  NEUROLOGICAL: No stroke like symptoms  SKIN: No rashes    TELEMETRY Personally reviewed: SR   	  MEDICATIONS:  furosemide   Injectable 20 milliGRAM(s) IV Push two times a day        PHYSICAL EXAM:  T(C): 36.1 (11-24-23 @ 12:00), Max: 37.3 (11-23-23 @ 20:00)  HR: 98 (11-24-23 @ 13:14) (80 - 102)  BP: 113/66 (11-24-23 @ 13:14) (99/59 - 143/77)  RR: 18 (11-24-23 @ 13:14) (13 - 31)  SpO2: 95% (11-24-23 @ 13:14) (93% - 100%)  Wt(kg): --  I&O's Summary    23 Nov 2023 07:01  -  24 Nov 2023 07:00  --------------------------------------------------------  IN: 2069.2 mL / OUT: 3610 mL / NET: -1540.8 mL    24 Nov 2023 07:01  -  24 Nov 2023 14:06  --------------------------------------------------------  IN: 550 mL / OUT: 655 mL / NET: -105 mL          Appearance: In no distress	  HEENT:    PERRL, EOMI	  Cardiovascular:  S1 S2, No JVD  Respiratory: Lungs clear to auscultation	  Gastrointestinal:  Soft, Non-tender, + BS	  Vascularature:  No edema of LE  Psychiatric: Appropriate affect   Neuro: no acute focal deficits                               9.0    18.94 )-----------( 82       ( 24 Nov 2023 00:29 )             27.7     11-24    136  |  99  |  16  ----------------------------<  142<H>  3.6   |  26  |  0.55    Ca    8.4      24 Nov 2023 00:28  Phos  2.7     11-24  Mg     1.9     11-24    TPro  6.1  /  Alb  3.8  /  TBili  2.2<H>  /  DBili  x   /  AST  18  /  ALT  18  /  AlkPhos  59  11-24        Labs personally reviewed      ASSESSMENT/PLAN: 	    58F with history of CVA with L sided weakness/numbness/L gaze deviation, DM2 with b/l peripheral neuropathy, COPD/Asthma, Breast Ca s/p ?RT, Bipolar depression, Rheumatoid Arthritis, Antiphospholipid syndrome, L eye uveitis/scleritis, Newly diagnosed DM2 (pt cannot remember which medication she takes for it), and current tobacco use who presents to the hospital with multiple complaints.     1. LV dysfunction  -new mod-severe segmental LV dysfunction in July has not had ischemic workup due to stroke at that time.  -c/w metoprolol and aldactone  -TTE EF 30% now with mod-severe AR  -c/w lasix 40mg PO Daily   -cath shows LAD prox 100% fills via right to left collateral   -CMR 11/17: Mild thinning and hypokinesis of the mid to apical anterior and  anteroseptal segments.  The left ventricular ejection fraction measures  43%.Findings consistent with ischemic cardiomyopathy.  LESLEY with sever AR   - s/p AVR     2. CVA  -history of multiple CVAs and is on eliquis   -CT scan of head negative for any acute findings  -ILR interrogation with no events  -per neuro no plans for MRI       3. Antiphospholipid syndrome  - Resume AC as per CTS          DANA Vieyra DO [facility]  Cardiovascular Medicine  [address], [address]  Available through call or text on [employer] TEAMs  Office: [phone]

## 2023-11-24 NOTE — PROGRESS NOTE ADULT - PROBLEM SELECTOR PLAN 2
- Continue with ASA 81mg qD, Atorvastatin 80mg qHS   - Patient stopped dobutamine gtt this AM. Plan to start Lopressor 12.5 BID starting tomorrow AM (11/25 AM)  - (+) PW => EPW (40/10/0.8)  - transitioned IV Lasix => PO Lasix 40mg daily today, per cardiology  - (+) Pleitez  - monitor I/Os  - Increase activity as tolerated.   - Off Dilaudid PCA pump. Pain mgmt following. On PO Dilaudid PRN, Gabapentin 300mg q8HR, Tylenol PRN per pain management team.  - continue Bowel regimen  - Encourage Chest PT / Pulmonary toileting and Incentive spirometry every 1 hour x 10 while awake.   - Continue with PUD and DVT prophylaxis.   - Shower on POD #5.   Plan of care discussed with attending

## 2023-11-24 NOTE — PROGRESS NOTE ADULT - PROBLEM SELECTOR PLAN 1
Continue with  mg PO Daily.   Continue with Lopressor mg PO.   Continue with Lipitor mg PO HS    Increase activity as tolerated.   Encourage Chest PT / Pulmonary toileting and Incentive spirometry every 1 hour x 10 while awake.   Continue with PUD and DVT prophylaxis.   Shower on POD #5.   D/C plan [ ] once medically cleared   Plan of care discussed with attending - Continue with ASA 81mg qD, Atorvastatin 80mg qHS,   - Patient stopped dobutamine gtt this AM. Plan to start Lopressor 12.5 BID starting tomorrow AM (11/25 AM)  - (+) PW => EPW (40/10/0.8)  - transitioned IV Lasix => PO Lasix 40mg daily today, per cardiology  - (+) Pleitez  - monitor I/Os  - Increase activity as tolerated.   - Off Dilaudid PCA pump. Pain mgmt following. On PO Dilaudid PRN, Gabapentin 300mg q8HR, Tylenol PRN per pain management team.  - continue Bowel regimen  - Encourage Chest PT / Pulmonary toileting and Incentive spirometry every 1 hour x 10 while awake.   - Continue with PUD and DVT prophylaxis.   - Shower on POD #5.   Plan of care discussed with attending

## 2023-11-24 NOTE — PROGRESS NOTE ADULT - ASSESSMENT
58F smoker with stroke x 2 (2022 and july 2023 with resid  L sided weakness/numbness  DM2 with b/l peripheral neuropathy, COPD/Asthma, Breast Ca s/p, Bipolar depression, Rheumatoid Arthritis, Antiphospholipid syndrome, and L eye uveitis/scleritis, Presents to [facility] transferred from [facility]. p/w multiple medical complaints and exacerbation of L-sided subjective weakness/numbness likely 2/2 recrudescence of prior Right BG infarct. Cardiology following for new LV dysfunction since July.Patient now transferred to CTS Dr. Loaiza for evaluation. neuro called for prior strokes   LESLEY was done showing severe Aortic Insufficiency and moderated MR.   Cardiac cath was done showing LAD prox 100% fills via right to left collateral.   CTH with tinght chronic appearing R BG infarct   TTE EF 30%   CD neg   A1c 7.6   LDL 37   ILR interrogated no events  NIHSS 3  premrs3   cath shows LAD prox 100% fills via right to left collateral will need AVR and LIMA to LAD bypass  o/e with mild L facial and decrease sensation on L with mild 5-/5 weakness on L with slow FFM ; dysconjugate gaze   s/p OR 11/21  extubated  post op no neuro changes   c/o HA.     Imprsesion:   prior nowe chronic appearing infarcts. R BG with residual L HP  DM peripheral neuropathy      - for secondary stroke prevention on asa 81mg and high dose statin.  was also on full dose lovenox given APLS; would resume when able or place on heparin drip   - gabapentin 300mg TID for neuropathy   - f/u rheum and heme/onc   - low risk for cardiac surgery from neuro/stroke standpoint   - telemetry  - PT/OT   - check FS, glucose control <180  - GI/DVT ppx   - Thank you for allowing me to participate in the care of this patient. Call with questions.   - spoke [facility] CTS team/ACP   Charles Crespo MD  Vascular Neurology  Office: [phone]

## 2023-11-24 NOTE — PROGRESS NOTE ADULT - SUBJECTIVE AND OBJECTIVE BOX
Day 2 of Anesthesia Pain Management Service    SUBJECTIVE: Patient is doing well with IV PCA    Pain Scale Score:	[X] Refer to charted pain scores    THERAPY:    [ ] IV PCA Morphine		[ ] 5 mg/mL	[ ] 1 mg/mL  [X] IV PCA Hydromorphone	[ ] 5 mg/mL	[X] 1 mg/mL  [ ] IV PCA Fentanyl		[ ] 50 micrograms/mL    Demand dose: 0.2 mg     Lockout: 6 minutes   Continuous Rate: 0 mg/hr  4 Hour Limit: 4 mg    MEDICATIONS  (STANDING):  albuterol/ipratropium for Nebulization 3 milliLiter(s) Nebulizer every 6 hours  ascorbic acid 500 milliGRAM(s) Oral two times a day  aspirin enteric coated 81 milliGRAM(s) Oral daily  atorvastatin 80 milliGRAM(s) Oral at bedtime  bisacodyl Suppository 10 milliGRAM(s) Rectal once  budesonide 160 MICROgram(s)/formoterol 4.5 MICROgram(s) Inhaler 2 Puff(s) Inhalation two times a day  chlorhexidine 2% Cloths 1 Application(s) Topical daily  dextrose 50% Injectable 50 milliLiter(s) IV Push every 15 minutes  dextrose 50% Injectable 25 milliLiter(s) IV Push every 15 minutes  dextrose Oral Gel 15 Gram(s) Oral once  DOBUTamine Infusion 1 MICROgram(s)/kG/Min (2.61 mL/Hr) IV Continuous <Continuous>  famotidine    Tablet 20 milliGRAM(s) Oral two times a day  furosemide   Injectable 20 milliGRAM(s) IV Push two times a day  gabapentin 300 milliGRAM(s) Oral every 8 hours  glucagon  Injectable 1 milliGRAM(s) IntraMuscular once  HYDROmorphone PCA (1 mG/mL) 30 milliLiter(s) PCA Continuous PCA Continuous  insulin glargine Injectable (LANTUS) 33 Unit(s) SubCutaneous at bedtime  insulin lispro (ADMELOG) corrective regimen sliding scale   SubCutaneous three times a day before meals  insulin lispro (ADMELOG) corrective regimen sliding scale   SubCutaneous at bedtime  insulin lispro Injectable (ADMELOG) 7 Unit(s) SubCutaneous three times a day before meals  OLANZapine 2.5 milliGRAM(s) Oral daily  polyethylene glycol 3350 17 Gram(s) Oral daily  predniSONE   Tablet 10 milliGRAM(s) Oral daily  senna 2 Tablet(s) Oral at bedtime  sodium chloride 0.9%. 1000 milliLiter(s) (10 mL/Hr) IV Continuous <Continuous>  traZODone 50 milliGRAM(s) Oral at bedtime    MEDICATIONS  (PRN):  acetaminophen     Tablet .. 650 milliGRAM(s) Oral every 6 hours PRN Mild Pain (1 - 3)  HYDROmorphone PCA (1 mG/mL) Rescue Clinician Bolus 0.5 milliGRAM(s) IV Push every 15 minutes PRN for Pain Scale GREATER THAN 6  naloxone Injectable 0.1 milliGRAM(s) IV Push every 3 minutes PRN For ANY of the following changes in patient status:  A. RR LESS THAN 10 breaths per minute, B. Oxygen saturation LESS THAN 90%, C. Sedation score of 6  ondansetron Injectable 4 milliGRAM(s) IV Push every 6 hours PRN Nausea      OBJECTIVE:    Sedation Score:	[ X] Alert	[ ] Drowsy 	[ ] Arousable	[ ] Asleep	[ ] Unresponsive    Side Effects:	[X ] None	[ ] Nausea	[ ] Vomiting	[ ] Pruritus  		[ ] Other:    Vital Signs Last 24 Hrs  T(C): 36.9 (24 Nov 2023 08:00), Max: 37.3 (23 Nov 2023 20:00)  T(F): 98.4 (24 Nov 2023 08:00), Max: 99.1 (23 Nov 2023 20:00)  HR: 96 (24 Nov 2023 10:00) (80 - 99)  BP: 117/71 (24 Nov 2023 10:00) (97/66 - 143/77)  BP(mean): 95 (24 Nov 2023 10:00) (72 - 103)  RR: 19 (24 Nov 2023 10:00) (13 - 31)  SpO2: 94% (24 Nov 2023 10:00) (93% - 100%)    Parameters below as of 24 Nov 2023 09:25  Patient On (Oxygen Delivery Method): nasal cannula  O2 Flow (L/min): 5      ASSESSMENT/ PLAN    Therapy to  be:               [X] Continued   [ ] Discontinued   [ ] Changed to PRN Analgesics    Documentation and Verification of current medications:   [X] Done	[ ] Not done, not eligible    Comments:

## 2023-11-24 NOTE — PROGRESS NOTE ADULT - SUBJECTIVE AND OBJECTIVE BOX
CRITICAL CARE ATTENDING - CTICU    MEDICATIONS  (STANDING):  acetaminophen     Tablet .. 650 milliGRAM(s) Oral every 6 hours  albuterol/ipratropium for Nebulization 3 milliLiter(s) Nebulizer every 6 hours  ascorbic acid 500 milliGRAM(s) Oral two times a day  aspirin enteric coated 81 milliGRAM(s) Oral daily  atorvastatin 80 milliGRAM(s) Oral at bedtime  bisacodyl Suppository 10 milliGRAM(s) Rectal once  budesonide 160 MICROgram(s)/formoterol 4.5 MICROgram(s) Inhaler 2 Puff(s) Inhalation two times a day  chlorhexidine 2% Cloths 1 Application(s) Topical daily  dextrose 50% Injectable 50 milliLiter(s) IV Push every 15 minutes  dextrose 50% Injectable 25 milliLiter(s) IV Push every 15 minutes  dextrose Oral Gel 15 Gram(s) Oral once  DOBUTamine Infusion 1 MICROgram(s)/kG/Min (2.61 mL/Hr) IV Continuous <Continuous>  famotidine    Tablet 20 milliGRAM(s) Oral two times a day  gabapentin 300 milliGRAM(s) Oral every 8 hours  glucagon  Injectable 1 milliGRAM(s) IntraMuscular once  HYDROmorphone PCA (1 mG/mL) 30 milliLiter(s) PCA Continuous PCA Continuous  insulin glargine Injectable (LANTUS) 38 Unit(s) SubCutaneous at bedtime  insulin lispro (ADMELOG) corrective regimen sliding scale   SubCutaneous at bedtime  insulin lispro (ADMELOG) corrective regimen sliding scale   SubCutaneous three times a day before meals  insulin lispro Injectable (ADMELOG) 9 Unit(s) SubCutaneous three times a day before meals  OLANZapine 2.5 milliGRAM(s) Oral daily  polyethylene glycol 3350 17 Gram(s) Oral daily  predniSONE   Tablet 10 milliGRAM(s) Oral daily  senna 2 Tablet(s) Oral at bedtime  sodium chloride 0.9%. 1000 milliLiter(s) (10 mL/Hr) IV Continuous <Continuous>  traZODone 50 milliGRAM(s) Oral at bedtime                                    9.0    18.94 )-----------( 82       ( 2023 00:29 )             27.7       11-24    136  |  99  |  16  ----------------------------<  142<H>  3.6   |  26  |  0.55    Ca    8.4      2023 00:28  Phos  2.7       Mg     1.9         TPro  6.1  /  Alb  3.8  /  TBili  2.2<H>  /  DBili  x   /  AST  18  /  ALT  18  /  AlkPhos  59                Daily     Daily Weight in k.7 (2023 00:00)       @ 07:  -   @ 07:00  --------------------------------------------------------  IN: 1461.7 mL / OUT: 3445 mL / NET: -1983.3 mL     @ 07:01  -   @ 05:46  --------------------------------------------------------  IN: 1954 mL / OUT: 3410 mL / NET: -1456 mL          Critically Ill patient  : [ ] preoperative ,   [ x] post operative    Requires :  [ x] Arterial Line   [x ] Central Line  [x ] PA catheter  [ ] IABP  [ ] ECMO  [ ] LVAD  [ ] Ventilator  [x ] pacemaker - TPM  [ ] Impella.                         [ x] ABG's     [x ] Pulse Oxymetry Monitoring  Bedside evaluation , monitoring , treatment of hemodynamics , fluids , IVP/ IVCD meds.        Diagnosis:     Anterior Wall MI     POD 3 - AVR / CABG X 1 L / LAAL -     Post op bleeding    h/o CVA's in past - L -  Hemiparesis    Hypotension     Hypovolemia     COPD            - on Chronic  Steroids    Chest Tube Drainage / Management     Requires chest PT, pulmonary toilet, suctioning to maintain SaO2,  patent airway and treat atelectasis.     Bethany Floyd catheter interpretation and therapeutic management of unstable hemodynamics     Respiratory insuffiencey    Temporary pacemaker (TPM) interrogation and setting.     CHF- acute [x ]   chronic [x ]    systolic [x ]   diastolic [ ]  Valvular [x ]          - Echo- EF -  35% / AI            [ ] RV dysfunction          - Cxr-cardiomegally, edema          - Clinical-  [ x]inotropes   [ ]pressors - on/off   [ ]diuresis   [ ]IABP   [ ]ECMO   [ ]LVAD   [x ] Respiratory insuffiencey     Hemodynamic lability,  instability. Requires IVCD [ ] vasopressors [ x] inotropes  [ ] vasodilator  [x ]IVSS fluid / blood products  to maintain MAP, perfusion, C.I.     DM - IVCD Insulin transitioned to sliding scale/ Lantus     Fluid Overload     Thrombocytopenia    Requires bedside physical therapy, mobilization and total group home care.     Obesity OHS / SARAH               I, Sivakumar Pollard, personally performed the services described in this documentation. All medical record entries made by the scribe were at my direction and in my presence. I have reviewed the chart and agree that the record reflects my personal performance and is accurate and complete.   Sivakumar Pollard MD.       By signing my name below, I, Claus Barba, attest that this documentation has been prepared under the direction and in the presence of Sivakumar Pollard MD.   Electronically Signed: Bucky Fuentes 23 @ 05:46        Discussed with CT surgeon, Physician Assistant - Nurse Practitioner- Critical care medicine team.   Dicussed at  AM / PM rounds.   Chart, labs , films reviewed.    Cumulative Critical Care Time Given Today:  CRITICAL CARE ATTENDING - CTICU    MEDICATIONS  (STANDING):  acetaminophen     Tablet .. 650 milliGRAM(s) Oral every 6 hours  albuterol/ipratropium for Nebulization 3 milliLiter(s) Nebulizer every 6 hours  ascorbic acid 500 milliGRAM(s) Oral two times a day  aspirin enteric coated 81 milliGRAM(s) Oral daily  atorvastatin 80 milliGRAM(s) Oral at bedtime  bisacodyl Suppository 10 milliGRAM(s) Rectal once  budesonide 160 MICROgram(s)/formoterol 4.5 MICROgram(s) Inhaler 2 Puff(s) Inhalation two times a day  chlorhexidine 2% Cloths 1 Application(s) Topical daily  dextrose 50% Injectable 50 milliLiter(s) IV Push every 15 minutes  dextrose 50% Injectable 25 milliLiter(s) IV Push every 15 minutes  dextrose Oral Gel 15 Gram(s) Oral once  DOBUTamine Infusion 1 MICROgram(s)/kG/Min (2.61 mL/Hr) IV Continuous <Continuous>  famotidine    Tablet 20 milliGRAM(s) Oral two times a day  gabapentin 300 milliGRAM(s) Oral every 8 hours  glucagon  Injectable 1 milliGRAM(s) IntraMuscular once  HYDROmorphone PCA (1 mG/mL) 30 milliLiter(s) PCA Continuous PCA Continuous  insulin glargine Injectable (LANTUS) 38 Unit(s) SubCutaneous at bedtime  insulin lispro (ADMELOG) corrective regimen sliding scale   SubCutaneous at bedtime  insulin lispro (ADMELOG) corrective regimen sliding scale   SubCutaneous three times a day before meals  insulin lispro Injectable (ADMELOG) 9 Unit(s) SubCutaneous three times a day before meals  OLANZapine 2.5 milliGRAM(s) Oral daily  polyethylene glycol 3350 17 Gram(s) Oral daily  predniSONE   Tablet 10 milliGRAM(s) Oral daily  senna 2 Tablet(s) Oral at bedtime  sodium chloride 0.9%. 1000 milliLiter(s) (10 mL/Hr) IV Continuous <Continuous>  traZODone 50 milliGRAM(s) Oral at bedtime                                    9.0    18.94 )-----------( 82       ( 2023 00:29 )             27.7       11-24    136  |  99  |  16  ----------------------------<  142<H>  3.6   |  26  |  0.55    Ca    8.4      2023 00:28  Phos  2.7       Mg     1.9         TPro  6.1  /  Alb  3.8  /  TBili  2.2<H>  /  DBili  x   /  AST  18  /  ALT  18  /  AlkPhos  59                Daily     Daily Weight in k.7 (2023 00:00)       @ 07:  -   @ 07:00  --------------------------------------------------------  IN: 1461.7 mL / OUT: 3445 mL / NET: -1983.3 mL     @ 07:01  -   @ 05:46  --------------------------------------------------------  IN: 1954 mL / OUT: 3410 mL / NET: -1456 mL          Critically Ill patient  : [ ] preoperative ,   [ x] post operative    Requires :  [ x] Arterial Line   [x ] Central Line  [x ] PA catheter  [ ] IABP  [ ] ECMO  [ ] LVAD  [ ] Ventilator  [x ] pacemaker - TPM  [ ] Impella.                         [ x] ABG's     [x ] Pulse Oxymetry Monitoring  Bedside evaluation , monitoring , treatment of hemodynamics , fluids , IVP/ IVCD meds.        Diagnosis:     Anterior Wall MI     POD 3 - AVR / CABG X 1 L / LAAL -     Post op bleeding    h/o CVA's in past - L -  Hemiparesis    Hypotension     Hypovolemia     COPD          - on Chronic  Steroids  RA    Chest Tube Drainage / Management     Requires chest PT, pulmonary toilet, suctioning to maintain SaO2,  patent airway and treat atelectasis.     [place]Crory Floyd catheter interpretation and therapeutic management of unstable hemodynamics     Respiratory insuffiencey    Temporary pacemaker (TPM) interrogation and setting.     CHF- acute [x ]   chronic [x ]    systolic [x ]   diastolic [ ]  Valvular [x ]          - Echo- EF -  35% / AI            [ ] RV dysfunction          - Cxr-cardiomegally, edema          - Clinical-  [ x]inotropes   [ ]pressors - on/off   [ ]diuresis   [ ]IABP   [ ]ECMO   [ ]LVAD   [x ] Respiratory insuffiencey     Hemodynamic lability,  instability. Requires IVCD [ ] vasopressors [ x] inotropes  [ ] vasodilator  [x ]IVSS fluid / blood products  to maintain MAP, perfusion, C.I.     DM - IVCD Insulin transitioned to sliding scale/ Lantus     Fluid Overload     Thrombocytopenia    Requires bedside physical therapy, mobilization and total jail care.     Obesity OHS / SARAH               I, Sivakumar Pollard, personally performed the services described in this documentation. All medical record entries made by the scribe were at my direction and in my presence. I have reviewed the chart and agree that the record reflects my personal performance and is accurate and complete.   Sivakumar Pollard MD.       By signing my name below, I, Claus Barba, attest that this documentation has been prepared under the direction and in the presence of Sivakumar Pollard MD.   Electronically Signed: Bucky Fuentes 23 @ 05:46        Discussed with CT surgeon, Physician Assistant - Nurse Practitioner- Critical care medicine team.   Dicussed at  AM / PM rounds.   Chart, labs , films reviewed.    Cumulative Critical Care Time Given Today:  30 min

## 2023-11-24 NOTE — PROGRESS NOTE ADULT - SUBJECTIVE AND OBJECTIVE BOX
Chief complaint  Patient is a 58y old  Female who presents with a chief complaint of Atherosclerosis of native coronary artery without angina pectoris    Per chart, patient is a 57 y/o female with PMH including h/o CVA (w/ L sided weakness/numbness/L gave deviation), T2DM w/ b/l peripheral neuropathy, COPD/asthma, h/o breast cancer (s/p ?RT), bipolar depression, rheumatoid arthritis, antiphospholipid syndrome, L eye uveitis/scleritis. Patient presents to [facility] as a transfer from [facility] for CTS evaluation. S/p CABG, AV replacement 11/21. (22 Nov 2023 15:15)         Labs and Fingersticks  CAPILLARY BLOOD GLUCOSE      POCT Blood Glucose.: 170 mg/dL (24 Nov 2023 13:03)  POCT Blood Glucose.: 216 mg/dL (24 Nov 2023 12:04)  POCT Blood Glucose.: 278 mg/dL (24 Nov 2023 11:05)  POCT Blood Glucose.: 162 mg/dL (24 Nov 2023 06:35)  POCT Blood Glucose.: 152 mg/dL (24 Nov 2023 03:05)  POCT Blood Glucose.: 146 mg/dL (23 Nov 2023 23:56)  POCT Blood Glucose.: 129 mg/dL (23 Nov 2023 23:12)  POCT Blood Glucose.: 99 mg/dL (23 Nov 2023 21:57)  POCT Blood Glucose.: 102 mg/dL (23 Nov 2023 20:52)  POCT Blood Glucose.: 163 mg/dL (23 Nov 2023 19:54)  POCT Blood Glucose.: 160 mg/dL (23 Nov 2023 18:46)  POCT Blood Glucose.: 187 mg/dL (23 Nov 2023 17:47)  POCT Blood Glucose.: 162 mg/dL (23 Nov 2023 17:03)  POCT Blood Glucose.: 190 mg/dL (23 Nov 2023 16:26)  POCT Blood Glucose.: 174 mg/dL (23 Nov 2023 15:06)  POCT Blood Glucose.: 137 mg/dL (23 Nov 2023 14:14)      Anion Gap: 11 (11-24 @ 00:28)  Anion Gap: 11 (11-23 @ 00:24)      Calcium: 8.4 (11-24 @ 00:28)  Calcium: 8.5 (11-23 @ 00:24)  Albumin: 3.8 (11-24 @ 00:28)  Albumin: 4.1 (11-23 @ 00:24)    Alanine Aminotransferase (ALT/SGPT): 18 (11-24 @ 00:28)  Alanine Aminotransferase (ALT/SGPT): 18 (11-23 @ 00:24)  Alkaline Phosphatase: 59 (11-24 @ 00:28)  Alkaline Phosphatase: 50 (11-23 @ 00:24)  Aspartate Aminotransferase (AST/SGOT): 18 (11-24 @ 00:28)  Aspartate Aminotransferase (AST/SGOT): 26 (11-23 @ 00:24)        11-24    136  |  99  |  16  ----------------------------<  142<H>  3.6   |  26  |  0.55    Ca    8.4      24 Nov 2023 00:28  Phos  2.7     11-24  Mg     1.9     11-24    TPro  6.1  /  Alb  3.8  /  TBili  2.2<H>  /  DBili  x   /  AST  18  /  ALT  18  /  AlkPhos  59  11-24                        9.0    18.94 )-----------( 82       ( 24 Nov 2023 00:29 )             27.7     Medications  MEDICATIONS  (STANDING):  albuterol/ipratropium for Nebulization 3 milliLiter(s) Nebulizer every 6 hours  ascorbic acid 500 milliGRAM(s) Oral two times a day  aspirin enteric coated 81 milliGRAM(s) Oral daily  atorvastatin 80 milliGRAM(s) Oral at bedtime  bisacodyl Suppository 10 milliGRAM(s) Rectal once  budesonide 160 MICROgram(s)/formoterol 4.5 MICROgram(s) Inhaler 2 Puff(s) Inhalation two times a day  chlorhexidine 2% Cloths 1 Application(s) Topical daily  dextrose 50% Injectable 50 milliLiter(s) IV Push every 15 minutes  dextrose 50% Injectable 25 milliLiter(s) IV Push every 15 minutes  dextrose Oral Gel 15 Gram(s) Oral once  famotidine    Tablet 20 milliGRAM(s) Oral two times a day  furosemide   Injectable 20 milliGRAM(s) IV Push two times a day  gabapentin 300 milliGRAM(s) Oral every 8 hours  glucagon  Injectable 1 milliGRAM(s) IntraMuscular once  HYDROmorphone PCA (1 mG/mL) 30 milliLiter(s) PCA Continuous PCA Continuous  insulin glargine Injectable (LANTUS) 33 Unit(s) SubCutaneous at bedtime  insulin lispro (ADMELOG) corrective regimen sliding scale   SubCutaneous at bedtime  insulin lispro (ADMELOG) corrective regimen sliding scale   SubCutaneous three times a day before meals  insulin lispro Injectable (ADMELOG) 7 Unit(s) SubCutaneous three times a day before meals  insulin regular Infusion 3 Unit(s)/Hr (3 mL/Hr) IV Continuous <Continuous>  OLANZapine 2.5 milliGRAM(s) Oral daily  polyethylene glycol 3350 17 Gram(s) Oral daily  predniSONE   Tablet 10 milliGRAM(s) Oral daily  senna 2 Tablet(s) Oral at bedtime  sodium chloride 0.9%. 1000 milliLiter(s) (10 mL/Hr) IV Continuous <Continuous>  traZODone 50 milliGRAM(s) Oral at bedtime      Physical Exam  General: Patient comfortable in bed   Vital Signs Last 12 Hrs  T(F): 97 (11-24-23 @ 12:00), Max: 98.8 (11-24-23 @ 04:00)  HR: 98 (11-24-23 @ 13:14) (80 - 102)  BP: 113/66 (11-24-23 @ 13:14) (99/59 - 122/58)  BP(mean): 87 (11-24-23 @ 12:00) (72 - 95)  RR: 18 (11-24-23 @ 13:14) (14 - 31)  SpO2: 95% (11-24-23 @ 13:14) (94% - 100%)    CVS: S1S2   Respiratory: No wheezing, no crepitations  GI: Abdomen soft, bowel sounds positive  Musculoskeletal:  moves all extremities  : Voiding

## 2023-11-24 NOTE — PROGRESS NOTE ADULT - ASSESSMENT
58F smoker with PMH CVA with L sided weakness/numbness/L gaze deviation, DM2 with b/l peripheral neuropathy, COPD/Asthma, Breast Ca s/p ?RT,  Bipolar depression, Rheumatoid Arthritis,  Antiphospholipid syndrome, and L eye uveitis/scleritis, Presents to [facility] transferred from [facility]. CTS eval   Assessment  DMT2: 58y Female with DM T2 with hyperglycemia, A1C 7.6% , was on oral meds at home, now postop, having hyperglycemias, was restarted on IV insulin, non compliant with food intake, snacking in between.   Severe AR: CTS eval, [facility], s/p ct surgery.   CAD: on medications, stable, monitored. Cardiac MR viability , postop CABG  HTN: on antihypertensive medications, monitored, asymptomatic.  Obesity: No strict exercise routines, not on any weight loss program, neither on low calorie diet.          Discussed plan and management with Dr Ladarius Rivera NP - TEAMS  Skye Durand MD  Cell: [phone]  Office: [phone]          58F smoker with PMH CVA with L sided weakness/numbness/L gaze deviation, DM2 with b/l peripheral neuropathy, COPD/Asthma, Breast Ca s/p ?RT,  Bipolar depression, Rheumatoid Arthritis,  Antiphospholipid syndrome, and L eye uveitis/scleritis, Presents to [facility] transferred from [facility]. CTS eval   Assessment  DMT2: 58y Female with DM T2 with hyperglycemia, A1C 7.6% , was on oral meds at home, now postop, having hyperglycemias, was restarted  on IV insulin, non compliant with food intake, snacking in between.   Severe AR: CTS eval, [facility], s/p ct surgery.   CAD: on medications, stable, monitored. Cardiac MR viability , postop CABG  HTN: on antihypertensive medications, monitored, asymptomatic.  Obesity: No strict exercise routines, not on any weight loss program, neither on low calorie diet.          Discussed plan and management with Dr Ladarius Rivera NP - TEAMS  Skye Durand MD  Cell: [phone]  Office: [phone]

## 2023-11-24 NOTE — PROGRESS NOTE ADULT - SUBJECTIVE AND OBJECTIVE BOX
Neurology        S: patient seen doing okay in ICU ; HA better ; in chair         Medications: MEDICATIONS  (STANDING):  acetaminophen     Tablet .. 650 milliGRAM(s) Oral every 6 hours  albuterol/ipratropium for Nebulization 3 milliLiter(s) Nebulizer every 6 hours  ascorbic acid 500 milliGRAM(s) Oral two times a day  aspirin enteric coated 81 milliGRAM(s) Oral daily  atorvastatin 80 milliGRAM(s) Oral at bedtime  bisacodyl Suppository 10 milliGRAM(s) Rectal once  budesonide 160 MICROgram(s)/formoterol 4.5 MICROgram(s) Inhaler 2 Puff(s) Inhalation two times a day  chlorhexidine 2% Cloths 1 Application(s) Topical daily  dextrose 50% Injectable 25 milliLiter(s) IV Push every 15 minutes  dextrose 50% Injectable 50 milliLiter(s) IV Push every 15 minutes  dextrose Oral Gel 15 Gram(s) Oral once  DOBUTamine Infusion 1 MICROgram(s)/kG/Min (2.61 mL/Hr) IV Continuous <Continuous>  famotidine    Tablet 20 milliGRAM(s) Oral two times a day  gabapentin 300 milliGRAM(s) Oral every 8 hours  glucagon  Injectable 1 milliGRAM(s) IntraMuscular once  HYDROmorphone PCA (1 mG/mL) 30 milliLiter(s) PCA Continuous PCA Continuous  insulin glargine Injectable (LANTUS) 33 Unit(s) SubCutaneous at bedtime  insulin lispro (ADMELOG) corrective regimen sliding scale   SubCutaneous three times a day before meals  insulin lispro (ADMELOG) corrective regimen sliding scale   SubCutaneous at bedtime  insulin lispro Injectable (ADMELOG) 7 Unit(s) SubCutaneous three times a day before meals  OLANZapine 2.5 milliGRAM(s) Oral daily  polyethylene glycol 3350 17 Gram(s) Oral daily  predniSONE   Tablet 10 milliGRAM(s) Oral daily  senna 2 Tablet(s) Oral at bedtime  sodium chloride 0.9%. 1000 milliLiter(s) (10 mL/Hr) IV Continuous <Continuous>  traZODone 50 milliGRAM(s) Oral at bedtime    MEDICATIONS  (PRN):  acetaminophen     Tablet .. 650 milliGRAM(s) Oral every 6 hours PRN Mild Pain (1 - 3)  HYDROmorphone PCA (1 mG/mL) Rescue Clinician Bolus 0.5 milliGRAM(s) IV Push every 15 minutes PRN for Pain Scale GREATER THAN 6  naloxone Injectable 0.1 milliGRAM(s) IV Push every 3 minutes PRN For ANY of the following changes in patient status:  A. RR LESS THAN 10 breaths per minute, B. Oxygen saturation LESS THAN 90%, C. Sedation score of 6  ondansetron Injectable 4 milliGRAM(s) IV Push every 6 hours PRN Nausea       Vitals:  Vital Signs Last 24 Hrs  T(C): 36.9 (24 Nov 2023 08:00), Max: 37.3 (23 Nov 2023 20:00)  T(F): 98.4 (24 Nov 2023 08:00), Max: 99.1 (23 Nov 2023 20:00)  HR: 97 (24 Nov 2023 08:00) (80 - 103)  BP: 107/67 (24 Nov 2023 08:00) (97/66 - 143/77)  BP(mean): 83 (24 Nov 2023 08:00) (72 - 111)  RR: 29 (24 Nov 2023 08:00) (13 - 29)  SpO2: 97% (24 Nov 2023 08:00) (93% - 100%)    Parameters below as of 24 Nov 2023 08:00  Patient On (Oxygen Delivery Method): nasal cannula  O2 Flow (L/min): 5            General Exam:   General Appearance: Appropriately dressed and in no acute distress       Head: Normocephalic, atraumatic and no dysmorphic features  Ear, Nose, and Throat: Moist mucous membranes  CVS: S1S2+  Resp: No SOB, no wheeze or rhonchi  GI: soft NT/ND  Extremities: No edema or cyanosis  Skin: No bruises or rashes     Neurological Exam:  Mental Status: Awake, alert and oriented x 3.  Able to follow simple and complex verbal commands. Able to name and repeat. fluent speech. No obvious aphasia or dysarthria noted.   Cranial Nerves: PERRL, dysconugate gaze , VFFC, sensation V1-V3 decrease on L,  L facial asymmetry, equal elevation of palate, scm/trap 5/5, tongue is midline on protrusion. no obvious papilledema on fundoscopic exam. hearing is grossly intact.   Motor: Normal bulk, tone and strength throughout except mild L HP 5-/5   Sensation: decrease on L compared to R   Reflexes: 1+ throughout at biceps, brachioradialis, triceps, patellars and ankles bilaterally and equal. No clonus. R toe and L toe were both downgoing.  Coordination: No dysmetria on FNF   Gait: cane baseline     Data/Labs/Imaging which I personally reviewed.         LABS:                          9.0    18.94 )-----------( 82       ( 24 Nov 2023 00:29 )             27.7     11-24    136  |  99  |  16  ----------------------------<  142<H>  3.6   |  26  |  0.55    Ca    8.4      24 Nov 2023 00:28  Phos  2.7     11-24  Mg     1.9     11-24    TPro  6.1  /  Alb  3.8  /  TBili  2.2<H>  /  DBili  x   /  AST  18  /  ALT  18  /  AlkPhos  59  11-24    LIVER FUNCTIONS - ( 24 Nov 2023 00:28 )  Alb: 3.8 g/dL / Pro: 6.1 g/dL / ALK PHOS: 59 U/L / ALT: 18 U/L / AST: 18 U/L / GGT: x             Urinalysis Basic - ( 24 Nov 2023 00:28 )    Color: x / Appearance: x / SG: x / pH: x  Gluc: 142 mg/dL / Ketone: x  / Bili: x / Urobili: x   Blood: x / Protein: x / Nitrite: x   Leuk Esterase: x / RBC: x / WBC x   Sq Epi: x / Non Sq Epi: x / Bacteria: x

## 2023-11-24 NOTE — PROGRESS NOTE ADULT - ASSESSMENT
58 female smoker with PMH CVA with residual Left-sided weakness/numbness/L gaze deviation, DM2 with b/l peripheral neuropathy, COPD/Asthma, Breast Ca s/p ?RT, Bipolar depression, Rheumatoid Arthritis, Antiphospholipid syndrome, and L eye uveitis/scleritis, Presents to [facility] transferred from [facility]. p/w multiple medical complaints. A/w exacerbation of L-sided subjective weakness/numbness likely 2/2 recrudescence of prior Right BG infarct. Cardiology following for new LV dysfunction since July, pending ischemic eval. GABBY was done showing severe Aortic Insufficiency and moderated MR. Cardiac cath was done showing LAD prox 100% fills via right to left collateral. Patient now transferred to [facility] Dr. Loaiza for evaluation.    HOSPITAL COURSE:   11/14 patient seen and examined at bedside. All labs and imaging to be reviewed by Dr. Loaiza   11/15:VSS, neuro consult for hx cva L sided weakness, Preop for Friday 11/16 Repeat echo shows only mild valve pathology  11/17 No valve surgery planned in light of repeat echo.  Getting MR viability for CAD and depressed LV function.  11/18  vss for rpt gabby mon  11/19  npo after midnight for gabby tomorrow  11/20 VSS NPO for GABBY this am=> severe AR  11/21: s/p CABG LIMA-LAD, AVR(t) - Inspiris 23mm, LAAL with AtriClip 40mm,   +Substernal mediastinal Chest tube and Left pleural Chest tube. Kept Intubated post op. Taken to CTU. Levophed, Vasopressin and Primacor switched to Dobutamine gtt.  Started on PO Amio for afib ppx. +Pleitez. On insulin gtt for tight glycemic control to prevent wound infxn.  11/22: POD1. Extubated. Inotropes and Pressors maintained. PCA Hydromorphone initiated by Acute Pain Mgmt team. Insulin gtt maintained per Endocrine. PT/OT evaluation.  11/23 POD 2. PCA Hydromorphone maintained. Insulin gtt maintained, started basal insulin tonight per Endo. Dobutamine gtt continued.   11/24 POD 3. Dobutamine gtt d/c'ed in AM. PCA pumped d/c'ed. MED and Pleural Chest tubes d/c'ed while in CTU. (+) PW to EPM (40/10/0.8). Prevena dressing intact. +Pleitez maintained. Monitor I/Os. Back on Insulin gtt for elevated FS >200. Endocrine following. TRANSFERRED TO SDU. Current medication regimen continued.

## 2023-11-25 LAB
ANION GAP SERPL CALC-SCNC: 11 MMOL/L — SIGNIFICANT CHANGE UP (ref 5–17)
ANION GAP SERPL CALC-SCNC: 11 MMOL/L — SIGNIFICANT CHANGE UP (ref 5–17)
BASOPHILS # BLD AUTO: 0 K/UL — SIGNIFICANT CHANGE UP (ref 0–0.2)
BASOPHILS # BLD AUTO: 0 K/UL — SIGNIFICANT CHANGE UP (ref 0–0.2)
BASOPHILS NFR BLD AUTO: 0 % — SIGNIFICANT CHANGE UP (ref 0–2)
BASOPHILS NFR BLD AUTO: 0 % — SIGNIFICANT CHANGE UP (ref 0–2)
BUN SERPL-MCNC: 19 MG/DL — SIGNIFICANT CHANGE UP (ref 7–23)
BUN SERPL-MCNC: 19 MG/DL — SIGNIFICANT CHANGE UP (ref 7–23)
CALCIUM SERPL-MCNC: 8.9 MG/DL — SIGNIFICANT CHANGE UP (ref 8.4–10.5)
CALCIUM SERPL-MCNC: 8.9 MG/DL — SIGNIFICANT CHANGE UP (ref 8.4–10.5)
CHLORIDE SERPL-SCNC: 97 MMOL/L — SIGNIFICANT CHANGE UP (ref 96–108)
CHLORIDE SERPL-SCNC: 97 MMOL/L — SIGNIFICANT CHANGE UP (ref 96–108)
CO2 SERPL-SCNC: 28 MMOL/L — SIGNIFICANT CHANGE UP (ref 22–31)
CO2 SERPL-SCNC: 28 MMOL/L — SIGNIFICANT CHANGE UP (ref 22–31)
CREAT SERPL-MCNC: 0.62 MG/DL — SIGNIFICANT CHANGE UP (ref 0.5–1.3)
CREAT SERPL-MCNC: 0.62 MG/DL — SIGNIFICANT CHANGE UP (ref 0.5–1.3)
EGFR: 103 ML/MIN/1.73M2 — SIGNIFICANT CHANGE UP
EGFR: 103 ML/MIN/1.73M2 — SIGNIFICANT CHANGE UP
EOSINOPHIL # BLD AUTO: 0.18 K/UL — SIGNIFICANT CHANGE UP (ref 0–0.5)
EOSINOPHIL # BLD AUTO: 0.18 K/UL — SIGNIFICANT CHANGE UP (ref 0–0.5)
EOSINOPHIL NFR BLD AUTO: 0.9 % — SIGNIFICANT CHANGE UP (ref 0–6)
EOSINOPHIL NFR BLD AUTO: 0.9 % — SIGNIFICANT CHANGE UP (ref 0–6)
GIANT PLATELETS BLD QL SMEAR: PRESENT — SIGNIFICANT CHANGE UP
GIANT PLATELETS BLD QL SMEAR: PRESENT — SIGNIFICANT CHANGE UP
GLUCOSE BLDC GLUCOMTR-MCNC: 116 MG/DL — HIGH (ref 70–99)
GLUCOSE BLDC GLUCOMTR-MCNC: 116 MG/DL — HIGH (ref 70–99)
GLUCOSE BLDC GLUCOMTR-MCNC: 118 MG/DL — HIGH (ref 70–99)
GLUCOSE BLDC GLUCOMTR-MCNC: 118 MG/DL — HIGH (ref 70–99)
GLUCOSE BLDC GLUCOMTR-MCNC: 133 MG/DL — HIGH (ref 70–99)
GLUCOSE BLDC GLUCOMTR-MCNC: 133 MG/DL — HIGH (ref 70–99)
GLUCOSE BLDC GLUCOMTR-MCNC: 141 MG/DL — HIGH (ref 70–99)
GLUCOSE BLDC GLUCOMTR-MCNC: 146 MG/DL — HIGH (ref 70–99)
GLUCOSE BLDC GLUCOMTR-MCNC: 146 MG/DL — HIGH (ref 70–99)
GLUCOSE BLDC GLUCOMTR-MCNC: 153 MG/DL — HIGH (ref 70–99)
GLUCOSE BLDC GLUCOMTR-MCNC: 153 MG/DL — HIGH (ref 70–99)
GLUCOSE BLDC GLUCOMTR-MCNC: 154 MG/DL — HIGH (ref 70–99)
GLUCOSE BLDC GLUCOMTR-MCNC: 154 MG/DL — HIGH (ref 70–99)
GLUCOSE BLDC GLUCOMTR-MCNC: 159 MG/DL — HIGH (ref 70–99)
GLUCOSE BLDC GLUCOMTR-MCNC: 189 MG/DL — HIGH (ref 70–99)
GLUCOSE BLDC GLUCOMTR-MCNC: 189 MG/DL — HIGH (ref 70–99)
GLUCOSE BLDC GLUCOMTR-MCNC: 92 MG/DL — SIGNIFICANT CHANGE UP (ref 70–99)
GLUCOSE BLDC GLUCOMTR-MCNC: 92 MG/DL — SIGNIFICANT CHANGE UP (ref 70–99)
GLUCOSE SERPL-MCNC: 82 MG/DL — SIGNIFICANT CHANGE UP (ref 70–99)
GLUCOSE SERPL-MCNC: 82 MG/DL — SIGNIFICANT CHANGE UP (ref 70–99)
HCT VFR BLD CALC: 28.8 % — LOW (ref 34.5–45)
HCT VFR BLD CALC: 28.8 % — LOW (ref 34.5–45)
HGB BLD-MCNC: 9.2 G/DL — LOW (ref 11.5–15.5)
HGB BLD-MCNC: 9.2 G/DL — LOW (ref 11.5–15.5)
LYMPHOCYTES # BLD AUTO: 17.5 % — SIGNIFICANT CHANGE UP (ref 13–44)
LYMPHOCYTES # BLD AUTO: 17.5 % — SIGNIFICANT CHANGE UP (ref 13–44)
LYMPHOCYTES # BLD AUTO: 3.59 K/UL — HIGH (ref 1–3.3)
LYMPHOCYTES # BLD AUTO: 3.59 K/UL — HIGH (ref 1–3.3)
MAGNESIUM SERPL-MCNC: 3 MG/DL — HIGH (ref 1.6–2.6)
MAGNESIUM SERPL-MCNC: 3 MG/DL — HIGH (ref 1.6–2.6)
MANUAL SMEAR VERIFICATION: SIGNIFICANT CHANGE UP
MANUAL SMEAR VERIFICATION: SIGNIFICANT CHANGE UP
MCHC RBC-ENTMCNC: 30.1 PG — SIGNIFICANT CHANGE UP (ref 27–34)
MCHC RBC-ENTMCNC: 30.1 PG — SIGNIFICANT CHANGE UP (ref 27–34)
MCHC RBC-ENTMCNC: 31.9 GM/DL — LOW (ref 32–36)
MCHC RBC-ENTMCNC: 31.9 GM/DL — LOW (ref 32–36)
MCV RBC AUTO: 94.1 FL — SIGNIFICANT CHANGE UP (ref 80–100)
MCV RBC AUTO: 94.1 FL — SIGNIFICANT CHANGE UP (ref 80–100)
MONOCYTES # BLD AUTO: 1.44 K/UL — HIGH (ref 0–0.9)
MONOCYTES # BLD AUTO: 1.44 K/UL — HIGH (ref 0–0.9)
MONOCYTES NFR BLD AUTO: 7 % — SIGNIFICANT CHANGE UP (ref 2–14)
MONOCYTES NFR BLD AUTO: 7 % — SIGNIFICANT CHANGE UP (ref 2–14)
NEUTROPHILS # BLD AUTO: 14.75 K/UL — HIGH (ref 1.8–7.4)
NEUTROPHILS # BLD AUTO: 14.75 K/UL — HIGH (ref 1.8–7.4)
NEUTROPHILS NFR BLD AUTO: 71.9 % — SIGNIFICANT CHANGE UP (ref 43–77)
NEUTROPHILS NFR BLD AUTO: 71.9 % — SIGNIFICANT CHANGE UP (ref 43–77)
PHOSPHATE SERPL-MCNC: 2.1 MG/DL — LOW (ref 2.5–4.5)
PHOSPHATE SERPL-MCNC: 2.1 MG/DL — LOW (ref 2.5–4.5)
PLAT MORPH BLD: NORMAL — SIGNIFICANT CHANGE UP
PLAT MORPH BLD: NORMAL — SIGNIFICANT CHANGE UP
PLATELET # BLD AUTO: 151 K/UL — SIGNIFICANT CHANGE UP (ref 150–400)
PLATELET # BLD AUTO: 151 K/UL — SIGNIFICANT CHANGE UP (ref 150–400)
POTASSIUM SERPL-MCNC: 3.5 MMOL/L — SIGNIFICANT CHANGE UP (ref 3.5–5.3)
POTASSIUM SERPL-MCNC: 3.5 MMOL/L — SIGNIFICANT CHANGE UP (ref 3.5–5.3)
POTASSIUM SERPL-SCNC: 3.5 MMOL/L — SIGNIFICANT CHANGE UP (ref 3.5–5.3)
POTASSIUM SERPL-SCNC: 3.5 MMOL/L — SIGNIFICANT CHANGE UP (ref 3.5–5.3)
PROMYELOCYTES # FLD: 0.9 % — HIGH (ref 0–0)
PROMYELOCYTES # FLD: 0.9 % — HIGH (ref 0–0)
RBC # BLD: 3.06 M/UL — LOW (ref 3.8–5.2)
RBC # BLD: 3.06 M/UL — LOW (ref 3.8–5.2)
RBC # FLD: 17 % — HIGH (ref 10.3–14.5)
RBC # FLD: 17 % — HIGH (ref 10.3–14.5)
RBC BLD AUTO: SIGNIFICANT CHANGE UP
RBC BLD AUTO: SIGNIFICANT CHANGE UP
SODIUM SERPL-SCNC: 136 MMOL/L — SIGNIFICANT CHANGE UP (ref 135–145)
SODIUM SERPL-SCNC: 136 MMOL/L — SIGNIFICANT CHANGE UP (ref 135–145)
VARIANT LYMPHS # BLD: 1.8 % — SIGNIFICANT CHANGE UP (ref 0–6)
VARIANT LYMPHS # BLD: 1.8 % — SIGNIFICANT CHANGE UP (ref 0–6)
WBC # BLD: 20.51 K/UL — HIGH (ref 3.8–10.5)
WBC # BLD: 20.51 K/UL — HIGH (ref 3.8–10.5)
WBC # FLD AUTO: 20.51 K/UL — HIGH (ref 3.8–10.5)
WBC # FLD AUTO: 20.51 K/UL — HIGH (ref 3.8–10.5)

## 2023-11-25 RX ORDER — MAGNESIUM SULFATE 500 MG/ML
2 VIAL (ML) INJECTION ONCE
Refills: 0 | Status: COMPLETED | OUTPATIENT
Start: 2023-11-25 | End: 2023-11-25

## 2023-11-25 RX ORDER — POTASSIUM CHLORIDE 20 MEQ
20 PACKET (EA) ORAL
Refills: 0 | Status: COMPLETED | OUTPATIENT
Start: 2023-11-25 | End: 2023-11-25

## 2023-11-25 RX ORDER — POTASSIUM CHLORIDE 20 MEQ
20 PACKET (EA) ORAL ONCE
Refills: 0 | Status: COMPLETED | OUTPATIENT
Start: 2023-11-25 | End: 2023-11-25

## 2023-11-25 RX ADMIN — Medication 40 MILLIGRAM(S): at 17:18

## 2023-11-25 RX ADMIN — Medication 1: at 11:56

## 2023-11-25 RX ADMIN — GABAPENTIN 300 MILLIGRAM(S): 400 CAPSULE ORAL at 05:04

## 2023-11-25 RX ADMIN — Medication 1: at 08:06

## 2023-11-25 RX ADMIN — BUDESONIDE AND FORMOTEROL FUMARATE DIHYDRATE 2 PUFF(S): 160; 4.5 AEROSOL RESPIRATORY (INHALATION) at 17:17

## 2023-11-25 RX ADMIN — GABAPENTIN 300 MILLIGRAM(S): 400 CAPSULE ORAL at 21:23

## 2023-11-25 RX ADMIN — Medication 20 MILLIEQUIVALENT(S): at 05:03

## 2023-11-25 RX ADMIN — HYDROMORPHONE HYDROCHLORIDE 4 MILLIGRAM(S): 2 INJECTION INTRAMUSCULAR; INTRAVENOUS; SUBCUTANEOUS at 03:35

## 2023-11-25 RX ADMIN — Medication 12.5 MILLIGRAM(S): at 17:18

## 2023-11-25 RX ADMIN — Medication 20 MILLIEQUIVALENT(S): at 08:05

## 2023-11-25 RX ADMIN — SENNA PLUS 2 TABLET(S): 8.6 TABLET ORAL at 21:24

## 2023-11-25 RX ADMIN — Medication 500 MILLIGRAM(S): at 05:05

## 2023-11-25 RX ADMIN — HYDROMORPHONE HYDROCHLORIDE 2 MILLIGRAM(S): 2 INJECTION INTRAMUSCULAR; INTRAVENOUS; SUBCUTANEOUS at 08:57

## 2023-11-25 RX ADMIN — Medication 1: at 17:16

## 2023-11-25 RX ADMIN — Medication 3 MILLILITER(S): at 11:11

## 2023-11-25 RX ADMIN — HYDROMORPHONE HYDROCHLORIDE 4 MILLIGRAM(S): 2 INJECTION INTRAMUSCULAR; INTRAVENOUS; SUBCUTANEOUS at 21:24

## 2023-11-25 RX ADMIN — Medication 500 MILLIGRAM(S): at 17:18

## 2023-11-25 RX ADMIN — HYDROMORPHONE HYDROCHLORIDE 4 MILLIGRAM(S): 2 INJECTION INTRAMUSCULAR; INTRAVENOUS; SUBCUTANEOUS at 04:15

## 2023-11-25 RX ADMIN — INSULIN GLARGINE 33 UNIT(S): 100 INJECTION, SOLUTION SUBCUTANEOUS at 21:35

## 2023-11-25 RX ADMIN — Medication 81 MILLIGRAM(S): at 11:11

## 2023-11-25 RX ADMIN — Medication 7 UNIT(S): at 17:17

## 2023-11-25 RX ADMIN — Medication 25 GRAM(S): at 05:03

## 2023-11-25 RX ADMIN — Medication 7 UNIT(S): at 08:07

## 2023-11-25 RX ADMIN — ATORVASTATIN CALCIUM 80 MILLIGRAM(S): 80 TABLET, FILM COATED ORAL at 21:23

## 2023-11-25 RX ADMIN — Medication 50 MILLIGRAM(S): at 21:24

## 2023-11-25 RX ADMIN — POLYETHYLENE GLYCOL 3350 17 GRAM(S): 17 POWDER, FOR SOLUTION ORAL at 11:12

## 2023-11-25 RX ADMIN — HYDROMORPHONE HYDROCHLORIDE 2 MILLIGRAM(S): 2 INJECTION INTRAMUSCULAR; INTRAVENOUS; SUBCUTANEOUS at 15:23

## 2023-11-25 RX ADMIN — HYDROMORPHONE HYDROCHLORIDE 2 MILLIGRAM(S): 2 INJECTION INTRAMUSCULAR; INTRAVENOUS; SUBCUTANEOUS at 15:53

## 2023-11-25 RX ADMIN — FAMOTIDINE 20 MILLIGRAM(S): 10 INJECTION INTRAVENOUS at 17:18

## 2023-11-25 RX ADMIN — Medication 20 MILLIEQUIVALENT(S): at 09:02

## 2023-11-25 RX ADMIN — Medication 3 MILLILITER(S): at 23:30

## 2023-11-25 RX ADMIN — CHLORHEXIDINE GLUCONATE 1 APPLICATION(S): 213 SOLUTION TOPICAL at 11:20

## 2023-11-25 RX ADMIN — Medication 3 MILLILITER(S): at 05:04

## 2023-11-25 RX ADMIN — OLANZAPINE 2.5 MILLIGRAM(S): 15 TABLET, FILM COATED ORAL at 11:11

## 2023-11-25 RX ADMIN — HYDROMORPHONE HYDROCHLORIDE 4 MILLIGRAM(S): 2 INJECTION INTRAMUSCULAR; INTRAVENOUS; SUBCUTANEOUS at 22:29

## 2023-11-25 RX ADMIN — Medication 3 MILLILITER(S): at 17:17

## 2023-11-25 RX ADMIN — HYDROMORPHONE HYDROCHLORIDE 2 MILLIGRAM(S): 2 INJECTION INTRAMUSCULAR; INTRAVENOUS; SUBCUTANEOUS at 08:27

## 2023-11-25 RX ADMIN — FAMOTIDINE 20 MILLIGRAM(S): 10 INJECTION INTRAVENOUS at 05:05

## 2023-11-25 RX ADMIN — BUDESONIDE AND FORMOTEROL FUMARATE DIHYDRATE 2 PUFF(S): 160; 4.5 AEROSOL RESPIRATORY (INHALATION) at 05:05

## 2023-11-25 RX ADMIN — Medication 10 MILLIGRAM(S): at 05:05

## 2023-11-25 RX ADMIN — Medication 7 UNIT(S): at 11:57

## 2023-11-25 RX ADMIN — GABAPENTIN 300 MILLIGRAM(S): 400 CAPSULE ORAL at 13:00

## 2023-11-25 RX ADMIN — Medication 12.5 MILLIGRAM(S): at 05:05

## 2023-11-25 NOTE — PROGRESS NOTE ADULT - SUBJECTIVE AND OBJECTIVE BOX
VITAL SIGNS-Telemetry:  SR/ST   Vital Signs Last 24 Hrs  T(C): 36.7 (23 @ 07:08), Max: 37.2 (23 @ 19:23)  T(F): 98.1 (23 @ 07:08), Max: 98.9 (23 @ 19:23)  HR: 91 (23 @ 07:08) (80 - 102)  BP: 118/69 (23 @ 07:08) (101/59 - 118/69)  RR: 18 (23 @ 07:08) (17 - 31)  SpO2: 96% (23 @ 07:08) (94% - 97%)          07:01  -   @ 07:00  --------------------------------------------------------  IN: 1448 mL / OUT: 2665 mL / NET: -1217 mL    Daily     Daily Weight in k.4 (2023 05:21)    CAPILLARY BLOOD GLUCOSE  POCT Blood Glucose.: 116 mg/dL (2023 06:34)  POCT Blood Glucose.: 92 mg/dL (2023 05:56)  POCT Blood Glucose.: 141 mg/dL (2023 04:56)  POCT Blood Glucose.: 189 mg/dL (2023 04:00)  POCT Blood Glucose.: 141 mg/dL (2023 03:03)        Pacing Wires        [ x ]   Settings:        vvi 40/10                          Isolated  [  ]  Coumadin    [ ] YES          [  ]      NO         Reason:       PHYSICAL EXAM:  Neurology: alert and oriented x 3, nonfocal, no gross deficits  CV : S1S2  Sternal Wound :  CDI , prevena in place  Lungs: cta  Abdomen: soft, nontender, nondistended, positive bowel sounds, last bowel movement       preop  Extremities:     no edema no calf tenderness,     acetaminophen     Tablet .. 650 milliGRAM(s) Oral every 6 hours PRN  albuterol/ipratropium for Nebulization 3 milliLiter(s) Nebulizer every 6 hours  ascorbic acid 500 milliGRAM(s) Oral two times a day  aspirin enteric coated 81 milliGRAM(s) Oral daily  atorvastatin 80 milliGRAM(s) Oral at bedtime  bisacodyl Suppository 10 milliGRAM(s) Rectal once  budesonide 160 MICROgram(s)/formoterol 4.5 MICROgram(s) Inhaler 2 Puff(s) Inhalation two times a day  chlorhexidine 2% Cloths 1 Application(s) Topical daily  dextrose 50% Injectable 50 milliLiter(s) IV Push every 15 minutes  dextrose 50% Injectable 25 milliLiter(s) IV Push every 15 minutes  dextrose Oral Gel 15 Gram(s) Oral once  famotidine    Tablet 20 milliGRAM(s) Oral two times a day  furosemide    Tablet 40 milliGRAM(s) Oral daily  gabapentin 300 milliGRAM(s) Oral every 8 hours  glucagon  Injectable 1 milliGRAM(s) IntraMuscular once  HYDROmorphone   Tablet 2 milliGRAM(s) Oral every 4 hours PRN  HYDROmorphone   Tablet 4 milliGRAM(s) Oral every 4 hours PRN  HYDROmorphone  Injectable 0.25 milliGRAM(s) IV Push every 6 hours PRN  insulin glargine Injectable (LANTUS) 33 Unit(s) SubCutaneous at bedtime  insulin lispro (ADMELOG) corrective regimen sliding scale   SubCutaneous three times a day before meals  insulin lispro (ADMELOG) corrective regimen sliding scale   SubCutaneous at bedtime  insulin lispro Injectable (ADMELOG) 7 Unit(s) SubCutaneous three times a day before meals  insulin regular Infusion 3 Unit(s)/Hr IV Continuous <Continuous>  metoprolol tartrate 12.5 milliGRAM(s) Oral two times a day  OLANZapine 2.5 milliGRAM(s) Oral daily  polyethylene glycol 3350 17 Gram(s) Oral daily  predniSONE   Tablet 10 milliGRAM(s) Oral daily  senna 2 Tablet(s) Oral at bedtime  sodium chloride 0.9%. 1000 milliLiter(s) IV Continuous <Continuous>  traZODone 50 milliGRAM(s) Oral at bedtime    Physical Therapy Rec:   Home  [  ]   Home w/ PT  [ x ]  Rehab  [  ]  Discussed with Cardiothoracic Team at AM rounds.

## 2023-11-25 NOTE — PROGRESS NOTE ADULT - ASSESSMENT
58 female smoker with PMH CVA with residual Left-sided weakness/numbness/L gaze deviation, DM2 with b/l peripheral neuropathy, COPD/Asthma, Breast Ca s/p ?RT, Bipolar depression, Rheumatoid Arthritis, Antiphospholipid syndrome, and L eye uveitis/scleritis, Presents to [facility] transferred from [facility]. p/w multiple medical complaints. A/w exacerbation of L-sided subjective weakness/numbness likely 2/2 recrudescence of prior Right BG infarct. Cardiology following for new LV dysfunction since July, pending ischemic eval. GABBY was done showing severe Aortic Insufficiency and moderated MR. Cardiac cath was done showing LAD prox 100% fills via right to left collateral. Patient now transferred to [facility] Dr. Loaiza for evaluation.    HOSPITAL COURSE:   11/14 patient seen and examined at bedside. All labs and imaging to be reviewed by Dr. Loaiza   11/15:VSS, neuro consult for hx cva L sided weakness, Preop for Friday 11/16 Repeat echo shows only mild valve pathology  11/17 No valve surgery planned in light of repeat echo.  Getting MR viability for CAD and depressed LV function.  11/18  vss for rpt gabby mon  11/19  npo after midnight for gabby tomorrow  11/20 VSS NPO for GABBY this am=> severe AR  11/21: s/p CABG LIMA-LAD, AVR(t) - Inspiris 23mm, LAAL with AtriClip 40mm,   +Substernal mediastinal Chest tube and Left pleural Chest tube. Kept Intubated post op. Taken to CTU. Levophed, Vasopressin and Primacor switched to Dobutamine gtt.  Started on PO Amio for afib ppx. +Soliz. On insulin gtt for tight glycemic control to prevent wound infxn.  11/22: POD1. Extubated. Inotropes and Pressors maintained. PCA Hydromorphone initiated by Acute Pain Mgmt team. Insulin gtt maintained per Endocrine. PT/OT evaluation.  11/23 POD 2. PCA Hydromorphone maintained. Insulin gtt maintained, started basal insulin tonight per Endo. Dobutamine gtt continued.   11/24 POD 3. Dobutamine gtt d/c'ed in AM. PCA pumped d/c'ed. MED and Pleural Chest tubes d/c'ed while in CTU. (+) PW to EPM (40/10/0.8). Prevena dressing intact. +Soliz maintained. Monitor I/Os. Back on Insulin gtt for elevated FS >200. Endocrine following. TRANSFERRED TO SDU. Current medication regimen continued.   11/25 VSS - insullin gtt off @ 6am- start low dose BB this am - soliz in place will d/c when pt more mobile.  d/c plan anticipate home PT

## 2023-11-25 NOTE — PROGRESS NOTE ADULT - SUBJECTIVE AND OBJECTIVE BOX
Neurology        S: patient seen doing okay, now on floor         Medications: MEDICATIONS  (STANDING):  albuterol/ipratropium for Nebulization 3 milliLiter(s) Nebulizer every 6 hours  ascorbic acid 500 milliGRAM(s) Oral two times a day  aspirin enteric coated 81 milliGRAM(s) Oral daily  atorvastatin 80 milliGRAM(s) Oral at bedtime  bisacodyl Suppository 10 milliGRAM(s) Rectal once  budesonide 160 MICROgram(s)/formoterol 4.5 MICROgram(s) Inhaler 2 Puff(s) Inhalation two times a day  chlorhexidine 2% Cloths 1 Application(s) Topical daily  dextrose 50% Injectable 50 milliLiter(s) IV Push every 15 minutes  dextrose 50% Injectable 25 milliLiter(s) IV Push every 15 minutes  dextrose Oral Gel 15 Gram(s) Oral once  famotidine    Tablet 20 milliGRAM(s) Oral two times a day  furosemide    Tablet 40 milliGRAM(s) Oral daily  gabapentin 300 milliGRAM(s) Oral every 8 hours  glucagon  Injectable 1 milliGRAM(s) IntraMuscular once  insulin glargine Injectable (LANTUS) 33 Unit(s) SubCutaneous at bedtime  insulin lispro (ADMELOG) corrective regimen sliding scale   SubCutaneous at bedtime  insulin lispro (ADMELOG) corrective regimen sliding scale   SubCutaneous three times a day before meals  insulin lispro Injectable (ADMELOG) 7 Unit(s) SubCutaneous three times a day before meals  insulin regular Infusion 3 Unit(s)/Hr (3 mL/Hr) IV Continuous <Continuous>  metoprolol tartrate 12.5 milliGRAM(s) Oral two times a day  OLANZapine 2.5 milliGRAM(s) Oral daily  polyethylene glycol 3350 17 Gram(s) Oral daily  potassium chloride    Tablet ER 20 milliEquivalent(s) Oral every 2 hours  predniSONE   Tablet 10 milliGRAM(s) Oral daily  senna 2 Tablet(s) Oral at bedtime  sodium chloride 0.9%. 1000 milliLiter(s) (10 mL/Hr) IV Continuous <Continuous>  traZODone 50 milliGRAM(s) Oral at bedtime    MEDICATIONS  (PRN):  acetaminophen     Tablet .. 650 milliGRAM(s) Oral every 6 hours PRN Mild Pain (1 - 3)  HYDROmorphone   Tablet 4 milliGRAM(s) Oral every 4 hours PRN Severe Pain (7 - 10)  HYDROmorphone   Tablet 2 milliGRAM(s) Oral every 4 hours PRN Moderate Pain (4 - 6)  HYDROmorphone  Injectable 0.25 milliGRAM(s) IV Push every 6 hours PRN breakthrough pain       Vitals:  Vital Signs Last 24 Hrs  T(C): 36.7 (25 Nov 2023 07:08), Max: 37.2 (24 Nov 2023 19:23)  T(F): 98.1 (25 Nov 2023 07:08), Max: 98.9 (24 Nov 2023 19:23)  HR: 91 (25 Nov 2023 07:08) (80 - 102)  BP: 118/69 (25 Nov 2023 07:08) (101/59 - 118/69)  BP(mean): 75 (25 Nov 2023 03:08) (75 - 95)  RR: 18 (25 Nov 2023 07:08) (17 - 31)  SpO2: 96% (25 Nov 2023 07:08) (94% - 97%)    Parameters below as of 25 Nov 2023 07:20  Patient On (Oxygen Delivery Method): nasal cannula                General Exam:   General Appearance: Appropriately dressed and in no acute distress       Head: Normocephalic, atraumatic and no dysmorphic features  Ear, Nose, and Throat: Moist mucous membranes  CVS: S1S2+  Resp: No SOB, no wheeze or rhonchi  GI: soft NT/ND  Extremities: No edema or cyanosis  Skin: No bruises or rashes     Neurological Exam:  Mental Status: Awake, alert and oriented x 3.  Able to follow simple and complex verbal commands. Able to name and repeat. fluent speech. No obvious aphasia or dysarthria noted.   Cranial Nerves: PERRL, dysconugate gaze , VFFC, sensation V1-V3 decrease on L,  L facial asymmetry, equal elevation of palate, scm/trap 5/5, tongue is midline on protrusion. no obvious papilledema on fundoscopic exam. hearing is grossly intact.   Motor: Normal bulk, tone and strength throughout except mild L HP 5-/5   Sensation: decrease on L compared to R   Reflexes: 1+ throughout at biceps, brachioradialis, triceps, patellars and ankles bilaterally and equal. No clonus. R toe and L toe were both downgoing.  Coordination: No dysmetria on FNF   Gait: cane baseline     Data/Labs/Imaging which I personally reviewed.           LABS:                          9.2    20.51 )-----------( 151      ( 25 Nov 2023 06:18 )             28.8     11-25    136  |  97  |  19  ----------------------------<  82  3.5   |  28  |  0.62    Ca    8.9      25 Nov 2023 06:18  Phos  2.1     11-25  Mg     3.0     11-25    TPro  6.1  /  Alb  3.8  /  TBili  2.2<H>  /  DBili  x   /  AST  18  /  ALT  18  /  AlkPhos  59  11-24    LIVER FUNCTIONS - ( 24 Nov 2023 00:28 )  Alb: 3.8 g/dL / Pro: 6.1 g/dL / ALK PHOS: 59 U/L / ALT: 18 U/L / AST: 18 U/L / GGT: x             Urinalysis Basic - ( 25 Nov 2023 06:18 )    Color: x / Appearance: x / SG: x / pH: x  Gluc: 82 mg/dL / Ketone: x  / Bili: x / Urobili: x   Blood: x / Protein: x / Nitrite: x   Leuk Esterase: x / RBC: x / WBC x   Sq Epi: x / Non Sq Epi: x / Bacteria: x

## 2023-11-25 NOTE — PROGRESS NOTE ADULT - SUBJECTIVE AND OBJECTIVE BOX
Chief complaint    Patient is a 58y old  Female who presents with a chief complaint of sob (25 Nov 2023 07:20)   Review of systems  Patient appears comfortable.    Labs and Fingersticks  CAPILLARY BLOOD GLUCOSE      POCT Blood Glucose.: 153 mg/dL (25 Nov 2023 07:48)  POCT Blood Glucose.: 116 mg/dL (25 Nov 2023 06:34)  POCT Blood Glucose.: 92 mg/dL (25 Nov 2023 05:56)  POCT Blood Glucose.: 141 mg/dL (25 Nov 2023 04:56)  POCT Blood Glucose.: 189 mg/dL (25 Nov 2023 04:00)  POCT Blood Glucose.: 141 mg/dL (25 Nov 2023 03:03)  POCT Blood Glucose.: 118 mg/dL (25 Nov 2023 02:15)  POCT Blood Glucose.: 133 mg/dL (25 Nov 2023 00:58)  POCT Blood Glucose.: 146 mg/dL (25 Nov 2023 00:07)  POCT Blood Glucose.: 134 mg/dL (24 Nov 2023 23:04)  POCT Blood Glucose.: 117 mg/dL (24 Nov 2023 22:05)  POCT Blood Glucose.: 105 mg/dL (24 Nov 2023 20:59)  POCT Blood Glucose.: 114 mg/dL (24 Nov 2023 20:02)  POCT Blood Glucose.: 114 mg/dL (24 Nov 2023 18:56)  POCT Blood Glucose.: 162 mg/dL (24 Nov 2023 18:03)  POCT Blood Glucose.: 154 mg/dL (24 Nov 2023 17:00)  POCT Blood Glucose.: 125 mg/dL (24 Nov 2023 15:49)  POCT Blood Glucose.: 99 mg/dL (24 Nov 2023 15:09)  POCT Blood Glucose.: 106 mg/dL (24 Nov 2023 14:18)  POCT Blood Glucose.: 170 mg/dL (24 Nov 2023 13:03)  POCT Blood Glucose.: 216 mg/dL (24 Nov 2023 12:04)  POCT Blood Glucose.: 278 mg/dL (24 Nov 2023 11:05)      Anion Gap: 11 (11-25 @ 06:18)  Anion Gap: 11 (11-24 @ 00:28)      Calcium: 8.9 (11-25 @ 06:18)  Calcium: 8.4 (11-24 @ 00:28)  Albumin: 3.8 (11-24 @ 00:28)    Alanine Aminotransferase (ALT/SGPT): 18 (11-24 @ 00:28)  Alkaline Phosphatase: 59 (11-24 @ 00:28)  Aspartate Aminotransferase (AST/SGOT): 18 (11-24 @ 00:28)        11-25    136  |  97  |  19  ----------------------------<  82  3.5   |  28  |  0.62    Ca    8.9      25 Nov 2023 06:18  Phos  2.1     11-25  Mg     3.0     11-25    TPro  6.1  /  Alb  3.8  /  TBili  2.2<H>  /  DBili  x   /  AST  18  /  ALT  18  /  AlkPhos  59  11-24                        9.2    20.51 )-----------( 151      ( 25 Nov 2023 06:18 )             28.8     Medications  MEDICATIONS  (STANDING):  albuterol/ipratropium for Nebulization 3 milliLiter(s) Nebulizer every 6 hours  ascorbic acid 500 milliGRAM(s) Oral two times a day  aspirin enteric coated 81 milliGRAM(s) Oral daily  atorvastatin 80 milliGRAM(s) Oral at bedtime  bisacodyl Suppository 10 milliGRAM(s) Rectal once  budesonide 160 MICROgram(s)/formoterol 4.5 MICROgram(s) Inhaler 2 Puff(s) Inhalation two times a day  chlorhexidine 2% Cloths 1 Application(s) Topical daily  dextrose 50% Injectable 50 milliLiter(s) IV Push every 15 minutes  dextrose 50% Injectable 25 milliLiter(s) IV Push every 15 minutes  dextrose Oral Gel 15 Gram(s) Oral once  famotidine    Tablet 20 milliGRAM(s) Oral two times a day  furosemide    Tablet 40 milliGRAM(s) Oral daily  gabapentin 300 milliGRAM(s) Oral every 8 hours  glucagon  Injectable 1 milliGRAM(s) IntraMuscular once  insulin glargine Injectable (LANTUS) 33 Unit(s) SubCutaneous at bedtime  insulin lispro (ADMELOG) corrective regimen sliding scale   SubCutaneous at bedtime  insulin lispro (ADMELOG) corrective regimen sliding scale   SubCutaneous three times a day before meals  insulin lispro Injectable (ADMELOG) 7 Unit(s) SubCutaneous three times a day before meals  insulin regular Infusion 3 Unit(s)/Hr (3 mL/Hr) IV Continuous <Continuous>  metoprolol tartrate 12.5 milliGRAM(s) Oral two times a day  OLANZapine 2.5 milliGRAM(s) Oral daily  polyethylene glycol 3350 17 Gram(s) Oral daily  predniSONE   Tablet 10 milliGRAM(s) Oral daily  senna 2 Tablet(s) Oral at bedtime  sodium chloride 0.9%. 1000 milliLiter(s) (10 mL/Hr) IV Continuous <Continuous>  traZODone 50 milliGRAM(s) Oral at bedtime      Physical Exam  General: Patient appears comfortable.  Vital Signs Last 12 Hrs  T(F): 98.1 (11-25-23 @ 07:08), Max: 98.3 (11-24-23 @ 23:06)  HR: 91 (11-25-23 @ 07:08) (91 - 101)  BP: 118/69 (11-25-23 @ 07:08) (101/59 - 118/69)  BP(mean): 75 (11-25-23 @ 03:08) (75 - 80)  RR: 18 (11-25-23 @ 07:08) (18 - 18)  SpO2: 96% (11-25-23 @ 07:08) (96% - 97%)  Neck: No palpable thyroid nodules.  CVS: S1S2, No murmurs  Respiratory: No wheezing, no crepitations  GI: Abdomen soft, non tender.    Diagnostics        Radiology:

## 2023-11-25 NOTE — PROGRESS NOTE ADULT - PROBLEM SELECTOR PLAN 1
- Continue with ASA 81mg qD, Atorvastatin 80mg qHS,   - Patient stopped dobutamine gtt this AM. Plan to start Lopressor 12.5 BID starting tomorrow AM (11/25 AM)  - (+) PW => EPW (40/10/0.8)  - transitioned IV Lasix => PO Lasix 40mg daily today, per cardiology  - (+) Pleitez  - monitor I/Os  - Increase activity as tolerated.   - Off Dilaudid PCA pump. Pain mgmt following. On PO Dilaudid PRN, Gabapentin 300mg q8HR, Tylenol PRN per pain management team.  - continue Bowel regimen  - Encourage Chest PT / Pulmonary toileting and Incentive spirometry every 1 hour x 10 while awake.   - Continue with PUD and DVT prophylaxis.   - Shower on POD #5.   Plan of care discussed with attending

## 2023-11-25 NOTE — PROGRESS NOTE ADULT - ASSESSMENT
58F smoker with stroke x 2 (2022 and july 2023 with resid  L sided weakness/numbness  DM2 with b/l peripheral neuropathy, COPD/Asthma, Breast Ca s/p, Bipolar depression, Rheumatoid Arthritis, Antiphospholipid syndrome, and L eye uveitis/scleritis, Presents to [facility] transferred from [facility]. p/w multiple medical complaints and exacerbation of L-sided subjective weakness/numbness likely 2/2 recrudescence of prior Right BG infarct. Cardiology following for new LV dysfunction since July.Patient now transferred to CTS Dr. Loaiza for evaluation. neuro called for prior strokes   LESLEY was done showing severe Aortic Insufficiency and moderated MR.   Cardiac cath was done showing LAD prox 100% fills via right to left collateral.   CTH with tinght chronic appearing R BG infarct   TTE EF 30%   CD neg   A1c 7.6   LDL 37   ILR interrogated no events  NIHSS 3  premrs3   cath shows LAD prox 100% fills via right to left collateral will need AVR and LIMA to LAD bypass  o/e with mild L facial and decrease sensation on L with mild 5-/5 weakness on L with slow FFM ; dysconjugate gaze   s/p OR 11/21  extubated  post op no neuro changes   c/o HA.     Imprsesion:   prior nowe chronic appearing infarcts. R BG with residual L HP  DM peripheral neuropathy      - for secondary stroke prevention on asa 81mg and high dose statin.  was also on full dose lovenox given APLS; would resume when able or place on heparin drip   - gabapentin 300mg TID for neuropathy   - f/u rheum and heme/onc   - low risk for cardiac surgery from neuro/stroke standpoint   - telemetry  - PT/OT   - check FS, glucose control <180  - GI/DVT ppx   - Thank you for allowing me to participate in the care of this patient. Call with questions.   - spoke [facility] CTS team/ACP   Charles rCespo MD  Vascular Neurology  Office: [phone]

## 2023-11-25 NOTE — PROGRESS NOTE ADULT - ASSESSMENT
58F smoker with PMH CVA with L sided weakness/numbness/L gaze deviation, DM2 with b/l peripheral neuropathy, COPD/Asthma, Breast Ca s/p ?RT,  Bipolar depression, Rheumatoid Arthritis,  Antiphospholipid syndrome, and L eye uveitis/scleritis, Presents to [facility] transferred from [facility]. CTS eval   Assessment  DMT2: 58y Female with DM T2 with hyperglycemia, A1C 7.6% , was on oral meds at home, now postop, having hyperglycemias, was on IV insulin, and switched to basal bolus , non compliant with food intake, snacking in between.   Severe AR: CTS eval, LESLEY, s/p ct surgery   CAD: on medications, stable, monitored. Cardiac MR viability , postop CABG  HTN: on antihypertensive medications, monitored, asymptomatic.  Obesity: No strict exercise routines, not on any weight loss program, neither on low calorie diet.            Skye Durand MD  Cell: [phone]  Office: [phone]

## 2023-11-26 LAB
ANION GAP SERPL CALC-SCNC: 13 MMOL/L — SIGNIFICANT CHANGE UP (ref 5–17)
ANION GAP SERPL CALC-SCNC: 13 MMOL/L — SIGNIFICANT CHANGE UP (ref 5–17)
BUN SERPL-MCNC: 21 MG/DL — SIGNIFICANT CHANGE UP (ref 7–23)
BUN SERPL-MCNC: 21 MG/DL — SIGNIFICANT CHANGE UP (ref 7–23)
CALCIUM SERPL-MCNC: 9 MG/DL — SIGNIFICANT CHANGE UP (ref 8.4–10.5)
CALCIUM SERPL-MCNC: 9 MG/DL — SIGNIFICANT CHANGE UP (ref 8.4–10.5)
CHLORIDE SERPL-SCNC: 96 MMOL/L — SIGNIFICANT CHANGE UP (ref 96–108)
CHLORIDE SERPL-SCNC: 96 MMOL/L — SIGNIFICANT CHANGE UP (ref 96–108)
CO2 SERPL-SCNC: 27 MMOL/L — SIGNIFICANT CHANGE UP (ref 22–31)
CO2 SERPL-SCNC: 27 MMOL/L — SIGNIFICANT CHANGE UP (ref 22–31)
CREAT SERPL-MCNC: 0.69 MG/DL — SIGNIFICANT CHANGE UP (ref 0.5–1.3)
CREAT SERPL-MCNC: 0.69 MG/DL — SIGNIFICANT CHANGE UP (ref 0.5–1.3)
EGFR: 100 ML/MIN/1.73M2 — SIGNIFICANT CHANGE UP
EGFR: 100 ML/MIN/1.73M2 — SIGNIFICANT CHANGE UP
GLUCOSE BLDC GLUCOMTR-MCNC: 131 MG/DL — HIGH (ref 70–99)
GLUCOSE BLDC GLUCOMTR-MCNC: 131 MG/DL — HIGH (ref 70–99)
GLUCOSE BLDC GLUCOMTR-MCNC: 133 MG/DL — HIGH (ref 70–99)
GLUCOSE BLDC GLUCOMTR-MCNC: 133 MG/DL — HIGH (ref 70–99)
GLUCOSE BLDC GLUCOMTR-MCNC: 184 MG/DL — HIGH (ref 70–99)
GLUCOSE BLDC GLUCOMTR-MCNC: 184 MG/DL — HIGH (ref 70–99)
GLUCOSE BLDC GLUCOMTR-MCNC: 190 MG/DL — HIGH (ref 70–99)
GLUCOSE BLDC GLUCOMTR-MCNC: 190 MG/DL — HIGH (ref 70–99)
GLUCOSE BLDC GLUCOMTR-MCNC: 246 MG/DL — HIGH (ref 70–99)
GLUCOSE BLDC GLUCOMTR-MCNC: 246 MG/DL — HIGH (ref 70–99)
GLUCOSE SERPL-MCNC: 114 MG/DL — HIGH (ref 70–99)
GLUCOSE SERPL-MCNC: 114 MG/DL — HIGH (ref 70–99)
HCT VFR BLD CALC: 30.9 % — LOW (ref 34.5–45)
HCT VFR BLD CALC: 30.9 % — LOW (ref 34.5–45)
HGB BLD-MCNC: 9.8 G/DL — LOW (ref 11.5–15.5)
HGB BLD-MCNC: 9.8 G/DL — LOW (ref 11.5–15.5)
MAGNESIUM SERPL-MCNC: 2.2 MG/DL — SIGNIFICANT CHANGE UP (ref 1.6–2.6)
MAGNESIUM SERPL-MCNC: 2.2 MG/DL — SIGNIFICANT CHANGE UP (ref 1.6–2.6)
MCHC RBC-ENTMCNC: 30 PG — SIGNIFICANT CHANGE UP (ref 27–34)
MCHC RBC-ENTMCNC: 30 PG — SIGNIFICANT CHANGE UP (ref 27–34)
MCHC RBC-ENTMCNC: 31.7 GM/DL — LOW (ref 32–36)
MCHC RBC-ENTMCNC: 31.7 GM/DL — LOW (ref 32–36)
MCV RBC AUTO: 94.5 FL — SIGNIFICANT CHANGE UP (ref 80–100)
MCV RBC AUTO: 94.5 FL — SIGNIFICANT CHANGE UP (ref 80–100)
NRBC # BLD: 0 /100 WBCS — SIGNIFICANT CHANGE UP (ref 0–0)
NRBC # BLD: 0 /100 WBCS — SIGNIFICANT CHANGE UP (ref 0–0)
PHOSPHATE SERPL-MCNC: 3.4 MG/DL — SIGNIFICANT CHANGE UP (ref 2.5–4.5)
PHOSPHATE SERPL-MCNC: 3.4 MG/DL — SIGNIFICANT CHANGE UP (ref 2.5–4.5)
PLATELET # BLD AUTO: 194 K/UL — SIGNIFICANT CHANGE UP (ref 150–400)
PLATELET # BLD AUTO: 194 K/UL — SIGNIFICANT CHANGE UP (ref 150–400)
POTASSIUM SERPL-MCNC: 3.9 MMOL/L — SIGNIFICANT CHANGE UP (ref 3.5–5.3)
POTASSIUM SERPL-MCNC: 3.9 MMOL/L — SIGNIFICANT CHANGE UP (ref 3.5–5.3)
POTASSIUM SERPL-SCNC: 3.9 MMOL/L — SIGNIFICANT CHANGE UP (ref 3.5–5.3)
POTASSIUM SERPL-SCNC: 3.9 MMOL/L — SIGNIFICANT CHANGE UP (ref 3.5–5.3)
RBC # BLD: 3.27 M/UL — LOW (ref 3.8–5.2)
RBC # BLD: 3.27 M/UL — LOW (ref 3.8–5.2)
RBC # FLD: 16.8 % — HIGH (ref 10.3–14.5)
RBC # FLD: 16.8 % — HIGH (ref 10.3–14.5)
SODIUM SERPL-SCNC: 136 MMOL/L — SIGNIFICANT CHANGE UP (ref 135–145)
SODIUM SERPL-SCNC: 136 MMOL/L — SIGNIFICANT CHANGE UP (ref 135–145)
WBC # BLD: 20.25 K/UL — HIGH (ref 3.8–10.5)
WBC # BLD: 20.25 K/UL — HIGH (ref 3.8–10.5)
WBC # FLD AUTO: 20.25 K/UL — HIGH (ref 3.8–10.5)
WBC # FLD AUTO: 20.25 K/UL — HIGH (ref 3.8–10.5)

## 2023-11-26 RX ORDER — OLANZAPINE 15 MG/1
7.5 TABLET, FILM COATED ORAL DAILY
Refills: 0 | Status: DISCONTINUED | OUTPATIENT
Start: 2023-11-27 | End: 2023-12-04

## 2023-11-26 RX ORDER — ACETAMINOPHEN 500 MG
1000 TABLET ORAL ONCE
Refills: 0 | Status: COMPLETED | OUTPATIENT
Start: 2023-11-26 | End: 2023-11-26

## 2023-11-26 RX ORDER — OLANZAPINE 15 MG/1
5 TABLET, FILM COATED ORAL ONCE
Refills: 0 | Status: COMPLETED | OUTPATIENT
Start: 2023-11-26 | End: 2023-11-26

## 2023-11-26 RX ORDER — METOPROLOL TARTRATE 50 MG
25 TABLET ORAL
Refills: 0 | Status: DISCONTINUED | OUTPATIENT
Start: 2023-11-26 | End: 2023-12-04

## 2023-11-26 RX ORDER — INSULIN LISPRO 100/ML
8 VIAL (ML) SUBCUTANEOUS
Refills: 0 | Status: DISCONTINUED | OUTPATIENT
Start: 2023-11-26 | End: 2023-11-27

## 2023-11-26 RX ORDER — ACETAMINOPHEN 500 MG
975 TABLET ORAL EVERY 8 HOURS
Refills: 0 | Status: COMPLETED | OUTPATIENT
Start: 2023-11-26 | End: 2023-11-28

## 2023-11-26 RX ORDER — ALPRAZOLAM 0.25 MG
0.25 TABLET ORAL ONCE
Refills: 0 | Status: DISCONTINUED | OUTPATIENT
Start: 2023-11-26 | End: 2023-11-26

## 2023-11-26 RX ORDER — POTASSIUM CHLORIDE 20 MEQ
20 PACKET (EA) ORAL ONCE
Refills: 0 | Status: COMPLETED | OUTPATIENT
Start: 2023-11-26 | End: 2023-11-26

## 2023-11-26 RX ORDER — FUROSEMIDE 40 MG
20 TABLET ORAL ONCE
Refills: 0 | Status: COMPLETED | OUTPATIENT
Start: 2023-11-26 | End: 2023-11-26

## 2023-11-26 RX ORDER — METOPROLOL TARTRATE 50 MG
12.5 TABLET ORAL ONCE
Refills: 0 | Status: COMPLETED | OUTPATIENT
Start: 2023-11-26 | End: 2023-11-26

## 2023-11-26 RX ADMIN — Medication 10 MILLIGRAM(S): at 05:26

## 2023-11-26 RX ADMIN — GABAPENTIN 300 MILLIGRAM(S): 400 CAPSULE ORAL at 13:07

## 2023-11-26 RX ADMIN — Medication 1000 MILLIGRAM(S): at 18:04

## 2023-11-26 RX ADMIN — Medication 500 MILLIGRAM(S): at 05:26

## 2023-11-26 RX ADMIN — FAMOTIDINE 20 MILLIGRAM(S): 10 INJECTION INTRAVENOUS at 17:34

## 2023-11-26 RX ADMIN — Medication 2: at 17:52

## 2023-11-26 RX ADMIN — POLYETHYLENE GLYCOL 3350 17 GRAM(S): 17 POWDER, FOR SOLUTION ORAL at 11:42

## 2023-11-26 RX ADMIN — GABAPENTIN 300 MILLIGRAM(S): 400 CAPSULE ORAL at 05:26

## 2023-11-26 RX ADMIN — OLANZAPINE 5 MILLIGRAM(S): 15 TABLET, FILM COATED ORAL at 11:45

## 2023-11-26 RX ADMIN — Medication 20 MILLIEQUIVALENT(S): at 10:23

## 2023-11-26 RX ADMIN — Medication 20 MILLIGRAM(S): at 10:23

## 2023-11-26 RX ADMIN — HYDROMORPHONE HYDROCHLORIDE 2 MILLIGRAM(S): 2 INJECTION INTRAMUSCULAR; INTRAVENOUS; SUBCUTANEOUS at 13:44

## 2023-11-26 RX ADMIN — ATORVASTATIN CALCIUM 80 MILLIGRAM(S): 80 TABLET, FILM COATED ORAL at 20:05

## 2023-11-26 RX ADMIN — HYDROMORPHONE HYDROCHLORIDE 2 MILLIGRAM(S): 2 INJECTION INTRAMUSCULAR; INTRAVENOUS; SUBCUTANEOUS at 09:24

## 2023-11-26 RX ADMIN — BUDESONIDE AND FORMOTEROL FUMARATE DIHYDRATE 2 PUFF(S): 160; 4.5 AEROSOL RESPIRATORY (INHALATION) at 05:26

## 2023-11-26 RX ADMIN — SENNA PLUS 2 TABLET(S): 8.6 TABLET ORAL at 20:05

## 2023-11-26 RX ADMIN — Medication 12.5 MILLIGRAM(S): at 05:26

## 2023-11-26 RX ADMIN — Medication 3 MILLILITER(S): at 22:09

## 2023-11-26 RX ADMIN — CHLORHEXIDINE GLUCONATE 1 APPLICATION(S): 213 SOLUTION TOPICAL at 11:09

## 2023-11-26 RX ADMIN — Medication 3 MILLILITER(S): at 05:25

## 2023-11-26 RX ADMIN — Medication 50 MILLIGRAM(S): at 20:05

## 2023-11-26 RX ADMIN — Medication 0.25 MILLIGRAM(S): at 21:54

## 2023-11-26 RX ADMIN — Medication 8 UNIT(S): at 17:51

## 2023-11-26 RX ADMIN — INSULIN GLARGINE 33 UNIT(S): 100 INJECTION, SOLUTION SUBCUTANEOUS at 21:54

## 2023-11-26 RX ADMIN — GABAPENTIN 300 MILLIGRAM(S): 400 CAPSULE ORAL at 20:05

## 2023-11-26 RX ADMIN — BUDESONIDE AND FORMOTEROL FUMARATE DIHYDRATE 2 PUFF(S): 160; 4.5 AEROSOL RESPIRATORY (INHALATION) at 17:34

## 2023-11-26 RX ADMIN — Medication 7 UNIT(S): at 08:16

## 2023-11-26 RX ADMIN — Medication 3 MILLILITER(S): at 17:34

## 2023-11-26 RX ADMIN — HYDROMORPHONE HYDROCHLORIDE 4 MILLIGRAM(S): 2 INJECTION INTRAMUSCULAR; INTRAVENOUS; SUBCUTANEOUS at 03:29

## 2023-11-26 RX ADMIN — HYDROMORPHONE HYDROCHLORIDE 2 MILLIGRAM(S): 2 INJECTION INTRAMUSCULAR; INTRAVENOUS; SUBCUTANEOUS at 08:54

## 2023-11-26 RX ADMIN — HYDROMORPHONE HYDROCHLORIDE 2 MILLIGRAM(S): 2 INJECTION INTRAMUSCULAR; INTRAVENOUS; SUBCUTANEOUS at 13:14

## 2023-11-26 RX ADMIN — Medication 12.5 MILLIGRAM(S): at 08:16

## 2023-11-26 RX ADMIN — Medication 1: at 11:40

## 2023-11-26 RX ADMIN — Medication 400 MILLIGRAM(S): at 17:34

## 2023-11-26 RX ADMIN — Medication 40 MILLIGRAM(S): at 05:26

## 2023-11-26 RX ADMIN — Medication 8 UNIT(S): at 11:40

## 2023-11-26 RX ADMIN — Medication 81 MILLIGRAM(S): at 11:44

## 2023-11-26 RX ADMIN — Medication 3 MILLILITER(S): at 11:43

## 2023-11-26 RX ADMIN — FAMOTIDINE 20 MILLIGRAM(S): 10 INJECTION INTRAVENOUS at 05:26

## 2023-11-26 RX ADMIN — Medication 25 MILLIGRAM(S): at 17:33

## 2023-11-26 RX ADMIN — Medication 975 MILLIGRAM(S): at 20:04

## 2023-11-26 RX ADMIN — HYDROMORPHONE HYDROCHLORIDE 4 MILLIGRAM(S): 2 INJECTION INTRAMUSCULAR; INTRAVENOUS; SUBCUTANEOUS at 04:41

## 2023-11-26 RX ADMIN — Medication 975 MILLIGRAM(S): at 21:25

## 2023-11-26 NOTE — PROGRESS NOTE ADULT - PROBLEM SELECTOR PLAN 1
- Continue with ASA 81mg qD, Atorvastatin 80mg qHS,   - Patient stopped dobutamine gtt this AM. Plan to start Lopressor 12.5 BID starting tomorrow AM (11/25 AM)  - (+) PW => EPW (40/10/0.8)  - transitioned IV Lasix => PO Lasix 40mg daily today, per cardiology  - (+) Pleitez  - monitor I/Os  - Increase activity as tolerated.   - Off Dilaudid PCA pump. Pain mgmt following. On PO Dilaudid PRN, Gabapentin 300mg q8HR, Tylenol PRN per pain management team.  - continue Bowel regimen  - Encourage Chest PT / Pulmonary toileting and Incentive spirometry every 1 hour x 10 while awake.   - Continue with PUD and DVT prophylaxis.   - Shower on POD #5.   Plan of care discussed with attending - Continue with ASA 81mg qD, Atorvastatin 80mg qHS,   - Patient stopped dobutamine gtt 11/24 11/26 increase lopresso to 25 bid  - (+) Pw isolate  - Lasix 40mg po daily today, per cardiology- 11/26 iv lasix 20 iv x 1  - 11/26 d/c Pleitez  - monitor I/Os  - Increase activity as tolerated.   - PO Dilaudid PRN, Gabapentin 300mg q8HR, Tylenol PRN per pain management team.  - continue Bowel regimen  - Encourage Chest PT / Pulmonary toileting and Incentive spirometry every 1 hour x 10 while awake.   - Continue with PUD and DVT prophylaxis.   - Shower on POD #5.   Plan of care discussed with attending

## 2023-11-26 NOTE — PROGRESS NOTE ADULT - PROBLEM SELECTOR PLAN 1
Will continue Lantus to 33u at bedtime.  Will increase Admelog to  8 u before each meal and continue Admelog correction scale coverage. Will continue monitoring FS and Follow up.   Will continue monitoring  blood sugars, will Follow up.  Patient counseled for compliance with consistent low carb diet.

## 2023-11-26 NOTE — PROGRESS NOTE ADULT - SUBJECTIVE AND OBJECTIVE BOX
Chief complaint  Patient is a 58y old  Female who presents with a chief complaint of sob (26 Nov 2023 07:28)         Labs and Fingersticks  CAPILLARY BLOOD GLUCOSE      POCT Blood Glucose.: 190 mg/dL (26 Nov 2023 11:31)  POCT Blood Glucose.: 131 mg/dL (26 Nov 2023 07:31)  POCT Blood Glucose.: 159 mg/dL (25 Nov 2023 21:32)  POCT Blood Glucose.: 159 mg/dL (25 Nov 2023 16:44)      Anion Gap: 13 (11-26 @ 06:53)  Anion Gap: 11 (11-25 @ 06:18)      Calcium: 9.0 (11-26 @ 06:53)  Calcium: 8.9 (11-25 @ 06:18)          11-26    136  |  96  |  21  ----------------------------<  114<H>  3.9   |  27  |  0.69    Ca    9.0      26 Nov 2023 06:53  Phos  3.4     11-26  Mg     2.2     11-26                          9.8    20.25 )-----------( 194      ( 26 Nov 2023 06:53 )             30.9     Medications  MEDICATIONS  (STANDING):  albuterol/ipratropium for Nebulization 3 milliLiter(s) Nebulizer every 6 hours  aspirin enteric coated 81 milliGRAM(s) Oral daily  atorvastatin 80 milliGRAM(s) Oral at bedtime  bisacodyl Suppository 10 milliGRAM(s) Rectal once  budesonide 160 MICROgram(s)/formoterol 4.5 MICROgram(s) Inhaler 2 Puff(s) Inhalation two times a day  chlorhexidine 2% Cloths 1 Application(s) Topical daily  dextrose 50% Injectable 50 milliLiter(s) IV Push every 15 minutes  dextrose 50% Injectable 25 milliLiter(s) IV Push every 15 minutes  dextrose Oral Gel 15 Gram(s) Oral once  famotidine    Tablet 20 milliGRAM(s) Oral two times a day  furosemide    Tablet 40 milliGRAM(s) Oral daily  gabapentin 300 milliGRAM(s) Oral every 8 hours  glucagon  Injectable 1 milliGRAM(s) IntraMuscular once  insulin glargine Injectable (LANTUS) 33 Unit(s) SubCutaneous at bedtime  insulin lispro (ADMELOG) corrective regimen sliding scale   SubCutaneous at bedtime  insulin lispro (ADMELOG) corrective regimen sliding scale   SubCutaneous three times a day before meals  insulin lispro Injectable (ADMELOG) 8 Unit(s) SubCutaneous three times a day before meals  metoprolol tartrate 25 milliGRAM(s) Oral two times a day  polyethylene glycol 3350 17 Gram(s) Oral daily  predniSONE   Tablet 10 milliGRAM(s) Oral daily  senna 2 Tablet(s) Oral at bedtime  traZODone 50 milliGRAM(s) Oral at bedtime      Physical Exam  General: Patient comfortable in bed  Vital Signs Last 12 Hrs  T(F): 99.2 (11-26-23 @ 10:50), Max: 99.2 (11-26-23 @ 10:50)  HR: 88 (11-26-23 @ 10:50) (87 - 99)  BP: 114/67 (11-26-23 @ 10:50) (92/65 - 116/75)  BP(mean): 86 (11-26-23 @ 10:50) (80 - 89)  RR: 18 (11-26-23 @ 10:50) (18 - 18)  SpO2: 100% (11-26-23 @ 10:50) (96% - 100%)    CVS: S1S2   Respiratory: No wheezing, no crepitations  GI: Abdomen soft, bowel sounds positive  Musculoskeletal:  moves all extremities  : Voiding            Chief complaint  Patient is a 58y old  Female who presents with a chief complaint of sob (26 Nov 2023 07:28)         Labs and Fingersticks  CAPILLARY BLOOD GLUCOSE      POCT Blood Glucose.: 190 mg/dL (26 Nov 2023 11:31)  POCT Blood Glucose.: 131 mg/dL (26 Nov 2023 07:31)  POCT Blood Glucose.: 159 mg/dL (25 Nov 2023 21:32)  POCT Blood Glucose.: 159 mg/dL (25 Nov 2023 16:44)      Anion Gap: 13 (11-26 @ 06:53)  Anion Gap: 11 (11-25 @ 06:18)      Calcium: 9.0 (11-26 @ 06:53)  Calcium: 8.9 (11-25 @ 06:18)          11-26    136  |  96  |  21  ----------------------------<  114<H>  3.9   |  27  |  0.69    Ca    9.0      26 Nov 2023 06:53  Phos  3.4     11-26  Mg     2.2     11-26                          9.8    20.25 )-----------( 194      ( 26 Nov 2023 06:53 )             30.9     Medications  MEDICATIONS  (STANDING):  albuterol/ipratropium for Nebulization 3 milliLiter(s) Nebulizer every 6 hours  aspirin enteric coated 81 milliGRAM(s) Oral daily  atorvastatin 80 milliGRAM(s) Oral at bedtime  bisacodyl Suppository 10 milliGRAM(s) Rectal once  budesonide 160 MICROgram(s)/formoterol 4.5 MICROgram(s) Inhaler 2 Puff(s) Inhalation two times a day  chlorhexidine 2% Cloths 1 Application(s) Topical daily  dextrose 50% Injectable 50 milliLiter(s) IV Push every 15 minutes  dextrose 50% Injectable 25 milliLiter(s) IV Push every 15 minutes  dextrose Oral Gel 15 Gram(s) Oral once  famotidine    Tablet 20 milliGRAM(s) Oral two times a day  furosemide    Tablet 40 milliGRAM(s) Oral daily  gabapentin 300 milliGRAM(s) Oral every 8 hours  glucagon  Injectable 1 milliGRAM(s) IntraMuscular once  insulin glargine Injectable (LANTUS) 33 Unit(s) SubCutaneous at bedtime  insulin lispro (ADMELOG) corrective regimen sliding scale   SubCutaneous at bedtime  insulin lispro (ADMELOG) corrective regimen sliding scale   SubCutaneous three times a day before meals  insulin lispro Injectable (ADMELOG) 8 Unit(s) SubCutaneous three times a day before meals  metoprolol tartrate 25 milliGRAM(s) Oral two times a day  polyethylene glycol 3350 17 Gram(s) Oral daily  predniSONE   Tablet 10 milliGRAM(s) Oral daily  senna 2 Tablet(s) Oral at bedtime  traZODone 50 milliGRAM(s) Oral at bedtime      Physical Exam  General: Patient comfortable in bed  Vital Signs Last 12 Hrs  T(F): 99.2 (11-26-23 @ 10:50), Max: 99.2 (11-26-23 @ 10:50)  HR: 88 (11-26-23 @ 10:50) (87 - 99)  BP: 114/67 (11-26-23 @ 10:50) (92/65 - 116/75)  BP(mean): 86 (11-26-23 @ 10:50) (80 - 89)  RR: 18 (11-26-23 @ 10:50) (18 - 18)  SpO2: 100% (11-26-23 @ 10:50) (96% - 100%)    CVS: S1S2   Respiratory: No wheezing, no crepitations  GI: Abdomen soft, bowel sounds positive  Musculoskeletal:  moves all extremities  : Voiding

## 2023-11-26 NOTE — PROGRESS NOTE ADULT - SUBJECTIVE AND OBJECTIVE BOX
VITAL SIGNS-Telemetry:  SR   Vital Signs Last 24 Hrs  T(C): 36.9 (11-26-23 @ 07:07), Max: 37.2 (11-25-23 @ 19:03)  T(F): 98.5 (11-26-23 @ 07:07), Max: 99 (11-25-23 @ 19:03)  HR: 87 (11-26-23 @ 07:07) (87 - 108)  BP: 92/65 (11-26-23 @ 07:07) (92/65 - 122/77)  RR: 18 (11-26-23 @ 07:07) (18 - 18)  SpO2: 97% (11-26-23 @ 07:07) (93% - 100%)         11-25 @ 07:01  -  11-26 @ 07:00  --------------------------------------------------------  IN: 780 mL / OUT: 2000 mL / NET: -1220 mL    Daily     Daily     CAPILLARY BLOOD GLUCOSE  POCT Blood Glucose.: 159 mg/dL (25 Nov 2023 21:32)  POCT Blood Glucose.: 159 mg/dL (25 Nov 2023 16:44)  POCT Blood Glucose.: 154 mg/dL (25 Nov 2023 11:52)  POCT Blood Glucose.: 153 mg/dL (25 Nov 2023 07:48)        Pacing Wires        [  ]   Settings:                                  Isolated  [  x]       PHYSICAL EXAM:  Neurology: alert and oriented x 3, nonfocal, no gross deficits  CV : S1S2  Sternal Wound :  CDI , Stable  Lungs: cta  Abdomen: soft, nontender, nondistended, positive bowel sounds, last bowel movement         Extremities:    tr edema b/l  no calf tenderness     acetaminophen     Tablet .. 650 milliGRAM(s) Oral every 6 hours PRN  albuterol/ipratropium for Nebulization 3 milliLiter(s) Nebulizer every 6 hours  aspirin enteric coated 81 milliGRAM(s) Oral daily  atorvastatin 80 milliGRAM(s) Oral at bedtime  bisacodyl Suppository 10 milliGRAM(s) Rectal once  budesonide 160 MICROgram(s)/formoterol 4.5 MICROgram(s) Inhaler 2 Puff(s) Inhalation two times a day  chlorhexidine 2% Cloths 1 Application(s) Topical daily  dextrose 50% Injectable 50 milliLiter(s) IV Push every 15 minutes  dextrose 50% Injectable 25 milliLiter(s) IV Push every 15 minutes  dextrose Oral Gel 15 Gram(s) Oral once  famotidine    Tablet 20 milliGRAM(s) Oral two times a day  furosemide    Tablet 40 milliGRAM(s) Oral daily  gabapentin 300 milliGRAM(s) Oral every 8 hours  glucagon  Injectable 1 milliGRAM(s) IntraMuscular once  HYDROmorphone   Tablet 2 milliGRAM(s) Oral every 4 hours PRN  HYDROmorphone   Tablet 4 milliGRAM(s) Oral every 4 hours PRN  HYDROmorphone  Injectable 0.25 milliGRAM(s) IV Push every 6 hours PRN  insulin glargine Injectable (LANTUS) 33 Unit(s) SubCutaneous at bedtime  insulin lispro (ADMELOG) corrective regimen sliding scale   SubCutaneous at bedtime  insulin lispro (ADMELOG) corrective regimen sliding scale   SubCutaneous three times a day before meals  insulin lispro Injectable (ADMELOG) 7 Unit(s) SubCutaneous three times a day before meals  metoprolol tartrate 12.5 milliGRAM(s) Oral two times a day  OLANZapine 2.5 milliGRAM(s) Oral daily  polyethylene glycol 3350 17 Gram(s) Oral daily  predniSONE   Tablet 10 milliGRAM(s) Oral daily  senna 2 Tablet(s) Oral at bedtime  traZODone 50 milliGRAM(s) Oral at bedtime      Physical Therapy Rec:   Home  [  ]   Home w/ PT  [  ]  Rehab  [  ]  Discussed with Cardiothoracic Team at AM rounds. VITAL SIGNS-Telemetry:  SR   Vital Signs Last 24 Hrs  T(C): 36.9 (11-26-23 @ 07:07), Max: 37.2 (11-25-23 @ 19:03)  T(F): 98.5 (11-26-23 @ 07:07), Max: 99 (11-25-23 @ 19:03)  HR: 87 (11-26-23 @ 07:07) (87 - 108)  BP: 92/65 (11-26-23 @ 07:07) (92/65 - 122/77)  RR: 18 (11-26-23 @ 07:07) (18 - 18)  SpO2: 97% (11-26-23 @ 07:07) (93% - 100%)         11-25 @ 07:01  -  11-26 @ 07:00  --------------------------------------------------------  IN: 780 mL / OUT: 2000 mL / NET: -1220 mL    Daily     Daily     CAPILLARY BLOOD GLUCOSE  POCT Blood Glucose.: 159 mg/dL (25 Nov 2023 21:32)  POCT Blood Glucose.: 159 mg/dL (25 Nov 2023 16:44)  POCT Blood Glucose.: 154 mg/dL (25 Nov 2023 11:52)  POCT Blood Glucose.: 153 mg/dL (25 Nov 2023 07:48)        Pacing Wires        [  ]   Settings:                                  Isolated  [  x]       PHYSICAL EXAM:  Neurology: alert and oriented x 3, nonfocal, no gross deficits  CV : S1S2  Sternal Wound :  CDI , Stable- prevena - chest binder in place  Lungs: expiratory wheezing bases  Abdomen: soft, nontender, nondistended, positive bowel sounds, last bowel movement       preop  Extremities:    + edema b/l  no calf tenderness     acetaminophen     Tablet .. 650 milliGRAM(s) Oral every 6 hours PRN  albuterol/ipratropium for Nebulization 3 milliLiter(s) Nebulizer every 6 hours  aspirin enteric coated 81 milliGRAM(s) Oral daily  atorvastatin 80 milliGRAM(s) Oral at bedtime  bisacodyl Suppository 10 milliGRAM(s) Rectal once  budesonide 160 MICROgram(s)/formoterol 4.5 MICROgram(s) Inhaler 2 Puff(s) Inhalation two times a day  chlorhexidine 2% Cloths 1 Application(s) Topical daily  dextrose 50% Injectable 50 milliLiter(s) IV Push every 15 minutes  dextrose 50% Injectable 25 milliLiter(s) IV Push every 15 minutes  dextrose Oral Gel 15 Gram(s) Oral once  famotidine    Tablet 20 milliGRAM(s) Oral two times a day  furosemide    Tablet 40 milliGRAM(s) Oral daily  gabapentin 300 milliGRAM(s) Oral every 8 hours  glucagon  Injectable 1 milliGRAM(s) IntraMuscular once  HYDROmorphone   Tablet 2 milliGRAM(s) Oral every 4 hours PRN  HYDROmorphone   Tablet 4 milliGRAM(s) Oral every 4 hours PRN  HYDROmorphone  Injectable 0.25 milliGRAM(s) IV Push every 6 hours PRN  insulin glargine Injectable (LANTUS) 33 Unit(s) SubCutaneous at bedtime  insulin lispro (ADMELOG) corrective regimen sliding scale   SubCutaneous at bedtime  insulin lispro (ADMELOG) corrective regimen sliding scale   SubCutaneous three times a day before meals  insulin lispro Injectable (ADMELOG) 7 Unit(s) SubCutaneous three times a day before meals  metoprolol tartrate 12.5 milliGRAM(s) Oral two times a day  OLANZapine 2.5 milliGRAM(s) Oral daily  polyethylene glycol 3350 17 Gram(s) Oral daily  predniSONE   Tablet 10 milliGRAM(s) Oral daily  senna 2 Tablet(s) Oral at bedtime  traZODone 50 milliGRAM(s) Oral at bedtime      Physical Therapy Rec:   Home  [  ]   Home w/ PT  [  ]  Rehab  [  ]  Discussed with Cardiothoracic Team at AM rounds.

## 2023-11-26 NOTE — PROGRESS NOTE ADULT - PROBLEM SELECTOR PLAN 2
- Continue with ASA 81mg qD, Atorvastatin 80mg qHS   - Patient stopped dobutamine gtt this AM. Plan to start Lopressor 12.5 BID starting tomorrow AM (11/25 AM)  - (+) PW => EPW (40/10/0.8)  - transitioned IV Lasix => PO Lasix 40mg daily today, per cardiology  - (+) Pleitez  - monitor I/Os  - Increase activity as tolerated.   - Off Dilaudid PCA pump. Pain mgmt following. On PO Dilaudid PRN, Gabapentin 300mg q8HR, Tylenol PRN per pain management team.  - continue Bowel regimen  - Encourage Chest PT / Pulmonary toileting and Incentive spirometry every 1 hour x 10 while awake.   - Continue with PUD and DVT prophylaxis.   - Shower on POD #5.   Plan of care discussed with attending same as above

## 2023-11-26 NOTE — PROGRESS NOTE ADULT - ASSESSMENT
58 female smoker with PMH CVA with residual Left-sided weakness/numbness/L gaze deviation, DM2 with b/l peripheral neuropathy, COPD/Asthma, Breast Ca s/p ?RT, Bipolar depression, Rheumatoid Arthritis, Antiphospholipid syndrome, and L eye uveitis/scleritis, Presents to [facility] transferred from [facility]. p/w multiple medical complaints. A/w exacerbation of L-sided subjective weakness/numbness likely 2/2 recrudescence of prior Right BG infarct. Cardiology following for new LV dysfunction since July, pending ischemic eval. GABBY was done showing severe Aortic Insufficiency and moderated MR. Cardiac cath was done showing LAD prox 100% fills via right to left collateral. Patient now transferred to [facility] Dr. Loaiza for evaluation.    HOSPITAL COURSE:   11/14 patient seen and examined at bedside. All labs and imaging to be reviewed by Dr. Loaiza   11/15:VSS, neuro consult for hx cva L sided weakness, Preop for Friday 11/16 Repeat echo shows only mild valve pathology  11/17 No valve surgery planned in light of repeat echo.  Getting MR viability for CAD and depressed LV function.  11/18  vss for rpt gabby mon  11/19  npo after midnight for gabby tomorrow  11/20 VSS NPO for GABBY this am=> severe AR  11/21: s/p CABG LIMA-LAD, AVR(t) - Inspiris 23mm, LAAL with AtriClip 40mm,   +Substernal mediastinal Chest tube and Left pleural Chest tube. Kept Intubated post op. Taken to CTU. Levophed, Vasopressin and Primacor switched to Dobutamine gtt.  Started on PO Amio for afib ppx. +Soliz. On insulin gtt for tight glycemic control to prevent wound infxn.  11/22: POD1. Extubated. Inotropes and Pressors maintained. PCA Hydromorphone initiated by Acute Pain Mgmt team. Insulin gtt maintained per Endocrine. PT/OT evaluation.  11/23 POD 2. PCA Hydromorphone maintained. Insulin gtt maintained, started basal insulin tonight per Endo. Dobutamine gtt continued.   11/24 POD 3. Dobutamine gtt d/c'ed in AM. PCA pumped d/c'ed. MED and Pleural Chest tubes d/c'ed while in CTU. (+) PW to EPM (40/10/0.8). Prevena dressing intact. +Soliz maintained. Monitor I/Os. Back on Insulin gtt for elevated FS >200. Endocrine following. TRANSFERRED TO SDU. Current medication regimen continued.   11/25 VSS - insullin gtt off @ 6am- start low dose BB this am - soliz in place will d/c when pt more mobile.  d/c plan anticipate home PT  11/26 WBC 20 again - afebrile - prevena in place.  will conitue to monitor 58 female smoker with PMH CVA with residual Left-sided weakness/numbness/L gaze deviation, DM2 with b/l peripheral neuropathy, COPD/Asthma, Breast Ca s/p ?RT, Bipolar depression, Rheumatoid Arthritis, Antiphospholipid syndrome, and L eye uveitis/scleritis, Presents to [facility] transferred from [facility]. p/w multiple medical complaints. A/w exacerbation of L-sided subjective weakness/numbness likely 2/2 recrudescence of prior Right BG infarct. Cardiology following for new LV dysfunction since July, pending ischemic eval. GABBY was done showing severe Aortic Insufficiency and moderated MR. Cardiac cath was done showing LAD prox 100% fills via right to left collateral. Patient now transferred to [facility] Dr. Loaiza for evaluation.    HOSPITAL COURSE:   11/14 patient seen and examined at bedside. All labs and imaging to be reviewed by Dr. Loaiza   11/15:VSS, neuro consult for hx cva L sided weakness, Preop for Friday 11/16 Repeat echo shows only mild valve pathology  11/17 No valve surgery planned in light of repeat echo.  Getting MR viability for CAD and depressed LV function.  11/18  vss for rpt gabby mon  11/19  npo after midnight for gabby tomorrow  11/20 VSS NPO for GABBY this am=> severe AR  11/21: s/p CABG LIMA-LAD, AVR(t) - Inspiris 23mm, LAAL with AtriClip 40mm,   +Substernal mediastinal Chest tube and Left pleural Chest tube. Kept Intubated post op. Taken to CTU. Levophed, Vasopressin and Primacor switched to Dobutamine gtt.  Started on PO Amio for afib ppx. +Soliz. On insulin gtt for tight glycemic control to prevent wound infxn.  11/22: POD1. Extubated. Inotropes and Pressors maintained. PCA Hydromorphone initiated by Acute Pain Mgmt team. Insulin gtt maintained per Endocrine. PT/OT evaluation.  11/23 POD 2. PCA Hydromorphone maintained. Insulin gtt maintained, started basal insulin tonight per Endo. Dobutamine gtt continued.   11/24 POD 3. Dobutamine gtt d/c'ed in AM. PCA pumped d/c'ed. MED and Pleural Chest tubes d/c'ed while in CTU. (+) PW to EPM (40/10/0.8). Prevena dressing intact. +Soliz maintained. Monitor I/Os. Back on Insulin gtt for elevated FS >200. Endocrine following. TRANSFERRED TO SDU. Current medication regimen continued.   11/25 VSS - insullin gtt off @ 6am- start low dose BB this am - soliz in place will d/c when pt more mobile.  d/c plan anticipate home PT  11/26 WBC 20 again -om chronic steroids - hx COPD- afebrile - prevena in place.  will conitue to monitor- xtra lasix 20 iv x 1 given - up 6 kgs in weight w/ ++ edema,  40 yr pack current smoker.  nebs.

## 2023-11-26 NOTE — PROGRESS NOTE ADULT - SUBJECTIVE AND OBJECTIVE BOX
DATE OF SERVICE: 11-26-23 @ 09:30    Patient is a 58y old  Female who presents with a chief complaint of sob (26 Nov 2023 07:28)      INTERVAL HISTORY: no complaints     REVIEW OF SYSTEMS:  CONSTITUTIONAL: No weakness  EYES/ENT: No visual changes;  No throat pain   NECK: No pain or stiffness  RESPIRATORY: No cough, wheezing; No shortness of breath  CARDIOVASCULAR: No chest pain or palpitations  GASTROINTESTINAL: No abdominal  pain. No nausea, vomiting, or hematemesis  GENITOURINARY: No dysuria, frequency or hematuria  NEUROLOGICAL: No stroke like symptoms  SKIN: No rashes      MEDICATIONS:  furosemide    Tablet 40 milliGRAM(s) Oral daily  metoprolol tartrate 25 milliGRAM(s) Oral two times a day        PHYSICAL EXAM:  T(C): 36.9 (11-26-23 @ 07:07), Max: 37.2 (11-25-23 @ 19:03)  HR: 89 (11-26-23 @ 07:57) (87 - 108)  BP: 116/75 (11-26-23 @ 07:57) (92/65 - 122/77)  RR: 18 (11-26-23 @ 07:57) (18 - 18)  SpO2: 96% (11-26-23 @ 07:57) (93% - 100%)  Wt(kg): --  I&O's Summary    25 Nov 2023 07:01  -  26 Nov 2023 07:00  --------------------------------------------------------  IN: 780 mL / OUT: 2000 mL / NET: -1220 mL    26 Nov 2023 07:01  -  26 Nov 2023 09:30  --------------------------------------------------------  IN: 360 mL / OUT: 650 mL / NET: -290 mL          Appearance: In no distress	  HEENT:    PERRL, EOMI	  Cardiovascular:  S1 S2, No JVD  Respiratory: Lungs clear to auscultation	  Gastrointestinal:  Soft, Non-tender, + BS	  Vascularature:  No edema of LE  Psychiatric: Appropriate affect   Neuro: no acute focal deficits                               9.8    20.25 )-----------( 194      ( 26 Nov 2023 06:53 )             30.9     11-26    136  |  96  |  21  ----------------------------<  114<H>  3.9   |  27  |  0.69    Ca    9.0      26 Nov 2023 06:53  Phos  3.4     11-26  Mg     2.2     11-26          Labs personally reviewed      ASSESSMENT/PLAN: 	    58F with history of CVA with L sided weakness/numbness/L gaze deviation, DM2 with b/l peripheral neuropathy, COPD/Asthma, Breast Ca s/p ?RT, Bipolar depression, Rheumatoid Arthritis, Antiphospholipid syndrome, L eye uveitis/scleritis, Newly diagnosed DM2 (pt cannot remember which medication she takes for it), and current tobacco use who presents to the hospital with multiple complaints.     1. LV dysfunction  -new mod-severe segmental LV dysfunction in July has not had ischemic workup due to stroke at that time.  -c/w metoprolol and aldactone  -TTE EF 30% now with mod-severe AR  -c/w lasix 40mg PO Daily   -cath shows LAD prox 100% fills via right to left collateral   -CMR 11/17: Mild thinning and hypokinesis of the mid to apical anterior and  anteroseptal segments.  The left ventricular ejection fraction measures  43%.Findings consistent with ischemic cardiomyopathy.  LESLEY with sever AR   - s/p AVR and CABGx1 LIMA-LAD    2. CVA  -history of multiple CVAs and is on eliquis   -CT scan of head negative for any acute findings  -ILR interrogation with no events  -per neuro no plans for MRI       3. Antiphospholipid syndrome  - Resume AC as per CTS          GOOD Aquino DO [facility]  Cardiovascular Medicine  [address], [address]  Office: [phone]  Available via call/text on Microsoft Teams

## 2023-11-26 NOTE — PROGRESS NOTE ADULT - ASSESSMENT
58F smoker with PMH CVA with L sided weakness/numbness/L gaze deviation, DM2 with b/l peripheral neuropathy, COPD/Asthma, Breast Ca s/p ?RT,  Bipolar depression, Rheumatoid Arthritis,  Antiphospholipid syndrome, and L eye uveitis/scleritis.   Assessment  DMT2: 58y Female with DM T2 with hyperglycemia, A1C 7.6% , was on oral meds at home, now postop, now transitioned to basal bolus, now sugars improving.   Severe AR: CTS eval, LESLEY, s/p ct surgery   CAD: on medications, stable, monitored. Cardiac MR viability , postop CABG  HTN: on antihypertensive medications, monitored, asymptomatic.  Obesity: No strict exercise routines, not on any weight loss program, neither on low calorie diet.        Discussed plan and management with Dr Ladarius Rivera NP - TEAMS  Skye Durand MD  Cell: [phone]  Office: [phone]        58F smoker with PMH CVA with L sided weakness/numbness/L gaze deviation, DM2 with b/l peripheral neuropathy, COPD/Asthma, Breast Ca s/p ?RT,  Bipolar depression, Rheumatoid Arthritis,  Antiphospholipid syndrome, and L eye uveitis/scleritis.   Assessment  DMT2: 58y Female with DM T2 with hyperglycemia, A1C 7.6% , was on oral meds at home, now postop, now transitioned to  basal bolus, now sugars improving.   Severe AR: CTS eval, LESLEY, s/p ct surgery   CAD: on medications, stable, monitored. Cardiac MR viability , postop CABG  HTN: on antihypertensive medications, monitored, asymptomatic.  Obesity: No strict exercise routines, not on any weight loss program, neither on low calorie diet.        Discussed plan and management with Dr Ladarius Rivera NP - TEAMS  Skye Durand MD  Cell: [phone]  Office: [phone]

## 2023-11-27 DIAGNOSIS — J43.9 EMPHYSEMA, UNSPECIFIED: ICD-10-CM

## 2023-11-27 DIAGNOSIS — R06.2 WHEEZING: ICD-10-CM

## 2023-11-27 DIAGNOSIS — Z87.09 PERSONAL HISTORY OF OTHER DISEASES OF THE RESPIRATORY SYSTEM: ICD-10-CM

## 2023-11-27 DIAGNOSIS — J45.909 UNSPECIFIED ASTHMA, UNCOMPLICATED: ICD-10-CM

## 2023-11-27 DIAGNOSIS — F31.70 BIPOLAR DISORDER, CURRENTLY IN REMISSION, MOST RECENT EPISODE UNSPECIFIED: ICD-10-CM

## 2023-11-27 LAB
ALBUMIN SERPL ELPH-MCNC: 3.9 G/DL — SIGNIFICANT CHANGE UP (ref 3.3–5)
ALBUMIN SERPL ELPH-MCNC: 3.9 G/DL — SIGNIFICANT CHANGE UP (ref 3.3–5)
ALP SERPL-CCNC: 84 U/L — SIGNIFICANT CHANGE UP (ref 40–120)
ALP SERPL-CCNC: 84 U/L — SIGNIFICANT CHANGE UP (ref 40–120)
ALT FLD-CCNC: 23 U/L — SIGNIFICANT CHANGE UP (ref 10–45)
ALT FLD-CCNC: 23 U/L — SIGNIFICANT CHANGE UP (ref 10–45)
ANION GAP SERPL CALC-SCNC: 17 MMOL/L — SIGNIFICANT CHANGE UP (ref 5–17)
ANION GAP SERPL CALC-SCNC: 17 MMOL/L — SIGNIFICANT CHANGE UP (ref 5–17)
APPEARANCE UR: CLEAR — SIGNIFICANT CHANGE UP
APPEARANCE UR: CLEAR — SIGNIFICANT CHANGE UP
AST SERPL-CCNC: 21 U/L — SIGNIFICANT CHANGE UP (ref 10–40)
AST SERPL-CCNC: 21 U/L — SIGNIFICANT CHANGE UP (ref 10–40)
BACTERIA # UR AUTO: NEGATIVE /HPF — SIGNIFICANT CHANGE UP
BACTERIA # UR AUTO: NEGATIVE /HPF — SIGNIFICANT CHANGE UP
BILIRUB DIRECT SERPL-MCNC: 0.4 MG/DL — HIGH (ref 0–0.3)
BILIRUB DIRECT SERPL-MCNC: 0.4 MG/DL — HIGH (ref 0–0.3)
BILIRUB INDIRECT FLD-MCNC: 2.2 MG/DL — HIGH (ref 0.2–1)
BILIRUB INDIRECT FLD-MCNC: 2.2 MG/DL — HIGH (ref 0.2–1)
BILIRUB SERPL-MCNC: 2.6 MG/DL — HIGH (ref 0.2–1.2)
BILIRUB SERPL-MCNC: 2.6 MG/DL — HIGH (ref 0.2–1.2)
BILIRUB UR-MCNC: NEGATIVE — SIGNIFICANT CHANGE UP
BILIRUB UR-MCNC: NEGATIVE — SIGNIFICANT CHANGE UP
BUN SERPL-MCNC: 24 MG/DL — HIGH (ref 7–23)
BUN SERPL-MCNC: 24 MG/DL — HIGH (ref 7–23)
CALCIUM SERPL-MCNC: 9.3 MG/DL — SIGNIFICANT CHANGE UP (ref 8.4–10.5)
CALCIUM SERPL-MCNC: 9.3 MG/DL — SIGNIFICANT CHANGE UP (ref 8.4–10.5)
CAST: 0 /LPF — SIGNIFICANT CHANGE UP (ref 0–4)
CAST: 0 /LPF — SIGNIFICANT CHANGE UP (ref 0–4)
CHLORIDE SERPL-SCNC: 94 MMOL/L — LOW (ref 96–108)
CHLORIDE SERPL-SCNC: 94 MMOL/L — LOW (ref 96–108)
CO2 SERPL-SCNC: 26 MMOL/L — SIGNIFICANT CHANGE UP (ref 22–31)
CO2 SERPL-SCNC: 26 MMOL/L — SIGNIFICANT CHANGE UP (ref 22–31)
COLOR SPEC: YELLOW — SIGNIFICANT CHANGE UP
COLOR SPEC: YELLOW — SIGNIFICANT CHANGE UP
CREAT SERPL-MCNC: 0.66 MG/DL — SIGNIFICANT CHANGE UP (ref 0.5–1.3)
CREAT SERPL-MCNC: 0.66 MG/DL — SIGNIFICANT CHANGE UP (ref 0.5–1.3)
DIFF PNL FLD: NEGATIVE — SIGNIFICANT CHANGE UP
DIFF PNL FLD: NEGATIVE — SIGNIFICANT CHANGE UP
EGFR: 101 ML/MIN/1.73M2 — SIGNIFICANT CHANGE UP
EGFR: 101 ML/MIN/1.73M2 — SIGNIFICANT CHANGE UP
GLUCOSE BLDC GLUCOMTR-MCNC: 107 MG/DL — HIGH (ref 70–99)
GLUCOSE BLDC GLUCOMTR-MCNC: 107 MG/DL — HIGH (ref 70–99)
GLUCOSE BLDC GLUCOMTR-MCNC: 123 MG/DL — HIGH (ref 70–99)
GLUCOSE BLDC GLUCOMTR-MCNC: 123 MG/DL — HIGH (ref 70–99)
GLUCOSE BLDC GLUCOMTR-MCNC: 164 MG/DL — HIGH (ref 70–99)
GLUCOSE BLDC GLUCOMTR-MCNC: 164 MG/DL — HIGH (ref 70–99)
GLUCOSE BLDC GLUCOMTR-MCNC: 168 MG/DL — HIGH (ref 70–99)
GLUCOSE BLDC GLUCOMTR-MCNC: 168 MG/DL — HIGH (ref 70–99)
GLUCOSE BLDC GLUCOMTR-MCNC: 208 MG/DL — HIGH (ref 70–99)
GLUCOSE BLDC GLUCOMTR-MCNC: 208 MG/DL — HIGH (ref 70–99)
GLUCOSE SERPL-MCNC: 106 MG/DL — HIGH (ref 70–99)
GLUCOSE SERPL-MCNC: 106 MG/DL — HIGH (ref 70–99)
GLUCOSE UR QL: NEGATIVE MG/DL — SIGNIFICANT CHANGE UP
GLUCOSE UR QL: NEGATIVE MG/DL — SIGNIFICANT CHANGE UP
HCT VFR BLD CALC: 31.3 % — LOW (ref 34.5–45)
HCT VFR BLD CALC: 31.3 % — LOW (ref 34.5–45)
HGB BLD-MCNC: 9.8 G/DL — LOW (ref 11.5–15.5)
HGB BLD-MCNC: 9.8 G/DL — LOW (ref 11.5–15.5)
KETONES UR-MCNC: NEGATIVE MG/DL — SIGNIFICANT CHANGE UP
KETONES UR-MCNC: NEGATIVE MG/DL — SIGNIFICANT CHANGE UP
LEUKOCYTE ESTERASE UR-ACNC: ABNORMAL
LEUKOCYTE ESTERASE UR-ACNC: ABNORMAL
MCHC RBC-ENTMCNC: 30.3 PG — SIGNIFICANT CHANGE UP (ref 27–34)
MCHC RBC-ENTMCNC: 30.3 PG — SIGNIFICANT CHANGE UP (ref 27–34)
MCHC RBC-ENTMCNC: 31.3 GM/DL — LOW (ref 32–36)
MCHC RBC-ENTMCNC: 31.3 GM/DL — LOW (ref 32–36)
MCV RBC AUTO: 96.9 FL — SIGNIFICANT CHANGE UP (ref 80–100)
MCV RBC AUTO: 96.9 FL — SIGNIFICANT CHANGE UP (ref 80–100)
NITRITE UR-MCNC: NEGATIVE — SIGNIFICANT CHANGE UP
NITRITE UR-MCNC: NEGATIVE — SIGNIFICANT CHANGE UP
NRBC # BLD: 0 /100 WBCS — SIGNIFICANT CHANGE UP (ref 0–0)
NRBC # BLD: 0 /100 WBCS — SIGNIFICANT CHANGE UP (ref 0–0)
PH UR: 6 — SIGNIFICANT CHANGE UP (ref 5–8)
PH UR: 6 — SIGNIFICANT CHANGE UP (ref 5–8)
PLATELET # BLD AUTO: 256 K/UL — SIGNIFICANT CHANGE UP (ref 150–400)
PLATELET # BLD AUTO: 256 K/UL — SIGNIFICANT CHANGE UP (ref 150–400)
POTASSIUM SERPL-MCNC: 3.6 MMOL/L — SIGNIFICANT CHANGE UP (ref 3.5–5.3)
POTASSIUM SERPL-MCNC: 3.6 MMOL/L — SIGNIFICANT CHANGE UP (ref 3.5–5.3)
POTASSIUM SERPL-SCNC: 3.6 MMOL/L — SIGNIFICANT CHANGE UP (ref 3.5–5.3)
POTASSIUM SERPL-SCNC: 3.6 MMOL/L — SIGNIFICANT CHANGE UP (ref 3.5–5.3)
PROT SERPL-MCNC: 6.9 G/DL — SIGNIFICANT CHANGE UP (ref 6–8.3)
PROT SERPL-MCNC: 6.9 G/DL — SIGNIFICANT CHANGE UP (ref 6–8.3)
PROT UR-MCNC: NEGATIVE MG/DL — SIGNIFICANT CHANGE UP
PROT UR-MCNC: NEGATIVE MG/DL — SIGNIFICANT CHANGE UP
RBC # BLD: 3.23 M/UL — LOW (ref 3.8–5.2)
RBC # BLD: 3.23 M/UL — LOW (ref 3.8–5.2)
RBC # FLD: 16.5 % — HIGH (ref 10.3–14.5)
RBC # FLD: 16.5 % — HIGH (ref 10.3–14.5)
RBC CASTS # UR COMP ASSIST: 2 /HPF — SIGNIFICANT CHANGE UP (ref 0–4)
RBC CASTS # UR COMP ASSIST: 2 /HPF — SIGNIFICANT CHANGE UP (ref 0–4)
SODIUM SERPL-SCNC: 137 MMOL/L — SIGNIFICANT CHANGE UP (ref 135–145)
SODIUM SERPL-SCNC: 137 MMOL/L — SIGNIFICANT CHANGE UP (ref 135–145)
SP GR SPEC: 1.02 — SIGNIFICANT CHANGE UP (ref 1–1.03)
SP GR SPEC: 1.02 — SIGNIFICANT CHANGE UP (ref 1–1.03)
SQUAMOUS # UR AUTO: 6 /HPF — HIGH (ref 0–5)
SQUAMOUS # UR AUTO: 6 /HPF — HIGH (ref 0–5)
UROBILINOGEN FLD QL: 1 MG/DL — SIGNIFICANT CHANGE UP (ref 0.2–1)
UROBILINOGEN FLD QL: 1 MG/DL — SIGNIFICANT CHANGE UP (ref 0.2–1)
WBC # BLD: 23.62 K/UL — HIGH (ref 3.8–10.5)
WBC # BLD: 23.62 K/UL — HIGH (ref 3.8–10.5)
WBC # FLD AUTO: 23.62 K/UL — HIGH (ref 3.8–10.5)
WBC # FLD AUTO: 23.62 K/UL — HIGH (ref 3.8–10.5)
WBC UR QL: 2 /HPF — SIGNIFICANT CHANGE UP (ref 0–5)
WBC UR QL: 2 /HPF — SIGNIFICANT CHANGE UP (ref 0–5)

## 2023-11-27 PROCEDURE — 71045 X-RAY EXAM CHEST 1 VIEW: CPT | Mod: 26

## 2023-11-27 PROCEDURE — 93010 ELECTROCARDIOGRAM REPORT: CPT

## 2023-11-27 PROCEDURE — 99221 1ST HOSP IP/OBS SF/LOW 40: CPT

## 2023-11-27 PROCEDURE — 71250 CT THORAX DX C-: CPT | Mod: 26

## 2023-11-27 RX ORDER — INSULIN LISPRO 100/ML
9 VIAL (ML) SUBCUTANEOUS
Refills: 0 | Status: DISCONTINUED | OUTPATIENT
Start: 2023-11-27 | End: 2023-12-04

## 2023-11-27 RX ORDER — POTASSIUM CHLORIDE 20 MEQ
20 PACKET (EA) ORAL
Refills: 0 | Status: COMPLETED | OUTPATIENT
Start: 2023-11-27 | End: 2023-11-27

## 2023-11-27 RX ORDER — SORBITOL SOLUTION 70 %
30 SOLUTION, ORAL MISCELLANEOUS ONCE
Refills: 0 | Status: COMPLETED | OUTPATIENT
Start: 2023-11-27 | End: 2023-11-30

## 2023-11-27 RX ORDER — ACETAMINOPHEN 500 MG
1000 TABLET ORAL ONCE
Refills: 0 | Status: COMPLETED | OUTPATIENT
Start: 2023-11-27 | End: 2023-11-27

## 2023-11-27 RX ORDER — INSULIN GLARGINE 100 [IU]/ML
35 INJECTION, SOLUTION SUBCUTANEOUS AT BEDTIME
Refills: 0 | Status: DISCONTINUED | OUTPATIENT
Start: 2023-11-27 | End: 2023-11-29

## 2023-11-27 RX ORDER — INSULIN LISPRO 100/ML
9 VIAL (ML) SUBCUTANEOUS ONCE
Refills: 0 | Status: COMPLETED | OUTPATIENT
Start: 2023-11-27 | End: 2023-11-27

## 2023-11-27 RX ADMIN — ATORVASTATIN CALCIUM 80 MILLIGRAM(S): 80 TABLET, FILM COATED ORAL at 21:35

## 2023-11-27 RX ADMIN — GABAPENTIN 300 MILLIGRAM(S): 400 CAPSULE ORAL at 05:03

## 2023-11-27 RX ADMIN — Medication 3 MILLILITER(S): at 23:30

## 2023-11-27 RX ADMIN — Medication 975 MILLIGRAM(S): at 15:01

## 2023-11-27 RX ADMIN — Medication 81 MILLIGRAM(S): at 11:44

## 2023-11-27 RX ADMIN — Medication 400 MILLIGRAM(S): at 15:16

## 2023-11-27 RX ADMIN — Medication 1000 MILLIGRAM(S): at 15:46

## 2023-11-27 RX ADMIN — HYDROMORPHONE HYDROCHLORIDE 4 MILLIGRAM(S): 2 INJECTION INTRAMUSCULAR; INTRAVENOUS; SUBCUTANEOUS at 23:23

## 2023-11-27 RX ADMIN — Medication 3 MILLILITER(S): at 17:12

## 2023-11-27 RX ADMIN — Medication 25 MILLIGRAM(S): at 17:13

## 2023-11-27 RX ADMIN — OLANZAPINE 7.5 MILLIGRAM(S): 15 TABLET, FILM COATED ORAL at 11:44

## 2023-11-27 RX ADMIN — Medication 975 MILLIGRAM(S): at 22:08

## 2023-11-27 RX ADMIN — Medication 975 MILLIGRAM(S): at 06:34

## 2023-11-27 RX ADMIN — HYDROMORPHONE HYDROCHLORIDE 2 MILLIGRAM(S): 2 INJECTION INTRAMUSCULAR; INTRAVENOUS; SUBCUTANEOUS at 11:42

## 2023-11-27 RX ADMIN — Medication 9 UNIT(S): at 17:10

## 2023-11-27 RX ADMIN — Medication 10 MILLIGRAM(S): at 05:03

## 2023-11-27 RX ADMIN — BUDESONIDE AND FORMOTEROL FUMARATE DIHYDRATE 2 PUFF(S): 160; 4.5 AEROSOL RESPIRATORY (INHALATION) at 17:12

## 2023-11-27 RX ADMIN — Medication 50 MILLIGRAM(S): at 21:36

## 2023-11-27 RX ADMIN — FAMOTIDINE 20 MILLIGRAM(S): 10 INJECTION INTRAVENOUS at 05:03

## 2023-11-27 RX ADMIN — Medication 975 MILLIGRAM(S): at 21:37

## 2023-11-27 RX ADMIN — Medication 3 MILLILITER(S): at 11:43

## 2023-11-27 RX ADMIN — Medication 1: at 11:44

## 2023-11-27 RX ADMIN — Medication 20 MILLIEQUIVALENT(S): at 11:43

## 2023-11-27 RX ADMIN — FAMOTIDINE 20 MILLIGRAM(S): 10 INJECTION INTRAVENOUS at 17:12

## 2023-11-27 RX ADMIN — SENNA PLUS 2 TABLET(S): 8.6 TABLET ORAL at 21:36

## 2023-11-27 RX ADMIN — Medication 975 MILLIGRAM(S): at 14:31

## 2023-11-27 RX ADMIN — Medication 20 MILLIEQUIVALENT(S): at 15:00

## 2023-11-27 RX ADMIN — Medication 975 MILLIGRAM(S): at 05:02

## 2023-11-27 RX ADMIN — Medication 9 UNIT(S): at 09:52

## 2023-11-27 RX ADMIN — HYDROMORPHONE HYDROCHLORIDE 4 MILLIGRAM(S): 2 INJECTION INTRAMUSCULAR; INTRAVENOUS; SUBCUTANEOUS at 23:53

## 2023-11-27 RX ADMIN — GABAPENTIN 300 MILLIGRAM(S): 400 CAPSULE ORAL at 14:31

## 2023-11-27 RX ADMIN — GABAPENTIN 300 MILLIGRAM(S): 400 CAPSULE ORAL at 21:37

## 2023-11-27 RX ADMIN — Medication 9 UNIT(S): at 11:45

## 2023-11-27 RX ADMIN — Medication 40 MILLIGRAM(S): at 05:03

## 2023-11-27 RX ADMIN — CHLORHEXIDINE GLUCONATE 1 APPLICATION(S): 213 SOLUTION TOPICAL at 11:38

## 2023-11-27 RX ADMIN — Medication 25 MILLIGRAM(S): at 05:02

## 2023-11-27 RX ADMIN — BUDESONIDE AND FORMOTEROL FUMARATE DIHYDRATE 2 PUFF(S): 160; 4.5 AEROSOL RESPIRATORY (INHALATION) at 05:02

## 2023-11-27 RX ADMIN — INSULIN GLARGINE 35 UNIT(S): 100 INJECTION, SOLUTION SUBCUTANEOUS at 21:37

## 2023-11-27 RX ADMIN — HYDROMORPHONE HYDROCHLORIDE 2 MILLIGRAM(S): 2 INJECTION INTRAMUSCULAR; INTRAVENOUS; SUBCUTANEOUS at 12:12

## 2023-11-27 RX ADMIN — Medication 3 MILLILITER(S): at 05:02

## 2023-11-27 RX ADMIN — POLYETHYLENE GLYCOL 3350 17 GRAM(S): 17 POWDER, FOR SOLUTION ORAL at 11:44

## 2023-11-27 RX ADMIN — Medication 1: at 08:05

## 2023-11-27 NOTE — CONSULT NOTE ADULT - SUBJECTIVE AND OBJECTIVE BOX
PULMONARY CONSULT    HPI: 59 y/o F with PMH smoker with PMH CVA with L sided weakness/numbness/L gaze deviation, DM2 with b/l peripheral neuropathy, COPD/emphysema, asthma, RA (on prednisone), Breast Ca s/p ?RT, Bipolar depression, Rheumatoid Arthritis, Antiphospholipid syndrome, and L eye uveitis/scleritis. Presents to [facility] transferred from [facility]. Initially presents with exacerbation of L-sided subjective weakness/numbness likely 2/2 recrudescence of prior Right BG infarct. Cardiology following for new LV dysfunction since July, pending ischemic eval. LESLEY was done showing severe Aortic Insufficiency and moderated MR. Cardiac cath was done showing LAD prox 100% fills via right to left collateral. Tx for surgical evaluation. S/p CABG, AVR on 11/21. Called to consult for wheezing. Per pt she is SOB and intermittently wheezing. Endorses difficult to expectorate secretions. Pt states she is unable to take a deep breath due to incisional pain.           PAST MEDICAL & SURGICAL HISTORY:  Cigarette smoker  Rheumatoid arthritis  Other depression  Breast cancer  Migraines  History of multiple cerebrovascular accidents (CVAs)  Suicidal ideation  Paresthesia of left arm  Facial paresthesia  APS (antiphospholipid syndrome)  History of depressed bipolar disorder  History of COPD  History of hysterectomy  History of cholecystectomy      Allergies  No Known Allergies      FAMILY HISTORY:  Family history of breast cancer (Mother)  Family history of diabetes mellitus (DM) (Father)      Social history: former smoker - quit prior to current surgery     Review of Systems:  CONSTITUTIONAL: No fever, chills, or fatigue  EYES: No eye pain, visual disturbances, or discharge  ENMT:  No difficulty hearing, tinnitus, vertigo; No sinus or throat pain  NECK: No pain or stiffness  RESPIRATORY: Per above  CARDIOVASCULAR: Per above   GASTROINTESTINAL: No abdominal or epigastric pain. No nausea, vomiting, or hematemesis; No diarrhea or constipation. No melena or hematochezia.  GENITOURINARY: No dysuria, frequency, hematuria, or incontinence  NEUROLOGICAL: No headaches, memory loss, loss of strength, numbness, or tremors  SKIN: No itching, burning, rashes, or lesions   MUSCULOSKELETAL: No joint pain or swelling; No muscle, back, or extremity pain  PSYCHIATRIC: No depression, anxiety, mood swings, or difficulty sleeping      Medications:  MEDICATIONS  (STANDING):  acetaminophen     Tablet .. 975 milliGRAM(s) Oral every 8 hours  albuterol/ipratropium for Nebulization 3 milliLiter(s) Nebulizer every 6 hours  aspirin enteric coated 81 milliGRAM(s) Oral daily  atorvastatin 80 milliGRAM(s) Oral at bedtime  bisacodyl Suppository 10 milliGRAM(s) Rectal once  budesonide 160 MICROgram(s)/formoterol 4.5 MICROgram(s) Inhaler 2 Puff(s) Inhalation two times a day  chlorhexidine 2% Cloths 1 Application(s) Topical daily  dextrose 50% Injectable 50 milliLiter(s) IV Push every 15 minutes  dextrose 50% Injectable 25 milliLiter(s) IV Push every 15 minutes  dextrose Oral Gel 15 Gram(s) Oral once  famotidine    Tablet 20 milliGRAM(s) Oral two times a day  furosemide    Tablet 40 milliGRAM(s) Oral daily  gabapentin 300 milliGRAM(s) Oral every 8 hours  glucagon  Injectable 1 milliGRAM(s) IntraMuscular once  insulin glargine Injectable (LANTUS) 35 Unit(s) SubCutaneous at bedtime  insulin lispro (ADMELOG) corrective regimen sliding scale   SubCutaneous at bedtime  insulin lispro (ADMELOG) corrective regimen sliding scale   SubCutaneous three times a day before meals  insulin lispro Injectable (ADMELOG) 9 Unit(s) SubCutaneous three times a day before meals  metoprolol tartrate 25 milliGRAM(s) Oral two times a day  OLANZapine 7.5 milliGRAM(s) Oral daily  polyethylene glycol 3350 17 Gram(s) Oral daily  potassium chloride    Tablet ER 20 milliEquivalent(s) Oral every 2 hours  predniSONE   Tablet 10 milliGRAM(s) Oral daily  senna 2 Tablet(s) Oral at bedtime  sorbitol 70% Solution 30 milliLiter(s) Oral once  traZODone 50 milliGRAM(s) Oral at bedtime    MEDICATIONS  (PRN):  HYDROmorphone   Tablet 4 milliGRAM(s) Oral every 4 hours PRN Severe Pain (7 - 10)  HYDROmorphone   Tablet 2 milliGRAM(s) Oral every 4 hours PRN Moderate Pain (4 - 6)            Vital Signs Last 24 Hrs  T(C): 36.6 (27 Nov 2023 07:38), Max: 36.9 (26 Nov 2023 15:20)  T(F): 97.9 (27 Nov 2023 07:38), Max: 98.5 (26 Nov 2023 15:20)  HR: 79 (27 Nov 2023 07:38) (79 - 93)  BP: 106/55 (27 Nov 2023 07:38) (106/55 - 118/66)  BP(mean): 71 (27 Nov 2023 07:38) (71 - 93)  RR: 18 (27 Nov 2023 07:38) (18 - 18)  SpO2: 94% (27 Nov 2023 07:38) (94% - 99%)    Parameters below as of 27 Nov 2023 07:38  Patient On (Oxygen Delivery Method): nasal cannula  O2 Flow (L/min): 2              11-26 @ 07:01  -  11-27 @ 07:00  --------------------------------------------------------  IN: 780 mL / OUT: 3950 mL / NET: -3170 mL          LABS:                        9.8    23.62 )-----------( 256      ( 27 Nov 2023 06:52 )             31.3     11-27    137  |  94<L>  |  24<H>  ----------------------------<  106<H>  3.6   |  26  |  0.66    Ca    9.3      27 Nov 2023 06:50  Phos  3.4     11-26  Mg     2.2     11-26    TPro  6.9  /  Alb  3.9  /  TBili  2.6<H>  /  DBili  0.4<H>  /  AST  21  /  ALT  23  /  AlkPhos  84  11-27          CAPILLARY BLOOD GLUCOSE      POCT Blood Glucose.: 208 mg/dL (27 Nov 2023 09:29)      Urinalysis Basic - ( 27 Nov 2023 06:50 )    Color: x / Appearance: x / SG: x / pH: x  Gluc: 106 mg/dL / Ketone: x  / Bili: x / Urobili: x   Blood: x / Protein: x / Nitrite: x   Leuk Esterase: x / RBC: x / WBC x   Sq Epi: x / Non Sq Epi: x / Bacteria: x          GEORGE Negative 11-09 @ 06:15  Anti RBP 0.2 11-09 @ 06:15  Anti SS-A <0.2 11-09 @ 06:15  Anti SS-B <0.2 11-09 @ 06:15   11-09 @ 06:15    Atypical ANCA -- 11-09 @ 06:15  c-ANCA titer -- 11-09 @ 06:15  c-ANCA -- 11-09 @ 06:15  p-ANCA -- 11-09 @ 06:15                Physical Examination:    General: No acute distress.      HEENT: Pupils equal, reactive to light.  Symmetric.    PULM: few scattered rhonchi, no wheezing     CVS: S1, S2    ABD: Soft, nondistended, nontender, normoactive bowel sounds, no masses    EXT:  +1-2 bilateral LE edema     SKIN: Warm and well perfused, no rashes noted.    NEURO: Alert, oriented, interactive, nonfocal    RADIOLOGY REVIEWED  CXR: mild congestion     CT chest:    < from: CT Angio Chest PE Protocol w/ IV Cont (09.18.23 @ 01:40) >    FINDINGS:    LUNGS AND AIRWAYS: Patent central airways.  Centrilobular and to a lesser   extent paraseptal emphysema, upper lobe predominant. Subpleural reticular   changes and thin linear bands bilaterally. Clustered cystic airspaces in   the bilateral lower lobes suspicious for honeycombing. No focal   consolidation. Bilateral lower lobe subsegmental atelectasis.  PLEURA: No pleural effusion.  MEDIASTINUM AND YADIEL: No lymphadenopathy.  VESSELS: No pulmonary embolism.  HEART: Heart size is normal. No pericardial effusion.  CHEST WALL AND LOWER NECK: Enlarged right thyroid lobe with a coarse   calcification. Left anterior chest wall loop recorder.  VISUALIZED UPPER ABDOMEN: Cholecystectomy. Large cyst within the   partially imaged right kidney. Small hiatal hernia.  BONES: Degenerative changes.    IMPRESSION:  No pulmonary embolism.    Upper lobe predominant emphysema. Additional clustered cystic airspaces   in a lower lobe predominance concerning for honeycombing and raising   specter chronic interstitial lung disease.    --- End of Report ---    < end of copied text >

## 2023-11-27 NOTE — PROGRESS NOTE ADULT - SUBJECTIVE AND OBJECTIVE BOX
INCOMPLETE SUBJECTIVE: "Hi. I want to speak to someone from psychiatry". Pt reports hx of Bipolar MDD, with no follow-up with Psychiatry since 2023. States she wants to get better, requesting to speak to Psychiatry. Denies any SI/HI/AH/VH at this time. Also denies any acute chest pain, palpitation, shortness of breath, abdominal pain, n/v, fever, chills, or dizziness at this time. No acute overnight events reported.    TELEMETRY:  SR     VITAL SIGNS:  Vital Signs Last 24 Hrs  T(C): 36.6 (23 @ 07:38), Max: 37.3 (23 @ 10:50)  T(F): 97.9 (23 @ :38), Max: 99.2 (23 @ 10:50)  HR: 79 (23:38) (79 - 93)  BP: 106/55 (23 @ 07:38) (106/55 - 118/66)  RR: 18 (23 07:38) (18 - 18)  SpO2: 94% (23 @ 07:38) (94% - 100%)           INPUT/OUTPUT:     @ 07:  -   @ 07:00  --------------------------------------------------------  IN: 780 mL / OUT: 3950 mL / NET: -3170 mL     @ 07:01  -   @ 10:40  --------------------------------------------------------  IN: 460 mL / OUT: 400 mL / NET: 60 mL        LABS:      137  |  94<L>  |  24<H>  ----------------------------<  106<H>  3.6   |  26  |  0.66    Ca    9.3      2023 06:50  Phos  3.4       Mg     2.2         TPro  6.9  /  Alb  3.9  /  TBili  2.6<H>  /  DBili  0.4<H>  /  AST  21  /  ALT  23  /  AlkPhos  84                          9.8    23.62 )-----------( 256      ( 2023 06:52 )             31.3              Daily Weight in k.9 (2023 07:00)      CAPILLARY BLOOD GLUCOSE  POCT Blood Glucose.: 208 mg/dL (2023 09:29)  POCT Blood Glucose.: 164 mg/dL (2023 07:53)  POCT Blood Glucose.: 184 mg/dL (2023 21:17)  POCT Blood Glucose.: 246 mg/dL (2023 17:48)  POCT Blood Glucose.: 133 mg/dL (2023 16:29)  POCT Blood Glucose.: 190 mg/dL (2023 11:31)          PHYSICAL EXAM:  Neurology: alert and oriented x 3, nonfocal, no gross deficits  CV : S1S2; (+) PW (isolated)  Sternal Wound :  CDI , Stable; (+)prevena - chest binder in place  Lungs: expiratory wheezing bases  Abdomen: soft, nontender, nondistended, positive bowel sounds, (+) flatus  Extremities:    + edema b/l  no calf tenderness     ACTIVE MEDICATIONS:  acetaminophen Tablet .. 975 milliGRAM(s) Oral every 8 hours  albuterol/ipratropium for Nebulization 3 milliLiter(s) Nebulizer every 6 hours  aspirin enteric coated 81 milliGRAM(s) Oral daily  atorvastatin 80 milliGRAM(s) Oral at bedtime  bisacodyl Suppository 10 milliGRAM(s) Rectal once  budesonide 160 MICROgram(s)/formoterol 4.5 MICROgram(s) Inhaler 2 Puff(s) Inhalation two times a day  chlorhexidine 2% Cloths 1 Application(s) Topical daily  dextrose 50% Injectable 50 milliLiter(s) IV Push every 15 minutes  dextrose 50% Injectable 25 milliLiter(s) IV Push every 15 minutes  dextrose Oral Gel 15 Gram(s) Oral once  famotidine Tablet 20 milliGRAM(s) Oral two times a day  furosemide Tablet 40 milliGRAM(s) Oral daily  gabapentin 300 milliGRAM(s) Oral every 8 hours  glucagon  Injectable 1 milliGRAM(s) IntraMuscular once  HYDROmorphone   Tablet 4 milliGRAM(s) Oral every 4 hours PRN  HYDROmorphone   Tablet 2 milliGRAM(s) Oral every 4 hours PRN  insulin glargine Injectable (LANTUS) 35 Unit(s) SubCutaneous at bedtime  insulin lispro (ADMELOG) corrective regimen sliding scale   SubCutaneous at bedtime  insulin lispro (ADMELOG) corrective regimen sliding scale   SubCutaneous three times a day before meals  insulin lispro Injectable (ADMELOG) 9 Unit(s) SubCutaneous three times a day before meals  metoprolol tartrate 25 milliGRAM(s) Oral two times a day  OLANZapine 7.5 milliGRAM(s) Oral daily  polyethylene glycol 3350 17 Gram(s) Oral daily  predniSONE   Tablet 10 milliGRAM(s) Oral daily  senna 2 Tablet(s) Oral at bedtime  sorbitol 70% Solution 30 milliLiter(s) Oral once  traZODone 50 milliGRAM(s) Oral at bedtime      Case discussed in detail with Cardiothoracic Team and Attending Dr. Loaiza. Plan below as per discussion.

## 2023-11-27 NOTE — BH CONSULTATION LIAISON ASSESSMENT NOTE - ADDITIONAL DETAILS ALL
Patient stating that she is not experiencing suicidal ideation, does recount one incident where she tried to overdose on unspecified pills. This was a number of years ago. Patient stating that she is not experiencing suicidal ideation, does recount one incident where she tried to overdose on unspecified pills many years ago.

## 2023-11-27 NOTE — PROGRESS NOTE ADULT - SUBJECTIVE AND OBJECTIVE BOX
Neurology        S: patient seen doing okay,  on floor ; doing okay.          Medications: MEDICATIONS  (STANDING):  acetaminophen     Tablet .. 975 milliGRAM(s) Oral every 8 hours  albuterol/ipratropium for Nebulization 3 milliLiter(s) Nebulizer every 6 hours  aspirin enteric coated 81 milliGRAM(s) Oral daily  atorvastatin 80 milliGRAM(s) Oral at bedtime  bisacodyl Suppository 10 milliGRAM(s) Rectal once  budesonide 160 MICROgram(s)/formoterol 4.5 MICROgram(s) Inhaler 2 Puff(s) Inhalation two times a day  chlorhexidine 2% Cloths 1 Application(s) Topical daily  dextrose 50% Injectable 50 milliLiter(s) IV Push every 15 minutes  dextrose 50% Injectable 25 milliLiter(s) IV Push every 15 minutes  dextrose Oral Gel 15 Gram(s) Oral once  famotidine    Tablet 20 milliGRAM(s) Oral two times a day  furosemide    Tablet 40 milliGRAM(s) Oral daily  gabapentin 300 milliGRAM(s) Oral every 8 hours  glucagon  Injectable 1 milliGRAM(s) IntraMuscular once  insulin glargine Injectable (LANTUS) 35 Unit(s) SubCutaneous at bedtime  insulin lispro (ADMELOG) corrective regimen sliding scale   SubCutaneous at bedtime  insulin lispro (ADMELOG) corrective regimen sliding scale   SubCutaneous three times a day before meals  insulin lispro Injectable (ADMELOG) 9 Unit(s) SubCutaneous three times a day before meals  metoprolol tartrate 25 milliGRAM(s) Oral two times a day  OLANZapine 7.5 milliGRAM(s) Oral daily  polyethylene glycol 3350 17 Gram(s) Oral daily  predniSONE   Tablet 10 milliGRAM(s) Oral daily  senna 2 Tablet(s) Oral at bedtime  sorbitol 70% Solution 30 milliLiter(s) Oral once  traZODone 50 milliGRAM(s) Oral at bedtime    MEDICATIONS  (PRN):  HYDROmorphone   Tablet 2 milliGRAM(s) Oral every 4 hours PRN Moderate Pain (4 - 6)  HYDROmorphone   Tablet 4 milliGRAM(s) Oral every 4 hours PRN Severe Pain (7 - 10)       Vitals:  Vital Signs Last 24 Hrs  T(C): 36.6 (27 Nov 2023 07:38), Max: 37.3 (26 Nov 2023 10:50)  T(F): 97.9 (27 Nov 2023 07:38), Max: 99.2 (26 Nov 2023 10:50)  HR: 79 (27 Nov 2023 07:38) (79 - 93)  BP: 106/55 (27 Nov 2023 07:38) (106/55 - 118/66)  BP(mean): 71 (27 Nov 2023 07:38) (71 - 93)  RR: 18 (27 Nov 2023 07:38) (18 - 18)  SpO2: 94% (27 Nov 2023 07:38) (94% - 100%)    Parameters below as of 27 Nov 2023 07:38  Patient On (Oxygen Delivery Method): nasal cannula  O2 Flow (L/min): 2                General Exam:   General Appearance: Appropriately dressed and in no acute distress       Head: Normocephalic, atraumatic and no dysmorphic features  Ear, Nose, and Throat: Moist mucous membranes  CVS: S1S2+  Resp: No SOB, no wheeze or rhonchi  GI: soft NT/ND  Extremities: No edema or cyanosis  Skin: No bruises or rashes     Neurological Exam:  Mental Status: Awake, alert and oriented x 3.  Able to follow simple and complex verbal commands. Able to name and repeat. fluent speech. No obvious aphasia or dysarthria noted.   Cranial Nerves: PERRL, dysconugate gaze , VFFC, sensation V1-V3 decrease on L,  L facial asymmetry, equal elevation of palate, scm/trap 5/5, tongue is midline on protrusion. no obvious papilledema on fundoscopic exam. hearing is grossly intact.   Motor: Normal bulk, tone and strength throughout except mild L HP 5-/5   Sensation: decrease on L compared to R   Reflexes: 1+ throughout at biceps, brachioradialis, triceps, patellars and ankles bilaterally and equal. No clonus. R toe and L toe were both downgoing.  Coordination: No dysmetria on FNF   Gait: cane baseline     Data/Labs/Imaging which I personally reviewed.         LABS:                          9.8    23.62 )-----------( 256      ( 27 Nov 2023 06:52 )             31.3     11-27    137  |  94<L>  |  24<H>  ----------------------------<  106<H>  3.6   |  26  |  0.66    Ca    9.3      27 Nov 2023 06:50  Phos  3.4     11-26  Mg     2.2     11-26    TPro  6.9  /  Alb  3.9  /  TBili  2.6<H>  /  DBili  0.4<H>  /  AST  21  /  ALT  23  /  AlkPhos  84  11-27    LIVER FUNCTIONS - ( 27 Nov 2023 06:50 )  Alb: 3.9 g/dL / Pro: 6.9 g/dL / ALK PHOS: 84 U/L / ALT: 23 U/L / AST: 21 U/L / GGT: x             Urinalysis Basic - ( 27 Nov 2023 06:50 )    Color: x / Appearance: x / SG: x / pH: x  Gluc: 106 mg/dL / Ketone: x  / Bili: x / Urobili: x   Blood: x / Protein: x / Nitrite: x   Leuk Esterase: x / RBC: x / WBC x   Sq Epi: x / Non Sq Epi: x / Bacteria: x

## 2023-11-27 NOTE — PROGRESS NOTE ADULT - ASSESSMENT
58F with history of CVA with L sided weakness/numbness/L gaze deviation, DM2 with b/l peripheral neuropathy, COPD/Asthma, Breast Ca s/p ?RT, Bipolar depression, Rheumatoid Arthritis, Antiphospholipid syndrome, L eye uveitis/scleritis, Newly diagnosed DM2 (pt cannot remember which medication she takes for it), and current tobacco use who presents to the hospital with multiple complaints. found to have  sever AR s/p AVR CABG x 1     1. LV dysfunction  -new mod-severe segmental LV dysfunction in July has not had ischemic workup due to stroke at that time.  -c/w metoprolol and aldactone  -TTE EF 30% now with mod-severe AR  -c/w lasix 40mg PO Daily   -cath shows LAD prox 100% fills via right to left collateral   -CMR 11/17: Mild thinning and hypokinesis of the mid to apical anterior and  anteroseptal segments.  The left ventricular ejection fraction measures  43%.Findings consistent with ischemic cardiomyopathy.  LESLEY with sever AR   - s/p AVR and CABGx1 LIMA-LAD    2. CVA  -history of multiple CVAs and is on eliquis   -CT scan of head negative for any acute findings  -ILR interrogation with no events  -per neuro no plans for MRI       3. Antiphospholipid syndrome  - Resume AC as per CTS

## 2023-11-27 NOTE — CONSULT NOTE ADULT - PROBLEM SELECTOR RECOMMENDATION 9
intermittent wheezing - suspect 2nd to retained secretions in airway  -Pt not take deep breaths due to incisional pain  -Suggest pain control  -Coughing/deep breathing, incentive spirometry encouraged  -Continue bronchodilators  -OOB to chair, ambulate as tolerated  -Wean o2 as tolerated, keep sats >90% (currently 2LNC)  -Her WBC remains elevated. Continue to trend. No clear PNA on CXR, if WBC continues to uptrend, check CT chest non contrast. intermittent wheezing - suspect 2nd to retained secretions in airway  -Pt not take deep breaths due to incisional pain  -Suggest pain control  -Coughing/deep breathing, incentive spirometry encouraged  -Continue bronchodilators  -OOB to chair, ambulate as tolerated  -Wean o2 as tolerated, keep sats >90% (currently 2LNC)  -Her WBC remains elevated. Suggest CT chest non contrast to r/o PNA.

## 2023-11-27 NOTE — PROGRESS NOTE ADULT - SUBJECTIVE AND OBJECTIVE BOX
Chief complaint  Patient is a 58y old  Female who presents with a chief complaint of CAD (27 Nov 2023 10:16)         Labs and Fingersticks  CAPILLARY BLOOD GLUCOSE      POCT Blood Glucose.: 168 mg/dL (27 Nov 2023 11:27)  POCT Blood Glucose.: 208 mg/dL (27 Nov 2023 09:29)  POCT Blood Glucose.: 164 mg/dL (27 Nov 2023 07:53)  POCT Blood Glucose.: 184 mg/dL (26 Nov 2023 21:17)  POCT Blood Glucose.: 246 mg/dL (26 Nov 2023 17:48)  POCT Blood Glucose.: 133 mg/dL (26 Nov 2023 16:29)      Anion Gap: 17 (11-27 @ 06:50)  Anion Gap: 13 (11-26 @ 06:53)      Calcium: 9.3 (11-27 @ 06:50)  Calcium: 9.0 (11-26 @ 06:53)  Albumin: 3.9 (11-27 @ 06:50)    Alanine Aminotransferase (ALT/SGPT): 23 (11-27 @ 06:50)  Alkaline Phosphatase: 84 (11-27 @ 06:50)  Aspartate Aminotransferase (AST/SGOT): 21 (11-27 @ 06:50)        11-27    137  |  94<L>  |  24<H>  ----------------------------<  106<H>  3.6   |  26  |  0.66    Ca    9.3      27 Nov 2023 06:50  Phos  3.4     11-26  Mg     2.2     11-26    TPro  6.9  /  Alb  3.9  /  TBili  2.6<H>  /  DBili  0.4<H>  /  AST  21  /  ALT  23  /  AlkPhos  84  11-27                        9.8    23.62 )-----------( 256      ( 27 Nov 2023 06:52 )             31.3     Medications  MEDICATIONS  (STANDING):  acetaminophen     Tablet .. 975 milliGRAM(s) Oral every 8 hours  albuterol/ipratropium for Nebulization 3 milliLiter(s) Nebulizer every 6 hours  aspirin enteric coated 81 milliGRAM(s) Oral daily  atorvastatin 80 milliGRAM(s) Oral at bedtime  bisacodyl Suppository 10 milliGRAM(s) Rectal once  budesonide 160 MICROgram(s)/formoterol 4.5 MICROgram(s) Inhaler 2 Puff(s) Inhalation two times a day  chlorhexidine 2% Cloths 1 Application(s) Topical daily  dextrose 50% Injectable 25 milliLiter(s) IV Push every 15 minutes  dextrose 50% Injectable 50 milliLiter(s) IV Push every 15 minutes  dextrose Oral Gel 15 Gram(s) Oral once  famotidine    Tablet 20 milliGRAM(s) Oral two times a day  furosemide    Tablet 40 milliGRAM(s) Oral daily  gabapentin 300 milliGRAM(s) Oral every 8 hours  glucagon  Injectable 1 milliGRAM(s) IntraMuscular once  insulin glargine Injectable (LANTUS) 35 Unit(s) SubCutaneous at bedtime  insulin lispro (ADMELOG) corrective regimen sliding scale   SubCutaneous three times a day before meals  insulin lispro (ADMELOG) corrective regimen sliding scale   SubCutaneous at bedtime  insulin lispro Injectable (ADMELOG) 9 Unit(s) SubCutaneous three times a day before meals  metoprolol tartrate 25 milliGRAM(s) Oral two times a day  OLANZapine 7.5 milliGRAM(s) Oral daily  polyethylene glycol 3350 17 Gram(s) Oral daily  potassium chloride    Tablet ER 20 milliEquivalent(s) Oral every 2 hours  predniSONE   Tablet 10 milliGRAM(s) Oral daily  senna 2 Tablet(s) Oral at bedtime  sorbitol 70% Solution 30 milliLiter(s) Oral once  traZODone 50 milliGRAM(s) Oral at bedtime      Physical Exam  General: Patient comfortable in bed   Vital Signs Last 12 Hrs  T(F): 98.2 (11-27-23 @ 12:02), Max: 98.2 (11-27-23 @ 12:02)  HR: 83 (11-27-23 @ 12:02) (79 - 83)  BP: 96/63 (11-27-23 @ 12:02) (96/63 - 118/66)  BP(mean): 71 (11-27-23 @ 07:38) (71 - 85)  RR: 18 (11-27-23 @ 12:02) (18 - 18)  SpO2: 94% (11-27-23 @ 12:02) (94% - 98%)    CVS: S1S2   Respiratory: No wheezing, no crepitations  GI: Abdomen soft, bowel sounds positive  Musculoskeletal:  moves all extremities  : Voiding        Chief complaint  Patient is a 58y old  Female who presents with a chief complaint of CAD (27 Nov 2023 10:16)         Labs and Fingersticks  CAPILLARY BLOOD GLUCOSE      POCT Blood Glucose.: 168 mg/dL (27 Nov 2023 11:27)  POCT Blood Glucose.: 208 mg/dL (27 Nov 2023 09:29)  POCT Blood Glucose.: 164 mg/dL (27 Nov 2023 07:53)  POCT Blood Glucose.: 184 mg/dL (26 Nov 2023 21:17)  POCT Blood Glucose.: 246 mg/dL (26 Nov 2023 17:48)  POCT Blood Glucose.: 133 mg/dL (26 Nov 2023 16:29)      Anion Gap: 17 (11-27 @ 06:50)  Anion Gap: 13 (11-26 @ 06:53)      Calcium: 9.3 (11-27 @ 06:50)  Calcium: 9.0 (11-26 @ 06:53)  Albumin: 3.9 (11-27 @ 06:50)    Alanine Aminotransferase (ALT/SGPT): 23 (11-27 @ 06:50)  Alkaline Phosphatase: 84 (11-27 @ 06:50)  Aspartate Aminotransferase (AST/SGOT): 21 (11-27 @ 06:50)        11-27    137  |  94<L>  |  24<H>  ----------------------------<  106<H>  3.6   |  26  |  0.66    Ca    9.3      27 Nov 2023 06:50  Phos  3.4     11-26  Mg     2.2     11-26    TPro  6.9  /  Alb  3.9  /  TBili  2.6<H>  /  DBili  0.4<H>  /  AST  21  /  ALT  23  /  AlkPhos  84  11-27                        9.8    23.62 )-----------( 256      ( 27 Nov 2023 06:52 )             31.3     Medications  MEDICATIONS  (STANDING):  acetaminophen     Tablet .. 975 milliGRAM(s) Oral every 8 hours  albuterol/ipratropium for Nebulization 3 milliLiter(s) Nebulizer every 6 hours  aspirin enteric coated 81 milliGRAM(s) Oral daily  atorvastatin 80 milliGRAM(s) Oral at bedtime  bisacodyl Suppository 10 milliGRAM(s) Rectal once  budesonide 160 MICROgram(s)/formoterol 4.5 MICROgram(s) Inhaler 2 Puff(s) Inhalation two times a day  chlorhexidine 2% Cloths 1 Application(s) Topical daily  dextrose 50% Injectable 25 milliLiter(s) IV Push every 15 minutes  dextrose 50% Injectable 50 milliLiter(s) IV Push every 15 minutes  dextrose Oral Gel 15 Gram(s) Oral once  famotidine    Tablet 20 milliGRAM(s) Oral two times a day  furosemide    Tablet 40 milliGRAM(s) Oral daily  gabapentin 300 milliGRAM(s) Oral every 8 hours  glucagon  Injectable 1 milliGRAM(s) IntraMuscular once  insulin glargine Injectable (LANTUS) 35 Unit(s) SubCutaneous at bedtime  insulin lispro (ADMELOG) corrective regimen sliding scale   SubCutaneous three times a day before meals  insulin lispro (ADMELOG) corrective regimen sliding scale   SubCutaneous at bedtime  insulin lispro Injectable (ADMELOG) 9 Unit(s) SubCutaneous three times a day before meals  metoprolol tartrate 25 milliGRAM(s) Oral two times a day  OLANZapine 7.5 milliGRAM(s) Oral daily  polyethylene glycol 3350 17 Gram(s) Oral daily  potassium chloride    Tablet ER 20 milliEquivalent(s) Oral every 2 hours  predniSONE   Tablet 10 milliGRAM(s) Oral daily  senna 2 Tablet(s) Oral at bedtime  sorbitol 70% Solution 30 milliLiter(s) Oral once  traZODone 50 milliGRAM(s) Oral at bedtime      Physical Exam  General: Patient comfortable in bed   Vital Signs Last 12 Hrs  T(F): 98.2 (11-27-23 @ 12:02), Max: 98.2 (11-27-23 @ 12:02)  HR: 83 (11-27-23 @ 12:02) (79 - 83)  BP: 96/63 (11-27-23 @ 12:02) (96/63 - 118/66)  BP(mean): 71 (11-27-23 @ 07:38) (71 - 85)  RR: 18 (11-27-23 @ 12:02) (18 - 18)  SpO2: 94% (11-27-23 @ 12:02) (94% - 98%)    CVS: S1S2   Respiratory: No wheezing, no crepitations  GI: Abdomen soft, bowel sounds positive  Musculoskeletal:  moves all extremities  : Voiding

## 2023-11-27 NOTE — PROGRESS NOTE ADULT - PROBLEM SELECTOR PLAN 2
- Continue with ASA 81mg qD, Atorvastatin 80mg qHS, lopressor 25 BID  - Patient stopped dobutamine gtt 11/24  - (+) Pw isolate  - Lasix 40mg po daily per cardiology  - 11/26 d/c Pleitez  - monitor I/Os  - Increase activity as tolerated.   - Pain control regimen, per pain management team.  - continue Bowel regimen  - Encourage Chest PT / Pulmonary toileting and Incentive spirometry every 1 hour x 10 while awake.   - Continue with PUD and DVT prophylaxis.

## 2023-11-27 NOTE — BH CONSULTATION LIAISON ASSESSMENT NOTE - NSBHCONSULTFOLLOWAFTERCARE_PSY_A_CORE FT
LOV: 1/18/22 (DM)   Last Refill: 12/16/21, #90, 0 RF   Next OV: n/a    Protocol passed. [facility] outpt [facility] at [phone]

## 2023-11-27 NOTE — PROGRESS NOTE ADULT - PROBLEM SELECTOR PLAN 1
Will increase Lantus to 35u at bedtime.  Will increase Admelog to  9u before each meal and continue Admelog correction scale coverage. Will continue monitoring FS and Follow up.   Will continue monitoring  blood sugars, will Follow up.  Patient counseled for compliance with consistent low carb diet.  Insulin teaching started  Anticipate Basal insulin at discharge + PO DM regimen.

## 2023-11-27 NOTE — PROGRESS NOTE ADULT - ASSESSMENT
58F smoker with PMH CVA with L sided weakness/numbness/L gaze deviation, DM2 with b/l peripheral neuropathy, COPD/Asthma, Breast Ca s/p ?RT,  Bipolar depression, Rheumatoid Arthritis,  Antiphospholipid syndrome, and L eye uveitis/scleritis.   Assessment  DMT2: 58y Female with DM T2 with hyperglycemia, A1C 7.6% , was on oral meds at home, now postop CABG, now transitioned to  basal bolus, now sugars improving with occasional elevatios.  Insulin teaching started   Severe AR: CTS eval, LESLEY, s/p ct surgery   CAD: on medications, stable, monitored. Cardiac MR viability , postop CABG  HTN: on antihypertensive medications, monitored, asymptomatic.  Obesity: No strict exercise routines, not on any weight loss program, neither on low calorie diet.        Discussed plan and management with Dr Ladarius Rivera NP - TEAMS  Skye Durand MD  Cell: [phone]  Office: [phone]        58F smoker with PMH CVA with L sided weakness/numbness/L gaze deviation, DM2 with b/l peripheral neuropathy, COPD/Asthma, Breast Ca s/p ?RT,  Bipolar depression, Rheumatoid Arthritis,  Antiphospholipid syndrome, and L eye uveitis/scleritis.   Assessment  DMT2: 58y Female with DM T2 with hyperglycemia, A1C 7.6% , was on oral meds at home, now postop CABG, now transitioned to  basal bolus, now sugars improving  with occasional elevatios.  Insulin teaching started   Severe AR: CTS eval, LESLEY, s/p ct surgery   CAD: on medications, stable, monitored. Cardiac MR viability , postop CABG  HTN: on antihypertensive medications, monitored, asymptomatic.  Obesity: No strict exercise routines, not on any weight loss program, neither on low calorie diet.        Discussed plan and management with Dr Ladarius Rivera NP - TEAMS  Skye Durand MD  Cell: [phone]  Office: [phone]

## 2023-11-27 NOTE — CONSULT NOTE ADULT - NS ATTEND AMEND GEN_ALL_CORE FT
Chart, labs, vitals, radiology reviewed. Above H&P reviewed and edited where appropriate. Agree with history and physical exam. Agree with assessment and plan. I reviewed the overnight course of events and discussed the care with the patient/ family.  All the decisions in assessment and plan are made by me.
continue b dilators  ct chest  keep sat>90%

## 2023-11-27 NOTE — BH CONSULTATION LIAISON ASSESSMENT NOTE - NSBHCHARTREVIEWINVESTIGATE_PSY_A_CORE FT
Ventricular Rate 92 BPM    Atrial Rate 92 BPM    P-R Interval 210 ms    QRS Duration 70 ms    Q-T Interval 358 ms    QTC Calculation(Bazett) 442 ms    P Axis 37 degrees    R Axis -11 degrees    T Axis 39 degrees    Diagnosis Line SINUS RHYTHM WITH 1ST DEGREE A-V BLOCK  LOW VOLTAGE QRS  BORDERLINE ECG  WHEN COMPARED WITH ECG OF 22-NOV-2023 00:12, (UNCONFIRMED)  NO SIGNIFICANT CHANGE WAS FOUND  Confirmed by PASCUAL MORGAN MD ([id number]) on 11/23/2023 3:49:05 PM

## 2023-11-27 NOTE — CONSULT NOTE ADULT - PROBLEM SELECTOR RECOMMENDATION 2
by hx  -Continue Symbicort BID  -Continue Duoneb q6h  -Pt is on prednisone for her RA, not COPD  -Eventual outpatient PFTs
On medications,  no chest pain, stable, monitored and followed up by primary cardiothoracic team/cardiology team.

## 2023-11-27 NOTE — PROGRESS NOTE ADULT - SUBJECTIVE AND OBJECTIVE BOX
German Carrasco MD  Interventional Cardiology / Endovascular Specialist  [place] Office : [phone] [place] [address] [place] [address]  Tel:   [place] Office : [address] [facility] [place] [address]  Tel: [phone]    Pt lying in bed c/o pain at surgical site   	  MEDICATIONS:  aspirin enteric coated 81 milliGRAM(s) Oral daily  furosemide    Tablet 40 milliGRAM(s) Oral daily  metoprolol tartrate 25 milliGRAM(s) Oral two times a day  albuterol/ipratropium for Nebulization 3 milliLiter(s) Nebulizer every 6 hours  budesonide 160 MICROgram(s)/formoterol 4.5 MICROgram(s) Inhaler 2 Puff(s) Inhalation two times a day  acetaminophen     Tablet .. 975 milliGRAM(s) Oral every 8 hours  gabapentin 300 milliGRAM(s) Oral every 8 hours  HYDROmorphone   Tablet 4 milliGRAM(s) Oral every 4 hours PRN  HYDROmorphone   Tablet 2 milliGRAM(s) Oral every 4 hours PRN  OLANZapine 7.5 milliGRAM(s) Oral daily  traZODone 50 milliGRAM(s) Oral at bedtime  bisacodyl Suppository 10 milliGRAM(s) Rectal once  famotidine    Tablet 20 milliGRAM(s) Oral two times a day  polyethylene glycol 3350 17 Gram(s) Oral daily  senna 2 Tablet(s) Oral at bedtime  sorbitol 70% Solution 30 milliLiter(s) Oral once  atorvastatin 80 milliGRAM(s) Oral at bedtime  dextrose 50% Injectable 50 milliLiter(s) IV Push every 15 minutes  dextrose 50% Injectable 25 milliLiter(s) IV Push every 15 minutes  dextrose Oral Gel 15 Gram(s) Oral once  glucagon  Injectable 1 milliGRAM(s) IntraMuscular once  insulin glargine Injectable (LANTUS) 35 Unit(s) SubCutaneous at bedtime  insulin lispro (ADMELOG) corrective regimen sliding scale   SubCutaneous three times a day before meals  insulin lispro (ADMELOG) corrective regimen sliding scale   SubCutaneous at bedtime  insulin lispro Injectable (ADMELOG) 9 Unit(s) SubCutaneous three times a day before meals  predniSONE   Tablet 10 milliGRAM(s) Oral daily  chlorhexidine 2% Cloths 1 Application(s) Topical daily      PAST MEDICAL/SURGICAL HISTORY  PAST MEDICAL & SURGICAL HISTORY:  Cigarette smoker      Rheumatoid arthritis      Other depression      Breast cancer      Migraines      History of multiple cerebrovascular accidents (CVAs)      Suicidal ideation      Paresthesia of left arm      Facial paresthesia      APS (antiphospholipid syndrome)      History of depressed bipolar disorder      History of COPD      History of COPD      History of hysterectomy      History of cholecystectomy          SOCIAL HISTORY: Substance Use (street drugs): ( x ) never used  (  ) other:    FAMILY HISTORY:  Family history of breast cancer (Mother)    Family history of diabetes mellitus (DM) (Father)        REVIEW OF SYSTEMS:  CONSTITUTIONAL: No fever, weight loss, or fatigue  EYES: No eye pain, visual disturbances, or discharge  ENMT:  No difficulty hearing, tinnitus, vertigo; No sinus or throat pain  BREASTS: No pain, masses, or nipple discharge  GASTROINTESTINAL: No abdominal or epigastric pain. No nausea, vomiting, or hematemesis; No diarrhea or constipation. No melena or hematochezia.  GENITOURINARY: No dysuria, frequency, hematuria, or incontinence  NEUROLOGICAL: No headaches, memory loss, loss of strength, numbness, or tremors  ENDOCRINE: No heat or cold intolerance; No hair loss  MUSCULOSKELETAL: No joint pain or swelling; No muscle, back, or extremity pain  PSYCHIATRIC: No depression, anxiety, mood swings, or difficulty sleeping  HEME/LYMPH: No easy bruising, or bleeding gums  All others negative    PHYSICAL EXAM:  T(C): 36.4 (11-27-23 @ 20:20), Max: 36.9 (11-26-23 @ 23:08)  HR: 76 (11-27-23 @ 20:20) (76 - 84)  BP: 121/73 (11-27-23 @ 20:20) (96/63 - 121/73)  RR: 18 (11-27-23 @ 20:20) (18 - 18)  SpO2: 95% (11-27-23 @ 20:20) (94% - 99%)  Wt(kg): --  I&O's Summary    26 Nov 2023 07:01  -  27 Nov 2023 07:00  --------------------------------------------------------  IN: 780 mL / OUT: 3950 mL / NET: -3170 mL    27 Nov 2023 07:01  -  27 Nov 2023 20:38  --------------------------------------------------------  IN: 760 mL / OUT: 600 mL / NET: 160 mL    Appearance: In no distress	  HEENT:    PERRL, EOMI	  Cardiovascular:  S1 S2, No JVD  Respiratory: Lungs clear to auscultation	  Gastrointestinal:  Soft, Non-tender, + BS	  Vascularature:  No edema of LE  Psychiatric: Appropriate affect   Neuro: no acute focal deficits                             9.8    23.62 )-----------( 256      ( 27 Nov 2023 06:52 )             31.3     11-27    137  |  94<L>  |  24<H>  ----------------------------<  106<H>  3.6   |  26  |  0.66    Ca    9.3      27 Nov 2023 06:50  Phos  3.4     11-26  Mg     2.2     11-26    TPro  6.9  /  Alb  3.9  /  TBili  2.6<H>  /  DBili  0.4<H>  /  AST  21  /  ALT  23  /  AlkPhos  84  11-27    proBNP:   Lipid Profile:   HgA1c:   TSH:     Consultant(s) Notes Reviewed:  [x ] YES  [ ] NO    Care Discussed with Consultants/Other Providers [ x] YES  [ ] NO    Imaging Personally Reviewed independently:  [x] YES  [ ] NO    All labs, radiologic studies, vitals, orders and medications list reviewed. Patient is seen and examined at bedside. Case discussed with medical team.

## 2023-11-27 NOTE — BH CONSULTATION LIAISON ASSESSMENT NOTE - SUMMARY
58 female smoker with PMH CVA with L sided weakness/numbness/L gaze deviation, DM2 with b/l peripheral neuropathy, COPD/Asthma, Breast Ca s/p ?RT, Bipolar depression, Rheumatoid Arthritis, Antiphospholipid syndrome, and L eye uveitis/scleritis, Presents to [facility] transferred from [facility]. p/w multiple medical complaints. A/w exacerbation of L-sided subjective weakness/numbness likely 2/2 recrudescence of prior Right BG infarct. Cardiology following for new LV dysfunction since July, pending ischemic eval. LESLEY was done showing severe Aortic Insufficiency and moderated MR. Cardiac cath was done showing LAD prox 100% fills via right to left collateral. Patient now s/p CABG on 11/21.  Psychiatry was consulted on the patient's request, as she is stating that she feels her current psychiatric medications are not helping.  When seen at bedside, patient is concerned about inability to sleep through the night and that her medications are not working. Also expresses depressed mood but without suicidal ideation. Also anxious about discharge as concerned she may start smoking again.    Plan:  - Recommend continuing trazodone 50mg and olanzapine 7.5mg, with administration of olanzapine 7.5mg at bedtime in order to facilitate sleep  - Recommend NRT (patient prefers lozenges) 58 female smoker with PMH CVA with L sided weakness/numbness/L gaze deviation, DM2 with b/l peripheral neuropathy, COPD/Asthma, Breast Ca s/p ?RT, Bipolar depression, Rheumatoid Arthritis, Antiphospholipid syndrome, and L eye uveitis/scleritis, Presents to [facility] transferred from [facility]. p/w multiple medical complaints. A/w exacerbation of L-sided subjective weakness/numbness likely 2/2 recrudescence of prior Right BG infarct. Cardiology following for new LV dysfunction since July, pending ischemic eval. LESLEY was done showing severe Aortic Insufficiency and moderated MR. Cardiac cath was done showing LAD prox 100% fills via right to left collateral. Patient now s/p CABG on 11/21.  Psychiatry was consulted on the patient's request, as she is stating that she feels her current psychiatric medications are not helping. no si/hi.    Plan:  - Recommend continuing trazodone 50mg and olanzapine 5 mg qhs, which can both help with sleep; f/u QTc < 500   - Recommend NRT (patient prefers lozenges)

## 2023-11-27 NOTE — BH CONSULTATION LIAISON ASSESSMENT NOTE - PRIVATE RESIDENCE
Previously staying with a friend in [place], housing is unstable. Believes will be going to friend's home once discharged. Kayli with friend

## 2023-11-27 NOTE — PROGRESS NOTE ADULT - ASSESSMENT
58F smoker with stroke x 2 (2022 and july 2023 with resid  L sided weakness/numbness  DM2 with b/l peripheral neuropathy, COPD/Asthma, Breast Ca s/p, Bipolar depression, Rheumatoid Arthritis, Antiphospholipid syndrome, and L eye uveitis/scleritis, Presents to [facility] transferred from [facility]. p/w multiple medical complaints and exacerbation of L-sided subjective weakness/numbness likely 2/2 recrudescence of prior Right BG infarct. Cardiology following for new LV dysfunction since July.Patient now transferred to CTS Dr. Loaiza for evaluation. neuro called for prior strokes   LESLEY was done showing severe Aortic Insufficiency and moderated MR.   Cardiac cath was done showing LAD prox 100% fills via right to left collateral.   CTH with tinght chronic appearing R BG infarct   TTE EF 30%   CD neg   A1c 7.6   LDL 37   ILR interrogated no events  NIHSS 3  premrs3   cath shows LAD prox 100% fills via right to left collateral will need AVR and LIMA to LAD bypass  o/e with mild L facial and decrease sensation on L with mild 5-/5 weakness on L with slow FFM ; dysconjugate gaze   s/p OR 11/21  extubated  post op no neuro changes   c/o HA.     Imprsesion:   prior nowe chronic appearing infarcts. R BG with residual L HP  DM peripheral neuropathy      - for secondary stroke prevention on asa 81mg and high dose statin.  was also on full dose lovenox given APLS; would resume when able or place on heparin drip   - gabapentin 300mg TID for neuropathy   - f/u rheum and heme/onc   - low risk for cardiac surgery from neuro/stroke standpoint   - telemetry  - PT/OT   - check FS, glucose control <180  - GI/DVT ppx   - Thank you for allowing me to participate in the care of this patient. Call with questions.   - spoke [facility] CTS team/ACP   Charles Crespo MD  Vascular Neurology  Office: [phone]

## 2023-11-27 NOTE — BH CONSULTATION LIAISON ASSESSMENT NOTE - RISK ASSESSMENT
Chronic risk factors: Dx of bipolar disorder, previous suicide attempt  Acute risk factors: Medical illness/surgery  Protective factors: Support from her ex  and ex's daughter, has children although they do not communicate well, sobriety    The patient, although currently depressed, does not express suicidal ideation.

## 2023-11-27 NOTE — CONSULT NOTE ADULT - ASSESSMENT
57 y/o F with PMH smoker with PMH CVA with L sided weakness/numbness/L gaze deviation, DM2 with b/l peripheral neuropathy, COPD/emphysema, asthma, RA (on prednisone), Breast Ca s/p ?RT, Bipolar depression, Rheumatoid Arthritis, Antiphospholipid syndrome, and L eye uveitis/scleritis. Presents to [facility] transferred from [facility]. Initially presents with exacerbation of L-sided subjective weakness/numbness likely 2/2 recrudescence of prior Right BG infarct. Cardiology following for new LV dysfunction since July, pending ischemic eval. LESLEY was done showing severe Aortic Insufficiency and moderated MR. Cardiac cath was done showing LAD prox 100% fills via right to left collateral. Tx for surgical evaluation. S/p CABG, AVR on 11/21. Called to consult for wheezing. Per pt she is SOB and intermittently wheezing. Endorses difficult to expectorate secretions. Pt states she is unable to take a deep breath due to incisional pain.

## 2023-11-27 NOTE — CONSULT NOTE ADULT - PROBLEM SELECTOR RECOMMENDATION 3
Suggest to continue medications, monitoring, FU primary team recommendations.
by hx  -Continue bronchodilators as above

## 2023-11-27 NOTE — PROGRESS NOTE ADULT - PROBLEM SELECTOR PLAN 4
Pt reports Hx of Bipolar/MDD, and states non-compliance with OP medication & OP psych follow up since pt's dx of stroke in July of 2023.   Pt requesting to speak to Psychiatry to establish care. Denies [id number] at time of evaluation.   On-call Psychiatry ([phone]) consulted and aware. Pt reports hx of Bipolar MDD, with no follow-up with OP Psychiatry and non-compliance with OP psych meds since July 2023. Pt requesting to speak to Psychiatry today to establish care.  Pt requesting to speak to Psychiatry to establish care. Denies [id number]. at time of evaluation.   On-call Psychiatry ([phone]) consulted and aware.

## 2023-11-27 NOTE — BH CONSULTATION LIAISON ASSESSMENT NOTE - SUICIDE ATTEMPT:
09/16/22 1602   Individualization/Patient Specific Goals   Patient Personal Strengths Resilient; Improvements in cooperating with directions    Patient Vulnerabilities lacks insight into illness   Anxieties, Fears or Concerns Patient lacks insight into his illness. He is at baseline with delusions and hallucinations.   Individualized Care Needs Patient is on a commitment/pittman and guardianship case is pending. Continue to encourage food, hygiene, socializing/cominig out to the lounge, taking walks on the unit.   Patient-Specific Goals (Include Timeframe) Continue with plan for stabilization 10-15 days   Interprofessional Rounds   Summary Patient continues to be stable at baseline. Patient has delusions and hallucinations. Patient is waiting for guardianship case to be finalized.   Participants nursing;psychiatrist;CTC;OT   Team Discussion   Participants Dr. Dk MD; FIFI Rivera, Wen Broderick RN; Radha Licona OTR   Progress Making progress.   Anticipated length of stay 10-14 days   Continued Stay Criteria/Rationale Patient can not discharge safely. He needs assistance with all ADLs. Team is waiting on final guardianship ruling. Patient will discharge to a group home/assisted living.   Medical/Physical Please see H and P.   Plan Continue with plan: medication management, psychiatric evaluations, medical evaluations as needed, nursing assessments, milieu therapy, and CTC case management. Order for guardianship has been received. Awaiting letters of guardianship from the court.   Anticipated Discharge Disposition Letters of guardianship received. Referrals to nursing homes are ongoing.       PRECAUTIONS AND SAFETY               Behavioral Orders   Procedures     Code 1 - Restrict to Unit     Code 2       CT scan only     Code 3       Patient can go out of the unit with staff supervision. He needs to be taken out by him self with 2 staff and security present.     Fall precautions     Routine Programming        As clinically indicated     Status 15       Every 15 minutes.            Safety  Safety WDL: WDL  Patient Location: patient room, own  Observed Behavior: calm  Observed Behavior (Comment): laying in bed  Safety Measures: safety plan reviewed  Diversional Activity: other (see comments) (pt declined to come out of the roonm)  Suicidality: Status 15  Seizure precautions: clutter free environment  Assault: status 15  Elopement Assessment: Distress about legal situation (holds for mental health or criminal)  Elopement Interventions: status 15  Sexual: status 15, private room           Yes > 3 months ago

## 2023-11-27 NOTE — BH CONSULTATION LIAISON ASSESSMENT NOTE - NSCOMMENTSUICRISKFACT_PSY_ALL_CORE
The main chronic factors include the patient's psychiatric diagnosis of bipolar disorder, divorce two years prior, lack of contact with children.  Acutely, the patient is depressed and has undergone significant surgery see HPI

## 2023-11-27 NOTE — PROGRESS NOTE ADULT - ASSESSMENT
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 p/w multiple medical complaints. A/w exacerbation of L-sided subjective weakness/numbness likely 2/2 recrudescence of prior Right BG infarct. Cardiology following for new LV dysfunction since July, pending ischemic eval. GABBY was done showing severe Aortic Insufficiency and moderated MR. Cardiac cath was done showing LAD prox 100% fills via right to left collateral. Patient now transferred to [facility] Dr. Loaiza for evaluation.    HOSPITAL COURSE:   11/14 patient seen and examined at bedside. All labs and imaging to be reviewed by Dr. Loaiza   11/15:VSS, neuro consult for hx cva L sided weakness, Preop for Friday 11/16 Repeat echo shows only mild valve pathology  11/17 No valve surgery planned in light of repeat echo.  Getting MR viability for CAD and depressed LV function.  11/18  vss for rpt gabby mon  11/19  npo after midnight for gabby tomorrow  11/20 VSS NPO for GABBY this am=> severe AR  11/21: s/p CABG LIMA-LAD, AVR(t) - Inspiris 23mm, LAAL with AtriClip 40mm,   +Substernal mediastinal Chest tube and Left pleural Chest tube. Kept Intubated post op. Taken to CTU. Levophed, Vasopressin and Primacor switched to Dobutamine gtt.  Started on PO Amio for afib ppx. +Soliz. On insulin gtt for tight glycemic control to prevent wound infxn.  11/22: POD1. Extubated. Inotropes and Pressors maintained. PCA Hydromorphone initiated by Acute Pain Mgmt team. Insulin gtt maintained per Endocrine. PT/OT evaluation.  11/23 POD 2. PCA Hydromorphone maintained. Insulin gtt maintained, started basal insulin tonight per Endo. Dobutamine gtt continued.   11/24 POD 3. Dobutamine gtt d/c'ed in AM. PCA pumped d/c'ed. MED and Pleural Chest tubes d/c'ed while in CTU. (+) PW to EPM (40/10/0.8). Prevena dressing intact. +Soliz maintained. Monitor I/Os. Back on Insulin gtt for elevated FS >200. Endocrine following. TRANSFERRED TO SDU. Current medication regimen continued.   11/25 VSS - insullin gtt off @ 6am- start low dose BB this am - soliz in place will d/c when pt more mobile.  d/c plan anticipate home PT  11/26 WBC 20 again -om chronic steroids - hx COPD- afebrile - prevena in place.  will conitue to monitor- xtra lasix 20 iv x 1 given - up 6 kgs in weight w/ ++ edema,  40 yr pack current smoker.  nebs.  11/27 VSS. Afebrile. WBC increased 23 (from 20) (Of note, patient on chronic steroids for Hx COPD). Repeat CXR today stable. Rpt UA pending. Potassium 3.6 this AM, supplemented. Pt states she has hx of Bipolar MDD, noncompliant with OP medication and following since her dx of stroke in July 2023. Pt requesting to speak to Psychiatry today to establish care. Denies SI/HI/AH/VH. On call Psychiatry ([phone]) consulted and aware. Pt also noted with bronchospasms/wheezing - Pulm (Dr. Leone) consulted and aware. Stable to move to floors today, per Dr. Loaiza. 58 female smoker with PMH CVA with residual Left-sided weakness/numbness/L gaze deviation, DM2 with b/l peripheral neuropathy, COPD/Asthma, Breast Ca s/p ?RT, Bipolar depression, Rheumatoid Arthritis, Antiphospholipid syndrome, and L eye uveitis/scleritis, Presents to [facility] transferred from [facility]. p/w multiple medical complaints. A/w exacerbation of L-sided subjective weakness/numbness likely 2/2 recrudescence of prior Right BG infarct. Cardiology following for new LV dysfunction since July, pending ischemic eval. GABBY was done showing severe Aortic Insufficiency and moderated MR. Cardiac cath was done showing LAD prox 100% fills via right to left collateral. Patient now transferred to [facility] Dr. Loaiza for evaluation.    HOSPITAL COURSE:   11/14 patient seen and examined at bedside. All labs and imaging to be reviewed by Dr. Loaiza   11/15:VSS, neuro consult for hx cva L sided weakness, Preop for Friday 11/16 Repeat echo shows only mild valve pathology  11/17 No valve surgery planned in light of repeat echo.  Getting MR viability for CAD and depressed LV function.  11/18  vss for rpt gabby mon  11/19  npo after midnight for gabby tomorrow  11/20 VSS NPO for GABBY this am=> severe AR  11/21: s/p CABG LIMA-LAD, AVR(t) - Inspiris 23mm, LAAL with AtriClip 40mm,   +Substernal mediastinal Chest tube and Left pleural Chest tube. Kept Intubated post op. Taken to CTU. Levophed, Vasopressin and Primacor switched to Dobutamine gtt.  Started on PO Amio for afib ppx. +Soliz. On insulin gtt for tight glycemic control to prevent wound infxn.  11/22: POD1. Extubated. Inotropes and Pressors maintained. PCA Hydromorphone initiated by Acute Pain Mgmt team. Insulin gtt maintained per Endocrine. PT/OT evaluation.  11/23 POD 2. PCA Hydromorphone maintained. Insulin gtt maintained, started basal insulin tonight per Endo. Dobutamine gtt continued.   11/24 POD 3. Dobutamine gtt d/c'ed in AM. PCA pumped d/c'ed. MED and Pleural Chest tubes d/c'ed while in CTU. (+) PW to EPM (40/10/0.8). Prevena dressing intact. +Soliz maintained. Monitor I/Os. Back on Insulin gtt for elevated FS >200. Endocrine following. TRANSFERRED TO SDU. Current medication regimen continued.   11/25 VSS - insullin gtt off @ 6am- start low dose BB this am - soliz in place will d/c when pt more mobile.  d/c plan anticipate home PT  11/26 WBC 20 again -om chronic steroids - hx COPD- afebrile - prevena in place.  will conitue to monitor- xtra lasix 20 iv x 1 given - up 6 kgs in weight w/ ++ edema,  40 yr pack current smoker.  nebs.  11/27 VSS. Afebrile. WBC increased 23 (from 20) (Of note, patient on chronic steroids for Hx COPD). Repeat CXR today stable. Rpt UA pending. Potassium 3.6 this AM, supplemented. Pt reports hx of Bipolar MDD, with no follow-up with Psychiatry and non-compliance with OP psych meds since July 2023. Pt requesting to speak to Psychiatry today to establish care. Denies SI/HI/AH/VH. On call Psychiatry ([phone]) consulted and aware. Pt also noted with bronchospasms/wheezing - Pulm (Dr. Leone) consulted and aware. Stable to move to floors today, per Dr. Loaiza. 58 female smoker with PMH CVA with residual Left-sided weakness/numbness/L gaze deviation, DM2 with b/l peripheral neuropathy, COPD/Asthma, Breast Ca s/p ?RT, Bipolar depression, Rheumatoid Arthritis, Antiphospholipid syndrome, and L eye uveitis/scleritis, Presents to [facility] transferred from [facility]. p/w multiple medical complaints. A/w exacerbation of L-sided subjective weakness/numbness likely 2/2 recrudescence of prior Right BG infarct. Cardiology following for new LV dysfunction since July, pending ischemic eval. GABBY was done showing severe Aortic Insufficiency and moderated MR. Cardiac cath was done showing LAD prox 100% fills via right to left collateral. Patient now transferred to [facility] Dr. Loaiza for evaluation.    HOSPITAL COURSE:   11/14 patient seen and examined at bedside. All labs and imaging to be reviewed by Dr. Loaiza   11/15:VSS, neuro consult for hx cva L sided weakness, Preop for Friday 11/16 Repeat echo shows only mild valve pathology  11/17 No valve surgery planned in light of repeat echo.  Getting MR viability for CAD and depressed LV function.  11/18  vss for rpt gabby mon  11/19  npo after midnight for gabby tomorrow  11/20 VSS NPO for GABBY this am=> severe AR  11/21: s/p CABG LIMA-LAD, AVR(t) - Inspiris 23mm, LAAL with AtriClip 40mm,   +Substernal mediastinal Chest tube and Left pleural Chest tube. Kept Intubated post op. Taken to CTU. Levophed, Vasopressin and Primacor switched to Dobutamine gtt.  Started on PO Amio for afib ppx. +Soliz. On insulin gtt for tight glycemic control to prevent wound infxn.  11/22: POD1. Extubated. Inotropes and Pressors maintained. PCA Hydromorphone initiated by Acute Pain Mgmt team. Insulin gtt maintained per Endocrine. PT/OT evaluation.  11/23 POD 2. PCA Hydromorphone maintained. Insulin gtt maintained, started basal insulin tonight per Endo. Dobutamine gtt continued.   11/24 POD 3. Dobutamine gtt d/c'ed in AM. PCA pumped d/c'ed. MED and Pleural Chest tubes d/c'ed while in CTU. (+) PW to EPM (40/10/0.8). Prevena dressing intact. +Soliz maintained. Monitor I/Os. Back on Insulin gtt for elevated FS >200. Endocrine following. TRANSFERRED TO SDU. Current medication regimen continued.   11/25 VSS - insullin gtt off @ 6am- start low dose BB this am - soliz in place will d/c when pt more mobile.  d/c plan anticipate home PT  11/26 WBC 20 again -om chronic steroids - hx COPD- afebrile - prevena in place.  will conitue to monitor- xtra lasix 20 iv x 1 given - up 6 kgs in weight w/ ++ edema,  40 yr pack current smoker.  nebs.  11/27 VSS. Afebrile. WBC increased 23 (from 20) (Of note, patient on chronic steroids for Hx RA). Repeat CXR today stable. Rpt UA pending. Potassium 3.6 this AM, supplemented. Pt reports hx of Bipolar MDD, with no follow-up with Psychiatry and non-compliance with OP psych meds since July 2023. Pt requesting to speak to Psychiatry today to establish care. Denies SI/HI/AH/VH. On call Psychiatry ([phone]) consulted and aware. Pt also noted with bronchospasms/wheezing - Pulm (Dr. Leone) consulted and aware. Stable to move to floors today, per Dr. Loaiza.

## 2023-11-27 NOTE — BH CONSULTATION LIAISON ASSESSMENT NOTE - DESCRIPTION
Pt was  for 33 years but is now two years .  She has four children, who all live in [place]. Although she is in touch with her children she wishes they would come to visit her in the hospital.  The patient's housing is currently unstable, and it appears (although unclear) that she has been staying at family and friends' homes for some time while applying for housing. The patient is a 35 pack year smoker and would like to quit on this admission.

## 2023-11-27 NOTE — BH CONSULTATION LIAISON ASSESSMENT NOTE - CURRENT MEDICATION
MEDICATIONS  (STANDING):  acetaminophen     Tablet .. 975 milliGRAM(s) Oral every 8 hours  albuterol/ipratropium for Nebulization 3 milliLiter(s) Nebulizer every 6 hours  aspirin enteric coated 81 milliGRAM(s) Oral daily  atorvastatin 80 milliGRAM(s) Oral at bedtime  bisacodyl Suppository 10 milliGRAM(s) Rectal once  budesonide 160 MICROgram(s)/formoterol 4.5 MICROgram(s) Inhaler 2 Puff(s) Inhalation two times a day  chlorhexidine 2% Cloths 1 Application(s) Topical daily  dextrose 50% Injectable 50 milliLiter(s) IV Push every 15 minutes  dextrose 50% Injectable 25 milliLiter(s) IV Push every 15 minutes  dextrose Oral Gel 15 Gram(s) Oral once  famotidine    Tablet 20 milliGRAM(s) Oral two times a day  furosemide    Tablet 40 milliGRAM(s) Oral daily  gabapentin 300 milliGRAM(s) Oral every 8 hours  glucagon  Injectable 1 milliGRAM(s) IntraMuscular once  insulin glargine Injectable (LANTUS) 35 Unit(s) SubCutaneous at bedtime  insulin lispro (ADMELOG) corrective regimen sliding scale   SubCutaneous at bedtime  insulin lispro (ADMELOG) corrective regimen sliding scale   SubCutaneous three times a day before meals  insulin lispro Injectable (ADMELOG) 9 Unit(s) SubCutaneous three times a day before meals  metoprolol tartrate 25 milliGRAM(s) Oral two times a day  OLANZapine 7.5 milliGRAM(s) Oral daily  polyethylene glycol 3350 17 Gram(s) Oral daily  potassium chloride    Tablet ER 20 milliEquivalent(s) Oral every 2 hours  predniSONE   Tablet 10 milliGRAM(s) Oral daily  senna 2 Tablet(s) Oral at bedtime  sorbitol 70% Solution 30 milliLiter(s) Oral once  traZODone 50 milliGRAM(s) Oral at bedtime    MEDICATIONS  (PRN):  HYDROmorphone   Tablet 2 milliGRAM(s) Oral every 4 hours PRN Moderate Pain (4 - 6)  HYDROmorphone   Tablet 4 milliGRAM(s) Oral every 4 hours PRN Severe Pain (7 - 10)

## 2023-11-27 NOTE — BH CONSULTATION LIAISON ASSESSMENT NOTE - HPI (INCLUDE ILLNESS QUALITY, SEVERITY, DURATION, TIMING, CONTEXT, MODIFYING FACTORS, ASSOCIATED SIGNS AND SYMPTOMS)
58 female smoker with PMH CVA with L sided weakness/numbness/L gaze deviation, DM2 with b/l peripheral neuropathy, COPD/Asthma, Breast Ca s/p ?RT, Bipolar depression, Rheumatoid Arthritis, Antiphospholipid syndrome, and L eye uveitis/scleritis, Presents to [facility] transferred from [facility]. p/w multiple medical complaints. A/w exacerbation of L-sided subjective weakness/numbness likely 2/2 recrudescence of prior Right BG infarct. Cardiology following for new LV dysfunction since July, pending ischemic eval. LESLEY was done showing severe Aortic Insufficiency and moderated MR. Cardiac cath was done showing LAD prox 100% fills via right to left collateral. Patient now s/p CABG on 11/21.  Psychiatry was consulted on the patient's request, as she is stating that she feels her current psychiatric medications are not helping.    The pt was seen today at bedside, endorsing two primary complaints. The first is that she is not sleeping. The second is that her current psychiatric medications, olanzapine and trazodone, are "not helping." The pt has a history of disrupted sleep and has previously been prescribed Ambien and Adivan, however they were stopped.  While here in the hospital she is still unable to sleep restfully as her mind is racing, particularly with thoughts of her daughter, who is dealing with what appears to be addiction.  The patient was previously on lithium and depakote, however she cannot speak to how recently these were discontinued or whether they worked well for her symptoms. She says that depakote did make her appear more subjectively sleepy to her family/friends.  The patient states that she has seen numerous psychiatrists in the past, although she cannot recall their information. She states that olanzapine was prescribed by a provider at [facility].  Her housing at this juncture is unstable - she is planning to go to a friend's house at discharge while she awaits more information on housing applications.    A secondary complaint is that the patient is withdrawing from nicotine. She is also concerned that when she leaves the hospital she will return to smoking, as she has for the past 35 years. She would like something to help with this process of quitting.  Aside from nicotine, pt. denies alcohol use per the chart.    Finally, the patient today is feeling depressed. She feels this has been the case since the summer. This has been exacerbated by her health conditions. She describes feeling low, eating more (part of which she also attributes to prednisone), decreased energy, and decreased concentration. She also describes a state of being subjectively forgetful, which is evidenced during the conversation as the patient cannot recall many details.  The patient today denies any suicidal ideation.  She has a history of a previous suicide attempt years ago where she tried to overdose on unspecified pills, resulting in admission to a psychiatric hospital. The patient states that she has had a number of other hospitalizations, but she cannot recall them in detail.         58 female smoker with PMH CVA with L sided weakness/numbness/L gaze deviation, DM2 with b/l peripheral neuropathy, COPD/Asthma, Breast Ca s/p ?RT, Bipolar depression, Rheumatoid Arthritis, Antiphospholipid syndrome, and L eye uveitis/scleritis, Presents to [facility] transferred from [facility]. p/w multiple medical complaints. A/w exacerbation of L-sided subjective weakness/numbness likely 2/2 recrudescence of prior Right BG infarct. Cardiology following for new LV dysfunction since July, pending ischemic eval. LESLEY was done showing severe Aortic Insufficiency and moderated MR. Cardiac cath was done showing LAD prox 100% fills via right to left collateral. Patient now s/p CABG on 11/21.  Psychiatry was consulted on the patient's request, as she is stating that she feels her current psychiatric medications are not helping.    The pt was seen today at bedside, endorsing two primary complaints. The first is that she is not sleeping. The second is that her current psychiatric medications, olanzapine and trazodone, are "not helping." The pt has a history of disrupted sleep and has previously been prescribed Ambien and ativan, in the distant past.  While here in the hospital she is still unable to sleep restfully as her mind is racing, particularly with thoughts of her daughter, who is dealing with what appears to be addiction.  The patient was previously on lithium and depakote, however she cannot speak to how recently these were discontinued or whether they worked well for her symptoms. She says that depakote did make her appear more subjectively sleepy to her family/friends.  The patient states that she has seen numerous psychiatrists in the past, although she cannot recall their information. She states that olanzapine was prescribed by a provider at [facility].     A secondary complaint is that the patient is withdrawing from nicotine. She is also concerned that when she leaves the hospital she will return to smoking, as she has for the past 35 years. She would like something to help with this process of quitting.  Aside from nicotine, pt. denies alcohol use per the chart.    Finally, the patient today is feeling depressed. She feels this has been the case since the summer. This has been exacerbated by her health conditions. She describes feeling low, eating more (part of which she also attributes to prednisone), decreased energy, and decreased concentration. She also describes a state of being subjectively forgetful, which is evidenced during the conversation as the patient cannot recall many details.  The patient today denies any suicidal ideation.  She reports a history of a previous suicide attempt many years ago where she tried to overdose on unspecified pills, resulting in admission to a psychiatric hospital. The patient states that she has had a number of other hospitalizations, but she cannot recall them in detail.

## 2023-11-27 NOTE — PROGRESS NOTE ADULT - PROBLEM SELECTOR PLAN 1
- Continue with ASA 81mg qD, Atorvastatin 80mg qHS, lopressor 25 BID  - Patient stopped dobutamine gtt 11/24  - (+) Pw isolate  - Lasix 40mg po daily per cardiology  - 11/26 d/c Pricilla  - monitor I/Os  - Increase activity as tolerated.   - Pain control regimen, per pain management team.  - continue Bowel regimen  - Encourage Chest PT / Pulmonary toileting and Incentive spirometry every 1 hour x 10 while awake.   - Continue with PUD and DVT prophylaxis.  - Monitor electrolytes, Keep K >4, Mg >2.  MOVE TO FLOORS 11/27.

## 2023-11-27 NOTE — BH CONSULTATION LIAISON ASSESSMENT NOTE - NSBHCHARTREVIEWLAB_PSY_A_CORE FT
Complete Blood Count (11.27.23 @ 06:52)   Nucleated RBC: 0 /100 WBCs  WBC Count: 23.62 K/uL  RBC Count: 3.23 M/uL  Hemoglobin: 9.8 g/dL  Hematocrit: 31.3 %  Mean Cell Volume: 96.9 fl  Mean Cell Hemoglobin: 30.3 pg  Mean Cell Hemoglobin Conc: 31.3 gm/dL  Red Cell Distrib Width: 16.5 %  Platelet Count - Automated: 256 K/uL    Basic Metabolic Panel (11.27.23 @ 06:50)   Sodium: 137 mmol/L  Potassium: 3.6 mmol/L  Chloride: 94 mmol/L  Carbon Dioxide: 26 mmol/L  Anion Gap: 17 mmol/L  Blood Urea Nitrogen: 24 mg/dL  Creatinine: 0.66 mg/dL  Glucose: 106 mg/dL  Calcium: 9.3 mg/dL  eGFR: 101    Urinalysis (11.27.23 @ 11:32)   Glucose Qualitative, Urine: Negative mg/dL  Blood, Urine: Negative  pH Urine: 6.0  Color: Yellow  Urine Appearance: Clear  Bilirubin: Negative  Ketone - Urine: Negative mg/dL  Specific Gravity: 1.017  Protein, Urine: Negative mg/dL  Urobilinogen: 1.0 mg/dL  Nitrite: Negative  Leukocyte Esterase Concentration: Trace

## 2023-11-27 NOTE — BH CONSULTATION LIAISON ASSESSMENT NOTE - NSBHCHARTREVIEWVS_PSY_A_CORE FT
Vital Signs Last 24 Hrs  T(C): 36.6 (27 Nov 2023 07:38), Max: 36.9 (26 Nov 2023 15:20)  T(F): 97.9 (27 Nov 2023 07:38), Max: 98.5 (26 Nov 2023 15:20)  HR: 79 (27 Nov 2023 07:38) (79 - 93)  BP: 106/55 (27 Nov 2023 07:38) (106/55 - 118/66)  BP(mean): 71 (27 Nov 2023 07:38) (71 - 93)  RR: 18 (27 Nov 2023 07:38) (18 - 18)  SpO2: 94% (27 Nov 2023 07:38) (94% - 99%)    Parameters below as of 27 Nov 2023 07:38  Patient On (Oxygen Delivery Method): nasal cannula  O2 Flow (L/min): 2

## 2023-11-27 NOTE — BH CONSULTATION LIAISON ASSESSMENT NOTE - NSBHATTESTCOMMENTATTENDFT_PSY_A_CORE
Pt is a 59 y/o WF with hx of reported bipolar disorder, non-compliance with her medications over the years, presents with scheduled CABG, requesting to speak with psychiatry service for depression/anxiety. pt seen, AOA x 3, states she has been intermittently taking zyprexa and trazodone, and had been on lithium many months ago for mood stability, but stopped due to unknown reasons. pt reports mild depression/anxiety, no si/hi, sleep fragmented and appetite fair. no current manic or psychotic symptoms present. agree with psych student's A/P above, may give continue trazodone 50 mg qhs for sleep and change zyprexa to 5 mg qhs for mood stability and sleep, f/u QTc < 500. can provide [facility] outpt referral number at [phone].,

## 2023-11-27 NOTE — BH CONSULTATION LIAISON ASSESSMENT NOTE - NSUNABLEASSESSPROTRISKCOMMENT_PSY_ALL_CORE
Pt has some degree of social support.  Although her children live in [place] and are not in frequent contact, they are occasionally. She feels that her ex  still cares for her and that his daughter does as well.

## 2023-11-28 ENCOUNTER — NON-APPOINTMENT (OUTPATIENT)
Age: 58
End: 2023-11-28

## 2023-11-28 LAB
ALBUMIN SERPL ELPH-MCNC: 3.7 G/DL — SIGNIFICANT CHANGE UP (ref 3.3–5)
ALBUMIN SERPL ELPH-MCNC: 3.7 G/DL — SIGNIFICANT CHANGE UP (ref 3.3–5)
ALP SERPL-CCNC: 85 U/L — SIGNIFICANT CHANGE UP (ref 40–120)
ALP SERPL-CCNC: 85 U/L — SIGNIFICANT CHANGE UP (ref 40–120)
ALT FLD-CCNC: 20 U/L — SIGNIFICANT CHANGE UP (ref 10–45)
ALT FLD-CCNC: 20 U/L — SIGNIFICANT CHANGE UP (ref 10–45)
ANION GAP SERPL CALC-SCNC: 11 MMOL/L — SIGNIFICANT CHANGE UP (ref 5–17)
ANION GAP SERPL CALC-SCNC: 11 MMOL/L — SIGNIFICANT CHANGE UP (ref 5–17)
AST SERPL-CCNC: 17 U/L — SIGNIFICANT CHANGE UP (ref 10–40)
AST SERPL-CCNC: 17 U/L — SIGNIFICANT CHANGE UP (ref 10–40)
BILIRUB SERPL-MCNC: 2.1 MG/DL — HIGH (ref 0.2–1.2)
BILIRUB SERPL-MCNC: 2.1 MG/DL — HIGH (ref 0.2–1.2)
BUN SERPL-MCNC: 26 MG/DL — HIGH (ref 7–23)
BUN SERPL-MCNC: 26 MG/DL — HIGH (ref 7–23)
CALCIUM SERPL-MCNC: 9.5 MG/DL — SIGNIFICANT CHANGE UP (ref 8.4–10.5)
CALCIUM SERPL-MCNC: 9.5 MG/DL — SIGNIFICANT CHANGE UP (ref 8.4–10.5)
CHLORIDE SERPL-SCNC: 101 MMOL/L — SIGNIFICANT CHANGE UP (ref 96–108)
CHLORIDE SERPL-SCNC: 101 MMOL/L — SIGNIFICANT CHANGE UP (ref 96–108)
CO2 SERPL-SCNC: 28 MMOL/L — SIGNIFICANT CHANGE UP (ref 22–31)
CO2 SERPL-SCNC: 28 MMOL/L — SIGNIFICANT CHANGE UP (ref 22–31)
CREAT SERPL-MCNC: 0.7 MG/DL — SIGNIFICANT CHANGE UP (ref 0.5–1.3)
CREAT SERPL-MCNC: 0.7 MG/DL — SIGNIFICANT CHANGE UP (ref 0.5–1.3)
EGFR: 100 ML/MIN/1.73M2 — SIGNIFICANT CHANGE UP
EGFR: 100 ML/MIN/1.73M2 — SIGNIFICANT CHANGE UP
GLUCOSE BLDC GLUCOMTR-MCNC: 125 MG/DL — HIGH (ref 70–99)
GLUCOSE BLDC GLUCOMTR-MCNC: 125 MG/DL — HIGH (ref 70–99)
GLUCOSE BLDC GLUCOMTR-MCNC: 131 MG/DL — HIGH (ref 70–99)
GLUCOSE BLDC GLUCOMTR-MCNC: 131 MG/DL — HIGH (ref 70–99)
GLUCOSE BLDC GLUCOMTR-MCNC: 198 MG/DL — HIGH (ref 70–99)
GLUCOSE BLDC GLUCOMTR-MCNC: 198 MG/DL — HIGH (ref 70–99)
GLUCOSE BLDC GLUCOMTR-MCNC: 218 MG/DL — HIGH (ref 70–99)
GLUCOSE BLDC GLUCOMTR-MCNC: 218 MG/DL — HIGH (ref 70–99)
GLUCOSE SERPL-MCNC: 107 MG/DL — HIGH (ref 70–99)
GLUCOSE SERPL-MCNC: 107 MG/DL — HIGH (ref 70–99)
HCT VFR BLD CALC: 29.2 % — LOW (ref 34.5–45)
HCT VFR BLD CALC: 29.2 % — LOW (ref 34.5–45)
HGB BLD-MCNC: 8.9 G/DL — LOW (ref 11.5–15.5)
HGB BLD-MCNC: 8.9 G/DL — LOW (ref 11.5–15.5)
MAGNESIUM SERPL-MCNC: 2.9 MG/DL — HIGH (ref 1.6–2.6)
MAGNESIUM SERPL-MCNC: 2.9 MG/DL — HIGH (ref 1.6–2.6)
MCHC RBC-ENTMCNC: 29.3 PG — SIGNIFICANT CHANGE UP (ref 27–34)
MCHC RBC-ENTMCNC: 29.3 PG — SIGNIFICANT CHANGE UP (ref 27–34)
MCHC RBC-ENTMCNC: 30.5 GM/DL — LOW (ref 32–36)
MCHC RBC-ENTMCNC: 30.5 GM/DL — LOW (ref 32–36)
MCV RBC AUTO: 96.1 FL — SIGNIFICANT CHANGE UP (ref 80–100)
MCV RBC AUTO: 96.1 FL — SIGNIFICANT CHANGE UP (ref 80–100)
NRBC # BLD: 0 /100 WBCS — SIGNIFICANT CHANGE UP (ref 0–0)
NRBC # BLD: 0 /100 WBCS — SIGNIFICANT CHANGE UP (ref 0–0)
PHOSPHATE SERPL-MCNC: 4.3 MG/DL — SIGNIFICANT CHANGE UP (ref 2.5–4.5)
PHOSPHATE SERPL-MCNC: 4.3 MG/DL — SIGNIFICANT CHANGE UP (ref 2.5–4.5)
PLATELET # BLD AUTO: 272 K/UL — SIGNIFICANT CHANGE UP (ref 150–400)
PLATELET # BLD AUTO: 272 K/UL — SIGNIFICANT CHANGE UP (ref 150–400)
POTASSIUM SERPL-MCNC: 4.2 MMOL/L — SIGNIFICANT CHANGE UP (ref 3.5–5.3)
POTASSIUM SERPL-MCNC: 4.2 MMOL/L — SIGNIFICANT CHANGE UP (ref 3.5–5.3)
POTASSIUM SERPL-SCNC: 4.2 MMOL/L — SIGNIFICANT CHANGE UP (ref 3.5–5.3)
POTASSIUM SERPL-SCNC: 4.2 MMOL/L — SIGNIFICANT CHANGE UP (ref 3.5–5.3)
PROT SERPL-MCNC: 6.4 G/DL — SIGNIFICANT CHANGE UP (ref 6–8.3)
PROT SERPL-MCNC: 6.4 G/DL — SIGNIFICANT CHANGE UP (ref 6–8.3)
RBC # BLD: 3.04 M/UL — LOW (ref 3.8–5.2)
RBC # BLD: 3.04 M/UL — LOW (ref 3.8–5.2)
RBC # FLD: 16.4 % — HIGH (ref 10.3–14.5)
RBC # FLD: 16.4 % — HIGH (ref 10.3–14.5)
SODIUM SERPL-SCNC: 140 MMOL/L — SIGNIFICANT CHANGE UP (ref 135–145)
SODIUM SERPL-SCNC: 140 MMOL/L — SIGNIFICANT CHANGE UP (ref 135–145)
WBC # BLD: 19.29 K/UL — HIGH (ref 3.8–10.5)
WBC # BLD: 19.29 K/UL — HIGH (ref 3.8–10.5)
WBC # FLD AUTO: 19.29 K/UL — HIGH (ref 3.8–10.5)
WBC # FLD AUTO: 19.29 K/UL — HIGH (ref 3.8–10.5)

## 2023-11-28 PROCEDURE — 93010 ELECTROCARDIOGRAM REPORT: CPT

## 2023-11-28 RX ORDER — HEPARIN SODIUM 5000 [USP'U]/ML
5000 INJECTION INTRAVENOUS; SUBCUTANEOUS EVERY 8 HOURS
Refills: 0 | Status: DISCONTINUED | OUTPATIENT
Start: 2023-11-28 | End: 2023-11-29

## 2023-11-28 RX ORDER — FUROSEMIDE 40 MG
20 TABLET ORAL ONCE
Refills: 0 | Status: COMPLETED | OUTPATIENT
Start: 2023-11-28 | End: 2023-11-28

## 2023-11-28 RX ORDER — MAGNESIUM SULFATE 500 MG/ML
2 VIAL (ML) INJECTION ONCE
Refills: 0 | Status: COMPLETED | OUTPATIENT
Start: 2023-11-28 | End: 2023-11-28

## 2023-11-28 RX ADMIN — Medication 9 UNIT(S): at 11:40

## 2023-11-28 RX ADMIN — POLYETHYLENE GLYCOL 3350 17 GRAM(S): 17 POWDER, FOR SOLUTION ORAL at 11:07

## 2023-11-28 RX ADMIN — GABAPENTIN 300 MILLIGRAM(S): 400 CAPSULE ORAL at 21:05

## 2023-11-28 RX ADMIN — BUDESONIDE AND FORMOTEROL FUMARATE DIHYDRATE 2 PUFF(S): 160; 4.5 AEROSOL RESPIRATORY (INHALATION) at 05:53

## 2023-11-28 RX ADMIN — Medication 2: at 11:39

## 2023-11-28 RX ADMIN — HYDROMORPHONE HYDROCHLORIDE 4 MILLIGRAM(S): 2 INJECTION INTRAMUSCULAR; INTRAVENOUS; SUBCUTANEOUS at 21:05

## 2023-11-28 RX ADMIN — Medication 20 MILLIGRAM(S): at 11:15

## 2023-11-28 RX ADMIN — SENNA PLUS 2 TABLET(S): 8.6 TABLET ORAL at 21:06

## 2023-11-28 RX ADMIN — Medication 3 MILLILITER(S): at 05:53

## 2023-11-28 RX ADMIN — Medication 25 MILLIGRAM(S): at 05:49

## 2023-11-28 RX ADMIN — Medication 50 MILLIGRAM(S): at 21:05

## 2023-11-28 RX ADMIN — HYDROMORPHONE HYDROCHLORIDE 2 MILLIGRAM(S): 2 INJECTION INTRAMUSCULAR; INTRAVENOUS; SUBCUTANEOUS at 11:09

## 2023-11-28 RX ADMIN — OLANZAPINE 7.5 MILLIGRAM(S): 15 TABLET, FILM COATED ORAL at 11:08

## 2023-11-28 RX ADMIN — GABAPENTIN 300 MILLIGRAM(S): 400 CAPSULE ORAL at 05:49

## 2023-11-28 RX ADMIN — Medication 9 UNIT(S): at 07:58

## 2023-11-28 RX ADMIN — ATORVASTATIN CALCIUM 80 MILLIGRAM(S): 80 TABLET, FILM COATED ORAL at 21:06

## 2023-11-28 RX ADMIN — HYDROMORPHONE HYDROCHLORIDE 2 MILLIGRAM(S): 2 INJECTION INTRAMUSCULAR; INTRAVENOUS; SUBCUTANEOUS at 19:00

## 2023-11-28 RX ADMIN — HEPARIN SODIUM 5000 UNIT(S): 5000 INJECTION INTRAVENOUS; SUBCUTANEOUS at 21:06

## 2023-11-28 RX ADMIN — Medication 975 MILLIGRAM(S): at 14:45

## 2023-11-28 RX ADMIN — Medication 81 MILLIGRAM(S): at 11:08

## 2023-11-28 RX ADMIN — Medication 10 MILLIGRAM(S): at 05:49

## 2023-11-28 RX ADMIN — Medication 3 MILLILITER(S): at 17:03

## 2023-11-28 RX ADMIN — FAMOTIDINE 20 MILLIGRAM(S): 10 INJECTION INTRAVENOUS at 05:49

## 2023-11-28 RX ADMIN — Medication 25 MILLIGRAM(S): at 17:04

## 2023-11-28 RX ADMIN — Medication 40 MILLIGRAM(S): at 05:49

## 2023-11-28 RX ADMIN — FAMOTIDINE 20 MILLIGRAM(S): 10 INJECTION INTRAVENOUS at 17:05

## 2023-11-28 RX ADMIN — Medication 975 MILLIGRAM(S): at 05:49

## 2023-11-28 RX ADMIN — Medication 9 UNIT(S): at 17:07

## 2023-11-28 RX ADMIN — Medication 975 MILLIGRAM(S): at 14:23

## 2023-11-28 RX ADMIN — HYDROMORPHONE HYDROCHLORIDE 2 MILLIGRAM(S): 2 INJECTION INTRAMUSCULAR; INTRAVENOUS; SUBCUTANEOUS at 14:28

## 2023-11-28 RX ADMIN — BUDESONIDE AND FORMOTEROL FUMARATE DIHYDRATE 2 PUFF(S): 160; 4.5 AEROSOL RESPIRATORY (INHALATION) at 17:03

## 2023-11-28 RX ADMIN — INSULIN GLARGINE 35 UNIT(S): 100 INJECTION, SOLUTION SUBCUTANEOUS at 22:15

## 2023-11-28 RX ADMIN — Medication 25 GRAM(S): at 02:34

## 2023-11-28 RX ADMIN — Medication 1: at 17:06

## 2023-11-28 RX ADMIN — HEPARIN SODIUM 5000 UNIT(S): 5000 INJECTION INTRAVENOUS; SUBCUTANEOUS at 14:24

## 2023-11-28 RX ADMIN — Medication 3 MILLILITER(S): at 11:08

## 2023-11-28 RX ADMIN — HYDROMORPHONE HYDROCHLORIDE 4 MILLIGRAM(S): 2 INJECTION INTRAMUSCULAR; INTRAVENOUS; SUBCUTANEOUS at 21:35

## 2023-11-28 RX ADMIN — Medication 3 MILLILITER(S): at 21:06

## 2023-11-28 RX ADMIN — CHLORHEXIDINE GLUCONATE 1 APPLICATION(S): 213 SOLUTION TOPICAL at 11:13

## 2023-11-28 RX ADMIN — GABAPENTIN 300 MILLIGRAM(S): 400 CAPSULE ORAL at 14:23

## 2023-11-28 NOTE — PROGRESS NOTE ADULT - SUBJECTIVE AND OBJECTIVE BOX
Neurology        S: patient seen doing okay,  on floor ; doing okay.  in chair         Medications: MEDICATIONS  (STANDING):  acetaminophen     Tablet .. 975 milliGRAM(s) Oral every 8 hours  albuterol/ipratropium for Nebulization 3 milliLiter(s) Nebulizer every 6 hours  aspirin enteric coated 81 milliGRAM(s) Oral daily  atorvastatin 80 milliGRAM(s) Oral at bedtime  bisacodyl Suppository 10 milliGRAM(s) Rectal once  budesonide 160 MICROgram(s)/formoterol 4.5 MICROgram(s) Inhaler 2 Puff(s) Inhalation two times a day  chlorhexidine 2% Cloths 1 Application(s) Topical daily  dextrose 50% Injectable 50 milliLiter(s) IV Push every 15 minutes  dextrose 50% Injectable 25 milliLiter(s) IV Push every 15 minutes  dextrose Oral Gel 15 Gram(s) Oral once  famotidine    Tablet 20 milliGRAM(s) Oral two times a day  furosemide    Tablet 40 milliGRAM(s) Oral daily  gabapentin 300 milliGRAM(s) Oral every 8 hours  glucagon  Injectable 1 milliGRAM(s) IntraMuscular once  heparin   Injectable 5000 Unit(s) SubCutaneous every 8 hours  insulin glargine Injectable (LANTUS) 35 Unit(s) SubCutaneous at bedtime  insulin lispro (ADMELOG) corrective regimen sliding scale   SubCutaneous three times a day before meals  insulin lispro (ADMELOG) corrective regimen sliding scale   SubCutaneous at bedtime  insulin lispro Injectable (ADMELOG) 9 Unit(s) SubCutaneous three times a day before meals  metoprolol tartrate 25 milliGRAM(s) Oral two times a day  OLANZapine 7.5 milliGRAM(s) Oral daily  polyethylene glycol 3350 17 Gram(s) Oral daily  predniSONE   Tablet 10 milliGRAM(s) Oral daily  senna 2 Tablet(s) Oral at bedtime  sorbitol 70% Solution 30 milliLiter(s) Oral once  traZODone 50 milliGRAM(s) Oral at bedtime    MEDICATIONS  (PRN):  HYDROmorphone   Tablet 2 milliGRAM(s) Oral every 4 hours PRN Moderate Pain (4 - 6)  HYDROmorphone   Tablet 4 milliGRAM(s) Oral every 4 hours PRN Severe Pain (7 - 10)       Vitals:  Vital Signs Last 24 Hrs  T(C): 36.4 (28 Nov 2023 04:52), Max: 36.8 (27 Nov 2023 12:02)  T(F): 97.5 (28 Nov 2023 04:52), Max: 98.2 (27 Nov 2023 12:02)  HR: 83 (28 Nov 2023 04:52) (76 - 84)  BP: 112/78 (28 Nov 2023 04:52) (96/63 - 121/73)  BP(mean): 89 (28 Nov 2023 04:52) (89 - 89)  RR: 18 (28 Nov 2023 04:52) (18 - 18)  SpO2: 93% (28 Nov 2023 04:52) (93% - 95%)    Parameters below as of 28 Nov 2023 04:52  Patient On (Oxygen Delivery Method): nasal cannula  O2 Flow (L/min): 2            General Exam:   General Appearance: Appropriately dressed and in no acute distress       Head: Normocephalic, atraumatic and no dysmorphic features  Ear, Nose, and Throat: Moist mucous membranes  CVS: S1S2+  Resp: No SOB, no wheeze or rhonchi  GI: soft NT/ND  Extremities: No edema or cyanosis  Skin: No bruises or rashes     Neurological Exam:  Mental Status: Awake, alert and oriented x 3.  Able to follow simple and complex verbal commands. Able to name and repeat. fluent speech. No obvious aphasia or dysarthria noted.   Cranial Nerves: PERRL, dysconugate gaze , VFFC, sensation V1-V3 decrease on L,  L facial asymmetry, equal elevation of palate, scm/trap 5/5, tongue is midline on protrusion. no obvious papilledema on fundoscopic exam. hearing is grossly intact.   Motor: Normal bulk, tone and strength throughout except mild L HP 5-/5   Sensation: decrease on L compared to R   Reflexes: 1+ throughout at biceps, brachioradialis, triceps, patellars and ankles bilaterally and equal. No clonus. R toe and L toe were both downgoing.  Coordination: No dysmetria on FNF   Gait: cane baseline     Data/Labs/Imaging which I personally reviewed.           LABS:                          8.9    19.29 )-----------( 272      ( 28 Nov 2023 05:12 )             29.2     11-28    140  |  101  |  26<H>  ----------------------------<  107<H>  4.2   |  28  |  0.70    Ca    9.5      28 Nov 2023 05:11  Phos  4.3     11-28  Mg     2.9     11-28    TPro  6.4  /  Alb  3.7  /  TBili  2.1<H>  /  DBili  x   /  AST  17  /  ALT  20  /  AlkPhos  85  11-28    LIVER FUNCTIONS - ( 28 Nov 2023 05:11 )  Alb: 3.7 g/dL / Pro: 6.4 g/dL / ALK PHOS: 85 U/L / ALT: 20 U/L / AST: 17 U/L / GGT: x             Urinalysis Basic - ( 28 Nov 2023 05:11 )    Color: x / Appearance: x / SG: x / pH: x  Gluc: 107 mg/dL / Ketone: x  / Bili: x / Urobili: x   Blood: x / Protein: x / Nitrite: x   Leuk Esterase: x / RBC: x / WBC x   Sq Epi: x / Non Sq Epi: x / Bacteria: x

## 2023-11-28 NOTE — PROGRESS NOTE ADULT - ASSESSMENT
58F smoker with stroke x 2 (2022 and july 2023 with resid  L sided weakness/numbness  DM2 with b/l peripheral neuropathy, COPD/Asthma, Breast Ca s/p, Bipolar depression, Rheumatoid Arthritis, Antiphospholipid syndrome, and L eye uveitis/scleritis, Presents to [facility] transferred from [facility]. p/w multiple medical complaints and exacerbation of L-sided subjective weakness/numbness likely 2/2 recrudescence of prior Right BG infarct. Cardiology following for new LV dysfunction since July.Patient now transferred to CTS Dr. Loaiza for evaluation. neuro called for prior strokes   LESLEY was done showing severe Aortic Insufficiency and moderated MR.   Cardiac cath was done showing LAD prox 100% fills via right to left collateral.   CTH with tinght chronic appearing R BG infarct   TTE EF 30%   CD neg   A1c 7.6   LDL 37   ILR interrogated no events  NIHSS 3  premrs3   cath shows LAD prox 100% fills via right to left collateral will need AVR and LIMA to LAD bypass  o/e with mild L facial and decrease sensation on L with mild 5-/5 weakness on L with slow FFM ; dysconjugate gaze   s/p OR 11/21  extubated  post op no neuro changes   c/o HA.     Imprsesion:   prior nowe chronic appearing infarcts. R BG with residual L HP  DM peripheral neuropathy      - for secondary stroke prevention on asa 81mg and high dose statin.  was also on full dose lovenox given APLS; would resume when able or place on heparin drip   - gabapentin 300mg TID for neuropathy   - f/u rheum and heme/onc [facility]   - telemetry  - PT/OT   - check FS, glucose control <180  - GI/DVT ppx   - Thank you for allowing me to participate in the care of this patient. Call with questions.   - spoke [facility] CTS team/ACP   Charles Crespo MD  Vascular Neurology  Office: [phone]

## 2023-11-28 NOTE — PROGRESS NOTE ADULT - PROBLEM SELECTOR PLAN 2
- Continue with ASA 81mg qD, Atorvastatin 80mg qHS, lopressor 25 BID  - Patient stopped dobutamine gtt 11/24  - (+) Pw isolate  - Lasix 40mg po daily per cardiology  - monitor I/Os  - Increase activity as tolerated.   - Pain control regimen, per pain management team.  - continue Bowel regimen  - Encourage Chest PT / Pulmonary toileting and Incentive spirometry every 1 hour x 10 while awake.   - Continue with PUD and DVT prophylaxis.  -Prednisone for hx COPD/RA

## 2023-11-28 NOTE — PROGRESS NOTE ADULT - PROBLEM SELECTOR PLAN 1
- Continue with ASA 81mg qD, Atorvastatin 80mg qHS, lopressor 25 BID  - Patient stopped dobutamine gtt 11/24  - (+) Pw isolate  - Lasix 40mg po daily per cardiology  - monitor I/Os  - Increase activity as tolerated.   - Pain control regimen, per pain management team.  - continue Bowel regimen  - Encourage Chest PT / Pulmonary toileting and Incentive spirometry every 1 hour x 10 while awake.   - Continue with PUD and DVT prophylaxis.  - Monitor electrolytes, Keep K >4, Mg >2.  MOVE TO FLOORS 11/27.

## 2023-11-28 NOTE — PROGRESS NOTE ADULT - ASSESSMENT
59 y/o F with PMH smoker with PMH CVA with L sided weakness/numbness/L gaze deviation, DM2 with b/l peripheral neuropathy, COPD/emphysema, asthma, RA (on prednisone), Breast Ca s/p ?RT, Bipolar depression, Rheumatoid Arthritis, Antiphospholipid syndrome, and L eye uveitis/scleritis. Presents to [facility] transferred from [facility]. Initially presents with exacerbation of L-sided subjective weakness/numbness likely 2/2 recrudescence of prior Right BG infarct. Cardiology following for new LV dysfunction since July, pending ischemic eval. LESLEY was done showing severe Aortic Insufficiency and moderated MR. Cardiac cath was done showing LAD prox 100% fills via right to left collateral. Tx for surgical evaluation. S/p CABG, AVR on 11/21. Called to consult for wheezing. Per pt she is SOB and intermittently wheezing. Endorses difficult to expectorate secretions. Pt states she is unable to take a deep breath due to incisional pain.

## 2023-11-28 NOTE — PROGRESS NOTE ADULT - ASSESSMENT
58F smoker with PMH CVA with L sided weakness/numbness/L gaze deviation, DM2 with b/l peripheral neuropathy, COPD/Asthma, Breast Ca s/p ?RT,  Bipolar depression, Rheumatoid Arthritis,  Antiphospholipid syndrome, and L eye uveitis/scleritis.   Assessment  DMT2: 58y Female with DM T2 with hyperglycemia, A1C 7.6% , was on oral meds at home, now postop CABG, now transitioned to  basal bolus, now sugars improving  with occasional elevations  Insulin teaching started   Severe AR: CTS eval, LESLEY, s/p ct surgery   CAD: on medications, stable, monitored. Cardiac MR viability , now postop CABG  HTN: on antihypertensive medications, monitored, asymptomatic.  Obesity: No strict exercise routines, not on any weight loss program, neither on low calorie diet.    Discussed plan and management with Dr Ladarius Rivera NP - TEAMS  Skye Durand MD  Cell: [phone]  Office: [phone]        58F smoker with PMH CVA with L sided weakness/numbness/L gaze deviation, DM2 with b/l peripheral neuropathy, COPD/Asthma, Breast Ca s/p ?RT,  Bipolar depression, Rheumatoid Arthritis,  Antiphospholipid syndrome, and L eye uveitis/scleritis.   Assessment  DMT2: 58y Female with DM T2 with hyperglycemia, A1C 7.6% , was on oral meds at home, now postop CABG, now transitioned to  basal bolus, now sugars  improving  with occasional elevations  Insulin teaching started   Severe AR: CTS eval, LESLEY, s/p ct surgery   CAD: on medications, stable, monitored. Cardiac MR viability , now postop CABG  HTN: on antihypertensive medications, monitored, asymptomatic.  Obesity: No strict exercise routines, not on any weight loss program, neither on low calorie diet.    Discussed plan and management with Dr Ladarius Rivera NP - TEAMS  Skye Durand MD  Cell: [phone]  Office: [phone]

## 2023-11-28 NOTE — PROGRESS NOTE ADULT - ASSESSMENT
58 female smoker with PMH CVA with residual Left-sided weakness/numbness/L gaze deviation, DM2 with b/l peripheral neuropathy, COPD/Asthma, Breast Ca s/p ?RT, Bipolar depression, Rheumatoid Arthritis, Antiphospholipid syndrome, and L eye uveitis/scleritis, Presents to [facility] transferred from [facility]. p/w multiple medical complaints. A/w exacerbation of L-sided subjective weakness/numbness likely 2/2 recrudescence of prior Right BG infarct. Cardiology following for new LV dysfunction since July, pending ischemic eval. GABBY was done showing severe Aortic Insufficiency and moderated MR. Cardiac cath was done showing LAD prox 100% fills via right to left collateral. Patient now transferred to [facility] Dr. Loaiza for evaluation.    HOSPITAL COURSE:   11/14 patient seen and examined at bedside. All labs and imaging to be reviewed by Dr. Loaiza   11/15:VSS, neuro consult for hx cva L sided weakness, Preop for Friday 11/16 Repeat echo shows only mild valve pathology  11/17 No valve surgery planned in light of repeat echo.  Getting MR viability for CAD and depressed LV function.  11/18  vss for rpt gabby mon  11/19  npo after midnight for gabby tomorrow  11/20 VSS NPO for GABBY this am=> severe AR  11/21: s/p CABG LIMA-LAD, AVR(t) - Inspiris 23mm, LAAL with AtriClip 40mm,   +Substernal mediastinal Chest tube and Left pleural Chest tube. Kept Intubated post op. Taken to CTU. Levophed, Vasopressin and Primacor switched to Dobutamine gtt.  Started on PO Amio for afib ppx. +Soliz. On insulin gtt for tight glycemic control to prevent wound infxn.  11/22: POD1. Extubated. Inotropes and Pressors maintained. PCA Hydromorphone initiated by Acute Pain Mgmt team. Insulin gtt maintained per Endocrine. PT/OT evaluation.  11/23 POD 2. PCA Hydromorphone maintained. Insulin gtt maintained, started basal insulin tonight per Endo. Dobutamine gtt continued.   11/24 POD 3. Dobutamine gtt d/c'ed in AM. PCA pumped d/c'ed. MED and Pleural Chest tubes d/c'ed while in CTU. (+) PW to EPM (40/10/0.8). Prevena dressing intact. +Soliz maintained. Monitor I/Os. Back on Insulin gtt for elevated FS >200. Endocrine following. TRANSFERRED TO SDU. Current medication regimen continued.   11/25 VSS - insullin gtt off @ 6am- start low dose BB this am - soliz in place will d/c when pt more mobile.  d/c plan anticipate home PT  11/26 WBC 20 again -om chronic steroids - hx COPD- afebrile - prevena in place.  will conitue to monitor- xtra lasix 20 iv x 1 given - up 6 kgs in weight w/ ++ edema,  40 yr pack current smoker.  nebs.  11/27 VSS. Afebrile. WBC increased 23 (from 20) (Of note, patient on chronic steroids for Hx RA). Repeat CXR today stable. Rpt UA pending. Potassium 3.6 this AM, supplemented. Pt reports hx of Bipolar MDD, with no follow-up with Psychiatry and non-compliance with OP psych meds since July 2023. Pt requesting to speak to Psychiatry today to establish care. Denies SI/HI/AH/VH. On call Psychiatry ([phone]) consulted and aware. Pt also noted with bronchospasms/wheezing - Pulm (Dr. Leone) consulted and aware. Stable to move to floors today, per Dr. Loaiza.  11/28: VSS, refusing PT, needs to increase ambulation/iS/effort. CT with ?sternal dehiscence at inferior pole, will discuss with Dr. Loaiza. Extra IV lasix given for edema.

## 2023-11-28 NOTE — PROGRESS NOTE ADULT - SUBJECTIVE AND OBJECTIVE BOX
Follow-up Pulm Progress Note    c/o incisional pain   denies SOB    Medications:  MEDICATIONS  (STANDING):  acetaminophen     Tablet .. 975 milliGRAM(s) Oral every 8 hours  albuterol/ipratropium for Nebulization 3 milliLiter(s) Nebulizer every 6 hours  aspirin enteric coated 81 milliGRAM(s) Oral daily  atorvastatin 80 milliGRAM(s) Oral at bedtime  bisacodyl Suppository 10 milliGRAM(s) Rectal once  budesonide 160 MICROgram(s)/formoterol 4.5 MICROgram(s) Inhaler 2 Puff(s) Inhalation two times a day  chlorhexidine 2% Cloths 1 Application(s) Topical daily  dextrose 50% Injectable 50 milliLiter(s) IV Push every 15 minutes  dextrose 50% Injectable 25 milliLiter(s) IV Push every 15 minutes  dextrose Oral Gel 15 Gram(s) Oral once  famotidine    Tablet 20 milliGRAM(s) Oral two times a day  furosemide    Tablet 40 milliGRAM(s) Oral daily  gabapentin 300 milliGRAM(s) Oral every 8 hours  glucagon  Injectable 1 milliGRAM(s) IntraMuscular once  heparin   Injectable 5000 Unit(s) SubCutaneous every 8 hours  insulin glargine Injectable (LANTUS) 35 Unit(s) SubCutaneous at bedtime  insulin lispro (ADMELOG) corrective regimen sliding scale   SubCutaneous three times a day before meals  insulin lispro (ADMELOG) corrective regimen sliding scale   SubCutaneous at bedtime  insulin lispro Injectable (ADMELOG) 9 Unit(s) SubCutaneous three times a day before meals  metoprolol tartrate 25 milliGRAM(s) Oral two times a day  OLANZapine 7.5 milliGRAM(s) Oral daily  polyethylene glycol 3350 17 Gram(s) Oral daily  predniSONE   Tablet 10 milliGRAM(s) Oral daily  senna 2 Tablet(s) Oral at bedtime  sorbitol 70% Solution 30 milliLiter(s) Oral once  traZODone 50 milliGRAM(s) Oral at bedtime    MEDICATIONS  (PRN):  HYDROmorphone   Tablet 4 milliGRAM(s) Oral every 4 hours PRN Severe Pain (7 - 10)  HYDROmorphone   Tablet 2 milliGRAM(s) Oral every 4 hours PRN Moderate Pain (4 - 6)          Vital Signs Last 24 Hrs  T(C): 36.6 (28 Nov 2023 11:36), Max: 36.6 (28 Nov 2023 11:36)  T(F): 97.9 (28 Nov 2023 11:36), Max: 97.9 (28 Nov 2023 11:36)  HR: 80 (28 Nov 2023 11:36) (76 - 84)  BP: 107/72 (28 Nov 2023 11:36) (107/72 - 121/73)  BP(mean): 89 (28 Nov 2023 04:52) (89 - 89)  RR: 18 (28 Nov 2023 11:36) (18 - 18)  SpO2: 94% (28 Nov 2023 11:36) (93% - 95%)    Parameters below as of 28 Nov 2023 11:36  Patient On (Oxygen Delivery Method): nasal cannula              11-27 @ 07:01  -  11-28 @ 07:00  --------------------------------------------------------  IN: 1080 mL / OUT: 1300 mL / NET: -220 mL          LABS:                        8.9    19.29 )-----------( 272      ( 28 Nov 2023 05:12 )             29.2     11-28    140  |  101  |  26<H>  ----------------------------<  107<H>  4.2   |  28  |  0.70    Ca    9.5      28 Nov 2023 05:11  Phos  4.3     11-28  Mg     2.9     11-28    TPro  6.4  /  Alb  3.7  /  TBili  2.1<H>  /  DBili  x   /  AST  17  /  ALT  20  /  AlkPhos  85  11-28          CAPILLARY BLOOD GLUCOSE      POCT Blood Glucose.: 218 mg/dL (28 Nov 2023 11:26)      Urinalysis Basic - ( 28 Nov 2023 05:11 )    Color: x / Appearance: x / SG: x / pH: x  Gluc: 107 mg/dL / Ketone: x  / Bili: x / Urobili: x   Blood: x / Protein: x / Nitrite: x   Leuk Esterase: x / RBC: x / WBC x   Sq Epi: x / Non Sq Epi: x / Bacteria: x          GEORGE Negative 11-09 @ 06:15  Anti SS-1 <0.2  Anti SS-2 <0.2  Anti RNP 0.2   11-09 @ 06:15    Atypical ANCA -- 11-09 @ 06:15  c-ANCA titer -- 11-09 @ 06:15  c-ANCA -- 11-09 @ 06:15  p-ANCA -- 11-09 @ 06:15          Physical Examination:  PULM: few scattered rhonchi, few b/l expiratory wheezes, clears with cough  CVS: S1, S2 heard    RADIOLOGY REVIEWED  CT chest:    < from: CT Chest No Cont (11.27.23 @ 18:45) >  FINDINGS:    AIRWAYS, LUNGS, PLEURA: Central airways clear. Emphysema. Subsegmental   atelectasis at the lung bases. Small loculated left pleural effusion   located posteriorly andalong the left major fissure.    MEDIASTINUM: Interval aortic valve replacement and CABG with associated   postoperative changes of the mediastinum. There is a partially loculated   pericardial effusion along the left atrioventricular groove with   hyperdense appearance measuring 7 x 4 cm (image 98, coronal series).    Thoracic aorta normal caliber. No large mediastinal nodes.    IMAGED ABDOMEN: Right renal cyst.    SOFT TISSUES and BONES: Sternal wires are present. The inferior most   sternal fragment engages only the left sternal fragment and not the   right. There is separation of the sternal fragments by 1 cm at this level   (image 87, series 3). There is fluid interposed between the lower sternal   fragments. Gas within the retrosternal region. Infiltrative changes and   gas are present within the anterior chest wall subcutaneous soft tissues.   Anterior chest wall loop recorder device. Right posterior first rib   fracture.    IMPRESSION:.    The inferior most sternal fragment engages only the left sternal fragment   and not the right, which is suggestive of dehiscence. There is separation   of sternal fragments by 1 cm at this level.    Fluid is located between the sternal fragments and there is a   retrosternal fluid and gas; these findings are favored to represent   postoperative changes, but infection should be considered in the   appropriate clinical scenario.    Partially loculated pericardial effusion along the left atrioventricular   groove measuring 7 x 4 cm with hyperdense component likely representing   hemopericardium.    No evidence of pneumonia.    Small loculated left pleural effusion.    < end of copied text >

## 2023-11-28 NOTE — PROGRESS NOTE ADULT - SUBJECTIVE AND OBJECTIVE BOX
Patient discussed on morning rounds with Dr. Loaiza    Operation / Date: 11/21: CABG x 1, AVR-t, MAKI Clip    SUBJECTIVE ASSESSMENT:  58y Female seen and examined. Feels tired and he legs are swollen. refused to work with PT this AM, she will work with them later. Need to walk, use IS, tolerating pO diet. Denies fever, chest pain, SOB, abdominal pain.         Vital Signs Last 24 Hrs  T(C): 36.4 (28 Nov 2023 04:52), Max: 36.8 (27 Nov 2023 12:02)  T(F): 97.5 (28 Nov 2023 04:52), Max: 98.2 (27 Nov 2023 12:02)  HR: 83 (28 Nov 2023 04:52) (76 - 84)  BP: 112/78 (28 Nov 2023 04:52) (96/63 - 121/73)  BP(mean): 89 (28 Nov 2023 04:52) (89 - 89)  RR: 18 (28 Nov 2023 04:52) (18 - 18)  SpO2: 93% (28 Nov 2023 04:52) (93% - 95%)    Parameters below as of 28 Nov 2023 04:52  Patient On (Oxygen Delivery Method): nasal cannula  O2 Flow (L/min): 2    I&O's Detail    27 Nov 2023 07:01  -  28 Nov 2023 07:00  --------------------------------------------------------  IN:    IV PiggyBack: 50 mL    Oral Fluid: 1030 mL  Total IN: 1080 mL    OUT:    Voided (mL): 1300 mL  Total OUT: 1300 mL    Total NET: -220 mL      28 Nov 2023 07:01  -  28 Nov 2023 11:07  --------------------------------------------------------  IN:  Total IN: 0 mL    OUT:    Voided (mL): 350 mL  Total OUT: 350 mL    Total NET: -350 mL        PHYSICAL EXAM:    General: Patient lying comfortably in bed, no acute distress, obese    Neurological: Alert and oriented. No focal neurological deficits     Cardiovascular: S1S2, RRR,    Respiratory: Decreased BS b/l, no wheeze/rhonchi/rales    Gastrointestinal: + BS, soft, non tender, non distended     Extremities: Warm and well perfused. 1+ b/l LE edema, no calf tenderness     Vascular: 2+ Peripheral pulses b/l     Incision Sites: MSI: +provena, + PW, + tie downs    LABS:                        8.9    19.29 )-----------( 272      ( 28 Nov 2023 05:12 )             29.2       COUMADIN:  No        11-28    140  |  101  |  26<H>  ----------------------------<  107<H>  4.2   |  28  |  0.70    Ca    9.5      28 Nov 2023 05:11  Phos  4.3     11-28  Mg     2.9     11-28    TPro  6.4  /  Alb  3.7  /  TBili  2.1<H>  /  DBili  x   /  AST  17  /  ALT  20  /  AlkPhos  85  11-28      Urinalysis Basic - ( 28 Nov 2023 05:11 )    Color: x / Appearance: x / SG: x / pH: x  Gluc: 107 mg/dL / Ketone: x  / Bili: x / Urobili: x   Blood: x / Protein: x / Nitrite: x   Leuk Esterase: x / RBC: x / WBC x   Sq Epi: x / Non Sq Epi: x / Bacteria: x        MEDICATIONS  (STANDING):  acetaminophen     Tablet .. 975 milliGRAM(s) Oral every 8 hours  albuterol/ipratropium for Nebulization 3 milliLiter(s) Nebulizer every 6 hours  aspirin enteric coated 81 milliGRAM(s) Oral daily  atorvastatin 80 milliGRAM(s) Oral at bedtime  bisacodyl Suppository 10 milliGRAM(s) Rectal once  budesonide 160 MICROgram(s)/formoterol 4.5 MICROgram(s) Inhaler 2 Puff(s) Inhalation two times a day  chlorhexidine 2% Cloths 1 Application(s) Topical daily  dextrose 50% Injectable 50 milliLiter(s) IV Push every 15 minutes  dextrose 50% Injectable 25 milliLiter(s) IV Push every 15 minutes  dextrose Oral Gel 15 Gram(s) Oral once  famotidine    Tablet 20 milliGRAM(s) Oral two times a day  furosemide    Tablet 40 milliGRAM(s) Oral daily  furosemide   Injectable 20 milliGRAM(s) IV Push once  gabapentin 300 milliGRAM(s) Oral every 8 hours  glucagon  Injectable 1 milliGRAM(s) IntraMuscular once  heparin   Injectable 5000 Unit(s) SubCutaneous every 8 hours  insulin glargine Injectable (LANTUS) 35 Unit(s) SubCutaneous at bedtime  insulin lispro (ADMELOG) corrective regimen sliding scale   SubCutaneous at bedtime  insulin lispro (ADMELOG) corrective regimen sliding scale   SubCutaneous three times a day before meals  insulin lispro Injectable (ADMELOG) 9 Unit(s) SubCutaneous three times a day before meals  metoprolol tartrate 25 milliGRAM(s) Oral two times a day  OLANZapine 7.5 milliGRAM(s) Oral daily  polyethylene glycol 3350 17 Gram(s) Oral daily  predniSONE   Tablet 10 milliGRAM(s) Oral daily  senna 2 Tablet(s) Oral at bedtime  sorbitol 70% Solution 30 milliLiter(s) Oral once  traZODone 50 milliGRAM(s) Oral at bedtime    MEDICATIONS  (PRN):  HYDROmorphone   Tablet 2 milliGRAM(s) Oral every 4 hours PRN Moderate Pain (4 - 6)  HYDROmorphone   Tablet 4 milliGRAM(s) Oral every 4 hours PRN Severe Pain (7 - 10)        RADIOLOGY & ADDITIONAL TESTS:

## 2023-11-28 NOTE — PROGRESS NOTE ADULT - SUBJECTIVE AND OBJECTIVE BOX
Chief complaint  Patient is a 58y old  Female who presents with a chief complaint of CAD (27 Nov 2023 10:16)         Labs and Fingersticks  CAPILLARY BLOOD GLUCOSE      POCT Blood Glucose.: 218 mg/dL (28 Nov 2023 11:26)  POCT Blood Glucose.: 131 mg/dL (28 Nov 2023 07:40)  POCT Blood Glucose.: 107 mg/dL (27 Nov 2023 21:27)  POCT Blood Glucose.: 123 mg/dL (27 Nov 2023 16:27)      Anion Gap: 11 (11-28 @ 05:11)  Anion Gap: 17 (11-27 @ 06:50)      Calcium: 9.5 (11-28 @ 05:11)  Calcium: 9.3 (11-27 @ 06:50)  Albumin: 3.7 (11-28 @ 05:11)  Albumin: 3.9 (11-27 @ 06:50)    Alanine Aminotransferase (ALT/SGPT): 20 (11-28 @ 05:11)  Alanine Aminotransferase (ALT/SGPT): 23 (11-27 @ 06:50)  Alkaline Phosphatase: 85 (11-28 @ 05:11)  Alkaline Phosphatase: 84 (11-27 @ 06:50)  Aspartate Aminotransferase (AST/SGOT): 17 (11-28 @ 05:11)  Aspartate Aminotransferase (AST/SGOT): 21 (11-27 @ 06:50)        11-28    140  |  101  |  26<H>  ----------------------------<  107<H>  4.2   |  28  |  0.70    Ca    9.5      28 Nov 2023 05:11  Phos  4.3     11-28  Mg     2.9     11-28    TPro  6.4  /  Alb  3.7  /  TBili  2.1<H>  /  DBili  x   /  AST  17  /  ALT  20  /  AlkPhos  85  11-28                        8.9    19.29 )-----------( 272      ( 28 Nov 2023 05:12 )             29.2     Medications  MEDICATIONS  (STANDING):  acetaminophen     Tablet .. 975 milliGRAM(s) Oral every 8 hours  albuterol/ipratropium for Nebulization 3 milliLiter(s) Nebulizer every 6 hours  aspirin enteric coated 81 milliGRAM(s) Oral daily  atorvastatin 80 milliGRAM(s) Oral at bedtime  bisacodyl Suppository 10 milliGRAM(s) Rectal once  budesonide 160 MICROgram(s)/formoterol 4.5 MICROgram(s) Inhaler 2 Puff(s) Inhalation two times a day  chlorhexidine 2% Cloths 1 Application(s) Topical daily  dextrose 50% Injectable 50 milliLiter(s) IV Push every 15 minutes  dextrose 50% Injectable 25 milliLiter(s) IV Push every 15 minutes  dextrose Oral Gel 15 Gram(s) Oral once  famotidine    Tablet 20 milliGRAM(s) Oral two times a day  furosemide    Tablet 40 milliGRAM(s) Oral daily  gabapentin 300 milliGRAM(s) Oral every 8 hours  glucagon  Injectable 1 milliGRAM(s) IntraMuscular once  heparin   Injectable 5000 Unit(s) SubCutaneous every 8 hours  insulin glargine Injectable (LANTUS) 35 Unit(s) SubCutaneous at bedtime  insulin lispro (ADMELOG) corrective regimen sliding scale   SubCutaneous three times a day before meals  insulin lispro (ADMELOG) corrective regimen sliding scale   SubCutaneous at bedtime  insulin lispro Injectable (ADMELOG) 9 Unit(s) SubCutaneous three times a day before meals  metoprolol tartrate 25 milliGRAM(s) Oral two times a day  OLANZapine 7.5 milliGRAM(s) Oral daily  polyethylene glycol 3350 17 Gram(s) Oral daily  predniSONE   Tablet 10 milliGRAM(s) Oral daily  senna 2 Tablet(s) Oral at bedtime  sorbitol 70% Solution 30 milliLiter(s) Oral once  traZODone 50 milliGRAM(s) Oral at bedtime      Physical Exam  General: Patient comfortable in bed   Vital Signs Last 12 Hrs  T(F): 97.9 (11-28-23 @ 11:36), Max: 97.9 (11-28-23 @ 11:36)  HR: 80 (11-28-23 @ 11:36) (80 - 83)  BP: 107/72 (11-28-23 @ 11:36) (107/72 - 112/78)  BP(mean): 89 (11-28-23 @ 04:52) (89 - 89)  RR: 18 (11-28-23 @ 11:36) (18 - 18)  SpO2: 94% (11-28-23 @ 11:36) (93% - 95%)    CVS: S1S2   Respiratory: No wheezing, no crepitations  GI: Abdomen soft, bowel sounds positive  Musculoskeletal:  moves all extremities  : Voiding        Chief complaint  Patient is a 58y old  Female who presents with a chief complaint of CAD (27 Nov 2023 10:16)         Labs and Fingersticks  CAPILLARY BLOOD GLUCOSE      POCT Blood Glucose.: 218 mg/dL (28 Nov 2023 11:26)  POCT Blood Glucose.: 131 mg/dL (28 Nov 2023 07:40)  POCT Blood Glucose.: 107 mg/dL (27 Nov 2023 21:27)  POCT Blood Glucose.: 123 mg/dL (27 Nov 2023 16:27)      Anion Gap: 11 (11-28 @ 05:11)  Anion Gap: 17 (11-27 @ 06:50)      Calcium: 9.5 (11-28 @ 05:11)  Calcium: 9.3 (11-27 @ 06:50)  Albumin: 3.7 (11-28 @ 05:11)  Albumin: 3.9 (11-27 @ 06:50)    Alanine Aminotransferase (ALT/SGPT): 20 (11-28 @ 05:11)  Alanine Aminotransferase (ALT/SGPT): 23 (11-27 @ 06:50)  Alkaline Phosphatase: 85 (11-28 @ 05:11)  Alkaline Phosphatase: 84 (11-27 @ 06:50)  Aspartate Aminotransferase (AST/SGOT): 17 (11-28 @ 05:11)  Aspartate Aminotransferase (AST/SGOT): 21 (11-27 @ 06:50)        11-28    140  |  101  |  26<H>  ----------------------------<  107<H>  4.2   |  28  |  0.70    Ca    9.5      28 Nov 2023 05:11  Phos  4.3     11-28  Mg     2.9     11-28    TPro  6.4  /  Alb  3.7  /  TBili  2.1<H>  /  DBili  x   /  AST  17  /  ALT  20  /  AlkPhos  85  11-28                        8.9    19.29 )-----------( 272      ( 28 Nov 2023 05:12 )             29.2     Medications  MEDICATIONS  (STANDING):  acetaminophen     Tablet .. 975 milliGRAM(s) Oral every 8 hours  albuterol/ipratropium for Nebulization 3 milliLiter(s) Nebulizer every 6 hours  aspirin enteric coated 81 milliGRAM(s) Oral daily  atorvastatin 80 milliGRAM(s) Oral at bedtime  bisacodyl Suppository 10 milliGRAM(s) Rectal once  budesonide 160 MICROgram(s)/formoterol 4.5 MICROgram(s) Inhaler 2 Puff(s) Inhalation two times a day  chlorhexidine 2% Cloths 1 Application(s) Topical daily  dextrose 50% Injectable 50 milliLiter(s) IV Push every 15 minutes  dextrose 50% Injectable 25 milliLiter(s) IV Push every 15 minutes  dextrose Oral Gel 15 Gram(s) Oral once  famotidine    Tablet 20 milliGRAM(s) Oral two times a day  furosemide    Tablet 40 milliGRAM(s) Oral daily  gabapentin 300 milliGRAM(s) Oral every 8 hours  glucagon  Injectable 1 milliGRAM(s) IntraMuscular once  heparin   Injectable 5000 Unit(s) SubCutaneous every 8 hours  insulin glargine Injectable (LANTUS) 35 Unit(s) SubCutaneous at bedtime  insulin lispro (ADMELOG) corrective regimen sliding scale   SubCutaneous three times a day before meals  insulin lispro (ADMELOG) corrective regimen sliding scale   SubCutaneous at bedtime  insulin lispro Injectable (ADMELOG) 9 Unit(s) SubCutaneous three times a day before meals  metoprolol tartrate 25 milliGRAM(s) Oral two times a day  OLANZapine 7.5 milliGRAM(s) Oral daily  polyethylene glycol 3350 17 Gram(s) Oral daily  predniSONE   Tablet 10 milliGRAM(s) Oral daily  senna 2 Tablet(s) Oral at bedtime  sorbitol 70% Solution 30 milliLiter(s) Oral once  traZODone 50 milliGRAM(s) Oral at bedtime      Physical Exam  General: Patient comfortable in bed   Vital Signs Last 12 Hrs  T(F): 97.9 (11-28-23 @ 11:36), Max: 97.9 (11-28-23 @ 11:36)  HR: 80 (11-28-23 @ 11:36) (80 - 83)  BP: 107/72 (11-28-23 @ 11:36) (107/72 - 112/78)  BP(mean): 89 (11-28-23 @ 04:52) (89 - 89)  RR: 18 (11-28-23 @ 11:36) (18 - 18)  SpO2: 94% (11-28-23 @ 11:36) (93% - 95%)    CVS: S1S2   Respiratory: No wheezing, no crepitations  GI: Abdomen soft, bowel sounds positive  Musculoskeletal:  moves all extremities  : Voiding

## 2023-11-28 NOTE — PROGRESS NOTE ADULT - ASSESSMENT
58F with history of CVA with L sided weakness/numbness/L gaze deviation, DM2 with b/l peripheral neuropathy, COPD/Asthma, Breast Ca s/p ?RT, Bipolar depression, Rheumatoid Arthritis, Antiphospholipid syndrome, L eye uveitis/scleritis, Newly diagnosed DM2 (pt cannot remember which medication she takes for it), and current tobacco use who presents to the hospital with multiple complaints. found to have  sever AR s/p AVR CABG x 1     1. LV dysfunction  -new mod-severe segmental LV dysfunction in July has not had ischemic workup due to stroke at that time.  -c/w metoprolol   -TTE EF 30% now with mod-severe AR  -c/w lasix 40mg PO Daily   -cath shows LAD prox 100% fills via right to left collateral   -CMR 11/17: Mild thinning and hypokinesis of the mid to apical anterior and  anteroseptal segments.  The left ventricular ejection fraction measures  43%.Findings consistent with ischemic cardiomyopathy.  LESLEY with sever AR   - s/p AVR and CABGx1 LIMA-LAD  -CT 11/27 with inferior most sternal fragment engages only the left sternal fragment  and not the right, which is suggestive of dehiscence. f/u CTS recs    2. CVA  -history of multiple CVAs and is on eliquis   -CT scan of head negative for any acute findings  -ILR interrogation with no events  -per neuro no plans for MRI       3. Antiphospholipid syndrome  - Resume AC as per CTS

## 2023-11-28 NOTE — PROGRESS NOTE ADULT - PROBLEM SELECTOR PLAN 1
Will continue Lantus 35u at bedtime.  Will continue  Admelog to  9u before each meal and continue Admelog correction scale coverage. Will continue monitoring FS and Follow up.   Will continue monitoring  blood sugars, will Follow up.  Patient counseled for compliance with consistent low carb diet.  Insulin teaching started  Anticipate Basal insulin at discharge + PO DM regimen.  Can be Discharged on current basal insulin once nightly.   In addition discharge on Farxiga 10 mg and Glimepiride 2 mg daily with breakfast.   Pls send with glucometer  FU outpatient 2 weeks

## 2023-11-28 NOTE — PROGRESS NOTE ADULT - PROBLEM SELECTOR PLAN 1
intermittent wheezing - suspect 2nd to retained secretions in airway  -Pt not take deep breaths due to incisional pain  -Suggest pain control. ?Sternal dehiscence on CT, ?hemopericardium. Defer to CT surgery.   -Coughing/deep breathing, incentive spirometry encouraged  -Continue bronchodilators  -OOB to chair, ambulate as tolerated  -Wean o2 as tolerated, keep sats >90% (currently 2LNC)  -CT chest 11/27 with no PNA, very small loculated L effusion. intermittent wheezing - suspect 2nd to retained secretions in airway  -Pt not take deep breaths due to incisional pain  -Suggest pain control. Sternal nonunion CT, ?hemopericardium. Defer to CT surgery.   -Coughing/deep breathing, incentive spirometry encouraged  -Continue bronchodilators  -OOB to chair, ambulate as tolerated  -Wean o2 as tolerated, keep sats >90% (currently 2LNC)  -CT chest 11/27 with no PNA, very small loculated L effusion.

## 2023-11-28 NOTE — PROGRESS NOTE ADULT - SUBJECTIVE AND OBJECTIVE BOX
German Carrasco MD  Interventional Cardiology / Advance Heart Failure and Cardiac Transplant Specialist  [place] Office : [address] [place] [id number]  Tel:   [place] Office : [address] [place] [facility] [address]  Tel: [phone]      Subjective/Overnight events: Pt is sitting up in recliner, c/o of midsternal chest pain  	  MEDICATIONS:  aspirin enteric coated 81 milliGRAM(s) Oral daily  furosemide    Tablet 40 milliGRAM(s) Oral daily  heparin   Injectable 5000 Unit(s) SubCutaneous every 8 hours  metoprolol tartrate 25 milliGRAM(s) Oral two times a day      albuterol/ipratropium for Nebulization 3 milliLiter(s) Nebulizer every 6 hours  budesonide 160 MICROgram(s)/formoterol 4.5 MICROgram(s) Inhaler 2 Puff(s) Inhalation two times a day    acetaminophen     Tablet .. 975 milliGRAM(s) Oral every 8 hours  gabapentin 300 milliGRAM(s) Oral every 8 hours  HYDROmorphone   Tablet 2 milliGRAM(s) Oral every 4 hours PRN  HYDROmorphone   Tablet 4 milliGRAM(s) Oral every 4 hours PRN  OLANZapine 7.5 milliGRAM(s) Oral daily  traZODone 50 milliGRAM(s) Oral at bedtime    bisacodyl Suppository 10 milliGRAM(s) Rectal once  famotidine    Tablet 20 milliGRAM(s) Oral two times a day  polyethylene glycol 3350 17 Gram(s) Oral daily  senna 2 Tablet(s) Oral at bedtime  sorbitol 70% Solution 30 milliLiter(s) Oral once    atorvastatin 80 milliGRAM(s) Oral at bedtime  dextrose 50% Injectable 50 milliLiter(s) IV Push every 15 minutes  dextrose 50% Injectable 25 milliLiter(s) IV Push every 15 minutes  dextrose Oral Gel 15 Gram(s) Oral once  glucagon  Injectable 1 milliGRAM(s) IntraMuscular once  insulin glargine Injectable (LANTUS) 35 Unit(s) SubCutaneous at bedtime  insulin lispro (ADMELOG) corrective regimen sliding scale   SubCutaneous at bedtime  insulin lispro (ADMELOG) corrective regimen sliding scale   SubCutaneous three times a day before meals  insulin lispro Injectable (ADMELOG) 9 Unit(s) SubCutaneous three times a day before meals  predniSONE   Tablet 10 milliGRAM(s) Oral daily    chlorhexidine 2% Cloths 1 Application(s) Topical daily      PAST MEDICAL/SURGICAL HISTORY  PAST MEDICAL & SURGICAL HISTORY:  Cigarette smoker      Rheumatoid arthritis      Other depression      Breast cancer      Migraines      History of multiple cerebrovascular accidents (CVAs)      Suicidal ideation      Paresthesia of left arm      Facial paresthesia      APS (antiphospholipid syndrome)      History of depressed bipolar disorder      History of COPD      History of COPD      History of hysterectomy      History of cholecystectomy          SOCIAL HISTORY: Substance Use (street drugs): ( x ) never used  (  ) other:    FAMILY HISTORY:  Family history of breast cancer (Mother)    Family history of diabetes mellitus (DM) (Father)          PHYSICAL EXAM:  T(C): 36.6 (11-28-23 @ 11:36), Max: 36.6 (11-28-23 @ 11:36)  HR: 80 (11-28-23 @ 11:36) (76 - 84)  BP: 107/72 (11-28-23 @ 11:36) (107/72 - 121/73)  RR: 18 (11-28-23 @ 11:36) (18 - 18)  SpO2: 94% (11-28-23 @ 11:36) (93% - 95%)  Wt(kg): --  I&O's Summary    27 Nov 2023 07:01  -  28 Nov 2023 07:00  --------------------------------------------------------  IN: 1080 mL / OUT: 1300 mL / NET: -220 mL    28 Nov 2023 07:01  -  28 Nov 2023 13:52  --------------------------------------------------------  IN: 200 mL / OUT: 1750 mL / NET: -1550 mL          GENERAL: NAD  EYES:, conjunctiva and sclera clear  ENMT: No tonsillar erythema, exudates, or enlargement  Cardiovascular: Normal S1 S2, No JVD,   Respiratory: Lungs clear to auscultation	  Gastrointestinal:  Soft, Non-tender, + BS	  Extremities: +2 LE edema                                     8.9    19.29 )-----------( 272      ( 28 Nov 2023 05:12 )             29.2     11-28    140  |  101  |  26<H>  ----------------------------<  107<H>  4.2   |  28  |  0.70    Ca    9.5      28 Nov 2023 05:11  Phos  4.3     11-28  Mg     2.9     11-28    TPro  6.4  /  Alb  3.7  /  TBili  2.1<H>  /  DBili  x   /  AST  17  /  ALT  20  /  AlkPhos  85  11-28    proBNP:   Lipid Profile:   HgA1c:   TSH:     Consultant(s) Notes Reviewed:  [x ] YES  [ ] NO    Care Discussed with Consultants/Other Providers [ x] YES  [ ] NO    Imaging Personally Reviewed independently:  [x] YES  [ ] NO    All labs, radiologic studies, vitals, orders and medications list reviewed. Patient is seen and examined at bedside. Case discussed with medical team.

## 2023-11-29 LAB
ANION GAP SERPL CALC-SCNC: 11 MMOL/L — SIGNIFICANT CHANGE UP (ref 5–17)
ANION GAP SERPL CALC-SCNC: 11 MMOL/L — SIGNIFICANT CHANGE UP (ref 5–17)
BASOPHILS # BLD AUTO: 0.09 K/UL — SIGNIFICANT CHANGE UP (ref 0–0.2)
BASOPHILS # BLD AUTO: 0.09 K/UL — SIGNIFICANT CHANGE UP (ref 0–0.2)
BASOPHILS NFR BLD AUTO: 0.5 % — SIGNIFICANT CHANGE UP (ref 0–2)
BASOPHILS NFR BLD AUTO: 0.5 % — SIGNIFICANT CHANGE UP (ref 0–2)
BUN SERPL-MCNC: 24 MG/DL — HIGH (ref 7–23)
BUN SERPL-MCNC: 24 MG/DL — HIGH (ref 7–23)
CALCIUM SERPL-MCNC: 9.4 MG/DL — SIGNIFICANT CHANGE UP (ref 8.4–10.5)
CALCIUM SERPL-MCNC: 9.4 MG/DL — SIGNIFICANT CHANGE UP (ref 8.4–10.5)
CHLORIDE SERPL-SCNC: 98 MMOL/L — SIGNIFICANT CHANGE UP (ref 96–108)
CHLORIDE SERPL-SCNC: 98 MMOL/L — SIGNIFICANT CHANGE UP (ref 96–108)
CO2 SERPL-SCNC: 29 MMOL/L — SIGNIFICANT CHANGE UP (ref 22–31)
CO2 SERPL-SCNC: 29 MMOL/L — SIGNIFICANT CHANGE UP (ref 22–31)
CREAT SERPL-MCNC: 0.59 MG/DL — SIGNIFICANT CHANGE UP (ref 0.5–1.3)
CREAT SERPL-MCNC: 0.59 MG/DL — SIGNIFICANT CHANGE UP (ref 0.5–1.3)
EGFR: 104 ML/MIN/1.73M2 — SIGNIFICANT CHANGE UP
EGFR: 104 ML/MIN/1.73M2 — SIGNIFICANT CHANGE UP
EOSINOPHIL # BLD AUTO: 0.41 K/UL — SIGNIFICANT CHANGE UP (ref 0–0.5)
EOSINOPHIL # BLD AUTO: 0.41 K/UL — SIGNIFICANT CHANGE UP (ref 0–0.5)
EOSINOPHIL NFR BLD AUTO: 2.3 % — SIGNIFICANT CHANGE UP (ref 0–6)
EOSINOPHIL NFR BLD AUTO: 2.3 % — SIGNIFICANT CHANGE UP (ref 0–6)
GLUCOSE BLDC GLUCOMTR-MCNC: 119 MG/DL — HIGH (ref 70–99)
GLUCOSE BLDC GLUCOMTR-MCNC: 119 MG/DL — HIGH (ref 70–99)
GLUCOSE BLDC GLUCOMTR-MCNC: 136 MG/DL — HIGH (ref 70–99)
GLUCOSE BLDC GLUCOMTR-MCNC: 136 MG/DL — HIGH (ref 70–99)
GLUCOSE BLDC GLUCOMTR-MCNC: 79 MG/DL — SIGNIFICANT CHANGE UP (ref 70–99)
GLUCOSE BLDC GLUCOMTR-MCNC: 79 MG/DL — SIGNIFICANT CHANGE UP (ref 70–99)
GLUCOSE BLDC GLUCOMTR-MCNC: 96 MG/DL — SIGNIFICANT CHANGE UP (ref 70–99)
GLUCOSE BLDC GLUCOMTR-MCNC: 96 MG/DL — SIGNIFICANT CHANGE UP (ref 70–99)
GLUCOSE SERPL-MCNC: 87 MG/DL — SIGNIFICANT CHANGE UP (ref 70–99)
GLUCOSE SERPL-MCNC: 87 MG/DL — SIGNIFICANT CHANGE UP (ref 70–99)
HCT VFR BLD CALC: 31.8 % — LOW (ref 34.5–45)
HCT VFR BLD CALC: 31.8 % — LOW (ref 34.5–45)
HGB BLD-MCNC: 9.8 G/DL — LOW (ref 11.5–15.5)
HGB BLD-MCNC: 9.8 G/DL — LOW (ref 11.5–15.5)
IMM GRANULOCYTES NFR BLD AUTO: 1.6 % — HIGH (ref 0–0.9)
IMM GRANULOCYTES NFR BLD AUTO: 1.6 % — HIGH (ref 0–0.9)
LYMPHOCYTES # BLD AUTO: 16.4 % — SIGNIFICANT CHANGE UP (ref 13–44)
LYMPHOCYTES # BLD AUTO: 16.4 % — SIGNIFICANT CHANGE UP (ref 13–44)
LYMPHOCYTES # BLD AUTO: 2.97 K/UL — SIGNIFICANT CHANGE UP (ref 1–3.3)
LYMPHOCYTES # BLD AUTO: 2.97 K/UL — SIGNIFICANT CHANGE UP (ref 1–3.3)
MAGNESIUM SERPL-MCNC: 2.2 MG/DL — SIGNIFICANT CHANGE UP (ref 1.6–2.6)
MAGNESIUM SERPL-MCNC: 2.2 MG/DL — SIGNIFICANT CHANGE UP (ref 1.6–2.6)
MCHC RBC-ENTMCNC: 29.9 PG — SIGNIFICANT CHANGE UP (ref 27–34)
MCHC RBC-ENTMCNC: 29.9 PG — SIGNIFICANT CHANGE UP (ref 27–34)
MCHC RBC-ENTMCNC: 30.8 GM/DL — LOW (ref 32–36)
MCHC RBC-ENTMCNC: 30.8 GM/DL — LOW (ref 32–36)
MCV RBC AUTO: 97 FL — SIGNIFICANT CHANGE UP (ref 80–100)
MCV RBC AUTO: 97 FL — SIGNIFICANT CHANGE UP (ref 80–100)
MONOCYTES # BLD AUTO: 1.45 K/UL — HIGH (ref 0–0.9)
MONOCYTES # BLD AUTO: 1.45 K/UL — HIGH (ref 0–0.9)
MONOCYTES NFR BLD AUTO: 8 % — SIGNIFICANT CHANGE UP (ref 2–14)
MONOCYTES NFR BLD AUTO: 8 % — SIGNIFICANT CHANGE UP (ref 2–14)
NEUTROPHILS # BLD AUTO: 12.93 K/UL — HIGH (ref 1.8–7.4)
NEUTROPHILS # BLD AUTO: 12.93 K/UL — HIGH (ref 1.8–7.4)
NEUTROPHILS NFR BLD AUTO: 71.2 % — SIGNIFICANT CHANGE UP (ref 43–77)
NEUTROPHILS NFR BLD AUTO: 71.2 % — SIGNIFICANT CHANGE UP (ref 43–77)
NRBC # BLD: 0 /100 WBCS — SIGNIFICANT CHANGE UP (ref 0–0)
NRBC # BLD: 0 /100 WBCS — SIGNIFICANT CHANGE UP (ref 0–0)
PHOSPHATE SERPL-MCNC: 3.7 MG/DL — SIGNIFICANT CHANGE UP (ref 2.5–4.5)
PHOSPHATE SERPL-MCNC: 3.7 MG/DL — SIGNIFICANT CHANGE UP (ref 2.5–4.5)
PLATELET # BLD AUTO: 348 K/UL — SIGNIFICANT CHANGE UP (ref 150–400)
PLATELET # BLD AUTO: 348 K/UL — SIGNIFICANT CHANGE UP (ref 150–400)
POTASSIUM SERPL-MCNC: 4.3 MMOL/L — SIGNIFICANT CHANGE UP (ref 3.5–5.3)
POTASSIUM SERPL-MCNC: 4.3 MMOL/L — SIGNIFICANT CHANGE UP (ref 3.5–5.3)
POTASSIUM SERPL-SCNC: 4.3 MMOL/L — SIGNIFICANT CHANGE UP (ref 3.5–5.3)
POTASSIUM SERPL-SCNC: 4.3 MMOL/L — SIGNIFICANT CHANGE UP (ref 3.5–5.3)
RBC # BLD: 3.28 M/UL — LOW (ref 3.8–5.2)
RBC # BLD: 3.28 M/UL — LOW (ref 3.8–5.2)
RBC # FLD: 16.7 % — HIGH (ref 10.3–14.5)
RBC # FLD: 16.7 % — HIGH (ref 10.3–14.5)
SODIUM SERPL-SCNC: 138 MMOL/L — SIGNIFICANT CHANGE UP (ref 135–145)
SODIUM SERPL-SCNC: 138 MMOL/L — SIGNIFICANT CHANGE UP (ref 135–145)
WBC # BLD: 18.14 K/UL — HIGH (ref 3.8–10.5)
WBC # BLD: 18.14 K/UL — HIGH (ref 3.8–10.5)
WBC # FLD AUTO: 18.14 K/UL — HIGH (ref 3.8–10.5)
WBC # FLD AUTO: 18.14 K/UL — HIGH (ref 3.8–10.5)

## 2023-11-29 RX ORDER — APIXABAN 2.5 MG/1
5 TABLET, FILM COATED ORAL
Refills: 0 | Status: DISCONTINUED | OUTPATIENT
Start: 2023-11-29 | End: 2023-12-04

## 2023-11-29 RX ORDER — LIDOCAINE 4 G/100G
1 CREAM TOPICAL ONCE
Refills: 0 | Status: COMPLETED | OUTPATIENT
Start: 2023-11-29 | End: 2023-11-29

## 2023-11-29 RX ORDER — INSULIN GLARGINE 100 [IU]/ML
32 INJECTION, SOLUTION SUBCUTANEOUS AT BEDTIME
Refills: 0 | Status: DISCONTINUED | OUTPATIENT
Start: 2023-11-29 | End: 2023-11-30

## 2023-11-29 RX ADMIN — HEPARIN SODIUM 5000 UNIT(S): 5000 INJECTION INTRAVENOUS; SUBCUTANEOUS at 13:05

## 2023-11-29 RX ADMIN — HYDROMORPHONE HYDROCHLORIDE 4 MILLIGRAM(S): 2 INJECTION INTRAMUSCULAR; INTRAVENOUS; SUBCUTANEOUS at 04:54

## 2023-11-29 RX ADMIN — BUDESONIDE AND FORMOTEROL FUMARATE DIHYDRATE 2 PUFF(S): 160; 4.5 AEROSOL RESPIRATORY (INHALATION) at 04:55

## 2023-11-29 RX ADMIN — GABAPENTIN 300 MILLIGRAM(S): 400 CAPSULE ORAL at 04:53

## 2023-11-29 RX ADMIN — Medication 81 MILLIGRAM(S): at 13:05

## 2023-11-29 RX ADMIN — Medication 3 MILLILITER(S): at 16:39

## 2023-11-29 RX ADMIN — ATORVASTATIN CALCIUM 80 MILLIGRAM(S): 80 TABLET, FILM COATED ORAL at 22:01

## 2023-11-29 RX ADMIN — HYDROMORPHONE HYDROCHLORIDE 2 MILLIGRAM(S): 2 INJECTION INTRAMUSCULAR; INTRAVENOUS; SUBCUTANEOUS at 00:36

## 2023-11-29 RX ADMIN — Medication 25 MILLIGRAM(S): at 04:54

## 2023-11-29 RX ADMIN — Medication 10 MILLIGRAM(S): at 04:55

## 2023-11-29 RX ADMIN — OLANZAPINE 7.5 MILLIGRAM(S): 15 TABLET, FILM COATED ORAL at 13:05

## 2023-11-29 RX ADMIN — HYDROMORPHONE HYDROCHLORIDE 4 MILLIGRAM(S): 2 INJECTION INTRAMUSCULAR; INTRAVENOUS; SUBCUTANEOUS at 22:02

## 2023-11-29 RX ADMIN — APIXABAN 5 MILLIGRAM(S): 2.5 TABLET, FILM COATED ORAL at 17:48

## 2023-11-29 RX ADMIN — Medication 50 MILLIGRAM(S): at 22:02

## 2023-11-29 RX ADMIN — HEPARIN SODIUM 5000 UNIT(S): 5000 INJECTION INTRAVENOUS; SUBCUTANEOUS at 04:55

## 2023-11-29 RX ADMIN — Medication 9 UNIT(S): at 13:04

## 2023-11-29 RX ADMIN — Medication 9 UNIT(S): at 17:49

## 2023-11-29 RX ADMIN — GABAPENTIN 300 MILLIGRAM(S): 400 CAPSULE ORAL at 13:05

## 2023-11-29 RX ADMIN — SENNA PLUS 2 TABLET(S): 8.6 TABLET ORAL at 22:02

## 2023-11-29 RX ADMIN — BUDESONIDE AND FORMOTEROL FUMARATE DIHYDRATE 2 PUFF(S): 160; 4.5 AEROSOL RESPIRATORY (INHALATION) at 16:39

## 2023-11-29 RX ADMIN — HYDROMORPHONE HYDROCHLORIDE 4 MILLIGRAM(S): 2 INJECTION INTRAMUSCULAR; INTRAVENOUS; SUBCUTANEOUS at 13:11

## 2023-11-29 RX ADMIN — Medication 9 UNIT(S): at 08:19

## 2023-11-29 RX ADMIN — LIDOCAINE 1 PATCH: 4 CREAM TOPICAL at 09:18

## 2023-11-29 RX ADMIN — HYDROMORPHONE HYDROCHLORIDE 4 MILLIGRAM(S): 2 INJECTION INTRAMUSCULAR; INTRAVENOUS; SUBCUTANEOUS at 22:35

## 2023-11-29 RX ADMIN — Medication 25 MILLIGRAM(S): at 17:49

## 2023-11-29 RX ADMIN — LIDOCAINE 1 PATCH: 4 CREAM TOPICAL at 17:51

## 2023-11-29 RX ADMIN — HYDROMORPHONE HYDROCHLORIDE 4 MILLIGRAM(S): 2 INJECTION INTRAMUSCULAR; INTRAVENOUS; SUBCUTANEOUS at 13:41

## 2023-11-29 RX ADMIN — GABAPENTIN 300 MILLIGRAM(S): 400 CAPSULE ORAL at 22:02

## 2023-11-29 RX ADMIN — HYDROMORPHONE HYDROCHLORIDE 4 MILLIGRAM(S): 2 INJECTION INTRAMUSCULAR; INTRAVENOUS; SUBCUTANEOUS at 17:49

## 2023-11-29 RX ADMIN — FAMOTIDINE 20 MILLIGRAM(S): 10 INJECTION INTRAVENOUS at 17:49

## 2023-11-29 RX ADMIN — Medication 40 MILLIGRAM(S): at 04:54

## 2023-11-29 RX ADMIN — LIDOCAINE 1 PATCH: 4 CREAM TOPICAL at 22:00

## 2023-11-29 RX ADMIN — HYDROMORPHONE HYDROCHLORIDE 2 MILLIGRAM(S): 2 INJECTION INTRAMUSCULAR; INTRAVENOUS; SUBCUTANEOUS at 00:06

## 2023-11-29 RX ADMIN — INSULIN GLARGINE 32 UNIT(S): 100 INJECTION, SOLUTION SUBCUTANEOUS at 22:02

## 2023-11-29 RX ADMIN — HYDROMORPHONE HYDROCHLORIDE 4 MILLIGRAM(S): 2 INJECTION INTRAMUSCULAR; INTRAVENOUS; SUBCUTANEOUS at 09:18

## 2023-11-29 RX ADMIN — HYDROMORPHONE HYDROCHLORIDE 4 MILLIGRAM(S): 2 INJECTION INTRAMUSCULAR; INTRAVENOUS; SUBCUTANEOUS at 09:48

## 2023-11-29 RX ADMIN — HYDROMORPHONE HYDROCHLORIDE 4 MILLIGRAM(S): 2 INJECTION INTRAMUSCULAR; INTRAVENOUS; SUBCUTANEOUS at 05:24

## 2023-11-29 RX ADMIN — CHLORHEXIDINE GLUCONATE 1 APPLICATION(S): 213 SOLUTION TOPICAL at 08:41

## 2023-11-29 RX ADMIN — FAMOTIDINE 20 MILLIGRAM(S): 10 INJECTION INTRAVENOUS at 04:54

## 2023-11-29 RX ADMIN — Medication 3 MILLILITER(S): at 04:54

## 2023-11-29 RX ADMIN — HYDROMORPHONE HYDROCHLORIDE 4 MILLIGRAM(S): 2 INJECTION INTRAMUSCULAR; INTRAVENOUS; SUBCUTANEOUS at 18:19

## 2023-11-29 NOTE — PROGRESS NOTE ADULT - PROBLEM SELECTOR PLAN 2
- Continue with ASA 81mg qD, Atorvastatin 80mg qHS, lopressor 25 BID  - Patient stopped dobutamine gtt 11/24  - (+) Pw isolate  - Lasix 40mg po daily per cardiology  - monitor I/Os  - Increase activity as tolerated.   - Pain control regimen, per pain management team.  - continue Bowel regimen  - Encourage Chest PT / Pulmonary toileting and Incentive spirometry every 1 hour x 10 while awake.   - Continue with PUD and DVT prophylaxis.  - On Prednisone for hx COPD/RA - Continue with ASA 81mg qD, Atorvastatin 80mg qHS, lopressor 25 BID  - Patient stopped dobutamine gtt 11/24  - (+) Pw isolate  - Lasix 40mg po daily per cardiology  - monitor I/Os  - Monitor electrolytes, Keep K >4, Mg >2.  - Increase activity as tolerated.   - Pain control regimen, per pain management team.  - continue Bowel regimen  - Encourage Chest PT / Pulmonary toileting and Incentive spirometry every 1 hour x 10 while awake.   - Continue with PUD and DVT prophylaxis.  - Of note, patient with hx of Antiphospholipid Syndrome and CVA, was on AC therapy pre-surgery; will d/w Dr. Loaiza when ok to resume AC. - Continue with ASA 81mg qD, Atorvastatin 80mg qHS, lopressor 25 BID  - Patient stopped dobutamine gtt 11/24  - (+) Pw isolate  - Lasix 40mg po daily per cardiology  - monitor I/Os  - Monitor electrolytes, Keep K >4, Mg >2.  - Increase activity as tolerated.   - Pain control regimen, per pain management team.  - continue Bowel regimen  - Encourage Chest PT / Pulmonary toileting and Incentive spirometry every 1 hour x 10 while awake.   - Continue with PUD and DVT prophylaxis.  - Of note, patient with hx of Antiphospholipid Syndrome and CVA, was on AC therapy pre-surgery; ok to re-start AC with Eliquis 5mg BID starting tonight, per Dr. Loaiza

## 2023-11-29 NOTE — PROGRESS NOTE ADULT - ASSESSMENT
57 y/o F with PMH smoker with PMH CVA with L sided weakness/numbness/L gaze deviation, DM2 with b/l peripheral neuropathy, COPD/emphysema, asthma, RA (on prednisone), Breast Ca s/p ?RT, Bipolar depression, Rheumatoid Arthritis, Antiphospholipid syndrome, and L eye uveitis/scleritis. Presents to [facility] transferred from [facility]. Initially presents with exacerbation of L-sided subjective weakness/numbness likely 2/2 recrudescence of prior Right BG infarct. Cardiology following for new LV dysfunction since July, pending ischemic eval. LESLEY was done showing severe Aortic Insufficiency and moderated MR. Cardiac cath was done showing LAD prox 100% fills via right to left collateral. Tx for surgical evaluation. S/p CABG, AVR on 11/21. Called to consult for wheezing. Per pt she is SOB and intermittently wheezing. Endorses difficult to expectorate secretions. Pt states she is unable to take a deep breath due to incisional pain.

## 2023-11-29 NOTE — PROGRESS NOTE ADULT - PROBLEM SELECTOR PLAN 1
- Continue with ASA 81mg qD, Atorvastatin 80mg qHS, lopressor 25 BID  - Patient stopped dobutamine gtt 11/24  - (+) Pw isolate  - Lasix 40mg po daily per cardiology  - monitor I/Os  - Increase activity as tolerated.   - Pain control regimen, per pain management team.  - continue Bowel regimen  - Encourage Chest PT / Pulmonary toileting and Incentive spirometry every 1 hour x 10 while awake.   - Continue with PUD and DVT prophylaxis.  - Monitor electrolytes, Keep K >4, Mg >2. - Continue with ASA 81mg qD, Atorvastatin 80mg qHS, lopressor 25 BID  - Patient stopped dobutamine gtt 11/24  - (+) Pw isolate  - Lasix 40mg po daily per cardiology  - monitor I/Os  - Monitor electrolytes, Keep K >4, Mg >2.  - Increase activity as tolerated.   - Pain control regimen, per pain management team.  - continue Bowel regimen  - Encourage Chest PT / Pulmonary toileting and Incentive spirometry every 1 hour x 10 while awake.   - Continue with PUD and DVT prophylaxis.  - Of note, patient with hx of Antiphospholipid Syndrome and CVA, was on AC therapy pre-surgery; will d/w Dr. Loaiza when ok to resume AC. - Continue with ASA 81mg qD, Atorvastatin 80mg qHS, lopressor 25 BID  - Patient stopped dobutamine gtt 11/24  - (+) Pw isolate  - Lasix 40mg po daily per cardiology  - monitor I/Os  - Monitor electrolytes, Keep K >4, Mg >2.  - Increase activity as tolerated.   - Pain control regimen, per pain management team.  - continue Bowel regimen  - Encourage Chest PT / Pulmonary toileting and Incentive spirometry every 1 hour x 10 while awake.   - Continue with PUD and DVT prophylaxis.  - Of note, patient with hx of Antiphospholipid Syndrome and CVA, was on AC therapy pre-surgery; ok to re-start AC with Eliquis 5mg BID starting tonight, per Dr. Loaiza

## 2023-11-29 NOTE — PROGRESS NOTE ADULT - ASSESSMENT
58F smoker with stroke x 2 (2022 and july 2023 with resid  L sided weakness/numbness  DM2 with b/l peripheral neuropathy, COPD/Asthma, Breast Ca s/p, Bipolar depression, Rheumatoid Arthritis, Antiphospholipid syndrome, and L eye uveitis/scleritis, Presents to [facility] transferred from [facility]. p/w multiple medical complaints and exacerbation of L-sided subjective weakness/numbness likely 2/2 recrudescence of prior Right BG infarct. Cardiology following for new LV dysfunction since July.Patient now transferred to CTS Dr. Loaiza for evaluation. neuro called for prior strokes   LESLEY was done showing severe Aortic Insufficiency and moderated MR.   Cardiac cath was done showing LAD prox 100% fills via right to left collateral.   CTH with tinght chronic appearing R BG infarct   TTE EF 30%   CD neg   A1c 7.6   LDL 37   ILR interrogated no events  NIHSS 3  premrs3   cath shows LAD prox 100% fills via right to left collateral will need AVR and LIMA to LAD bypass  o/e with mild L facial and decrease sensation on L with mild 5-/5 weakness on L with slow FFM ; dysconjugate gaze   s/p OR 11/21  extubated  post op no neuro changes   c/o HA.   11/29 walking on unit with PT     Imprsesion:   prior nowe chronic appearing infarcts. R BG with residual L HP  DM peripheral neuropathy      - for secondary stroke prevention on asa 81mg and high dose statin.  was also on full dose lovenox given APLS; would resume when able or place on heparin drip   - gabapentin 300mg TID for neuropathy   - f/u rheum and heme/onc [facility]   - telemetry  - PT/OT   - check FS, glucose control <180  - GI/DVT ppx   - Thank you for allowing me to participate in the care of this patient. Call with questions.   - spoke [facility] CTS team/ACP   Charles Crespo MD  Vascular Neurology  Office: [phone]

## 2023-11-29 NOTE — PROGRESS NOTE ADULT - SUBJECTIVE AND OBJECTIVE BOX
German Carrasco MD  Interventional Cardiology / Advance Heart Failure and Cardiac Transplant Specialist  [place] Office : [address] [place] [id number]  Tel:   [place] Office : [address] [facility] [address]  Tel: [phone]      Subjective/Overnight events: Pt is sitting up in recliner no acute distress  	  MEDICATIONS:  aspirin enteric coated 81 milliGRAM(s) Oral daily  furosemide    Tablet 40 milliGRAM(s) Oral daily  heparin   Injectable 5000 Unit(s) SubCutaneous every 8 hours  metoprolol tartrate 25 milliGRAM(s) Oral two times a day      albuterol/ipratropium for Nebulization 3 milliLiter(s) Nebulizer every 6 hours  budesonide 160 MICROgram(s)/formoterol 4.5 MICROgram(s) Inhaler 2 Puff(s) Inhalation two times a day    gabapentin 300 milliGRAM(s) Oral every 8 hours  HYDROmorphone   Tablet 2 milliGRAM(s) Oral every 4 hours PRN  HYDROmorphone   Tablet 4 milliGRAM(s) Oral every 4 hours PRN  OLANZapine 7.5 milliGRAM(s) Oral daily  traZODone 50 milliGRAM(s) Oral at bedtime    bisacodyl Suppository 10 milliGRAM(s) Rectal once  famotidine    Tablet 20 milliGRAM(s) Oral two times a day  polyethylene glycol 3350 17 Gram(s) Oral daily  senna 2 Tablet(s) Oral at bedtime  sorbitol 70% Solution 30 milliLiter(s) Oral once    atorvastatin 80 milliGRAM(s) Oral at bedtime  dextrose 50% Injectable 50 milliLiter(s) IV Push every 15 minutes  dextrose 50% Injectable 25 milliLiter(s) IV Push every 15 minutes  dextrose Oral Gel 15 Gram(s) Oral once  glucagon  Injectable 1 milliGRAM(s) IntraMuscular once  insulin glargine Injectable (LANTUS) 35 Unit(s) SubCutaneous at bedtime  insulin lispro (ADMELOG) corrective regimen sliding scale   SubCutaneous at bedtime  insulin lispro (ADMELOG) corrective regimen sliding scale   SubCutaneous three times a day before meals  insulin lispro Injectable (ADMELOG) 9 Unit(s) SubCutaneous three times a day before meals  predniSONE   Tablet 10 milliGRAM(s) Oral daily    chlorhexidine 2% Cloths 1 Application(s) Topical daily      PAST MEDICAL/SURGICAL HISTORY  PAST MEDICAL & SURGICAL HISTORY:  Cigarette smoker      Rheumatoid arthritis      Other depression      Breast cancer      Migraines      History of multiple cerebrovascular accidents (CVAs)      Suicidal ideation      Paresthesia of left arm      Facial paresthesia      APS (antiphospholipid syndrome)      History of depressed bipolar disorder      History of COPD      History of COPD      History of hysterectomy      History of cholecystectomy          SOCIAL HISTORY: Substance Use (street drugs): ( x ) never used  (  ) other:    FAMILY HISTORY:  Family history of breast cancer (Mother)    Family history of diabetes mellitus (DM) (Father)            PHYSICAL EXAM:  T(C): 37.2 (11-29-23 @ 04:40), Max: 37.2 (11-29-23 @ 04:40)  HR: 88 (11-29-23 @ 04:40) (79 - 88)  BP: 99/63 (11-29-23 @ 04:40) (99/63 - 108/73)  RR: 18 (11-29-23 @ 04:40) (18 - 18)  SpO2: 95% (11-29-23 @ 04:40) (94% - 96%)  Wt(kg): --  I&O's Summary    28 Nov 2023 07:01  -  29 Nov 2023 07:00  --------------------------------------------------------  IN: 800 mL / OUT: 2550 mL / NET: -1750 mL    29 Nov 2023 07:01  -  29 Nov 2023 10:50  --------------------------------------------------------  IN: 240 mL / OUT: 0 mL / NET: 240 mL          GENERAL: NAD  EYES: , conjunctiva and sclera clear  ENMT: No tonsillar erythema, exudates, or enlargement  Cardiovascular: Normal S1 S2, No JVD, s/p sternotomy   Respiratory: Lungs clear to auscultation	  Gastrointestinal:  Soft, Non-tender, + BS	  Extremities: +2 LE edema                                     9.8    18.14 )-----------( 348      ( 29 Nov 2023 06:20 )             31.8     11-29    138  |  98  |  24<H>  ----------------------------<  87  4.3   |  29  |  0.59    Ca    9.4      29 Nov 2023 06:20  Phos  3.7     11-29  Mg     2.2     11-29    TPro  6.4  /  Alb  3.7  /  TBili  2.1<H>  /  DBili  x   /  AST  17  /  ALT  20  /  AlkPhos  85  11-28    proBNP:   Lipid Profile:   HgA1c:   TSH:     Consultant(s) Notes Reviewed:  [x ] YES  [ ] NO    Care Discussed with Consultants/Other Providers [ x] YES  [ ] NO    Imaging Personally Reviewed independently:  [x] YES  [ ] NO    All labs, radiologic studies, vitals, orders and medications list reviewed. Patient is seen and examined at bedside. Case discussed with medical team.

## 2023-11-29 NOTE — PROGRESS NOTE ADULT - ASSESSMENT
58F smoker with PMH CVA with L sided weakness/numbness/L gaze deviation, DM2 with b/l peripheral neuropathy, COPD/Asthma, Breast Ca s/p ?RT,  Bipolar depression, Rheumatoid Arthritis,  Antiphospholipid syndrome, and L eye uveitis/scleritis.   Assessment  DMT2: 58y Female with DM T2 with hyperglycemia, A1C 7.6% , was on oral meds at home, now postop CABG, now transitioned to  basal bolus, now sugars down trending, pt snacking at bedside between meals.   Insulin teaching started   Severe AR: CTS eval, LESLEY, s/p ct surgery   CAD: on medications, stable, monitored. Cardiac MR viability , now postop CABG  HTN: on antihypertensive medications, monitored, asymptomatic.  Obesity: No strict exercise routines, not on any weight loss program, neither on low calorie diet.    Discussed plan and management with Dr Ladarius Rivera NP - TEAMS  Skye Durand MD  Cell: [phone]  Office: [phone]        58F smoker with PMH CVA with L sided weakness/numbness/L gaze deviation, DM2 with b/l peripheral neuropathy, COPD/Asthma, Breast Ca s/p ?RT,  Bipolar depression, Rheumatoid Arthritis,  Antiphospholipid syndrome, and L eye uveitis/scleritis.   Assessment  DMT2: 58y Female with DM T2 with hyperglycemia, A1C 7.6% , was on oral meds at home, now postop CABG, now transitioned to  basal bolus, now sugars down trending,  pt snacking at bedside between meals.   Insulin teaching started   Severe AR: CTS eval, LESLEY, s/p ct surgery   CAD: on medications, stable, monitored. Cardiac MR viability , now postop CABG  HTN: on antihypertensive medications, monitored, asymptomatic.  Obesity: No strict exercise routines, not on any weight loss program, neither on low calorie diet.    Discussed plan and management with Dr Ladarius Rivera NP - TEAMS  Skye Durand MD  Cell: [phone]  Office: [phone]

## 2023-11-29 NOTE — PROGRESS NOTE ADULT - SUBJECTIVE AND OBJECTIVE BOX
Chief complaint  Patient is a 58y old  Female who presents with a chief complaint of CAD (27 Nov 2023 10:16)         Labs and Fingersticks  CAPILLARY BLOOD GLUCOSE      POCT Blood Glucose.: 79 mg/dL (29 Nov 2023 11:48)  POCT Blood Glucose.: 136 mg/dL (29 Nov 2023 08:14)  POCT Blood Glucose.: 125 mg/dL (28 Nov 2023 21:31)  POCT Blood Glucose.: 198 mg/dL (28 Nov 2023 16:27)      Anion Gap: 11 (11-29 @ 06:20)  Anion Gap: 11 (11-28 @ 05:11)      Calcium: 9.4 (11-29 @ 06:20)  Calcium: 9.5 (11-28 @ 05:11)  Albumin: 3.7 (11-28 @ 05:11)    Alanine Aminotransferase (ALT/SGPT): 20 (11-28 @ 05:11)  Alkaline Phosphatase: 85 (11-28 @ 05:11)  Aspartate Aminotransferase (AST/SGOT): 17 (11-28 @ 05:11)        11-29    138  |  98  |  24<H>  ----------------------------<  87  4.3   |  29  |  0.59    Ca    9.4      29 Nov 2023 06:20  Phos  3.7     11-29  Mg     2.2     11-29    TPro  6.4  /  Alb  3.7  /  TBili  2.1<H>  /  DBili  x   /  AST  17  /  ALT  20  /  AlkPhos  85  11-28                        9.8    18.14 )-----------( 348      ( 29 Nov 2023 06:20 )             31.8     Medications  MEDICATIONS  (STANDING):  albuterol/ipratropium for Nebulization 3 milliLiter(s) Nebulizer every 6 hours  aspirin enteric coated 81 milliGRAM(s) Oral daily  atorvastatin 80 milliGRAM(s) Oral at bedtime  bisacodyl Suppository 10 milliGRAM(s) Rectal once  budesonide 160 MICROgram(s)/formoterol 4.5 MICROgram(s) Inhaler 2 Puff(s) Inhalation two times a day  chlorhexidine 2% Cloths 1 Application(s) Topical daily  dextrose 50% Injectable 50 milliLiter(s) IV Push every 15 minutes  dextrose 50% Injectable 25 milliLiter(s) IV Push every 15 minutes  dextrose Oral Gel 15 Gram(s) Oral once  famotidine    Tablet 20 milliGRAM(s) Oral two times a day  furosemide    Tablet 40 milliGRAM(s) Oral daily  gabapentin 300 milliGRAM(s) Oral every 8 hours  glucagon  Injectable 1 milliGRAM(s) IntraMuscular once  heparin   Injectable 5000 Unit(s) SubCutaneous every 8 hours  insulin glargine Injectable (LANTUS) 32 Unit(s) SubCutaneous at bedtime  insulin lispro (ADMELOG) corrective regimen sliding scale   SubCutaneous at bedtime  insulin lispro (ADMELOG) corrective regimen sliding scale   SubCutaneous three times a day before meals  insulin lispro Injectable (ADMELOG) 9 Unit(s) SubCutaneous three times a day before meals  metoprolol tartrate 25 milliGRAM(s) Oral two times a day  OLANZapine 7.5 milliGRAM(s) Oral daily  polyethylene glycol 3350 17 Gram(s) Oral daily  predniSONE   Tablet 10 milliGRAM(s) Oral daily  senna 2 Tablet(s) Oral at bedtime  sorbitol 70% Solution 30 milliLiter(s) Oral once  traZODone 50 milliGRAM(s) Oral at bedtime      Physical Exam  General: Patient comfortable in bed   Vital Signs Last 12 Hrs  T(F): 97.9 (11-29-23 @ 12:56), Max: 98.9 (11-29-23 @ 04:40)  HR: 94 (11-29-23 @ 12:56) (88 - 94)  BP: 119/73 (11-29-23 @ 12:56) (99/63 - 119/73)  BP(mean): --  RR: 18 (11-29-23 @ 12:56) (18 - 18)  SpO2: 92% (11-29-23 @ 12:56) (92% - 95%)    CVS: S1S2   Respiratory: No wheezing, no crepitations  GI: Abdomen soft, bowel sounds positive  Musculoskeletal:  moves all extremities  : Voiding        Chief complaint  Patient is a 58y old  Female who presents with a chief complaint of CAD (27 Nov 2023 10:16)         Labs and Fingersticks  CAPILLARY BLOOD GLUCOSE      POCT Blood Glucose.: 79 mg/dL (29 Nov 2023 11:48)  POCT Blood Glucose.: 136 mg/dL (29 Nov 2023 08:14)  POCT Blood Glucose.: 125 mg/dL (28 Nov 2023 21:31)  POCT Blood Glucose.: 198 mg/dL (28 Nov 2023 16:27)      Anion Gap: 11 (11-29 @ 06:20)  Anion Gap: 11 (11-28 @ 05:11)      Calcium: 9.4 (11-29 @ 06:20)  Calcium: 9.5 (11-28 @ 05:11)  Albumin: 3.7 (11-28 @ 05:11)    Alanine Aminotransferase (ALT/SGPT): 20 (11-28 @ 05:11)  Alkaline Phosphatase: 85 (11-28 @ 05:11)  Aspartate Aminotransferase (AST/SGOT): 17 (11-28 @ 05:11)        11-29    138  |  98  |  24<H>  ----------------------------<  87  4.3   |  29  |  0.59    Ca    9.4      29 Nov 2023 06:20  Phos  3.7     11-29  Mg     2.2     11-29    TPro  6.4  /  Alb  3.7  /  TBili  2.1<H>  /  DBili  x   /  AST  17  /  ALT  20  /  AlkPhos  85  11-28                        9.8    18.14 )-----------( 348      ( 29 Nov 2023 06:20 )             31.8     Medications  MEDICATIONS  (STANDING):  albuterol/ipratropium for Nebulization 3 milliLiter(s) Nebulizer every 6 hours  aspirin enteric coated 81 milliGRAM(s) Oral daily  atorvastatin 80 milliGRAM(s) Oral at bedtime  bisacodyl Suppository 10 milliGRAM(s) Rectal once  budesonide 160 MICROgram(s)/formoterol 4.5 MICROgram(s) Inhaler 2 Puff(s) Inhalation two times a day  chlorhexidine 2% Cloths 1 Application(s) Topical daily  dextrose 50% Injectable 50 milliLiter(s) IV Push every 15 minutes  dextrose 50% Injectable 25 milliLiter(s) IV Push every 15 minutes  dextrose Oral Gel 15 Gram(s) Oral once  famotidine    Tablet 20 milliGRAM(s) Oral two times a day  furosemide    Tablet 40 milliGRAM(s) Oral daily  gabapentin 300 milliGRAM(s) Oral every 8 hours  glucagon  Injectable 1 milliGRAM(s) IntraMuscular once  heparin   Injectable 5000 Unit(s) SubCutaneous every 8 hours  insulin glargine Injectable (LANTUS) 32 Unit(s) SubCutaneous at bedtime  insulin lispro (ADMELOG) corrective regimen sliding scale   SubCutaneous at bedtime  insulin lispro (ADMELOG) corrective regimen sliding scale   SubCutaneous three times a day before meals  insulin lispro Injectable (ADMELOG) 9 Unit(s) SubCutaneous three times a day before meals  metoprolol tartrate 25 milliGRAM(s) Oral two times a day  OLANZapine 7.5 milliGRAM(s) Oral daily  polyethylene glycol 3350 17 Gram(s) Oral daily  predniSONE   Tablet 10 milliGRAM(s) Oral daily  senna 2 Tablet(s) Oral at bedtime  sorbitol 70% Solution 30 milliLiter(s) Oral once  traZODone 50 milliGRAM(s) Oral at bedtime      Physical Exam  General: Patient comfortable in bed   Vital Signs Last 12 Hrs  T(F): 97.9 (11-29-23 @ 12:56), Max: 98.9 (11-29-23 @ 04:40)  HR: 94 (11-29-23 @ 12:56) (88 - 94)  BP: 119/73 (11-29-23 @ 12:56) (99/63 - 119/73)  BP(mean): --  RR: 18 (11-29-23 @ 12:56) (18 - 18)  SpO2: 92% (11-29-23 @ 12:56) (92% - 95%)    CVS: S1S2   Respiratory: No wheezing, no crepitations  GI: Abdomen soft, bowel sounds positive  Musculoskeletal:  moves all extremities  : Voiding

## 2023-11-29 NOTE — PROGRESS NOTE ADULT - PROBLEM SELECTOR PLAN 2
by hx  -Continue Symbicort BID  -Continue Duoneb q6h  -Pt is on prednisone for her RA, not COPD  -Eventual outpatient PFTs.

## 2023-11-29 NOTE — PROGRESS NOTE ADULT - ASSESSMENT
58F with history of CVA with L sided weakness/numbness/L gaze deviation, DM2 with b/l peripheral neuropathy, COPD/Asthma, Breast Ca s/p ?RT, Bipolar depression, Rheumatoid Arthritis, Antiphospholipid syndrome, L eye uveitis/scleritis, Newly diagnosed DM2 (pt cannot remember which medication she takes for it), and current tobacco use who presents to the hospital with multiple complaints. found to have  sever AR s/p AVR CABG x 1     1. LV dysfunction  -new mod-severe segmental LV dysfunction in July has not had ischemic workup due to stroke at that time.  -c/w metoprolol   -TTE EF 30% now with mod-severe AR  -c/w lasix 40mg PO Daily   -cath shows LAD prox 100% fills via right to left collateral   -CMR 11/17: Mild thinning and hypokinesis of the mid to apical anterior and  anteroseptal segments.  The left ventricular ejection fraction measures  43%.Findings consistent with ischemic cardiomyopathy.  LESLEY with sever AR   - s/p AVR and CABGx1 LIMA-LAD  -CT 11/27 with inferior most sternal fragment engages only the left sternal fragment  and not the right, which is suggestive of dehiscence. f/u CTS recs    2. CVA  -history of multiple CVAs and is on eliquis   -CT scan of head negative for any acute findings  -ILR interrogation with no events  -per neuro no plans for MRI       3. Antiphospholipid syndrome  - Resume AC as per CTS       58F with history of CVA with L sided weakness/numbness/L gaze deviation, DM2 with b/l peripheral neuropathy, COPD/Asthma, Breast Ca s/p ?RT, Bipolar depression, Rheumatoid Arthritis, Antiphospholipid syndrome, L eye uveitis/scleritis, Newly diagnosed DM2 (pt cannot remember which medication she takes for it), and current tobacco use who presents to the hospital with multiple complaints. found to have  sever AR s/p AVR CABG x 1     1. LV dysfunction  -new mod-severe segmental LV dysfunction in July has not had ischemic workup due to stroke at that time.  -c/w metoprolol   -TTE EF 30% now with mod-severe AR  -c/w lasix 40mg PO Daily   -cath shows LAD prox 100% fills via right to left collateral   -CMR 11/17: Mild thinning and hypokinesis of the mid to apical anterior and  anteroseptal segments.  The left ventricular ejection fraction measures  43%.Findings consistent with ischemic cardiomyopathy.  LESLEY with sever AR   - s/p AVR and CABGx1 LIMA-LAD  -CT 11/27 with inferior most sternal fragment engages only the left sternal fragment  and not the right, which is suggestive of dehiscence. CTS recs not a dehiscence    2. CVA  -history of multiple CVAs and is on eliquis   -CT scan of head negative for any acute findings  -ILR interrogation with no events  -per neuro no plans for MRI       3. Antiphospholipid syndrome  - Resume AC as per CTS

## 2023-11-29 NOTE — PROGRESS NOTE ADULT - SUBJECTIVE AND OBJECTIVE BOX
Follow-up Pulm Progress Note    SOB improving  wheezing and chest congestion improving  sats low 90s on RA     Medications:  MEDICATIONS  (STANDING):  albuterol/ipratropium for Nebulization 3 milliLiter(s) Nebulizer every 6 hours  aspirin enteric coated 81 milliGRAM(s) Oral daily  atorvastatin 80 milliGRAM(s) Oral at bedtime  bisacodyl Suppository 10 milliGRAM(s) Rectal once  budesonide 160 MICROgram(s)/formoterol 4.5 MICROgram(s) Inhaler 2 Puff(s) Inhalation two times a day  chlorhexidine 2% Cloths 1 Application(s) Topical daily  dextrose 50% Injectable 50 milliLiter(s) IV Push every 15 minutes  dextrose 50% Injectable 25 milliLiter(s) IV Push every 15 minutes  dextrose Oral Gel 15 Gram(s) Oral once  famotidine    Tablet 20 milliGRAM(s) Oral two times a day  furosemide    Tablet 40 milliGRAM(s) Oral daily  gabapentin 300 milliGRAM(s) Oral every 8 hours  glucagon  Injectable 1 milliGRAM(s) IntraMuscular once  heparin   Injectable 5000 Unit(s) SubCutaneous every 8 hours  insulin glargine Injectable (LANTUS) 32 Unit(s) SubCutaneous at bedtime  insulin lispro (ADMELOG) corrective regimen sliding scale   SubCutaneous at bedtime  insulin lispro (ADMELOG) corrective regimen sliding scale   SubCutaneous three times a day before meals  insulin lispro Injectable (ADMELOG) 9 Unit(s) SubCutaneous three times a day before meals  metoprolol tartrate 25 milliGRAM(s) Oral two times a day  OLANZapine 7.5 milliGRAM(s) Oral daily  polyethylene glycol 3350 17 Gram(s) Oral daily  predniSONE   Tablet 10 milliGRAM(s) Oral daily  senna 2 Tablet(s) Oral at bedtime  sorbitol 70% Solution 30 milliLiter(s) Oral once  traZODone 50 milliGRAM(s) Oral at bedtime    MEDICATIONS  (PRN):  HYDROmorphone   Tablet 4 milliGRAM(s) Oral every 4 hours PRN Severe Pain (7 - 10)  HYDROmorphone   Tablet 2 milliGRAM(s) Oral every 4 hours PRN Moderate Pain (4 - 6)          Vital Signs Last 24 Hrs  T(C): 36.6 (29 Nov 2023 12:56), Max: 37.2 (29 Nov 2023 04:40)  T(F): 97.9 (29 Nov 2023 12:56), Max: 98.9 (29 Nov 2023 04:40)  HR: 94 (29 Nov 2023 12:56) (79 - 94)  BP: 119/73 (29 Nov 2023 12:56) (99/63 - 119/73)  BP(mean): --  RR: 18 (29 Nov 2023 12:56) (18 - 18)  SpO2: 92% (29 Nov 2023 12:56) (92% - 96%)    Parameters below as of 29 Nov 2023 12:56  Patient On (Oxygen Delivery Method): room air              11-28 @ 07:01  -  11-29 @ 07:00  --------------------------------------------------------  IN: 800 mL / OUT: 2550 mL / NET: -1750 mL          LABS:                        9.8    18.14 )-----------( 348      ( 29 Nov 2023 06:20 )             31.8     11-29    138  |  98  |  24<H>  ----------------------------<  87  4.3   |  29  |  0.59    Ca    9.4      29 Nov 2023 06:20  Phos  3.7     11-29  Mg     2.2     11-29    TPro  6.4  /  Alb  3.7  /  TBili  2.1<H>  /  DBili  x   /  AST  17  /  ALT  20  /  AlkPhos  85  11-28          CAPILLARY BLOOD GLUCOSE      POCT Blood Glucose.: 79 mg/dL (29 Nov 2023 11:48)      Urinalysis Basic - ( 29 Nov 2023 06:20 )    Color: x / Appearance: x / SG: x / pH: x  Gluc: 87 mg/dL / Ketone: x  / Bili: x / Urobili: x   Blood: x / Protein: x / Nitrite: x   Leuk Esterase: x / RBC: x / WBC x   Sq Epi: x / Non Sq Epi: x / Bacteria: x          GEORGE Negative 11-09 @ 06:15  Anti SS-1 <0.2  Anti SS-2 <0.2  Anti RNP 0.2   11-09 @ 06:15    Atypical ANCA -- 11-09 @ 06:15  c-ANCA titer -- 11-09 @ 06:15  c-ANCA -- 11-09 @ 06:15  p-ANCA -- 11-09 @ 06:15          Physical Examination:  PULM: CTA bilaterally   CVS: S1, S2 heard    RADIOLOGY REVIEWED  CT chest:    < from: CT Chest No Cont (11.27.23 @ 18:45) >  FINDINGS:    AIRWAYS, LUNGS, PLEURA: Central airways clear. Emphysema. Subsegmental   atelectasis at the lung bases. Small loculated left pleural effusion   located posteriorly andalong the left major fissure.    MEDIASTINUM: Interval aortic valve replacement and CABG with associated   postoperative changes of the mediastinum. There is a partially loculated   pericardial effusion along the left atrioventricular groove with   hyperdense appearance measuring 7 x 4 cm (image 98, coronal series).    Thoracic aorta normal caliber. No large mediastinal nodes.    IMAGED ABDOMEN: Right renal cyst.    SOFT TISSUES and BONES: Sternal wires are present. The inferior most   sternal fragment engages only the left sternal fragment and not the   right. There is separation of the sternal fragments by 1 cm at this level   (image 87, series 3). There is fluid interposed between the lower sternal   fragments. Gas within the retrosternal region. Infiltrative changes and   gas are present within the anterior chest wall subcutaneous soft tissues.   Anterior chest wall loop recorder device. Right posterior first rib   fracture.    IMPRESSION:.    The inferior most sternal fragment engages only the left sternal fragment   and not the right, which is suggestive of dehiscence. There is separation   of sternal fragments by 1 cm at this level.    Fluid is located between the sternal fragments and there is a   retrosternal fluid and gas; these findings are favored to represent   postoperative changes, but infection should be considered in the   appropriate clinical scenario.    Partially loculated pericardial effusion along the left atrioventricular   groove measuring 7 x 4 cm with hyperdense component likely representing   hemopericardium.    No evidence of pneumonia.    Small loculated left pleural effusion.    < end of copied text >

## 2023-11-29 NOTE — PROGRESS NOTE ADULT - SUBJECTIVE AND OBJECTIVE BOX
Neurology        S: patient seen doing okay,  on floor ; doing okay, walking with PT           Medications: MEDICATIONS  (STANDING):  albuterol/ipratropium for Nebulization 3 milliLiter(s) Nebulizer every 6 hours  aspirin enteric coated 81 milliGRAM(s) Oral daily  atorvastatin 80 milliGRAM(s) Oral at bedtime  bisacodyl Suppository 10 milliGRAM(s) Rectal once  budesonide 160 MICROgram(s)/formoterol 4.5 MICROgram(s) Inhaler 2 Puff(s) Inhalation two times a day  chlorhexidine 2% Cloths 1 Application(s) Topical daily  dextrose 50% Injectable 50 milliLiter(s) IV Push every 15 minutes  dextrose 50% Injectable 25 milliLiter(s) IV Push every 15 minutes  dextrose Oral Gel 15 Gram(s) Oral once  famotidine    Tablet 20 milliGRAM(s) Oral two times a day  furosemide    Tablet 40 milliGRAM(s) Oral daily  gabapentin 300 milliGRAM(s) Oral every 8 hours  glucagon  Injectable 1 milliGRAM(s) IntraMuscular once  heparin   Injectable 5000 Unit(s) SubCutaneous every 8 hours  insulin glargine Injectable (LANTUS) 35 Unit(s) SubCutaneous at bedtime  insulin lispro (ADMELOG) corrective regimen sliding scale   SubCutaneous at bedtime  insulin lispro (ADMELOG) corrective regimen sliding scale   SubCutaneous three times a day before meals  insulin lispro Injectable (ADMELOG) 9 Unit(s) SubCutaneous three times a day before meals  metoprolol tartrate 25 milliGRAM(s) Oral two times a day  OLANZapine 7.5 milliGRAM(s) Oral daily  polyethylene glycol 3350 17 Gram(s) Oral daily  predniSONE   Tablet 10 milliGRAM(s) Oral daily  senna 2 Tablet(s) Oral at bedtime  sorbitol 70% Solution 30 milliLiter(s) Oral once  traZODone 50 milliGRAM(s) Oral at bedtime    MEDICATIONS  (PRN):  HYDROmorphone   Tablet 2 milliGRAM(s) Oral every 4 hours PRN Moderate Pain (4 - 6)  HYDROmorphone   Tablet 4 milliGRAM(s) Oral every 4 hours PRN Severe Pain (7 - 10)       Vitals:  Vital Signs Last 24 Hrs  T(C): 37.2 (29 Nov 2023 04:40), Max: 37.2 (29 Nov 2023 04:40)  T(F): 98.9 (29 Nov 2023 04:40), Max: 98.9 (29 Nov 2023 04:40)  HR: 88 (29 Nov 2023 04:40) (79 - 88)  BP: 99/63 (29 Nov 2023 04:40) (99/63 - 108/73)  BP(mean): --  RR: 18 (29 Nov 2023 04:40) (18 - 18)  SpO2: 95% (29 Nov 2023 04:40) (94% - 96%)    Parameters below as of 29 Nov 2023 04:40  Patient On (Oxygen Delivery Method): nasal cannula        Orthostatic VS      General Exam:   General Appearance: Appropriately dressed and in no acute distress       Head: Normocephalic, atraumatic and no dysmorphic features  Ear, Nose, and Throat: Moist mucous membranes  CVS: S1S2+  Resp: No SOB, no wheeze or rhonchi  GI: soft NT/ND  Extremities: No edema or cyanosis  Skin: No bruises or rashes     Neurological Exam:  Mental Status: Awake, alert and oriented x 3.  Able to follow simple and complex verbal commands. Able to name and repeat. fluent speech. No obvious aphasia or dysarthria noted.   Cranial Nerves: PERRL, dysconugate gaze , VFFC, sensation V1-V3 decrease on L,  L facial asymmetry, equal elevation of palate, scm/trap 5/5, tongue is midline on protrusion. no obvious papilledema on fundoscopic exam. hearing is grossly intact.   Motor: Normal bulk, tone and strength throughout except mild L HP 5-/5   Sensation: decrease on L compared to R   Reflexes: 1+ throughout at biceps, brachioradialis, triceps, patellars and ankles bilaterally and equal. No clonus. R toe and L toe were both downgoing.  Coordination: No dysmetria on FNF   Gait: cane baseline     Data/Labs/Imaging which I personally reviewed.         LABS:                          9.8    18.14 )-----------( 348      ( 29 Nov 2023 06:20 )             31.8     11-29    138  |  98  |  24<H>  ----------------------------<  87  4.3   |  29  |  0.59    Ca    9.4      29 Nov 2023 06:20  Phos  3.7     11-29  Mg     2.2     11-29    TPro  6.4  /  Alb  3.7  /  TBili  2.1<H>  /  DBili  x   /  AST  17  /  ALT  20  /  AlkPhos  85  11-28    LIVER FUNCTIONS - ( 28 Nov 2023 05:11 )  Alb: 3.7 g/dL / Pro: 6.4 g/dL / ALK PHOS: 85 U/L / ALT: 20 U/L / AST: 17 U/L / GGT: x             Urinalysis Basic - ( 29 Nov 2023 06:20 )    Color: x / Appearance: x / SG: x / pH: x  Gluc: 87 mg/dL / Ketone: x  / Bili: x / Urobili: x   Blood: x / Protein: x / Nitrite: x   Leuk Esterase: x / RBC: x / WBC x   Sq Epi: x / Non Sq Epi: x / Bacteria: x

## 2023-11-29 NOTE — PROGRESS NOTE ADULT - PROBLEM SELECTOR PLAN 1
intermittent wheezing - suspect 2nd to retained secretions in airway  -Pt not take deep breaths due to incisional pain  -Suggest pain control. Sternal nonunion CT, ?hemopericardium. Defer to CT surgery.   -Coughing/deep breathing, incentive spirometry encouraged  -Continue bronchodilators  -OOB to chair, ambulate as tolerated  -Wean o2 as tolerated, keep sats >90% (currently RA-2LNC)  -CT chest 11/27 with no PNA, very small loculated L effusion.

## 2023-11-29 NOTE — PROGRESS NOTE ADULT - PROBLEM SELECTOR PLAN 1
Will continue Lantus 32u at bedtime.  Will continue  Admelog to  9u before each meal and continue Admelog correction scale coverage. Will continue monitoring FS and Follow up.   Will continue monitoring  blood sugars, will Follow up.  Patient counseled for compliance with consistent low carb diet.  Insulin teaching   Anticipate Basal insulin at discharge + PO DM regimen.  Can be Discharged on current basal insulin once nightly.   In addition discharge on Farxiga 10 mg and Glimepiride 2 mg daily with breakfast.   Pls send with glucometer  FU outpatient 2 weeks

## 2023-11-29 NOTE — PROGRESS NOTE ADULT - ASSESSMENT
58 female smoker with PMH CVA with residual Left-sided weakness/numbness/L gaze deviation, DM2 with b/l peripheral neuropathy, COPD/Asthma, Breast Ca s/p ?RT, Bipolar depression, Rheumatoid Arthritis, Antiphospholipid syndrome, and L eye uveitis/scleritis, Presents to [facility] transferred from [facility]. p/w multiple medical complaints. A/w exacerbation of L-sided subjective weakness/numbness likely 2/2 recrudescence of prior Right BG infarct. Cardiology following for new LV dysfunction since July, pending ischemic eval. GABBY was done showing severe Aortic Insufficiency and moderated MR. Cardiac cath was done showing LAD prox 100% fills via right to left collateral. Patient now transferred to [facility] Dr. Loaiza for evaluation.    HOSPITAL COURSE:   11/14 patient seen and examined at bedside. All labs and imaging to be reviewed by Dr. Loaiza   11/15:VSS, neuro consult for hx cva L sided weakness, Preop for Friday 11/16 Repeat echo shows only mild valve pathology  11/17 No valve surgery planned in light of repeat echo.  Getting MR viability for CAD and depressed LV function.  11/18  vss for rpt gabby mon  11/19  npo after midnight for gabby tomorrow  11/20 VSS NPO for GABBY this am=> severe AR  11/21: s/p CABG LIMA-LAD, AVR(t) - Inspiris 23mm, LAAL with AtriClip 40mm,   +Substernal mediastinal Chest tube and Left pleural Chest tube. Kept Intubated post op. Taken to CTU. Levophed, Vasopressin and Primacor switched to Dobutamine gtt.  Started on PO Amio for afib ppx. +Soliz. On insulin gtt for tight glycemic control to prevent wound infxn.  11/22: POD1. Extubated. Inotropes and Pressors maintained. PCA Hydromorphone initiated by Acute Pain Mgmt team. Insulin gtt maintained per Endocrine. PT/OT evaluation.  11/23 POD 2. PCA Hydromorphone maintained. Insulin gtt maintained, started basal insulin tonight per Endo. Dobutamine gtt continued.   11/24 POD 3. Dobutamine gtt d/c'ed in AM. PCA pumped d/c'ed. MED and Pleural Chest tubes d/c'ed while in CTU. (+) PW to EPM (40/10/0.8). Prevena dressing intact. +Soliz maintained. Monitor I/Os. Back on Insulin gtt for elevated FS >200. Endocrine following. TRANSFERRED TO SDU. Current medication regimen continued.   11/25 VSS - insullin gtt off @ 6am- start low dose BB this am - soliz in place will d/c when pt more mobile.  d/c plan anticipate home PT  11/26 WBC 20 again -om chronic steroids - hx COPD- afebrile - prevena in place.  will conitue to monitor- xtra lasix 20 iv x 1 given - up 6 kgs in weight w/ ++ edema,  40 yr pack current smoker.  nebs.  11/27 VSS. Afebrile. WBC increased 23 (from 20) (Of note, patient on chronic steroids for Hx RA). Repeat CXR today stable. Rpt UA pending. Potassium 3.6 this AM, supplemented. Pt reports hx of Bipolar MDD, with no follow-up with Psychiatry and non-compliance with OP psych meds since July 2023. Pt requesting to speak to Psychiatry today to establish care. Denies SI/HI/AH/VH. On call Psychiatry ([phone]) consulted and aware. Pt also noted with bronchospasms/wheezing - Pulm (Dr. Leone) consulted and aware. Stable to move to floors today, per Dr. Loaiza.  11/28: VSS, refusing PT, needs to increase ambulation/iS/effort. CT with ?sternal dehiscence at inferior pole, will discuss with Dr. Loaiza. Extra IV lasix given for edema.   11/29: VSS. CT Chest imaging reviewed closely by Dr. Loaiza yesterday, no concerned for sternal dehiscence, upon Attending's review. Prevena d/c'd today. Site c/d/i. +PW (isolated). Lidocaine x1 for back pain today, reports improvement. Encouraged ambulation/OOB. Pt working with Physical Therapy. CM to work on Rehab for disposition as recommended by PT. Current medication regimen continued. 58 female smoker with PMH CVA with residual Left-sided weakness/numbness/L gaze deviation, DM2 with b/l peripheral neuropathy, COPD/Asthma, Breast Ca s/p ?RT, Bipolar depression, Rheumatoid Arthritis, Antiphospholipid syndrome, and L eye uveitis/scleritis, Presents to [facility] transferred from [facility]. p/w multiple medical complaints. A/w exacerbation of L-sided subjective weakness/numbness likely 2/2 recrudescence of prior Right BG infarct. Cardiology following for new LV dysfunction since July, pending ischemic eval. GABBY was done showing severe Aortic Insufficiency and moderated MR. Cardiac cath was done showing LAD prox 100% fills via right to left collateral. Patient now transferred to [facility] Dr. Loaiza for evaluation.    HOSPITAL COURSE:   11/14 patient seen and examined at bedside. All labs and imaging to be reviewed by Dr. Loaiza   11/15:VSS, neuro consult for hx cva L sided weakness, Preop for Friday 11/16 Repeat echo shows only mild valve pathology  11/17 No valve surgery planned in light of repeat echo.  Getting MR viability for CAD and depressed LV function.  11/18  vss for rpt gabby mon  11/19  npo after midnight for gabby tomorrow  11/20 VSS NPO for GABBY this am=> severe AR  11/21: s/p CABG LIMA-LAD, AVR(t) - Inspiris 23mm, LAAL with AtriClip 40mm,   +Substernal mediastinal Chest tube and Left pleural Chest tube. Kept Intubated post op. Taken to CTU. Levophed, Vasopressin and Primacor switched to Dobutamine gtt.  Started on PO Amio for afib ppx. +Soliz. On insulin gtt for tight glycemic control to prevent wound infxn.  11/22: POD1. Extubated. Inotropes and Pressors maintained. PCA Hydromorphone initiated by Acute Pain Mgmt team. Insulin gtt maintained per Endocrine. PT/OT evaluation.  11/23 POD 2. PCA Hydromorphone maintained. Insulin gtt maintained, started basal insulin tonight per Endo. Dobutamine gtt continued.   11/24 POD 3. Dobutamine gtt d/c'ed in AM. PCA pumped d/c'ed. MED and Pleural Chest tubes d/c'ed while in CTU. (+) PW to EPM (40/10/0.8). Prevena dressing intact. +Soliz maintained. Monitor I/Os. Back on Insulin gtt for elevated FS >200. Endocrine following. TRANSFERRED TO SDU. Current medication regimen continued.   11/25 VSS - insullin gtt off @ 6am- start low dose BB this am - soliz in place will d/c when pt more mobile.  d/c plan anticipate home PT  11/26 WBC 20 again -om chronic steroids - hx COPD- afebrile - prevena in place.  will conitue to monitor- xtra lasix 20 iv x 1 given - up 6 kgs in weight w/ ++ edema,  40 yr pack current smoker.  nebs.  11/27 VSS. Afebrile. WBC increased 23 (from 20) (Of note, patient on chronic steroids for Hx RA). Repeat CXR today stable. Rpt UA pending. Potassium 3.6 this AM, supplemented. Pt reports hx of Bipolar MDD, with no follow-up with Psychiatry and non-compliance with OP psych meds since July 2023. Pt requesting to speak to Psychiatry today to establish care. Denies SI/HI/AH/VH. On call Psychiatry ([phone]) consulted and aware. Pt also noted with bronchospasms/wheezing - Pulm (Dr. Leone) consulted and aware. Stable to move to floors today, per Dr. Loaiza.  11/28: VSS, refusing PT, needs to increase ambulation/iS/effort. CT with ?sternal dehiscence at inferior pole, will discuss with Dr. Loaiza. Extra IV lasix given for edema.   11/29: VSS. CT Chest imaging reviewed closely by Dr. Loaiza yesterday, no concerned for sternal dehiscence, upon Attending's review. Prevena d/c'd today. Site c/d/i. +PW (isolated). Lidocaine x1 for back pain today, reports improvement. Encouraged ambulation/OOB. Pt working with Physical Therapy. CM to work on Rehab for disposition as recommended by PT. Current medication regimen continued. Of note, patient with hx of Antiphospholipid Syndrome and CVA, was on AC therapy pre-surgery; will d/w Dr. Loaiza when ok to resume AC.  58 female smoker with PMH CVA with residual Left-sided weakness/numbness/L gaze deviation, DM2 with b/l peripheral neuropathy, COPD/Asthma, Breast Ca s/p ?RT, Bipolar depression, Rheumatoid Arthritis, Antiphospholipid syndrome, and L eye uveitis/scleritis, Presents to [facility] transferred from [facility]. p/w multiple medical complaints. A/w exacerbation of L-sided subjective weakness/numbness likely 2/2 recrudescence of prior Right BG infarct. Cardiology following for new LV dysfunction since July, pending ischemic eval. GABBY was done showing severe Aortic Insufficiency and moderated MR. Cardiac cath was done showing LAD prox 100% fills via right to left collateral. Patient now transferred to [facility] Dr. Loaiza for evaluation.    HOSPITAL COURSE:   11/14 patient seen and examined at bedside. All labs and imaging to be reviewed by Dr. Loaiza   11/15:VSS, neuro consult for hx cva L sided weakness, Preop for Friday 11/16 Repeat echo shows only mild valve pathology  11/17 No valve surgery planned in light of repeat echo.  Getting MR viability for CAD and depressed LV function.  11/18  vss for rpt gabby mon  11/19  npo after midnight for gabby tomorrow  11/20 VSS NPO for GABBY this am=> severe AR  11/21: s/p CABG LIMA-LAD, AVR(t) - Inspiris 23mm, LAAL with AtriClip 40mm,   +Substernal mediastinal Chest tube and Left pleural Chest tube. Kept Intubated post op. Taken to CTU. Levophed, Vasopressin and Primacor switched to Dobutamine gtt.  Started on PO Amio for afib ppx. +Soliz. On insulin gtt for tight glycemic control to prevent wound infxn.  11/22: POD1. Extubated. Inotropes and Pressors maintained. PCA Hydromorphone initiated by Acute Pain Mgmt team. Insulin gtt maintained per Endocrine. PT/OT evaluation.  11/23 POD 2. PCA Hydromorphone maintained. Insulin gtt maintained, started basal insulin tonight per Endo. Dobutamine gtt continued.   11/24 POD 3. Dobutamine gtt d/c'ed in AM. PCA pumped d/c'ed. MED and Pleural Chest tubes d/c'ed while in CTU. (+) PW to EPM (40/10/0.8). Prevena dressing intact. +Soliz maintained. Monitor I/Os. Back on Insulin gtt for elevated FS >200. Endocrine following. TRANSFERRED TO SDU. Current medication regimen continued.   11/25 VSS - insullin gtt off @ 6am- start low dose BB this am - soliz in place will d/c when pt more mobile.  d/c plan anticipate home PT  11/26 WBC 20 again -om chronic steroids - hx COPD- afebrile - prevena in place.  will conitue to monitor- xtra lasix 20 iv x 1 given - up 6 kgs in weight w/ ++ edema,  40 yr pack current smoker.  nebs.  11/27 VSS. Afebrile. WBC increased 23 (from 20) (Of note, patient on chronic steroids for Hx RA). Repeat CXR today stable. Rpt UA pending. Potassium 3.6 this AM, supplemented. Pt reports hx of Bipolar MDD, with no follow-up with Psychiatry and non-compliance with OP psych meds since July 2023. Pt requesting to speak to Psychiatry today to establish care. Denies SI/HI/AH/VH. On call Psychiatry ([phone]) consulted and aware. Pt also noted with bronchospasms/wheezing - Pulm (Dr. Leone) consulted and aware. Stable to move to floors today, per Dr. Loaiza.  11/28: VSS, refusing PT, needs to increase ambulation/iS/effort. CT with ?sternal dehiscence at inferior pole, will discuss with Dr. Loaiza. Extra IV lasix given for edema.   11/29: VSS. CT Chest imaging reviewed closely by Dr. Loaiza yesterday, no concerned for sternal dehiscence, upon Attending's review. Prevena d/c'd today. Site c/d/i. +PW (isolated). Lidocaine x1 for back pain today, reports improvement. Encouraged ambulation/OOB. Pt working with Physical Therapy. CM to work on Rehab for disposition as recommended by PT. Current medication regimen continued. Of note, patient with hx of Antiphospholipid Syndrome and CVA, was on AC therapy pre-surgery; ok to restart AC therapy with Eliquis 5mg BID starting tonight, per Dr. Loaiza. 58 female smoker with PMH CVA with residual Left-sided weakness/numbness/L gaze deviation, DM2 with b/l peripheral neuropathy, COPD/Asthma, Breast Ca s/p ?RT, Bipolar depression, Rheumatoid Arthritis, Antiphospholipid syndrome, and L eye uveitis/scleritis, Presents to [facility] transferred from [facility]. p/w multiple medical complaints. A/w exacerbation of L-sided subjective weakness/numbness likely 2/2 recrudescence of prior Right BG infarct. Cardiology following for new LV dysfunction since July, pending ischemic eval. GABBY was done showing severe Aortic Insufficiency and moderated MR. Cardiac cath was done showing LAD prox 100% fills via right to left collateral. Patient now transferred to [facility] Dr. Loaiza for evaluation.    HOSPITAL COURSE:   11/14 patient seen and examined at bedside. All labs and imaging to be reviewed by Dr. Loaiza   11/15:VSS, neuro consult for hx cva L sided weakness, Preop for Friday 11/16 Repeat echo shows only mild valve pathology  11/17 No valve surgery planned in light of repeat echo.  Getting MR viability for CAD and depressed LV function.  11/18  vss for rpt gabby mon  11/19  npo after midnight for gabby tomorrow  11/20 VSS NPO for GABBY this am=> severe AR  11/21: s/p CABG LIMA-LAD, AVR(t) - Inspiris 23mm, LAAL with AtriClip 40mm,   +Substernal mediastinal Chest tube and Left pleural Chest tube. Kept Intubated post op. Taken to CTU. Levophed, Vasopressin and Primacor switched to Dobutamine gtt.  Started on PO Amio for afib ppx. +Soliz. On insulin gtt for tight glycemic control to prevent wound infxn.  11/22: POD1. Extubated. Inotropes and Pressors maintained. PCA Hydromorphone initiated by Acute Pain Mgmt team. Insulin gtt maintained per Endocrine. PT/OT evaluation.  11/23 POD 2. PCA Hydromorphone maintained. Insulin gtt maintained, started basal insulin tonight per Endo. Dobutamine gtt continued.   11/24 POD 3. Dobutamine gtt d/c'ed in AM. PCA pumped d/c'ed. MED and Pleural Chest tubes d/c'ed while in CTU. (+) PW to EPM (40/10/0.8). Prevena dressing intact. +Soliz maintained. Monitor I/Os. Back on Insulin gtt for elevated FS >200. Endocrine following. TRANSFERRED TO SDU. Current medication regimen continued.   11/25 VSS - insullin gtt off @ 6am- start low dose BB this am - soliz in place will d/c when pt more mobile.  d/c plan anticipate home PT  11/26 WBC 20 again -om chronic steroids - hx COPD- afebrile - prevena in place.  will conitue to monitor- xtra lasix 20 iv x 1 given - up 6 kgs in weight w/ ++ edema,  40 yr pack current smoker.  nebs.  11/27 VSS. Afebrile. WBC increased 23 (from 20) (Of note, patient on chronic steroids for Hx RA). Repeat CXR today stable. Rpt UA pending. Potassium 3.6 this AM, supplemented. Pt reports hx of Bipolar MDD, with no follow-up with Psychiatry and non-compliance with OP psych meds since July 2023. Pt requesting to speak to Psychiatry today to establish care. Denies SI/HI/AH/VH. On call Psychiatry ([phone]) consulted and aware. Pt also noted with bronchospasms/wheezing - Pulm (Dr. Leone) consulted and aware. Stable to move to floors today, per Dr. Loaiza.  11/28: VSS, refusing PT, needs to increase ambulation/iS/effort. CT with ?sternal dehiscence at inferior pole, will discuss with Dr. Loaiza. Extra IV lasix given for edema.   11/29: VSS. CT Chest imaging reviewed closely by Dr. Loaiza yesterday, no concerned for sternal dehiscence, upon Attending's review. Prevena d/c'd today. Site c/d/i. +PW (isolated). Lidocaine x1 for back pain today, reports improvement. Encouraged ambulation/OOB. Pt working with Physical Therapy. CM to work on Rehab for disposition as recommended by PT. Current medication regimen continued. PW D/liang per Dr. Loaiza. Of note, patient with hx of Antiphospholipid Syndrome and CVA, was on AC therapy pre-surgery; ok to restart AC therapy with Eliquis 5mg BID starting tonight, per Dr. Loaiza.

## 2023-11-29 NOTE — PROGRESS NOTE ADULT - SUBJECTIVE AND OBJECTIVE BOX
SUBJECTIVE: "Hi. I took a shower and I feel good." Patient denies any acute chest pain, palpitation, shortness of breath, abdominal pain, n/v, fever, chills, or dizziness at this time.    TELEMETRY:  SR     VITAL SIGNS:  Vital Signs Last 24 Hrs  T(C): 37.2 (23 @ 04:40), Max: 37.2 (23 @ 04:40)  T(F): 98.9 (23 @ 04:40), Max: 98.9 (23 @ 04:40)  HR: 88 (23 @ 04:40) (79 - 88)  BP: 99/63 (23 @ 04:40) (99/63 - 108/73)  RR: 18 (23 @ 04:40) (18 - 18)  SpO2: 95% (23 @ 04:40) (94% - 96%)           INPUT/OUTPUT:     @ 07:  -   @ 07:00  --------------------------------------------------------  IN: 800 mL / OUT: 2550 mL / NET: -1750 mL     @ 07:01  -   @ 10:48  --------------------------------------------------------  IN: 240 mL / OUT: 0 mL / NET: 240 mL        LABS:      138  |  98  |  24<H>  ----------------------------<  87  4.3   |  29  |  0.59    Ca    9.4      2023 06:20  Phos  3.7       Mg     2.2         TPro  6.4  /  Alb  3.7  /  TBili  2.1<H>  /  DBili  x   /  AST  17  /  ALT  20  /  AlkPhos  85  11-28                        9.8    18.14 )-----------( 348      ( 2023 06:20 )             31.8              Daily Weight in k.4 (2023 07:15)      CAPILLARY BLOOD GLUCOSE  POCT Blood Glucose.: 136 mg/dL (2023 08:14)  POCT Blood Glucose.: 125 mg/dL (2023 21:31)  POCT Blood Glucose.: 198 mg/dL (2023 16:27)  POCT Blood Glucose.: 218 mg/dL (2023 11:26)            PHYSICAL EXAM:  General: Patient lying comfortably in bed, no acute distress, obese  Neurological: Alert and oriented. No focal neurological deficits   Cardiovascular: S1S2, RRR,  Sternal Wound: MSI C/D/I, +PW (isolated)  Respiratory: Decreased BS b/l, no wheeze/rhonchi/rales, non-labored breathing  Gastrointestinal: + BS, soft, non tender, non distended, +flatus  Extremities: LASHONDA, Warm and well perfused. 1+ b/l LE edema, no calf tenderness , peripheral pulses intact bilaterally        ACTIVE MEDICATIONS:  albuterol/ipratropium for Nebulization 3 milliLiter(s) Nebulizer every 6 hours  aspirin enteric coated 81 milliGRAM(s) Oral daily  atorvastatin 80 milliGRAM(s) Oral at bedtime  bisacodyl Suppository 10 milliGRAM(s) Rectal once  budesonide 160 MICROgram(s)/formoterol 4.5 MICROgram(s) Inhaler 2 Puff(s) Inhalation two times a day  chlorhexidine 2% Cloths 1 Application(s) Topical daily  dextrose 50% Injectable 50 milliLiter(s) IV Push every 15 minutes  dextrose 50% Injectable 25 milliLiter(s) IV Push every 15 minutes  dextrose Oral Gel 15 Gram(s) Oral once  famotidine    Tablet 20 milliGRAM(s) Oral two times a day  furosemide    Tablet 40 milliGRAM(s) Oral daily  gabapentin 300 milliGRAM(s) Oral every 8 hours  glucagon  Injectable 1 milliGRAM(s) IntraMuscular once  heparin   Injectable 5000 Unit(s) SubCutaneous every 8 hours  HYDROmorphone   Tablet 2 milliGRAM(s) Oral every 4 hours PRN  HYDROmorphone   Tablet 4 milliGRAM(s) Oral every 4 hours PRN  insulin glargine Injectable (LANTUS) 35 Unit(s) SubCutaneous at bedtime  insulin lispro (ADMELOG) corrective regimen sliding scale   SubCutaneous at bedtime  insulin lispro (ADMELOG) corrective regimen sliding scale   SubCutaneous three times a day before meals  insulin lispro Injectable (ADMELOG) 9 Unit(s) SubCutaneous three times a day before meals  metoprolol tartrate 25 milliGRAM(s) Oral two times a day  OLANZapine 7.5 milliGRAM(s) Oral daily  polyethylene glycol 3350 17 Gram(s) Oral daily  predniSONE   Tablet 10 milliGRAM(s) Oral daily  senna 2 Tablet(s) Oral at bedtime  sorbitol 70% Solution 30 milliLiter(s) Oral once  traZODone 50 milliGRAM(s) Oral at bedtime      Case discussed in detail with Cardiothoracic Team and Attending Dr. Loaiza. Plan below as per discussion.

## 2023-11-30 ENCOUNTER — APPOINTMENT (OUTPATIENT)
Dept: INTERNAL MEDICINE | Facility: CLINIC | Age: 58
End: 2023-11-30

## 2023-11-30 LAB
ANION GAP SERPL CALC-SCNC: 14 MMOL/L — SIGNIFICANT CHANGE UP (ref 5–17)
ANION GAP SERPL CALC-SCNC: 14 MMOL/L — SIGNIFICANT CHANGE UP (ref 5–17)
BUN SERPL-MCNC: 21 MG/DL — SIGNIFICANT CHANGE UP (ref 7–23)
BUN SERPL-MCNC: 21 MG/DL — SIGNIFICANT CHANGE UP (ref 7–23)
CALCIUM SERPL-MCNC: 9.5 MG/DL — SIGNIFICANT CHANGE UP (ref 8.4–10.5)
CALCIUM SERPL-MCNC: 9.5 MG/DL — SIGNIFICANT CHANGE UP (ref 8.4–10.5)
CHLORIDE SERPL-SCNC: 99 MMOL/L — SIGNIFICANT CHANGE UP (ref 96–108)
CHLORIDE SERPL-SCNC: 99 MMOL/L — SIGNIFICANT CHANGE UP (ref 96–108)
CO2 SERPL-SCNC: 27 MMOL/L — SIGNIFICANT CHANGE UP (ref 22–31)
CO2 SERPL-SCNC: 27 MMOL/L — SIGNIFICANT CHANGE UP (ref 22–31)
CREAT SERPL-MCNC: 0.67 MG/DL — SIGNIFICANT CHANGE UP (ref 0.5–1.3)
CREAT SERPL-MCNC: 0.67 MG/DL — SIGNIFICANT CHANGE UP (ref 0.5–1.3)
EGFR: 101 ML/MIN/1.73M2 — SIGNIFICANT CHANGE UP
EGFR: 101 ML/MIN/1.73M2 — SIGNIFICANT CHANGE UP
GLUCOSE BLDC GLUCOMTR-MCNC: 118 MG/DL — HIGH (ref 70–99)
GLUCOSE BLDC GLUCOMTR-MCNC: 118 MG/DL — HIGH (ref 70–99)
GLUCOSE BLDC GLUCOMTR-MCNC: 218 MG/DL — HIGH (ref 70–99)
GLUCOSE BLDC GLUCOMTR-MCNC: 218 MG/DL — HIGH (ref 70–99)
GLUCOSE BLDC GLUCOMTR-MCNC: 245 MG/DL — HIGH (ref 70–99)
GLUCOSE BLDC GLUCOMTR-MCNC: 245 MG/DL — HIGH (ref 70–99)
GLUCOSE BLDC GLUCOMTR-MCNC: 90 MG/DL — SIGNIFICANT CHANGE UP (ref 70–99)
GLUCOSE BLDC GLUCOMTR-MCNC: 90 MG/DL — SIGNIFICANT CHANGE UP (ref 70–99)
GLUCOSE SERPL-MCNC: 65 MG/DL — LOW (ref 70–99)
GLUCOSE SERPL-MCNC: 65 MG/DL — LOW (ref 70–99)
HCT VFR BLD CALC: 32.1 % — LOW (ref 34.5–45)
HCT VFR BLD CALC: 32.1 % — LOW (ref 34.5–45)
HGB BLD-MCNC: 9.8 G/DL — LOW (ref 11.5–15.5)
HGB BLD-MCNC: 9.8 G/DL — LOW (ref 11.5–15.5)
MAGNESIUM SERPL-MCNC: 2.2 MG/DL — SIGNIFICANT CHANGE UP (ref 1.6–2.6)
MAGNESIUM SERPL-MCNC: 2.2 MG/DL — SIGNIFICANT CHANGE UP (ref 1.6–2.6)
MCHC RBC-ENTMCNC: 29.5 PG — SIGNIFICANT CHANGE UP (ref 27–34)
MCHC RBC-ENTMCNC: 29.5 PG — SIGNIFICANT CHANGE UP (ref 27–34)
MCHC RBC-ENTMCNC: 30.5 GM/DL — LOW (ref 32–36)
MCHC RBC-ENTMCNC: 30.5 GM/DL — LOW (ref 32–36)
MCV RBC AUTO: 96.7 FL — SIGNIFICANT CHANGE UP (ref 80–100)
MCV RBC AUTO: 96.7 FL — SIGNIFICANT CHANGE UP (ref 80–100)
NRBC # BLD: 0 /100 WBCS — SIGNIFICANT CHANGE UP (ref 0–0)
NRBC # BLD: 0 /100 WBCS — SIGNIFICANT CHANGE UP (ref 0–0)
PHOSPHATE SERPL-MCNC: 4.2 MG/DL — SIGNIFICANT CHANGE UP (ref 2.5–4.5)
PHOSPHATE SERPL-MCNC: 4.2 MG/DL — SIGNIFICANT CHANGE UP (ref 2.5–4.5)
PLATELET # BLD AUTO: 365 K/UL — SIGNIFICANT CHANGE UP (ref 150–400)
PLATELET # BLD AUTO: 365 K/UL — SIGNIFICANT CHANGE UP (ref 150–400)
POTASSIUM SERPL-MCNC: 3.5 MMOL/L — SIGNIFICANT CHANGE UP (ref 3.5–5.3)
POTASSIUM SERPL-MCNC: 3.5 MMOL/L — SIGNIFICANT CHANGE UP (ref 3.5–5.3)
POTASSIUM SERPL-SCNC: 3.5 MMOL/L — SIGNIFICANT CHANGE UP (ref 3.5–5.3)
POTASSIUM SERPL-SCNC: 3.5 MMOL/L — SIGNIFICANT CHANGE UP (ref 3.5–5.3)
RBC # BLD: 3.32 M/UL — LOW (ref 3.8–5.2)
RBC # BLD: 3.32 M/UL — LOW (ref 3.8–5.2)
RBC # FLD: 16.7 % — HIGH (ref 10.3–14.5)
RBC # FLD: 16.7 % — HIGH (ref 10.3–14.5)
SODIUM SERPL-SCNC: 140 MMOL/L — SIGNIFICANT CHANGE UP (ref 135–145)
SODIUM SERPL-SCNC: 140 MMOL/L — SIGNIFICANT CHANGE UP (ref 135–145)
SURGICAL PATHOLOGY STUDY: SIGNIFICANT CHANGE UP
SURGICAL PATHOLOGY STUDY: SIGNIFICANT CHANGE UP
WBC # BLD: 21.37 K/UL — HIGH (ref 3.8–10.5)
WBC # BLD: 21.37 K/UL — HIGH (ref 3.8–10.5)
WBC # FLD AUTO: 21.37 K/UL — HIGH (ref 3.8–10.5)
WBC # FLD AUTO: 21.37 K/UL — HIGH (ref 3.8–10.5)

## 2023-11-30 RX ORDER — POTASSIUM BICARBONATE 978 MG/1
25 TABLET, EFFERVESCENT ORAL
Refills: 0 | Status: COMPLETED | OUTPATIENT
Start: 2023-11-30 | End: 2023-11-30

## 2023-11-30 RX ORDER — INSULIN GLARGINE 100 [IU]/ML
28 INJECTION, SOLUTION SUBCUTANEOUS AT BEDTIME
Refills: 0 | Status: DISCONTINUED | OUTPATIENT
Start: 2023-11-30 | End: 2023-12-04

## 2023-11-30 RX ADMIN — HYDROMORPHONE HYDROCHLORIDE 4 MILLIGRAM(S): 2 INJECTION INTRAMUSCULAR; INTRAVENOUS; SUBCUTANEOUS at 22:30

## 2023-11-30 RX ADMIN — GABAPENTIN 300 MILLIGRAM(S): 400 CAPSULE ORAL at 05:57

## 2023-11-30 RX ADMIN — Medication 81 MILLIGRAM(S): at 12:16

## 2023-11-30 RX ADMIN — HYDROMORPHONE HYDROCHLORIDE 4 MILLIGRAM(S): 2 INJECTION INTRAMUSCULAR; INTRAVENOUS; SUBCUTANEOUS at 18:20

## 2023-11-30 RX ADMIN — HYDROMORPHONE HYDROCHLORIDE 2 MILLIGRAM(S): 2 INJECTION INTRAMUSCULAR; INTRAVENOUS; SUBCUTANEOUS at 08:39

## 2023-11-30 RX ADMIN — Medication 25 MILLIGRAM(S): at 17:27

## 2023-11-30 RX ADMIN — HYDROMORPHONE HYDROCHLORIDE 4 MILLIGRAM(S): 2 INJECTION INTRAMUSCULAR; INTRAVENOUS; SUBCUTANEOUS at 17:27

## 2023-11-30 RX ADMIN — OLANZAPINE 7.5 MILLIGRAM(S): 15 TABLET, FILM COATED ORAL at 11:50

## 2023-11-30 RX ADMIN — HYDROMORPHONE HYDROCHLORIDE 4 MILLIGRAM(S): 2 INJECTION INTRAMUSCULAR; INTRAVENOUS; SUBCUTANEOUS at 12:15

## 2023-11-30 RX ADMIN — POTASSIUM BICARBONATE 25 MILLIEQUIVALENT(S): 978 TABLET, EFFERVESCENT ORAL at 11:11

## 2023-11-30 RX ADMIN — BUDESONIDE AND FORMOTEROL FUMARATE DIHYDRATE 2 PUFF(S): 160; 4.5 AEROSOL RESPIRATORY (INHALATION) at 05:59

## 2023-11-30 RX ADMIN — Medication 3 MILLILITER(S): at 05:57

## 2023-11-30 RX ADMIN — HYDROMORPHONE HYDROCHLORIDE 2 MILLIGRAM(S): 2 INJECTION INTRAMUSCULAR; INTRAVENOUS; SUBCUTANEOUS at 09:05

## 2023-11-30 RX ADMIN — APIXABAN 5 MILLIGRAM(S): 2.5 TABLET, FILM COATED ORAL at 05:57

## 2023-11-30 RX ADMIN — Medication 10 MILLIGRAM(S): at 05:58

## 2023-11-30 RX ADMIN — Medication 9 UNIT(S): at 11:49

## 2023-11-30 RX ADMIN — Medication 40 MILLIGRAM(S): at 05:58

## 2023-11-30 RX ADMIN — POTASSIUM BICARBONATE 25 MILLIEQUIVALENT(S): 978 TABLET, EFFERVESCENT ORAL at 13:24

## 2023-11-30 RX ADMIN — Medication 3 MILLILITER(S): at 17:27

## 2023-11-30 RX ADMIN — Medication 9 UNIT(S): at 08:25

## 2023-11-30 RX ADMIN — Medication 3 MILLILITER(S): at 11:50

## 2023-11-30 RX ADMIN — INSULIN GLARGINE 28 UNIT(S): 100 INJECTION, SOLUTION SUBCUTANEOUS at 21:56

## 2023-11-30 RX ADMIN — Medication 25 MILLIGRAM(S): at 05:58

## 2023-11-30 RX ADMIN — FAMOTIDINE 20 MILLIGRAM(S): 10 INJECTION INTRAVENOUS at 05:57

## 2023-11-30 RX ADMIN — HYDROMORPHONE HYDROCHLORIDE 4 MILLIGRAM(S): 2 INJECTION INTRAMUSCULAR; INTRAVENOUS; SUBCUTANEOUS at 21:57

## 2023-11-30 RX ADMIN — Medication 3 MILLILITER(S): at 00:10

## 2023-11-30 RX ADMIN — GABAPENTIN 300 MILLIGRAM(S): 400 CAPSULE ORAL at 21:56

## 2023-11-30 RX ADMIN — Medication 50 MILLIGRAM(S): at 21:56

## 2023-11-30 RX ADMIN — Medication 9 UNIT(S): at 17:28

## 2023-11-30 RX ADMIN — BUDESONIDE AND FORMOTEROL FUMARATE DIHYDRATE 2 PUFF(S): 160; 4.5 AEROSOL RESPIRATORY (INHALATION) at 17:26

## 2023-11-30 RX ADMIN — ATORVASTATIN CALCIUM 80 MILLIGRAM(S): 80 TABLET, FILM COATED ORAL at 21:56

## 2023-11-30 RX ADMIN — HYDROMORPHONE HYDROCHLORIDE 4 MILLIGRAM(S): 2 INJECTION INTRAMUSCULAR; INTRAVENOUS; SUBCUTANEOUS at 05:57

## 2023-11-30 RX ADMIN — Medication 30 MILLILITER(S): at 08:39

## 2023-11-30 RX ADMIN — FAMOTIDINE 20 MILLIGRAM(S): 10 INJECTION INTRAVENOUS at 17:27

## 2023-11-30 RX ADMIN — HYDROMORPHONE HYDROCHLORIDE 4 MILLIGRAM(S): 2 INJECTION INTRAMUSCULAR; INTRAVENOUS; SUBCUTANEOUS at 12:45

## 2023-11-30 RX ADMIN — HYDROMORPHONE HYDROCHLORIDE 4 MILLIGRAM(S): 2 INJECTION INTRAMUSCULAR; INTRAVENOUS; SUBCUTANEOUS at 06:49

## 2023-11-30 RX ADMIN — APIXABAN 5 MILLIGRAM(S): 2.5 TABLET, FILM COATED ORAL at 17:27

## 2023-11-30 RX ADMIN — Medication 2: at 11:49

## 2023-11-30 RX ADMIN — GABAPENTIN 300 MILLIGRAM(S): 400 CAPSULE ORAL at 13:25

## 2023-11-30 RX ADMIN — CHLORHEXIDINE GLUCONATE 1 APPLICATION(S): 213 SOLUTION TOPICAL at 14:29

## 2023-11-30 NOTE — PROGRESS NOTE ADULT - ASSESSMENT
58F smoker with PMH CVA with L sided weakness/numbness/L gaze deviation, DM2 with b/l peripheral neuropathy, COPD/Asthma, Breast Ca s/p ?RT,  Bipolar depression, Rheumatoid Arthritis,  Antiphospholipid syndrome, and L eye uveitis/scleritis.   Assessment  DMT2: 58y Female with DM T2 with hyperglycemia, A1C 7.6% , was on oral meds at home, now postop CABG, now transitioned to  basal bolus, now sugars down trending with fluctuations, pt snacking at bedside between meals.   Insulin teaching  Severe AR: CTS eval, LESLEY, s/p ct surgery   CAD: on medications, stable, monitored. Cardiac MR viability , now postop CABG  HTN: on antihypertensive medications, monitored, asymptomatic.  Obesity: No strict exercise routines, not on any weight loss program, neither on low calorie diet.    Discussed plan and management with Dr Ladarius Rivera NP - TEAMS  Skye Durand MD  Cell: [phone]  Office: [phone]        58F smoker with PMH CVA with L sided weakness/numbness/L gaze deviation, DM2 with b/l peripheral neuropathy, COPD/Asthma, Breast Ca s/p ?RT,  Bipolar depression, Rheumatoid Arthritis,  Antiphospholipid syndrome, and L eye uveitis/scleritis.   Assessment  DMT2: 58y Female with DM T2 with hyperglycemia, A1C 7.6% , was on oral meds at home, now postop CABG, now transitioned to  basal bolus, now sugars down trending with fluctuations, pt snacking at bedside between meals.   Insulin teaching  Severe AR: CTS eval, LESLEY, s/p ct surgery   CAD: on medications, stable, monitored. Cardiac MR viability , now postop CABG  HTN: on antihypertensive medications, monitored, asymptomatic.  Obesity: No strict exercise routines, not on any weight loss program, neither on low calorie diet.      Discussed plan and management with Dr Ladarius Rivera NP - TEAMS  Skye Durand MD  Cell: [phone]  Office: [phone]        Size Of Lesion In Cm: 0

## 2023-11-30 NOTE — PROGRESS NOTE ADULT - ASSESSMENT
59 y/o F with PMH smoker with PMH CVA with L sided weakness/numbness/L gaze deviation, DM2 with b/l peripheral neuropathy, COPD/emphysema, asthma, RA (on prednisone), Breast Ca s/p ?RT, Bipolar depression, Rheumatoid Arthritis, Antiphospholipid syndrome, and L eye uveitis/scleritis. Presents to [facility] transferred from [facility]. Initially presents with exacerbation of L-sided subjective weakness/numbness likely 2/2 recrudescence of prior Right BG infarct. Cardiology following for new LV dysfunction since July, pending ischemic eval. LESLEY was done showing severe Aortic Insufficiency and moderated MR. Cardiac cath was done showing LAD prox 100% fills via right to left collateral. Tx for surgical evaluation. S/p CABG, AVR on 11/21. Called to consult for wheezing. Per pt she is SOB and intermittently wheezing. Endorses difficult to expectorate secretions. Pt states she is unable to take a deep breath due to incisional pain.

## 2023-11-30 NOTE — PROGRESS NOTE ADULT - SUBJECTIVE AND OBJECTIVE BOX
German Carrasco MD  Interventional Cardiology / Advance Heart Failure and Cardiac Transplant Specialist  [place] Office : [phone] [place] [address]  Tel:   [place] Office : [phone] [facility] [address]  Tel: [phone]      Subjective/Overnight events: Pt is lying in recliner not in distress  	  MEDICATIONS:  apixaban 5 milliGRAM(s) Oral two times a day  aspirin enteric coated 81 milliGRAM(s) Oral daily  furosemide    Tablet 40 milliGRAM(s) Oral daily  metoprolol tartrate 25 milliGRAM(s) Oral two times a day      albuterol/ipratropium for Nebulization 3 milliLiter(s) Nebulizer every 6 hours  budesonide 160 MICROgram(s)/formoterol 4.5 MICROgram(s) Inhaler 2 Puff(s) Inhalation two times a day    gabapentin 300 milliGRAM(s) Oral every 8 hours  HYDROmorphone   Tablet 4 milliGRAM(s) Oral every 4 hours PRN  HYDROmorphone   Tablet 2 milliGRAM(s) Oral every 4 hours PRN  OLANZapine 7.5 milliGRAM(s) Oral daily  traZODone 50 milliGRAM(s) Oral at bedtime    bisacodyl Suppository 10 milliGRAM(s) Rectal once  famotidine    Tablet 20 milliGRAM(s) Oral two times a day  polyethylene glycol 3350 17 Gram(s) Oral daily  senna 2 Tablet(s) Oral at bedtime    atorvastatin 80 milliGRAM(s) Oral at bedtime  dextrose 50% Injectable 50 milliLiter(s) IV Push every 15 minutes  dextrose 50% Injectable 25 milliLiter(s) IV Push every 15 minutes  dextrose Oral Gel 15 Gram(s) Oral once  glucagon  Injectable 1 milliGRAM(s) IntraMuscular once  insulin glargine Injectable (LANTUS) 32 Unit(s) SubCutaneous at bedtime  insulin lispro (ADMELOG) corrective regimen sliding scale   SubCutaneous at bedtime  insulin lispro (ADMELOG) corrective regimen sliding scale   SubCutaneous three times a day before meals  insulin lispro Injectable (ADMELOG) 9 Unit(s) SubCutaneous three times a day before meals  predniSONE   Tablet 10 milliGRAM(s) Oral daily    chlorhexidine 2% Cloths 1 Application(s) Topical daily      PAST MEDICAL/SURGICAL HISTORY  PAST MEDICAL & SURGICAL HISTORY:  Cigarette smoker      Rheumatoid arthritis      Other depression      Breast cancer      Migraines      History of multiple cerebrovascular accidents (CVAs)      Suicidal ideation      Paresthesia of left arm      Facial paresthesia      APS (antiphospholipid syndrome)      History of depressed bipolar disorder      History of COPD      History of COPD      History of hysterectomy      History of cholecystectomy          SOCIAL HISTORY: Substance Use (street drugs): ( x ) never used  (  ) other:    FAMILY HISTORY:  Family history of breast cancer (Mother)    Family history of diabetes mellitus (DM) (Father)            PHYSICAL EXAM:  T(C): 37.2 (11-30-23 @ 05:46), Max: 37.2 (11-30-23 @ 05:46)  HR: 98 (11-30-23 @ 05:46) (90 - 98)  BP: 106/72 (11-30-23 @ 05:46) (97/67 - 119/73)  RR: 18 (11-30-23 @ 05:46) (18 - 18)  SpO2: 92% (11-29-23 @ 22:07) (92% - 94%)  Wt(kg): --  I&O's Summary    29 Nov 2023 07:01  -  30 Nov 2023 07:00  --------------------------------------------------------  IN: 240 mL / OUT: 0 mL / NET: 240 mL    30 Nov 2023 07:01  -  30 Nov 2023 10:24  --------------------------------------------------------  IN: 240 mL / OUT: 200 mL / NET: 40 mL          GENERAL: NAD  EYES: , conjunctiva and sclera clear  ENMT: No tonsillar erythema, exudates, or enlargement  Cardiovascular: Normal S1 S2, No JVD, s/p sternotomy   Respiratory: Lungs clear to auscultation	  Gastrointestinal:  Soft, Non-tender, + BS	  Extremities: +2 LE edema                                     9.8    21.37 )-----------( 365      ( 30 Nov 2023 07:13 )             32.1     11-30    140  |  99  |  21  ----------------------------<  65<L>  3.5   |  27  |  0.67    Ca    9.5      30 Nov 2023 07:15  Phos  4.2     11-30  Mg     2.2     11-30      proBNP:   Lipid Profile:   HgA1c:   TSH:     Consultant(s) Notes Reviewed:  [x ] YES  [ ] NO    Care Discussed with Consultants/Other Providers [ x] YES  [ ] NO    Imaging Personally Reviewed independently:  [x] YES  [ ] NO    All labs, radiologic studies, vitals, orders and medications list reviewed. Patient is seen and examined at bedside. Case discussed with medical team.

## 2023-11-30 NOTE — PROGRESS NOTE ADULT - ASSESSMENT
58F smoker with stroke x 2 (2022 and july 2023 with resid  L sided weakness/numbness  DM2 with b/l peripheral neuropathy, COPD/Asthma, Breast Ca s/p, Bipolar depression, Rheumatoid Arthritis, Antiphospholipid syndrome, and L eye uveitis/scleritis, Presents to [facility] transferred from [facility]. p/w multiple medical complaints and exacerbation of L-sided subjective weakness/numbness likely 2/2 recrudescence of prior Right BG infarct. Cardiology following for new LV dysfunction since July.Patient now transferred to CTS Dr. Loaiza for evaluation. neuro called for prior strokes   LESLEY was done showing severe Aortic Insufficiency and moderated MR.   Cardiac cath was done showing LAD prox 100% fills via right to left collateral.   CTH with tinght chronic appearing R BG infarct   TTE EF 30%   CD neg   A1c 7.6   LDL 37   ILR interrogated no events  NIHSS 3  premrs3   cath shows LAD prox 100% fills via right to left collateral will need AVR and LIMA to LAD bypass  o/e with mild L facial and decrease sensation on L with mild 5-/5 weakness on L with slow FFM ; dysconjugate gaze   s/p OR 11/21  extubated  post op no neuro changes   c/o HA.   11/29 walking on unit with PT     Impression   prior nowe chronic appearing infarcts. R BG with residual L HP  DM peripheral neuropathy      - for secondary stroke prevention on asa 81mg and high dose statin.  was also on full dose lovenox given APLS; would resume when able --> now back on DOAC , elqiuis 5mg BID   - gabapentin 300mg TID for neuropathy   - f/u rheum and heme/onc [facility]   - telemetry  - PT/OT   - check FS, glucose control <180  - GI/DVT ppx   - Thank you for allowing me to participate in the care of this patient. Call with questions.   - spoke [facility] CTS team/ACP   Charles Crespo MD  Vascular Neurology  Office: [phone]

## 2023-11-30 NOTE — PROGRESS NOTE ADULT - PROBLEM SELECTOR PLAN 1
intermittent wheezing - suspect 2nd to retained secretions in airway  -Pt not take deep breaths due to incisional pain  -Suggest pain control. Sternal nonunion CT, ?hemopericardium. Defer to CT surgery.   -Coughing/deep breathing, incentive spirometry encouraged  -Continue bronchodilators  -OOB to chair, ambulate as tolerated  -Wean o2 as tolerated, keep sats >90% (currently RA)  -CT chest 11/27 with no PNA, very small loculated L effusion.

## 2023-11-30 NOTE — PROGRESS NOTE ADULT - SUBJECTIVE AND OBJECTIVE BOX
VITAL SIGNS-Telemetry:  SR 70-90  Vital Signs Last 24 Hrs  T(C): 37.2 (23 @ 05:46), Max: 37.2 (23 @ 05:46)  T(F): 99 (23 @ 05:46), Max: 99 (23 @ 05:46)  HR: 98 (23 @ 05:46) (90 - 98)  BP: 106/72 (23 @ 05:46) (97/67 - 119/73)  RR: 18 (23 @ 05:46) (18 - 18)  SpO2: 92% (23 @ 22:07) (92% - 94%)          @ 07:01  -   @ 07:00  --------------------------------------------------------  IN: 240 mL / OUT: 0 mL / NET: 240 mL     @ 07:01  -   @ 12:35  --------------------------------------------------------  IN: 240 mL / OUT: 200 mL / NET: 40 mL    Daily     Daily Weight in k.1 (2023 09:00)    CAPILLARY BLOOD GLUCOSE    POCT Blood Glucose.: 218 mg/dL (2023 11:40)  POCT Blood Glucose.: 90 mg/dL (2023 07:48)  POCT Blood Glucose.: 119 mg/dL (2023 21:38)  POCT Blood Glucose.: 96 mg/dL (2023 16:29)           PHYSICAL EXAM:  Neurology: alert and oriented x 3, nonfocal, no gross deficits  CV : S1S2  Sternal Wound :  CDI , Stable  Lungs:  Abdomen: soft, nontender, nondistended, positive bowel sounds, last bowel movement         Extremities:         albuterol/ipratropium for Nebulization 3 milliLiter(s) Nebulizer every 6 hours  apixaban 5 milliGRAM(s) Oral two times a day  aspirin enteric coated 81 milliGRAM(s) Oral daily  atorvastatin 80 milliGRAM(s) Oral at bedtime  bisacodyl Suppository 10 milliGRAM(s) Rectal once  budesonide 160 MICROgram(s)/formoterol 4.5 MICROgram(s) Inhaler 2 Puff(s) Inhalation two times a day  chlorhexidine 2% Cloths 1 Application(s) Topical daily  dextrose 50% Injectable 50 milliLiter(s) IV Push every 15 minutes  dextrose 50% Injectable 25 milliLiter(s) IV Push every 15 minutes  dextrose Oral Gel 15 Gram(s) Oral once  famotidine    Tablet 20 milliGRAM(s) Oral two times a day  furosemide    Tablet 40 milliGRAM(s) Oral daily  gabapentin 300 milliGRAM(s) Oral every 8 hours  glucagon  Injectable 1 milliGRAM(s) IntraMuscular once  HYDROmorphone   Tablet 2 milliGRAM(s) Oral every 4 hours PRN  HYDROmorphone   Tablet 4 milliGRAM(s) Oral every 4 hours PRN  insulin glargine Injectable (LANTUS) 32 Unit(s) SubCutaneous at bedtime  insulin lispro (ADMELOG) corrective regimen sliding scale   SubCutaneous at bedtime  insulin lispro (ADMELOG) corrective regimen sliding scale   SubCutaneous three times a day before meals  insulin lispro Injectable (ADMELOG) 9 Unit(s) SubCutaneous three times a day before meals  metoprolol tartrate 25 milliGRAM(s) Oral two times a day  OLANZapine 7.5 milliGRAM(s) Oral daily  polyethylene glycol 3350 17 Gram(s) Oral daily  potassium bicarbonate/potassium citrate 25 milliEquivalent(s) Oral every 2 hours  predniSONE   Tablet 10 milliGRAM(s) Oral daily  senna 2 Tablet(s) Oral at bedtime  traZODone 50 milliGRAM(s) Oral at bedtime    Physical Therapy Rec:   Home  [  ]   Home w/ PT  [  ]  Rehab  [ x ]  Discussed with Cardiothoracic Team at AM rounds. VITAL SIGNS-Telemetry:  SR 70-90  Vital Signs Last 24 Hrs  T(C): 37.2 (23 @ 05:46), Max: 37.2 (23 @ 05:46)  T(F): 99 (23 @ 05:46), Max: 99 (23 @ 05:46)  HR: 98 (23 @ 05:46) (90 - 98)  BP: 106/72 (23 @ 05:46) (97/67 - 119/73)  RR: 18 (23 @ 05:46) (18 - 18)  SpO2: 92% (23 @ 22:07) (92% - 94%)          @ 07:01  -   @ 07:00  --------------------------------------------------------  IN: 240 mL / OUT: 0 mL / NET: 240 mL     @ 07:01  -   @ 12:35  --------------------------------------------------------  IN: 240 mL / OUT: 200 mL / NET: 40 mL    Daily     Daily Weight in k.1 (2023 09:00)    CAPILLARY BLOOD GLUCOSE    POCT Blood Glucose.: 218 mg/dL (2023 11:40)  POCT Blood Glucose.: 90 mg/dL (2023 07:48)  POCT Blood Glucose.: 119 mg/dL (2023 21:38)  POCT Blood Glucose.: 96 mg/dL (2023 16:29)           PHYSICAL EXAM:  Neurology: alert and oriented x 3, nonfocal, no gross deficits  CV : S1S2  Sternal Wound :  CDI , Stable  Lungs: diminished bs bases  Abdomen: soft, nontender, nondistended, positive bowel sounds, last bowel movement  +bm       Extremities:     +edema b/l  no calf tenderness    albuterol/ipratropium for Nebulization 3 milliLiter(s) Nebulizer every 6 hours  apixaban 5 milliGRAM(s) Oral two times a day  aspirin enteric coated 81 milliGRAM(s) Oral daily  atorvastatin 80 milliGRAM(s) Oral at bedtime  bisacodyl Suppository 10 milliGRAM(s) Rectal once  budesonide 160 MICROgram(s)/formoterol 4.5 MICROgram(s) Inhaler 2 Puff(s) Inhalation two times a day  chlorhexidine 2% Cloths 1 Application(s) Topical daily  dextrose 50% Injectable 50 milliLiter(s) IV Push every 15 minutes  dextrose 50% Injectable 25 milliLiter(s) IV Push every 15 minutes  dextrose Oral Gel 15 Gram(s) Oral once  famotidine    Tablet 20 milliGRAM(s) Oral two times a day  furosemide    Tablet 40 milliGRAM(s) Oral daily  gabapentin 300 milliGRAM(s) Oral every 8 hours  glucagon  Injectable 1 milliGRAM(s) IntraMuscular once  HYDROmorphone   Tablet 2 milliGRAM(s) Oral every 4 hours PRN  HYDROmorphone   Tablet 4 milliGRAM(s) Oral every 4 hours PRN  insulin glargine Injectable (LANTUS) 32 Unit(s) SubCutaneous at bedtime  insulin lispro (ADMELOG) corrective regimen sliding scale   SubCutaneous at bedtime  insulin lispro (ADMELOG) corrective regimen sliding scale   SubCutaneous three times a day before meals  insulin lispro Injectable (ADMELOG) 9 Unit(s) SubCutaneous three times a day before meals  metoprolol tartrate 25 milliGRAM(s) Oral two times a day  OLANZapine 7.5 milliGRAM(s) Oral daily  polyethylene glycol 3350 17 Gram(s) Oral daily  potassium bicarbonate/potassium citrate 25 milliEquivalent(s) Oral every 2 hours  predniSONE   Tablet 10 milliGRAM(s) Oral daily  senna 2 Tablet(s) Oral at bedtime  traZODone 50 milliGRAM(s) Oral at bedtime    Physical Therapy Rec:   Home  [  ]   Home w/ PT  [  ]  Rehab  [ x ]  Discussed with Cardiothoracic Team at AM rounds.

## 2023-11-30 NOTE — PROGRESS NOTE ADULT - PROBLEM SELECTOR PLAN 4
----- Message from Nicolle Quach MD sent at 11/17/2017  2:17 PM CST -----  Please notify patient of test results: Bacteria in urine culture were sensitive to the antibiotic prescribed   -Management per CTS.

## 2023-11-30 NOTE — PROGRESS NOTE ADULT - PROBLEM SELECTOR PLAN 1
Will decrease Lantus 28u at bedtime.  Will continue  Admelog 9u before each meal and continue Admelog correction scale coverage. Will continue monitoring FS and Follow up.   Will continue monitoring  blood sugars, will Follow up.  Patient counseled for compliance with consistent low carb diet.  Insulin teaching   Anticipate Basal insulin at discharge (Lantus) + PO DM regimen.  Can be Discharged on current basal insulin once nightly.   In addition discharge on Farxiga 10 mg and Glimepiride 2 mg daily with breakfast.   Pls send with glucometer  FU outpatient 2 weeks

## 2023-11-30 NOTE — PROGRESS NOTE ADULT - ASSESSMENT
58 female smoker with PMH CVA with residual Left-sided weakness/numbness/L gaze deviation, DM2 with b/l peripheral neuropathy, COPD/Asthma, Breast Ca s/p ?RT, Bipolar depression, Rheumatoid Arthritis, Antiphospholipid syndrome, and L eye uveitis/scleritis, Presents to [facility] transferred from [facility]. p/w multiple medical complaints. A/w exacerbation of L-sided subjective weakness/numbness likely 2/2 recrudescence of prior Right BG infarct. Cardiology following for new LV dysfunction since July, pending ischemic eval. GABBY was done showing severe Aortic Insufficiency and moderated MR. Cardiac cath was done showing LAD prox 100% fills via right to left collateral. Patient now transferred to [facility] Dr. Loaiza for evaluation.    HOSPITAL COURSE:   11/14 patient seen and examined at bedside. All labs and imaging to be reviewed by Dr. Loaiza   11/15:VSS, neuro consult for hx cva L sided weakness, Preop for Friday 11/16 Repeat echo shows only mild valve pathology  11/17 No valve surgery planned in light of repeat echo.  Getting MR viability for CAD and depressed LV function.  11/18  vss for rpt gabby mon  11/19  npo after midnight for gabby tomorrow  11/20 VSS NPO for GABBY this am=> severe AR  11/21: s/p CABG LIMA-LAD, AVR(t) - Inspiris 23mm, LAAL with AtriClip 40mm,   +Substernal mediastinal Chest tube and Left pleural Chest tube. Kept Intubated post op. Taken to CTU. Levophed, Vasopressin and Primacor switched to Dobutamine gtt.  Started on PO Amio for afib ppx. +Soliz. On insulin gtt for tight glycemic control to prevent wound infxn.  11/22: POD1. Extubated. Inotropes and Pressors maintained. PCA Hydromorphone initiated by Acute Pain Mgmt team. Insulin gtt maintained per Endocrine. PT/OT evaluation.  11/23 POD 2. PCA Hydromorphone maintained. Insulin gtt maintained, started basal insulin tonight per Endo. Dobutamine gtt continued.   11/24 POD 3. Dobutamine gtt d/c'ed in AM. PCA pumped d/c'ed. MED and Pleural Chest tubes d/c'ed while in CTU. (+) PW to EPM (40/10/0.8). Prevena dressing intact. +Soliz maintained. Monitor I/Os. Back on Insulin gtt for elevated FS >200. Endocrine following. TRANSFERRED TO SDU. Current medication regimen continued.   11/25 VSS - insullin gtt off @ 6am- start low dose BB this am - soliz in place will d/c when pt more mobile.  d/c plan anticipate home PT  11/26 WBC 20 again -om chronic steroids - hx COPD- afebrile - prevena in place.  will conitue to monitor- xtra lasix 20 iv x 1 given - up 6 kgs in weight w/ ++ edema,  40 yr pack current smoker.  nebs.  11/27 VSS. Afebrile. WBC increased 23 (from 20) (Of note, patient on chronic steroids for Hx RA). Repeat CXR today stable. Rpt UA pending. Potassium 3.6 this AM, supplemented. Pt reports hx of Bipolar MDD, with no follow-up with Psychiatry and non-compliance with OP psych meds since July 2023. Pt requesting to speak to Psychiatry today to establish care. Denies SI/HI/AH/VH. On call Psychiatry ([phone] consulted and aware. Pt also noted with bronchospasms/wheezing - Pulm (Dr. Leone) consulted and aware. Stable to move to floors today, per Dr. Loaiza.  11/28: VSS, refusing PT, needs to increase ambulation/iS/effort. CT with ?sternal dehiscence at inferior pole, will discuss with Dr. Loaiza. Extra IV lasix given for edema.   11/29: VSS. CT Chest imaging reviewed closely by Dr. Loaiza yesterday, no concerned for sternal dehiscence, upon Attending's review. Prevena d/c'd today. Site c/d/i. +PW (isolated). Lidocaine x1 for back pain today, reports improvement. Encouraged ambulation/OOB. Pt working with Physical Therapy. CM to work on Rehab for disposition as recommended by PT. Current medication regimen continued. PW D/liang per Dr. Loaiza. Of note, patient with hx of Antiphospholipid Syndrome and CVA, was on AC therapy pre-surgery; ok to restart AC therapy with Eliquis 5mg BID starting tonight, per Dr. Loaiza.  11/30 VSS rounds made w/ DR. Loaiza.  awaiting d/c to rehab

## 2023-11-30 NOTE — PROGRESS NOTE ADULT - ASSESSMENT
58F with history of CVA with L sided weakness/numbness/L gaze deviation, DM2 with b/l peripheral neuropathy, COPD/Asthma, Breast Ca s/p ?RT, Bipolar depression, Rheumatoid Arthritis, Antiphospholipid syndrome, L eye uveitis/scleritis, Newly diagnosed DM2 (pt cannot remember which medication she takes for it), and current tobacco use who presents to the hospital with multiple complaints. found to have  sever AR s/p AVR CABG x 1     1. LV dysfunction  -new mod-severe segmental LV dysfunction in July has not had ischemic workup due to stroke at that time.  -c/w metoprolol   -TTE EF 30% now with mod-severe AR  -c/w lasix 40mg PO Daily   -cath shows LAD prox 100% fills via right to left collateral   -CMR 11/17: Mild thinning and hypokinesis of the mid to apical anterior and  anteroseptal segments.  The left ventricular ejection fraction measures  43%.Findings consistent with ischemic cardiomyopathy.  LESLEY with sever AR   - s/p AVR and CABGx1 LIMA-LAD  -CT 11/27 with inferior most sternal fragment engages only the left sternal fragment  and not the right, which is suggestive of dehiscence. CTS recs not a dehiscence    2. CVA  -history of multiple CVAs and is on eliquis   -CT scan of head negative for any acute findings  -ILR interrogation with no events  -per neuro no plans for MRI       3. Antiphospholipid syndrome  -eliquis resumed

## 2023-11-30 NOTE — PROGRESS NOTE ADULT - PROBLEM SELECTOR PLAN 1
- Continue with ASA 81mg qD, Atorvastatin 80mg qHS, lopressor 25 BID  - Lasix 40mg po daily per cardiology  - monitor I/Os  - Monitor electrolytes, Keep K >4, Mg >2.  - Increase activity as tolerated.   - Pain control regimen, per pain management team.  - Encourage Chest PT / Pulmonary toileting and Incentive spirometry every 1 hour x 10 while awake.   - Continue with PUD and DVT prophylaxis.  - Of note, patient with hx of Antiphospholipid Syndrome and CVA, was on AC therapy pre-surgery; ok to re-start AC with Eliquis 5mg BID starting tonight, per Dr. Loaiza

## 2023-11-30 NOTE — PROGRESS NOTE ADULT - SUBJECTIVE AND OBJECTIVE BOX
Follow-up Pulm Progress Note    SOB improving  c/o incisional pain  sats low 90s on RA    Medications:  MEDICATIONS  (STANDING):  albuterol/ipratropium for Nebulization 3 milliLiter(s) Nebulizer every 6 hours  apixaban 5 milliGRAM(s) Oral two times a day  aspirin enteric coated 81 milliGRAM(s) Oral daily  atorvastatin 80 milliGRAM(s) Oral at bedtime  bisacodyl Suppository 10 milliGRAM(s) Rectal once  budesonide 160 MICROgram(s)/formoterol 4.5 MICROgram(s) Inhaler 2 Puff(s) Inhalation two times a day  chlorhexidine 2% Cloths 1 Application(s) Topical daily  dextrose 50% Injectable 50 milliLiter(s) IV Push every 15 minutes  dextrose 50% Injectable 25 milliLiter(s) IV Push every 15 minutes  dextrose Oral Gel 15 Gram(s) Oral once  famotidine    Tablet 20 milliGRAM(s) Oral two times a day  furosemide    Tablet 40 milliGRAM(s) Oral daily  gabapentin 300 milliGRAM(s) Oral every 8 hours  glucagon  Injectable 1 milliGRAM(s) IntraMuscular once  insulin glargine Injectable (LANTUS) 32 Unit(s) SubCutaneous at bedtime  insulin lispro (ADMELOG) corrective regimen sliding scale   SubCutaneous at bedtime  insulin lispro (ADMELOG) corrective regimen sliding scale   SubCutaneous three times a day before meals  insulin lispro Injectable (ADMELOG) 9 Unit(s) SubCutaneous three times a day before meals  metoprolol tartrate 25 milliGRAM(s) Oral two times a day  OLANZapine 7.5 milliGRAM(s) Oral daily  polyethylene glycol 3350 17 Gram(s) Oral daily  predniSONE   Tablet 10 milliGRAM(s) Oral daily  senna 2 Tablet(s) Oral at bedtime  traZODone 50 milliGRAM(s) Oral at bedtime    MEDICATIONS  (PRN):  HYDROmorphone   Tablet 4 milliGRAM(s) Oral every 4 hours PRN Severe Pain (7 - 10)  HYDROmorphone   Tablet 2 milliGRAM(s) Oral every 4 hours PRN Moderate Pain (4 - 6)          Vital Signs Last 24 Hrs  T(C): 37.2 (30 Nov 2023 05:46), Max: 37.2 (30 Nov 2023 05:46)  T(F): 99 (30 Nov 2023 05:46), Max: 99 (30 Nov 2023 05:46)  HR: 98 (30 Nov 2023 05:46) (90 - 98)  BP: 106/72 (30 Nov 2023 05:46) (97/67 - 119/73)  BP(mean): --  RR: 18 (30 Nov 2023 05:46) (18 - 18)  SpO2: 92% (29 Nov 2023 22:07) (92% - 94%)    Parameters below as of 30 Nov 2023 05:46  Patient On (Oxygen Delivery Method): room air              11-29 @ 07:01  -  11-30 @ 07:00  --------------------------------------------------------  IN: 240 mL / OUT: 0 mL / NET: 240 mL          LABS:                        9.8    21.37 )-----------( 365      ( 30 Nov 2023 07:13 )             32.1     11-30    140  |  99  |  21  ----------------------------<  65<L>  3.5   |  27  |  0.67    Ca    9.5      30 Nov 2023 07:15  Phos  4.2     11-30  Mg     2.2     11-30            CAPILLARY BLOOD GLUCOSE      POCT Blood Glucose.: 90 mg/dL (30 Nov 2023 07:48)      Urinalysis Basic - ( 30 Nov 2023 07:15 )    Color: x / Appearance: x / SG: x / pH: x  Gluc: 65 mg/dL / Ketone: x  / Bili: x / Urobili: x   Blood: x / Protein: x / Nitrite: x   Leuk Esterase: x / RBC: x / WBC x   Sq Epi: x / Non Sq Epi: x / Bacteria: x          GEORGE Negative 11-09 @ 06:15  Anti SS-1 <0.2  Anti SS-2 <0.2  Anti RNP 0.2   11-09 @ 06:15    Atypical ANCA -- 11-09 @ 06:15  c-ANCA titer -- 11-09 @ 06:15  c-ANCA -- 11-09 @ 06:15  p-ANCA -- 11-09 @ 06:15          Physical Examination:  PULM: minimal forced end expiratory wheezes   CVS: S1, S2 heard    RADIOLOGY REVIEWED  CT chest:    < from: CT Chest No Cont (11.27.23 @ 18:45) >  FINDINGS:    AIRWAYS, LUNGS, PLEURA: Central airways clear. Emphysema. Subsegmental   atelectasis at the lung bases. Small loculated left pleural effusion   located posteriorly andalong the left major fissure.    MEDIASTINUM: Interval aortic valve replacement and CABG with associated   postoperative changes of the mediastinum. There is a partially loculated   pericardial effusion along the left atrioventricular groove with   hyperdense appearance measuring 7 x 4 cm (image 98, coronal series).    Thoracic aorta normal caliber. No large mediastinal nodes.    IMAGED ABDOMEN: Right renal cyst.    SOFT TISSUES and BONES: Sternal wires are present. The inferior most   sternal fragment engages only the left sternal fragment and not the   right. There is separation of the sternal fragments by 1 cm at this level   (image 87, series 3). There is fluid interposed between the lower sternal   fragments. Gas within the retrosternal region. Infiltrative changes and   gas are present within the anterior chest wall subcutaneous soft tissues.   Anterior chest wall loop recorder device. Right posterior first rib   fracture.    IMPRESSION:.    The inferior most sternal fragment engages only the left sternal fragment   and not the right, which is suggestive of dehiscence. There is separation   of sternal fragments by 1 cm at this level.    Fluid is located between the sternal fragments and there is a   retrosternal fluid and gas; these findings are favored to represent   postoperative changes, but infection should be considered in the   appropriate clinical scenario.    Partially loculated pericardial effusion along the left atrioventricular   groove measuring 7 x 4 cm with hyperdense component likely representing   hemopericardium.    No evidence of pneumonia.    Small loculated left pleural effusion.    < end of copied text >

## 2023-11-30 NOTE — PROGRESS NOTE ADULT - SUBJECTIVE AND OBJECTIVE BOX
Chief complaint  Patient is a 58y old  Female who presents with a chief complaint of sob (30 Nov 2023 12:34)         Labs and Fingersticks  CAPILLARY BLOOD GLUCOSE      POCT Blood Glucose.: 218 mg/dL (30 Nov 2023 11:40)  POCT Blood Glucose.: 90 mg/dL (30 Nov 2023 07:48)  POCT Blood Glucose.: 119 mg/dL (29 Nov 2023 21:38)  POCT Blood Glucose.: 96 mg/dL (29 Nov 2023 16:29)      Anion Gap: 14 (11-30 @ 07:15)  Anion Gap: 11 (11-29 @ 06:20)      Calcium: 9.5 (11-30 @ 07:15)  Calcium: 9.4 (11-29 @ 06:20)          11-30    140  |  99  |  21  ----------------------------<  65<L>  3.5   |  27  |  0.67    Ca    9.5      30 Nov 2023 07:15  Phos  4.2     11-30  Mg     2.2     11-30                          9.8    21.37 )-----------( 365      ( 30 Nov 2023 07:13 )             32.1     Medications  MEDICATIONS  (STANDING):  albuterol/ipratropium for Nebulization 3 milliLiter(s) Nebulizer every 6 hours  apixaban 5 milliGRAM(s) Oral two times a day  aspirin enteric coated 81 milliGRAM(s) Oral daily  atorvastatin 80 milliGRAM(s) Oral at bedtime  bisacodyl Suppository 10 milliGRAM(s) Rectal once  budesonide 160 MICROgram(s)/formoterol 4.5 MICROgram(s) Inhaler 2 Puff(s) Inhalation two times a day  chlorhexidine 2% Cloths 1 Application(s) Topical daily  dextrose 50% Injectable 50 milliLiter(s) IV Push every 15 minutes  dextrose 50% Injectable 25 milliLiter(s) IV Push every 15 minutes  dextrose Oral Gel 15 Gram(s) Oral once  famotidine    Tablet 20 milliGRAM(s) Oral two times a day  furosemide    Tablet 40 milliGRAM(s) Oral daily  gabapentin 300 milliGRAM(s) Oral every 8 hours  glucagon  Injectable 1 milliGRAM(s) IntraMuscular once  insulin glargine Injectable (LANTUS) 28 Unit(s) SubCutaneous at bedtime  insulin lispro (ADMELOG) corrective regimen sliding scale   SubCutaneous at bedtime  insulin lispro (ADMELOG) corrective regimen sliding scale   SubCutaneous three times a day before meals  insulin lispro Injectable (ADMELOG) 9 Unit(s) SubCutaneous three times a day before meals  metoprolol tartrate 25 milliGRAM(s) Oral two times a day  OLANZapine 7.5 milliGRAM(s) Oral daily  polyethylene glycol 3350 17 Gram(s) Oral daily  potassium bicarbonate/potassium citrate 25 milliEquivalent(s) Oral every 2 hours  predniSONE   Tablet 10 milliGRAM(s) Oral daily  senna 2 Tablet(s) Oral at bedtime  traZODone 50 milliGRAM(s) Oral at bedtime      Physical Exam  General: Patient comfortable in bed   Vital Signs Last 12 Hrs  T(F): 96.8 (11-30-23 @ 12:59), Max: 99 (11-30-23 @ 05:46)  HR: 91 (11-30-23 @ 13:15) (91 - 98)  BP: 118/84 (11-30-23 @ 12:59) (106/72 - 118/84)  BP(mean): --  RR: 18 (11-30-23 @ 13:15) (18 - 18)  SpO2: 94% (11-30-23 @ 13:15) (86% - 94%)    CVS: S1S2   Respiratory: No wheezing, no crepitations  GI: Abdomen soft, bowel sounds positive  Musculoskeletal:  moves all extremities           Chief complaint  Patient is a 58y old  Female who presents with a chief complaint of sob (30 Nov 2023 12:34)         Labs and Fingersticks  CAPILLARY BLOOD GLUCOSE      POCT Blood Glucose.: 218 mg/dL (30 Nov 2023 11:40)  POCT Blood Glucose.: 90 mg/dL (30 Nov 2023 07:48)  POCT Blood Glucose.: 119 mg/dL (29 Nov 2023 21:38)  POCT Blood Glucose.: 96 mg/dL (29 Nov 2023 16:29)      Anion Gap: 14 (11-30 @ 07:15)  Anion Gap: 11 (11-29 @ 06:20)      Calcium: 9.5 (11-30 @ 07:15)  Calcium: 9.4 (11-29 @ 06:20)          11-30    140  |  99  |  21  ----------------------------<  65<L>  3.5   |  27  |  0.67    Ca    9.5      30 Nov 2023 07:15  Phos  4.2     11-30  Mg     2.2     11-30                          9.8    21.37 )-----------( 365      ( 30 Nov 2023 07:13 )             32.1     Medications  MEDICATIONS  (STANDING):  albuterol/ipratropium for Nebulization 3 milliLiter(s) Nebulizer every 6 hours  apixaban 5 milliGRAM(s) Oral two times a day  aspirin enteric coated 81 milliGRAM(s) Oral daily  atorvastatin 80 milliGRAM(s) Oral at bedtime  bisacodyl Suppository 10 milliGRAM(s) Rectal once  budesonide 160 MICROgram(s)/formoterol 4.5 MICROgram(s) Inhaler 2 Puff(s) Inhalation two times a day  chlorhexidine 2% Cloths 1 Application(s) Topical daily  dextrose 50% Injectable 50 milliLiter(s) IV Push every 15 minutes  dextrose 50% Injectable 25 milliLiter(s) IV Push every 15 minutes  dextrose Oral Gel 15 Gram(s) Oral once  famotidine    Tablet 20 milliGRAM(s) Oral two times a day  furosemide    Tablet 40 milliGRAM(s) Oral daily  gabapentin 300 milliGRAM(s) Oral every 8 hours  glucagon  Injectable 1 milliGRAM(s) IntraMuscular once  insulin glargine Injectable (LANTUS) 28 Unit(s) SubCutaneous at bedtime  insulin lispro (ADMELOG) corrective regimen sliding scale   SubCutaneous at bedtime  insulin lispro (ADMELOG) corrective regimen sliding scale   SubCutaneous three times a day before meals  insulin lispro Injectable (ADMELOG) 9 Unit(s) SubCutaneous three times a day before meals  metoprolol tartrate 25 milliGRAM(s) Oral two times a day  OLANZapine 7.5 milliGRAM(s) Oral daily  polyethylene glycol 3350 17 Gram(s) Oral daily  potassium bicarbonate/potassium citrate 25 milliEquivalent(s) Oral every 2 hours  predniSONE   Tablet 10 milliGRAM(s) Oral daily  senna 2 Tablet(s) Oral at bedtime  traZODone 50 milliGRAM(s) Oral at bedtime      Physical Exam  General: Patient comfortable in bed   Vital Signs Last 12 Hrs  T(F): 96.8 (11-30-23 @ 12:59), Max: 99 (11-30-23 @ 05:46)  HR: 91 (11-30-23 @ 13:15) (91 - 98)  BP: 118/84 (11-30-23 @ 12:59) (106/72 - 118/84)  BP(mean): --  RR: 18 (11-30-23 @ 13:15) (18 - 18)  SpO2: 94% (11-30-23 @ 13:15) (86% - 94%)    CVS: S1S2   Respiratory: No wheezing, no crepitations  GI: Abdomen soft, bowel sounds positive  Musculoskeletal:  moves all extremities

## 2023-11-30 NOTE — PROGRESS NOTE ADULT - SUBJECTIVE AND OBJECTIVE BOX
Neurology        S: patient seen doing okay,  doing okay.             Medications: MEDICATIONS  (STANDING):  albuterol/ipratropium for Nebulization 3 milliLiter(s) Nebulizer every 6 hours  apixaban 5 milliGRAM(s) Oral two times a day  aspirin enteric coated 81 milliGRAM(s) Oral daily  atorvastatin 80 milliGRAM(s) Oral at bedtime  bisacodyl Suppository 10 milliGRAM(s) Rectal once  budesonide 160 MICROgram(s)/formoterol 4.5 MICROgram(s) Inhaler 2 Puff(s) Inhalation two times a day  chlorhexidine 2% Cloths 1 Application(s) Topical daily  dextrose 50% Injectable 50 milliLiter(s) IV Push every 15 minutes  dextrose 50% Injectable 25 milliLiter(s) IV Push every 15 minutes  dextrose Oral Gel 15 Gram(s) Oral once  famotidine    Tablet 20 milliGRAM(s) Oral two times a day  furosemide    Tablet 40 milliGRAM(s) Oral daily  gabapentin 300 milliGRAM(s) Oral every 8 hours  glucagon  Injectable 1 milliGRAM(s) IntraMuscular once  insulin glargine Injectable (LANTUS) 32 Unit(s) SubCutaneous at bedtime  insulin lispro (ADMELOG) corrective regimen sliding scale   SubCutaneous at bedtime  insulin lispro (ADMELOG) corrective regimen sliding scale   SubCutaneous three times a day before meals  insulin lispro Injectable (ADMELOG) 9 Unit(s) SubCutaneous three times a day before meals  metoprolol tartrate 25 milliGRAM(s) Oral two times a day  OLANZapine 7.5 milliGRAM(s) Oral daily  polyethylene glycol 3350 17 Gram(s) Oral daily  potassium bicarbonate/potassium citrate 25 milliEquivalent(s) Oral every 2 hours  predniSONE   Tablet 10 milliGRAM(s) Oral daily  senna 2 Tablet(s) Oral at bedtime  traZODone 50 milliGRAM(s) Oral at bedtime    MEDICATIONS  (PRN):  HYDROmorphone   Tablet 2 milliGRAM(s) Oral every 4 hours PRN Moderate Pain (4 - 6)  HYDROmorphone   Tablet 4 milliGRAM(s) Oral every 4 hours PRN Severe Pain (7 - 10)       Vitals:  Vital Signs Last 24 Hrs  T(C): 37.2 (30 Nov 2023 05:46), Max: 37.2 (30 Nov 2023 05:46)  T(F): 99 (30 Nov 2023 05:46), Max: 99 (30 Nov 2023 05:46)  HR: 98 (30 Nov 2023 05:46) (90 - 98)  BP: 106/72 (30 Nov 2023 05:46) (97/67 - 119/73)  BP(mean): --  RR: 18 (30 Nov 2023 05:46) (18 - 18)  SpO2: 92% (29 Nov 2023 22:07) (92% - 94%)    Parameters below as of 30 Nov 2023 05:46  Patient On (Oxygen Delivery Method): room air           General Exam:   General Appearance: Appropriately dressed and in no acute distress       Head: Normocephalic, atraumatic and no dysmorphic features  Ear, Nose, and Throat: Moist mucous membranes  CVS: S1S2+  Resp: No SOB, no wheeze or rhonchi  GI: soft NT/ND  Extremities: No edema or cyanosis  Skin: No bruises or rashes     Neurological Exam:  Mental Status: Awake, alert and oriented x 3.  Able to follow simple and complex verbal commands. Able to name and repeat. fluent speech. No obvious aphasia or dysarthria noted.   Cranial Nerves: PERRL, dysconugate gaze , VFFC, sensation V1-V3 decrease on L,  L facial asymmetry, equal elevation of palate, scm/trap 5/5, tongue is midline on protrusion. no obvious papilledema on fundoscopic exam. hearing is grossly intact.   Motor: Normal bulk, tone and strength throughout except mild L HP 5-/5   Sensation: decrease on L compared to R   Reflexes: 1+ throughout at biceps, brachioradialis, triceps, patellars and ankles bilaterally and equal. No clonus. R toe and L toe were both downgoing.  Coordination: No dysmetria on FNF   Gait: cane baseline     Data/Labs/Imaging which I personally reviewed.           LABS:                          9.8    21.37 )-----------( 365      ( 30 Nov 2023 07:13 )             32.1     11-30    140  |  99  |  21  ----------------------------<  65<L>  3.5   |  27  |  0.67    Ca    9.5      30 Nov 2023 07:15  Phos  4.2     11-30  Mg     2.2     11-30          Urinalysis Basic - ( 30 Nov 2023 07:15 )    Color: x / Appearance: x / SG: x / pH: x  Gluc: 65 mg/dL / Ketone: x  / Bili: x / Urobili: x   Blood: x / Protein: x / Nitrite: x   Leuk Esterase: x / RBC: x / WBC x   Sq Epi: x / Non Sq Epi: x / Bacteria: x

## 2023-12-01 LAB
ANION GAP SERPL CALC-SCNC: 8 MMOL/L — SIGNIFICANT CHANGE UP (ref 5–17)
ANION GAP SERPL CALC-SCNC: 8 MMOL/L — SIGNIFICANT CHANGE UP (ref 5–17)
BUN SERPL-MCNC: 24 MG/DL — HIGH (ref 7–23)
BUN SERPL-MCNC: 24 MG/DL — HIGH (ref 7–23)
CALCIUM SERPL-MCNC: 8.9 MG/DL — SIGNIFICANT CHANGE UP (ref 8.4–10.5)
CALCIUM SERPL-MCNC: 8.9 MG/DL — SIGNIFICANT CHANGE UP (ref 8.4–10.5)
CHLORIDE SERPL-SCNC: 99 MMOL/L — SIGNIFICANT CHANGE UP (ref 96–108)
CHLORIDE SERPL-SCNC: 99 MMOL/L — SIGNIFICANT CHANGE UP (ref 96–108)
CO2 SERPL-SCNC: 30 MMOL/L — SIGNIFICANT CHANGE UP (ref 22–31)
CO2 SERPL-SCNC: 30 MMOL/L — SIGNIFICANT CHANGE UP (ref 22–31)
CREAT SERPL-MCNC: 0.74 MG/DL — SIGNIFICANT CHANGE UP (ref 0.5–1.3)
CREAT SERPL-MCNC: 0.74 MG/DL — SIGNIFICANT CHANGE UP (ref 0.5–1.3)
EGFR: 93 ML/MIN/1.73M2 — SIGNIFICANT CHANGE UP
EGFR: 93 ML/MIN/1.73M2 — SIGNIFICANT CHANGE UP
GLUCOSE BLDC GLUCOMTR-MCNC: 108 MG/DL — HIGH (ref 70–99)
GLUCOSE BLDC GLUCOMTR-MCNC: 108 MG/DL — HIGH (ref 70–99)
GLUCOSE BLDC GLUCOMTR-MCNC: 124 MG/DL — HIGH (ref 70–99)
GLUCOSE BLDC GLUCOMTR-MCNC: 124 MG/DL — HIGH (ref 70–99)
GLUCOSE BLDC GLUCOMTR-MCNC: 155 MG/DL — HIGH (ref 70–99)
GLUCOSE BLDC GLUCOMTR-MCNC: 155 MG/DL — HIGH (ref 70–99)
GLUCOSE BLDC GLUCOMTR-MCNC: 171 MG/DL — HIGH (ref 70–99)
GLUCOSE BLDC GLUCOMTR-MCNC: 171 MG/DL — HIGH (ref 70–99)
GLUCOSE SERPL-MCNC: 89 MG/DL — SIGNIFICANT CHANGE UP (ref 70–99)
GLUCOSE SERPL-MCNC: 89 MG/DL — SIGNIFICANT CHANGE UP (ref 70–99)
HCT VFR BLD CALC: 28.1 % — LOW (ref 34.5–45)
HCT VFR BLD CALC: 28.1 % — LOW (ref 34.5–45)
HGB BLD-MCNC: 8.5 G/DL — LOW (ref 11.5–15.5)
HGB BLD-MCNC: 8.5 G/DL — LOW (ref 11.5–15.5)
MAGNESIUM SERPL-MCNC: 1.9 MG/DL — SIGNIFICANT CHANGE UP (ref 1.6–2.6)
MAGNESIUM SERPL-MCNC: 1.9 MG/DL — SIGNIFICANT CHANGE UP (ref 1.6–2.6)
MCHC RBC-ENTMCNC: 29.3 PG — SIGNIFICANT CHANGE UP (ref 27–34)
MCHC RBC-ENTMCNC: 29.3 PG — SIGNIFICANT CHANGE UP (ref 27–34)
MCHC RBC-ENTMCNC: 30.2 GM/DL — LOW (ref 32–36)
MCHC RBC-ENTMCNC: 30.2 GM/DL — LOW (ref 32–36)
MCV RBC AUTO: 96.9 FL — SIGNIFICANT CHANGE UP (ref 80–100)
MCV RBC AUTO: 96.9 FL — SIGNIFICANT CHANGE UP (ref 80–100)
NRBC # BLD: 0 /100 WBCS — SIGNIFICANT CHANGE UP (ref 0–0)
NRBC # BLD: 0 /100 WBCS — SIGNIFICANT CHANGE UP (ref 0–0)
PHOSPHATE SERPL-MCNC: 3.4 MG/DL — SIGNIFICANT CHANGE UP (ref 2.5–4.5)
PHOSPHATE SERPL-MCNC: 3.4 MG/DL — SIGNIFICANT CHANGE UP (ref 2.5–4.5)
PLATELET # BLD AUTO: 310 K/UL — SIGNIFICANT CHANGE UP (ref 150–400)
PLATELET # BLD AUTO: 310 K/UL — SIGNIFICANT CHANGE UP (ref 150–400)
POTASSIUM SERPL-MCNC: 4.2 MMOL/L — SIGNIFICANT CHANGE UP (ref 3.5–5.3)
POTASSIUM SERPL-MCNC: 4.2 MMOL/L — SIGNIFICANT CHANGE UP (ref 3.5–5.3)
POTASSIUM SERPL-SCNC: 4.2 MMOL/L — SIGNIFICANT CHANGE UP (ref 3.5–5.3)
POTASSIUM SERPL-SCNC: 4.2 MMOL/L — SIGNIFICANT CHANGE UP (ref 3.5–5.3)
RBC # BLD: 2.9 M/UL — LOW (ref 3.8–5.2)
RBC # BLD: 2.9 M/UL — LOW (ref 3.8–5.2)
RBC # FLD: 16.4 % — HIGH (ref 10.3–14.5)
RBC # FLD: 16.4 % — HIGH (ref 10.3–14.5)
SODIUM SERPL-SCNC: 137 MMOL/L — SIGNIFICANT CHANGE UP (ref 135–145)
SODIUM SERPL-SCNC: 137 MMOL/L — SIGNIFICANT CHANGE UP (ref 135–145)
WBC # BLD: 21.24 K/UL — HIGH (ref 3.8–10.5)
WBC # BLD: 21.24 K/UL — HIGH (ref 3.8–10.5)
WBC # FLD AUTO: 21.24 K/UL — HIGH (ref 3.8–10.5)
WBC # FLD AUTO: 21.24 K/UL — HIGH (ref 3.8–10.5)

## 2023-12-01 RX ORDER — HYDROMORPHONE HYDROCHLORIDE 2 MG/ML
2 INJECTION INTRAMUSCULAR; INTRAVENOUS; SUBCUTANEOUS EVERY 4 HOURS
Refills: 0 | Status: DISCONTINUED | OUTPATIENT
Start: 2023-12-01 | End: 2023-12-04

## 2023-12-01 RX ADMIN — Medication 25 MILLIGRAM(S): at 17:38

## 2023-12-01 RX ADMIN — HYDROMORPHONE HYDROCHLORIDE 2 MILLIGRAM(S): 2 INJECTION INTRAMUSCULAR; INTRAVENOUS; SUBCUTANEOUS at 21:00

## 2023-12-01 RX ADMIN — HYDROMORPHONE HYDROCHLORIDE 2 MILLIGRAM(S): 2 INJECTION INTRAMUSCULAR; INTRAVENOUS; SUBCUTANEOUS at 17:15

## 2023-12-01 RX ADMIN — HYDROMORPHONE HYDROCHLORIDE 2 MILLIGRAM(S): 2 INJECTION INTRAMUSCULAR; INTRAVENOUS; SUBCUTANEOUS at 16:43

## 2023-12-01 RX ADMIN — Medication 9 UNIT(S): at 12:39

## 2023-12-01 RX ADMIN — HYDROMORPHONE HYDROCHLORIDE 2 MILLIGRAM(S): 2 INJECTION INTRAMUSCULAR; INTRAVENOUS; SUBCUTANEOUS at 23:49

## 2023-12-01 RX ADMIN — Medication 20 MILLIGRAM(S): at 14:58

## 2023-12-01 RX ADMIN — Medication 1: at 12:39

## 2023-12-01 RX ADMIN — BUDESONIDE AND FORMOTEROL FUMARATE DIHYDRATE 2 PUFF(S): 160; 4.5 AEROSOL RESPIRATORY (INHALATION) at 05:55

## 2023-12-01 RX ADMIN — Medication 25 MILLIGRAM(S): at 05:53

## 2023-12-01 RX ADMIN — APIXABAN 5 MILLIGRAM(S): 2.5 TABLET, FILM COATED ORAL at 17:38

## 2023-12-01 RX ADMIN — HYDROMORPHONE HYDROCHLORIDE 4 MILLIGRAM(S): 2 INJECTION INTRAMUSCULAR; INTRAVENOUS; SUBCUTANEOUS at 07:57

## 2023-12-01 RX ADMIN — OLANZAPINE 7.5 MILLIGRAM(S): 15 TABLET, FILM COATED ORAL at 12:32

## 2023-12-01 RX ADMIN — Medication 50 MILLIGRAM(S): at 22:04

## 2023-12-01 RX ADMIN — HYDROMORPHONE HYDROCHLORIDE 2 MILLIGRAM(S): 2 INJECTION INTRAMUSCULAR; INTRAVENOUS; SUBCUTANEOUS at 13:07

## 2023-12-01 RX ADMIN — Medication 3 MILLILITER(S): at 00:30

## 2023-12-01 RX ADMIN — Medication 3 MILLILITER(S): at 12:31

## 2023-12-01 RX ADMIN — HYDROMORPHONE HYDROCHLORIDE 4 MILLIGRAM(S): 2 INJECTION INTRAMUSCULAR; INTRAVENOUS; SUBCUTANEOUS at 02:03

## 2023-12-01 RX ADMIN — APIXABAN 5 MILLIGRAM(S): 2.5 TABLET, FILM COATED ORAL at 05:53

## 2023-12-01 RX ADMIN — CHLORHEXIDINE GLUCONATE 1 APPLICATION(S): 213 SOLUTION TOPICAL at 13:05

## 2023-12-01 RX ADMIN — FAMOTIDINE 20 MILLIGRAM(S): 10 INJECTION INTRAVENOUS at 05:53

## 2023-12-01 RX ADMIN — HYDROMORPHONE HYDROCHLORIDE 2 MILLIGRAM(S): 2 INJECTION INTRAMUSCULAR; INTRAVENOUS; SUBCUTANEOUS at 20:23

## 2023-12-01 RX ADMIN — ATORVASTATIN CALCIUM 80 MILLIGRAM(S): 80 TABLET, FILM COATED ORAL at 22:04

## 2023-12-01 RX ADMIN — Medication 3 MILLILITER(S): at 05:53

## 2023-12-01 RX ADMIN — Medication 9 UNIT(S): at 16:43

## 2023-12-01 RX ADMIN — Medication 3 MILLILITER(S): at 17:37

## 2023-12-01 RX ADMIN — BUDESONIDE AND FORMOTEROL FUMARATE DIHYDRATE 2 PUFF(S): 160; 4.5 AEROSOL RESPIRATORY (INHALATION) at 17:37

## 2023-12-01 RX ADMIN — Medication 81 MILLIGRAM(S): at 12:31

## 2023-12-01 RX ADMIN — GABAPENTIN 300 MILLIGRAM(S): 400 CAPSULE ORAL at 05:53

## 2023-12-01 RX ADMIN — Medication 9 UNIT(S): at 08:02

## 2023-12-01 RX ADMIN — FAMOTIDINE 20 MILLIGRAM(S): 10 INJECTION INTRAVENOUS at 17:38

## 2023-12-01 RX ADMIN — GABAPENTIN 300 MILLIGRAM(S): 400 CAPSULE ORAL at 13:24

## 2023-12-01 RX ADMIN — INSULIN GLARGINE 28 UNIT(S): 100 INJECTION, SOLUTION SUBCUTANEOUS at 22:05

## 2023-12-01 RX ADMIN — Medication 10 MILLIGRAM(S): at 05:54

## 2023-12-01 RX ADMIN — GABAPENTIN 300 MILLIGRAM(S): 400 CAPSULE ORAL at 22:10

## 2023-12-01 RX ADMIN — HYDROMORPHONE HYDROCHLORIDE 4 MILLIGRAM(S): 2 INJECTION INTRAMUSCULAR; INTRAVENOUS; SUBCUTANEOUS at 02:43

## 2023-12-01 RX ADMIN — HYDROMORPHONE HYDROCHLORIDE 2 MILLIGRAM(S): 2 INJECTION INTRAMUSCULAR; INTRAVENOUS; SUBCUTANEOUS at 12:38

## 2023-12-01 RX ADMIN — Medication 40 MILLIGRAM(S): at 05:54

## 2023-12-01 RX ADMIN — HYDROMORPHONE HYDROCHLORIDE 4 MILLIGRAM(S): 2 INJECTION INTRAMUSCULAR; INTRAVENOUS; SUBCUTANEOUS at 07:27

## 2023-12-01 NOTE — PROGRESS NOTE ADULT - SUBJECTIVE AND OBJECTIVE BOX
Follow-up Pulm Progress Note    pt states feeling better today, less pain  SOB improving    o2 sats reportedly 86% on RA     Medications:  MEDICATIONS  (STANDING):  albuterol/ipratropium for Nebulization 3 milliLiter(s) Nebulizer every 6 hours  apixaban 5 milliGRAM(s) Oral two times a day  aspirin enteric coated 81 milliGRAM(s) Oral daily  atorvastatin 80 milliGRAM(s) Oral at bedtime  bisacodyl Suppository 10 milliGRAM(s) Rectal once  budesonide 160 MICROgram(s)/formoterol 4.5 MICROgram(s) Inhaler 2 Puff(s) Inhalation two times a day  chlorhexidine 2% Cloths 1 Application(s) Topical daily  dextrose 50% Injectable 50 milliLiter(s) IV Push every 15 minutes  dextrose 50% Injectable 25 milliLiter(s) IV Push every 15 minutes  dextrose Oral Gel 15 Gram(s) Oral once  famotidine    Tablet 20 milliGRAM(s) Oral two times a day  furosemide    Tablet 40 milliGRAM(s) Oral daily  gabapentin 300 milliGRAM(s) Oral every 8 hours  glucagon  Injectable 1 milliGRAM(s) IntraMuscular once  insulin glargine Injectable (LANTUS) 28 Unit(s) SubCutaneous at bedtime  insulin lispro (ADMELOG) corrective regimen sliding scale   SubCutaneous at bedtime  insulin lispro (ADMELOG) corrective regimen sliding scale   SubCutaneous three times a day before meals  insulin lispro Injectable (ADMELOG) 9 Unit(s) SubCutaneous three times a day before meals  metoprolol tartrate 25 milliGRAM(s) Oral two times a day  OLANZapine 7.5 milliGRAM(s) Oral daily  polyethylene glycol 3350 17 Gram(s) Oral daily  predniSONE   Tablet 10 milliGRAM(s) Oral daily  senna 2 Tablet(s) Oral at bedtime  traZODone 50 milliGRAM(s) Oral at bedtime    MEDICATIONS  (PRN):  HYDROmorphone   Tablet 2 milliGRAM(s) Oral every 4 hours PRN Moderate Pain (4 - 6)          Vital Signs Last 24 Hrs  T(C): 36 (01 Dec 2023 06:04), Max: 36.7 (01 Dec 2023 04:45)  T(F): 96.8 (01 Dec 2023 06:04), Max: 98 (01 Dec 2023 04:45)  HR: 87 (01 Dec 2023 06:04) (82 - 91)  BP: 102/71 (01 Dec 2023 06:04) (92/60 - 125/75)  BP(mean): 81 (01 Dec 2023 06:04) (81 - 81)  RR: 18 (01 Dec 2023 06:04) (18 - 18)  SpO2: 98% (01 Dec 2023 06:04) (86% - 98%)    Parameters below as of 01 Dec 2023 07:30  Patient On (Oxygen Delivery Method): nasal cannula              11-30 @ 07:01  -  12-01 @ 07:00  --------------------------------------------------------  IN: 720 mL / OUT: 200 mL / NET: 520 mL          LABS:                        8.5    21.24 )-----------( 310      ( 01 Dec 2023 06:03 )             28.1     12-01    137  |  99  |  24<H>  ----------------------------<  89  4.2   |  30  |  0.74    Ca    8.9      01 Dec 2023 06:03  Phos  3.4     12-01  Mg     1.9     12-01            CAPILLARY BLOOD GLUCOSE      POCT Blood Glucose.: 108 mg/dL (01 Dec 2023 07:48)      Urinalysis Basic - ( 01 Dec 2023 06:03 )    Color: x / Appearance: x / SG: x / pH: x  Gluc: 89 mg/dL / Ketone: x  / Bili: x / Urobili: x   Blood: x / Protein: x / Nitrite: x   Leuk Esterase: x / RBC: x / WBC x   Sq Epi: x / Non Sq Epi: x / Bacteria: x          GEORGE Negative 11-09 @ 06:15  Anti SS-1 <0.2  Anti SS-2 <0.2  Anti RNP 0.2   11-09 @ 06:15    Atypical ANCA -- 11-09 @ 06:15  c-ANCA titer -- 11-09 @ 06:15  c-ANCA -- 11-09 @ 06:15  p-ANCA -- 11-09 @ 06:15          Physical Examination:  PULM: CTA bilaterally   CVS: S1, S2 heard    RADIOLOGY REVIEWED  CT chest:    < from: CT Chest No Cont (11.27.23 @ 18:45) >  FINDINGS:    AIRWAYS, LUNGS, PLEURA: Central airways clear. Emphysema. Subsegmental   atelectasis at the lung bases. Small loculated left pleural effusion   located posteriorly andalong the left major fissure.    MEDIASTINUM: Interval aortic valve replacement and CABG with associated   postoperative changes of the mediastinum. There is a partially loculated   pericardial effusion along the left atrioventricular groove with   hyperdense appearance measuring 7 x 4 cm (image 98, coronal series).    Thoracic aorta normal caliber. No large mediastinal nodes.    IMAGED ABDOMEN: Right renal cyst.    SOFT TISSUES and BONES: Sternal wires are present. The inferior most   sternal fragment engages only the left sternal fragment and not the   right. There is separation of the sternal fragments by 1 cm at this level   (image 87, series 3). There is fluid interposed between the lower sternal   fragments. Gas within the retrosternal region. Infiltrative changes and   gas are present within the anterior chest wall subcutaneous soft tissues.   Anterior chest wall loop recorder device. Right posterior first rib   fracture.    IMPRESSION:.    The inferior most sternal fragment engages only the left sternal fragment   and not the right, which is suggestive of dehiscence. There is separation   of sternal fragments by 1 cm at this level.    Fluid is located between the sternal fragments and there is a   retrosternal fluid and gas; these findings are favored to represent   postoperative changes, but infection should be considered in the   appropriate clinical scenario.    Partially loculated pericardial effusion along the left atrioventricular   groove measuring 7 x 4 cm with hyperdense component likely representing   hemopericardium.    No evidence of pneumonia.    Small loculated left pleural effusion.    < end of copied text >

## 2023-12-01 NOTE — PROGRESS NOTE ADULT - ASSESSMENT
58F smoker with PMH CVA with L sided weakness/numbness/L gaze deviation, DM2 with b/l peripheral neuropathy, COPD/Asthma, Breast Ca s/p ?RT,  Bipolar depression, Rheumatoid Arthritis,  Antiphospholipid syndrome, and L eye uveitis/scleritis.   Assessment  DMT2: 58y Female with DM T2 with hyperglycemia, A1C 7.6% , was on oral meds at home, now postop CABG, now transitioned to  basal bolus, now sugars down trending with fluctuations, pt snacking at bedside between meals.   Insulin teaching  Severe AR: CTS eval, LESLEY, s/p ct surgery   CAD: on medications, stable, monitored. s/p Cardiac MR viability , now postop CABG  HTN: on antihypertensive medications, monitored, asymptomatic.  Obesity: No strict exercise routines, not on any weight loss program, neither on low calorie diet.      Discussed plan and management with Dr Ladarius Rivera NP - TEAMS  Skye Durand MD  Cell: [phone]  Office: [phone]        58F smoker with PMH CVA with L sided weakness/numbness/L gaze deviation, DM2 with b/l peripheral neuropathy, COPD/Asthma, Breast Ca s/p ?RT,  Bipolar depression, Rheumatoid Arthritis,  Antiphospholipid syndrome, and L eye uveitis/scleritis.   Assessment  DMT2: 58y Female with DM T2 with hyperglycemia, A1C 7.6% , was on oral meds at home, now postop CABG, now transitioned to basal bolus, now sugars down trending with fluctuations, pt snacking at bedside between meals.   Insulin teaching  Severe AR: CTS eval, LESLEY, s/p ct surgery   CAD: on medications, stable, monitored. s/p Cardiac MR viability , now postop CABG  HTN: on antihypertensive medications, monitored, asymptomatic.  Obesity: No strict exercise routines, not on any weight loss program, neither on low calorie diet.      Discussed plan and management with Dr Ladarius Rivera NP - TEAMS  Skye Durand MD  Cell: [phone]  Office: [phone]

## 2023-12-01 NOTE — PROGRESS NOTE ADULT - PROBLEM SELECTOR PLAN 1
- Continue with ASA 81mg qD, Atorvastatin 80mg qHS, lopressor 25 BID  - Lasix 40mg po daily per cardiology  - monitor I/Os  - Monitor electrolytes, Keep K >4, Mg >2.  - Increase activity as tolerated.   - Pain control regimen, per pain management team.  - Encourage Chest PT / Pulmonary toileting and Incentive spirometry every 1 hour x 10 while awake.   - Continue with PUD and DVT prophylaxis.  - For  hx of Antiphospholipid Syndrome and CVA,  >Eliquis 5mg BID

## 2023-12-01 NOTE — PROGRESS NOTE ADULT - ASSESSMENT
57 y/o F with PMH smoker with PMH CVA with L sided weakness/numbness/L gaze deviation, DM2 with b/l peripheral neuropathy, COPD/emphysema, asthma, RA (on prednisone), Breast Ca s/p ?RT, Bipolar depression, Rheumatoid Arthritis, Antiphospholipid syndrome, and L eye uveitis/scleritis. Presents to [facility] transferred from [facility]. Initially presents with exacerbation of L-sided subjective weakness/numbness likely 2/2 recrudescence of prior Right BG infarct. Cardiology following for new LV dysfunction since July, pending ischemic eval. LESLEY was done showing severe Aortic Insufficiency and moderated MR. Cardiac cath was done showing LAD prox 100% fills via right to left collateral. Tx for surgical evaluation. S/p CABG, AVR on 11/21. Called to consult for wheezing. Per pt she is SOB and intermittently wheezing. Endorses difficult to expectorate secretions. Pt states she is unable to take a deep breath due to incisional pain.

## 2023-12-01 NOTE — PROGRESS NOTE ADULT - SUBJECTIVE AND OBJECTIVE BOX
German Carrasco MD  Interventional Cardiology / Advance Heart Failure and Cardiac Transplant Specialist  [place] Office : [phone] [place] [address] [place] [address]  Tel:   [place] Office : [address] [facility] [place] [address]  Tel: [phone]       Pt is lying in bed comfortable not in distress, no chest pains no SOB no palpitations  	  MEDICATIONS:  apixaban 5 milliGRAM(s) Oral two times a day  aspirin enteric coated 81 milliGRAM(s) Oral daily  metoprolol tartrate 25 milliGRAM(s) Oral two times a day  torsemide 20 milliGRAM(s) Oral daily      albuterol/ipratropium for Nebulization 3 milliLiter(s) Nebulizer every 6 hours  budesonide 160 MICROgram(s)/formoterol 4.5 MICROgram(s) Inhaler 2 Puff(s) Inhalation two times a day    gabapentin 300 milliGRAM(s) Oral every 8 hours  OLANZapine 7.5 milliGRAM(s) Oral daily  traZODone 50 milliGRAM(s) Oral at bedtime    bisacodyl Suppository 10 milliGRAM(s) Rectal once  famotidine    Tablet 20 milliGRAM(s) Oral two times a day  polyethylene glycol 3350 17 Gram(s) Oral daily  senna 2 Tablet(s) Oral at bedtime    atorvastatin 80 milliGRAM(s) Oral at bedtime  dextrose 50% Injectable 50 milliLiter(s) IV Push every 15 minutes  dextrose 50% Injectable 25 milliLiter(s) IV Push every 15 minutes  dextrose Oral Gel 15 Gram(s) Oral once  glucagon  Injectable 1 milliGRAM(s) IntraMuscular once  insulin glargine Injectable (LANTUS) 28 Unit(s) SubCutaneous at bedtime  insulin lispro (ADMELOG) corrective regimen sliding scale   SubCutaneous at bedtime  insulin lispro (ADMELOG) corrective regimen sliding scale   SubCutaneous three times a day before meals  insulin lispro Injectable (ADMELOG) 9 Unit(s) SubCutaneous three times a day before meals  predniSONE   Tablet 10 milliGRAM(s) Oral daily    chlorhexidine 2% Cloths 1 Application(s) Topical daily      PAST MEDICAL/SURGICAL HISTORY  PAST MEDICAL & SURGICAL HISTORY:  Cigarette smoker      Rheumatoid arthritis      Other depression      Breast cancer      Migraines      History of multiple cerebrovascular accidents (CVAs)      Suicidal ideation      Paresthesia of left arm      Facial paresthesia      APS (antiphospholipid syndrome)      History of depressed bipolar disorder      History of COPD      History of COPD      History of hysterectomy      History of cholecystectomy          SOCIAL HISTORY: Substance Use (street drugs): ( x ) never used  (  ) other:    FAMILY HISTORY:  Family history of breast cancer (Mother)    Family history of diabetes mellitus (DM) (Father)       PHYSICAL EXAM:  T(C): 36.8 (12-01-23 @ 19:31), Max: 36.8 (12-01-23 @ 19:31)  HR: 84 (12-01-23 @ 19:31) (84 - 98)  BP: 100/66 (12-01-23 @ 19:31) (92/60 - 102/71)  RR: 18 (12-01-23 @ 19:31) (18 - 20)  SpO2: 91% (12-01-23 @ 19:31) (91% - 98%)  Wt(kg): --  I&O's Summary    30 Nov 2023 07:01  -  01 Dec 2023 07:00  --------------------------------------------------------  IN: 720 mL / OUT: 200 mL / NET: 520 mL    01 Dec 2023 07:01  -  01 Dec 2023 21:52  --------------------------------------------------------  IN: 720 mL / OUT: 300 mL / NET: 420 mL        EYES:   PERRLA   ENMT:   Moist mucous membranes, Good dentition, No lesions  Cardiovascular: Normal S1 S2, No JVD, No murmurs, No edema s/p sternotomy   Respiratory: Lungs clear to auscultation	  Gastrointestinal:  Soft, Non-tender, + BS	  Extremities: no edema                                    8.5    21.24 )-----------( 310      ( 01 Dec 2023 06:03 )             28.1     12-01    137  |  99  |  24<H>  ----------------------------<  89  4.2   |  30  |  0.74    Ca    8.9      01 Dec 2023 06:03  Phos  3.4     12-01  Mg     1.9     12-01      proBNP:   Lipid Profile:   HgA1c:   TSH:     Consultant(s) Notes Reviewed:  [x ] YES  [ ] NO    Care Discussed with Consultants/Other Providers [ x] YES  [ ] NO    Imaging Personally Reviewed independently:  [x] YES  [ ] NO    All labs, radiologic studies, vitals, orders and medications list reviewed. Patient is seen and examined at bedside. Case discussed with medical team.

## 2023-12-01 NOTE — PROGRESS NOTE ADULT - PROBLEM SELECTOR PLAN 1
intermittent wheezing - suspect 2nd to retained secretions in airway  -Pt not take deep breaths due to incisional pain  -Suggest pain control. Sternal nonunion CT, no concern for dehiscence per CTS, ?hemopericardium. Defer to CT surgery.   -CT chest 11/27 with no PNA, very small loculated L effusion.  -Coughing/deep breathing, incentive spirometry encouraged  -Continue bronchodilators  -OOB to chair, ambulate as tolerated  -Wheezing resolved at this time  -Recheck o2 sats on RA. Keep sats >90% with o2 PRN.

## 2023-12-01 NOTE — PROGRESS NOTE ADULT - ASSESSMENT
58F smoker with stroke x 2 (2022 and july 2023 with resid  L sided weakness/numbness  DM2 with b/l peripheral neuropathy, COPD/Asthma, Breast Ca s/p, Bipolar depression, Rheumatoid Arthritis, Antiphospholipid syndrome, and L eye uveitis/scleritis, Presents to [facility] transferred from [facility]. p/w multiple medical complaints and exacerbation of L-sided subjective weakness/numbness likely 2/2 recrudescence of prior Right BG infarct. Cardiology following for new LV dysfunction since July.Patient now transferred to CTS Dr. Loaiza for evaluation. neuro called for prior strokes   LESLEY was done showing severe Aortic Insufficiency and moderated MR.   Cardiac cath was done showing LAD prox 100% fills via right to left collateral.   CTH with tinght chronic appearing R BG infarct   TTE EF 30%   CD neg   A1c 7.6   LDL 37   ILR interrogated no events  NIHSS 3  premrs3   cath shows LAD prox 100% fills via right to left collateral will need AVR and LIMA to LAD bypass  o/e with mild L facial and decrease sensation on L with mild 5-/5 weakness on L with slow FFM ; dysconjugate gaze   s/p OR 11/21  extubated  post op no neuro changes   c/o HA.   11/29 walking on unit with PT   no neuro changes     Impression   prior nowe chronic appearing infarcts. R BG with residual L HP  DM peripheral neuropathy      - for secondary stroke prevention on asa 81mg and high dose statin.  was also on full dose lovenox given APLS; would resume when able --> now back on DOAC , elqiuis 5mg BID   - gabapentin 300mg TID for neuropathy   - f/u rheum and heme/onc [facility]   - telemetry  - PT/OT   - check FS, glucose control <180  - GI/DVT ppx   - Thank you for allowing me to participate in the care of this patient. Call with questions.   - spoke [facility] CTS team/ACP   Charles Crespo MD  Vascular Neurology  Office: [phone]

## 2023-12-01 NOTE — PROGRESS NOTE ADULT - PROBLEM SELECTOR PLAN 1
Will continue Lantus 28u at bedtime.  Will continue  Admelog 9u before each meal and continue Admelog correction scale coverage. Will continue monitoring FS and Follow up.   Will continue monitoring  blood sugars, will Follow up.  Patient counseled for compliance with consistent low carb diet.  Insulin teaching   Anticipate Basal insulin at discharge (Lantus) + PO DM regimen.  Can be Discharged on current basal insulin once nightly.   In addition discharge on Farxiga 10 mg and Glimepiride 2 mg daily with breakfast.   Pls send with glucometer  FU outpatient 2 weeks

## 2023-12-01 NOTE — PROGRESS NOTE ADULT - SUBJECTIVE AND OBJECTIVE BOX
Chief complaint  Patient is a 58y old  Female who presents with a chief complaint of sob (30 Nov 2023 12:34)         Labs and Fingersticks  CAPILLARY BLOOD GLUCOSE      POCT Blood Glucose.: 155 mg/dL (01 Dec 2023 11:42)  POCT Blood Glucose.: 108 mg/dL (01 Dec 2023 07:48)  POCT Blood Glucose.: 245 mg/dL (30 Nov 2023 21:36)  POCT Blood Glucose.: 118 mg/dL (30 Nov 2023 16:33)      Anion Gap: 8 (12-01 @ 06:03)  Anion Gap: 14 (11-30 @ 07:15)      Calcium: 8.9 (12-01 @ 06:03)  Calcium: 9.5 (11-30 @ 07:15)          12-01    137  |  99  |  24<H>  ----------------------------<  89  4.2   |  30  |  0.74    Ca    8.9      01 Dec 2023 06:03  Phos  3.4     12-01  Mg     1.9     12-01                          8.5    21.24 )-----------( 310      ( 01 Dec 2023 06:03 )             28.1     Medications  MEDICATIONS  (STANDING):  albuterol/ipratropium for Nebulization 3 milliLiter(s) Nebulizer every 6 hours  apixaban 5 milliGRAM(s) Oral two times a day  aspirin enteric coated 81 milliGRAM(s) Oral daily  atorvastatin 80 milliGRAM(s) Oral at bedtime  bisacodyl Suppository 10 milliGRAM(s) Rectal once  budesonide 160 MICROgram(s)/formoterol 4.5 MICROgram(s) Inhaler 2 Puff(s) Inhalation two times a day  chlorhexidine 2% Cloths 1 Application(s) Topical daily  dextrose 50% Injectable 50 milliLiter(s) IV Push every 15 minutes  dextrose 50% Injectable 25 milliLiter(s) IV Push every 15 minutes  dextrose Oral Gel 15 Gram(s) Oral once  famotidine    Tablet 20 milliGRAM(s) Oral two times a day  furosemide    Tablet 40 milliGRAM(s) Oral daily  gabapentin 300 milliGRAM(s) Oral every 8 hours  glucagon  Injectable 1 milliGRAM(s) IntraMuscular once  insulin glargine Injectable (LANTUS) 28 Unit(s) SubCutaneous at bedtime  insulin lispro (ADMELOG) corrective regimen sliding scale   SubCutaneous at bedtime  insulin lispro (ADMELOG) corrective regimen sliding scale   SubCutaneous three times a day before meals  insulin lispro Injectable (ADMELOG) 9 Unit(s) SubCutaneous three times a day before meals  metoprolol tartrate 25 milliGRAM(s) Oral two times a day  OLANZapine 7.5 milliGRAM(s) Oral daily  polyethylene glycol 3350 17 Gram(s) Oral daily  predniSONE   Tablet 10 milliGRAM(s) Oral daily  senna 2 Tablet(s) Oral at bedtime  traZODone 50 milliGRAM(s) Oral at bedtime      Physical Exam  General: Patient comfortable in bed  Vital Signs Last 12 Hrs  T(F): 97.9 (12-01-23 @ 11:00), Max: 98 (12-01-23 @ 04:45)  HR: 90 (12-01-23 @ 11:00) (84 - 90)  BP: 99/62 (12-01-23 @ 11:00) (92/60 - 102/71)  BP(mean): 74 (12-01-23 @ 11:00) (74 - 81)  RR: 18 (12-01-23 @ 11:00) (18 - 18)  SpO2: 95% (12-01-23 @ 11:00) (95% - 98%)    CVS: S1S2   Respiratory: No wheezing, no crepitations  GI: Abdomen soft, bowel sounds positive  Musculoskeletal:  moves all extremities       Chief complaint  Patient is a 58y old  Female who presents with a chief complaint of sob (30 Nov 2023 12:34)         Labs and Fingersticks  CAPILLARY BLOOD GLUCOSE      POCT Blood Glucose.: 155 mg/dL (01 Dec 2023 11:42)  POCT Blood Glucose.: 108 mg/dL (01 Dec 2023 07:48)  POCT Blood Glucose.: 245 mg/dL (30 Nov 2023 21:36)  POCT Blood Glucose.: 118 mg/dL (30 Nov 2023 16:33)      Anion Gap: 8 (12-01 @ 06:03)  Anion Gap: 14 (11-30 @ 07:15)      Calcium: 8.9 (12-01 @ 06:03)  Calcium: 9.5 (11-30 @ 07:15)          12-01    137  |  99  |  24<H>  ----------------------------<  89  4.2   |  30  |  0.74    Ca    8.9      01 Dec 2023 06:03  Phos  3.4     12-01  Mg     1.9     12-01                          8.5    21.24 )-----------( 310      ( 01 Dec 2023 06:03 )             28.1     Medications  MEDICATIONS  (STANDING):  albuterol/ipratropium for Nebulization 3 milliLiter(s) Nebulizer every 6 hours  apixaban 5 milliGRAM(s) Oral two times a day  aspirin enteric coated 81 milliGRAM(s) Oral daily  atorvastatin 80 milliGRAM(s) Oral at bedtime  bisacodyl Suppository 10 milliGRAM(s) Rectal once  budesonide 160 MICROgram(s)/formoterol 4.5 MICROgram(s) Inhaler 2 Puff(s) Inhalation two times a day  chlorhexidine 2% Cloths 1 Application(s) Topical daily  dextrose 50% Injectable 50 milliLiter(s) IV Push every 15 minutes  dextrose 50% Injectable 25 milliLiter(s) IV Push every 15 minutes  dextrose Oral Gel 15 Gram(s) Oral once  famotidine    Tablet 20 milliGRAM(s) Oral two times a day  furosemide    Tablet 40 milliGRAM(s) Oral daily  gabapentin 300 milliGRAM(s) Oral every 8 hours  glucagon  Injectable 1 milliGRAM(s) IntraMuscular once  insulin glargine Injectable (LANTUS) 28 Unit(s) SubCutaneous at bedtime  insulin lispro (ADMELOG) corrective regimen sliding scale   SubCutaneous at bedtime  insulin lispro (ADMELOG) corrective regimen sliding scale   SubCutaneous three times a day before meals  insulin lispro Injectable (ADMELOG) 9 Unit(s) SubCutaneous three times a day before meals  metoprolol tartrate 25 milliGRAM(s) Oral two times a day  OLANZapine 7.5 milliGRAM(s) Oral daily  polyethylene glycol 3350 17 Gram(s) Oral daily  predniSONE   Tablet 10 milliGRAM(s) Oral daily  senna 2 Tablet(s) Oral at bedtime  traZODone 50 milliGRAM(s) Oral at bedtime      Physical Exam  General: Patient comfortable in bed  Vital Signs Last 12 Hrs  T(F): 97.9 (12-01-23 @ 11:00), Max: 98 (12-01-23 @ 04:45)  HR: 90 (12-01-23 @ 11:00) (84 - 90)  BP: 99/62 (12-01-23 @ 11:00) (92/60 - 102/71)  BP(mean): 74 (12-01-23 @ 11:00) (74 - 81)  RR: 18 (12-01-23 @ 11:00) (18 - 18)  SpO2: 95% (12-01-23 @ 11:00) (95% - 98%)    CVS: S1S2   Respiratory: No wheezing, no crepitations  GI: Abdomen soft, bowel sounds positive  Musculoskeletal:  moves all extremities

## 2023-12-01 NOTE — PROGRESS NOTE ADULT - ASSESSMENT
58 female smoker with PMH CVA with residual Left-sided weakness/numbness/L gaze deviation, DM2 with b/l peripheral neuropathy, COPD/Asthma, Breast Ca s/p ?RT, Bipolar depression, Rheumatoid Arthritis, Antiphospholipid syndrome, and L eye uveitis/scleritis, Presents to [facility] transferred from [facility]. p/w multiple medical complaints. A/w exacerbation of L-sided subjective weakness/numbness likely 2/2 recrudescence of prior Right BG infarct. Cardiology following for new LV dysfunction since July, pending ischemic eval. GABBY was done showing severe Aortic Insufficiency and moderated MR. Cardiac cath was done showing LAD prox 100% fills via right to left collateral. Patient now transferred to [facility] Dr. Loaiza for evaluation.    HOSPITAL COURSE:   11/14 patient seen and examined at bedside. All labs and imaging to be reviewed by Dr. Loaiza   11/15:VSS, neuro consult for hx cva L sided weakness, Preop for Friday 11/16 Repeat echo shows only mild valve pathology  11/17 No valve surgery planned in light of repeat echo.  Getting MR viability for CAD and depressed LV function.  11/18  vss for rpt gabby mon  11/19  npo after midnight for gabby tomorrow  11/20 VSS NPO for GABBY this am=> severe AR  11/21: s/p CABG LIMA-LAD, AVR(t) - Inspiris 23mm, LAAL with AtriClip 40mm,   +Substernal mediastinal Chest tube and Left pleural Chest tube. Kept Intubated post op. Taken to CTU. Levophed, Vasopressin and Primacor switched to Dobutamine gtt.  Started on PO Amio for afib ppx. +Soliz. On insulin gtt for tight glycemic control to prevent wound infxn.  11/22: POD1. Extubated. Inotropes and Pressors maintained. PCA Hydromorphone initiated by Acute Pain Mgmt team. Insulin gtt maintained per Endocrine. PT/OT evaluation.  11/23 POD 2. PCA Hydromorphone maintained. Insulin gtt maintained, started basal insulin tonight per Endo. Dobutamine gtt continued.   11/24 POD 3. Dobutamine gtt d/c'ed in AM. PCA pumped d/c'ed. MED and Pleural Chest tubes d/c'ed while in CTU. (+) PW to EPM (40/10/0.8). Prevena dressing intact. +Soliz maintained. Monitor I/Os. Back on Insulin gtt for elevated FS >200. Endocrine following. TRANSFERRED TO SDU. Current medication regimen continued.   11/25 VSS - insullin gtt off @ 6am- start low dose BB this am - soliz in place will d/c when pt more mobile.  d/c plan anticipate home PT  11/26 WBC 20 again -om chronic steroids - hx COPD- afebrile - prevena in place.  will conitue to monitor- xtra lasix 20 iv x 1 given - up 6 kgs in weight w/ ++ edema,  40 yr pack current smoker.  nebs.  11/27 VSS. Afebrile. WBC increased 23 (from 20) (Of note, patient on chronic steroids for Hx RA). Repeat CXR today stable. Rpt UA pending. Potassium 3.6 this AM, supplemented. Pt reports hx of Bipolar MDD, with no follow-up with Psychiatry and non-compliance with OP psych meds since July 2023. Pt requesting to speak to Psychiatry today to establish care. Denies SI/HI/AH/VH. On call Psychiatry ([phone] consulted and aware. Pt also noted with bronchospasms/wheezing - Pulm (Dr. Leone) consulted and aware. Stable to move to floors today, per Dr. Loaiza.  11/28: VSS, refusing PT, needs to increase ambulation/iS/effort. CT with ?sternal dehiscence at inferior pole, will discuss with Dr. Loaiza. Extra IV lasix given for edema.   11/29: VSS. CT Chest imaging reviewed closely by Dr. Loaiza yesterday, no concerned for sternal dehiscence, upon Attending's review. Prevena d/c'd today. Site c/d/i. +PW (isolated). Lidocaine x1 for back pain today, reports improvement. Encouraged ambulation/OOB. Pt working with Physical Therapy. CM to work on Rehab for disposition as recommended by PT. Current medication regimen continued. PW D/liang per Dr. Loaiza. Of note, patient with hx of Antiphospholipid Syndrome and CVA, was on AC therapy pre-surgery; ok to restart AC therapy with Eliquis 5mg BID starting tonight, per Dr. Loaiza.  11/30 VSS rounds made w/ DR. Loaiza.  awaiting d/c to rehab   12/1  Insulin teaching Awaiting for rehab VSS

## 2023-12-01 NOTE — PROGRESS NOTE ADULT - SUBJECTIVE AND OBJECTIVE BOX
Neurology        S: patient seen doing okay,  doing okay.         Medications: MEDICATIONS  (STANDING):  albuterol/ipratropium for Nebulization 3 milliLiter(s) Nebulizer every 6 hours  apixaban 5 milliGRAM(s) Oral two times a day  aspirin enteric coated 81 milliGRAM(s) Oral daily  atorvastatin 80 milliGRAM(s) Oral at bedtime  bisacodyl Suppository 10 milliGRAM(s) Rectal once  budesonide 160 MICROgram(s)/formoterol 4.5 MICROgram(s) Inhaler 2 Puff(s) Inhalation two times a day  chlorhexidine 2% Cloths 1 Application(s) Topical daily  dextrose 50% Injectable 50 milliLiter(s) IV Push every 15 minutes  dextrose 50% Injectable 25 milliLiter(s) IV Push every 15 minutes  dextrose Oral Gel 15 Gram(s) Oral once  famotidine    Tablet 20 milliGRAM(s) Oral two times a day  furosemide    Tablet 40 milliGRAM(s) Oral daily  gabapentin 300 milliGRAM(s) Oral every 8 hours  glucagon  Injectable 1 milliGRAM(s) IntraMuscular once  insulin glargine Injectable (LANTUS) 28 Unit(s) SubCutaneous at bedtime  insulin lispro (ADMELOG) corrective regimen sliding scale   SubCutaneous at bedtime  insulin lispro (ADMELOG) corrective regimen sliding scale   SubCutaneous three times a day before meals  insulin lispro Injectable (ADMELOG) 9 Unit(s) SubCutaneous three times a day before meals  metoprolol tartrate 25 milliGRAM(s) Oral two times a day  OLANZapine 7.5 milliGRAM(s) Oral daily  polyethylene glycol 3350 17 Gram(s) Oral daily  predniSONE   Tablet 10 milliGRAM(s) Oral daily  senna 2 Tablet(s) Oral at bedtime  traZODone 50 milliGRAM(s) Oral at bedtime    MEDICATIONS  (PRN):  HYDROmorphone   Tablet 2 milliGRAM(s) Oral every 4 hours PRN Moderate Pain (4 - 6)       Vitals:  Vital Signs Last 24 Hrs  T(C): 36 (01 Dec 2023 06:04), Max: 36.7 (01 Dec 2023 04:45)  T(F): 96.8 (01 Dec 2023 06:04), Max: 98 (01 Dec 2023 04:45)  HR: 87 (01 Dec 2023 06:04) (82 - 91)  BP: 102/71 (01 Dec 2023 06:04) (92/60 - 125/75)  BP(mean): 81 (01 Dec 2023 06:04) (81 - 81)  RR: 18 (01 Dec 2023 06:04) (18 - 18)  SpO2: 98% (01 Dec 2023 06:04) (86% - 98%)    Parameters below as of 01 Dec 2023 07:30  Patient On (Oxygen Delivery Method): nasal cannula            General Exam:   General Appearance: Appropriately dressed and in no acute distress       Head: Normocephalic, atraumatic and no dysmorphic features  Ear, Nose, and Throat: Moist mucous membranes  CVS: S1S2+  Resp: No SOB, no wheeze or rhonchi  GI: soft NT/ND  Extremities: No edema or cyanosis  Skin: No bruises or rashes     Neurological Exam:  Mental Status: Awake, alert and oriented x 3.  Able to follow simple and complex verbal commands. Able to name and repeat. fluent speech. No obvious aphasia or dysarthria noted.   Cranial Nerves: PERRL, dysconugate gaze , VFFC, sensation V1-V3 decrease on L,  L facial asymmetry, equal elevation of palate, scm/trap 5/5, tongue is midline on protrusion. no obvious papilledema on fundoscopic exam. hearing is grossly intact.   Motor: Normal bulk, tone and strength throughout except mild L HP 5-/5   Sensation: decrease on L compared to R   Reflexes: 1+ throughout at biceps, brachioradialis, triceps, patellars and ankles bilaterally and equal. No clonus. R toe and L toe were both downgoing.  Coordination: No dysmetria on FNF   Gait: cane baseline     Data/Labs/Imaging which I personally reviewed.         LABS:                          8.5    21.24 )-----------( 310      ( 01 Dec 2023 06:03 )             28.1     12-01    137  |  99  |  24<H>  ----------------------------<  89  4.2   |  30  |  0.74    Ca    8.9      01 Dec 2023 06:03  Phos  3.4     12-01  Mg     1.9     12-01          Urinalysis Basic - ( 01 Dec 2023 06:03 )    Color: x / Appearance: x / SG: x / pH: x  Gluc: 89 mg/dL / Ketone: x  / Bili: x / Urobili: x   Blood: x / Protein: x / Nitrite: x   Leuk Esterase: x / RBC: x / WBC x   Sq Epi: x / Non Sq Epi: x / Bacteria: x

## 2023-12-01 NOTE — PROGRESS NOTE ADULT - SUBJECTIVE AND OBJECTIVE BOX
Subjective:  "Hello"  OOB  walked in [place] 100 feet RW  gait steady/stops "to catch my breath> + tobacco current      Tele:  SR  70s           V/S:                    T(F): 97.9 (23 @ 11:00), Max: 98 (23 @ 04:45)  HR: 90 (23 @ 11:00) (82 - 90)  BP: 99/62 (23 @ 11:00) (92/60 - 108/71)  RR: 18 (23 @ 11:00) (18 - 18)  SpO2: 95% (23 @ 11:00) (95% - 98%)  Wt(kg): --      LV EF:      Labs:      137  |  99  |  24<H>  ----------------------------<  89  4.2   |  30  |  0.74    Ca    8.9      01 Dec 2023 06:03  Phos  3.4       Mg     1.9                                      8.5    21.24 )-----------( 310      ( 01 Dec 2023 06:03 )             28.1             CAPILLARY BLOOD GLUCOSE      POCT Blood Glucose.: 155 mg/dL (01 Dec 2023 11:42)  POCT Blood Glucose.: 108 mg/dL (01 Dec 2023 07:48)  POCT Blood Glucose.: 245 mg/dL (2023 21:36)  POCT Blood Glucose.: 118 mg/dL (2023 16:33)           CXR:    I&O's Detail    2023 07:01  -  01 Dec 2023 07:00  --------------------------------------------------------  IN:    Oral Fluid: 720 mL  Total IN: 720 mL    OUT:    Voided (mL): 200 mL  Total OUT: 200 mL    Total NET: 520 mL    BOWEL MOVEMENT:  [x ] YES [ ] NO      Daily     Daily Weight in k.3 (01 Dec 2023 08:08)  Medications:  albuterol/ipratropium for Nebulization 3 milliLiter(s) Nebulizer every 6 hours  apixaban 5 milliGRAM(s) Oral two times a day  aspirin enteric coated 81 milliGRAM(s) Oral daily  atorvastatin 80 milliGRAM(s) Oral at bedtime  bisacodyl Suppository 10 milliGRAM(s) Rectal once  budesonide 160 MICROgram(s)/formoterol 4.5 MICROgram(s) Inhaler 2 Puff(s) Inhalation two times a day  chlorhexidine 2% Cloths 1 Application(s) Topical daily  dextrose 50% Injectable 50 milliLiter(s) IV Push every 15 minutes  dextrose 50% Injectable 25 milliLiter(s) IV Push every 15 minutes  dextrose Oral Gel 15 Gram(s) Oral once  famotidine    Tablet 20 milliGRAM(s) Oral two times a day  furosemide    Tablet 40 milliGRAM(s) Oral daily  gabapentin 300 milliGRAM(s) Oral every 8 hours  glucagon  Injectable 1 milliGRAM(s) IntraMuscular once  HYDROmorphone   Tablet 2 milliGRAM(s) Oral every 4 hours PRN  insulin glargine Injectable (LANTUS) 28 Unit(s) SubCutaneous at bedtime  insulin lispro (ADMELOG) corrective regimen sliding scale   SubCutaneous at bedtime  insulin lispro (ADMELOG) corrective regimen sliding scale   SubCutaneous three times a day before meals  insulin lispro Injectable (ADMELOG) 9 Unit(s) SubCutaneous three times a day before meals  metoprolol tartrate 25 milliGRAM(s) Oral two times a day  OLANZapine 7.5 milliGRAM(s) Oral daily  polyethylene glycol 3350 17 Gram(s) Oral daily  predniSONE   Tablet 10 milliGRAM(s) Oral daily  senna 2 Tablet(s) Oral at bedtime  traZODone 50 milliGRAM(s) Oral at bedtime        Physical Exam:    Neurology: alert and oriented x 3, nonfocal, no gross deficits  CV : S1S2  Sternal Wound :  CDI , Stable, mild erythema along edges  Lungs: diminished bs bases  Abdomen: soft, nontender, nondistended, positive bowel sounds, last bowel movement  +bm       Extremities:     +edema b/l  no calf tenderness                   PAST MEDICAL & SURGICAL HISTORY:  Cigarette smoker      Rheumatoid arthritis      Other depression      Breast cancer      Migraines      History of multiple cerebrovascular accidents (CVAs)      Suicidal ideation      Paresthesia of left arm      Facial paresthesia      APS (antiphospholipid syndrome)      History of depressed bipolar disorder      History of COPD      History of COPD      History of hysterectomy      History of cholecystectomy

## 2023-12-02 LAB
ANION GAP SERPL CALC-SCNC: 12 MMOL/L — SIGNIFICANT CHANGE UP (ref 5–17)
ANION GAP SERPL CALC-SCNC: 12 MMOL/L — SIGNIFICANT CHANGE UP (ref 5–17)
BASOPHILS # BLD AUTO: 0.12 K/UL — SIGNIFICANT CHANGE UP (ref 0–0.2)
BASOPHILS # BLD AUTO: 0.12 K/UL — SIGNIFICANT CHANGE UP (ref 0–0.2)
BASOPHILS NFR BLD AUTO: 0.6 % — SIGNIFICANT CHANGE UP (ref 0–2)
BASOPHILS NFR BLD AUTO: 0.6 % — SIGNIFICANT CHANGE UP (ref 0–2)
BUN SERPL-MCNC: 22 MG/DL — SIGNIFICANT CHANGE UP (ref 7–23)
BUN SERPL-MCNC: 22 MG/DL — SIGNIFICANT CHANGE UP (ref 7–23)
CALCIUM SERPL-MCNC: 8.9 MG/DL — SIGNIFICANT CHANGE UP (ref 8.4–10.5)
CALCIUM SERPL-MCNC: 8.9 MG/DL — SIGNIFICANT CHANGE UP (ref 8.4–10.5)
CHLORIDE SERPL-SCNC: 97 MMOL/L — SIGNIFICANT CHANGE UP (ref 96–108)
CHLORIDE SERPL-SCNC: 97 MMOL/L — SIGNIFICANT CHANGE UP (ref 96–108)
CO2 SERPL-SCNC: 27 MMOL/L — SIGNIFICANT CHANGE UP (ref 22–31)
CO2 SERPL-SCNC: 27 MMOL/L — SIGNIFICANT CHANGE UP (ref 22–31)
CREAT SERPL-MCNC: 0.65 MG/DL — SIGNIFICANT CHANGE UP (ref 0.5–1.3)
CREAT SERPL-MCNC: 0.65 MG/DL — SIGNIFICANT CHANGE UP (ref 0.5–1.3)
EGFR: 101 ML/MIN/1.73M2 — SIGNIFICANT CHANGE UP
EGFR: 101 ML/MIN/1.73M2 — SIGNIFICANT CHANGE UP
EOSINOPHIL # BLD AUTO: 0.22 K/UL — SIGNIFICANT CHANGE UP (ref 0–0.5)
EOSINOPHIL # BLD AUTO: 0.22 K/UL — SIGNIFICANT CHANGE UP (ref 0–0.5)
EOSINOPHIL NFR BLD AUTO: 1 % — SIGNIFICANT CHANGE UP (ref 0–6)
EOSINOPHIL NFR BLD AUTO: 1 % — SIGNIFICANT CHANGE UP (ref 0–6)
GLUCOSE BLDC GLUCOMTR-MCNC: 146 MG/DL — HIGH (ref 70–99)
GLUCOSE BLDC GLUCOMTR-MCNC: 146 MG/DL — HIGH (ref 70–99)
GLUCOSE BLDC GLUCOMTR-MCNC: 165 MG/DL — HIGH (ref 70–99)
GLUCOSE BLDC GLUCOMTR-MCNC: 165 MG/DL — HIGH (ref 70–99)
GLUCOSE BLDC GLUCOMTR-MCNC: 174 MG/DL — HIGH (ref 70–99)
GLUCOSE BLDC GLUCOMTR-MCNC: 174 MG/DL — HIGH (ref 70–99)
GLUCOSE BLDC GLUCOMTR-MCNC: 181 MG/DL — HIGH (ref 70–99)
GLUCOSE BLDC GLUCOMTR-MCNC: 181 MG/DL — HIGH (ref 70–99)
GLUCOSE BLDC GLUCOMTR-MCNC: 73 MG/DL — SIGNIFICANT CHANGE UP (ref 70–99)
GLUCOSE BLDC GLUCOMTR-MCNC: 73 MG/DL — SIGNIFICANT CHANGE UP (ref 70–99)
GLUCOSE SERPL-MCNC: 95 MG/DL — SIGNIFICANT CHANGE UP (ref 70–99)
GLUCOSE SERPL-MCNC: 95 MG/DL — SIGNIFICANT CHANGE UP (ref 70–99)
HCT VFR BLD CALC: 29.9 % — LOW (ref 34.5–45)
HCT VFR BLD CALC: 29.9 % — LOW (ref 34.5–45)
HGB BLD-MCNC: 9.2 G/DL — LOW (ref 11.5–15.5)
HGB BLD-MCNC: 9.2 G/DL — LOW (ref 11.5–15.5)
IMM GRANULOCYTES NFR BLD AUTO: 1.6 % — HIGH (ref 0–0.9)
IMM GRANULOCYTES NFR BLD AUTO: 1.6 % — HIGH (ref 0–0.9)
LYMPHOCYTES # BLD AUTO: 13.4 % — SIGNIFICANT CHANGE UP (ref 13–44)
LYMPHOCYTES # BLD AUTO: 13.4 % — SIGNIFICANT CHANGE UP (ref 13–44)
LYMPHOCYTES # BLD AUTO: 2.85 K/UL — SIGNIFICANT CHANGE UP (ref 1–3.3)
LYMPHOCYTES # BLD AUTO: 2.85 K/UL — SIGNIFICANT CHANGE UP (ref 1–3.3)
MAGNESIUM SERPL-MCNC: 1.9 MG/DL — SIGNIFICANT CHANGE UP (ref 1.6–2.6)
MAGNESIUM SERPL-MCNC: 1.9 MG/DL — SIGNIFICANT CHANGE UP (ref 1.6–2.6)
MCHC RBC-ENTMCNC: 29.3 PG — SIGNIFICANT CHANGE UP (ref 27–34)
MCHC RBC-ENTMCNC: 29.3 PG — SIGNIFICANT CHANGE UP (ref 27–34)
MCHC RBC-ENTMCNC: 30.8 GM/DL — LOW (ref 32–36)
MCHC RBC-ENTMCNC: 30.8 GM/DL — LOW (ref 32–36)
MCV RBC AUTO: 95.2 FL — SIGNIFICANT CHANGE UP (ref 80–100)
MCV RBC AUTO: 95.2 FL — SIGNIFICANT CHANGE UP (ref 80–100)
MONOCYTES # BLD AUTO: 1.42 K/UL — HIGH (ref 0–0.9)
MONOCYTES # BLD AUTO: 1.42 K/UL — HIGH (ref 0–0.9)
MONOCYTES NFR BLD AUTO: 6.7 % — SIGNIFICANT CHANGE UP (ref 2–14)
MONOCYTES NFR BLD AUTO: 6.7 % — SIGNIFICANT CHANGE UP (ref 2–14)
NEUTROPHILS # BLD AUTO: 16.35 K/UL — HIGH (ref 1.8–7.4)
NEUTROPHILS # BLD AUTO: 16.35 K/UL — HIGH (ref 1.8–7.4)
NEUTROPHILS NFR BLD AUTO: 76.7 % — SIGNIFICANT CHANGE UP (ref 43–77)
NEUTROPHILS NFR BLD AUTO: 76.7 % — SIGNIFICANT CHANGE UP (ref 43–77)
NRBC # BLD: 0 /100 WBCS — SIGNIFICANT CHANGE UP (ref 0–0)
NRBC # BLD: 0 /100 WBCS — SIGNIFICANT CHANGE UP (ref 0–0)
PHOSPHATE SERPL-MCNC: 3.7 MG/DL — SIGNIFICANT CHANGE UP (ref 2.5–4.5)
PHOSPHATE SERPL-MCNC: 3.7 MG/DL — SIGNIFICANT CHANGE UP (ref 2.5–4.5)
PLATELET # BLD AUTO: 345 K/UL — SIGNIFICANT CHANGE UP (ref 150–400)
PLATELET # BLD AUTO: 345 K/UL — SIGNIFICANT CHANGE UP (ref 150–400)
POTASSIUM SERPL-MCNC: 3.1 MMOL/L — LOW (ref 3.5–5.3)
POTASSIUM SERPL-MCNC: 3.1 MMOL/L — LOW (ref 3.5–5.3)
POTASSIUM SERPL-MCNC: 4 MMOL/L — SIGNIFICANT CHANGE UP (ref 3.5–5.3)
POTASSIUM SERPL-MCNC: 4 MMOL/L — SIGNIFICANT CHANGE UP (ref 3.5–5.3)
POTASSIUM SERPL-SCNC: 3.1 MMOL/L — LOW (ref 3.5–5.3)
POTASSIUM SERPL-SCNC: 3.1 MMOL/L — LOW (ref 3.5–5.3)
POTASSIUM SERPL-SCNC: 4 MMOL/L — SIGNIFICANT CHANGE UP (ref 3.5–5.3)
POTASSIUM SERPL-SCNC: 4 MMOL/L — SIGNIFICANT CHANGE UP (ref 3.5–5.3)
RBC # BLD: 3.14 M/UL — LOW (ref 3.8–5.2)
RBC # BLD: 3.14 M/UL — LOW (ref 3.8–5.2)
RBC # FLD: 16.7 % — HIGH (ref 10.3–14.5)
RBC # FLD: 16.7 % — HIGH (ref 10.3–14.5)
SODIUM SERPL-SCNC: 136 MMOL/L — SIGNIFICANT CHANGE UP (ref 135–145)
SODIUM SERPL-SCNC: 136 MMOL/L — SIGNIFICANT CHANGE UP (ref 135–145)
WBC # BLD: 21.3 K/UL — HIGH (ref 3.8–10.5)
WBC # BLD: 21.3 K/UL — HIGH (ref 3.8–10.5)
WBC # FLD AUTO: 21.3 K/UL — HIGH (ref 3.8–10.5)
WBC # FLD AUTO: 21.3 K/UL — HIGH (ref 3.8–10.5)

## 2023-12-02 RX ORDER — POTASSIUM CHLORIDE 20 MEQ
20 PACKET (EA) ORAL
Refills: 0 | Status: COMPLETED | OUTPATIENT
Start: 2023-12-02 | End: 2023-12-02

## 2023-12-02 RX ORDER — MAGNESIUM SULFATE 500 MG/ML
2 VIAL (ML) INJECTION ONCE
Refills: 0 | Status: COMPLETED | OUTPATIENT
Start: 2023-12-02 | End: 2023-12-02

## 2023-12-02 RX ORDER — ACETAMINOPHEN 500 MG
650 TABLET ORAL EVERY 6 HOURS
Refills: 0 | Status: DISCONTINUED | OUTPATIENT
Start: 2023-12-02 | End: 2023-12-04

## 2023-12-02 RX ADMIN — Medication 3 MILLILITER(S): at 17:44

## 2023-12-02 RX ADMIN — CHLORHEXIDINE GLUCONATE 1 APPLICATION(S): 213 SOLUTION TOPICAL at 10:53

## 2023-12-02 RX ADMIN — FAMOTIDINE 20 MILLIGRAM(S): 10 INJECTION INTRAVENOUS at 17:44

## 2023-12-02 RX ADMIN — Medication 25 MILLIGRAM(S): at 06:17

## 2023-12-02 RX ADMIN — Medication 9 UNIT(S): at 16:46

## 2023-12-02 RX ADMIN — GABAPENTIN 300 MILLIGRAM(S): 400 CAPSULE ORAL at 13:12

## 2023-12-02 RX ADMIN — Medication 25 MILLIGRAM(S): at 17:44

## 2023-12-02 RX ADMIN — Medication 9 UNIT(S): at 12:36

## 2023-12-02 RX ADMIN — Medication 650 MILLIGRAM(S): at 13:15

## 2023-12-02 RX ADMIN — FAMOTIDINE 20 MILLIGRAM(S): 10 INJECTION INTRAVENOUS at 06:16

## 2023-12-02 RX ADMIN — INSULIN GLARGINE 28 UNIT(S): 100 INJECTION, SOLUTION SUBCUTANEOUS at 21:55

## 2023-12-02 RX ADMIN — HYDROMORPHONE HYDROCHLORIDE 2 MILLIGRAM(S): 2 INJECTION INTRAMUSCULAR; INTRAVENOUS; SUBCUTANEOUS at 04:30

## 2023-12-02 RX ADMIN — Medication 10 MILLIGRAM(S): at 06:19

## 2023-12-02 RX ADMIN — HYDROMORPHONE HYDROCHLORIDE 2 MILLIGRAM(S): 2 INJECTION INTRAMUSCULAR; INTRAVENOUS; SUBCUTANEOUS at 19:45

## 2023-12-02 RX ADMIN — Medication 25 GRAM(S): at 09:25

## 2023-12-02 RX ADMIN — HYDROMORPHONE HYDROCHLORIDE 2 MILLIGRAM(S): 2 INJECTION INTRAMUSCULAR; INTRAVENOUS; SUBCUTANEOUS at 09:45

## 2023-12-02 RX ADMIN — HYDROMORPHONE HYDROCHLORIDE 2 MILLIGRAM(S): 2 INJECTION INTRAMUSCULAR; INTRAVENOUS; SUBCUTANEOUS at 03:53

## 2023-12-02 RX ADMIN — Medication 3 MILLILITER(S): at 06:17

## 2023-12-02 RX ADMIN — Medication 81 MILLIGRAM(S): at 12:26

## 2023-12-02 RX ADMIN — Medication 50 MILLIGRAM(S): at 21:56

## 2023-12-02 RX ADMIN — Medication 1: at 16:46

## 2023-12-02 RX ADMIN — HYDROMORPHONE HYDROCHLORIDE 2 MILLIGRAM(S): 2 INJECTION INTRAMUSCULAR; INTRAVENOUS; SUBCUTANEOUS at 00:19

## 2023-12-02 RX ADMIN — Medication 650 MILLIGRAM(S): at 14:15

## 2023-12-02 RX ADMIN — Medication 9 UNIT(S): at 08:41

## 2023-12-02 RX ADMIN — APIXABAN 5 MILLIGRAM(S): 2.5 TABLET, FILM COATED ORAL at 06:16

## 2023-12-02 RX ADMIN — GABAPENTIN 300 MILLIGRAM(S): 400 CAPSULE ORAL at 06:16

## 2023-12-02 RX ADMIN — ATORVASTATIN CALCIUM 80 MILLIGRAM(S): 80 TABLET, FILM COATED ORAL at 21:55

## 2023-12-02 RX ADMIN — BUDESONIDE AND FORMOTEROL FUMARATE DIHYDRATE 2 PUFF(S): 160; 4.5 AEROSOL RESPIRATORY (INHALATION) at 17:44

## 2023-12-02 RX ADMIN — Medication 3 MILLILITER(S): at 12:24

## 2023-12-02 RX ADMIN — SENNA PLUS 2 TABLET(S): 8.6 TABLET ORAL at 21:56

## 2023-12-02 RX ADMIN — Medication 20 MILLIEQUIVALENT(S): at 12:23

## 2023-12-02 RX ADMIN — HYDROMORPHONE HYDROCHLORIDE 2 MILLIGRAM(S): 2 INJECTION INTRAMUSCULAR; INTRAVENOUS; SUBCUTANEOUS at 18:51

## 2023-12-02 RX ADMIN — HYDROMORPHONE HYDROCHLORIDE 2 MILLIGRAM(S): 2 INJECTION INTRAMUSCULAR; INTRAVENOUS; SUBCUTANEOUS at 08:45

## 2023-12-02 RX ADMIN — APIXABAN 5 MILLIGRAM(S): 2.5 TABLET, FILM COATED ORAL at 17:44

## 2023-12-02 RX ADMIN — Medication 20 MILLIGRAM(S): at 06:17

## 2023-12-02 RX ADMIN — BUDESONIDE AND FORMOTEROL FUMARATE DIHYDRATE 2 PUFF(S): 160; 4.5 AEROSOL RESPIRATORY (INHALATION) at 06:18

## 2023-12-02 RX ADMIN — Medication 20 MILLIEQUIVALENT(S): at 08:45

## 2023-12-02 RX ADMIN — GABAPENTIN 300 MILLIGRAM(S): 400 CAPSULE ORAL at 21:55

## 2023-12-02 RX ADMIN — Medication 20 MILLIEQUIVALENT(S): at 10:14

## 2023-12-02 RX ADMIN — OLANZAPINE 7.5 MILLIGRAM(S): 15 TABLET, FILM COATED ORAL at 12:27

## 2023-12-02 RX ADMIN — POLYETHYLENE GLYCOL 3350 17 GRAM(S): 17 POWDER, FOR SOLUTION ORAL at 12:26

## 2023-12-02 NOTE — PROGRESS NOTE ADULT - PROBLEM SELECTOR PLAN 1
Will continue current insulin regimen for now. Will continue monitoring  blood sugars, will Follow up.  Patient counseled for compliance with consistent low carb diet.  .   Anticipate Basal insulin at discharge (Lantus) + PO DM regimen.  Can be Discharged on current basal insulin once nightly.   In addition discharge on Farxiga 10 mg and Glimepiride 2 mg daily with breakfast.   Pls send with glucometer  FU outpatient 2 weeks

## 2023-12-02 NOTE — PROGRESS NOTE ADULT - SUBJECTIVE AND OBJECTIVE BOX
Subjective: Pt states "Hello" denies any CP or SOB. No acute events overnight.     Telemetry: SR 70 - 100    Vital Signs Last 24 Hrs  T(C): 36.4 (23 @ 11:04), Max: 36.8 (23 @ 19:31)  T(F): 97.5 (23 @ 11:04), Max: 98.2 (23 @ 19:31)  HR: 88 (23 @ 11:04) (84 - 98)  BP: 97/67 (23 @ 11:04) (96/64 - 104/74)  RR: 18 (23 @ 11:04) (18 - 20)  SpO2: 93% (23 @ 11:04) (91% - 94%)              @ 07:01  -   @ 07:00  --------------------------------------------------------  IN: 1000 mL / OUT: 300 mL / NET: 700 mL       Daily Weight in k.4 (02 Dec 2023 07:14)                        9.2    21.30 )-----------( 345      ( 02 Dec 2023 06:07 )             29.9     136  |  97  |  22  ----------------------------<  95  3.1<L>   |  27  |  0.65            CAPILLARY BLOOD GLUCOSE  73 - 165            PHYSICAL EXAM  Neurology: A&Ox3, NAD  CV : RRR+S1S2  Sternal Wound: MSI CDI SHARON, Stable  Lungs: Respirations non-labored, B/L BS clear, diminished at bases  Abdomen: Soft, NT/ND, +BSx4Q, +BM (-)N/V/D  : Voiding without difficulty  Extremities: B/L LE +2 edema, negative calf tenderness, +PP            MEDICATIONS  albuterol/ipratropium for Nebulization 3 milliLiter(s) Nebulizer every 6 hours  apixaban 5 milliGRAM(s) Oral two times a day  aspirin enteric coated 81 milliGRAM(s) Oral daily  atorvastatin 80 milliGRAM(s) Oral at bedtime  bisacodyl Suppository 10 milliGRAM(s) Rectal once  budesonide 160 MICROgram(s)/formoterol 4.5 MICROgram(s) Inhaler 2 Puff(s) Inhalation two times a day  chlorhexidine 2% Cloths 1 Application(s) Topical daily  famotidine    Tablet 20 milliGRAM(s) Oral two times a day  gabapentin 300 milliGRAM(s) Oral every 8 hours  glucagon  Injectable 1 milliGRAM(s) IntraMuscular once  HYDROmorphone   Tablet 2 milliGRAM(s) Oral every 4 hours PRN  insulin glargine Injectable (LANTUS) 28 Unit(s) SubCutaneous at bedtime  insulin lispro (ADMELOG) corrective regimen sliding scale   SubCutaneous at bedtime  insulin lispro (ADMELOG) corrective regimen sliding scale   SubCutaneous three times a day before meals  insulin lispro Injectable (ADMELOG) 9 Unit(s) SubCutaneous three times a day before meals  metoprolol tartrate 25 milliGRAM(s) Oral two times a day  OLANZapine 7.5 milliGRAM(s) Oral daily  polyethylene glycol 3350 17 Gram(s) Oral daily  predniSONE   Tablet 10 milliGRAM(s) Oral daily  senna 2 Tablet(s) Oral at bedtime  torsemide 20 milliGRAM(s) Oral daily  traZODone 50 milliGRAM(s) Oral at bedtime      Physical Therapy Rec:   Home  [  ]   Home w/ PT  [  ]  Rehab  [ X ]    Discussed with Cardiothoracic Team at AM rounds.

## 2023-12-02 NOTE — PROGRESS NOTE ADULT - ASSESSMENT
58F smoker with stroke x 2 (2022 and july 2023 with resid  L sided weakness/numbness  DM2 with b/l peripheral neuropathy, COPD/Asthma, Breast Ca s/p, Bipolar depression, Rheumatoid Arthritis, Antiphospholipid syndrome, and L eye uveitis/scleritis, Presents to [facility] transferred from [facility]. p/w multiple medical complaints and exacerbation of L-sided subjective weakness/numbness likely 2/2 recrudescence of prior Right BG infarct. Cardiology following for new LV dysfunction since July.Patient now transferred to CTS Dr. Loaiza for evaluation. neuro called for prior strokes   LESLEY was done showing severe Aortic Insufficiency and moderated MR.   Cardiac cath was done showing LAD prox 100% fills via right to left collateral.   CTH with tinght chronic appearing R BG infarct   TTE EF 30%   CD neg   A1c 7.6   LDL 37   ILR interrogated no events  NIHSS 3  premrs3   cath shows LAD prox 100% fills via right to left collateral will need AVR and LIMA to LAD bypass  o/e with mild L facial and decrease sensation on L with mild 5-/5 weakness on L with slow FFM ; dysconjugate gaze   s/p OR 11/21  extubated  post op no neuro changes   c/o HA.   11/29 walking on unit with PT   no neuro changes     Impression   prior nowe chronic appearing infarcts. R BG with residual L HP  DM peripheral neuropathy      - for secondary stroke prevention on asa 81mg and high dose statin.  was also on full dose lovenox given APLS; would resume when able --> now back on DOAC , elqiuis 5mg BID   - gabapentin 300mg TID for neuropathy   - f/u rheum and heme/onc [facility]   - telemetry  - PT/OT   - check FS, glucose control <180  - GI/DVT ppx   - Thank you for allowing me to participate in the care of this patient. Call with questions.   - spoke [facility] CTS team/ACP   dc plan rehab   Charles Crespo MD  Vascular Neurology  Office: [phone]

## 2023-12-02 NOTE — PROGRESS NOTE ADULT - SUBJECTIVE AND OBJECTIVE BOX
Neurology        S: patient seen doing okay,  doing okay.           Medications: MEDICATIONS  (STANDING):  albuterol/ipratropium for Nebulization 3 milliLiter(s) Nebulizer every 6 hours  apixaban 5 milliGRAM(s) Oral two times a day  aspirin enteric coated 81 milliGRAM(s) Oral daily  atorvastatin 80 milliGRAM(s) Oral at bedtime  bisacodyl Suppository 10 milliGRAM(s) Rectal once  budesonide 160 MICROgram(s)/formoterol 4.5 MICROgram(s) Inhaler 2 Puff(s) Inhalation two times a day  chlorhexidine 2% Cloths 1 Application(s) Topical daily  dextrose 50% Injectable 50 milliLiter(s) IV Push every 15 minutes  dextrose 50% Injectable 25 milliLiter(s) IV Push every 15 minutes  dextrose Oral Gel 15 Gram(s) Oral once  famotidine    Tablet 20 milliGRAM(s) Oral two times a day  gabapentin 300 milliGRAM(s) Oral every 8 hours  glucagon  Injectable 1 milliGRAM(s) IntraMuscular once  insulin glargine Injectable (LANTUS) 28 Unit(s) SubCutaneous at bedtime  insulin lispro (ADMELOG) corrective regimen sliding scale   SubCutaneous at bedtime  insulin lispro (ADMELOG) corrective regimen sliding scale   SubCutaneous three times a day before meals  insulin lispro Injectable (ADMELOG) 9 Unit(s) SubCutaneous three times a day before meals  magnesium sulfate  IVPB 2 Gram(s) IV Intermittent once  metoprolol tartrate 25 milliGRAM(s) Oral two times a day  OLANZapine 7.5 milliGRAM(s) Oral daily  polyethylene glycol 3350 17 Gram(s) Oral daily  potassium chloride    Tablet ER 20 milliEquivalent(s) Oral every 2 hours  predniSONE   Tablet 10 milliGRAM(s) Oral daily  senna 2 Tablet(s) Oral at bedtime  torsemide 20 milliGRAM(s) Oral daily  traZODone 50 milliGRAM(s) Oral at bedtime    MEDICATIONS  (PRN):  HYDROmorphone   Tablet 2 milliGRAM(s) Oral every 4 hours PRN Severe Pain (7 - 10)       Vitals:  Vital Signs Last 24 Hrs  T(C): 36.7 (02 Dec 2023 05:21), Max: 36.8 (01 Dec 2023 19:31)  T(F): 98.1 (02 Dec 2023 05:21), Max: 98.2 (01 Dec 2023 19:31)  HR: 96 (02 Dec 2023 06:15) (84 - 98)  BP: 104/74 (02 Dec 2023 06:15) (96/64 - 104/74)  BP(mean): 74 (01 Dec 2023 11:00) (74 - 74)  RR: 20 (02 Dec 2023 06:15) (18 - 20)  SpO2: 94% (02 Dec 2023 06:15) (91% - 95%)    Parameters below as of 02 Dec 2023 06:15  Patient On (Oxygen Delivery Method): nasal cannula  O2 Flow (L/min): 2                  General Exam:   General Appearance: Appropriately dressed and in no acute distress       Head: Normocephalic, atraumatic and no dysmorphic features  Ear, Nose, and Throat: Moist mucous membranes  CVS: S1S2+  Resp: No SOB, no wheeze or rhonchi  GI: soft NT/ND  Extremities: No edema or cyanosis  Skin: No bruises or rashes     Neurological Exam:  Mental Status: Awake, alert and oriented x 3.  Able to follow simple and complex verbal commands. Able to name and repeat. fluent speech. No obvious aphasia or dysarthria noted.   Cranial Nerves: PERRL, dysconugate gaze , VFFC, sensation V1-V3 decrease on L,  L facial asymmetry, equal elevation of palate, scm/trap 5/5, tongue is midline on protrusion. no obvious papilledema on fundoscopic exam. hearing is grossly intact.   Motor: Normal bulk, tone and strength throughout except mild L HP 5-/5   Sensation: decrease on L compared to R   Reflexes: 1+ throughout at biceps, brachioradialis, triceps, patellars and ankles bilaterally and equal. No clonus. R toe and L toe were both downgoing.  Coordination: No dysmetria on FNF   Gait: cane baseline     Data/Labs/Imaging which I personally reviewed.       LABS:                          9.2    21.30 )-----------( 345      ( 02 Dec 2023 06:07 )             29.9     12-02    136  |  97  |  22  ----------------------------<  95  3.1<L>   |  27  |  0.65    Ca    8.9      02 Dec 2023 06:07  Phos  3.7     12-02  Mg     1.9     12-02          Urinalysis Basic - ( 02 Dec 2023 06:07 )    Color: x / Appearance: x / SG: x / pH: x  Gluc: 95 mg/dL / Ketone: x  / Bili: x / Urobili: x   Blood: x / Protein: x / Nitrite: x   Leuk Esterase: x / RBC: x / WBC x   Sq Epi: x / Non Sq Epi: x / Bacteria: x

## 2023-12-02 NOTE — PROGRESS NOTE ADULT - ASSESSMENT
58F smoker with PMH CVA with L sided weakness/numbness/L gaze deviation, DM2 with b/l peripheral neuropathy, COPD/Asthma, Breast Ca s/p ?RT,  Bipolar depression, Rheumatoid Arthritis,  Antiphospholipid syndrome, and L eye uveitis/scleritis.   Assessment  DMT2: 58y Female with DM T2 with hyperglycemia, A1C 7.6% , was on oral meds at home, now postop CABG, now transitioned to basal bolus, sugars  improving, eating meals.  Insulin teaching  Severe AR: CTS eval, LESLEY, s/p ct surgery   CAD: on medications, stable, monitored. s/p Cardiac MR viability , now postop CABG  HTN: on antihypertensive medications, monitored, asymptomatic.  Obesity: No strict exercise routines, not on any weight loss program, neither on low calorie diet.      Skye Durand MD  Cell: [phone]  Office: [phone]

## 2023-12-02 NOTE — PROGRESS NOTE ADULT - ASSESSMENT
59 y/o F with PMH smoker with PMH CVA with L sided weakness/numbness/L gaze deviation, DM2 with b/l peripheral neuropathy, COPD/emphysema, asthma, RA (on prednisone), Breast Ca s/p ?RT, Bipolar depression, Rheumatoid Arthritis, Antiphospholipid syndrome, and L eye uveitis/scleritis. Presents to [facility] transferred from [facility]. Initially presents with exacerbation of L-sided subjective weakness/numbness likely 2/2 recrudescence of prior Right BG infarct. Cardiology following for new LV dysfunction since July, pending ischemic eval. LESLEY was done showing severe Aortic Insufficiency and moderated MR. Cardiac cath was done showing LAD prox 100% fills via right to left collateral. Tx for surgical evaluation. S/p CABG, AVR on 11/21. Called to consult for wheezing. Per pt she is SOB and intermittently wheezing. Endorses difficult to expectorate secretions. Pt states she is unable to take a deep breath due to incisional pain.

## 2023-12-02 NOTE — PROGRESS NOTE ADULT - PROBLEM SELECTOR PLAN 2
- Continue with ASA 81mg qD, Atorvastatin 80mg qHS, lopressor 25 BID  - Titrate up beta-blocker as tolerated

## 2023-12-02 NOTE — PROGRESS NOTE ADULT - ASSESSMENT
58 female smoker with PMH CVA with residual Left-sided weakness/numbness/L gaze deviation, DM2 with b/l peripheral neuropathy, COPD/Asthma, Breast Ca s/p ?RT, Bipolar depression, Rheumatoid Arthritis, Antiphospholipid syndrome, and L eye uveitis/scleritis, Presents to [facility] transferred from [facility]. p/w multiple medical complaints. A/w exacerbation of L-sided subjective weakness/numbness likely 2/2 recrudescence of prior Right BG infarct. Cardiology following for new LV dysfunction since July, pending ischemic eval. GABBY was done showing severe Aortic Insufficiency and moderated MR. Cardiac cath was done showing LAD prox 100% fills via right to left collateral. Patient now transferred to [facility] Dr. Loaiza for evaluation.    HOSPITAL COURSE:   11/14 patient seen and examined at bedside. All labs and imaging to be reviewed by Dr. Loaiza   11/15:VSS, neuro consult for hx cva L sided weakness, Preop for Friday 11/16 Repeat echo shows only mild valve pathology  11/17 No valve surgery planned in light of repeat echo.  Getting MR viability for CAD and depressed LV function.  11/18  vss for rpt gabby mon  11/19  npo after midnight for gabby tomorrow  11/20 VSS NPO for GABBY this am=> severe AR  11/21: s/p CABG LIMA-LAD, AVR(t) - Inspiris 23mm, LAAL with AtriClip 40mm,   +Substernal mediastinal Chest tube and Left pleural Chest tube. Kept Intubated post op. Taken to CTU. Levophed, Vasopressin and Primacor switched to Dobutamine gtt.  Started on PO Amio for afib ppx. +Soliz. On insulin gtt for tight glycemic control to prevent wound infxn.  11/22: POD1. Extubated. Inotropes and Pressors maintained. PCA Hydromorphone initiated by Acute Pain Mgmt team. Insulin gtt maintained per Endocrine. PT/OT evaluation.  11/23 POD 2. PCA Hydromorphone maintained. Insulin gtt maintained, started basal insulin tonight per Endo. Dobutamine gtt continued.   11/24 POD 3. Dobutamine gtt d/c'ed in AM. PCA pumped d/c'ed. MED and Pleural Chest tubes d/c'ed while in CTU. (+) PW to EPM (40/10/0.8). Prevena dressing intact. +Soliz maintained. Monitor I/Os. Back on Insulin gtt for elevated FS >200. Endocrine following. TRANSFERRED TO SDU. Current medication regimen continued.   11/25 VSS - insullin gtt off @ 6am- start low dose BB this am - soliz in place will d/c when pt more mobile.  d/c plan anticipate home PT  11/26 WBC 20 again -om chronic steroids - hx COPD- afebrile - prevena in place.  will conitue to monitor- xtra lasix 20 iv x 1 given - up 6 kgs in weight w/ ++ edema,  40 yr pack current smoker.  nebs.  11/27 VSS. Afebrile. WBC increased 23 (from 20) (Of note, patient on chronic steroids for Hx RA). Repeat CXR today stable. Rpt UA pending. Potassium 3.6 this AM, supplemented. Pt reports hx of Bipolar MDD, with no follow-up with Psychiatry and non-compliance with OP psych meds since July 2023. Pt requesting to speak to Psychiatry today to establish care. Denies SI/HI/AH/VH. On call Psychiatry ([phone]) consulted and aware. Pt also noted with bronchospasms/wheezing - Pulm (Dr. Leone) consulted and aware. Stable to move to floors today, per Dr. Loaiza.  11/28: VSS, refusing PT, needs to increase ambulation/iS/effort. CT with ?sternal dehiscence at inferior pole, will discuss with Dr. Loaiza. Extra IV lasix given for edema.   11/29: VSS. CT Chest imaging reviewed closely by Dr. Loaiza yesterday, no concerned for sternal dehiscence, upon Attending's review. Prevena d/c'd today. Site c/d/i. +PW (isolated). Lidocaine x1 for back pain today, reports improvement. Encouraged ambulation/OOB. Pt working with Physical Therapy. CM to work on Rehab for disposition as recommended by PT. Current medication regimen continued. PW D/liang per Dr. Loaiza. Of note, patient with hx of Antiphospholipid Syndrome and CVA, was on AC therapy pre-surgery; ok to restart AC therapy with Eliquis 5mg BID starting tonight, per Dr. Loaiza.  11/30 VSS rounds made w/ DR. Loaiza.  awaiting d/c to rehab   12/1  Insulin teaching Awaiting for rehab VSS   12/2 VSS, continue diuresis on torsemide 20 daily, BUN/CR 22/0.65, K 3.1 supplemented, check repeat this afternoon.

## 2023-12-02 NOTE — PROGRESS NOTE ADULT - PROBLEM SELECTOR PLAN 1
- Continue with ASA 81mg qD, Atorvastatin 80mg qHS, lopressor 25 BID  - Continue diuresis on torsemide 20 daily  - Strict I/Os and daily standing weights  - Monitor electrolytes, Keep K >4, Mg >2.  - Pain control regimen, per pain management team.  - Cough and deep breathe, Incentive Spirometry Q1h, Chest PT.  - Ambulate 4x daily as tolerated and with PT.   - Continue home Eliquis 5mg BID for APS and CVA  - Disposition: Subacute rehab pending placement

## 2023-12-02 NOTE — PROGRESS NOTE ADULT - SUBJECTIVE AND OBJECTIVE BOX
German Carrasco MD  Interventional Cardiology / Advance Heart Failure and Cardiac Transplant Specialist  [place] Office : [address] [place] [address] [place]. [address]  Tel:   [place] Office : [address] [facility] [place] [address]  Tel: [phone]       Pt is lying in bed comfortable not in distress, no chest pains no SOB no palpitations  	  MEDICATIONS:  apixaban 5 milliGRAM(s) Oral two times a day  aspirin enteric coated 81 milliGRAM(s) Oral daily  metoprolol tartrate 25 milliGRAM(s) Oral two times a day  torsemide 20 milliGRAM(s) Oral daily      albuterol/ipratropium for Nebulization 3 milliLiter(s) Nebulizer every 6 hours  budesonide 160 MICROgram(s)/formoterol 4.5 MICROgram(s) Inhaler 2 Puff(s) Inhalation two times a day    acetaminophen     Tablet .. 650 milliGRAM(s) Oral every 6 hours PRN  gabapentin 300 milliGRAM(s) Oral every 8 hours  HYDROmorphone   Tablet 2 milliGRAM(s) Oral every 4 hours PRN  OLANZapine 7.5 milliGRAM(s) Oral daily  traZODone 50 milliGRAM(s) Oral at bedtime    bisacodyl Suppository 10 milliGRAM(s) Rectal once  famotidine    Tablet 20 milliGRAM(s) Oral two times a day  polyethylene glycol 3350 17 Gram(s) Oral daily  senna 2 Tablet(s) Oral at bedtime    atorvastatin 80 milliGRAM(s) Oral at bedtime  dextrose 50% Injectable 50 milliLiter(s) IV Push every 15 minutes  dextrose 50% Injectable 25 milliLiter(s) IV Push every 15 minutes  dextrose Oral Gel 15 Gram(s) Oral once  glucagon  Injectable 1 milliGRAM(s) IntraMuscular once  insulin glargine Injectable (LANTUS) 28 Unit(s) SubCutaneous at bedtime  insulin lispro (ADMELOG) corrective regimen sliding scale   SubCutaneous at bedtime  insulin lispro (ADMELOG) corrective regimen sliding scale   SubCutaneous three times a day before meals  insulin lispro Injectable (ADMELOG) 9 Unit(s) SubCutaneous three times a day before meals  predniSONE   Tablet 10 milliGRAM(s) Oral daily    chlorhexidine 2% Cloths 1 Application(s) Topical daily      PAST MEDICAL/SURGICAL HISTORY  PAST MEDICAL & SURGICAL HISTORY:  Cigarette smoker      Rheumatoid arthritis      Other depression      Breast cancer      Migraines      History of multiple cerebrovascular accidents (CVAs)      Suicidal ideation      Paresthesia of left arm      Facial paresthesia      APS (antiphospholipid syndrome)      History of depressed bipolar disorder      History of COPD      History of COPD      History of hysterectomy      History of cholecystectomy          SOCIAL HISTORY: Substance Use (street drugs): ( x ) never used  (  ) other:    FAMILY HISTORY:  Family history of breast cancer (Mother)    Family history of diabetes mellitus (DM) (Father)         PHYSICAL EXAM:  T(C): 36.5 (12-02-23 @ 19:00), Max: 36.7 (12-02-23 @ 05:21)  HR: 85 (12-02-23 @ 19:00) (85 - 96)  BP: 101/69 (12-02-23 @ 19:00) (96/64 - 104/74)  RR: 18 (12-02-23 @ 19:00) (18 - 20)  SpO2: 96% (12-02-23 @ 19:00) (88% - 96%)  Wt(kg): --  I&O's Summary    01 Dec 2023 07:01  -  02 Dec 2023 07:00  --------------------------------------------------------  IN: 1000 mL / OUT: 300 mL / NET: 700 mL    02 Dec 2023 07:01  -  02 Dec 2023 23:16  --------------------------------------------------------  IN: 680 mL / OUT: 850 mL / NET: -170 mL          GENERAL: NAD  EYES:   PERRLA   ENMT:   Moist mucous membranes, Good dentition, No lesions  Cardiovascular: Normal S1 S2, No JVD, No murmurs, No edema s/p sternotomy RV heave   Respiratory: Lungs clear to auscultation	  Gastrointestinal:  Soft, Non-tender, + BS	  Extremities: no edema                                    9.2    21.30 )-----------( 345      ( 02 Dec 2023 06:07 )             29.9     12-02    x   |  x   |  x   ----------------------------<  x   4.0   |  x   |  x     Ca    8.9      02 Dec 2023 06:07  Phos  3.7     12-02  Mg     1.9     12-02      proBNP:   Lipid Profile:   HgA1c:   TSH:     Consultant(s) Notes Reviewed:  [x ] YES  [ ] NO    Care Discussed with Consultants/Other Providers [ x] YES  [ ] NO    Imaging Personally Reviewed independently:  [x] YES  [ ] NO    All labs, radiologic studies, vitals, orders and medications list reviewed. Patient is seen and examined at bedside. Case discussed with medical team.         German Carrasco MD  Interventional Cardiology / Advance Heart Failure and Cardiac Transplant Specialist  [place] Office : [address] [place] [address] [place]. [address]  Tel:   [place] Office : [address] [facility] [place] [address]  Tel: [phone]       Pt is lying in bed comfortable not in distress, no chest pains no SOB no palpitations  	  MEDICATIONS:  apixaban 5 milliGRAM(s) Oral two times a day  aspirin enteric coated 81 milliGRAM(s) Oral daily  metoprolol tartrate 25 milliGRAM(s) Oral two times a day  torsemide 20 milliGRAM(s) Oral daily      albuterol/ipratropium for Nebulization 3 milliLiter(s) Nebulizer every 6 hours  budesonide 160 MICROgram(s)/formoterol 4.5 MICROgram(s) Inhaler 2 Puff(s) Inhalation two times a day    acetaminophen     Tablet .. 650 milliGRAM(s) Oral every 6 hours PRN  gabapentin 300 milliGRAM(s) Oral every 8 hours  HYDROmorphone   Tablet 2 milliGRAM(s) Oral every 4 hours PRN  OLANZapine 7.5 milliGRAM(s) Oral daily  traZODone 50 milliGRAM(s) Oral at bedtime    bisacodyl Suppository 10 milliGRAM(s) Rectal once  famotidine    Tablet 20 milliGRAM(s) Oral two times a day  polyethylene glycol 3350 17 Gram(s) Oral daily  senna 2 Tablet(s) Oral at bedtime    atorvastatin 80 milliGRAM(s) Oral at bedtime  dextrose 50% Injectable 50 milliLiter(s) IV Push every 15 minutes  dextrose 50% Injectable 25 milliLiter(s) IV Push every 15 minutes  dextrose Oral Gel 15 Gram(s) Oral once  glucagon  Injectable 1 milliGRAM(s) IntraMuscular once  insulin glargine Injectable (LANTUS) 28 Unit(s) SubCutaneous at bedtime  insulin lispro (ADMELOG) corrective regimen sliding scale   SubCutaneous at bedtime  insulin lispro (ADMELOG) corrective regimen sliding scale   SubCutaneous three times a day before meals  insulin lispro Injectable (ADMELOG) 9 Unit(s) SubCutaneous three times a day before meals  predniSONE   Tablet 10 milliGRAM(s) Oral daily    chlorhexidine 2% Cloths 1 Application(s) Topical daily      PAST MEDICAL/SURGICAL HISTORY  PAST MEDICAL & SURGICAL HISTORY:  Cigarette smoker      Rheumatoid arthritis      Other depression      Breast cancer      Migraines      History of multiple cerebrovascular accidents (CVAs)      Suicidal ideation      Paresthesia of left arm      Facial paresthesia      APS (antiphospholipid syndrome)      History of depressed bipolar disorder      History of COPD      History of COPD      History of hysterectomy      History of cholecystectomy          SOCIAL HISTORY: Substance Use (street drugs): ( x ) never used  (  ) other:    FAMILY HISTORY:  Family history of breast cancer (Mother)    Family history of diabetes mellitus (DM) (Father)         PHYSICAL EXAM:  T(C): 36.5 (12-02-23 @ 19:00), Max: 36.7 (12-02-23 @ 05:21)  HR: 85 (12-02-23 @ 19:00) (85 - 96)  BP: 101/69 (12-02-23 @ 19:00) (96/64 - 104/74)  RR: 18 (12-02-23 @ 19:00) (18 - 20)  SpO2: 96% (12-02-23 @ 19:00) (88% - 96%)  Wt(kg): --  I&O's Summary    01 Dec 2023 07:01  -  02 Dec 2023 07:00  --------------------------------------------------------  IN: 1000 mL / OUT: 300 mL / NET: 700 mL    02 Dec 2023 07:01  -  02 Dec 2023 23:16  --------------------------------------------------------  IN: 680 mL / OUT: 850 mL / NET: -170 mL          GENERAL: NAD  EYES:   PERRLA   ENMT:   Moist mucous membranes, Good dentition, No lesions  Cardiovascular: Normal S1 S2, No JVD, No murmurs, No edema s/p sternotomy RV heave   Respiratory: Lungs clear to auscultation	  Gastrointestinal:  Soft, Non-tender, + BS	  Extremities: no edema                                    9.2    21.30 )-----------( 345      ( 02 Dec 2023 06:07 )             29.9     12-02    x   |  x   |  x   ----------------------------<  x   4.0   |  x   |  x     Ca    8.9      02 Dec 2023 06:07  Phos  3.7     12-02  Mg     1.9     12-02      proBNP:   Lipid Profile:   HgA1c:   TSH:     Consultant(s) Notes Reviewed:  [x ] YES  [ ] NO    Care Discussed with Consultants/Other Providers [ x] YES  [ ] NO    Imaging Personally Reviewed independently:  [x] YES  [ ] NO    All labs, radiologic studies, vitals, orders and medications list reviewed. Patient is seen and examined at bedside. Case discussed with medical team.         German Carrasco MD  Interventional Cardiology / Advance Heart Failure and Cardiac Transplant Specialist  [place] Office : [address] [place] [address] [place]. [address]  Tel:   [place] Office : [address] [facility] [place] [address]  Tel: [phone]       Pt is lying in bed comfortable not in distress, no chest pains no SOB no palpitations  	  MEDICATIONS:  apixaban 5 milliGRAM(s) Oral two times a day  aspirin enteric coated 81 milliGRAM(s) Oral daily  metoprolol tartrate 25 milliGRAM(s) Oral two times a day  torsemide 20 milliGRAM(s) Oral daily      albuterol/ipratropium for Nebulization 3 milliLiter(s) Nebulizer every 6 hours  budesonide 160 MICROgram(s)/formoterol 4.5 MICROgram(s) Inhaler 2 Puff(s) Inhalation two times a day    acetaminophen     Tablet .. 650 milliGRAM(s) Oral every 6 hours PRN  gabapentin 300 milliGRAM(s) Oral every 8 hours  HYDROmorphone   Tablet 2 milliGRAM(s) Oral every 4 hours PRN  OLANZapine 7.5 milliGRAM(s) Oral daily  traZODone 50 milliGRAM(s) Oral at bedtime    bisacodyl Suppository 10 milliGRAM(s) Rectal once  famotidine    Tablet 20 milliGRAM(s) Oral two times a day  polyethylene glycol 3350 17 Gram(s) Oral daily  senna 2 Tablet(s) Oral at bedtime    atorvastatin 80 milliGRAM(s) Oral at bedtime  dextrose 50% Injectable 50 milliLiter(s) IV Push every 15 minutes  dextrose 50% Injectable 25 milliLiter(s) IV Push every 15 minutes  dextrose Oral Gel 15 Gram(s) Oral once  glucagon  Injectable 1 milliGRAM(s) IntraMuscular once  insulin glargine Injectable (LANTUS) 28 Unit(s) SubCutaneous at bedtime  insulin lispro (ADMELOG) corrective regimen sliding scale   SubCutaneous at bedtime  insulin lispro (ADMELOG) corrective regimen sliding scale   SubCutaneous three times a day before meals  insulin lispro Injectable (ADMELOG) 9 Unit(s) SubCutaneous three times a day before meals  predniSONE   Tablet 10 milliGRAM(s) Oral daily    chlorhexidine 2% Cloths 1 Application(s) Topical daily      PAST MEDICAL/SURGICAL HISTORY  PAST MEDICAL & SURGICAL HISTORY:  Cigarette smoker      Rheumatoid arthritis      Other depression      Breast cancer      Migraines      History of multiple cerebrovascular accidents (CVAs)      Suicidal ideation      Paresthesia of left arm      Facial paresthesia      APS (antiphospholipid syndrome)      History of depressed bipolar disorder      History of COPD      History of COPD      History of hysterectomy      History of cholecystectomy          SOCIAL HISTORY: Substance Use (street drugs): ( x ) never used  (  ) other:    FAMILY HISTORY:  Family history of breast cancer (Mother)    Family history of diabetes mellitus (DM) (Father)         PHYSICAL EXAM:  T(C): 36.5 (12-02-23 @ 19:00), Max: 36.7 (12-02-23 @ 05:21)  HR: 85 (12-02-23 @ 19:00) (85 - 96)  BP: 101/69 (12-02-23 @ 19:00) (96/64 - 104/74)  RR: 18 (12-02-23 @ 19:00) (18 - 20)  SpO2: 96% (12-02-23 @ 19:00) (88% - 96%)  Wt(kg): --  I&O's Summary    01 Dec 2023 07:01  -  02 Dec 2023 07:00  --------------------------------------------------------  IN: 1000 mL / OUT: 300 mL / NET: 700 mL    02 Dec 2023 07:01  -  02 Dec 2023 23:16  --------------------------------------------------------  IN: 680 mL / OUT: 850 mL / NET: -170 mL          GENERAL: NAD  EYES:   PERRLA   ENMT:   Moist mucous membranes, Good dentition, No lesions  Cardiovascular: Normal S1 S2, No JVD, No murmurs, No edema s/p sternotomy RV heave   Respiratory: Lungs clear to auscultation	  Gastrointestinal:  Soft, Non-tender, + BS	  Extremities: no edema                                    9.2    21.30 )-----------( 345      ( 02 Dec 2023 06:07 )             29.9     12-02    x   |  x   |  x   ----------------------------<  x   4.0   |  x   |  x     Ca    8.9      02 Dec 2023 06:07  Phos  3.7     12-02  Mg     1.9     12-02      proBNP:   Lipid Profile:   HgA1c:   TSH:     Consultant(s) Notes Reviewed:  [x ] YES  [ ] NO    Care Discussed with Consultants/Other Providers [ x] YES  [ ] NO    Imaging Personally Reviewed independently:  [x] YES  [ ] NO    All labs, radiologic studies, vitals, orders and medications list reviewed. Patient is seen and examined at bedside. Case discussed with medical team.

## 2023-12-02 NOTE — PROGRESS NOTE ADULT - PROBLEM SELECTOR PLAN 4
Psych following- continue Zyprexa 7.5 daily, trazodone 50 HS  [facility] outpt mental health clinic f/u at [phone]

## 2023-12-02 NOTE — PROGRESS NOTE ADULT - SUBJECTIVE AND OBJECTIVE BOX
Follow-up Pulm Progress Note    still with c/o pain  denies SOB  sats 86-88% on RA, placed back on 2LNC     Medications:  MEDICATIONS  (STANDING):  albuterol/ipratropium for Nebulization 3 milliLiter(s) Nebulizer every 6 hours  apixaban 5 milliGRAM(s) Oral two times a day  aspirin enteric coated 81 milliGRAM(s) Oral daily  atorvastatin 80 milliGRAM(s) Oral at bedtime  bisacodyl Suppository 10 milliGRAM(s) Rectal once  budesonide 160 MICROgram(s)/formoterol 4.5 MICROgram(s) Inhaler 2 Puff(s) Inhalation two times a day  chlorhexidine 2% Cloths 1 Application(s) Topical daily  dextrose 50% Injectable 50 milliLiter(s) IV Push every 15 minutes  dextrose 50% Injectable 25 milliLiter(s) IV Push every 15 minutes  dextrose Oral Gel 15 Gram(s) Oral once  famotidine    Tablet 20 milliGRAM(s) Oral two times a day  gabapentin 300 milliGRAM(s) Oral every 8 hours  glucagon  Injectable 1 milliGRAM(s) IntraMuscular once  insulin glargine Injectable (LANTUS) 28 Unit(s) SubCutaneous at bedtime  insulin lispro (ADMELOG) corrective regimen sliding scale   SubCutaneous three times a day before meals  insulin lispro (ADMELOG) corrective regimen sliding scale   SubCutaneous at bedtime  insulin lispro Injectable (ADMELOG) 9 Unit(s) SubCutaneous three times a day before meals  metoprolol tartrate 25 milliGRAM(s) Oral two times a day  OLANZapine 7.5 milliGRAM(s) Oral daily  polyethylene glycol 3350 17 Gram(s) Oral daily  potassium chloride    Tablet ER 20 milliEquivalent(s) Oral every 2 hours  predniSONE   Tablet 10 milliGRAM(s) Oral daily  senna 2 Tablet(s) Oral at bedtime  torsemide 20 milliGRAM(s) Oral daily  traZODone 50 milliGRAM(s) Oral at bedtime    MEDICATIONS  (PRN):  HYDROmorphone   Tablet 2 milliGRAM(s) Oral every 4 hours PRN Severe Pain (7 - 10)          Vital Signs Last 24 Hrs  T(C): 36.4 (02 Dec 2023 11:04), Max: 36.8 (01 Dec 2023 19:31)  T(F): 97.5 (02 Dec 2023 11:04), Max: 98.2 (01 Dec 2023 19:31)  HR: 88 (02 Dec 2023 11:04) (84 - 98)  BP: 97/67 (02 Dec 2023 11:04) (96/64 - 104/74)  BP(mean): 77 (02 Dec 2023 11:04) (77 - 77)  RR: 18 (02 Dec 2023 11:04) (18 - 20)  SpO2: 93% (02 Dec 2023 11:04) (91% - 94%)    Parameters below as of 02 Dec 2023 11:04  Patient On (Oxygen Delivery Method): room air              12-01 @ 07:01  -  12-02 @ 07:00  --------------------------------------------------------  IN: 1000 mL / OUT: 300 mL / NET: 700 mL          LABS:                        9.2    21.30 )-----------( 345      ( 02 Dec 2023 06:07 )             29.9     12-02    136  |  97  |  22  ----------------------------<  95  3.1<L>   |  27  |  0.65    Ca    8.9      02 Dec 2023 06:07  Phos  3.7     12-02  Mg     1.9     12-02            CAPILLARY BLOOD GLUCOSE      POCT Blood Glucose.: 73 mg/dL (02 Dec 2023 11:19)      Urinalysis Basic - ( 02 Dec 2023 06:07 )    Color: x / Appearance: x / SG: x / pH: x  Gluc: 95 mg/dL / Ketone: x  / Bili: x / Urobili: x   Blood: x / Protein: x / Nitrite: x   Leuk Esterase: x / RBC: x / WBC x   Sq Epi: x / Non Sq Epi: x / Bacteria: x          GEORGE Negative 11-09 @ 06:15  Anti SS-1 <0.2  Anti SS-2 <0.2  Anti RNP 0.2   11-09 @ 06:15    Atypical ANCA -- 11-09 @ 06:15  c-ANCA titer -- 11-09 @ 06:15  c-ANCA -- 11-09 @ 06:15  p-ANCA -- 11-09 @ 06:15            Physical Examination:  PULM: CTA bilaterally   CVS: S1, S2 heard    RADIOLOGY REVIEWED  CT chest:    < from: CT Chest No Cont (11.27.23 @ 18:45) >  FINDINGS:    AIRWAYS, LUNGS, PLEURA: Central airways clear. Emphysema. Subsegmental   atelectasis at the lung bases. Small loculated left pleural effusion   located posteriorly andalong the left major fissure.    MEDIASTINUM: Interval aortic valve replacement and CABG with associated   postoperative changes of the mediastinum. There is a partially loculated   pericardial effusion along the left atrioventricular groove with   hyperdense appearance measuring 7 x 4 cm (image 98, coronal series).    Thoracic aorta normal caliber. No large mediastinal nodes.    IMAGED ABDOMEN: Right renal cyst.    SOFT TISSUES and BONES: Sternal wires are present. The inferior most   sternal fragment engages only the left sternal fragment and not the   right. There is separation of the sternal fragments by 1 cm at this level   (image 87, series 3). There is fluid interposed between the lower sternal   fragments. Gas within the retrosternal region. Infiltrative changes and   gas are present within the anterior chest wall subcutaneous soft tissues.   Anterior chest wall loop recorder device. Right posterior first rib   fracture.    IMPRESSION:.    The inferior most sternal fragment engages only the left sternal fragment   and not the right, which is suggestive of dehiscence. There is separation   of sternal fragments by 1 cm at this level.    Fluid is located between the sternal fragments and there is a   retrosternal fluid and gas; these findings are favored to represent   postoperative changes, but infection should be considered in the   appropriate clinical scenario.    Partially loculated pericardial effusion along the left atrioventricular   groove measuring 7 x 4 cm with hyperdense component likely representing   hemopericardium.    No evidence of pneumonia.    Small loculated left pleural effusion.    < end of copied text >

## 2023-12-02 NOTE — PROGRESS NOTE ADULT - SUBJECTIVE AND OBJECTIVE BOX
Chief complaint    Patient is a 58y old  Female who presents with a chief complaint of AVR   C1LIMA (01 Dec 2023 13:53)   Review of systems  Patient appears comfortable.    Labs and Fingersticks  CAPILLARY BLOOD GLUCOSE      POCT Blood Glucose.: 146 mg/dL (02 Dec 2023 07:44)  POCT Blood Glucose.: 171 mg/dL (01 Dec 2023 21:49)  POCT Blood Glucose.: 124 mg/dL (01 Dec 2023 16:35)  POCT Blood Glucose.: 155 mg/dL (01 Dec 2023 11:42)      Anion Gap: 12 (12-02 @ 06:07)  Anion Gap: 8 (12-01 @ 06:03)      Calcium: 8.9 (12-02 @ 06:07)  Calcium: 8.9 (12-01 @ 06:03)          12-02    136  |  97  |  22  ----------------------------<  95  3.1<L>   |  27  |  0.65    Ca    8.9      02 Dec 2023 06:07  Phos  3.7     12-02  Mg     1.9     12-02                          9.2    21.30 )-----------( 345      ( 02 Dec 2023 06:07 )             29.9     Medications  MEDICATIONS  (STANDING):  albuterol/ipratropium for Nebulization 3 milliLiter(s) Nebulizer every 6 hours  apixaban 5 milliGRAM(s) Oral two times a day  aspirin enteric coated 81 milliGRAM(s) Oral daily  atorvastatin 80 milliGRAM(s) Oral at bedtime  bisacodyl Suppository 10 milliGRAM(s) Rectal once  budesonide 160 MICROgram(s)/formoterol 4.5 MICROgram(s) Inhaler 2 Puff(s) Inhalation two times a day  chlorhexidine 2% Cloths 1 Application(s) Topical daily  dextrose 50% Injectable 50 milliLiter(s) IV Push every 15 minutes  dextrose 50% Injectable 25 milliLiter(s) IV Push every 15 minutes  dextrose Oral Gel 15 Gram(s) Oral once  famotidine    Tablet 20 milliGRAM(s) Oral two times a day  gabapentin 300 milliGRAM(s) Oral every 8 hours  glucagon  Injectable 1 milliGRAM(s) IntraMuscular once  insulin glargine Injectable (LANTUS) 28 Unit(s) SubCutaneous at bedtime  insulin lispro (ADMELOG) corrective regimen sliding scale   SubCutaneous three times a day before meals  insulin lispro (ADMELOG) corrective regimen sliding scale   SubCutaneous at bedtime  insulin lispro Injectable (ADMELOG) 9 Unit(s) SubCutaneous three times a day before meals  metoprolol tartrate 25 milliGRAM(s) Oral two times a day  OLANZapine 7.5 milliGRAM(s) Oral daily  polyethylene glycol 3350 17 Gram(s) Oral daily  potassium chloride    Tablet ER 20 milliEquivalent(s) Oral every 2 hours  predniSONE   Tablet 10 milliGRAM(s) Oral daily  senna 2 Tablet(s) Oral at bedtime  torsemide 20 milliGRAM(s) Oral daily  traZODone 50 milliGRAM(s) Oral at bedtime      Physical Exam  General: Patient appears comfortable.  Vital Signs Last 12 Hrs  T(F): 98.1 (12-02-23 @ 05:21), Max: 98.1 (12-02-23 @ 05:21)  HR: 96 (12-02-23 @ 06:15) (94 - 96)  BP: 104/74 (12-02-23 @ 06:15) (96/64 - 104/74)  BP(mean): --  RR: 20 (12-02-23 @ 06:15) (20 - 20)  SpO2: 94% (12-02-23 @ 06:15) (91% - 94%)  Neck: No palpable thyroid nodules.  CVS: S1S2, No murmurs  Respiratory: No wheezing, no crepitations  GI: Abdomen soft, non tender.    Diagnostics        Radiology:

## 2023-12-03 LAB
ANION GAP SERPL CALC-SCNC: 11 MMOL/L — SIGNIFICANT CHANGE UP (ref 5–17)
ANION GAP SERPL CALC-SCNC: 11 MMOL/L — SIGNIFICANT CHANGE UP (ref 5–17)
BUN SERPL-MCNC: 26 MG/DL — HIGH (ref 7–23)
BUN SERPL-MCNC: 26 MG/DL — HIGH (ref 7–23)
CALCIUM SERPL-MCNC: 8.7 MG/DL — SIGNIFICANT CHANGE UP (ref 8.4–10.5)
CALCIUM SERPL-MCNC: 8.7 MG/DL — SIGNIFICANT CHANGE UP (ref 8.4–10.5)
CHLORIDE SERPL-SCNC: 99 MMOL/L — SIGNIFICANT CHANGE UP (ref 96–108)
CHLORIDE SERPL-SCNC: 99 MMOL/L — SIGNIFICANT CHANGE UP (ref 96–108)
CO2 SERPL-SCNC: 26 MMOL/L — SIGNIFICANT CHANGE UP (ref 22–31)
CO2 SERPL-SCNC: 26 MMOL/L — SIGNIFICANT CHANGE UP (ref 22–31)
CREAT SERPL-MCNC: 0.72 MG/DL — SIGNIFICANT CHANGE UP (ref 0.5–1.3)
CREAT SERPL-MCNC: 0.72 MG/DL — SIGNIFICANT CHANGE UP (ref 0.5–1.3)
EGFR: 96 ML/MIN/1.73M2 — SIGNIFICANT CHANGE UP
EGFR: 96 ML/MIN/1.73M2 — SIGNIFICANT CHANGE UP
GLUCOSE BLDC GLUCOMTR-MCNC: 114 MG/DL — HIGH (ref 70–99)
GLUCOSE BLDC GLUCOMTR-MCNC: 114 MG/DL — HIGH (ref 70–99)
GLUCOSE BLDC GLUCOMTR-MCNC: 132 MG/DL — HIGH (ref 70–99)
GLUCOSE BLDC GLUCOMTR-MCNC: 132 MG/DL — HIGH (ref 70–99)
GLUCOSE BLDC GLUCOMTR-MCNC: 167 MG/DL — HIGH (ref 70–99)
GLUCOSE BLDC GLUCOMTR-MCNC: 167 MG/DL — HIGH (ref 70–99)
GLUCOSE BLDC GLUCOMTR-MCNC: 187 MG/DL — HIGH (ref 70–99)
GLUCOSE BLDC GLUCOMTR-MCNC: 187 MG/DL — HIGH (ref 70–99)
GLUCOSE SERPL-MCNC: 107 MG/DL — HIGH (ref 70–99)
GLUCOSE SERPL-MCNC: 107 MG/DL — HIGH (ref 70–99)
HCT VFR BLD CALC: 27.3 % — LOW (ref 34.5–45)
HCT VFR BLD CALC: 27.3 % — LOW (ref 34.5–45)
HGB BLD-MCNC: 8.5 G/DL — LOW (ref 11.5–15.5)
HGB BLD-MCNC: 8.5 G/DL — LOW (ref 11.5–15.5)
MAGNESIUM SERPL-MCNC: 2.1 MG/DL — SIGNIFICANT CHANGE UP (ref 1.6–2.6)
MAGNESIUM SERPL-MCNC: 2.1 MG/DL — SIGNIFICANT CHANGE UP (ref 1.6–2.6)
MCHC RBC-ENTMCNC: 29.7 PG — SIGNIFICANT CHANGE UP (ref 27–34)
MCHC RBC-ENTMCNC: 29.7 PG — SIGNIFICANT CHANGE UP (ref 27–34)
MCHC RBC-ENTMCNC: 31.1 GM/DL — LOW (ref 32–36)
MCHC RBC-ENTMCNC: 31.1 GM/DL — LOW (ref 32–36)
MCV RBC AUTO: 95.5 FL — SIGNIFICANT CHANGE UP (ref 80–100)
MCV RBC AUTO: 95.5 FL — SIGNIFICANT CHANGE UP (ref 80–100)
NRBC # BLD: 0 /100 WBCS — SIGNIFICANT CHANGE UP (ref 0–0)
NRBC # BLD: 0 /100 WBCS — SIGNIFICANT CHANGE UP (ref 0–0)
PHOSPHATE SERPL-MCNC: 3.1 MG/DL — SIGNIFICANT CHANGE UP (ref 2.5–4.5)
PHOSPHATE SERPL-MCNC: 3.1 MG/DL — SIGNIFICANT CHANGE UP (ref 2.5–4.5)
PLATELET # BLD AUTO: 326 K/UL — SIGNIFICANT CHANGE UP (ref 150–400)
PLATELET # BLD AUTO: 326 K/UL — SIGNIFICANT CHANGE UP (ref 150–400)
POTASSIUM SERPL-MCNC: 3.7 MMOL/L — SIGNIFICANT CHANGE UP (ref 3.5–5.3)
POTASSIUM SERPL-MCNC: 3.7 MMOL/L — SIGNIFICANT CHANGE UP (ref 3.5–5.3)
POTASSIUM SERPL-SCNC: 3.7 MMOL/L — SIGNIFICANT CHANGE UP (ref 3.5–5.3)
POTASSIUM SERPL-SCNC: 3.7 MMOL/L — SIGNIFICANT CHANGE UP (ref 3.5–5.3)
RBC # BLD: 2.86 M/UL — LOW (ref 3.8–5.2)
RBC # BLD: 2.86 M/UL — LOW (ref 3.8–5.2)
RBC # FLD: 16.6 % — HIGH (ref 10.3–14.5)
RBC # FLD: 16.6 % — HIGH (ref 10.3–14.5)
SODIUM SERPL-SCNC: 136 MMOL/L — SIGNIFICANT CHANGE UP (ref 135–145)
SODIUM SERPL-SCNC: 136 MMOL/L — SIGNIFICANT CHANGE UP (ref 135–145)
WBC # BLD: 18.95 K/UL — HIGH (ref 3.8–10.5)
WBC # BLD: 18.95 K/UL — HIGH (ref 3.8–10.5)
WBC # FLD AUTO: 18.95 K/UL — HIGH (ref 3.8–10.5)
WBC # FLD AUTO: 18.95 K/UL — HIGH (ref 3.8–10.5)

## 2023-12-03 RX ORDER — POTASSIUM CHLORIDE 20 MEQ
20 PACKET (EA) ORAL ONCE
Refills: 0 | Status: COMPLETED | OUTPATIENT
Start: 2023-12-03 | End: 2023-12-03

## 2023-12-03 RX ORDER — SORBITOL SOLUTION 70 %
15 SOLUTION, ORAL MISCELLANEOUS ONCE
Refills: 0 | Status: COMPLETED | OUTPATIENT
Start: 2023-12-03 | End: 2023-12-03

## 2023-12-03 RX ADMIN — FAMOTIDINE 20 MILLIGRAM(S): 10 INJECTION INTRAVENOUS at 17:36

## 2023-12-03 RX ADMIN — Medication 50 MILLIGRAM(S): at 22:01

## 2023-12-03 RX ADMIN — HYDROMORPHONE HYDROCHLORIDE 2 MILLIGRAM(S): 2 INJECTION INTRAMUSCULAR; INTRAVENOUS; SUBCUTANEOUS at 17:36

## 2023-12-03 RX ADMIN — Medication 15 MILLILITER(S): at 12:29

## 2023-12-03 RX ADMIN — APIXABAN 5 MILLIGRAM(S): 2.5 TABLET, FILM COATED ORAL at 05:14

## 2023-12-03 RX ADMIN — Medication 25 MILLIGRAM(S): at 05:13

## 2023-12-03 RX ADMIN — OLANZAPINE 7.5 MILLIGRAM(S): 15 TABLET, FILM COATED ORAL at 12:28

## 2023-12-03 RX ADMIN — GABAPENTIN 300 MILLIGRAM(S): 400 CAPSULE ORAL at 22:01

## 2023-12-03 RX ADMIN — HYDROMORPHONE HYDROCHLORIDE 2 MILLIGRAM(S): 2 INJECTION INTRAMUSCULAR; INTRAVENOUS; SUBCUTANEOUS at 05:45

## 2023-12-03 RX ADMIN — Medication 25 MILLIGRAM(S): at 17:36

## 2023-12-03 RX ADMIN — HYDROMORPHONE HYDROCHLORIDE 2 MILLIGRAM(S): 2 INJECTION INTRAMUSCULAR; INTRAVENOUS; SUBCUTANEOUS at 18:30

## 2023-12-03 RX ADMIN — ATORVASTATIN CALCIUM 80 MILLIGRAM(S): 80 TABLET, FILM COATED ORAL at 22:01

## 2023-12-03 RX ADMIN — Medication 20 MILLIEQUIVALENT(S): at 16:31

## 2023-12-03 RX ADMIN — Medication 650 MILLIGRAM(S): at 01:03

## 2023-12-03 RX ADMIN — HYDROMORPHONE HYDROCHLORIDE 2 MILLIGRAM(S): 2 INJECTION INTRAMUSCULAR; INTRAVENOUS; SUBCUTANEOUS at 06:15

## 2023-12-03 RX ADMIN — Medication 650 MILLIGRAM(S): at 12:28

## 2023-12-03 RX ADMIN — BUDESONIDE AND FORMOTEROL FUMARATE DIHYDRATE 2 PUFF(S): 160; 4.5 AEROSOL RESPIRATORY (INHALATION) at 17:39

## 2023-12-03 RX ADMIN — Medication 20 MILLIGRAM(S): at 05:14

## 2023-12-03 RX ADMIN — Medication 3 MILLILITER(S): at 05:13

## 2023-12-03 RX ADMIN — Medication 3 MILLILITER(S): at 12:28

## 2023-12-03 RX ADMIN — POLYETHYLENE GLYCOL 3350 17 GRAM(S): 17 POWDER, FOR SOLUTION ORAL at 09:26

## 2023-12-03 RX ADMIN — Medication 81 MILLIGRAM(S): at 12:28

## 2023-12-03 RX ADMIN — SENNA PLUS 2 TABLET(S): 8.6 TABLET ORAL at 22:01

## 2023-12-03 RX ADMIN — Medication 650 MILLIGRAM(S): at 13:20

## 2023-12-03 RX ADMIN — GABAPENTIN 300 MILLIGRAM(S): 400 CAPSULE ORAL at 13:11

## 2023-12-03 RX ADMIN — Medication 9 UNIT(S): at 17:31

## 2023-12-03 RX ADMIN — Medication 650 MILLIGRAM(S): at 01:33

## 2023-12-03 RX ADMIN — INSULIN GLARGINE 28 UNIT(S): 100 INJECTION, SOLUTION SUBCUTANEOUS at 22:01

## 2023-12-03 RX ADMIN — Medication 3 MILLILITER(S): at 17:37

## 2023-12-03 RX ADMIN — Medication 10 MILLIGRAM(S): at 05:14

## 2023-12-03 RX ADMIN — Medication 1: at 07:55

## 2023-12-03 RX ADMIN — Medication 1: at 11:53

## 2023-12-03 RX ADMIN — Medication 9 UNIT(S): at 07:56

## 2023-12-03 RX ADMIN — Medication 9 UNIT(S): at 11:53

## 2023-12-03 RX ADMIN — APIXABAN 5 MILLIGRAM(S): 2.5 TABLET, FILM COATED ORAL at 17:35

## 2023-12-03 RX ADMIN — FAMOTIDINE 20 MILLIGRAM(S): 10 INJECTION INTRAVENOUS at 05:14

## 2023-12-03 RX ADMIN — BUDESONIDE AND FORMOTEROL FUMARATE DIHYDRATE 2 PUFF(S): 160; 4.5 AEROSOL RESPIRATORY (INHALATION) at 05:13

## 2023-12-03 RX ADMIN — GABAPENTIN 300 MILLIGRAM(S): 400 CAPSULE ORAL at 05:14

## 2023-12-03 RX ADMIN — CHLORHEXIDINE GLUCONATE 1 APPLICATION(S): 213 SOLUTION TOPICAL at 12:24

## 2023-12-03 NOTE — PROGRESS NOTE ADULT - ASSESSMENT
58F smoker with PMH CVA with L sided weakness/numbness/L gaze deviation, DM2 with b/l peripheral neuropathy, COPD/Asthma, Breast Ca s/p ?RT,  Bipolar depression, Rheumatoid Arthritis,  Antiphospholipid syndrome, and L eye uveitis/scleritis.   Assessment  DMT2: 58y Female with DM T2 with hyperglycemia, A1C 7.6% , was on oral meds at home, now postop CABG, now transitioned to basal bolus, sugars  stable, eating meals.  Insulin teaching  Severe AR: CTS eval, LESLEY, s/p ct surgery   CAD: on medications, stable, monitored. s/p Cardiac MR viability , now postop CABG  HTN: on antihypertensive medications, monitored, asymptomatic.  Obesity: No strict exercise routines, not on any weight loss program, neither on low calorie diet.        Discussed plan and management with Dr Ladarius Rivera NP - TEAMS  Skye Durand MD  Cell: [phone]  Office: [phone]    58F smoker with PMH CVA with L sided weakness/numbness/L gaze deviation, DM2 with b/l peripheral neuropathy, COPD/Asthma, Breast Ca s/p ?RT,  Bipolar depression, Rheumatoid Arthritis,  Antiphospholipid syndrome, and L eye uveitis/scleritis.   Assessment  DMT2: 58y Female with DM T2 with hyperglycemia, A1C 7.6% , was on oral meds at home, now postop CABG, now transitioned to basal bolus, sugars  stable, eating meals.  Insulin teaching  Severe AR: CTS eval, LESLEY, s/p ct surgery   CAD: on medications, stable, monitored. s/p Cardiac MR viability , now postop CABG  HTN: on antihypertensive medications, monitored, asymptomatic.  Obesity: No strict exercise routines, not on any weight loss program, neither on low calorie diet.        Discussed plan and management with Dr Ladarius Rivera NP - TEAMS  Skye Durand MD  Cell: [phone]  Office: [phone]    58F smoker with PMH CVA with L sided weakness/numbness/L gaze deviation, DM2 with b/l peripheral neuropathy, COPD/Asthma, Breast Ca s/p ?RT,  Bipolar depression, Rheumatoid Arthritis,  Antiphospholipid syndrome, and L eye uveitis/scleritis.   Assessment  DMT2: 58y Female with DM T2 with hyperglycemia, A1C 7.6% , was on oral meds at home, now postop CABG, now transitioned to basal bolus, sugars  stable, eating meals.  Insulin teaching  Severe AR: CTS eval, LESLEY, s/p ct surgery   CAD: on medications, stable, monitored. s/p Cardiac MR viability , now postop CABG  HTN: on antihypertensive medications, monitored, asymptomatic.  Obesity: No strict exercise routines, not on any weight loss program, neither on low calorie diet.        Discussed plan and management with Dr Ladarius Rivera NP - TEAMS  Skye Durand MD  Cell: [phone]  Office: [phone]

## 2023-12-03 NOTE — PROGRESS NOTE ADULT - SUBJECTIVE AND OBJECTIVE BOX
Chief complaint  Patient is a 58y old  Female who presents with a chief complaint of AVR   [id number] (01 Dec 2023 13:53)         Labs and Fingersticks  CAPILLARY BLOOD GLUCOSE      POCT Blood Glucose.: 187 mg/dL (03 Dec 2023 11:28)  POCT Blood Glucose.: 167 mg/dL (03 Dec 2023 07:29)  POCT Blood Glucose.: 174 mg/dL (02 Dec 2023 21:33)  POCT Blood Glucose.: 181 mg/dL (02 Dec 2023 16:31)  POCT Blood Glucose.: 165 mg/dL (02 Dec 2023 12:33)      Anion Gap: 11 (12-03 @ 06:02)  Anion Gap: 12 (12-02 @ 06:07)      Calcium: 8.7 (12-03 @ 06:02)  Calcium: 8.9 (12-02 @ 06:07)          12-03    136  |  99  |  26<H>  ----------------------------<  107<H>  3.7   |  26  |  0.72    Ca    8.7      03 Dec 2023 06:02  Phos  3.1     12-03  Mg     2.1     12-03                          8.5    18.95 )-----------( 326      ( 03 Dec 2023 06:02 )             27.3     Medications  MEDICATIONS  (STANDING):  albuterol/ipratropium for Nebulization 3 milliLiter(s) Nebulizer every 6 hours  apixaban 5 milliGRAM(s) Oral two times a day  aspirin enteric coated 81 milliGRAM(s) Oral daily  atorvastatin 80 milliGRAM(s) Oral at bedtime  bisacodyl Suppository 10 milliGRAM(s) Rectal once  budesonide 160 MICROgram(s)/formoterol 4.5 MICROgram(s) Inhaler 2 Puff(s) Inhalation two times a day  chlorhexidine 2% Cloths 1 Application(s) Topical daily  dextrose 50% Injectable 50 milliLiter(s) IV Push every 15 minutes  dextrose 50% Injectable 25 milliLiter(s) IV Push every 15 minutes  dextrose Oral Gel 15 Gram(s) Oral once  famotidine    Tablet 20 milliGRAM(s) Oral two times a day  gabapentin 300 milliGRAM(s) Oral every 8 hours  glucagon  Injectable 1 milliGRAM(s) IntraMuscular once  insulin glargine Injectable (LANTUS) 28 Unit(s) SubCutaneous at bedtime  insulin lispro (ADMELOG) corrective regimen sliding scale   SubCutaneous at bedtime  insulin lispro (ADMELOG) corrective regimen sliding scale   SubCutaneous three times a day before meals  insulin lispro Injectable (ADMELOG) 9 Unit(s) SubCutaneous three times a day before meals  metoprolol tartrate 25 milliGRAM(s) Oral two times a day  OLANZapine 7.5 milliGRAM(s) Oral daily  polyethylene glycol 3350 17 Gram(s) Oral daily  predniSONE   Tablet 10 milliGRAM(s) Oral daily  senna 2 Tablet(s) Oral at bedtime  sorbitol 70% Solution 15 milliLiter(s) Oral once  torsemide 20 milliGRAM(s) Oral daily  traZODone 50 milliGRAM(s) Oral at bedtime      Physical Exam  General: Patient comfortable in bed   Vital Signs Last 12 Hrs  T(F): 98.4 (12-03-23 @ 11:02), Max: 98.4 (12-03-23 @ 11:02)  HR: 85 (12-03-23 @ 11:02) (85 - 85)  BP: 96/58 (12-03-23 @ 11:02) (96/58 - 113/75)  BP(mean): 71 (12-03-23 @ 11:02) (71 - 87)  RR: 18 (12-03-23 @ 11:02) (18 - 18)  SpO2: 96% (12-03-23 @ 11:02) (95% - 96%)    CVS: S1S2   Respiratory: No wheezing, no crepitations  GI: Abdomen soft, bowel sounds positive  Musculoskeletal:  moves all extremities  : Voiding

## 2023-12-03 NOTE — PROGRESS NOTE ADULT - PROBLEM SELECTOR PLAN 4
Psych following- continue Zyprexa 7.5 daily, trazodone 50 HS  [facility] outpt mental health clinic f/u at [phone] Psych following- continue Zyprexa 7.5 daily, trazodone 50 HS  [facility] outpt mental health clinic f/u at [phone] Psych following- continue Zyprexa 7.5 daily, trazodone 50 HS  [facility] outpt mental health clinic f/u at [phone]

## 2023-12-03 NOTE — PROGRESS NOTE ADULT - SUBJECTIVE AND OBJECTIVE BOX
VITAL SIGNS    Telemetry:    Vital Signs Last 24 Hrs  T(C): 36.5 (23 @ 04:37), Max: 36.5 (23 @ 19:00)  T(F): 97.7 (23 @ 04:37), Max: 97.7 (23 @ 19:00)  HR: 85 (23 @ 04:37) (85 - 93)  BP: 113/75 (23 @ 04:37) (97/66 - 113/75)  RR: 18 (23 @ 04:37) (18 - 20)  SpO2: 95% (23 @ 04:37) (88% - 96%)             @ 07:01  -   @ 07:00  --------------------------------------------------------  IN: 920 mL / OUT: 1300 mL / NET: -380 mL     @ 07:01  -   @ 09:11  --------------------------------------------------------  IN: 240 mL / OUT: 350 mL / NET: -110 mL       Daily     Daily Weight in k.5 (03 Dec 2023 07:02)  Admit Wt: Drug Dosing Weight  Height (cm): 155 (2023 08:00)  Weight (kg): 86.9 (2023 08:00)  BMI (kg/m2): 36.2 (2023 08:00)  BSA (m2): 1.86 (2023 08:00)      CAPILLARY BLOOD GLUCOSE      POCT Blood Glucose.: 167 mg/dL (03 Dec 2023 07:29)  POCT Blood Glucose.: 174 mg/dL (02 Dec 2023 21:33)  POCT Blood Glucose.: 181 mg/dL (02 Dec 2023 16:31)  POCT Blood Glucose.: 165 mg/dL (02 Dec 2023 12:33)  POCT Blood Glucose.: 73 mg/dL (02 Dec 2023 11:19)          LABS:     @ 07:  -   @ 07:00  --------------------------------------------------------  IN: 920 mL / OUT: 1300 mL / NET: -380 mL     @ 07:01  -   @ 09:11  --------------------------------------------------------  IN: 240 mL / OUT: 350 mL / NET: -110 mL    cret                        8.5    18.95 )-----------( 326      ( 03 Dec 2023 06:02 )             27.3         136  |  99  |  26<H>  ----------------------------<  107<H>  3.7   |  26  |  0.72    Ca    8.7      03 Dec 2023 06:02  Phos  3.1       Mg     2.1               acetaminophen     Tablet .. 650 milliGRAM(s) Oral every 6 hours PRN  albuterol/ipratropium for Nebulization 3 milliLiter(s) Nebulizer every 6 hours  apixaban 5 milliGRAM(s) Oral two times a day  aspirin enteric coated 81 milliGRAM(s) Oral daily  atorvastatin 80 milliGRAM(s) Oral at bedtime  bisacodyl Suppository 10 milliGRAM(s) Rectal once  budesonide 160 MICROgram(s)/formoterol 4.5 MICROgram(s) Inhaler 2 Puff(s) Inhalation two times a day  chlorhexidine 2% Cloths 1 Application(s) Topical daily  dextrose 50% Injectable 50 milliLiter(s) IV Push every 15 minutes  dextrose 50% Injectable 25 milliLiter(s) IV Push every 15 minutes  dextrose Oral Gel 15 Gram(s) Oral once  famotidine    Tablet 20 milliGRAM(s) Oral two times a day  gabapentin 300 milliGRAM(s) Oral every 8 hours  glucagon  Injectable 1 milliGRAM(s) IntraMuscular once  HYDROmorphone   Tablet 2 milliGRAM(s) Oral every 4 hours PRN  insulin glargine Injectable (LANTUS) 28 Unit(s) SubCutaneous at bedtime  insulin lispro (ADMELOG) corrective regimen sliding scale   SubCutaneous at bedtime  insulin lispro (ADMELOG) corrective regimen sliding scale   SubCutaneous three times a day before meals  insulin lispro Injectable (ADMELOG) 9 Unit(s) SubCutaneous three times a day before meals  metoprolol tartrate 25 milliGRAM(s) Oral two times a day  OLANZapine 7.5 milliGRAM(s) Oral daily  polyethylene glycol 3350 17 Gram(s) Oral daily  predniSONE   Tablet 10 milliGRAM(s) Oral daily  senna 2 Tablet(s) Oral at bedtime  torsemide 20 milliGRAM(s) Oral daily  traZODone 50 milliGRAM(s) Oral at bedtime      PHYSICAL EXAM    Subjective: "Hi.   Neurology: alert and oriented x 3, nonfocal, no gross deficits  CV : tele:  RSR  Sternal Wound :  CDI with dressing , Stable  Lungs: clear. RR easy, unlabored   Abdomen: soft, nontender, nondistended, positive bowel sounds, bowel movement   Neg N/V/D   :  pt voiding without difficulty   Extremities:   DEWITT; edema, neg calf tenderness.   PPP bilaterally      PW:  Chest tubes:                 VITAL SIGNS    Telemetry:  rsr 80-90 brief pat   Vital Signs Last 24 Hrs  T(C): 36.5 (23 @ 04:37), Max: 36.5 (23 @ 19:00)  T(F): 97.7 (23 @ 04:37), Max: 97.7 (23 @ 19:00)  HR: 85 (23 @ 04:37) (85 - 93)  BP: 113/75 (23 @ 04:37) (97/66 - 113/75)  RR: 18 (23 @ 04:37) (18 - 20)  SpO2: 95% (23 @ 04:37) (88% - 96%)             @ 07:01  -   @ 07:00  --------------------------------------------------------  IN: 920 mL / OUT: 1300 mL / NET: -380 mL     @ 07:01  -   @ 09:11  --------------------------------------------------------  IN: 240 mL / OUT: 350 mL / NET: -110 mL       Daily     Daily Weight in k.5 (03 Dec 2023 07:02)  Admit Wt: Drug Dosing Weight  Height (cm): 155 (2023 08:00)  Weight (kg): 86.9 (2023 08:00)  BMI (kg/m2): 36.2 (2023 08:00)  BSA (m2): 1.86 (2023 08:00)      CAPILLARY BLOOD GLUCOSE      POCT Blood Glucose.: 167 mg/dL (03 Dec 2023 07:29)  POCT Blood Glucose.: 174 mg/dL (02 Dec 2023 21:33)  POCT Blood Glucose.: 181 mg/dL (02 Dec 2023 16:31)  POCT Blood Glucose.: 165 mg/dL (02 Dec 2023 12:33)  POCT Blood Glucose.: 73 mg/dL (02 Dec 2023 11:19)          LABS:     @ 07:  -   @ 07:00  --------------------------------------------------------  IN: 920 mL / OUT: 1300 mL / NET: -380 mL     @ 07:01  -   @ 09:11  --------------------------------------------------------  IN: 240 mL / OUT: 350 mL / NET: -110 mL    cret                        8.5    18.95 )-----------( 326      ( 03 Dec 2023 06:02 )             27.3         136  |  99  |  26<H>  ----------------------------<  107<H>  3.7   |  26  |  0.72    Ca    8.7      03 Dec 2023 06:02  Phos  3.1       Mg     2.1               acetaminophen     Tablet .. 650 milliGRAM(s) Oral every 6 hours PRN  albuterol/ipratropium for Nebulization 3 milliLiter(s) Nebulizer every 6 hours  apixaban 5 milliGRAM(s) Oral two times a day  aspirin enteric coated 81 milliGRAM(s) Oral daily  atorvastatin 80 milliGRAM(s) Oral at bedtime  bisacodyl Suppository 10 milliGRAM(s) Rectal once  budesonide 160 MICROgram(s)/formoterol 4.5 MICROgram(s) Inhaler 2 Puff(s) Inhalation two times a day  chlorhexidine 2% Cloths 1 Application(s) Topical daily  dextrose 50% Injectable 50 milliLiter(s) IV Push every 15 minutes  dextrose 50% Injectable 25 milliLiter(s) IV Push every 15 minutes  dextrose Oral Gel 15 Gram(s) Oral once  famotidine    Tablet 20 milliGRAM(s) Oral two times a day  gabapentin 300 milliGRAM(s) Oral every 8 hours  glucagon  Injectable 1 milliGRAM(s) IntraMuscular once  HYDROmorphone   Tablet 2 milliGRAM(s) Oral every 4 hours PRN  insulin glargine Injectable (LANTUS) 28 Unit(s) SubCutaneous at bedtime  insulin lispro (ADMELOG) corrective regimen sliding scale   SubCutaneous at bedtime  insulin lispro (ADMELOG) corrective regimen sliding scale   SubCutaneous three times a day before meals  insulin lispro Injectable (ADMELOG) 9 Unit(s) SubCutaneous three times a day before meals  metoprolol tartrate 25 milliGRAM(s) Oral two times a day  OLANZapine 7.5 milliGRAM(s) Oral daily  polyethylene glycol 3350 17 Gram(s) Oral daily  predniSONE   Tablet 10 milliGRAM(s) Oral daily  senna 2 Tablet(s) Oral at bedtime  torsemide 20 milliGRAM(s) Oral daily  traZODone 50 milliGRAM(s) Oral at bedtime        PHYSICAL EXAM    Subjective: "Heh."   Neurology: alert and oriented x 3, nonfocal, no gross deficits  CV : tele:  RSR 80-90 with brief pat;   Sternal Wound :  CDI SHARON- sternum stable   Lungs: clear. RR easy, unlabored   Abdomen: soft, nontender, nondistended, positive bowel sounds,  + bowel movement   Neg N/V/D; obese abdomen   :  pt voiding without difficulty   Extremities:   DEWITT; +2 LE edema, neg calf tenderness.   PPP bilaterally      PW: no  Chest tubes: none

## 2023-12-03 NOTE — PROGRESS NOTE ADULT - ASSESSMENT
58 female smoker with PMH CVA with residual Left-sided weakness/numbness/L gaze deviation, DM2 with b/l peripheral neuropathy, COPD/Asthma, Breast Ca s/p ?RT, Bipolar depression, Rheumatoid Arthritis, Antiphospholipid syndrome, and L eye uveitis/scleritis, Presents to [facility] transferred from [facility]. p/w multiple medical complaints. A/w exacerbation of L-sided subjective weakness/numbness likely 2/2 recrudescence of prior Right BG infarct. Cardiology following for new LV dysfunction since July, pending ischemic eval. GABBY was done showing severe Aortic Insufficiency and moderated MR. Cardiac cath was done showing LAD prox 100% fills via right to left collateral. Patient now transferred to [facility] Dr. Loaiza for evaluation.    HOSPITAL COURSE:   11/14 patient seen and examined at bedside. All labs and imaging to be reviewed by Dr. Loaiza   11/15:VSS, neuro consult for hx cva L sided weakness, Preop for Friday 11/16 Repeat echo shows only mild valve pathology  11/17 No valve surgery planned in light of repeat echo.  Getting MR viability for CAD and depressed LV function.  11/18  vss for rpt gabby mon  11/19  npo after midnight for gabby tomorrow  11/20 VSS NPO for GABBY this am=> severe AR  11/21: s/p CABG LIMA-LAD, AVR(t) - Inspiris 23mm, LAAL with AtriClip 40mm,   +Substernal mediastinal Chest tube and Left pleural Chest tube. Kept Intubated post op. Taken to CTU. Levophed, Vasopressin and Primacor switched to Dobutamine gtt.  Started on PO Amio for afib ppx. +Soliz. On insulin gtt for tight glycemic control to prevent wound infxn.  11/22: POD1. Extubated. Inotropes and Pressors maintained. PCA Hydromorphone initiated by Acute Pain Mgmt team. Insulin gtt maintained per Endocrine. PT/OT evaluation.  11/23 POD 2. PCA Hydromorphone maintained. Insulin gtt maintained, started basal insulin tonight per Endo. Dobutamine gtt continued.   11/24 POD 3. Dobutamine gtt d/c'ed in AM. PCA pumped d/c'ed. MED and Pleural Chest tubes d/c'ed while in CTU. (+) PW to EPM (40/10/0.8). Prevena dressing intact. +Soliz maintained. Monitor I/Os. Back on Insulin gtt for elevated FS >200. Endocrine following. TRANSFERRED TO SDU. Current medication regimen continued.   11/25 VSS - insullin gtt off @ 6am- start low dose BB this am - soliz in place will d/c when pt more mobile.  d/c plan anticipate home PT  11/26 WBC 20 again -om chronic steroids - hx COPD- afebrile - prevena in place.  will conitue to monitor- xtra lasix 20 iv x 1 given - up 6 kgs in weight w/ ++ edema,  40 yr pack current smoker.  nebs.  11/27 VSS. Afebrile. WBC increased 23 (from 20) (Of note, patient on chronic steroids for Hx RA). Repeat CXR today stable. Rpt UA pending. Potassium 3.6 this AM, supplemented. Pt reports hx of Bipolar MDD, with no follow-up with Psychiatry and non-compliance with OP psych meds since July 2023. Pt requesting to speak to Psychiatry today to establish care. Denies SI/HI/AH/VH. On call Psychiatry ([phone]) consulted and aware. Pt also noted with bronchospasms/wheezing - Pulm (Dr. Leone) consulted and aware. Stable to move to floors today, per Dr. Loaiza.  11/28: VSS, refusing PT, needs to increase ambulation/iS/effort. CT with ?sternal dehiscence at inferior pole, will discuss with Dr. Loaiza. Extra IV lasix given for edema.   11/29: VSS. CT Chest imaging reviewed closely by Dr. Loaiza yesterday, no concerned for sternal dehiscence, upon Attending's review. Prevena d/c'd today. Site c/d/i. +PW (isolated). Lidocaine x1 for back pain today, reports improvement. Encouraged ambulation/OOB. Pt working with Physical Therapy. CM to work on Rehab for disposition as recommended by PT. Current medication regimen continued. PW D/liang per Dr. Loaiza. Of note, patient with hx of Antiphospholipid Syndrome and CVA, was on AC therapy pre-surgery; ok to restart AC therapy with Eliquis 5mg BID starting tonight, per Dr. Loaiza.  11/30 VSS rounds made w/ DR. Loaiza.  awaiting d/c to rehab   12/1  Insulin teaching Awaiting for rehab VSS   12/2 VSS, continue diuresis on torsemide 20 daily, BUN/CR 22/0.65, K 3.1 supplemented, check repeat this afternoon.  58 female smoker with PMH CVA with residual Left-sided weakness/numbness/L gaze deviation, DM2 with b/l peripheral neuropathy, COPD/Asthma, Breast Ca s/p ?RT, Bipolar depression, Rheumatoid Arthritis, Antiphospholipid syndrome, and L eye uveitis/scleritis, Presents to [facility] transferred from [facility]. p/w multiple medical complaints. A/w exacerbation of L-sided subjective weakness/numbness likely 2/2 recrudescence of prior Right BG infarct. Cardiology following for new LV dysfunction since July, pending ischemic eval. GABBY was done showing severe Aortic Insufficiency and moderated MR. Cardiac cath was done showing LAD prox 100% fills via right to left collateral. Patient now transferred to [facility] Dr. Loaiza for evaluation.    HOSPITAL COURSE:   11/14 patient seen and examined at bedside. All labs and imaging to be reviewed by Dr. Loaiza   11/15:VSS, neuro consult for hx cva L sided weakness, Preop for Friday 11/16 Repeat echo shows only mild valve pathology  11/17 No valve surgery planned in light of repeat echo.  Getting MR viability for CAD and depressed LV function.  11/18  vss for rpt gabby mon  11/19  npo after midnight for gabby tomorrow  11/20 VSS NPO for GABBY this am=> severe AR  11/21: s/p CABG LIMA-LAD, AVR(t) - Inspiris 23mm, LAAL with AtriClip 40mm,   +Substernal mediastinal Chest tube and Left pleural Chest tube. Kept Intubated post op. Taken to CTU. Levophed, Vasopressin and Primacor switched to Dobutamine gtt.  Started on PO Amio for afib ppx. +Soliz. On insulin gtt for tight glycemic control to prevent wound infxn.  11/22: POD1. Extubated. Inotropes and Pressors maintained. PCA Hydromorphone initiated by Acute Pain Mgmt team. Insulin gtt maintained per Endocrine. PT/OT evaluation.  11/23 POD 2. PCA Hydromorphone maintained. Insulin gtt maintained, started basal insulin tonight per Endo. Dobutamine gtt continued.   11/24 POD 3. Dobutamine gtt d/c'ed in AM. PCA pumped d/c'ed. MED and Pleural Chest tubes d/c'ed while in CTU. (+) PW to EPM (40/10/0.8). Prevena dressing intact. +Soliz maintained. Monitor I/Os. Back on Insulin gtt for elevated FS >200. Endocrine following. TRANSFERRED TO SDU. Current medication regimen continued.   11/25 VSS - insullin gtt off @ 6am- start low dose BB this am - soliz in place will d/c when pt more mobile.  d/c plan anticipate home PT  11/26 WBC 20 again -om chronic steroids - hx COPD- afebrile - prevena in place.  will conitue to monitor- xtra lasix 20 iv x 1 given - up 6 kgs in weight w/ ++ edema,  40 yr pack current smoker.  nebs.  11/27 VSS. Afebrile. WBC increased 23 (from 20) (Of note, patient on chronic steroids for Hx RA). Repeat CXR today stable. Rpt UA pending. Potassium 3.6 this AM, supplemented. Pt reports hx of Bipolar MDD, with no follow-up with Psychiatry and non-compliance with OP psych meds since July 2023. Pt requesting to speak to Psychiatry today to establish care. Denies SI/HI/AH/VH. On call Psychiatry ([phone]) consulted and aware. Pt also noted with bronchospasms/wheezing - Pulm (Dr. Leone) consulted and aware. Stable to move to floors today, per Dr. Loaiza.  11/28: VSS, refusing PT, needs to increase ambulation/iS/effort. CT with ?sternal dehiscence at inferior pole, will discuss with Dr. Loaiza. Extra IV lasix given for edema.   11/29: VSS. CT Chest imaging reviewed closely by Dr. Loaiza yesterday, no concerned for sternal dehiscence, upon Attending's review. Prevena d/c'd today. Site c/d/i. +PW (isolated). Lidocaine x1 for back pain today, reports improvement. Encouraged ambulation/OOB. Pt working with Physical Therapy. CM to work on Rehab for disposition as recommended by PT. Current medication regimen continued. PW D/liang per Dr. Loaiza. Of note, patient with hx of Antiphospholipid Syndrome and CVA, was on AC therapy pre-surgery; ok to restart AC therapy with Eliquis 5mg BID starting tonight, per Dr. Loaiza.  11/30 VSS rounds made w/ DR. Loaiza.  awaiting d/c to rehab   12/1  Insulin teaching Awaiting for rehab VSS   12/2 VSS, continue diuresis on torsemide 20 daily, BUN/CR 22/0.65, K 3.1 supplemented, check repeat this afternoon.  58 female smoker with PMH CVA with residual Left-sided weakness/numbness/L gaze deviation, DM2 with b/l peripheral neuropathy, COPD/Asthma, Breast Ca s/p ?RT, Bipolar depression, Rheumatoid Arthritis, Antiphospholipid syndrome, and L eye uveitis/scleritis, Presents to [facility] transferred from [facility]. p/w multiple medical complaints. A/w exacerbation of L-sided subjective weakness/numbness likely 2/2 recrudescence of prior Right BG infarct. Cardiology following for new LV dysfunction since July, pending ischemic eval. GABBY was done showing severe Aortic Insufficiency and moderated MR. Cardiac cath was done showing LAD prox 100% fills via right to left collateral. Patient now transferred to [facility] Dr. Loaiza for evaluation.    HOSPITAL COURSE:   11/14 patient seen and examined at bedside. All labs and imaging to be reviewed by Dr. Loaiza   11/15:VSS, neuro consult for hx cva L sided weakness, Preop for Friday 11/16 Repeat echo shows only mild valve pathology  11/17 No valve surgery planned in light of repeat echo.  Getting MR viability for CAD and depressed LV function.  11/18  vss for rpt gabby mon  11/19  npo after midnight for gabby tomorrow  11/20 VSS NPO for GABBY this am=> severe AR  11/21: s/p CABG LIMA-LAD, AVR(t) - Inspiris 23mm, LAAL with AtriClip 40mm,   +Substernal mediastinal Chest tube and Left pleural Chest tube. Kept Intubated post op. Taken to CTU. Levophed, Vasopressin and Primacor switched to Dobutamine gtt.  Started on PO Amio for afib ppx. +Soliz. On insulin gtt for tight glycemic control to prevent wound infxn.  11/22: POD1. Extubated. Inotropes and Pressors maintained. PCA Hydromorphone initiated by Acute Pain Mgmt team. Insulin gtt maintained per Endocrine. PT/OT evaluation.  11/23 POD 2. PCA Hydromorphone maintained. Insulin gtt maintained, started basal insulin tonight per Endo. Dobutamine gtt continued.   11/24 POD 3. Dobutamine gtt d/c'ed in AM. PCA pumped d/c'ed. MED and Pleural Chest tubes d/c'ed while in CTU. (+) PW to EPM (40/10/0.8). Prevena dressing intact. +Soliz maintained. Monitor I/Os. Back on Insulin gtt for elevated FS >200. Endocrine following. TRANSFERRED TO SDU. Current medication regimen continued.   11/25 VSS - insullin gtt off @ 6am- start low dose BB this am - soliz in place will d/c when pt more mobile.  d/c plan anticipate home PT  11/26 WBC 20 again -om chronic steroids - hx COPD- afebrile - prevena in place.  will conitue to monitor- xtra lasix 20 iv x 1 given - up 6 kgs in weight w/ ++ edema,  40 yr pack current smoker.  nebs.  11/27 VSS. Afebrile. WBC increased 23 (from 20) (Of note, patient on chronic steroids for Hx RA). Repeat CXR today stable. Rpt UA pending. Potassium 3.6 this AM, supplemented. Pt reports hx of Bipolar MDD, with no follow-up with Psychiatry and non-compliance with OP psych meds since July 2023. Pt requesting to speak to Psychiatry today to establish care. Denies SI/HI/AH/VH. On call Psychiatry ([phone]) consulted and aware. Pt also noted with bronchospasms/wheezing - Pulm (Dr. Leone) consulted and aware. Stable to move to floors today, per Dr. Loaiza.  11/28: VSS, refusing PT, needs to increase ambulation/iS/effort. CT with ?sternal dehiscence at inferior pole, will discuss with Dr. Loaiza. Extra IV lasix given for edema.   11/29: VSS. CT Chest imaging reviewed closely by Dr. Loaiza yesterday, no concerned for sternal dehiscence, upon Attending's review. Prevena d/c'd today. Site c/d/i. +PW (isolated). Lidocaine x1 for back pain today, reports improvement. Encouraged ambulation/OOB. Pt working with Physical Therapy. CM to work on Rehab for disposition as recommended by PT. Current medication regimen continued. PW D/liang per Dr. Loaiza. Of note, patient with hx of Antiphospholipid Syndrome and CVA, was on AC therapy pre-surgery; ok to restart AC therapy with Eliquis 5mg BID starting tonight, per Dr. Loaiza.  11/30 VSS rounds made w/ DR. Loaiza.  awaiting d/c to rehab   12/1  Insulin teaching Awaiting for rehab VSS   12/2 VSS, continue diuresis on torsemide 20 daily, BUN/CR 22/0.65, K 3.1 supplemented, check repeat this afternoon.  58 female smoker with PMH CVA with residual Left-sided weakness/numbness/L gaze deviation, DM2 with b/l peripheral neuropathy, COPD/Asthma, Breast Ca s/p ?RT, Bipolar depression, Rheumatoid Arthritis, Antiphospholipid syndrome, and L eye uveitis/scleritis, Presents to [facility] transferred from [facility]. p/w multiple medical complaints. A/w exacerbation of L-sided subjective weakness/numbness likely 2/2 recrudescence of prior Right BG infarct. Cardiology following for new LV dysfunction since July, pending ischemic eval. GABBY was done showing severe Aortic Insufficiency and moderated MR. Cardiac cath was done showing LAD prox 100% fills via right to left collateral. Patient now transferred to [facility] Dr. Loaiza for evaluation.    HOSPITAL COURSE:   11/14 patient seen and examined at bedside. All labs and imaging to be reviewed by Dr. Loaiza   11/15:VSS, neuro consult for hx cva L sided weakness, Preop for Friday 11/16 Repeat echo shows only mild valve pathology  11/17 No valve surgery planned in light of repeat echo.  Getting MR viability for CAD and depressed LV function.  11/18  vss for rpt gabby mon  11/19  npo after midnight for gabby tomorrow  11/20 VSS NPO for GABBY this am=> severe AR  11/21: s/p CABG LIMA-LAD, AVR(t) - Inspiris 23mm, LAAL with AtriClip 40mm,   +Substernal mediastinal Chest tube and Left pleural Chest tube. Kept Intubated post op. Taken to CTU. Levophed, Vasopressin and Primacor switched to Dobutamine gtt.  Started on PO Amio for afib ppx. +Soliz. On insulin gtt for tight glycemic control to prevent wound infxn.  11/22: POD1. Extubated. Inotropes and Pressors maintained. PCA Hydromorphone initiated by Acute Pain Mgmt team. Insulin gtt maintained per Endocrine. PT/OT evaluation.  11/23 POD 2. PCA Hydromorphone maintained. Insulin gtt maintained, started basal insulin tonight per Endo. Dobutamine gtt continued.   11/24 POD 3. Dobutamine gtt d/c'ed in AM. PCA pumped d/c'ed. MED and Pleural Chest tubes d/c'ed while in CTU. (+) PW to EPM (40/10/0.8). Prevena dressing intact. +Soliz maintained. Monitor I/Os. Back on Insulin gtt for elevated FS >200. Endocrine following. TRANSFERRED TO SDU. Current medication regimen continued.   11/25 VSS - insullin gtt off @ 6am- start low dose BB this am - soliz in place will d/c when pt more mobile.  d/c plan anticipate home PT  11/26 WBC 20 again -om chronic steroids - hx COPD- afebrile - prevena in place.  will conitue to monitor- xtra lasix 20 iv x 1 given - up 6 kgs in weight w/ ++ edema,  40 yr pack current smoker.  nebs.  11/27 VSS. Afebrile. WBC increased 23 (from 20) (Of note, patient on chronic steroids for Hx RA). Repeat CXR today stable. Rpt UA pending. Potassium 3.6 this AM, supplemented. Pt reports hx of Bipolar MDD, with no follow-up with Psychiatry and non-compliance with OP psych meds since July 2023. Pt requesting to speak to Psychiatry today to establish care. Denies SI/HI/AH/VH. On call Psychiatry ([phone]) consulted and aware. Pt also noted with bronchospasms/wheezing - Pulm (Dr. Leone) consulted and aware. Stable to move to floors today, per Dr. Loaiza.  11/28: VSS, refusing PT, needs to increase ambulation/iS/effort. CT with ?sternal dehiscence at inferior pole, will discuss with Dr. Loaiza. Extra IV lasix given for edema.   11/29: VSS. CT Chest imaging reviewed closely by Dr. Loaiza yesterday, no concerned for sternal dehiscence, upon Attending's review. Prevena d/c'd today. Site c/d/i. +PW (isolated). Lidocaine x1 for back pain today, reports improvement. Encouraged ambulation/OOB. Pt working with Physical Therapy. CM to work on Rehab for disposition as recommended by PT. Current medication regimen continued. PW D/liang per Dr. Loaiza. Of note, patient with hx of Antiphospholipid Syndrome and CVA, was on AC therapy pre-surgery; ok to restart AC therapy with Eliquis 5mg BID starting tonight, per Dr. Loaiza.  11/30 VSS rounds made w/ DR. Loaiza.  awaiting d/c to rehab   12/1  Insulin teaching Awaiting for rehab VSS   12/2 VSS, continue diuresis on torsemide 20 daily, BUN/CR 22/0.65, K 3.1 supplemented, check repeat this afternoon.   12/3 VSS; rsr 80-90; continue diuresis for hypervolemia; bun/cr 71/ 58 female smoker with PMH CVA with residual Left-sided weakness/numbness/L gaze deviation, DM2 with b/l peripheral neuropathy, COPD/Asthma, Breast Ca s/p ?RT, Bipolar depression, Rheumatoid Arthritis, Antiphospholipid syndrome, and L eye uveitis/scleritis, Presents to [facility] transferred from [facility]. p/w multiple medical complaints. A/w exacerbation of L-sided subjective weakness/numbness likely 2/2 recrudescence of prior Right BG infarct. Cardiology following for new LV dysfunction since July, pending ischemic eval. GABBY was done showing severe Aortic Insufficiency and moderated MR. Cardiac cath was done showing LAD prox 100% fills via right to left collateral. Patient now transferred to [facility] Dr. Loaiza for evaluation.    HOSPITAL COURSE:   11/14 patient seen and examined at bedside. All labs and imaging to be reviewed by Dr. Loaiza   11/15:VSS, neuro consult for hx cva L sided weakness, Preop for Friday 11/16 Repeat echo shows only mild valve pathology  11/17 No valve surgery planned in light of repeat echo.  Getting MR viability for CAD and depressed LV function.  11/18  vss for rpt gabby mon  11/19  npo after midnight for gabby tomorrow  11/20 VSS NPO for GABBY this am=> severe AR  11/21: s/p CABG LIMA-LAD, AVR(t) - Inspiris 23mm, LAAL with AtriClip 40mm,   +Substernal mediastinal Chest tube and Left pleural Chest tube. Kept Intubated post op. Taken to CTU. Levophed, Vasopressin and Primacor switched to Dobutamine gtt.  Started on PO Amio for afib ppx. +Soliz. On insulin gtt for tight glycemic control to prevent wound infxn.  11/22: POD1. Extubated. Inotropes and Pressors maintained. PCA Hydromorphone initiated by Acute Pain Mgmt team. Insulin gtt maintained per Endocrine. PT/OT evaluation.  11/23 POD 2. PCA Hydromorphone maintained. Insulin gtt maintained, started basal insulin tonight per Endo. Dobutamine gtt continued.   11/24 POD 3. Dobutamine gtt d/c'ed in AM. PCA pumped d/c'ed. MED and Pleural Chest tubes d/c'ed while in CTU. (+) PW to EPM (40/10/0.8). Prevena dressing intact. +Soliz maintained. Monitor I/Os. Back on Insulin gtt for elevated FS >200. Endocrine following. TRANSFERRED TO SDU. Current medication regimen continued.   11/25 VSS - insullin gtt off @ 6am- start low dose BB this am - soliz in place will d/c when pt more mobile.  d/c plan anticipate home PT  11/26 WBC 20 again -om chronic steroids - hx COPD- afebrile - prevena in place.  will conitue to monitor- xtra lasix 20 iv x 1 given - up 6 kgs in weight w/ ++ edema,  40 yr pack current smoker.  nebs.  11/27 VSS. Afebrile. WBC increased 23 (from 20) (Of note, patient on chronic steroids for Hx RA). Repeat CXR today stable. Rpt UA pending. Potassium 3.6 this AM, supplemented. Pt reports hx of Bipolar MDD, with no follow-up with Psychiatry and non-compliance with OP psych meds since July 2023. Pt requesting to speak to Psychiatry today to establish care. Denies SI/HI/AH/VH. On call Psychiatry ([phone]) consulted and aware. Pt also noted with bronchospasms/wheezing - Pulm (Dr. Leone) consulted and aware. Stable to move to floors today, per Dr. Loaiza.  11/28: VSS, refusing PT, needs to increase ambulation/iS/effort. CT with ?sternal dehiscence at inferior pole, will discuss with Dr. Loaiza. Extra IV lasix given for edema.   11/29: VSS. CT Chest imaging reviewed closely by Dr. Loaiza yesterday, no concerned for sternal dehiscence, upon Attending's review. Prevena d/c'd today. Site c/d/i. +PW (isolated). Lidocaine x1 for back pain today, reports improvement. Encouraged ambulation/OOB. Pt working with Physical Therapy. CM to work on Rehab for disposition as recommended by PT. Current medication regimen continued. PW D/liang per Dr. Loaiza. Of note, patient with hx of Antiphospholipid Syndrome and CVA, was on AC therapy pre-surgery; ok to restart AC therapy with Eliquis 5mg BID starting tonight, per Dr. Loaiza.  11/30 VSS rounds made w/ DR. Loaiza.  awaiting d/c to rehab   12/1  Insulin teaching Awaiting for rehab VSS   12/2 VSS, continue diuresis on torsemide 20 daily, BUN/CR 22/0.65, K 3.1 supplemented, check repeat this afternoon.   12/3 VSS; rsr 80-90; continue diuresis for hypervolemia; bun/cr 71/ 58 female smoker with PMH CVA with residual Left-sided weakness/numbness/L gaze deviation, DM2 with b/l peripheral neuropathy, COPD/Asthma, Breast Ca s/p ?RT, Bipolar depression, Rheumatoid Arthritis, Antiphospholipid syndrome, and L eye uveitis/scleritis, Presents to [facility] transferred from [facility]. p/w multiple medical complaints. A/w exacerbation of L-sided subjective weakness/numbness likely 2/2 recrudescence of prior Right BG infarct. Cardiology following for new LV dysfunction since July, pending ischemic eval. GABBY was done showing severe Aortic Insufficiency and moderated MR. Cardiac cath was done showing LAD prox 100% fills via right to left collateral. Patient now transferred to [facility] Dr. Loaiza for evaluation.    HOSPITAL COURSE:   11/14 patient seen and examined at bedside. All labs and imaging to be reviewed by Dr. Loaiza   11/15:VSS, neuro consult for hx cva L sided weakness, Preop for Friday 11/16 Repeat echo shows only mild valve pathology  11/17 No valve surgery planned in light of repeat echo.  Getting MR viability for CAD and depressed LV function.  11/18  vss for rpt gabby mon  11/19  npo after midnight for gabby tomorrow  11/20 VSS NPO for GABBY this am=> severe AR  11/21: s/p CABG LIMA-LAD, AVR(t) - Inspiris 23mm, LAAL with AtriClip 40mm,   +Substernal mediastinal Chest tube and Left pleural Chest tube. Kept Intubated post op. Taken to CTU. Levophed, Vasopressin and Primacor switched to Dobutamine gtt.  Started on PO Amio for afib ppx. +Soliz. On insulin gtt for tight glycemic control to prevent wound infxn.  11/22: POD1. Extubated. Inotropes and Pressors maintained. PCA Hydromorphone initiated by Acute Pain Mgmt team. Insulin gtt maintained per Endocrine. PT/OT evaluation.  11/23 POD 2. PCA Hydromorphone maintained. Insulin gtt maintained, started basal insulin tonight per Endo. Dobutamine gtt continued.   11/24 POD 3. Dobutamine gtt d/c'ed in AM. PCA pumped d/c'ed. MED and Pleural Chest tubes d/c'ed while in CTU. (+) PW to EPM (40/10/0.8). Prevena dressing intact. +Soliz maintained. Monitor I/Os. Back on Insulin gtt for elevated FS >200. Endocrine following. TRANSFERRED TO SDU. Current medication regimen continued.   11/25 VSS - insullin gtt off @ 6am- start low dose BB this am - soliz in place will d/c when pt more mobile.  d/c plan anticipate home PT  11/26 WBC 20 again -om chronic steroids - hx COPD- afebrile - prevena in place.  will conitue to monitor- xtra lasix 20 iv x 1 given - up 6 kgs in weight w/ ++ edema,  40 yr pack current smoker.  nebs.  11/27 VSS. Afebrile. WBC increased 23 (from 20) (Of note, patient on chronic steroids for Hx RA). Repeat CXR today stable. Rpt UA pending. Potassium 3.6 this AM, supplemented. Pt reports hx of Bipolar MDD, with no follow-up with Psychiatry and non-compliance with OP psych meds since July 2023. Pt requesting to speak to Psychiatry today to establish care. Denies SI/HI/AH/VH. On call Psychiatry ([phone]) consulted and aware. Pt also noted with bronchospasms/wheezing - Pulm (Dr. Leone) consulted and aware. Stable to move to floors today, per Dr. Loaiza.  11/28: VSS, refusing PT, needs to increase ambulation/iS/effort. CT with ?sternal dehiscence at inferior pole, will discuss with Dr. Loaiza. Extra IV lasix given for edema.   11/29: VSS. CT Chest imaging reviewed closely by Dr. Loaiza yesterday, no concerned for sternal dehiscence, upon Attending's review. Prevena d/c'd today. Site c/d/i. +PW (isolated). Lidocaine x1 for back pain today, reports improvement. Encouraged ambulation/OOB. Pt working with Physical Therapy. CM to work on Rehab for disposition as recommended by PT. Current medication regimen continued. PW D/liang per Dr. Loaiza. Of note, patient with hx of Antiphospholipid Syndrome and CVA, was on AC therapy pre-surgery; ok to restart AC therapy with Eliquis 5mg BID starting tonight, per Dr. Loaiza.  11/30 VSS rounds made w/ DR. Loaiza.  awaiting d/c to rehab   12/1  Insulin teaching Awaiting for rehab VSS   12/2 VSS, continue diuresis on torsemide 20 daily, BUN/CR 22/0.65, K 3.1 supplemented, check repeat this afternoon.   12/3 VSS; rsr 80-90; continue diuresis for hypervolemia; bun/cr 71/ 58 female smoker with PMH CVA with residual Left-sided weakness/numbness/L gaze deviation, DM2 with b/l peripheral neuropathy, COPD/Asthma, Breast Ca s/p ?RT, Bipolar depression, Rheumatoid Arthritis, Antiphospholipid syndrome, and L eye uveitis/scleritis, Presents to [facility] transferred from [facility]. p/w multiple medical complaints. A/w exacerbation of L-sided subjective weakness/numbness likely 2/2 recrudescence of prior Right BG infarct. Cardiology following for new LV dysfunction since July, pending ischemic eval. GABBY was done showing severe Aortic Insufficiency and moderated MR. Cardiac cath was done showing LAD prox 100% fills via right to left collateral. Patient now transferred to [facility] Dr. Loaiza for evaluation.    HOSPITAL COURSE:   11/14 patient seen and examined at bedside. All labs and imaging to be reviewed by Dr. Loaiza   11/15:VSS, neuro consult for hx cva L sided weakness, Preop for Friday 11/16 Repeat echo shows only mild valve pathology  11/17 No valve surgery planned in light of repeat echo.  Getting MR viability for CAD and depressed LV function.  11/18  vss for rpt gabby mon  11/19  npo after midnight for gabby tomorrow  11/20 VSS NPO for GABBY this am=> severe AR  11/21: s/p CABG LIMA-LAD, AVR(t) - Inspiris 23mm, LAAL with AtriClip 40mm,   +Substernal mediastinal Chest tube and Left pleural Chest tube. Kept Intubated post op. Taken to CTU. Levophed, Vasopressin and Primacor switched to Dobutamine gtt.  Started on PO Amio for afib ppx. +Soliz. On insulin gtt for tight glycemic control to prevent wound infxn.  11/22: POD1. Extubated. Inotropes and Pressors maintained. PCA Hydromorphone initiated by Acute Pain Mgmt team. Insulin gtt maintained per Endocrine. PT/OT evaluation.  11/23 POD 2. PCA Hydromorphone maintained. Insulin gtt maintained, started basal insulin tonight per Endo. Dobutamine gtt continued.   11/24 POD 3. Dobutamine gtt d/c'ed in AM. PCA pumped d/c'ed. MED and Pleural Chest tubes d/c'ed while in CTU. (+) PW to EPM (40/10/0.8). Prevena dressing intact. +Soliz maintained. Monitor I/Os. Back on Insulin gtt for elevated FS >200. Endocrine following. TRANSFERRED TO SDU. Current medication regimen continued.   11/25 VSS - insullin gtt off @ 6am- start low dose BB this am - soliz in place will d/c when pt more mobile.  d/c plan anticipate home PT  11/26 WBC 20 again -om chronic steroids - hx COPD- afebrile - prevena in place.  will conitue to monitor- xtra lasix 20 iv x 1 given - up 6 kgs in weight w/ ++ edema,  40 yr pack current smoker.  nebs.  11/27 VSS. Afebrile. WBC increased 23 (from 20) (Of note, patient on chronic steroids for Hx RA). Repeat CXR today stable. Rpt UA pending. Potassium 3.6 this AM, supplemented. Pt reports hx of Bipolar MDD, with no follow-up with Psychiatry and non-compliance with OP psych meds since July 2023. Pt requesting to speak to Psychiatry today to establish care. Denies SI/HI/AH/VH. On call Psychiatry ([phone]) consulted and aware. Pt also noted with bronchospasms/wheezing - Pulm (Dr. Leone) consulted and aware. Stable to move to floors today, per Dr. Loaiza.  11/28: VSS, refusing PT, needs to increase ambulation/iS/effort. CT with ?sternal dehiscence at inferior pole, will discuss with Dr. Loaiza. Extra IV lasix given for edema.   11/29: VSS. CT Chest imaging reviewed closely by Dr. Loaiza yesterday, no concerned for sternal dehiscence, upon Attending's review. Prevena d/c'd today. Site c/d/i. +PW (isolated). Lidocaine x1 for back pain today, reports improvement. Encouraged ambulation/OOB. Pt working with Physical Therapy. CM to work on Rehab for disposition as recommended by PT. Current medication regimen continued. PW D/liang per Dr. Loaiza. Of note, patient with hx of Antiphospholipid Syndrome and CVA, was on AC therapy pre-surgery; ok to restart AC therapy with Eliquis 5mg BID starting tonight, per Dr. Loaiza.  11/30 VSS rounds made w/ DR. Loaiza.  awaiting d/c to rehab   12/1  Insulin teaching Awaiting for rehab VSS   12/2 VSS, continue diuresis on torsemide 20 daily, BUN/CR 22/0.65, K 3.1 supplemented, check repeat this afternoon.   12/3 VSS; rsr 80-90; continue diuresis for hypervolemia; await rehab placement  58 female smoker with PMH CVA with residual Left-sided weakness/numbness/L gaze deviation, DM2 with b/l peripheral neuropathy, COPD/Asthma, Breast Ca s/p ?RT, Bipolar depression, Rheumatoid Arthritis, Antiphospholipid syndrome, and L eye uveitis/scleritis, Presents to [facility] transferred from [facility]. p/w multiple medical complaints. A/w exacerbation of L-sided subjective weakness/numbness likely 2/2 recrudescence of prior Right BG infarct. Cardiology following for new LV dysfunction since July, pending ischemic eval. GABBY was done showing severe Aortic Insufficiency and moderated MR. Cardiac cath was done showing LAD prox 100% fills via right to left collateral. Patient now transferred to [facility] Dr. Loaiza for evaluation.    HOSPITAL COURSE:   11/14 patient seen and examined at bedside. All labs and imaging to be reviewed by Dr. Loaiza   11/15:VSS, neuro consult for hx cva L sided weakness, Preop for Friday 11/16 Repeat echo shows only mild valve pathology  11/17 No valve surgery planned in light of repeat echo.  Getting MR viability for CAD and depressed LV function.  11/18  vss for rpt gabby mon  11/19  npo after midnight for gabby tomorrow  11/20 VSS NPO for GABBY this am=> severe AR  11/21: s/p CABG LIMA-LAD, AVR(t) - Inspiris 23mm, LAAL with AtriClip 40mm,   +Substernal mediastinal Chest tube and Left pleural Chest tube. Kept Intubated post op. Taken to CTU. Levophed, Vasopressin and Primacor switched to Dobutamine gtt.  Started on PO Amio for afib ppx. +Soliz. On insulin gtt for tight glycemic control to prevent wound infxn.  11/22: POD1. Extubated. Inotropes and Pressors maintained. PCA Hydromorphone initiated by Acute Pain Mgmt team. Insulin gtt maintained per Endocrine. PT/OT evaluation.  11/23 POD 2. PCA Hydromorphone maintained. Insulin gtt maintained, started basal insulin tonight per Endo. Dobutamine gtt continued.   11/24 POD 3. Dobutamine gtt d/c'ed in AM. PCA pumped d/c'ed. MED and Pleural Chest tubes d/c'ed while in CTU. (+) PW to EPM (40/10/0.8). Prevena dressing intact. +Soliz maintained. Monitor I/Os. Back on Insulin gtt for elevated FS >200. Endocrine following. TRANSFERRED TO SDU. Current medication regimen continued.   11/25 VSS - insullin gtt off @ 6am- start low dose BB this am - soliz in place will d/c when pt more mobile.  d/c plan anticipate home PT  11/26 WBC 20 again -om chronic steroids - hx COPD- afebrile - prevena in place.  will conitue to monitor- xtra lasix 20 iv x 1 given - up 6 kgs in weight w/ ++ edema,  40 yr pack current smoker.  nebs.  11/27 VSS. Afebrile. WBC increased 23 (from 20) (Of note, patient on chronic steroids for Hx RA). Repeat CXR today stable. Rpt UA pending. Potassium 3.6 this AM, supplemented. Pt reports hx of Bipolar MDD, with no follow-up with Psychiatry and non-compliance with OP psych meds since July 2023. Pt requesting to speak to Psychiatry today to establish care. Denies SI/HI/AH/VH. On call Psychiatry ([phone]) consulted and aware. Pt also noted with bronchospasms/wheezing - Pulm (Dr. Leone) consulted and aware. Stable to move to floors today, per Dr. Loaiza.  11/28: VSS, refusing PT, needs to increase ambulation/iS/effort. CT with ?sternal dehiscence at inferior pole, will discuss with Dr. Loaiza. Extra IV lasix given for edema.   11/29: VSS. CT Chest imaging reviewed closely by Dr. Loaiza yesterday, no concerned for sternal dehiscence, upon Attending's review. Prevena d/c'd today. Site c/d/i. +PW (isolated). Lidocaine x1 for back pain today, reports improvement. Encouraged ambulation/OOB. Pt working with Physical Therapy. CM to work on Rehab for disposition as recommended by PT. Current medication regimen continued. PW D/liang per Dr. Loaiza. Of note, patient with hx of Antiphospholipid Syndrome and CVA, was on AC therapy pre-surgery; ok to restart AC therapy with Eliquis 5mg BID starting tonight, per Dr. Loaiza.  11/30 VSS rounds made w/ DR. Loaiza.  awaiting d/c to rehab   12/1  Insulin teaching Awaiting for rehab VSS   12/2 VSS, continue diuresis on torsemide 20 daily, BUN/CR 22/0.65, K 3.1 supplemented, check repeat this afternoon.   12/3 VSS; rsr 80-90; continue diuresis for hypervolemia; await rehab placement  58 female smoker with PMH CVA with residual Left-sided weakness/numbness/L gaze deviation, DM2 with b/l peripheral neuropathy, COPD/Asthma, Breast Ca s/p ?RT, Bipolar depression, Rheumatoid Arthritis, Antiphospholipid syndrome, and L eye uveitis/scleritis, Presents to [facility] transferred from [facility]. p/w multiple medical complaints. A/w exacerbation of L-sided subjective weakness/numbness likely 2/2 recrudescence of prior Right BG infarct. Cardiology following for new LV dysfunction since July, pending ischemic eval. GABBY was done showing severe Aortic Insufficiency and moderated MR. Cardiac cath was done showing LAD prox 100% fills via right to left collateral. Patient now transferred to [facility] Dr. Loaiza for evaluation.    HOSPITAL COURSE:   11/14 patient seen and examined at bedside. All labs and imaging to be reviewed by Dr. Loaiza   11/15:VSS, neuro consult for hx cva L sided weakness, Preop for Friday 11/16 Repeat echo shows only mild valve pathology  11/17 No valve surgery planned in light of repeat echo.  Getting MR viability for CAD and depressed LV function.  11/18  vss for rpt gabby mon  11/19  npo after midnight for gabby tomorrow  11/20 VSS NPO for GABBY this am=> severe AR  11/21: s/p CABG LIMA-LAD, AVR(t) - Inspiris 23mm, LAAL with AtriClip 40mm,   +Substernal mediastinal Chest tube and Left pleural Chest tube. Kept Intubated post op. Taken to CTU. Levophed, Vasopressin and Primacor switched to Dobutamine gtt.  Started on PO Amio for afib ppx. +Soliz. On insulin gtt for tight glycemic control to prevent wound infxn.  11/22: POD1. Extubated. Inotropes and Pressors maintained. PCA Hydromorphone initiated by Acute Pain Mgmt team. Insulin gtt maintained per Endocrine. PT/OT evaluation.  11/23 POD 2. PCA Hydromorphone maintained. Insulin gtt maintained, started basal insulin tonight per Endo. Dobutamine gtt continued.   11/24 POD 3. Dobutamine gtt d/c'ed in AM. PCA pumped d/c'ed. MED and Pleural Chest tubes d/c'ed while in CTU. (+) PW to EPM (40/10/0.8). Prevena dressing intact. +Soliz maintained. Monitor I/Os. Back on Insulin gtt for elevated FS >200. Endocrine following. TRANSFERRED TO SDU. Current medication regimen continued.   11/25 VSS - insullin gtt off @ 6am- start low dose BB this am - soliz in place will d/c when pt more mobile.  d/c plan anticipate home PT  11/26 WBC 20 again -om chronic steroids - hx COPD- afebrile - prevena in place.  will conitue to monitor- xtra lasix 20 iv x 1 given - up 6 kgs in weight w/ ++ edema,  40 yr pack current smoker.  nebs.  11/27 VSS. Afebrile. WBC increased 23 (from 20) (Of note, patient on chronic steroids for Hx RA). Repeat CXR today stable. Rpt UA pending. Potassium 3.6 this AM, supplemented. Pt reports hx of Bipolar MDD, with no follow-up with Psychiatry and non-compliance with OP psych meds since July 2023. Pt requesting to speak to Psychiatry today to establish care. Denies SI/HI/AH/VH. On call Psychiatry ([phone]) consulted and aware. Pt also noted with bronchospasms/wheezing - Pulm (Dr. Leone) consulted and aware. Stable to move to floors today, per Dr. Loaiza.  11/28: VSS, refusing PT, needs to increase ambulation/iS/effort. CT with ?sternal dehiscence at inferior pole, will discuss with Dr. Loaiza. Extra IV lasix given for edema.   11/29: VSS. CT Chest imaging reviewed closely by Dr. Loaiza yesterday, no concerned for sternal dehiscence, upon Attending's review. Prevena d/c'd today. Site c/d/i. +PW (isolated). Lidocaine x1 for back pain today, reports improvement. Encouraged ambulation/OOB. Pt working with Physical Therapy. CM to work on Rehab for disposition as recommended by PT. Current medication regimen continued. PW D/liang per Dr. Loaiza. Of note, patient with hx of Antiphospholipid Syndrome and CVA, was on AC therapy pre-surgery; ok to restart AC therapy with Eliquis 5mg BID starting tonight, per Dr. Loaiza.  11/30 VSS rounds made w/ DR. Loaiza.  awaiting d/c to rehab   12/1  Insulin teaching Awaiting for rehab VSS   12/2 VSS, continue diuresis on torsemide 20 daily, BUN/CR 22/0.65, K 3.1 supplemented, check repeat this afternoon.   12/3 VSS; rsr 80-90; continue diuresis for hypervolemia; await rehab placement

## 2023-12-03 NOTE — PROGRESS NOTE ADULT - SUBJECTIVE AND OBJECTIVE BOX
German Carrasco MD  Interventional Cardiology / Advance Heart Failure and Cardiac Transplant Specialist  [place] Office : [address] [place] [address] [place]. [address]  Tel:   [place] Office : [address] [facility] [place] [address]  Tel: [phone]       Pt is lying in bed comfortable not in distress, no chest pains no SOB no palpitations  	  MEDICATIONS:  apixaban 5 milliGRAM(s) Oral two times a day  aspirin enteric coated 81 milliGRAM(s) Oral daily  metoprolol tartrate 25 milliGRAM(s) Oral two times a day  torsemide 20 milliGRAM(s) Oral daily      albuterol/ipratropium for Nebulization 3 milliLiter(s) Nebulizer every 6 hours  budesonide 160 MICROgram(s)/formoterol 4.5 MICROgram(s) Inhaler 2 Puff(s) Inhalation two times a day    acetaminophen     Tablet .. 650 milliGRAM(s) Oral every 6 hours PRN  gabapentin 300 milliGRAM(s) Oral every 8 hours  HYDROmorphone   Tablet 2 milliGRAM(s) Oral every 4 hours PRN  OLANZapine 7.5 milliGRAM(s) Oral daily  traZODone 50 milliGRAM(s) Oral at bedtime    bisacodyl Suppository 10 milliGRAM(s) Rectal once  famotidine    Tablet 20 milliGRAM(s) Oral two times a day  polyethylene glycol 3350 17 Gram(s) Oral daily  senna 2 Tablet(s) Oral at bedtime    atorvastatin 80 milliGRAM(s) Oral at bedtime  dextrose 50% Injectable 50 milliLiter(s) IV Push every 15 minutes  dextrose 50% Injectable 25 milliLiter(s) IV Push every 15 minutes  dextrose Oral Gel 15 Gram(s) Oral once  glucagon  Injectable 1 milliGRAM(s) IntraMuscular once  insulin glargine Injectable (LANTUS) 28 Unit(s) SubCutaneous at bedtime  insulin lispro (ADMELOG) corrective regimen sliding scale   SubCutaneous three times a day before meals  insulin lispro (ADMELOG) corrective regimen sliding scale   SubCutaneous at bedtime  insulin lispro Injectable (ADMELOG) 9 Unit(s) SubCutaneous three times a day before meals  predniSONE   Tablet 10 milliGRAM(s) Oral daily    chlorhexidine 2% Cloths 1 Application(s) Topical daily      PAST MEDICAL/SURGICAL HISTORY  PAST MEDICAL & SURGICAL HISTORY:  Cigarette smoker      Rheumatoid arthritis      Other depression      Breast cancer      Migraines      History of multiple cerebrovascular accidents (CVAs)      Suicidal ideation      Paresthesia of left arm      Facial paresthesia      APS (antiphospholipid syndrome)      History of depressed bipolar disorder      History of COPD      History of COPD      History of hysterectomy      History of cholecystectomy          SOCIAL HISTORY: Substance Use (street drugs): ( x ) never used  (  ) other:    FAMILY HISTORY:  Family history of breast cancer (Mother)    Family history of diabetes mellitus (DM) (Father)         PHYSICAL EXAM:  T(C): 36.9 (12-03-23 @ 11:02), Max: 36.9 (12-03-23 @ 11:02)  HR: 85 (12-03-23 @ 11:02) (85 - 93)  BP: 96/58 (12-03-23 @ 11:02) (96/58 - 113/75)  RR: 18 (12-03-23 @ 11:02) (18 - 20)  SpO2: 96% (12-03-23 @ 11:02) (88% - 96%)  Wt(kg): --  I&O's Summary    02 Dec 2023 07:01  -  03 Dec 2023 07:00  --------------------------------------------------------  IN: 920 mL / OUT: 1300 mL / NET: -380 mL    03 Dec 2023 07:01  -  03 Dec 2023 14:33  --------------------------------------------------------  IN: 480 mL / OUT: 900 mL / NET: -420 mL             EYES:   PERRLA   ENMT:   Moist mucous membranes, Good dentition, No lesions  Cardiovascular: Normal S1 S2, No JVD, No murmurs, No edema s/p sternotomy   Respiratory: Lungs clear to auscultation	  Gastrointestinal:  Soft, Non-tender, + BS	  Extremities: no edema                                    8.5    18.95 )-----------( 326      ( 03 Dec 2023 06:02 )             27.3     12-03    136  |  99  |  26<H>  ----------------------------<  107<H>  3.7   |  26  |  0.72    Ca    8.7      03 Dec 2023 06:02  Phos  3.1     12-03  Mg     2.1     12-03      proBNP:   Lipid Profile:   HgA1c:   TSH:     Consultant(s) Notes Reviewed:  [x ] YES  [ ] NO    Care Discussed with Consultants/Other Providers [ x] YES  [ ] NO    Imaging Personally Reviewed independently:  [x] YES  [ ] NO    All labs, radiologic studies, vitals, orders and medications list reviewed. Patient is seen and examined at bedside. Case discussed with medical team.         German Carrasco MD  Interventional Cardiology / Advance Heart Failure and Cardiac Transplant Specialist  [place] Office : [address] [place] [address] [place]. [address]  Tel:   [place] Office : [address] [facility] [place] [address]  Tel: [phone]       Pt is lying in bed comfortable not in distress, no chest pains no SOB no palpitations  	  MEDICATIONS:  apixaban 5 milliGRAM(s) Oral two times a day  aspirin enteric coated 81 milliGRAM(s) Oral daily  metoprolol tartrate 25 milliGRAM(s) Oral two times a day  torsemide 20 milliGRAM(s) Oral daily      albuterol/ipratropium for Nebulization 3 milliLiter(s) Nebulizer every 6 hours  budesonide 160 MICROgram(s)/formoterol 4.5 MICROgram(s) Inhaler 2 Puff(s) Inhalation two times a day    acetaminophen     Tablet .. 650 milliGRAM(s) Oral every 6 hours PRN  gabapentin 300 milliGRAM(s) Oral every 8 hours  HYDROmorphone   Tablet 2 milliGRAM(s) Oral every 4 hours PRN  OLANZapine 7.5 milliGRAM(s) Oral daily  traZODone 50 milliGRAM(s) Oral at bedtime    bisacodyl Suppository 10 milliGRAM(s) Rectal once  famotidine    Tablet 20 milliGRAM(s) Oral two times a day  polyethylene glycol 3350 17 Gram(s) Oral daily  senna 2 Tablet(s) Oral at bedtime    atorvastatin 80 milliGRAM(s) Oral at bedtime  dextrose 50% Injectable 50 milliLiter(s) IV Push every 15 minutes  dextrose 50% Injectable 25 milliLiter(s) IV Push every 15 minutes  dextrose Oral Gel 15 Gram(s) Oral once  glucagon  Injectable 1 milliGRAM(s) IntraMuscular once  insulin glargine Injectable (LANTUS) 28 Unit(s) SubCutaneous at bedtime  insulin lispro (ADMELOG) corrective regimen sliding scale   SubCutaneous three times a day before meals  insulin lispro (ADMELOG) corrective regimen sliding scale   SubCutaneous at bedtime  insulin lispro Injectable (ADMELOG) 9 Unit(s) SubCutaneous three times a day before meals  predniSONE   Tablet 10 milliGRAM(s) Oral daily    chlorhexidine 2% Cloths 1 Application(s) Topical daily      PAST MEDICAL/SURGICAL HISTORY  PAST MEDICAL & SURGICAL HISTORY:  Cigarette smoker      Rheumatoid arthritis      Other depression      Breast cancer      Migraines      History of multiple cerebrovascular accidents (CVAs)      Suicidal ideation      Paresthesia of left arm      Facial paresthesia      APS (antiphospholipid syndrome)      History of depressed bipolar disorder      History of COPD      History of COPD      History of hysterectomy      History of cholecystectomy          SOCIAL HISTORY: Substance Use (street drugs): ( x ) never used  (  ) other:    FAMILY HISTORY:  Family history of breast cancer (Mother)    Family history of diabetes mellitus (DM) (Father)         PHYSICAL EXAM:  T(C): 36.9 (12-03-23 @ 11:02), Max: 36.9 (12-03-23 @ 11:02)  HR: 85 (12-03-23 @ 11:02) (85 - 93)  BP: 96/58 (12-03-23 @ 11:02) (96/58 - 113/75)  RR: 18 (12-03-23 @ 11:02) (18 - 20)  SpO2: 96% (12-03-23 @ 11:02) (88% - 96%)  Wt(kg): --  I&O's Summary    02 Dec 2023 07:01  -  03 Dec 2023 07:00  --------------------------------------------------------  IN: 920 mL / OUT: 1300 mL / NET: -380 mL    03 Dec 2023 07:01  -  03 Dec 2023 14:33  --------------------------------------------------------  IN: 480 mL / OUT: 900 mL / NET: -420 mL             EYES:   PERRLA   ENMT:   Moist mucous membranes, Good dentition, No lesions  Cardiovascular: Normal S1 S2, No JVD, No murmurs, No edema s/p sternotomy   Respiratory: Lungs clear to auscultation	  Gastrointestinal:  Soft, Non-tender, + BS	  Extremities: no edema                                    8.5    18.95 )-----------( 326      ( 03 Dec 2023 06:02 )             27.3     12-03    136  |  99  |  26<H>  ----------------------------<  107<H>  3.7   |  26  |  0.72    Ca    8.7      03 Dec 2023 06:02  Phos  3.1     12-03  Mg     2.1     12-03      proBNP:   Lipid Profile:   HgA1c:   TSH:     Consultant(s) Notes Reviewed:  [x ] YES  [ ] NO    Care Discussed with Consultants/Other Providers [ x] YES  [ ] NO    Imaging Personally Reviewed independently:  [x] YES  [ ] NO    All labs, radiologic studies, vitals, orders and medications list reviewed. Patient is seen and examined at bedside. Case discussed with medical team.

## 2023-12-03 NOTE — PROGRESS NOTE ADULT - PROBLEM SELECTOR PLAN 1
Will continue current insulin regimen for now. Will continue monitoring  blood sugars, will Follow up.  Patient counseled for compliance with consistent low carb diet.  Insulin teaching    Anticipate Basal insulin at discharge (Lantus) + PO DM regimen.  Can be Discharged on current basal insulin once nightly.   In addition discharge on Farxiga 10 mg and Glimepiride 2 mg daily with breakfast.   Pls send with glucometer  FU outpatient 2 weeks

## 2023-12-04 ENCOUNTER — TRANSCRIPTION ENCOUNTER (OUTPATIENT)
Age: 58
End: 2023-12-04

## 2023-12-04 VITALS
SYSTOLIC BLOOD PRESSURE: 102 MMHG | RESPIRATION RATE: 18 BRPM | HEART RATE: 88 BPM | OXYGEN SATURATION: 94 % | TEMPERATURE: 98 F | DIASTOLIC BLOOD PRESSURE: 59 MMHG

## 2023-12-04 DIAGNOSIS — R09.02 HYPOXEMIA: ICD-10-CM

## 2023-12-04 LAB
ANION GAP SERPL CALC-SCNC: 12 MMOL/L — SIGNIFICANT CHANGE UP (ref 5–17)
ANION GAP SERPL CALC-SCNC: 12 MMOL/L — SIGNIFICANT CHANGE UP (ref 5–17)
BUN SERPL-MCNC: 22 MG/DL — SIGNIFICANT CHANGE UP (ref 7–23)
BUN SERPL-MCNC: 22 MG/DL — SIGNIFICANT CHANGE UP (ref 7–23)
CALCIUM SERPL-MCNC: 8.9 MG/DL — SIGNIFICANT CHANGE UP (ref 8.4–10.5)
CALCIUM SERPL-MCNC: 8.9 MG/DL — SIGNIFICANT CHANGE UP (ref 8.4–10.5)
CHLORIDE SERPL-SCNC: 101 MMOL/L — SIGNIFICANT CHANGE UP (ref 96–108)
CHLORIDE SERPL-SCNC: 101 MMOL/L — SIGNIFICANT CHANGE UP (ref 96–108)
CO2 SERPL-SCNC: 28 MMOL/L — SIGNIFICANT CHANGE UP (ref 22–31)
CO2 SERPL-SCNC: 28 MMOL/L — SIGNIFICANT CHANGE UP (ref 22–31)
CREAT SERPL-MCNC: 0.71 MG/DL — SIGNIFICANT CHANGE UP (ref 0.5–1.3)
CREAT SERPL-MCNC: 0.71 MG/DL — SIGNIFICANT CHANGE UP (ref 0.5–1.3)
EGFR: 98 ML/MIN/1.73M2 — SIGNIFICANT CHANGE UP
EGFR: 98 ML/MIN/1.73M2 — SIGNIFICANT CHANGE UP
FLUAV AG NPH QL: SIGNIFICANT CHANGE UP
FLUAV AG NPH QL: SIGNIFICANT CHANGE UP
FLUBV AG NPH QL: SIGNIFICANT CHANGE UP
FLUBV AG NPH QL: SIGNIFICANT CHANGE UP
GLUCOSE BLDC GLUCOMTR-MCNC: 109 MG/DL — HIGH (ref 70–99)
GLUCOSE BLDC GLUCOMTR-MCNC: 109 MG/DL — HIGH (ref 70–99)
GLUCOSE BLDC GLUCOMTR-MCNC: 138 MG/DL — HIGH (ref 70–99)
GLUCOSE BLDC GLUCOMTR-MCNC: 138 MG/DL — HIGH (ref 70–99)
GLUCOSE BLDC GLUCOMTR-MCNC: 195 MG/DL — HIGH (ref 70–99)
GLUCOSE BLDC GLUCOMTR-MCNC: 195 MG/DL — HIGH (ref 70–99)
GLUCOSE SERPL-MCNC: 84 MG/DL — SIGNIFICANT CHANGE UP (ref 70–99)
GLUCOSE SERPL-MCNC: 84 MG/DL — SIGNIFICANT CHANGE UP (ref 70–99)
HCT VFR BLD CALC: 28.3 % — LOW (ref 34.5–45)
HCT VFR BLD CALC: 28.3 % — LOW (ref 34.5–45)
HGB BLD-MCNC: 8.7 G/DL — LOW (ref 11.5–15.5)
HGB BLD-MCNC: 8.7 G/DL — LOW (ref 11.5–15.5)
MAGNESIUM SERPL-MCNC: 2 MG/DL — SIGNIFICANT CHANGE UP (ref 1.6–2.6)
MAGNESIUM SERPL-MCNC: 2 MG/DL — SIGNIFICANT CHANGE UP (ref 1.6–2.6)
MCHC RBC-ENTMCNC: 29.3 PG — SIGNIFICANT CHANGE UP (ref 27–34)
MCHC RBC-ENTMCNC: 29.3 PG — SIGNIFICANT CHANGE UP (ref 27–34)
MCHC RBC-ENTMCNC: 30.7 GM/DL — LOW (ref 32–36)
MCHC RBC-ENTMCNC: 30.7 GM/DL — LOW (ref 32–36)
MCV RBC AUTO: 95.3 FL — SIGNIFICANT CHANGE UP (ref 80–100)
MCV RBC AUTO: 95.3 FL — SIGNIFICANT CHANGE UP (ref 80–100)
NRBC # BLD: 0 /100 WBCS — SIGNIFICANT CHANGE UP (ref 0–0)
NRBC # BLD: 0 /100 WBCS — SIGNIFICANT CHANGE UP (ref 0–0)
PHOSPHATE SERPL-MCNC: 3.9 MG/DL — SIGNIFICANT CHANGE UP (ref 2.5–4.5)
PHOSPHATE SERPL-MCNC: 3.9 MG/DL — SIGNIFICANT CHANGE UP (ref 2.5–4.5)
PLATELET # BLD AUTO: 329 K/UL — SIGNIFICANT CHANGE UP (ref 150–400)
PLATELET # BLD AUTO: 329 K/UL — SIGNIFICANT CHANGE UP (ref 150–400)
POTASSIUM SERPL-MCNC: 3.7 MMOL/L — SIGNIFICANT CHANGE UP (ref 3.5–5.3)
POTASSIUM SERPL-MCNC: 3.7 MMOL/L — SIGNIFICANT CHANGE UP (ref 3.5–5.3)
POTASSIUM SERPL-SCNC: 3.7 MMOL/L — SIGNIFICANT CHANGE UP (ref 3.5–5.3)
POTASSIUM SERPL-SCNC: 3.7 MMOL/L — SIGNIFICANT CHANGE UP (ref 3.5–5.3)
RBC # BLD: 2.97 M/UL — LOW (ref 3.8–5.2)
RBC # BLD: 2.97 M/UL — LOW (ref 3.8–5.2)
RBC # FLD: 16.6 % — HIGH (ref 10.3–14.5)
RBC # FLD: 16.6 % — HIGH (ref 10.3–14.5)
RSV RNA NPH QL NAA+NON-PROBE: SIGNIFICANT CHANGE UP
RSV RNA NPH QL NAA+NON-PROBE: SIGNIFICANT CHANGE UP
SARS-COV-2 RNA SPEC QL NAA+PROBE: SIGNIFICANT CHANGE UP
SARS-COV-2 RNA SPEC QL NAA+PROBE: SIGNIFICANT CHANGE UP
SODIUM SERPL-SCNC: 141 MMOL/L — SIGNIFICANT CHANGE UP (ref 135–145)
SODIUM SERPL-SCNC: 141 MMOL/L — SIGNIFICANT CHANGE UP (ref 135–145)
WBC # BLD: 16.14 K/UL — HIGH (ref 3.8–10.5)
WBC # BLD: 16.14 K/UL — HIGH (ref 3.8–10.5)
WBC # FLD AUTO: 16.14 K/UL — HIGH (ref 3.8–10.5)
WBC # FLD AUTO: 16.14 K/UL — HIGH (ref 3.8–10.5)

## 2023-12-04 PROCEDURE — 71250 CT THORAX DX C-: CPT

## 2023-12-04 PROCEDURE — 86965 POOLING BLOOD PLATELETS: CPT

## 2023-12-04 PROCEDURE — 86901 BLOOD TYPING SEROLOGIC RH(D): CPT

## 2023-12-04 PROCEDURE — 93325 DOPPLER ECHO COLOR FLOW MAPG: CPT

## 2023-12-04 PROCEDURE — 93320 DOPPLER ECHO COMPLETE: CPT

## 2023-12-04 PROCEDURE — 84132 ASSAY OF SERUM POTASSIUM: CPT

## 2023-12-04 PROCEDURE — 97530 THERAPEUTIC ACTIVITIES: CPT

## 2023-12-04 PROCEDURE — 93880 EXTRACRANIAL BILAT STUDY: CPT

## 2023-12-04 PROCEDURE — 82565 ASSAY OF CREATININE: CPT

## 2023-12-04 PROCEDURE — 36415 COLL VENOUS BLD VENIPUNCTURE: CPT

## 2023-12-04 PROCEDURE — P9016: CPT

## 2023-12-04 PROCEDURE — 82550 ASSAY OF CK (CPK): CPT

## 2023-12-04 PROCEDURE — 84443 ASSAY THYROID STIM HORMONE: CPT

## 2023-12-04 PROCEDURE — 81003 URINALYSIS AUTO W/O SCOPE: CPT

## 2023-12-04 PROCEDURE — 85396 CLOTTING ASSAY WHOLE BLOOD: CPT

## 2023-12-04 PROCEDURE — 97535 SELF CARE MNGMENT TRAINING: CPT

## 2023-12-04 PROCEDURE — 85520 HEPARIN ASSAY: CPT

## 2023-12-04 PROCEDURE — 84702 CHORIONIC GONADOTROPIN TEST: CPT

## 2023-12-04 PROCEDURE — 84295 ASSAY OF SERUM SODIUM: CPT

## 2023-12-04 PROCEDURE — 83735 ASSAY OF MAGNESIUM: CPT

## 2023-12-04 PROCEDURE — A9585: CPT

## 2023-12-04 PROCEDURE — C1751: CPT

## 2023-12-04 PROCEDURE — C8929: CPT

## 2023-12-04 PROCEDURE — 81001 URINALYSIS AUTO W/SCOPE: CPT

## 2023-12-04 PROCEDURE — 87389 HIV-1 AG W/HIV-1&-2 AB AG IA: CPT

## 2023-12-04 PROCEDURE — 88305 TISSUE EXAM BY PATHOLOGIST: CPT

## 2023-12-04 PROCEDURE — C1769: CPT

## 2023-12-04 PROCEDURE — 82803 BLOOD GASES ANY COMBINATION: CPT

## 2023-12-04 PROCEDURE — 84100 ASSAY OF PHOSPHORUS: CPT

## 2023-12-04 PROCEDURE — 82435 ASSAY OF BLOOD CHLORIDE: CPT

## 2023-12-04 PROCEDURE — 80074 ACUTE HEPATITIS PANEL: CPT

## 2023-12-04 PROCEDURE — 97162 PT EVAL MOD COMPLEX 30 MIN: CPT

## 2023-12-04 PROCEDURE — 85018 HEMOGLOBIN: CPT

## 2023-12-04 PROCEDURE — 36430 TRANSFUSION BLD/BLD COMPNT: CPT

## 2023-12-04 PROCEDURE — 94640 AIRWAY INHALATION TREATMENT: CPT

## 2023-12-04 PROCEDURE — 86850 RBC ANTIBODY SCREEN: CPT

## 2023-12-04 PROCEDURE — 83036 HEMOGLOBIN GLYCOSYLATED A1C: CPT

## 2023-12-04 PROCEDURE — 82330 ASSAY OF CALCIUM: CPT

## 2023-12-04 PROCEDURE — 97116 GAIT TRAINING THERAPY: CPT

## 2023-12-04 PROCEDURE — 97130 THER IVNTJ EA ADDL 15 MIN: CPT

## 2023-12-04 PROCEDURE — 93312 ECHO TRANSESOPHAGEAL: CPT

## 2023-12-04 PROCEDURE — 71045 X-RAY EXAM CHEST 1 VIEW: CPT | Mod: 26

## 2023-12-04 PROCEDURE — 80076 HEPATIC FUNCTION PANEL: CPT

## 2023-12-04 PROCEDURE — 97129 THER IVNTJ 1ST 15 MIN: CPT

## 2023-12-04 PROCEDURE — 86923 COMPATIBILITY TEST ELECTRIC: CPT

## 2023-12-04 PROCEDURE — 84484 ASSAY OF TROPONIN QUANT: CPT

## 2023-12-04 PROCEDURE — P9059: CPT

## 2023-12-04 PROCEDURE — 86891 AUTOLOGOUS BLOOD OP SALVAGE: CPT

## 2023-12-04 PROCEDURE — P9045: CPT

## 2023-12-04 PROCEDURE — 80048 BASIC METABOLIC PNL TOTAL CA: CPT

## 2023-12-04 PROCEDURE — 82553 CREATINE MB FRACTION: CPT

## 2023-12-04 PROCEDURE — 85027 COMPLETE CBC AUTOMATED: CPT

## 2023-12-04 PROCEDURE — 84439 ASSAY OF FREE THYROXINE: CPT

## 2023-12-04 PROCEDURE — 85014 HEMATOCRIT: CPT

## 2023-12-04 PROCEDURE — 83880 ASSAY OF NATRIURETIC PEPTIDE: CPT

## 2023-12-04 PROCEDURE — 83605 ASSAY OF LACTIC ACID: CPT

## 2023-12-04 PROCEDURE — 82947 ASSAY GLUCOSE BLOOD QUANT: CPT

## 2023-12-04 PROCEDURE — 85730 THROMBOPLASTIN TIME PARTIAL: CPT

## 2023-12-04 PROCEDURE — 87637 SARSCOV2&INF A&B&RSV AMP PRB: CPT

## 2023-12-04 PROCEDURE — 86900 BLOOD TYPING SEROLOGIC ABO: CPT

## 2023-12-04 PROCEDURE — 97166 OT EVAL MOD COMPLEX 45 MIN: CPT

## 2023-12-04 PROCEDURE — 88312 SPECIAL STAINS GROUP 1: CPT

## 2023-12-04 PROCEDURE — 85610 PROTHROMBIN TIME: CPT

## 2023-12-04 PROCEDURE — C1889: CPT

## 2023-12-04 PROCEDURE — 75561 CARDIAC MRI FOR MORPH W/DYE: CPT

## 2023-12-04 PROCEDURE — 71045 X-RAY EXAM CHEST 1 VIEW: CPT

## 2023-12-04 PROCEDURE — 94010 BREATHING CAPACITY TEST: CPT

## 2023-12-04 PROCEDURE — 85025 COMPLETE CBC W/AUTO DIFF WBC: CPT

## 2023-12-04 PROCEDURE — 94002 VENT MGMT INPAT INIT DAY: CPT

## 2023-12-04 PROCEDURE — 80053 COMPREHEN METABOLIC PANEL: CPT

## 2023-12-04 PROCEDURE — 85384 FIBRINOGEN ACTIVITY: CPT

## 2023-12-04 PROCEDURE — 82962 GLUCOSE BLOOD TEST: CPT

## 2023-12-04 PROCEDURE — 87640 STAPH A DNA AMP PROBE: CPT

## 2023-12-04 PROCEDURE — 87641 MR-STAPH DNA AMP PROBE: CPT

## 2023-12-04 PROCEDURE — 93005 ELECTROCARDIOGRAM TRACING: CPT

## 2023-12-04 PROCEDURE — 70450 CT HEAD/BRAIN W/O DYE: CPT

## 2023-12-04 PROCEDURE — P9012: CPT

## 2023-12-04 RX ORDER — APIXABAN 2.5 MG/1
1 TABLET, FILM COATED ORAL
Qty: 0 | Refills: 0 | DISCHARGE
Start: 2023-12-04

## 2023-12-04 RX ORDER — INSULIN GLARGINE 100 [IU]/ML
28 INJECTION, SOLUTION SUBCUTANEOUS
Qty: 0 | Refills: 0 | DISCHARGE
Start: 2023-12-04

## 2023-12-04 RX ORDER — FAMOTIDINE 10 MG/ML
1 INJECTION INTRAVENOUS
Qty: 0 | Refills: 0 | DISCHARGE
Start: 2023-12-04

## 2023-12-04 RX ORDER — OXYCODONE AND ACETAMINOPHEN 5; 325 MG/1; MG/1
1 TABLET ORAL
Qty: 0 | Refills: 0 | DISCHARGE
Start: 2023-12-04

## 2023-12-04 RX ORDER — ENOXAPARIN SODIUM 100 MG/ML
85 INJECTION SUBCUTANEOUS
Qty: 0 | Refills: 0 | DISCHARGE

## 2023-12-04 RX ORDER — SPIRONOLACTONE 25 MG/1
1 TABLET, FILM COATED ORAL
Qty: 0 | Refills: 0 | DISCHARGE
Start: 2023-12-04

## 2023-12-04 RX ORDER — INSULIN LISPRO 100/ML
8 VIAL (ML) SUBCUTANEOUS
Qty: 0 | Refills: 0 | DISCHARGE
Start: 2023-12-04

## 2023-12-04 RX ORDER — POLYETHYLENE GLYCOL 3350 17 G/17G
17 POWDER, FOR SOLUTION ORAL
Qty: 0 | Refills: 0 | DISCHARGE
Start: 2023-12-04

## 2023-12-04 RX ADMIN — Medication 25 MILLIGRAM(S): at 05:48

## 2023-12-04 RX ADMIN — Medication 9 UNIT(S): at 16:57

## 2023-12-04 RX ADMIN — OLANZAPINE 7.5 MILLIGRAM(S): 15 TABLET, FILM COATED ORAL at 12:16

## 2023-12-04 RX ADMIN — Medication 650 MILLIGRAM(S): at 10:47

## 2023-12-04 RX ADMIN — Medication 10 MILLIGRAM(S): at 05:48

## 2023-12-04 RX ADMIN — CHLORHEXIDINE GLUCONATE 1 APPLICATION(S): 213 SOLUTION TOPICAL at 12:15

## 2023-12-04 RX ADMIN — APIXABAN 5 MILLIGRAM(S): 2.5 TABLET, FILM COATED ORAL at 05:48

## 2023-12-04 RX ADMIN — Medication 650 MILLIGRAM(S): at 11:17

## 2023-12-04 RX ADMIN — Medication 1: at 12:11

## 2023-12-04 RX ADMIN — Medication 3 MILLILITER(S): at 05:47

## 2023-12-04 RX ADMIN — HYDROMORPHONE HYDROCHLORIDE 2 MILLIGRAM(S): 2 INJECTION INTRAMUSCULAR; INTRAVENOUS; SUBCUTANEOUS at 01:15

## 2023-12-04 RX ADMIN — FAMOTIDINE 20 MILLIGRAM(S): 10 INJECTION INTRAVENOUS at 05:47

## 2023-12-04 RX ADMIN — BUDESONIDE AND FORMOTEROL FUMARATE DIHYDRATE 2 PUFF(S): 160; 4.5 AEROSOL RESPIRATORY (INHALATION) at 05:47

## 2023-12-04 RX ADMIN — GABAPENTIN 300 MILLIGRAM(S): 400 CAPSULE ORAL at 13:51

## 2023-12-04 RX ADMIN — HYDROMORPHONE HYDROCHLORIDE 2 MILLIGRAM(S): 2 INJECTION INTRAMUSCULAR; INTRAVENOUS; SUBCUTANEOUS at 01:45

## 2023-12-04 RX ADMIN — GABAPENTIN 300 MILLIGRAM(S): 400 CAPSULE ORAL at 05:48

## 2023-12-04 RX ADMIN — Medication 3 MILLILITER(S): at 12:14

## 2023-12-04 RX ADMIN — Medication 20 MILLIGRAM(S): at 05:48

## 2023-12-04 RX ADMIN — Medication 9 UNIT(S): at 08:14

## 2023-12-04 RX ADMIN — Medication 81 MILLIGRAM(S): at 12:13

## 2023-12-04 RX ADMIN — Medication 9 UNIT(S): at 12:12

## 2023-12-04 NOTE — PROGRESS NOTE ADULT - SUBJECTIVE AND OBJECTIVE BOX
Subjective: "Hello"  OOB chair      Tele:  SR 70-80           V/S:                    T(F): 98.3 (23 @ 04:20), Max: 98.4 (23 @ 11:02)  HR: 83 (23 @ 04:20) (82 - 95)  BP: 105/69 (23 @ 04:20) (96/58 - 109/75)  RR: 18 (23 @ 04:20) (18 - 18)  SpO2: 93% (23 @ 04:20) (93% - 96%)  Wt(kg): --      LV EF:      Labs:      141  |  101  |  22  ----------------------------<  84  3.7   |  28  |  0.71    Ca    8.9      04 Dec 2023 06:01  Phos  3.9       Mg     2.0                                      8.7    16.14 )-----------( 329      ( 04 Dec 2023 06:02 )             28.3             CAPILLARY BLOOD GLUCOSE      POCT Blood Glucose.: 109 mg/dL (04 Dec 2023 07:34)  POCT Blood Glucose.: 132 mg/dL (03 Dec 2023 21:23)  POCT Blood Glucose.: 114 mg/dL (03 Dec 2023 16:41)  POCT Blood Glucose.: 187 mg/dL (03 Dec 2023 11:28)           CXR:    I&O's Detail    03 Dec 2023 07:01  -  04 Dec 2023 07:00  --------------------------------------------------------  IN:    Oral Fluid: 720 mL  Total IN: 720 mL    OUT:    Voided (mL): 2450 mL  Total OUT: 2450 mL    Total NET: -1730 mL      04 Dec 2023 07:01  -  04 Dec 2023 10:02  --------------------------------------------------------  IN:    Oral Fluid: 240 mL  Total IN: 240 mL    OUT:  Total OUT: 0 mL    Total NET: 240 mL      BOWEL MOVEMENT:  [x ] YES [ ] NO      Daily     Daily Weight in k.9 (04 Dec 2023 08:12)  Medications:  acetaminophen     Tablet .. 650 milliGRAM(s) Oral every 6 hours PRN  albuterol/ipratropium for Nebulization 3 milliLiter(s) Nebulizer every 6 hours  apixaban 5 milliGRAM(s) Oral two times a day  aspirin enteric coated 81 milliGRAM(s) Oral daily  atorvastatin 80 milliGRAM(s) Oral at bedtime  bisacodyl Suppository 10 milliGRAM(s) Rectal once  budesonide 160 MICROgram(s)/formoterol 4.5 MICROgram(s) Inhaler 2 Puff(s) Inhalation two times a day  chlorhexidine 2% Cloths 1 Application(s) Topical daily  dextrose 50% Injectable 50 milliLiter(s) IV Push every 15 minutes  dextrose 50% Injectable 25 milliLiter(s) IV Push every 15 minutes  dextrose Oral Gel 15 Gram(s) Oral once  famotidine    Tablet 20 milliGRAM(s) Oral two times a day  gabapentin 300 milliGRAM(s) Oral every 8 hours  glucagon  Injectable 1 milliGRAM(s) IntraMuscular once  HYDROmorphone   Tablet 2 milliGRAM(s) Oral every 4 hours PRN  insulin glargine Injectable (LANTUS) 28 Unit(s) SubCutaneous at bedtime  insulin lispro (ADMELOG) corrective regimen sliding scale   SubCutaneous at bedtime  insulin lispro (ADMELOG) corrective regimen sliding scale   SubCutaneous three times a day before meals  insulin lispro Injectable (ADMELOG) 9 Unit(s) SubCutaneous three times a day before meals  metoprolol tartrate 25 milliGRAM(s) Oral two times a day  OLANZapine 7.5 milliGRAM(s) Oral daily  polyethylene glycol 3350 17 Gram(s) Oral daily  predniSONE   Tablet 10 milliGRAM(s) Oral daily  senna 2 Tablet(s) Oral at bedtime  torsemide 20 milliGRAM(s) Oral daily  traZODone 50 milliGRAM(s) Oral at bedtime        Physical Exam:    Neurology: alert and oriented x 3, nonfocal  CV : S1S2 RRR  Sternal Wound :  CDI SHARON- sternum stable > silks removed  Lungs: clear. RR easy, unlabored   Abdomen: soft, nontender, nondistended, positive bowel sounds,  + bowel movement   Neg N/V/D; obese abdomen   :  pt voiding without difficulty   Extremities:   DEWITT; +1 LE edema, neg calf tenderness.   PPP bilaterally                     PAST MEDICAL & SURGICAL HISTORY:  Cigarette smoker      Rheumatoid arthritis      Other depression      Breast cancer      Migraines      History of multiple cerebrovascular accidents (CVAs)      Suicidal ideation      Paresthesia of left arm      Facial paresthesia      APS (antiphospholipid syndrome)      History of depressed bipolar disorder      History of COPD      History of COPD      History of hysterectomy      History of cholecystectomy                 Subjective: "Hello"  OOB chair      Tele:  SR 70-80           V/S:                    T(F): 98.3 (23 @ 04:20), Max: 98.4 (23 @ 11:02)  HR: 83 (23 @ 04:20) (82 - 95)  BP: 105/69 (23 @ 04:20) (96/58 - 109/75)  RR: 18 (23 @ 04:20) (18 - 18)  SpO2: 93% (23 @ 04:20) (93% - 96%)  Wt(kg): --    SPO2  RA 88% @ rest      SPO2 w/ ambulation RA 82%    SPO2 O2 2LPM NC  95%  LV EF:      Labs:      141  |  101  |  22  ----------------------------<  84  3.7   |  28  |  0.71    Ca    8.9      04 Dec 2023 06:01  Phos  3.9       Mg     2.0                                      8.7    16.14 )-----------( 329      ( 04 Dec 2023 06:02 )             28.3             CAPILLARY BLOOD GLUCOSE      POCT Blood Glucose.: 109 mg/dL (04 Dec 2023 07:34)  POCT Blood Glucose.: 132 mg/dL (03 Dec 2023 21:23)  POCT Blood Glucose.: 114 mg/dL (03 Dec 2023 16:41)  POCT Blood Glucose.: 187 mg/dL (03 Dec 2023 11:28)           CXR:    I&O's Detail    03 Dec 2023 07:01  -  04 Dec 2023 07:00  --------------------------------------------------------  IN:    Oral Fluid: 720 mL  Total IN: 720 mL    OUT:    Voided (mL): 2450 mL  Total OUT: 2450 mL    Total NET: -1730 mL      04 Dec 2023 07:01  -  04 Dec 2023 10:02  --------------------------------------------------------  IN:    Oral Fluid: 240 mL  Total IN: 240 mL    OUT:  Total OUT: 0 mL    Total NET: 240 mL      BOWEL MOVEMENT:  [x ] YES [ ] NO      Daily     Daily Weight in k.9 (04 Dec 2023 08:12)  Medications:  acetaminophen     Tablet .. 650 milliGRAM(s) Oral every 6 hours PRN  albuterol/ipratropium for Nebulization 3 milliLiter(s) Nebulizer every 6 hours  apixaban 5 milliGRAM(s) Oral two times a day  aspirin enteric coated 81 milliGRAM(s) Oral daily  atorvastatin 80 milliGRAM(s) Oral at bedtime  bisacodyl Suppository 10 milliGRAM(s) Rectal once  budesonide 160 MICROgram(s)/formoterol 4.5 MICROgram(s) Inhaler 2 Puff(s) Inhalation two times a day  chlorhexidine 2% Cloths 1 Application(s) Topical daily  dextrose 50% Injectable 50 milliLiter(s) IV Push every 15 minutes  dextrose 50% Injectable 25 milliLiter(s) IV Push every 15 minutes  dextrose Oral Gel 15 Gram(s) Oral once  famotidine    Tablet 20 milliGRAM(s) Oral two times a day  gabapentin 300 milliGRAM(s) Oral every 8 hours  glucagon  Injectable 1 milliGRAM(s) IntraMuscular once  HYDROmorphone   Tablet 2 milliGRAM(s) Oral every 4 hours PRN  insulin glargine Injectable (LANTUS) 28 Unit(s) SubCutaneous at bedtime  insulin lispro (ADMELOG) corrective regimen sliding scale   SubCutaneous at bedtime  insulin lispro (ADMELOG) corrective regimen sliding scale   SubCutaneous three times a day before meals  insulin lispro Injectable (ADMELOG) 9 Unit(s) SubCutaneous three times a day before meals  metoprolol tartrate 25 milliGRAM(s) Oral two times a day  OLANZapine 7.5 milliGRAM(s) Oral daily  polyethylene glycol 3350 17 Gram(s) Oral daily  predniSONE   Tablet 10 milliGRAM(s) Oral daily  senna 2 Tablet(s) Oral at bedtime  torsemide 20 milliGRAM(s) Oral daily  traZODone 50 milliGRAM(s) Oral at bedtime        Physical Exam:    Neurology: alert and oriented x 3, nonfocal  CV : S1S2 RRR  Sternal Wound :  CDI SHARON- sternum stable > silks removed  Lungs: clear. RR easy, unlabored   Abdomen: soft, nontender, nondistended, positive bowel sounds,  + bowel movement   Neg N/V/D; obese abdomen   :  pt voiding without difficulty   Extremities:   DEWITT; +1 LE edema, neg calf tenderness.   PPP bilaterally               PAST MEDICAL & SURGICAL HISTORY:  Cigarette smoker      Rheumatoid arthritis      Other depression      Breast cancer      Migraines      History of multiple cerebrovascular accidents (CVAs)      Suicidal ideation      Paresthesia of left arm      Facial paresthesia      APS (antiphospholipid syndrome)      History of depressed bipolar disorder      History of COPD      History of COPD      History of hysterectomy      History of cholecystectomy                 Subjective: "Hello"  OOB chair      Tele:  SR 70-80           V/S:                    T(F): 98.3 (23 @ 04:20), Max: 98.4 (23 @ 11:02)  HR: 83 (23 @ 04:20) (82 - 95)  BP: 105/69 (23 @ 04:20) (96/58 - 109/75)  RR: 18 (23 @ 04:20) (18 - 18)  SpO2: 93% (23 @ 04:20) (93% - 96%)  SPO2  RA 88% @ rest      SPO2 w/ ambulation RA 82%    SPO2 O2 2LPM NC  95%  LV EF:      Labs:      141  |  101  |  22  ----------------------------<  84  3.7   |  28  |  0.71    Ca    8.9      04 Dec 2023 06:01  Phos  3.9       Mg     2.0                                      8.7    16.14 )-----------( 329      ( 04 Dec 2023 06:02 )             28.3             CAPILLARY BLOOD GLUCOSE      POCT Blood Glucose.: 109 mg/dL (04 Dec 2023 07:34)  POCT Blood Glucose.: 132 mg/dL (03 Dec 2023 21:23)  POCT Blood Glucose.: 114 mg/dL (03 Dec 2023 16:41)  POCT Blood Glucose.: 187 mg/dL (03 Dec 2023 11:28)           CXR:    I&O's Detail    03 Dec 2023 07:01  -  04 Dec 2023 07:00  --------------------------------------------------------  IN:    Oral Fluid: 720 mL  Total IN: 720 mL    OUT:    Voided (mL): 2450 mL  Total OUT: 2450 mL    Total NET: -1730 mL      04 Dec 2023 07:01  -  04 Dec 2023 10:02  --------------------------------------------------------  IN:    Oral Fluid: 240 mL  Total IN: 240 mL    OUT:  Total OUT: 0 mL    Total NET: 240 mL      BOWEL MOVEMENT:  [x ] YES [ ] NO      Daily     Daily Weight in k.9 (04 Dec 2023 08:12)  Medications:  acetaminophen     Tablet .. 650 milliGRAM(s) Oral every 6 hours PRN  albuterol/ipratropium for Nebulization 3 milliLiter(s) Nebulizer every 6 hours  apixaban 5 milliGRAM(s) Oral two times a day  aspirin enteric coated 81 milliGRAM(s) Oral daily  atorvastatin 80 milliGRAM(s) Oral at bedtime  bisacodyl Suppository 10 milliGRAM(s) Rectal once  budesonide 160 MICROgram(s)/formoterol 4.5 MICROgram(s) Inhaler 2 Puff(s) Inhalation two times a day  chlorhexidine 2% Cloths 1 Application(s) Topical daily  dextrose 50% Injectable 50 milliLiter(s) IV Push every 15 minutes  dextrose 50% Injectable 25 milliLiter(s) IV Push every 15 minutes  dextrose Oral Gel 15 Gram(s) Oral once  famotidine    Tablet 20 milliGRAM(s) Oral two times a day  gabapentin 300 milliGRAM(s) Oral every 8 hours  glucagon  Injectable 1 milliGRAM(s) IntraMuscular once  HYDROmorphone   Tablet 2 milliGRAM(s) Oral every 4 hours PRN  insulin glargine Injectable (LANTUS) 28 Unit(s) SubCutaneous at bedtime  insulin lispro (ADMELOG) corrective regimen sliding scale   SubCutaneous at bedtime  insulin lispro (ADMELOG) corrective regimen sliding scale   SubCutaneous three times a day before meals  insulin lispro Injectable (ADMELOG) 9 Unit(s) SubCutaneous three times a day before meals  metoprolol tartrate 25 milliGRAM(s) Oral two times a day  OLANZapine 7.5 milliGRAM(s) Oral daily  polyethylene glycol 3350 17 Gram(s) Oral daily  predniSONE   Tablet 10 milliGRAM(s) Oral daily  senna 2 Tablet(s) Oral at bedtime  torsemide 20 milliGRAM(s) Oral daily  traZODone 50 milliGRAM(s) Oral at bedtime        Physical Exam:    Neurology: alert and oriented x 3, nonfocal  CV : S1S2 RRR  Sternal Wound :  CDI SHARON- sternum stable > silks removed  Lungs: clear. RR easy, unlabored   Abdomen: soft, nontender, nondistended, positive bowel sounds,  + bowel movement   Neg N/V/D; obese abdomen   :  pt voiding without difficulty   Extremities:   DEWITT; +1 LE edema, neg calf tenderness.   PPP bilaterally               PAST MEDICAL & SURGICAL HISTORY:  Cigarette smoker      Rheumatoid arthritis      Other depression      Breast cancer      Migraines      History of multiple cerebrovascular accidents (CVAs)      Suicidal ideation      Paresthesia of left arm      Facial paresthesia      APS (antiphospholipid syndrome)      History of depressed bipolar disorder      History of COPD      History of COPD      History of hysterectomy      History of cholecystectomy

## 2023-12-04 NOTE — DISCHARGE NOTE PROVIDER - NSDCFUADDAPPT_GEN_ALL_CORE_FT
Your Care Navigator Nurse Practitioner will be in touch to see you in your home within a few days from discharge. The [facility] program can help ensure you understand your medications, discharge instructions and answer any questions you may have at that time. They are also a great source to address concerns during the day and may be reached at [phone].   Your Care Navigator Nurse Practitioner will be in touch to see you in your home within a few days from discharge. The [facility] program can help ensure you understand your medications, discharge instructions and answer any questions you may have at that time. They are also a great source to address concerns during the day and may be reached at [phone].

## 2023-12-04 NOTE — DISCHARGE NOTE PROVIDER - NSDCACTIVITY_GEN_ALL_CORE
Bathing allowed/Do not drive or operate machinery/Showering allowed/Do not make important decisions/Stairs allowed/Walking - Indoors allowed/No heavy lifting/straining/Follow Instructions Provided by your Surgical Team

## 2023-12-04 NOTE — PROGRESS NOTE ADULT - PROBLEM SELECTOR PLAN 5
-Management per CTS.
-Management per CTS.
Pulm (Dr. Leone) following   -Wheezing resolved at this time  -OOB to chair, ambulate as tolerated  -Keep sats >90% with o2 PRN.  -Continue bronchodilators Symbicort BID and Duoneb q6h  -Pt is on prednisone 10 daily for her RA  Cough and deep breathe, Incentive Spirometry Q1h, Chest PT.
Continue on DuoNebs and Symbicort  monitor Respiratory status  Wean 02 as tolerated  Pulm (Dr. Leone) consulted and aware.
Pulm (Dr. Leone) following   -Wheezing resolved at this time  -OOB to chair, ambulate as tolerated  -Keep sats >90% with o2 PRN.  -Continue bronchodilators Symbicort BID and Duoneb q6h  -Pt is on prednisone 10 daily for her RA  Cough and deep breathe, Incentive Spirometry Q1h, Chest PT.
Pulm (Dr. Leone) following   -Wheezing resolved at this time  -OOB to chair, ambulate as tolerated  -Keep sats >90% with o2 PRN.  -Continue bronchodilators Symbicort BID and Duoneb q6h  -Pt is on prednisone 10 daily for her RA  Cough and deep breathe, Incentive Spirometry Q1h, Chest PT.
No wheezing this morning.   Continue on DuoNebs and Symbicort  monitor Respiratory status; wean 02 as tolerated  Incentive spirometry encouraged, coughing/deep breathing  Pulm (Dr. Leone) following closely
-Management per CTS.
No wheezing this morning.   Continue on DuoNebs and Symbicort  monitor Respiratory status; wean 02 as tolerated  Incentive spirometry encouraged, coughing/deep breathing  Pulm (Dr. Leone) following closely
-Management per CTS.
No wheezing this morning.   Continue on DuoNebs and Symbicort  monitor Respiratory status; wean 02 as tolerated  Incentive spirometry encouraged, coughing/deep breathing  Pulm (Dr. Leone) following closely
-Management per CTS.
-Management per CTS.
Continue on DuoNebs and Symbicort  monitor Respiratory status  Wean 02 as tolerated  Pulm (Dr. Leone) consulted and aware.

## 2023-12-04 NOTE — DISCHARGE NOTE NURSING/CASE MANAGEMENT/SOCIAL WORK - NSDCPEWEB_GEN_ALL_CORE
[facility] for Tobacco Control website --- [email or web address] website --- [email or web address] [facility] for Tobacco Control website --- [email or web address] website --- [email or web address]

## 2023-12-04 NOTE — DISCHARGE NOTE PROVIDER - CARE PROVIDERS DIRECT ADDRESSES
[email or web address]

## 2023-12-04 NOTE — PROGRESS NOTE ADULT - SUBJECTIVE AND OBJECTIVE BOX
German Carrasco MD  Interventional Cardiology / Endovascular Specialist  [place] Office : [address] [place] [address] Tel:   [place] Office : [address] [facility] [address] [address]  Tel: [phone]      Subjective/Overnight events: Patient sitting in recliner. No acute distress.     MEDICATIONS:  apixaban 5 milliGRAM(s) Oral two times a day  aspirin enteric coated 81 milliGRAM(s) Oral daily  metoprolol tartrate 25 milliGRAM(s) Oral two times a day  torsemide 20 milliGRAM(s) Oral daily      albuterol/ipratropium for Nebulization 3 milliLiter(s) Nebulizer every 6 hours  budesonide 160 MICROgram(s)/formoterol 4.5 MICROgram(s) Inhaler 2 Puff(s) Inhalation two times a day    acetaminophen     Tablet .. 650 milliGRAM(s) Oral every 6 hours PRN  gabapentin 300 milliGRAM(s) Oral every 8 hours  HYDROmorphone   Tablet 2 milliGRAM(s) Oral every 4 hours PRN  OLANZapine 7.5 milliGRAM(s) Oral daily  traZODone 50 milliGRAM(s) Oral at bedtime    bisacodyl Suppository 10 milliGRAM(s) Rectal once  famotidine    Tablet 20 milliGRAM(s) Oral two times a day  polyethylene glycol 3350 17 Gram(s) Oral daily  senna 2 Tablet(s) Oral at bedtime    atorvastatin 80 milliGRAM(s) Oral at bedtime  dextrose 50% Injectable 50 milliLiter(s) IV Push every 15 minutes  dextrose 50% Injectable 25 milliLiter(s) IV Push every 15 minutes  dextrose Oral Gel 15 Gram(s) Oral once  glucagon  Injectable 1 milliGRAM(s) IntraMuscular once  insulin glargine Injectable (LANTUS) 28 Unit(s) SubCutaneous at bedtime  insulin lispro (ADMELOG) corrective regimen sliding scale   SubCutaneous at bedtime  insulin lispro (ADMELOG) corrective regimen sliding scale   SubCutaneous three times a day before meals  insulin lispro Injectable (ADMELOG) 9 Unit(s) SubCutaneous three times a day before meals  predniSONE   Tablet 10 milliGRAM(s) Oral daily    chlorhexidine 2% Cloths 1 Application(s) Topical daily      PAST MEDICAL/SURGICAL HISTORY  PAST MEDICAL & SURGICAL HISTORY:  Cigarette smoker      Rheumatoid arthritis      Other depression      Breast cancer      Migraines      History of multiple cerebrovascular accidents (CVAs)      Suicidal ideation      Paresthesia of left arm      Facial paresthesia      APS (antiphospholipid syndrome)      History of depressed bipolar disorder      History of COPD      History of COPD      History of hysterectomy      History of cholecystectomy          SOCIAL HISTORY: Substance Use (street drugs): ( x ) never used  (  ) other:    FAMILY HISTORY:  Family history of breast cancer (Mother)    Family history of diabetes mellitus (DM) (Father)          PHYSICAL EXAM:  T(C): 36.5 (12-04-23 @ 12:48), Max: 36.8 (12-04-23 @ 04:20)  HR: 88 (12-04-23 @ 12:48) (82 - 95)  BP: 102/59 (12-04-23 @ 12:48) (102/59 - 109/75)  RR: 18 (12-04-23 @ 12:48) (18 - 18)  SpO2: 94% (12-04-23 @ 12:48) (83% - 96%)  Wt(kg): --  I&O's Summary    03 Dec 2023 07:01  -  04 Dec 2023 07:00  --------------------------------------------------------  IN: 720 mL / OUT: 2450 mL / NET: -1730 mL    04 Dec 2023 07:01  -  04 Dec 2023 12:54  --------------------------------------------------------  IN: 240 mL / OUT: 0 mL / NET: 240 mL          GENERAL: NAD  EYES: EOMI, PERRLA, conjunctiva and sclera clear  ENMT: No tonsillar erythema, exudates, or enlargement  Cardiovascular: Normal S1 S2, No JVD, No murmurs, No edema  Respiratory: Lungs clear to auscultation	  Gastrointestinal:  Soft, Non-tender, + BS	  Extremities: No edema                                     8.7    16.14 )-----------( 329      ( 04 Dec 2023 06:02 )             28.3     12-04    141  |  101  |  22  ----------------------------<  84  3.7   |  28  |  0.71    Ca    8.9      04 Dec 2023 06:01  Phos  3.9     12-04  Mg     2.0     12-04      proBNP:   Lipid Profile:   HgA1c:   TSH:     Consultant(s) Notes Reviewed:  [x ] YES  [ ] NO    Care Discussed with Consultants/Other Providers [ x] YES  [ ] NO    Imaging Personally Reviewed independently:  [x] YES  [ ] NO    All labs, radiologic studies, vitals, orders and medications list reviewed. Patient is seen and examined at bedside. Case discussed with medical team.                 German Carrasco MD  Interventional Cardiology / Endovascular Specialist  [place] Office : [address] [place] [address] Tel:   [place] Office : [address] [facility] [address] [address]  Tel: [phone]      Subjective/Overnight events: Patient sitting in recliner. No acute distress.     MEDICATIONS:  apixaban 5 milliGRAM(s) Oral two times a day  aspirin enteric coated 81 milliGRAM(s) Oral daily  metoprolol tartrate 25 milliGRAM(s) Oral two times a day  torsemide 20 milliGRAM(s) Oral daily      albuterol/ipratropium for Nebulization 3 milliLiter(s) Nebulizer every 6 hours  budesonide 160 MICROgram(s)/formoterol 4.5 MICROgram(s) Inhaler 2 Puff(s) Inhalation two times a day    acetaminophen     Tablet .. 650 milliGRAM(s) Oral every 6 hours PRN  gabapentin 300 milliGRAM(s) Oral every 8 hours  HYDROmorphone   Tablet 2 milliGRAM(s) Oral every 4 hours PRN  OLANZapine 7.5 milliGRAM(s) Oral daily  traZODone 50 milliGRAM(s) Oral at bedtime    bisacodyl Suppository 10 milliGRAM(s) Rectal once  famotidine    Tablet 20 milliGRAM(s) Oral two times a day  polyethylene glycol 3350 17 Gram(s) Oral daily  senna 2 Tablet(s) Oral at bedtime    atorvastatin 80 milliGRAM(s) Oral at bedtime  dextrose 50% Injectable 50 milliLiter(s) IV Push every 15 minutes  dextrose 50% Injectable 25 milliLiter(s) IV Push every 15 minutes  dextrose Oral Gel 15 Gram(s) Oral once  glucagon  Injectable 1 milliGRAM(s) IntraMuscular once  insulin glargine Injectable (LANTUS) 28 Unit(s) SubCutaneous at bedtime  insulin lispro (ADMELOG) corrective regimen sliding scale   SubCutaneous at bedtime  insulin lispro (ADMELOG) corrective regimen sliding scale   SubCutaneous three times a day before meals  insulin lispro Injectable (ADMELOG) 9 Unit(s) SubCutaneous three times a day before meals  predniSONE   Tablet 10 milliGRAM(s) Oral daily    chlorhexidine 2% Cloths 1 Application(s) Topical daily      PAST MEDICAL/SURGICAL HISTORY  PAST MEDICAL & SURGICAL HISTORY:  Cigarette smoker      Rheumatoid arthritis      Other depression      Breast cancer      Migraines      History of multiple cerebrovascular accidents (CVAs)      Suicidal ideation      Paresthesia of left arm      Facial paresthesia      APS (antiphospholipid syndrome)      History of depressed bipolar disorder      History of COPD      History of COPD      History of hysterectomy      History of cholecystectomy          SOCIAL HISTORY: Substance Use (street drugs): ( x ) never used  (  ) other:    FAMILY HISTORY:  Family history of breast cancer (Mother)    Family history of diabetes mellitus (DM) (Father)          PHYSICAL EXAM:  T(C): 36.5 (12-04-23 @ 12:48), Max: 36.8 (12-04-23 @ 04:20)  HR: 88 (12-04-23 @ 12:48) (82 - 95)  BP: 102/59 (12-04-23 @ 12:48) (102/59 - 109/75)  RR: 18 (12-04-23 @ 12:48) (18 - 18)  SpO2: 94% (12-04-23 @ 12:48) (83% - 96%)  Wt(kg): --  I&O's Summary    03 Dec 2023 07:01  -  04 Dec 2023 07:00  --------------------------------------------------------  IN: 720 mL / OUT: 2450 mL / NET: -1730 mL    04 Dec 2023 07:01  -  04 Dec 2023 12:54  --------------------------------------------------------  IN: 240 mL / OUT: 0 mL / NET: 240 mL          GENERAL: NAD  EYES: EOMI, PERRLA, conjunctiva and sclera clear  ENMT: No tonsillar erythema, exudates, or enlargement  Cardiovascular: Normal S1 S2, No JVD, No murmurs, No edema  Respiratory: Lungs clear to auscultation	  Gastrointestinal:  Soft, Non-tender, + BS	  Extremities: No edema                                     8.7    16.14 )-----------( 329      ( 04 Dec 2023 06:02 )             28.3     12-04    141  |  101  |  22  ----------------------------<  84  3.7   |  28  |  0.71    Ca    8.9      04 Dec 2023 06:01  Phos  3.9     12-04  Mg     2.0     12-04      proBNP:   Lipid Profile:   HgA1c:   TSH:     Consultant(s) Notes Reviewed:  [x ] YES  [ ] NO    Care Discussed with Consultants/Other Providers [ x] YES  [ ] NO    Imaging Personally Reviewed independently:  [x] YES  [ ] NO    All labs, radiologic studies, vitals, orders and medications list reviewed. Patient is seen and examined at bedside. Case discussed with medical team.

## 2023-12-04 NOTE — PROGRESS NOTE ADULT - ASSESSMENT
58 female smoker with PMH CVA with residual Left-sided weakness/numbness/L gaze deviation, DM2 with b/l peripheral neuropathy, COPD/Asthma, Breast Ca s/p ?RT, Bipolar depression, Rheumatoid Arthritis, Antiphospholipid syndrome, and L eye uveitis/scleritis, Presents to [facility] transferred from [facility]. p/w multiple medical complaints. A/w exacerbation of L-sided subjective weakness/numbness likely 2/2 recrudescence of prior Right BG infarct. Cardiology following for new LV dysfunction since July, pending ischemic eval. GABBY was done showing severe Aortic Insufficiency and moderated MR. Cardiac cath was done showing LAD prox 100% fills via right to left collateral. Patient now transferred to [facility] Dr. Loaiza for evaluation.    HOSPITAL COURSE:   11/14 patient seen and examined at bedside. All labs and imaging to be reviewed by Dr. Loaiza   11/15:VSS, neuro consult for hx cva L sided weakness, Preop for Friday 11/16 Repeat echo shows only mild valve pathology  11/17 No valve surgery planned in light of repeat echo.  Getting MR viability for CAD and depressed LV function.  11/18  vss for rpt gabby mon  11/19  npo after midnight for gabby tomorrow  11/20 VSS NPO for GABBY this am=> severe AR  11/21: s/p CABG LIMA-LAD, AVR(t) - Inspiris 23mm, LAAL with AtriClip 40mm,   +Substernal mediastinal Chest tube and Left pleural Chest tube. Kept Intubated post op. Taken to CTU. Levophed, Vasopressin and Primacor switched to Dobutamine gtt.  Started on PO Amio for afib ppx. +Soliz. On insulin gtt for tight glycemic control to prevent wound infxn.  11/22: POD1. Extubated. Inotropes and Pressors maintained. PCA Hydromorphone initiated by Acute Pain Mgmt team. Insulin gtt maintained per Endocrine. PT/OT evaluation.  11/23 POD 2. PCA Hydromorphone maintained. Insulin gtt maintained, started basal insulin tonight per Endo. Dobutamine gtt continued.   11/24 POD 3. Dobutamine gtt d/c'ed in AM. PCA pumped d/c'ed. MED and Pleural Chest tubes d/c'ed while in CTU. (+) PW to EPM (40/10/0.8). Prevena dressing intact. +Soliz maintained. Monitor I/Os. Back on Insulin gtt for elevated FS >200. Endocrine following. TRANSFERRED TO SDU. Current medication regimen continued.   11/25 VSS - insullin gtt off @ 6am- start low dose BB this am - soliz in place will d/c when pt more mobile.  d/c plan anticipate home PT  11/26 WBC 20 again -om chronic steroids - hx COPD- afebrile - prevena in place.  will conitue to monitor- xtra lasix 20 iv x 1 given - up 6 kgs in weight w/ ++ edema,  40 yr pack current smoker.  nebs.  11/27 VSS. Afebrile. WBC increased 23 (from 20) (Of note, patient on chronic steroids for Hx RA). Repeat CXR today stable. Rpt UA pending. Potassium 3.6 this AM, supplemented. Pt reports hx of Bipolar MDD, with no follow-up with Psychiatry and non-compliance with OP psych meds since July 2023. Pt requesting to speak to Psychiatry today to establish care. Denies SI/HI/AH/VH. On call Psychiatry ([phone]) consulted and aware. Pt also noted with bronchospasms/wheezing - Pulm (Dr. Leone) consulted and aware. Stable to move to floors today, per Dr. Loaiza.  11/28: VSS, refusing PT, needs to increase ambulation/iS/effort. CT with ?sternal dehiscence at inferior pole, will discuss with Dr. Loaiza. Extra IV lasix given for edema.   11/29: VSS. CT Chest imaging reviewed closely by Dr. Loaiza yesterday, no concerned for sternal dehiscence, upon Attending's review. Prevena d/c'd today. Site c/d/i. +PW (isolated). Lidocaine x1 for back pain today, reports improvement. Encouraged ambulation/OOB. Pt working with Physical Therapy. CM to work on Rehab for disposition as recommended by PT. Current medication regimen continued. PW D/liang per Dr. Loaiza. Of note, patient with hx of Antiphospholipid Syndrome and CVA, was on AC therapy pre-surgery; ok to restart AC therapy with Eliquis 5mg BID starting tonight, per Dr. Loaiza.  11/30 VSS rounds made w/ DR. Loaiza.  awaiting d/c to rehab   12/1  Insulin teaching Awaiting for rehab VSS   12/2 VSS, continue diuresis on torsemide 20 daily, BUN/CR 22/0.65, K 3.1 supplemented, check repeat this afternoon.   12/3 VSS; rsr 80-90; continue diuresis for hypervolemia; await rehab placement   12/4 VSS Declines rehab will DC to home  ANEUDY carcamo 58 female smoker with PMH CVA with residual Left-sided weakness/numbness/L gaze deviation, DM2 with b/l peripheral neuropathy, COPD/Asthma, Breast Ca s/p ?RT, Bipolar depression, Rheumatoid Arthritis, Antiphospholipid syndrome, and L eye uveitis/scleritis, Presents to [facility] transferred from [facility]. p/w multiple medical complaints. A/w exacerbation of L-sided subjective weakness/numbness likely 2/2 recrudescence of prior Right BG infarct. Cardiology following for new LV dysfunction since July, pending ischemic eval. GABBY was done showing severe Aortic Insufficiency and moderated MR. Cardiac cath was done showing LAD prox 100% fills via right to left collateral. Patient now transferred to [facility] Dr. Loaiza for evaluation.    HOSPITAL COURSE:   11/14 patient seen and examined at bedside. All labs and imaging to be reviewed by Dr. Loaiza   11/15:VSS, neuro consult for hx cva L sided weakness, Preop for Friday 11/16 Repeat echo shows only mild valve pathology  11/17 No valve surgery planned in light of repeat echo.  Getting MR viability for CAD and depressed LV function.  11/18  vss for rpt gabby mon  11/19  npo after midnight for gabby tomorrow  11/20 VSS NPO for GABBY this am=> severe AR  11/21: s/p CABG LIMA-LAD, AVR(t) - Inspiris 23mm, LAAL with AtriClip 40mm,   +Substernal mediastinal Chest tube and Left pleural Chest tube. Kept Intubated post op. Taken to CTU. Levophed, Vasopressin and Primacor switched to Dobutamine gtt.  Started on PO Amio for afib ppx. +Soliz. On insulin gtt for tight glycemic control to prevent wound infxn.  11/22: POD1. Extubated. Inotropes and Pressors maintained. PCA Hydromorphone initiated by Acute Pain Mgmt team. Insulin gtt maintained per Endocrine. PT/OT evaluation.  11/23 POD 2. PCA Hydromorphone maintained. Insulin gtt maintained, started basal insulin tonight per Endo. Dobutamine gtt continued.   11/24 POD 3. Dobutamine gtt d/c'ed in AM. PCA pumped d/c'ed. MED and Pleural Chest tubes d/c'ed while in CTU. (+) PW to EPM (40/10/0.8). Prevena dressing intact. +Soliz maintained. Monitor I/Os. Back on Insulin gtt for elevated FS >200. Endocrine following. TRANSFERRED TO SDU. Current medication regimen continued.   11/25 VSS - insullin gtt off @ 6am- start low dose BB this am - soliz in place will d/c when pt more mobile.  d/c plan anticipate home PT  11/26 WBC 20 again -om chronic steroids - hx COPD- afebrile - prevena in place.  will conitue to monitor- xtra lasix 20 iv x 1 given - up 6 kgs in weight w/ ++ edema,  40 yr pack current smoker.  nebs.  11/27 VSS. Afebrile. WBC increased 23 (from 20) (Of note, patient on chronic steroids for Hx RA). Repeat CXR today stable. Rpt UA pending. Potassium 3.6 this AM, supplemented. Pt reports hx of Bipolar MDD, with no follow-up with Psychiatry and non-compliance with OP psych meds since July 2023. Pt requesting to speak to Psychiatry today to establish care. Denies SI/HI/AH/VH. On call Psychiatry ([phone]) consulted and aware. Pt also noted with bronchospasms/wheezing - Pulm (Dr. Leone) consulted and aware. Stable to move to floors today, per Dr. Loaiza.  11/28: VSS, refusing PT, needs to increase ambulation/iS/effort. CT with ?sternal dehiscence at inferior pole, will discuss with Dr. Loaiza. Extra IV lasix given for edema.   11/29: VSS. CT Chest imaging reviewed closely by Dr. Loaiza yesterday, no concerned for sternal dehiscence, upon Attending's review. Prevena d/c'd today. Site c/d/i. +PW (isolated). Lidocaine x1 for back pain today, reports improvement. Encouraged ambulation/OOB. Pt working with Physical Therapy. CM to work on Rehab for disposition as recommended by PT. Current medication regimen continued. PW D/liang per Dr. Loaiza. Of note, patient with hx of Antiphospholipid Syndrome and CVA, was on AC therapy pre-surgery; ok to restart AC therapy with Eliquis 5mg BID starting tonight, per Dr. Loaiza.  11/30 VSS rounds made w/ DR. Loaiza.  awaiting d/c to rehab   12/1  Insulin teaching Awaiting for rehab VSS   12/2 VSS, continue diuresis on torsemide 20 daily, BUN/CR 22/0.65, K 3.1 supplemented, check repeat this afternoon.   12/3 VSS; rsr 80-90; continue diuresis for hypervolemia; await rehab placement   12/4 VSS Declines rehab will DC to home  ANEUDY carcamo 58 female smoker with PMH CVA with residual Left-sided weakness/numbness/L gaze deviation, DM2 with b/l peripheral neuropathy, COPD/Asthma, Breast Ca s/p ?RT, Bipolar depression, Rheumatoid Arthritis, Antiphospholipid syndrome, and L eye uveitis/scleritis, Presents to [facility] transferred from [facility]. p/w multiple medical complaints. A/w exacerbation of L-sided subjective weakness/numbness likely 2/2 recrudescence of prior Right BG infarct. Cardiology following for new LV dysfunction since July, pending ischemic eval. GABBY was done showing severe Aortic Insufficiency and moderated MR. Cardiac cath was done showing LAD prox 100% fills via right to left collateral. Patient now transferred to [facility] Dr. Loaiza for evaluation.    HOSPITAL COURSE:   11/14 patient seen and examined at bedside. All labs and imaging to be reviewed by Dr. Loaiza   11/15:VSS, neuro consult for hx cva L sided weakness, Preop for Friday 11/16 Repeat echo shows only mild valve pathology  11/17 No valve surgery planned in light of repeat echo.  Getting MR viability for CAD and depressed LV function.  11/18  vss for rpt gabby mon  11/19  npo after midnight for gabby tomorrow  11/20 VSS NPO for GABBY this am=> severe AR  11/21: s/p CABG LIMA-LAD, AVR(t) - Inspiris 23mm, LAAL with AtriClip 40mm,   +Substernal mediastinal Chest tube and Left pleural Chest tube. Kept Intubated post op. Taken to CTU. Levophed, Vasopressin and Primacor switched to Dobutamine gtt.  Started on PO Amio for afib ppx. +Soliz. On insulin gtt for tight glycemic control to prevent wound infxn.  11/22: POD1. Extubated. Inotropes and Pressors maintained. PCA Hydromorphone initiated by Acute Pain Mgmt team. Insulin gtt maintained per Endocrine. PT/OT evaluation.  11/23 POD 2. PCA Hydromorphone maintained. Insulin gtt maintained, started basal insulin tonight per Endo. Dobutamine gtt continued.   11/24 POD 3. Dobutamine gtt d/c'ed in AM. PCA pumped d/c'ed. MED and Pleural Chest tubes d/c'ed while in CTU. (+) PW to EPM (40/10/0.8). Prevena dressing intact. +Soliz maintained. Monitor I/Os. Back on Insulin gtt for elevated FS >200. Endocrine following.   TRANSFERRED TO SDU. Current medication regimen continued.   11/25 VSS - insullin gtt off @ 6am- start low dose BB this am - soliz in place will d/c when pt more mobile.  d/c plan anticipate home PT  11/26 WBC 20 again -om chronic steroids - hx COPD- afebrile - prevena in place.  will conitue to monitor- xtra lasix 20 iv x 1 given - up 6 kgs in weight w/ ++ edema,  40 yr pack current smoker.  nebs.  11/27 VSS. Afebrile. WBC increased 23 (from 20) (Of note, patient on chronic steroids for Hx RA). Repeat CXR today stable. Rpt UA pending. Potassium 3.6 this AM, supplemented. Pt reports hx of Bipolar MDD, with no follow-up with Psychiatry and non-compliance with OP psych meds since July 2023. Pt requesting to speak to Psychiatry today to establish care. Denies SI/HI/AH/VH. On call Psychiatry ([phone]) consulted and aware. Pt also noted with bronchospasms/wheezing - Pulm (Dr. Leone) consulted and aware. Stable to move to floors today,   per Dr. Loaiza.  11/28: VSS, refusing PT, needs to increase ambulation/iS/effort. CT with ?sternal dehiscence at inferior pole, will discuss with Dr. Loaiza. Extra IV lasix given for edema.   11/29: VSS. CT Chest imaging reviewed closely by Dr. Loaiza yesterday, no concerned for sternal dehiscence, upon Attending's review. Prevena d/c'd today. Site c/d/i. +PW (isolated). Lidocaine x1 for back pain today, reports improvement. Encouraged ambulation/OOB. Pt working with Physical Therapy. CM to work on Rehab for disposition as recommended by PT. Current medication regimen continued. PW D/liang per Dr. Loaiza. Of note, patient with hx of Antiphospholipid Syndrome and CVA, was on AC therapy pre-surgery; ok to restart AC therapy with Eliquis 5mg BID starting tonight, per Dr. Loaiza.  11/30 VSS rounds made w/ DR. Loaiza.  awaiting d/c to rehab   12/1  Insulin teaching Awaiting for rehab VSS   12/2 VSS, continue diuresis on torsemide 20 daily, BUN/CR 22/0.65, K 3.1 supplemented, check repeat this afternoon.   12/3 VSS;  80-90; continue diuresis for hypervolemia; await rehab placement   12/4 VSS Declines rehab will DC to home  DC silks  Will need O2 for post op hypoxia related long term tobacco abuse 58 female smoker with PMH CVA with residual Left-sided weakness/numbness/L gaze deviation, DM2 with b/l peripheral neuropathy, COPD/Asthma, Breast Ca s/p ?RT, Bipolar depression, Rheumatoid Arthritis, Antiphospholipid syndrome, and L eye uveitis/scleritis, Presents to [facility] transferred from [facility]. p/w multiple medical complaints. A/w exacerbation of L-sided subjective weakness/numbness likely 2/2 recrudescence of prior Right BG infarct. Cardiology following for new LV dysfunction since July, pending ischemic eval. GABBY was done showing severe Aortic Insufficiency and moderated MR. Cardiac cath was done showing LAD prox 100% fills via right to left collateral. Patient now transferred to [facility] Dr. Loaiza for evaluation.    HOSPITAL COURSE:   11/14 patient seen and examined at bedside. All labs and imaging to be reviewed by Dr. Loaiza   11/15:VSS, neuro consult for hx cva L sided weakness, Preop for Friday 11/16 Repeat echo shows only mild valve pathology  11/17 No valve surgery planned in light of repeat echo.  Getting MR viability for CAD and depressed LV function.  11/18  vss for rpt gabby mon  11/19  npo after midnight for gabby tomorrow  11/20 VSS NPO for GABBY this am=> severe AR  11/21: s/p CABG LIMA-LAD, AVR(t) - Inspiris 23mm, LAAL with AtriClip 40mm,   +Substernal mediastinal Chest tube and Left pleural Chest tube. Kept Intubated post op. Taken to CTU. Levophed, Vasopressin and Primacor switched to Dobutamine gtt.  Started on PO Amio for afib ppx. +Soliz. On insulin gtt for tight glycemic control to prevent wound infxn.  11/22: POD1. Extubated. Inotropes and Pressors maintained. PCA Hydromorphone initiated by Acute Pain Mgmt team. Insulin gtt maintained per Endocrine. PT/OT evaluation.  11/23 POD 2. PCA Hydromorphone maintained. Insulin gtt maintained, started basal insulin tonight per Endo. Dobutamine gtt continued.   11/24 POD 3. Dobutamine gtt d/c'ed in AM. PCA pumped d/c'ed. MED and Pleural Chest tubes d/c'ed while in CTU. (+) PW to EPM (40/10/0.8). Prevena dressing intact. +Soliz maintained. Monitor I/Os. Back on Insulin gtt for elevated FS >200. Endocrine following.   TRANSFERRED TO SDU. Current medication regimen continued.   11/25 VSS - insullin gtt off @ 6am- start low dose BB this am - soliz in place will d/c when pt more mobile.  d/c plan anticipate home PT  11/26 WBC 20 again -om chronic steroids - hx COPD- afebrile - prevena in place.  will conitue to monitor- xtra lasix 20 iv x 1 given - up 6 kgs in weight w/ ++ edema,  40 yr pack current smoker.  nebs.  11/27 VSS. Afebrile. WBC increased 23 (from 20) (Of note, patient on chronic steroids for Hx RA). Repeat CXR today stable. Rpt UA pending. Potassium 3.6 this AM, supplemented. Pt reports hx of Bipolar MDD, with no follow-up with Psychiatry and non-compliance with OP psych meds since July 2023. Pt requesting to speak to Psychiatry today to establish care. Denies SI/HI/AH/VH. On call Psychiatry ([phone]) consulted and aware. Pt also noted with bronchospasms/wheezing - Pulm (Dr. Leone) consulted and aware. Stable to move to floors today,   per Dr. Loaiza.  11/28: VSS, refusing PT, needs to increase ambulation/iS/effort. CT with ?sternal dehiscence at inferior pole, will discuss with Dr. Loaiza. Extra IV lasix given for edema.   11/29: VSS. CT Chest imaging reviewed closely by Dr. Loaiza yesterday, no concerned for sternal dehiscence, upon Attending's review. Prevena d/c'd today. Site c/d/i. +PW (isolated). Lidocaine x1 for back pain today, reports improvement. Encouraged ambulation/OOB. Pt working with Physical Therapy. CM to work on Rehab for disposition as recommended by PT. Current medication regimen continued. PW D/liang per Dr. Loaiza. Of note, patient with hx of Antiphospholipid Syndrome and CVA, was on AC therapy pre-surgery; ok to restart AC therapy with Eliquis 5mg BID starting tonight, per Dr. Loaiza.  11/30 VSS rounds made w/ DR. Loaiza.  awaiting d/c to rehab   12/1  Insulin teaching Awaiting for rehab VSS   12/2 VSS, continue diuresis on torsemide 20 daily, BUN/CR 22/0.65, K 3.1 supplemented, check repeat this afternoon.   12/3 VSS;  80-90; continue diuresis for hypervolemia; await rehab placement   12/4 VSS Declines rehab will DC to home  DC silks  Will need O2 for post op hypoxia related long term tobacco abuse

## 2023-12-04 NOTE — DISCHARGE NOTE PROVIDER - HOSPITAL COURSE
58 female smoker with PMH CVA with residual Left-sided weakness/numbness/L gaze deviation, DM2 with b/l peripheral neuropathy, COPD/Asthma, Breast Ca s/p ?RT, Bipolar depression, Rheumatoid Arthritis, Antiphospholipid syndrome, and L eye uveitis/scleritis, Presents to [facility] transferred from [facility]. p/w multiple medical complaints. A/w exacerbation of L-sided subjective weakness/numbness likely 2/2 recrudescence of prior Right BG infarct. Cardiology following for new LV dysfunction since July, pending ischemic eval. GABBY was done showing severe Aortic Insufficiency and moderated MR. Cardiac cath was done showing LAD prox 100% fills via right to left collateral. Patient now transferred to [facility] Dr. Loaiza for evaluation.    HOSPITAL COURSE:   11/14 patient seen and examined at bedside. All labs and imaging to be reviewed by Dr. Loaiza   11/15:VSS, neuro consult for hx cva L sided weakness, Preop for Friday 11/16 Repeat echo shows only mild valve pathology  11/17 No valve surgery planned in light of repeat echo.  Getting MR viability for CAD and depressed LV function.  11/18  vss for rpt gabby mon  11/19  npo after midnight for gabby tomorrow  11/20 VSS NPO for GABBY this am=> severe AR  11/21: s/p CABG LIMA-LAD, AVR(t) - Inspiris 23mm, LAAL with AtriClip 40mm,   +Substernal mediastinal Chest tube and Left pleural Chest tube. Kept Intubated post op. Taken to CTU. Levophed, Vasopressin and Primacor switched to Dobutamine gtt.  Started on PO Amio for afib ppx. +Soliz. On insulin gtt for tight glycemic control to prevent wound infxn.  11/22: POD1. Extubated. Inotropes and Pressors maintained. PCA Hydromorphone initiated by Acute Pain Mgmt team. Insulin gtt maintained per Endocrine. PT/OT evaluation.  11/23 POD 2. PCA Hydromorphone maintained. Insulin gtt maintained, started basal insulin tonight per Endo. Dobutamine gtt continued.   11/24 POD 3. Dobutamine gtt d/c'ed in AM. PCA pumped d/c'ed. MED and Pleural Chest tubes d/c'ed while in CTU. (+) PW to EPM (40/10/0.8). Prevena dressing intact. +Soliz maintained. Monitor I/Os. Back on Insulin gtt for elevated FS >200. Endocrine following.   TRANSFERRED TO SDU. Current medication regimen continued.   11/25 VSS - insullin gtt off @ 6am- start low dose BB this am - soliz in place will d/c when pt more mobile.  d/c plan anticipate home PT  11/26 WBC 20 again -om chronic steroids - hx COPD- afebrile - prevena in place.  will conitue to monitor- xtra lasix 20 iv x 1 given - up 6 kgs in weight w/ ++ edema,  40 yr pack current smoker.  nebs.  11/27 VSS. Afebrile. WBC increased 23 (from 20) (Of note, patient on chronic steroids for Hx RA). Repeat CXR today stable. Rpt UA pending. Potassium 3.6 this AM, supplemented. Pt reports hx of Bipolar MDD, with no follow-up with Psychiatry and non-compliance with OP psych meds since July 2023. Pt requesting to speak to Psychiatry today to establish care. Denies SI/HI/AH/VH. On call Psychiatry ([phone]) consulted and aware. Pt also noted with bronchospasms/wheezing - Pulm (Dr. Leone) consulted and aware. Stable to move to floors today,   per Dr. Loaiza.  11/28: VSS, refusing PT, needs to increase ambulation/iS/effort. CT with ?sternal dehiscence at inferior pole, will discuss with Dr. Loaiza. Extra IV lasix given for edema.   11/29: VSS. CT Chest imaging reviewed closely by Dr. Loaiza yesterday, no concerned for sternal dehiscence, upon Attending's review. Prevena d/c'd today. Site c/d/i. +PW (isolated). Lidocaine x1 for back pain today, reports improvement. Encouraged ambulation/OOB. Pt working with Physical Therapy. CM to work on Rehab for disposition as recommended by PT. Current medication regimen continued. PW D/liang per Dr. Loaiza. Of note, patient with hx of Antiphospholipid Syndrome and CVA, was on AC therapy pre-surgery; ok to restart AC therapy with Eliquis 5mg BID starting tonight, per Dr. Loaiza.  11/30 VSS rounds made w/ DR. Loaiza.  awaiting d/c to rehab   12/1  Insulin teaching Awaiting for rehab VSS   12/2 VSS, continue diuresis on torsemide 20 daily, BUN/CR 22/0.65, K 3.1 supplemented, check repeat this afternoon.   12/3 VSS;  80-90; continue diuresis for hypervolemia; await rehab placement   12/4 VSS DC rehab [place] tpday  DC silks  Will need O2 for post op hypoxia related long term tobacco abuse   58 female smoker with PMH CVA with residual Left-sided weakness/numbness/L gaze deviation, DM2 with b/l peripheral neuropathy, COPD/Asthma, Breast Ca s/p ?RT, Bipolar depression, Rheumatoid Arthritis, Antiphospholipid syndrome, and L eye uveitis/scleritis, Presents to [facility] transferred from [facility]. p/w multiple medical complaints. A/w exacerbation of L-sided subjective weakness/numbness likely 2/2 recrudescence of prior Right BG infarct. Cardiology following for new LV dysfunction since July, pending ischemic eval. GABBY was done showing severe Aortic Insufficiency and moderated MR. Cardiac cath was done showing LAD prox 100% fills via right to left collateral. Patient now transferred to [facility] Dr. Loaiza for evaluation.    HOSPITAL COURSE:   11/14 patient seen and examined at bedside. All labs and imaging to be reviewed by Dr. Loaiza   11/15:VSS, neuro consult for hx cva L sided weakness, Preop for Friday 11/16 Repeat echo shows only mild valve pathology  11/17 No valve surgery planned in light of repeat echo.  Getting MR viability for CAD and depressed LV function.  11/18  vss for rpt gabby mon  11/19  npo after midnight for gabby tomorrow  11/20 VSS NPO for GABBY this am=> severe AR  11/21: s/p CABG LIMA-LAD, AVR(t) - Inspiris 23mm, LAAL with AtriClip 40mm,   +Substernal mediastinal Chest tube and Left pleural Chest tube. Kept Intubated post op. Taken to CTU. Levophed, Vasopressin and Primacor switched to Dobutamine gtt.  Started on PO Amio for afib ppx. +Soliz. On insulin gtt for tight glycemic control to prevent wound infxn.  11/22: POD1. Extubated. Inotropes and Pressors maintained. PCA Hydromorphone initiated by Acute Pain Mgmt team. Insulin gtt maintained per Endocrine. PT/OT evaluation.  11/23 POD 2. PCA Hydromorphone maintained. Insulin gtt maintained, started basal insulin tonight per Endo. Dobutamine gtt continued.   11/24 POD 3. Dobutamine gtt d/c'ed in AM. PCA pumped d/c'ed. MED and Pleural Chest tubes d/c'ed while in CTU. (+) PW to EPM (40/10/0.8). Prevena dressing intact. +Soliz maintained. Monitor I/Os. Back on Insulin gtt for elevated FS >200. Endocrine following.   TRANSFERRED TO SDU. Current medication regimen continued.   11/25 VSS - insullin gtt off @ 6am- start low dose BB this am - soliz in place will d/c when pt more mobile.  d/c plan anticipate home PT  11/26 WBC 20 again -om chronic steroids - hx COPD- afebrile - prevena in place.  will conitue to monitor- xtra lasix 20 iv x 1 given - up 6 kgs in weight w/ ++ edema,  40 yr pack current smoker.  nebs.  11/27 VSS. Afebrile. WBC increased 23 (from 20) (Of note, patient on chronic steroids for Hx RA). Repeat CXR today stable. Rpt UA pending. Potassium 3.6 this AM, supplemented. Pt reports hx of Bipolar MDD, with no follow-up with Psychiatry and non-compliance with OP psych meds since July 2023. Pt requesting to speak to Psychiatry today to establish care. Denies SI/HI/AH/VH. On call Psychiatry ([phone]) consulted and aware. Pt also noted with bronchospasms/wheezing - Pulm (Dr. Leone) consulted and aware. Stable to move to floors today,   per Dr. Loaiza.  11/28: VSS, refusing PT, needs to increase ambulation/iS/effort. CT with ?sternal dehiscence at inferior pole, will discuss with Dr. Loaiza. Extra IV lasix given for edema.   11/29: VSS. CT Chest imaging reviewed closely by Dr. Loaiza yesterday, no concerned for sternal dehiscence, upon Attending's review. Prevena d/c'd today. Site c/d/i. +PW (isolated). Lidocaine x1 for back pain today, reports improvement. Encouraged ambulation/OOB. Pt working with Physical Therapy. CM to work on Rehab for disposition as recommended by PT. Current medication regimen continued. PW D/liang per Dr. Loaiza. Of note, patient with hx of Antiphospholipid Syndrome and CVA, was on AC therapy pre-surgery; ok to restart AC therapy with Eliquis 5mg BID starting tonight, per Dr. Loaiza.  11/30 VSS rounds made w/ DR. Loaiza.  awaiting d/c to rehab   12/1  Insulin teaching Awaiting for rehab VSS   12/2 VSS, continue diuresis on torsemide 20 daily, BUN/CR 22/0.65, K 3.1 supplemented, check repeat this afternoon.   12/3 VSS;  80-90; continue diuresis for hypervolemia; await rehab placement   12/4 VSS DC rehab [place] tpday  DC silks  Will need O2 for post op hypoxia related long term tobacco abuse

## 2023-12-04 NOTE — DISCHARGE NOTE NURSING/CASE MANAGEMENT/SOCIAL WORK - NSDCPEHOTLINE_GEN_ALL_CORE
[facility] Smokers Quitline 5-620-DVKOWLH ([phone]) [facility] Smokers Quitline 6-265-ZTIEPIP ([phone])

## 2023-12-04 NOTE — PROGRESS NOTE ADULT - SUBJECTIVE AND OBJECTIVE BOX
Chief complaint  Patient is a 58y old  Female who presents with a chief complaint of AVR C1LIMA (04 Dec 2023 10:01)         Labs and Fingersticks  CAPILLARY BLOOD GLUCOSE      POCT Blood Glucose.: 195 mg/dL (04 Dec 2023 11:50)  POCT Blood Glucose.: 109 mg/dL (04 Dec 2023 07:34)  POCT Blood Glucose.: 132 mg/dL (03 Dec 2023 21:23)  POCT Blood Glucose.: 114 mg/dL (03 Dec 2023 16:41)      Anion Gap: 12 (12-04 @ 06:01)  Anion Gap: 11 (12-03 @ 06:02)      Calcium: 8.9 (12-04 @ 06:01)  Calcium: 8.7 (12-03 @ 06:02)          12-04    141  |  101  |  22  ----------------------------<  84  3.7   |  28  |  0.71    Ca    8.9      04 Dec 2023 06:01  Phos  3.9     12-04  Mg     2.0     12-04                          8.7    16.14 )-----------( 329      ( 04 Dec 2023 06:02 )             28.3     Medications  MEDICATIONS  (STANDING):  albuterol/ipratropium for Nebulization 3 milliLiter(s) Nebulizer every 6 hours  apixaban 5 milliGRAM(s) Oral two times a day  aspirin enteric coated 81 milliGRAM(s) Oral daily  atorvastatin 80 milliGRAM(s) Oral at bedtime  bisacodyl Suppository 10 milliGRAM(s) Rectal once  budesonide 160 MICROgram(s)/formoterol 4.5 MICROgram(s) Inhaler 2 Puff(s) Inhalation two times a day  chlorhexidine 2% Cloths 1 Application(s) Topical daily  dextrose 50% Injectable 50 milliLiter(s) IV Push every 15 minutes  dextrose 50% Injectable 25 milliLiter(s) IV Push every 15 minutes  dextrose Oral Gel 15 Gram(s) Oral once  famotidine    Tablet 20 milliGRAM(s) Oral two times a day  gabapentin 300 milliGRAM(s) Oral every 8 hours  glucagon  Injectable 1 milliGRAM(s) IntraMuscular once  insulin glargine Injectable (LANTUS) 28 Unit(s) SubCutaneous at bedtime  insulin lispro (ADMELOG) corrective regimen sliding scale   SubCutaneous at bedtime  insulin lispro (ADMELOG) corrective regimen sliding scale   SubCutaneous three times a day before meals  insulin lispro Injectable (ADMELOG) 9 Unit(s) SubCutaneous three times a day before meals  metoprolol tartrate 25 milliGRAM(s) Oral two times a day  OLANZapine 7.5 milliGRAM(s) Oral daily  polyethylene glycol 3350 17 Gram(s) Oral daily  predniSONE   Tablet 10 milliGRAM(s) Oral daily  senna 2 Tablet(s) Oral at bedtime  torsemide 20 milliGRAM(s) Oral daily  traZODone 50 milliGRAM(s) Oral at bedtime      Physical Exam  General: Patient comfortable in bed   Vital Signs Last 12 Hrs  T(F): 97.7 (12-04-23 @ 12:48), Max: 98.3 (12-04-23 @ 04:20)  HR: 88 (12-04-23 @ 12:48) (83 - 88)  BP: 102/59 (12-04-23 @ 12:48) (102/59 - 105/69)  BP(mean): 81 (12-04-23 @ 04:20) (81 - 81)  RR: 18 (12-04-23 @ 12:48) (18 - 18)  SpO2: 94% (12-04-23 @ 12:48) (83% - 94%)    CVS: S1S2   Respiratory: No wheezing, no crepitations  GI: Abdomen soft, bowel sounds positive  Musculoskeletal:  moves all extremities  : Voiding        Chief complaint  Patient is a 58y old  Female who presents with a chief complaint of AVR C1LIMA (04 Dec 2023 10:01)         Labs and Fingersticks  CAPILLARY BLOOD GLUCOSE      POCT Blood Glucose.: 195 mg/dL (04 Dec 2023 11:50)  POCT Blood Glucose.: 109 mg/dL (04 Dec 2023 07:34)  POCT Blood Glucose.: 132 mg/dL (03 Dec 2023 21:23)  POCT Blood Glucose.: 114 mg/dL (03 Dec 2023 16:41)      Anion Gap: 12 (12-04 @ 06:01)  Anion Gap: 11 (12-03 @ 06:02)      Calcium: 8.9 (12-04 @ 06:01)  Calcium: 8.7 (12-03 @ 06:02)          12-04    141  |  101  |  22  ----------------------------<  84  3.7   |  28  |  0.71    Ca    8.9      04 Dec 2023 06:01  Phos  3.9     12-04  Mg     2.0     12-04                          8.7    16.14 )-----------( 329      ( 04 Dec 2023 06:02 )             28.3     Medications  MEDICATIONS  (STANDING):  albuterol/ipratropium for Nebulization 3 milliLiter(s) Nebulizer every 6 hours  apixaban 5 milliGRAM(s) Oral two times a day  aspirin enteric coated 81 milliGRAM(s) Oral daily  atorvastatin 80 milliGRAM(s) Oral at bedtime  bisacodyl Suppository 10 milliGRAM(s) Rectal once  budesonide 160 MICROgram(s)/formoterol 4.5 MICROgram(s) Inhaler 2 Puff(s) Inhalation two times a day  chlorhexidine 2% Cloths 1 Application(s) Topical daily  dextrose 50% Injectable 50 milliLiter(s) IV Push every 15 minutes  dextrose 50% Injectable 25 milliLiter(s) IV Push every 15 minutes  dextrose Oral Gel 15 Gram(s) Oral once  famotidine    Tablet 20 milliGRAM(s) Oral two times a day  gabapentin 300 milliGRAM(s) Oral every 8 hours  glucagon  Injectable 1 milliGRAM(s) IntraMuscular once  insulin glargine Injectable (LANTUS) 28 Unit(s) SubCutaneous at bedtime  insulin lispro (ADMELOG) corrective regimen sliding scale   SubCutaneous at bedtime  insulin lispro (ADMELOG) corrective regimen sliding scale   SubCutaneous three times a day before meals  insulin lispro Injectable (ADMELOG) 9 Unit(s) SubCutaneous three times a day before meals  metoprolol tartrate 25 milliGRAM(s) Oral two times a day  OLANZapine 7.5 milliGRAM(s) Oral daily  polyethylene glycol 3350 17 Gram(s) Oral daily  predniSONE   Tablet 10 milliGRAM(s) Oral daily  senna 2 Tablet(s) Oral at bedtime  torsemide 20 milliGRAM(s) Oral daily  traZODone 50 milliGRAM(s) Oral at bedtime      Physical Exam  General: Patient comfortable in bed   Vital Signs Last 12 Hrs  T(F): 97.7 (12-04-23 @ 12:48), Max: 98.3 (12-04-23 @ 04:20)  HR: 88 (12-04-23 @ 12:48) (83 - 88)  BP: 102/59 (12-04-23 @ 12:48) (102/59 - 105/69)  BP(mean): 81 (12-04-23 @ 04:20) (81 - 81)  RR: 18 (12-04-23 @ 12:48) (18 - 18)  SpO2: 94% (12-04-23 @ 12:48) (83% - 94%)    CVS: S1S2   Respiratory: No wheezing, no crepitations  GI: Abdomen soft, bowel sounds positive  Musculoskeletal:  moves all extremities  : Voiding

## 2023-12-04 NOTE — PROGRESS NOTE ADULT - PROBLEM SELECTOR PROBLEM 4
Diabetes mellitus
History of depressed bipolar disorder
History of depressed bipolar disorder
Asthma
S/P CABG x 1
S/P CABG x 1
Diabetes mellitus
Diabetes mellitus
S/P CABG x 1
History of depressed bipolar disorder
Diabetes mellitus
History of depressed bipolar disorder
History of depressed bipolar disorder
S/P CABG x 1
Diabetes mellitus
History of depressed bipolar disorder
S/P CABG x 1
History of depressed bipolar disorder
History of depressed bipolar disorder

## 2023-12-04 NOTE — PROGRESS NOTE ADULT - NS ATTEND OPT1 GEN_ALL_CORE

## 2023-12-04 NOTE — PROGRESS NOTE ADULT - PROBLEM SELECTOR PLAN 3
- A1c 7.6  - Monitor FS  - Endocrine following closely.   - DM regimen per Endo  - Lantus 35QHS, Lispro 9U TID
Antiphospholipid syndrome  - eliquis 5mg BID STOPPED  - c/w LVNX 85mg Q12 from JESS
Suggest to continue medications, monitoring, FU primary team recommendations. Pending Cardiac MR
Suggest to continue medications, monitoring, FU primary team recommendations. Pending Cardiac MR
- A1c 7.6  - Monitor FS  - Endocrine following closely.   - DM regimen per Endo  - Insulin gtt
Antiphospholipid syndrome  - eliquis 5mg BID STOPPED  - c/w LVNX 85mg Q12 from JESS
by hx  -Continue bronchodilators as above.
Suggest to continue medications, monitoring, FU primary team recommendations. Pending Cardiac MR
Suggest to continue medications, monitoring, FU primary team recommendations. Pending Cardiac MR
by hx  -Continue bronchodilators as above.
- A1c 7.6  - Monitor FS  - Endocrine following closely.   - DM regimen per Endo  - Lantus 35QHS, Lispro 9U TID, admelog corrective SS
Antiphospholipid syndrome  - eliquis 5mg BID STOPPED  - c/w LVNX 85mg Q12 from JESS
Suggest to continue medications, monitoring, FU primary team recommendations. Pending Cardiac MR
Antiphospholipid syndrome  - eliquis 5mg BID STOPPED  - c/w LVNX 85mg Q12 from JESS
Antiphospholipid syndrome  - eliquis 5mg BID STOPPED  - c/w LVNX 85mg Q12 from JESS
Suggest to continue medications, monitoring, FU primary team recommendations. Pending Cardiac MR
Suggest to continue medications, monitoring, FU primary team recommendations. Pending Cardiac MR
- A1c 7.6  - Monitor FS  - Endocrine following closely.   - DM regimen per Endo  - Insulin gtt
- A1c 7.6  - Monitor FS  - Endocrine following closely.   - DM regimen per Endo  - Lantus 35QHS, Lispro 9U TID, admelog corrective SS
Suggest to continue medications, monitoring, FU primary team recommendations. Pending Cardiac MR
by hx  -Continue bronchodilators as above.
- A1c 7.6  - Monitor FS  - Endocrine following closely.   - DM regimen per Endo  - Lantus 35QHS, Lispro 9U TID, admelog corrective SS
Suggest to continue medications, monitoring, FU primary team recommendations. Pending Cardiac MR
- A1c 7.6  - Monitor FS  - Endocrine following closely.   - DM regimen per Endo  - Insulin gtt
- A1c 7.6  - Monitor FS  - Endocrine following closely.   - DM regimen per Endo  - Insulin gtt
Suggest to continue medications, monitoring, FU primary team recommendations. Pending Cardiac MR
Suggest to continue medications, monitoring, FU primary team recommendations. Pending Cardiac MR
by hx  -Continue bronchodilators as above.
by hx  -Continue bronchodilators as above.
- A1c 7.6  - Monitor FS AC/HS  - Endocrine following   - DM regimen per Endo  - Lantus 28 units QHS, Lispro 9U TID, admelog corrective SS
Suggest to continue medications, monitoring, FU primary team recommendations. Pending Cardiac MR
- A1c 7.6  - Monitor FS AC/HS  - Endocrine following   - DM regimen per Endo  - Lantus 28 units QHS, Lispro 9U TID, admelog corrective SS
Suggest to continue medications, monitoring, FU primary team recommendations. Pending Cardiac MR
- A1c 7.6  - Monitor FS AC/HS  - Endocrine following   - DM regimen per Endo  - Lantus 28 units QHS, Lispro 9U TID, admelog corrective SS
by hx  -Continue Symbicort BID  -Continue Duoneb q6h  -Pt is on prednisone for her RA, not COPD  -Eventual outpatient PFTs.

## 2023-12-04 NOTE — PROGRESS NOTE ADULT - ASSESSMENT
57 y/o F with PMH smoker with PMH CVA with L sided weakness/numbness/L gaze deviation, DM2 with b/l peripheral neuropathy, COPD/emphysema, asthma, RA (on prednisone), Breast Ca s/p ?RT, Bipolar depression, Rheumatoid Arthritis, Antiphospholipid syndrome, and L eye uveitis/scleritis. Presents to [facility] transferred from [facility]. Initially presents with exacerbation of L-sided subjective weakness/numbness likely 2/2 recrudescence of prior Right BG infarct. Cardiology following for new LV dysfunction since July, pending ischemic eval. LESLEY was done showing severe Aortic Insufficiency and moderated MR. Cardiac cath was done showing LAD prox 100% fills via right to left collateral. Tx for surgical evaluation. S/p CABG, AVR on 11/21. Called to consult for wheezing. Per pt she is SOB and intermittently wheezing. Endorses difficult to expectorate secretions. Pt states she is unable to take a deep breath due to incisional pain.  57 y/o F with PMH smoker with PMH CVA with L sided weakness/numbness/L gaze deviation, DM2 with b/l peripheral neuropathy, COPD/emphysema, asthma, RA (on prednisone), Breast Ca s/p ?RT, Bipolar depression, Rheumatoid Arthritis, Antiphospholipid syndrome, and L eye uveitis/scleritis. Presents to [facility] transferred from [facility]. Initially presents with exacerbation of L-sided subjective weakness/numbness likely 2/2 recrudescence of prior Right BG infarct. Cardiology following for new LV dysfunction since July, pending ischemic eval. LESLEY was done showing severe Aortic Insufficiency and moderated MR. Cardiac cath was done showing LAD prox 100% fills via right to left collateral. Tx for surgical evaluation. S/p CABG, AVR on 11/21. Called to consult for wheezing. Per pt she is SOB and intermittently wheezing. Endorses difficult to expectorate secretions. Pt states she is unable to take a deep breath due to incisional pain.

## 2023-12-04 NOTE — PROGRESS NOTE ADULT - ASSESSMENT
58F smoker with PMH CVA with L sided weakness/numbness/L gaze deviation, DM2 with b/l peripheral neuropathy, COPD/Asthma, Breast Ca s/p ?RT,  Bipolar depression, Rheumatoid Arthritis,  Antiphospholipid syndrome, and L eye uveitis/scleritis.   Assessment  DMT2: 58y Female with DM T2 with hyperglycemia, A1C 7.6% , was on oral meds at home, now postop CABG, now transitioned to basal bolus, sugars stable, eating full meals. Snacking in between meals.   Insulin teaching  Severe AR: CTS eval, LESLEY, s/p ct surgery   CAD: on medications, stable, monitored. s/p Cardiac MR viability , now postop CABG  HTN: on antihypertensive medications, monitored, asymptomatic.  Obesity: No strict exercise routines, not on any weight loss program, neither on low calorie diet.        Discussed plan and management with Dr Ladarius Rivera NP - TEAMS  Skye Durand MD  Cell: [phone]  Office: [phone]    58F smoker with PMH CVA with L sided weakness/numbness/L gaze deviation, DM2 with b/l peripheral neuropathy, COPD/Asthma, Breast Ca s/p ?RT,  Bipolar depression, Rheumatoid Arthritis,  Antiphospholipid syndrome, and L eye uveitis/scleritis.   Assessment  DMT2: 58y Female with DM T2 with hyperglycemia, A1C 7.6% , was on oral meds at home, now postop CABG, now transitioned to basal bolus, sugars stable, eating full meals. Snacking in between meals.   Insulin teaching  Severe AR: CTS eval, LESLEY, s/p ct surgery   CAD: on medications, stable, monitored. s/p Cardiac MR viability , now postop CABG  HTN: on antihypertensive medications, monitored, asymptomatic.  Obesity: No strict exercise routines, not on any weight loss program, neither on low calorie diet.        Discussed plan and management with Dr Ladarius Rivera NP - TEAMS  Skye Durand MD  Cell: [phone]  Office: [phone]    58F smoker with PMH CVA with L sided weakness/numbness/L gaze deviation, DM2 with b/l peripheral neuropathy, COPD/Asthma, Breast Ca s/p ?RT,  Bipolar depression, Rheumatoid Arthritis,  Antiphospholipid syndrome, and L eye uveitis/scleritis.   Assessment  DMT2: 58y Female with DM T2 with hyperglycemia, A1C 7.6% , was on oral meds at home, now postop CABG, now transitioned to basal bolus, sugars stable,  eating full meals. Snacking in between meals.   Insulin teaching  Severe AR: CTS eval, LESLEY, s/p ct surgery   CAD: on medications, stable, monitored. s/p Cardiac MR viability , now postop CABG  HTN: on antihypertensive medications, monitored, asymptomatic.  Obesity: No strict exercise routines, not on any weight loss program, neither on low calorie diet.        Discussed plan and management with Dr Ladarius Rivera NP - TEAMS  Skye Durand MD  Cell: [phone]  Office: [phone]    58F smoker with PMH CVA with L sided weakness/numbness/L gaze deviation, DM2 with b/l peripheral neuropathy, COPD/Asthma, Breast Ca s/p ?RT,  Bipolar depression, Rheumatoid Arthritis,  Antiphospholipid syndrome, and L eye uveitis/scleritis.   Assessment  DMT2: 58y Female with DM T2 with hyperglycemia, A1C 7.6% , was on oral meds at home, now postop CABG, now transitioned to basal bolus, sugars stable,  eating full meals. Snacking in between meals.   Insulin teaching  Severe AR: CTS eval, LESLEY, s/p ct surgery   CAD: on medications, stable, monitored. s/p Cardiac MR viability , now postop CABG  HTN: on antihypertensive medications, monitored, asymptomatic.  Obesity: No strict exercise routines, not on any weight loss program, neither on low calorie diet.        Discussed plan and management with Dr Ladarius Rivera NP - TEAMS  Skye Durand MD  Cell: [phone]  Office: [phone]

## 2023-12-04 NOTE — PROGRESS NOTE ADULT - PROBLEM SELECTOR PROBLEM 2
CAD (coronary artery disease)
S/P AVR
CAD (coronary artery disease)
CVA (cerebral vascular accident)
CAD (coronary artery disease)
CAD (coronary artery disease)
CVA (cerebral vascular accident)
S/P AVR
CAD (coronary artery disease)
CAD (coronary artery disease)
S/P AVR
CAD (coronary artery disease)
Emphysema/COPD
Emphysema/COPD
CAD (coronary artery disease)
CVA (cerebral vascular accident)
Emphysema/COPD
S/P AVR
CAD (coronary artery disease)
CVA (cerebral vascular accident)
S/P AVR
Wheezing
S/P AVR
CAD (coronary artery disease)
S/P AVR
CAD (coronary artery disease)
S/P AVR
Emphysema/COPD
Emphysema/COPD
CVA (cerebral vascular accident)
S/P AVR

## 2023-12-04 NOTE — DISCHARGE NOTE NURSING/CASE MANAGEMENT/SOCIAL WORK - NSDCPEPAMP_GEN_ALL_CORE
“[facility]” pamphlet given “[facility]” pamphlet given

## 2023-12-04 NOTE — PROGRESS NOTE ADULT - PROBLEM SELECTOR PROBLEM 1
Wheezing
LV dysfunction
Diabetes mellitus
S/P CABG x 1
S/P CABG x 1
Diabetes mellitus
S/P CABG x 1
Wheezing
Diabetes mellitus
Diabetes mellitus
Wheezing
Diabetes mellitus
LV dysfunction
S/P CABG x 1
S/P CABG x 1
Wheezing
LV dysfunction
Diabetes mellitus
S/P CABG x 1
Diabetes mellitus
LV dysfunction
LV dysfunction
S/P CABG x 1
S/P CABG x 1
Wheezing
Diabetes mellitus
S/P CABG x 1
S/P CABG x 1
Diabetes mellitus
Hypoxia
S/P CABG x 1

## 2023-12-04 NOTE — PROGRESS NOTE ADULT - PROBLEM SELECTOR PLAN 4
Psych following- continue Zyprexa 7.5 daily, trazodone 50 HS  [facility] outpt mental health clinic f/u at [phone] Psych following- continue Zyprexa 7.5 daily, trazodone 50 HS  [facility] outpt mental health clinic f/u at [phone]

## 2023-12-04 NOTE — PROGRESS NOTE ADULT - PROBLEM SELECTOR PLAN 1
hypoxia on exertion   -Doubt PE, already on AC  -No wheezing on exam  -Check CXR  -Pt refusing TATO   -Pt will require o2 at this point with exertion. Keep sats >88% with o2 PRN.   -F/u in office 2 weeks

## 2023-12-04 NOTE — PROGRESS NOTE ADULT - ASSESSMENT
58F smoker with stroke x 2 (2022 and july 2023 with resid  L sided weakness/numbness  DM2 with b/l peripheral neuropathy, COPD/Asthma, Breast Ca s/p, Bipolar depression, Rheumatoid Arthritis, Antiphospholipid syndrome, and L eye uveitis/scleritis, Presents to [facility] transferred from [facility]. p/w multiple medical complaints and exacerbation of L-sided subjective weakness/numbness likely 2/2 recrudescence of prior Right BG infarct. Cardiology following for new LV dysfunction since July.Patient now transferred to CTS Dr. Loaiza for evaluation. neuro called for prior strokes   LESLEY was done showing severe Aortic Insufficiency and moderated MR.   Cardiac cath was done showing LAD prox 100% fills via right to left collateral.   CTH with tinght chronic appearing R BG infarct   TTE EF 30%   CD neg   A1c 7.6   LDL 37   ILR interrogated no events  NIHSS 3  premrs3   cath shows LAD prox 100% fills via right to left collateral will need AVR and LIMA to LAD bypass  o/e with mild L facial and decrease sensation on L with mild 5-/5 weakness on L with slow FFM ; dysconjugate gaze   s/p OR 11/21  extubated  post op no neuro changes   c/o HA.   11/29 walking on unit with PT   no neuro changes     Impression   prior nowe chronic appearing infarcts. R BG with residual L HP  DM peripheral neuropathy      - for secondary stroke prevention on asa 81mg and high dose statin.  was also on full dose lovenox given APLS; would resume when able --> now back on DOAC , elqiuis 5mg BID   - gabapentin 300mg TID for neuropathy   - f/u rheum and heme/onc [facility]   - telemetry  - PT/OT   - check FS, glucose control <180  - GI/DVT ppx   - Thank you for allowing me to participate in the care of this patient. Call with questions.   - spoke [facility] CTS team/ACP   dc plan rehab ; now wants home   Charles Crespo MD  Vascular Neurology  Office: [phone]  58F smoker with stroke x 2 (2022 and july 2023 with resid  L sided weakness/numbness  DM2 with b/l peripheral neuropathy, COPD/Asthma, Breast Ca s/p, Bipolar depression, Rheumatoid Arthritis, Antiphospholipid syndrome, and L eye uveitis/scleritis, Presents to [facility] transferred from [facility]. p/w multiple medical complaints and exacerbation of L-sided subjective weakness/numbness likely 2/2 recrudescence of prior Right BG infarct. Cardiology following for new LV dysfunction since July.Patient now transferred to CTS Dr. Loaiza for evaluation. neuro called for prior strokes   LESLEY was done showing severe Aortic Insufficiency and moderated MR.   Cardiac cath was done showing LAD prox 100% fills via right to left collateral.   CTH with tinght chronic appearing R BG infarct   TTE EF 30%   CD neg   A1c 7.6   LDL 37   ILR interrogated no events  NIHSS 3  premrs3   cath shows LAD prox 100% fills via right to left collateral will need AVR and LIMA to LAD bypass  o/e with mild L facial and decrease sensation on L with mild 5-/5 weakness on L with slow FFM ; dysconjugate gaze   s/p OR 11/21  extubated  post op no neuro changes   c/o HA.   11/29 walking on unit with PT   no neuro changes     Impression   prior nowe chronic appearing infarcts. R BG with residual L HP  DM peripheral neuropathy      - for secondary stroke prevention on asa 81mg and high dose statin.  was also on full dose lovenox given APLS; would resume when able --> now back on DOAC , elqiuis 5mg BID   - gabapentin 300mg TID for neuropathy   - f/u rheum and heme/onc [facility]   - telemetry  - PT/OT   - check FS, glucose control <180  - GI/DVT ppx   - Thank you for allowing me to participate in the care of this patient. Call with questions.   - spoke [facility] CTS team/ACP   dc plan rehab ; now wants home   Charles Crespo MD  Vascular Neurology  Office: [phone]

## 2023-12-04 NOTE — DISCHARGE NOTE PROVIDER - CARE PROVIDER_API CALL
Sivakumar Loaiza  Thoracic Surgery  [address] [place] [id number]  Phone: [phone]  Fax: [phone]  Follow Up Time: 2 weeks   Sivakumar oLaiza  Thoracic Surgery  [address] [place] [address]  Phone: [phone]  Fax: [phone]  Follow Up Time: 2 weeks

## 2023-12-04 NOTE — PROGRESS NOTE ADULT - NS ATTEND AMEND GEN_ALL_CORE FT
Patient care and plan discussed and reviewed with Advanced Care Provider. Plan as outlined above edited by me to reflect our discussion.
Patient care and plan discussed and reviewed with Advanced Care Provider. Plan as outlined above edited by me to reflect our discussion.
I reviewed the overnight course of events on the unit, re-confirming the patient history. I discussed the care with the patient and their family. The plan of care was discussed with the ACP team and modifications were made to the notation where appropriate. Differential diagnosis and plan of care discussed with patient after the evaluation. Advanced care planning was discussed with patient and family.  Advanced care planning forms were reviewed and discussed.  Risks, benefits and alternatives of cardiac procedures were discussed in detail and all questions were answered. 35 minutes spent on total encounter of which more than fifty percent of the encounter was spent counseling and/or coordinating care by the attending physician.
Patient care and plan discussed and reviewed with Advanced Care Provider. Plan as outlined above edited by me to reflect our discussion.
I reviewed the overnight course of events on the unit, re-confirming the patient history. I discussed the care with the patient and their family. The plan of care was discussed with the ACP team and modifications were made to the notation where appropriate. Differential diagnosis and plan of care discussed with patient after the evaluation. Advanced care planning was discussed with patient and family.  Advanced care planning forms were reviewed and discussed.  Risks, benefits and alternatives of cardiac procedures were discussed in detail and all questions were answered. 35 minutes spent on total encounter of which more than fifty percent of the encounter was spent counseling and/or coordinating care by the attending physician.
Chart, labs, vitals, radiology reviewed. Above H&P reviewed and edited where appropriate. Agree with history and physical exam. Agree with assessment and plan. I reviewed the overnight course of events and discussed the care with the patient/ family.  All the decisions in assessment and plan are made by me
Chart, labs, vitals, radiology reviewed. Above H&P reviewed and edited where appropriate. Agree with history and physical exam. Agree with assessment and plan. I reviewed the overnight course of events and discussed the care with the patient/ family.  All the decisions in assessment and plan are made by me.
Chart, labs, vitals, radiology reviewed. Above H&P reviewed and edited where appropriate. Agree with history and physical exam. Agree with assessment and plan. I reviewed the overnight course of events and discussed the care with the patient/ family.  All the decisions in assessment and plan are made by me.
Chart, labs, vitals, radiology reviewed. Above H&P reviewed and edited where appropriate. Agree with history and physical exam. Agree with assessment and plan. I reviewed the overnight course of events and discussed the care with the patient/ family.  All the decisions in assessment and plan are made by me
Chart, labs, vitals, radiology reviewed. Above H&P reviewed and edited where appropriate. Agree with history and physical exam. Agree with assessment and plan. I reviewed the overnight course of events and discussed the care with the patient/ family.  All the decisions in assessment and plan are made by me.
Chart, labs, vitals, radiology reviewed. Above H&P reviewed and edited where appropriate. Agree with history and physical exam. Agree with assessment and plan. I reviewed the overnight course of events and discussed the care with the patient/ family.  All the decisions in assessment and plan are made by me.
Chart, labs, vitals, radiology reviewed. Above H&P reviewed and edited where appropriate. Agree with history and physical exam. Agree with assessment and plan. I reviewed the overnight course of events and discussed the care with the patient/ family.  All the decisions in assessment and plan are made by me..
Chart, labs, vitals, radiology reviewed. Above H&P reviewed and edited where appropriate. Agree with history and physical exam. Agree with assessment and plan. I reviewed the overnight course of events and discussed the care with the patient/ family.  All the decisions in assessment and plan are made by me
Chart, labs, vitals, radiology reviewed. Above H&P reviewed and edited where appropriate. Agree with history and physical exam. Agree with assessment and plan. I reviewed the overnight course of events and discussed the care with the patient/ family.  All the decisions in assessment and plan are made by me.
keep sat>90% wean fio2 as tolerated
continue b dilators  keep sat>90% w o2 as needed
continue b dilators  keep sat>90%
Chart, labs, vitals, radiology reviewed. Above H&P reviewed and edited where appropriate. Agree with history and physical exam. Agree with assessment and plan. I reviewed the overnight course of events and discussed the care with the patient/ family.  All the decisions in assessment and plan are made by me..
pt requires o2 2l when ambulating and sleeping  fu in office

## 2023-12-04 NOTE — DISCHARGE NOTE PROVIDER - NSDCFUSCHEDAPPT_GEN_ALL_CORE_FT
[facility]  ELECTROPH [id number]  Scheduled Appointment: 12/12/2023    Wendi Lares  [facility]  NEUROLOGY [address] [facility]  Scheduled Appointment: 01/03/2024    Wendi Lares  [facility]  NEUROLOGY [address] [facility]  Scheduled Appointment: 01/03/2024    Goldberg, Naomi  [facility]  OPHTHALM [address] [facility]  Scheduled Appointment: 01/22/2024     [facility]  ELECTROPH [id number]  Scheduled Appointment: 12/12/2023    Wendi Lraes  [facility]  NEUROLOGY [address] [facility]  Scheduled Appointment: 01/03/2024    Wendi Lares  [facility]  NEUROLOGY [address] [facility]  Scheduled Appointment: 01/03/2024    Goldberg, Naomi  [facility]  OPHTHALM [address] [facility]  Scheduled Appointment: 01/22/2024

## 2023-12-04 NOTE — PROGRESS NOTE ADULT - PROBLEM SELECTOR PLAN 2
intermittent wheezing - suspect 2nd to retained secretions in airway  -Pt not take deep breaths due to incisional pain  -Suggest pain control. Sternal nonunion CT, no concern for dehiscence per CTS, ?hemopericardium. Defer to CT surgery.   -CT chest 11/27 with no PNA, very small loculated L effusion.  -Coughing/deep breathing, incentive spirometry encouraged  -Continue bronchodilators  -OOB to chair, ambulate as tolerated  -Wheezing resolved at this time

## 2023-12-04 NOTE — PROGRESS NOTE ADULT - SUBJECTIVE AND OBJECTIVE BOX
Follow-up Pulm Progress Note    hypoxic with exertion to low/mid 80s  normoxic at rest  denies SOB/CP     Medications:  MEDICATIONS  (STANDING):  albuterol/ipratropium for Nebulization 3 milliLiter(s) Nebulizer every 6 hours  apixaban 5 milliGRAM(s) Oral two times a day  aspirin enteric coated 81 milliGRAM(s) Oral daily  atorvastatin 80 milliGRAM(s) Oral at bedtime  bisacodyl Suppository 10 milliGRAM(s) Rectal once  budesonide 160 MICROgram(s)/formoterol 4.5 MICROgram(s) Inhaler 2 Puff(s) Inhalation two times a day  chlorhexidine 2% Cloths 1 Application(s) Topical daily  dextrose 50% Injectable 50 milliLiter(s) IV Push every 15 minutes  dextrose 50% Injectable 25 milliLiter(s) IV Push every 15 minutes  dextrose Oral Gel 15 Gram(s) Oral once  famotidine    Tablet 20 milliGRAM(s) Oral two times a day  gabapentin 300 milliGRAM(s) Oral every 8 hours  glucagon  Injectable 1 milliGRAM(s) IntraMuscular once  insulin glargine Injectable (LANTUS) 28 Unit(s) SubCutaneous at bedtime  insulin lispro (ADMELOG) corrective regimen sliding scale   SubCutaneous three times a day before meals  insulin lispro (ADMELOG) corrective regimen sliding scale   SubCutaneous at bedtime  insulin lispro Injectable (ADMELOG) 9 Unit(s) SubCutaneous three times a day before meals  metoprolol tartrate 25 milliGRAM(s) Oral two times a day  OLANZapine 7.5 milliGRAM(s) Oral daily  polyethylene glycol 3350 17 Gram(s) Oral daily  predniSONE   Tablet 10 milliGRAM(s) Oral daily  senna 2 Tablet(s) Oral at bedtime  torsemide 20 milliGRAM(s) Oral daily  traZODone 50 milliGRAM(s) Oral at bedtime    MEDICATIONS  (PRN):  acetaminophen     Tablet .. 650 milliGRAM(s) Oral every 6 hours PRN Mild Pain (1 - 3)  HYDROmorphone   Tablet 2 milliGRAM(s) Oral every 4 hours PRN Severe Pain (7 - 10)          Vital Signs Last 24 Hrs  T(C): 36.8 (04 Dec 2023 04:20), Max: 36.8 (04 Dec 2023 04:20)  T(F): 98.3 (04 Dec 2023 04:20), Max: 98.3 (04 Dec 2023 04:20)  HR: 83 (04 Dec 2023 04:20) (82 - 95)  BP: 105/69 (04 Dec 2023 04:20) (105/69 - 109/75)  BP(mean): 81 (04 Dec 2023 04:20) (81 - 86)  RR: 18 (04 Dec 2023 10:30) (18 - 18)  SpO2: 83% (04 Dec 2023 10:30) (83% - 96%)    Parameters below as of 04 Dec 2023 10:30  Patient On (Oxygen Delivery Method): room air              12-03 @ 07:01  -  12-04 @ 07:00  --------------------------------------------------------  IN: 720 mL / OUT: 2450 mL / NET: -1730 mL          LABS:                        8.7    16.14 )-----------( 329      ( 04 Dec 2023 06:02 )             28.3     12-04    141  |  101  |  22  ----------------------------<  84  3.7   |  28  |  0.71    Ca    8.9      04 Dec 2023 06:01  Phos  3.9     12-04  Mg     2.0     12-04            CAPILLARY BLOOD GLUCOSE      POCT Blood Glucose.: 109 mg/dL (04 Dec 2023 07:34)      Urinalysis Basic - ( 04 Dec 2023 06:01 )    Color: x / Appearance: x / SG: x / pH: x  Gluc: 84 mg/dL / Ketone: x  / Bili: x / Urobili: x   Blood: x / Protein: x / Nitrite: x   Leuk Esterase: x / RBC: x / WBC x   Sq Epi: x / Non Sq Epi: x / Bacteria: x          Physical Examination:  PULM: CTA bilaterally   CVS: S1, S2 heard    RADIOLOGY REVIEWED  CT chest:    < from: CT Chest No Cont (11.27.23 @ 18:45) >  FINDINGS:    AIRWAYS, LUNGS, PLEURA: Central airways clear. Emphysema. Subsegmental   atelectasis at the lung bases. Small loculated left pleural effusion   located posteriorly andalong the left major fissure.    MEDIASTINUM: Interval aortic valve replacement and CABG with associated   postoperative changes of the mediastinum. There is a partially loculated   pericardial effusion along the left atrioventricular groove with   hyperdense appearance measuring 7 x 4 cm (image 98, coronal series).    Thoracic aorta normal caliber. No large mediastinal nodes.    IMAGED ABDOMEN: Right renal cyst.    SOFT TISSUES and BONES: Sternal wires are present. The inferior most   sternal fragment engages only the left sternal fragment and not the   right. There is separation of the sternal fragments by 1 cm at this level   (image 87, series 3). There is fluid interposed between the lower sternal   fragments. Gas within the retrosternal region. Infiltrative changes and   gas are present within the anterior chest wall subcutaneous soft tissues.   Anterior chest wall loop recorder device. Right posterior first rib   fracture.    IMPRESSION:.    The inferior most sternal fragment engages only the left sternal fragment   and not the right, which is suggestive of dehiscence. There is separation   of sternal fragments by 1 cm at this level.    Fluid is located between the sternal fragments and there is a   retrosternal fluid and gas; these findings are favored to represent   postoperative changes, but infection should be considered in the   appropriate clinical scenario.    Partially loculated pericardial effusion along the left atrioventricular   groove measuring 7 x 4 cm with hyperdense component likely representing   hemopericardium.    No evidence of pneumonia.    Small loculated left pleural effusion.    < end of copied text >

## 2023-12-04 NOTE — PROGRESS NOTE ADULT - PROBLEM SELECTOR PROBLEM 3
Asthma
LV dysfunction
LV dysfunction
Antiphospholipid syndrome
Asthma
LV dysfunction
LV dysfunction
Antiphospholipid syndrome
Diabetes mellitus
Diabetes mellitus
LV dysfunction
Diabetes mellitus
Antiphospholipid syndrome
LV dysfunction
Antiphospholipid syndrome
Diabetes mellitus
Diabetes mellitus
LV dysfunction
Diabetes mellitus
Antiphospholipid syndrome
LV dysfunction
Diabetes mellitus
LV dysfunction
Diabetes mellitus
Asthma
Asthma
Diabetes mellitus
Asthma
LV dysfunction
Emphysema/COPD
Diabetes mellitus
Diabetes mellitus

## 2023-12-04 NOTE — PROGRESS NOTE ADULT - SUBJECTIVE AND OBJECTIVE BOX
Neurology        S: patient seen doing okay,  doing okay.         Medications: MEDICATIONS  (STANDING):  albuterol/ipratropium for Nebulization 3 milliLiter(s) Nebulizer every 6 hours  apixaban 5 milliGRAM(s) Oral two times a day  aspirin enteric coated 81 milliGRAM(s) Oral daily  atorvastatin 80 milliGRAM(s) Oral at bedtime  bisacodyl Suppository 10 milliGRAM(s) Rectal once  budesonide 160 MICROgram(s)/formoterol 4.5 MICROgram(s) Inhaler 2 Puff(s) Inhalation two times a day  chlorhexidine 2% Cloths 1 Application(s) Topical daily  dextrose 50% Injectable 50 milliLiter(s) IV Push every 15 minutes  dextrose 50% Injectable 25 milliLiter(s) IV Push every 15 minutes  dextrose Oral Gel 15 Gram(s) Oral once  famotidine    Tablet 20 milliGRAM(s) Oral two times a day  gabapentin 300 milliGRAM(s) Oral every 8 hours  glucagon  Injectable 1 milliGRAM(s) IntraMuscular once  insulin glargine Injectable (LANTUS) 28 Unit(s) SubCutaneous at bedtime  insulin lispro (ADMELOG) corrective regimen sliding scale   SubCutaneous at bedtime  insulin lispro (ADMELOG) corrective regimen sliding scale   SubCutaneous three times a day before meals  insulin lispro Injectable (ADMELOG) 9 Unit(s) SubCutaneous three times a day before meals  metoprolol tartrate 25 milliGRAM(s) Oral two times a day  OLANZapine 7.5 milliGRAM(s) Oral daily  polyethylene glycol 3350 17 Gram(s) Oral daily  predniSONE   Tablet 10 milliGRAM(s) Oral daily  senna 2 Tablet(s) Oral at bedtime  torsemide 20 milliGRAM(s) Oral daily  traZODone 50 milliGRAM(s) Oral at bedtime    MEDICATIONS  (PRN):  acetaminophen     Tablet .. 650 milliGRAM(s) Oral every 6 hours PRN Mild Pain (1 - 3)  HYDROmorphone   Tablet 2 milliGRAM(s) Oral every 4 hours PRN Severe Pain (7 - 10)       Vitals:  Vital Signs Last 24 Hrs  T(C): 36.8 (04 Dec 2023 04:20), Max: 36.8 (04 Dec 2023 04:20)  T(F): 98.3 (04 Dec 2023 04:20), Max: 98.3 (04 Dec 2023 04:20)  HR: 83 (04 Dec 2023 04:20) (82 - 95)  BP: 105/69 (04 Dec 2023 04:20) (105/69 - 109/75)  BP(mean): 81 (04 Dec 2023 04:20) (81 - 86)  RR: 18 (04 Dec 2023 10:30) (18 - 18)  SpO2: 83% (04 Dec 2023 10:30) (83% - 96%)    Parameters below as of 04 Dec 2023 10:30  Patient On (Oxygen Delivery Method): room air                    General Exam:   General Appearance: Appropriately dressed and in no acute distress       Head: Normocephalic, atraumatic and no dysmorphic features  Ear, Nose, and Throat: Moist mucous membranes  CVS: S1S2+  Resp: No SOB, no wheeze or rhonchi  GI: soft NT/ND  Extremities: No edema or cyanosis  Skin: No bruises or rashes     Neurological Exam:  Mental Status: Awake, alert and oriented x 3.  Able to follow simple and complex verbal commands. Able to name and repeat. fluent speech. No obvious aphasia or dysarthria noted.   Cranial Nerves: PERRL, dysconugate gaze , VFFC, sensation V1-V3 decrease on L,  L facial asymmetry, equal elevation of palate, scm/trap 5/5, tongue is midline on protrusion. no obvious papilledema on fundoscopic exam. hearing is grossly intact.   Motor: Normal bulk, tone and strength throughout except mild L HP 5-/5   Sensation: decrease on L compared to R   Reflexes: 1+ throughout at biceps, brachioradialis, triceps, patellars and ankles bilaterally and equal. No clonus. R toe and L toe were both downgoing.  Coordination: No dysmetria on FNF   Gait: cane baseline     Data/Labs/Imaging which I personally reviewed.     ]\  LABS:                          8.7    16.14 )-----------( 329      ( 04 Dec 2023 06:02 )             28.3     12-04    141  |  101  |  22  ----------------------------<  84  3.7   |  28  |  0.71    Ca    8.9      04 Dec 2023 06:01  Phos  3.9     12-04  Mg     2.0     12-04          Urinalysis Basic - ( 04 Dec 2023 06:01 )    Color: x / Appearance: x / SG: x / pH: x  Gluc: 84 mg/dL / Ketone: x  / Bili: x / Urobili: x   Blood: x / Protein: x / Nitrite: x   Leuk Esterase: x / RBC: x / WBC x   Sq Epi: x / Non Sq Epi: x / Bacteria: x

## 2023-12-04 NOTE — DISCHARGE NOTE NURSING/CASE MANAGEMENT/SOCIAL WORK - NSDCPEFALRISK_GEN_ALL_CORE
For information on Fall & Injury Prevention, visit: [email or web address] OR  [email or web address] OR  https://www.cdc.gov/steadi/patient.html For information on Fall & Injury Prevention, visit: [email or web address] OR  [email or web address] OR  https://www.cdc.gov/steadi/patient.html

## 2023-12-04 NOTE — DISCHARGE NOTE PROVIDER - NSDCMRMEDTOKEN_GEN_ALL_CORE_FT
acetaminophen 325 mg oral tablet: 2 tab(s) orally every 6 hours As needed Temp greater or equal to 38C (100.4F), Mild Pain (1 - 3)  aluminum hydroxide-magnesium hydroxide 200 mg-200 mg/5 mL oral suspension: 30 milliliter(s) orally every 4 hours As needed Dyspepsia  apixaban 5 mg oral tablet: 1 tab(s) orally 2 times a day  aspirin 81 mg oral tablet: 1 tab(s) orally once a day  atorvastatin 80 mg oral tablet: 1 tab(s) orally once a day (at bedtime)  budesonide-formoterol 160 mcg-4.5 mcg/inh inhalation aerosol: 2 puff(s) inhaled 2 times a day  cholecalciferol oral tablet: 1000 unit(s) orally once a day  famotidine 20 mg oral tablet: 1 tab(s) orally 2 times a day  folic acid 1 mg oral tablet: 1 tab(s) orally once a day  gabapentin 300 mg oral capsule: 1 cap(s) orally 3 times a day  HumaLOG 100 units/mL injectable solution: 8 unit(s) subcutaneous 3 times a day (before meals)  insulin glargine 100 units/mL subcutaneous solution: 28 unit(s) subcutaneous once a day (at bedtime)  ipratropium-albuterol 0.5 mg-2.5 mg/3 mL inhalation solution: 3 milliliter(s) inhaled every 6 hours As needed Shortness of Breath and/or Wheezing  metoprolol succinate 25 mg oral capsule, extended release: 1 cap(s) orally once a day  OLANZapine 7.5 mg oral tablet: 1 tab(s) orally once a day  oxycodone-acetaminophen 5 mg-325 mg oral tablet: 1 tab(s) orally every 8 hours As needed Severe Pain (7 - 10)  polyethylene glycol 3350 oral powder for reconstitution: 17 gram(s) orally once a day  predniSONE 10 mg oral tablet: 1 tab(s) orally once a day  spironolactone 25 mg oral tablet: 1 tab(s) orally once a day  torsemide 20 mg oral tablet: 1 tab(s) orally once a day  traZODone 50 mg oral tablet: 1 tab(s) orally once a day (at bedtime) hold for sedation or low blood pressure MDD: 1

## 2023-12-04 NOTE — DISCHARGE NOTE PROVIDER - NSDCPNSUBOBJ_GEN_ALL_CORE
Progress Note:   · Provider Specialty  CT Surgery    Reason for Admission:   Reason for Admission:  · Reason for Admission  AVR C1LIMA      · Subjective and Objective:   Subjective: "Hello"  OOB chair      Tele:  SR 70-80           V/S:                    T(F): 98.3 (23 @ 04:20), Max: 98.4 (23 @ 11:02)  HR: 83 (23 @ 04:20) (82 - 95)  BP: 105/69 (23 @ 04:20) (96/58 - 109/75)  RR: 18 (23 @ 04:20) (18 - 18)  SpO2: 93% (23 @ 04:20) (93% - 96%)  SPO2  RA 88% @ rest      SPO2 w/ ambulation RA 82%    SPO2 O2 2LPM NC  95%  LV EF:      Labs:      141  |  101  |  22  ----------------------------<  84  3.7   |  28  |  0.71    Ca    8.9      04 Dec 2023 06:01  Phos  3.9       Mg     2.0                                      8.7    16.14 )-----------( 329      ( 04 Dec 2023 06:02 )             28.3             CAPILLARY BLOOD GLUCOSE      POCT Blood Glucose.: 109 mg/dL (04 Dec 2023 07:34)  POCT Blood Glucose.: 132 mg/dL (03 Dec 2023 21:23)  POCT Blood Glucose.: 114 mg/dL (03 Dec 2023 16:41)  POCT Blood Glucose.: 187 mg/dL (03 Dec 2023 11:28)           CXR:    I&O's Detail    03 Dec 2023 07:01  -  04 Dec 2023 07:00  --------------------------------------------------------  IN:    Oral Fluid: 720 mL  Total IN: 720 mL    OUT:    Voided (mL): 2450 mL  Total OUT: 2450 mL    Total NET: -1730 mL      04 Dec 2023 07:01  -  04 Dec 2023 10:02  --------------------------------------------------------  IN:    Oral Fluid: 240 mL  Total IN: 240 mL    OUT:  Total OUT: 0 mL    Total NET: 240 mL      BOWEL MOVEMENT:  [x ] YES [ ] NO      Daily     Daily Weight in k.9 (04 Dec 2023 08:12)  Medications:  acetaminophen     Tablet .. 650 milliGRAM(s) Oral every 6 hours PRN  albuterol/ipratropium for Nebulization 3 milliLiter(s) Nebulizer every 6 hours  apixaban 5 milliGRAM(s) Oral two times a day  aspirin enteric coated 81 milliGRAM(s) Oral daily  atorvastatin 80 milliGRAM(s) Oral at bedtime  bisacodyl Suppository 10 milliGRAM(s) Rectal once  budesonide 160 MICROgram(s)/formoterol 4.5 MICROgram(s) Inhaler 2 Puff(s) Inhalation two times a day  chlorhexidine 2% Cloths 1 Application(s) Topical daily  dextrose 50% Injectable 50 milliLiter(s) IV Push every 15 minutes  dextrose 50% Injectable 25 milliLiter(s) IV Push every 15 minutes  dextrose Oral Gel 15 Gram(s) Oral once  famotidine    Tablet 20 milliGRAM(s) Oral two times a day  gabapentin 300 milliGRAM(s) Oral every 8 hours  glucagon  Injectable 1 milliGRAM(s) IntraMuscular once  HYDROmorphone   Tablet 2 milliGRAM(s) Oral every 4 hours PRN  insulin glargine Injectable (LANTUS) 28 Unit(s) SubCutaneous at bedtime  insulin lispro (ADMELOG) corrective regimen sliding scale   SubCutaneous at bedtime  insulin lispro (ADMELOG) corrective regimen sliding scale   SubCutaneous three times a day before meals  insulin lispro Injectable (ADMELOG) 9 Unit(s) SubCutaneous three times a day before meals  metoprolol tartrate 25 milliGRAM(s) Oral two times a day  OLANZapine 7.5 milliGRAM(s) Oral daily  polyethylene glycol 3350 17 Gram(s) Oral daily  predniSONE   Tablet 10 milliGRAM(s) Oral daily  senna 2 Tablet(s) Oral at bedtime  torsemide 20 milliGRAM(s) Oral daily  traZODone 50 milliGRAM(s) Oral at bedtime        Physical Exam:    Neurology: alert and oriented x 3, nonfocal  CV : S1S2 RRR  Sternal Wound :  CDI SHARON- sternum stable > silks removed  Lungs: clear. RR easy, unlabored   Abdomen: soft, nontender, nondistended, positive bowel sounds,  + bowel movement   Neg N/V/D; obese abdomen   :  pt voiding without difficulty   Extremities:   DEWITT; +1 LE edema, neg calf tenderness.   PPP bilaterally               PAST MEDICAL & SURGICAL HISTORY:  Cigarette smoker      Rheumatoid arthritis      Other depression      Breast cancer      Migraines      History of multiple cerebrovascular accidents (CVAs)      Suicidal ideation      Paresthesia of left arm      Facial paresthesia      APS (antiphospholipid syndrome)      History of depressed bipolar disorder      History of COPD      History of COPD      History of hysterectomy      History of cholecystectomy                      Assessment and Plan:   · Assessment    58 female smoker with PMH CVA with residual Left-sided weakness/numbness/L gaze deviation, DM2 with b/l peripheral neuropathy, COPD/Asthma, Breast Ca s/p ?RT, Bipolar depression, Rheumatoid Arthritis, Antiphospholipid syndrome, and L eye uveitis/scleritis, Presents to [facility] transferred from [facility]. p/w multiple medical complaints. A/w exacerbation of L-sided subjective weakness/numbness likely 2/2 recrudescence of prior Right BG infarct. Cardiology following for new LV dysfunction since July, pending ischemic eval. GABBY was done showing severe Aortic Insufficiency and moderated MR. Cardiac cath was done showing LAD prox 100% fills via right to left collateral. Patient now transferred to [facility] Dr. Loaiza for evaluation.    HOSPITAL COURSE:    patient seen and examined at bedside. All labs and imaging to be reviewed by Dr. Loaiza   11/15:VSS, neuro consult for hx cva L sided weakness, Preop for  Repeat echo shows only mild valve pathology   No valve surgery planned in light of repeat echo.  Getting MR viability for CAD and depressed LV function.    vss for rpt gabby mon    npo after midnight for gabby tomorrow   VSS NPO for GABBY this am=> severe AR  : s/p CABG LIMA-LAD, AVR(t) - Inspiris 23mm, LAAL with AtriClip 40mm,   +Substernal mediastinal Chest tube and Left pleural Chest tube. Kept Intubated post op. Taken to CTU. Levophed, Vasopressin and Primacor switched to Dobutamine gtt.  Started on PO Amio for afib ppx. +Soliz. On insulin gtt for tight glycemic control to prevent wound infxn.  : POD1. Extubated. Inotropes and Pressors maintained. PCA Hydromorphone initiated by Acute Pain Mgmt team. Insulin gtt maintained per Endocrine. PT/OT evaluation.   POD 2. PCA Hydromorphone maintained. Insulin gtt maintained, started basal insulin tonight per Endo. Dobutamine gtt continued.    POD 3. Dobutamine gtt d/c'ed in AM. PCA pumped d/c'ed. MED and Pleural Chest tubes d/c'ed while in CTU. (+) PW to EPM (40/10/0.8). Prevena dressing intact. +Soliz maintained. Monitor I/Os. Back on Insulin gtt for elevated FS >200. Endocrine following.   TRANSFERRED TO SDU. Current medication regimen continued.    VSS - insullin gtt off @ 6am- start low dose BB this am - soliz in place will d/c when pt more mobile.  d/c plan anticipate home PT   WBC 20 again -om chronic steroids - hx COPD- afebrile - prevena in place.  will conitue to monitor- xtra lasix 20 iv x 1 given - up 6 kgs in weight w/ ++ edema,  40 yr pack current smoker.  nebs.   VSS. Afebrile. WBC increased 23 (from 20) (Of note, patient on chronic steroids for Hx RA). Repeat CXR today stable. Rpt UA pending. Potassium 3.6 this AM, supplemented. Pt reports hx of Bipolar MDD, with no follow-up with Psychiatry and non-compliance with OP psych meds since 2023. Pt requesting to speak to Psychiatry today to establish care. Denies SI/HI/AH/VH. On call Psychiatry ([phone]) consulted and aware. Pt also noted with bronchospasms/wheezing - Pulm (Dr. Leone) consulted and aware. Stable to move to floors today,   per Dr. Loaiza.  : VSS, refusing PT, needs to increase ambulation/iS/effort. CT with ?sternal dehiscence at inferior pole, will discuss with Dr. Loaiza. Extra IV lasix given for edema.   : VSS. CT Chest imaging reviewed closely by Dr. Loaiza yesterday, no concerned for sternal dehiscence, upon Attending's review. Prevena d/c'd today. Site c/d/i. +PW (isolated). Lidocaine x1 for back pain today, reports improvement. Encouraged ambulation/OOB. Pt working with Physical Therapy. CM to work on Rehab for disposition as recommended by PT. Current medication regimen continued. PW D/liang per Dr. Loaiza. Of note, patient with hx of Antiphospholipid Syndrome and CVA, was on AC therapy pre-surgery; ok to restart AC therapy with Eliquis 5mg BID starting tonight, per Dr. Loaiza.   VSS rounds made w/ DR. Loaiza.  awaiting d/c to rehab     Insulin teaching Awaiting for rehab VSS    VSS, continue diuresis on torsemide 20 daily, BUN/CR 22/0.65, K 3.1 supplemented, check repeat this afternoon.   12/3 VSS;  80-90; continue diuresis for hypervolemia; await rehab placement    VSS DC to rehab [facility]  DC silks  Will need O2 for post op hypoxia related long term tobacco abuse      Problem/Plan - 1:  ·  Problem: S/P CABG x 1.   ·  Plan: - Continue with ASA 81mg qD, Atorvastatin 80mg qHS, lopressor 25 BID  - Continue diuresis on torsemide 20 daily  - Strict I/Os and daily standing weights  - Monitor electrolytes, Keep K >4, Mg >2.  - Pain control regimen, per pain management team.  - Cough and deep breathe, Incentive Spirometry Q1h, Chest PT.  - Ambulate 4x daily as tolerated and with PT.   - Continue home Eliquis 5mg BID for APS and CVA  - Disposition: Subacute rehab pending placement.    Problem/Plan - 2:  ·  Problem: S/P AVR.   ·  Plan: - Continue with ASA 81mg qD, Atorvastatin 80mg qHS, lopressor 25 BID  - Titrate up beta-blocker as tolerated.    Problem/Plan - 3:  ·  Problem: Diabetes mellitus.   ·  Plan: - A1c 7.6  - Monitor FS AC/HS  - Endocrine following   - DM regimen per Endo  - Lantus 28 units QHS, Lispro 9U TID, admelog corrective SS.    Problem/Plan - 4:  ·  Problem: History of depressed bipolar disorder.   ·  Plan: Psych following- continue Zyprexa 7.5 daily, trazodone 50 HS  [facility] mental health clinic f/u at [phone].    Problem/Plan - 5:  ·  Problem: Wheezing.   ·  Plan: Pulm (Dr. Leone) following   -Wheezing resolved at this time  -OOB to chair, ambulate as tolerated  -Keep sats >90% with o2 PRN.  -Continue bronchodilators Symbicort BID and Duoneb q6h  -Pt is on prednisone 10 daily for her RA  Cough and deep breathe, Incentive Spirometry Q1h, Chest PT.      Electronic Signatures:  Carline Covington (NP)  (Signed 04-Dec-2023 11:07)  	Authored: Progress Note, Reason for Admission, Subjective and Objective, Assessment and Plan Progress Note:   · Provider Specialty  CT Surgery    Reason for Admission:   Reason for Admission:  · Reason for Admission  AVR C1LIMA      · Subjective and Objective:   Subjective: "Hello"  OOB chair      Tele:  SR 70-80           V/S:                    T(F): 98.3 (23 @ 04:20), Max: 98.4 (23 @ 11:02)  HR: 83 (23 @ 04:20) (82 - 95)  BP: 105/69 (23 @ 04:20) (96/58 - 109/75)  RR: 18 (23 @ 04:20) (18 - 18)  SpO2: 93% (23 @ 04:20) (93% - 96%)  SPO2  RA 88% @ rest      SPO2 w/ ambulation RA 82%    SPO2 O2 2LPM NC  95%  LV EF:      Labs:      141  |  101  |  22  ----------------------------<  84  3.7   |  28  |  0.71    Ca    8.9      04 Dec 2023 06:01  Phos  3.9       Mg     2.0                                      8.7    16.14 )-----------( 329      ( 04 Dec 2023 06:02 )             28.3             CAPILLARY BLOOD GLUCOSE      POCT Blood Glucose.: 109 mg/dL (04 Dec 2023 07:34)  POCT Blood Glucose.: 132 mg/dL (03 Dec 2023 21:23)  POCT Blood Glucose.: 114 mg/dL (03 Dec 2023 16:41)  POCT Blood Glucose.: 187 mg/dL (03 Dec 2023 11:28)           CXR:    I&O's Detail    03 Dec 2023 07:01  -  04 Dec 2023 07:00  --------------------------------------------------------  IN:    Oral Fluid: 720 mL  Total IN: 720 mL    OUT:    Voided (mL): 2450 mL  Total OUT: 2450 mL    Total NET: -1730 mL      04 Dec 2023 07:01  -  04 Dec 2023 10:02  --------------------------------------------------------  IN:    Oral Fluid: 240 mL  Total IN: 240 mL    OUT:  Total OUT: 0 mL    Total NET: 240 mL      BOWEL MOVEMENT:  [x ] YES [ ] NO      Daily     Daily Weight in k.9 (04 Dec 2023 08:12)  Medications:  acetaminophen     Tablet .. 650 milliGRAM(s) Oral every 6 hours PRN  albuterol/ipratropium for Nebulization 3 milliLiter(s) Nebulizer every 6 hours  apixaban 5 milliGRAM(s) Oral two times a day  aspirin enteric coated 81 milliGRAM(s) Oral daily  atorvastatin 80 milliGRAM(s) Oral at bedtime  bisacodyl Suppository 10 milliGRAM(s) Rectal once  budesonide 160 MICROgram(s)/formoterol 4.5 MICROgram(s) Inhaler 2 Puff(s) Inhalation two times a day  chlorhexidine 2% Cloths 1 Application(s) Topical daily  dextrose 50% Injectable 50 milliLiter(s) IV Push every 15 minutes  dextrose 50% Injectable 25 milliLiter(s) IV Push every 15 minutes  dextrose Oral Gel 15 Gram(s) Oral once  famotidine    Tablet 20 milliGRAM(s) Oral two times a day  gabapentin 300 milliGRAM(s) Oral every 8 hours  glucagon  Injectable 1 milliGRAM(s) IntraMuscular once  HYDROmorphone   Tablet 2 milliGRAM(s) Oral every 4 hours PRN  insulin glargine Injectable (LANTUS) 28 Unit(s) SubCutaneous at bedtime  insulin lispro (ADMELOG) corrective regimen sliding scale   SubCutaneous at bedtime  insulin lispro (ADMELOG) corrective regimen sliding scale   SubCutaneous three times a day before meals  insulin lispro Injectable (ADMELOG) 9 Unit(s) SubCutaneous three times a day before meals  metoprolol tartrate 25 milliGRAM(s) Oral two times a day  OLANZapine 7.5 milliGRAM(s) Oral daily  polyethylene glycol 3350 17 Gram(s) Oral daily  predniSONE   Tablet 10 milliGRAM(s) Oral daily  senna 2 Tablet(s) Oral at bedtime  torsemide 20 milliGRAM(s) Oral daily  traZODone 50 milliGRAM(s) Oral at bedtime        Physical Exam:    Neurology: alert and oriented x 3, nonfocal  CV : S1S2 RRR  Sternal Wound :  CDI SHARON- sternum stable > silks removed  Lungs: clear. RR easy, unlabored   Abdomen: soft, nontender, nondistended, positive bowel sounds,  + bowel movement   Neg N/V/D; obese abdomen   :  pt voiding without difficulty   Extremities:   DEWITT; +1 LE edema, neg calf tenderness.   PPP bilaterally               PAST MEDICAL & SURGICAL HISTORY:  Cigarette smoker      Rheumatoid arthritis      Other depression      Breast cancer      Migraines      History of multiple cerebrovascular accidents (CVAs)      Suicidal ideation      Paresthesia of left arm      Facial paresthesia      APS (antiphospholipid syndrome)      History of depressed bipolar disorder      History of COPD      History of COPD      History of hysterectomy      History of cholecystectomy                      Assessment and Plan:   · Assessment    58 female smoker with PMH CVA with residual Left-sided weakness/numbness/L gaze deviation, DM2 with b/l peripheral neuropathy, COPD/Asthma, Breast Ca s/p ?RT, Bipolar depression, Rheumatoid Arthritis, Antiphospholipid syndrome, and L eye uveitis/scleritis, Presents to [facility] transferred from [facility]. p/w multiple medical complaints. A/w exacerbation of L-sided subjective weakness/numbness likely 2/2 recrudescence of prior Right BG infarct. Cardiology following for new LV dysfunction since July, pending ischemic eval. GABBY was done showing severe Aortic Insufficiency and moderated MR. Cardiac cath was done showing LAD prox 100% fills via right to left collateral. Patient now transferred to [facility] Dr. Loaiza for evaluation.    HOSPITAL COURSE:    patient seen and examined at bedside. All labs and imaging to be reviewed by Dr. Loaiza   11/15:VSS, neuro consult for hx cva L sided weakness, Preop for  Repeat echo shows only mild valve pathology   No valve surgery planned in light of repeat echo.  Getting MR viability for CAD and depressed LV function.    vss for rpt gabby mon    npo after midnight for gabby tomorrow   VSS NPO for GABBY this am=> severe AR  : s/p CABG LIMA-LAD, AVR(t) - Inspiris 23mm, LAAL with AtriClip 40mm,   +Substernal mediastinal Chest tube and Left pleural Chest tube. Kept Intubated post op. Taken to CTU. Levophed, Vasopressin and Primacor switched to Dobutamine gtt.  Started on PO Amio for afib ppx. +Soliz. On insulin gtt for tight glycemic control to prevent wound infxn.  : POD1. Extubated. Inotropes and Pressors maintained. PCA Hydromorphone initiated by Acute Pain Mgmt team. Insulin gtt maintained per Endocrine. PT/OT evaluation.   POD 2. PCA Hydromorphone maintained. Insulin gtt maintained, started basal insulin tonight per Endo. Dobutamine gtt continued.    POD 3. Dobutamine gtt d/c'ed in AM. PCA pumped d/c'ed. MED and Pleural Chest tubes d/c'ed while in CTU. (+) PW to EPM (40/10/0.8). Prevena dressing intact. +Soliz maintained. Monitor I/Os. Back on Insulin gtt for elevated FS >200. Endocrine following.   TRANSFERRED TO SDU. Current medication regimen continued.    VSS - insullin gtt off @ 6am- start low dose BB this am - soliz in place will d/c when pt more mobile.  d/c plan anticipate home PT   WBC 20 again -om chronic steroids - hx COPD- afebrile - prevena in place.  will conitue to monitor- xtra lasix 20 iv x 1 given - up 6 kgs in weight w/ ++ edema,  40 yr pack current smoker.  nebs.   VSS. Afebrile. WBC increased 23 (from 20) (Of note, patient on chronic steroids for Hx RA). Repeat CXR today stable. Rpt UA pending. Potassium 3.6 this AM, supplemented. Pt reports hx of Bipolar MDD, with no follow-up with Psychiatry and non-compliance with OP psych meds since 2023. Pt requesting to speak to Psychiatry today to establish care. Denies SI/HI/AH/VH. On call Psychiatry ([phone]) consulted and aware. Pt also noted with bronchospasms/wheezing - Pulm (Dr. Leone) consulted and aware. Stable to move to floors today,   per Dr. Loaiza.  : VSS, refusing PT, needs to increase ambulation/iS/effort. CT with ?sternal dehiscence at inferior pole, will discuss with Dr. Loaiza. Extra IV lasix given for edema.   : VSS. CT Chest imaging reviewed closely by Dr. Loaiza yesterday, no concerned for sternal dehiscence, upon Attending's review. Prevena d/c'd today. Site c/d/i. +PW (isolated). Lidocaine x1 for back pain today, reports improvement. Encouraged ambulation/OOB. Pt working with Physical Therapy. CM to work on Rehab for disposition as recommended by PT. Current medication regimen continued. PW D/liang per Dr. Loaiza. Of note, patient with hx of Antiphospholipid Syndrome and CVA, was on AC therapy pre-surgery; ok to restart AC therapy with Eliquis 5mg BID starting tonight, per Dr. Loaiza.   VSS rounds made w/ DR. Loaiza.  awaiting d/c to rehab     Insulin teaching Awaiting for rehab VSS    VSS, continue diuresis on torsemide 20 daily, BUN/CR 22/0.65, K 3.1 supplemented, check repeat this afternoon.   12/3 VSS;  80-90; continue diuresis for hypervolemia; await rehab placement    VSS DC to rehab [facility]  DC silks  Will need O2 for post op hypoxia related long term tobacco abuse      Problem/Plan - 1:  ·  Problem: S/P CABG x 1.   ·  Plan: - Continue with ASA 81mg qD, Atorvastatin 80mg qHS, lopressor 25 BID  - Continue diuresis on torsemide 20 daily  - Strict I/Os and daily standing weights  - Monitor electrolytes, Keep K >4, Mg >2.  - Pain control regimen, per pain management team.  - Cough and deep breathe, Incentive Spirometry Q1h, Chest PT.  - Ambulate 4x daily as tolerated and with PT.   - Continue home Eliquis 5mg BID for APS and CVA  - Disposition: Subacute rehab pending placement.    Problem/Plan - 2:  ·  Problem: S/P AVR.   ·  Plan: - Continue with ASA 81mg qD, Atorvastatin 80mg qHS, lopressor 25 BID  - Titrate up beta-blocker as tolerated.    Problem/Plan - 3:  ·  Problem: Diabetes mellitus.   ·  Plan: - A1c 7.6  - Monitor FS AC/HS  - Endocrine following   - DM regimen per Endo  - Lantus 28 units QHS, Lispro 9U TID, admelog corrective SS.    Problem/Plan - 4:  ·  Problem: History of depressed bipolar disorder.   ·  Plan: Psych following- continue Zyprexa 7.5 daily, trazodone 50 HS  [facility] mental health clinic f/u at [phone].    Problem/Plan - 5:  ·  Problem: Wheezing.   ·  Plan: Pulm (Dr. Leone) following   -Wheezing resolved at this time  -OOB to chair, ambulate as tolerated  -Keep sats >90% with o2 PRN.  -Continue bronchodilators Symbicort BID and Duoneb q6h  -Pt is on prednisone 10 daily for her RA  Cough and deep breathe, Incentive Spirometry Q1h, Chest PT.      Electronic Signatures:  Carline Covington (NP)  (Signed 04-Dec-2023 11:07)  	Authored: Progress Note, Reason for Admission, Subjective and Objective, Assessment and Plan

## 2023-12-04 NOTE — DISCHARGE NOTE NURSING/CASE MANAGEMENT/SOCIAL WORK - PATIENT PORTAL LINK FT
You can access the FollowMyHealth Patient Portal offered by [facility] by registering at the following website: [email or web address]. By joining [employer]’s FollowMyHealth portal, you will also be able to view your health information using other applications (apps) compatible with our system. You can access the FollowMyHealth Patient Portal offered by [facility] by registering at the following website: [email or web address]. By joining [employer]’s FollowMyHealth portal, you will also be able to view your health information using other applications (apps) compatible with our system.

## 2023-12-06 ENCOUNTER — TRANSCRIPTION ENCOUNTER (OUTPATIENT)
Age: 58
End: 2023-12-06

## 2023-12-06 ENCOUNTER — APPOINTMENT (OUTPATIENT)
Dept: CARE COORDINATION | Facility: HOME HEALTH | Age: 58
End: 2023-12-06
Payer: MEDICAID

## 2023-12-06 VITALS — WEIGHT: 204.6 LBS | RESPIRATION RATE: 16 BRPM | BODY MASS INDEX: 38.66 KG/M2

## 2023-12-06 PROBLEM — Z87.09 PERSONAL HISTORY OF OTHER DISEASES OF THE RESPIRATORY SYSTEM: Chronic | Status: ACTIVE | Noted: 2023-11-27

## 2023-12-06 PROBLEM — F31.70 BIPOLAR DISORDER, CURRENTLY IN REMISSION, MOST RECENT EPISODE UNSPECIFIED: Chronic | Status: ACTIVE | Noted: 2023-11-27

## 2023-12-06 PROCEDURE — 99024 POSTOP FOLLOW-UP VISIT: CPT

## 2023-12-06 RX ORDER — ENOXAPARIN SODIUM 300 MG/3ML
INJECTION INTRAVENOUS; SUBCUTANEOUS
Refills: 0 | Status: DISCONTINUED | COMMUNITY
End: 2023-12-06

## 2023-12-06 RX ORDER — PANTOPRAZOLE 40 MG/1
40 TABLET, DELAYED RELEASE ORAL
Refills: 0 | Status: DISCONTINUED | COMMUNITY
End: 2023-12-06

## 2023-12-06 RX ORDER — FOLIC ACID 1 MG/1
1 TABLET ORAL
Refills: 0 | Status: DISCONTINUED | COMMUNITY
End: 2023-12-06

## 2023-12-06 RX ORDER — POLYETHYLENE GLYCOL 3350
POWDER (GRAM) MISCELLANEOUS
Qty: 1 | Refills: 0 | Status: ACTIVE | COMMUNITY

## 2023-12-06 RX ORDER — FAMOTIDINE 20 MG/1
20 TABLET, FILM COATED ORAL
Refills: 0 | Status: ACTIVE | COMMUNITY

## 2023-12-06 RX ORDER — TOPIRAMATE 50 MG/1
50 TABLET, FILM COATED ORAL TWICE DAILY
Refills: 0 | Status: DISCONTINUED | COMMUNITY
End: 2023-12-06

## 2023-12-06 RX ORDER — GABAPENTIN 300 MG
300 TABLET ORAL
Refills: 0 | Status: DISCONTINUED | COMMUNITY
End: 2023-12-06

## 2023-12-06 RX ORDER — PREDNISONE 10 MG/1
10 TABLET ORAL DAILY
Refills: 0 | Status: DISCONTINUED | COMMUNITY
End: 2023-12-06

## 2023-12-06 RX ORDER — BUDESONIDE AND FORMOTEROL FUMARATE DIHYDRATE 160; 4.5 UG/1; UG/1
160-4.5 AEROSOL RESPIRATORY (INHALATION)
Qty: 1 | Refills: 0 | Status: ACTIVE | COMMUNITY

## 2023-12-06 RX ORDER — APIXABAN 5 MG/1
5 TABLET, FILM COATED ORAL
Refills: 0 | Status: ACTIVE | COMMUNITY

## 2023-12-06 RX ORDER — ATORVASTATIN CALCIUM 80 MG/1
80 TABLET, FILM COATED ORAL
Qty: 30 | Refills: 0 | Status: ACTIVE | COMMUNITY

## 2023-12-06 RX ORDER — QUETIAPINE 400 MG/1
TABLET, FILM COATED ORAL
Refills: 0 | Status: DISCONTINUED | COMMUNITY
End: 2023-12-06

## 2023-12-06 RX ORDER — ACETAMINOPHEN 500 MG/1
TABLET ORAL EVERY 6 HOURS
Refills: 0 | Status: ACTIVE | COMMUNITY

## 2023-12-06 RX ORDER — OLANZAPINE 7.5 MG/1
7.5 TABLET, FILM COATED ORAL DAILY
Refills: 0 | Status: ACTIVE | COMMUNITY

## 2023-12-06 RX ORDER — METHOTREXATE 2.5 MG/1
2.5 TABLET ORAL
Refills: 0 | Status: DISCONTINUED | COMMUNITY
End: 2023-12-06

## 2023-12-06 RX ORDER — METFORMIN HYDROCHLORIDE 500 MG/1
500 TABLET, COATED ORAL
Qty: 60 | Refills: 0 | Status: ACTIVE | COMMUNITY

## 2023-12-06 RX ORDER — TRAZODONE HYDROCHLORIDE 50 MG/1
50 TABLET ORAL
Refills: 0 | Status: DISCONTINUED | COMMUNITY
End: 2023-12-06

## 2023-12-06 RX ORDER — TRAZODONE HYDROCHLORIDE 100 MG/1
100 TABLET ORAL
Refills: 0 | Status: ACTIVE | COMMUNITY

## 2023-12-06 RX ORDER — OLANZAPINE 5 MG/1
5 TABLET, FILM COATED ORAL
Refills: 0 | Status: DISCONTINUED | COMMUNITY
End: 2023-12-06

## 2023-12-06 RX ORDER — METOPROLOL SUCCINATE 25 MG/1
25 TABLET, EXTENDED RELEASE ORAL DAILY
Refills: 0 | Status: DISCONTINUED | COMMUNITY
End: 2023-12-06

## 2023-12-06 RX ORDER — OXYCODONE HYDROCHLORIDE AND ACETAMINOPHEN 10; 325 MG/1; MG/1
10-325 TABLET ORAL
Refills: 0 | Status: DISCONTINUED | COMMUNITY
Start: 2023-10-02 | End: 2023-12-06

## 2023-12-06 RX ORDER — SPIRONOLACTONE 25 MG/1
25 TABLET ORAL
Refills: 0 | Status: DISCONTINUED | COMMUNITY
End: 2023-12-06

## 2023-12-06 RX ORDER — ATORVASTATIN CALCIUM 40 MG/1
40 TABLET, FILM COATED ORAL
Refills: 0 | Status: DISCONTINUED | COMMUNITY
End: 2023-12-06

## 2023-12-08 ENCOUNTER — TRANSCRIPTION ENCOUNTER (OUTPATIENT)
Age: 58
End: 2023-12-08

## 2023-12-08 PROBLEM — Z87.09 PERSONAL HISTORY OF OTHER DISEASES OF THE RESPIRATORY SYSTEM: Chronic | Status: ACTIVE | Noted: 2023-11-27

## 2023-12-10 ENCOUNTER — TRANSCRIPTION ENCOUNTER (OUTPATIENT)
Age: 58
End: 2023-12-10

## 2023-12-12 ENCOUNTER — APPOINTMENT (OUTPATIENT)
Dept: ELECTROPHYSIOLOGY | Facility: CLINIC | Age: 58
End: 2023-12-12

## 2023-12-13 ENCOUNTER — APPOINTMENT (OUTPATIENT)
Dept: CARDIOTHORACIC SURGERY | Facility: CLINIC | Age: 58
End: 2023-12-13
Payer: MEDICAID

## 2023-12-13 VITALS
WEIGHT: 198 LBS | SYSTOLIC BLOOD PRESSURE: 115 MMHG | RESPIRATION RATE: 16 BRPM | HEIGHT: 61 IN | TEMPERATURE: 98 F | DIASTOLIC BLOOD PRESSURE: 83 MMHG | BODY MASS INDEX: 37.38 KG/M2 | HEART RATE: 106 BPM | OXYGEN SATURATION: 96 %

## 2023-12-13 PROCEDURE — 99024 POSTOP FOLLOW-UP VISIT: CPT

## 2023-12-13 RX ORDER — ASPIRIN ENTERIC COATED TABLETS 81 MG 81 MG/1
81 TABLET, DELAYED RELEASE ORAL DAILY
Qty: 30 | Refills: 0 | Status: ACTIVE | COMMUNITY
Start: 2023-12-13

## 2023-12-13 NOTE — PHYSICAL EXAM
[] : no respiratory distress [Respiration, Rhythm And Depth] : normal respiratory rhythm and effort [Exaggerated Use Of Accessory Muscles For Inspiration] : no accessory muscle use [Apical Impulse] : the apical impulse was normal [Heart Rate And Rhythm] : heart rate was normal and rhythm regular [Heart Sounds] : normal S1 and S2 [Clean] : clean [Dry] : dry [Healing Well] : healing well [No Edema] : no edema [Bleeding] : no active bleeding [Foul Odor] : no foul smell [Purulent Drainage] : no purulent drainage [Serosanguinous Drainage] : no serosanguinous drainage [Erythema] : not erythematous [Warm] : not warm [Tender] : not tender

## 2023-12-13 NOTE — ASSESSMENT
[FreeTextEntry1] : Today on exam patient's lungs clear bilaterally, normal sinus rhythm, sternum stable, incision clean, dry and intact.  No peripheral edema noted. Instructed patient on importance of optimal glycemic control, daily showering, daily weights, incentive spirometer use, and increase ambulation as tolerated. Instructed to call office with any signs or symptoms of infection or weight gain of 2 or more pounds in 1 day or 3 or more pounds in 1 week.   Plan:  - Continue current medication regimen per PCP and cardiologist - Follow up with cardiologist Dr. Carrasco next week as scheduled - Follow up with PCP Dr. Almodovar - Continue to walk and increase as tolerated - Low salt diet and fluids discussed in detail - Tight glucose control discussed in detail for wound healing - SBE antibiotic prophylaxis discussed at length  - Return to office in 3 weeks - Call with any questions or concerns

## 2023-12-13 NOTE — REASON FOR VISIT
[de-identified] : CABG LIMA-LAD, AVR(t) - Inspiris 23mm, LAAL with AtriClip 40mm [de-identified] : 11/21/2023 [de-identified] : 58 female smoker with PMH CVA with residual Left-sided weakness/numbness/L gaze deviation, DM2 with b/l peripheral neuropathy, COPD/Asthma, Breast Ca s/p ?RT, Bipolar depression, Rheumatoid Arthritis, Antiphospholipid syndrome, and L eye uveitis/scleritis, Presents to [facility] transferred from [facility]. p/w multiple medical complaints. A/w exacerbation of L-sided subjective weakness/numbness likely 2/2 recrudescence of prior Right BG infarct. Cardiology following for new LV dysfunction since July, pending ischemic eval. LESLEY was done showing severe Aortic Insufficiency and moderated MR. Cardiac cath was done showing LAD prox 100% fills via right to left collateral. Patient now transferred to [facility] Dr. Loaiza for evaluation.  HOSPITAL COURSE: neuro consult for hx cva L sided weakness, Preop for Friday, Repeat echo shows only mild valve pathology LESLEY this am=> severe AR, s/p CABG LIMA-LAD, AVR(t) - Inspiris 23mm, LAAL with AtriClip 40mm, Post op Amio, tight glycemic control to prevent wound infxn. Hydromorphone initiated by Acute Pain Mgmt team. Insulin gtt maintained per Endocrine. PT/OT evaluation. Pt on chronic steroids - hx COPD- afebrile - prevena in place. 40 yr pack current smoker. nebs. WBC increased 23 (from 20) (Of note, patient on chronic steroids for Hx RA).hx of Bipolar MDD, with no follow-up with Psychiatry and non-compliance with OP psych meds since July 2023. Pt requesting to speak to Psychiatry today to establish care. Denies SI/HI/AH/VH. On call Psychiatry ([phone]) consulted and aware. Pt also noted with bronchospasms/wheezing - Pulm (Dr. Leone) consulted. CT with no concern for sternal dehiscence. Encourage ambulation with Physical Therapy. CM to work on Rehab for disposition as recommended by PT. Hx of Antiphospholipid Syndrome and CVA, was on AC therapy pre-surgery; ok to restart AC therapy with Eliquis 5mg BID. 12/4 VSS DC rehab [place], will need O2 for post op hypoxia related long term tobacco abuse, Seen by [facility] 12/5 left AMA to home from [facility] rehab. Followed up with cardiologist Dr.Syed Romano [phone] 12/5 and seen by PCP on 12/5 Dr. Brigido Almodovar [phone]  Presents with friend. Taking aspirin so DC, no trouble, had allergy.  Weight down to 188, saw Dr Carrasco and PCP BS mainly 130's, lower than 150. Had some med changes with PCP, started back on Trazadone.  Presents today with her friend.  Reports she is very slowly getting better. Walking room to room at her friends apartment and trying to increase.  Reports she still has fatigue with walking and some incisional soreness with movements.  Eating and drinking with +BM. Denies palpitations, cough, fevers or chills. Has follow up with Dr. German Carrasco next week and PCP as well.

## 2023-12-14 ENCOUNTER — TRANSCRIPTION ENCOUNTER (OUTPATIENT)
Age: 58
End: 2023-12-14

## 2023-12-17 ENCOUNTER — TRANSCRIPTION ENCOUNTER (OUTPATIENT)
Age: 58
End: 2023-12-17

## 2023-12-17 NOTE — CHART NOTE - NSCHARTNOTEFT_GEN_A_CORE
This patient on admission was found to have acute on chronic heart failure. Patient also had an acute MI type 1 ruled in VIA Cath. Post operatively, patient had acute pulmonary insufficiency.  Vasopressors for hypovolemic shock and inotropic use for cardiogenic shock post operatively. BMI of 40.8 qualifies for diagnosis of morbid obesity.
CTS    OR cancelled for tomorrow.  AI and MR on repeat TTE will be treated medically   1. Technically difficult image quality.   2. Left ventricular cavity is normal. Left ventricular wall thickness is normal. Left ventricular systolic function is moderately to severely decreased with an ejection fraction of 33 % by Cummings's method of disks. Regional wall motion abnormalities present.   3. Multiple segmental abnormalities exist. See findings.   4. There is no evidence of a left ventricular thrombus.   5. There is mild(grade 1) left ventricular diastolic dysfunction.   6. The right ventricle is not well visualized. Normal right ventricular cavity size, wall thickness, and systolic function.   7. Symmetric mitral valve leaflet tethering.   8. Trace mitral regurgitation.   9. Trileaflet aortic valve with normal systolic excursion. calcification of the aortic valve leaflets.  10. Mild to moderate aortic regurgitation.  11. No prior echocardiogram is available for comparison.
Nutrition Follow Up Note  Patient seen for: follow up  chart reviewed. events noted.     Source: [x] Patient       [x] Medical Record        [] RN        [] Family at bedside       [] Other:    -If unable to interview patient: [] Trach/Vent/BiPAP  [] Disoriented/confused/inappropriate to interview    Diet Order:   Diet, Regular:   Consistent Carbohydrate {No Snacks} (CSTCHO)  DASH/TLC {Sodium & Cholesterol Restricted} (DASH) (23)    - Is current order appropriate/adequate? see below for recommendations     - PO intake :   [x] >75%  Adequate    [] 50-75%  Fair       [] <50%  Poor    Nutrition-related concerns:  -: s/p CABG   -Hgb A1c 7.6%, PMHx of DM2. POCT Blood glucose being monitored. on insulin regimen to maintain glycemic control.   -Vitamin D 25OH insufficient (22.2 11/10). s/p replenishment.     GI:  Last BM . Bowel Regimen? [x] Yes   [] No    Weights:   Daily Weight in k.1 (-30), Weight in k.4 (-29), Weight in k.7 (-28), Weight in k.9 (-27), Weight in k.1 (-26), Weight in k.4 (-25), Weight in k.7 (-24)  : Patient reports her height to be 5'1".  BMI using most recent weight and height: 39.1. also noted with edema, see below.     Nutritionally Pertinent MEDICATIONS  (STANDING):  atorvastatin  bisacodyl Suppository  dextrose 50% Injectable  dextrose 50% Injectable  dextrose Oral Gel  famotidine    Tablet  furosemide    Tablet  glucagon  Injectable  insulin glargine Injectable (LANTUS)  insulin lispro (ADMELOG) corrective regimen sliding scale  insulin lispro (ADMELOG) corrective regimen sliding scale  insulin lispro Injectable (ADMELOG)  metoprolol tartrate  polyethylene glycol 3350  predniSONE   Tablet  senna    Pertinent Labs:  @ 07:15: Na 140, BUN 21, Cr 0.67, BG 65<L>, K+ 3.5, Phos 4.2, Mg 2.2, Alk Phos --, ALT/SGPT --, AST/SGOT --, HbA1c --    A1C with Estimated Average Glucose Result: 7.6 % (23 @ 22:46)  A1C with Estimated Average Glucose Result: 7.5 % (23 @ 06:15)  A1C with Estimated Average Glucose Result: 7.6 % (23 @ 06:43)  A1C with Estimated Average Glucose Result: 6.1 % (23 @ 06:02)    Finger Sticks:  POCT Blood Glucose.: 218 mg/dL ( @ 11:40)  POCT Blood Glucose.: 90 mg/dL ( @ 07:48)  POCT Blood Glucose.: 119 mg/dL ( @ 21:38)  POCT Blood Glucose.: 96 mg/dL ( @ 16:29)    Skin per nursing documentation: mid sternal incision   Edema per nursing documentation: 2+ telma foot     Estimated Needs:   [x] no change since previous assessment  [] recalculated:     Previous Nutrition Diagnosis: Altered Nutrition Related Lab Values, Overweight/Obesity  Nutrition Diagnosis is: [x] ongoing  [] resolved [] not applicable     Nutrition Care Plan:  [x] In Progress  [] Achieved  [] Not applicable    New Nutrition Diagnosis: increased nutrient needs related to increased demand for nutrients as evidenced by mid sternal incision     Nutrition Interventions:  : qdequate caloric/protein intake, heart healthy and DM diet guidelines w/ literature provided as reference, food preferences, all questions were answered  today reinforced above    Recommendations:      -can continue current diet.   -pt meeting increased nutritional needs on current diet provided. if PO intake decreases, consider addition of oral nutrition supplement.   -consider addition of Multivitamin to aid in incision healing.     Monitoring and Evaluation:   Continue to monitor nutritional intake, tolerance to diet prescription, weights, labs, skin integrity    RD remains available upon request and will follow up per protocol  Melissa Rasheed, MS, RD, CDN / Teams

## 2023-12-18 ENCOUNTER — TRANSCRIPTION ENCOUNTER (OUTPATIENT)
Age: 58
End: 2023-12-18

## 2023-12-20 ENCOUNTER — TRANSCRIPTION ENCOUNTER (OUTPATIENT)
Age: 58
End: 2023-12-20

## 2024-01-03 ENCOUNTER — TRANSCRIPTION ENCOUNTER (OUTPATIENT)
Age: 59
End: 2024-01-03

## 2024-01-03 ENCOUNTER — APPOINTMENT (OUTPATIENT)
Dept: NEUROLOGY | Facility: CLINIC | Age: 59
End: 2024-01-03
Payer: MEDICAID

## 2024-01-03 ENCOUNTER — APPOINTMENT (OUTPATIENT)
Dept: CARDIOTHORACIC SURGERY | Facility: CLINIC | Age: 59
End: 2024-01-03
Payer: MEDICAID

## 2024-01-03 VITALS
HEIGHT: 61 IN | OXYGEN SATURATION: 96 % | HEART RATE: 92 BPM | BODY MASS INDEX: 35.3 KG/M2 | TEMPERATURE: 97.5 F | SYSTOLIC BLOOD PRESSURE: 92 MMHG | RESPIRATION RATE: 14 BRPM | WEIGHT: 187 LBS | DIASTOLIC BLOOD PRESSURE: 69 MMHG

## 2024-01-03 VITALS — DIASTOLIC BLOOD PRESSURE: 77 MMHG | SYSTOLIC BLOOD PRESSURE: 99 MMHG

## 2024-01-03 DIAGNOSIS — R20.2 ANESTHESIA OF SKIN: ICD-10-CM

## 2024-01-03 DIAGNOSIS — R20.0 ANESTHESIA OF SKIN: ICD-10-CM

## 2024-01-03 DIAGNOSIS — J45.909 UNSPECIFIED ASTHMA, UNCOMPLICATED: ICD-10-CM

## 2024-01-03 PROCEDURE — 99214 OFFICE O/P EST MOD 30 MIN: CPT | Mod: 95

## 2024-01-03 PROCEDURE — 99024 POSTOP FOLLOW-UP VISIT: CPT

## 2024-01-03 NOTE — ASSESSMENT
[FreeTextEntry1] : lacunar infarct involving the right basal ganglia region cw Lipitor 80mg, advised patient to add asa 81mg for secondary stroke prevention LDL goal <70 (LDL 77, HBA1c 7), will refer to nutritionist, discussed adding adding another LDL lowering agent but she prefers dietary changes at this time BP goal of normal  Numbness in legs, concerning for neuropathy, will check basic neuropathy labs, nerve conduction EMG of the legs. Will cw Neurontin 300 mg 3 times a day for neuropathic pain. after emg  I spent the time noted on the day of this patient encounter preparing for, providing and documenting the above E/M service and counseling and educate patient on differential, workup, disease course, and treatment/management. Education was provided to the patient during this encounter. All questions and concerns were answered and addressed in detail. The patient verbalized understanding and agreed to plan. Patient was advised to continue to monitor for neurologic symptoms and to notify my office or go to the nearest emergency room if there are any changes. Any orders/referrals and communications were provided as well.  Side effects of the above medications were discussed in detail including but not limited to applicable black box warning and teratogenicity as appropriate. For patients with seizures, I discussed risk of SUDEP with patient and advised that SUDEP risk can be minimized with good seizure control through medication compliance and other measures. Patient was advised to bring previous records to my office, including CD of imaging, when applicable.

## 2024-01-03 NOTE — CONSULT LETTER
[Dear  ___] : Dear  [unfilled], [Courtesy Letter:] : I had the pleasure of seeing your patient, [unfilled], in my office today. [Please see my note below.] : Please see my note below. [Consult Closing:] : Thank you very much for allowing me to participate in the care of this patient.  If you have any questions, please do not hesitate to contact me. [Sincerely,] : Sincerely, [FreeTextEntry2] : Dr. German Romano [FreeTextEntry3] : Sivakumar Loaiza MD Department of Cardiovascular &Thoracic Surgery  Cardiovascular &Thoracic Surgery [facility].

## 2024-01-03 NOTE — PHYSICAL EXAM
[General Appearance - Alert] : alert [General Appearance - In No Acute Distress] : in no acute distress [Person] : oriented to person [Place] : oriented to place [Time] : oriented to time [Concentration Intact] : normal concentrating ability [Naming Objects] : no difficulty naming common objects [Fluency] : fluency intact [Comprehension] : comprehension intact [Vocabulary] : adequate range of vocabulary [Cranial Nerves Facial (VII)] : face symmetrical

## 2024-01-03 NOTE — END OF VISIT
[FreeTextEntry3] :  I, NATASHA Vasquez , personally performed the evaluation and management (E/M) services for this established  patient. That E/M includes conducting the initial examination, assessing all conditions, and establishing the plan of care. Today, EMILY PICHARDO  was here to observe my evaluation and management services for this patient.

## 2024-01-03 NOTE — HISTORY OF PRESENT ILLNESS
[Home] : at home, [unfilled] , at the time of the visit. [Medical Office: ([facility])___] : at the medical office located in  [Verbal consent obtained from patient] : the patient, [unfilled] [FreeTextEntry1] : Patient was admitted to the hospital and found to have right basal ganglier stroke, she is here for follow-up. She says that she continues to feel unwell. There is no focal symptoms, however she has numbness and tingling in her feet.  The patient denies being on aspirin or Plavix. She is on atorvastatin 80 mg at night. Had cardaic bipass.

## 2024-01-03 NOTE — ASSESSMENT
[FreeTextEntry1] : 58 female smoker with PMH CVA with residual Left-sided weakness/numbness/L gaze deviation, DM2 with b/l peripheral neuropathy, COPD/Asthma, Breast Ca s/p ?RT, Bipolar depression, Rheumatoid Arthritis, Antiphospholipid syndrome, and L eye uveitis/scleritis, Presents to [facility] transferred from [facility]. p/w multiple medical complaints. A/w exacerbation of L-sided subjective weakness/numbness likely 2/2 recrudescence of prior Right BG infarct. Cardiology following for new LV dysfunction since July, pending ischemic eval. LESLEY was done showing severe Aortic Insufficiency and moderated MR. Cardiac cath was done showing LAD prox 100% fills via right to left collateral. Patient now transferred to [facility] Dr. Loaiza for evaluation.  HOSPITAL COURSE: neuro consult for hx cva L sided weakness, Preop for Friday, Repeat echo shows only mild valve pathology LESLEY this am=> severe AR, s/p CABG LIMA-LAD, AVR(t) - Inspiris 23mm, LAAL with AtriClip 40mm, Post op Amio, tight glycemic control to prevent wound infxn. Hydromorphone initiated by Acute Pain Mgmt team. Insulin gtt maintained per Endocrine. PT/OT evaluation. Pt on chronic steroids - hx COPD- afebrile - prevena in place. 40 yr pack current smoker. nebs. WBC increased 23 (from 20) (Of note, patient on chronic steroids for Hx RA).hx of Bipolar MDD, with no follow-up with Psychiatry and non-compliance with OP psych meds since July 2023. Pt requesting to speak to Psychiatry today to establish care. Denies SI/HI/AH/VH. On call Psychiatry ([phone]) consulted and aware. Pt also noted with bronchospasms/wheezing - Pulm (Dr. Leone) consulted. CT with no concern for sternal dehiscence. Encourage ambulation with Physical Therapy. CM to work on Rehab for disposition as recommended by PT. Hx of Antiphospholipid Syndrome and CVA, was on AC therapy pre-surgery; ok to restart AC therapy with Eliquis 5mg BID. 12/4 VSS DC rehab [place], will need O2 for post op hypoxia related long term tobacco abuse, Seen by [facility] 12/5 left AMA to home from [facility] rehab. Followed up with cardiologist Dr.Syed Romano [phone] 12/5 and seen by PCP on 12/5 Dr. Brigido Almodovar [phone]  Seen post op 12/13 gradually progressing and stable from CTS perspective, Weight down to 188, saw Dr Carrasco and PCP BS mainly 130's, lower than 150. Had some med changes with PCP, started back on Trazadone. Was tp fu with PCP Dr. Almodovar and cardiologist Dr. German Carrasco. Also had follow up TEB with neuro this morning.  Today she presents and reports that she has pain to MSI pain. Denies any chest pain, shortness of breath, palpitations, dizziness or pedal edema.   Today on exam patient's lungs clear bilaterally, normal sinus rhythm, sternum stable, incision clean, dry and intact.  No peripheral edema noted.    Instructed patient on importance of optimal glycemic control, daily showering, daily weights, any signs of fever (temperature greater than 101F, chills,  incentive spirometer use, and increase ambulation as tolerated. Instructed to call office with any signs or symptoms of infection or weight gain of 2 or more pounds in 1 day or 3 or more pounds in 1 week.    Discussed intake of plant based foods, including vegetables, fruits, and whole grain foods: legumes, nuts and seeds, fish or seafood, lean meats, and non-fat or low-fat diary foods. Plant based oils (non-tropical) in place of solid fats. Instructed patient to limit intake of high fat meats and processed meats, high-fat diary foods, dietary cholesterol and sodium, foods and beverages with added sugars.   Plan: 1) Continue current medication regimen 2) Follow up with cardiologist ( Dr. German Romano on 1/9/24) and PCP  3) May return on as needed basis  4) Discontinue Torsemide and Aldactone  5) SBE antibiotic prophylaxis discussed at length  6) Continue to increase activity and walk daily as tolerated. Continue to use incentive spirometer.  7) Keep legs elevated above heart when resting/sitting/sleeping.  8) Call MD if you experience fever, fatigue, dizziness, confusion, syncope, shortness of breath, chest pain not relieved with analgesics, increased redness/drainage from the surgical  incision site 9) Virtual Cardiac Rehab referral

## 2024-01-03 NOTE — REASON FOR VISIT
[Family Member] : family member [de-identified] : CABG LIMA-LAD, AVR(t) - Inspiris 23mm, LAAL with AtriClip 40mm [de-identified] : 11/21/23

## 2024-01-17 ENCOUNTER — NON-APPOINTMENT (OUTPATIENT)
Age: 59
End: 2024-01-17

## 2024-01-17 ENCOUNTER — APPOINTMENT (OUTPATIENT)
Dept: ELECTROPHYSIOLOGY | Facility: CLINIC | Age: 59
End: 2024-01-17
Payer: MEDICAID

## 2024-01-18 PROCEDURE — 93298 REM INTERROG DEV EVAL SCRMS: CPT

## 2024-01-22 ENCOUNTER — APPOINTMENT (OUTPATIENT)
Dept: OPHTHALMOLOGY | Facility: CLINIC | Age: 59
End: 2024-01-22

## 2024-01-29 NOTE — PROGRESS NOTE ADULT - PROBLEM SELECTOR PLAN 5
- left eye very inflamed appearing; has blurred/decreased vision - pt with hx of uveitis likely due to RA   - on prednisone drops initially every 2 hours; patient reports using 4x daily at present  - ophthalmology consulted given symptoms worsening as per pt , recs noted , c/w prednisone 10 mg po daily   - Rheumatology eval as outpt [Negative] : Heme/Lymph

## 2024-01-31 ENCOUNTER — TRANSCRIPTION ENCOUNTER (OUTPATIENT)
Age: 59
End: 2024-01-31

## 2024-01-31 ENCOUNTER — NON-APPOINTMENT (OUTPATIENT)
Age: 59
End: 2024-01-31

## 2024-01-31 ENCOUNTER — APPOINTMENT (OUTPATIENT)
Dept: OPHTHALMOLOGY | Facility: CLINIC | Age: 59
End: 2024-01-31
Payer: MEDICAID

## 2024-01-31 PROCEDURE — 92134 CPTRZ OPH DX IMG PST SGM RTA: CPT

## 2024-01-31 PROCEDURE — 92012 INTRM OPH EXAM EST PATIENT: CPT | Mod: 25

## 2024-02-01 ENCOUNTER — TRANSCRIPTION ENCOUNTER (OUTPATIENT)
Age: 59
End: 2024-02-01

## 2024-02-01 ENCOUNTER — NON-APPOINTMENT (OUTPATIENT)
Age: 59
End: 2024-02-01

## 2024-02-02 ENCOUNTER — TRANSCRIPTION ENCOUNTER (OUTPATIENT)
Age: 59
End: 2024-02-02

## 2024-02-05 ENCOUNTER — TRANSCRIPTION ENCOUNTER (OUTPATIENT)
Age: 59
End: 2024-02-05

## 2024-02-06 ENCOUNTER — APPOINTMENT (OUTPATIENT)
Dept: CARDIOLOGY | Facility: CLINIC | Age: 59
End: 2024-02-06

## 2024-02-06 ENCOUNTER — NON-APPOINTMENT (OUTPATIENT)
Age: 59
End: 2024-02-06

## 2024-02-06 NOTE — HISTORY OF PRESENT ILLNESS
[FreeTextEntry1] : BRENDEN DE LA CRUZ is a 58 year old female, s/p CABG LIMA-LAD, AVR(t) - Inspiris 23mm, LAAL with AtriClip 40mm on  11/21/23. who was referred by Dr. Loaiza for virtual cardiac rehabilitation. Past medical history includes: antiphospholipid syndrome, severe AR, CAD, diabetes, CVA (multiple 2021) with left residual hemiparesis and left gaze deviation, COPD, bipolar depression, asthma, breast cancer (radiation tx), severe left ventricular systolic dysfunction (LVEF 35% via TTE); s/p loop recorder placement during post-op hospitalization as well.  Patient presents for phone intake with her caregiver, Ann-Marie Mclean. Patient was provided with overview of our virtual program; states she would prefer to attend our center-based cardiac rehabilitation program. She states she is basically sedentary, except when she goes to her medical appointments. She remains a non-smoker, having quit before her cardiac surgery. She states she is trying to follow a heart healthy diabetic eating plan. She endorses some depression related to her clinical condition and her wish to get better more quickly. She follows with Dr. German Romano for cardiology and Dr. Khan for primary care. She has seen her ophthalmologist last week, states she has left geoff visual disturbance, also states she feels unsteady at times, may be interested in getting an assist device like a cane or walker.

## 2024-02-06 NOTE — REASON FOR VISIT
[Home] : at home, [unfilled] , at the time of the visit. [Medical Office: ([facility])___] : at the medical office located in  [Formal Caregiver] : formal caregiver [Verbal consent obtained from patient] : the patient, [unfilled]

## 2024-02-06 NOTE — ASSESSMENT
[FreeTextEntry1] : status post CABG LIMA-LAD, AVR(t) - Inspiris 23mm, LAAL with AtriClip 40mm  PHone intake today= patient for cardiac rehab center-based- further assessment at in-person visit

## 2024-02-06 NOTE — REVIEW OF SYSTEMS
[Vision Problems] : vision problems [Hearing Loss] : hearing loss [Shortness Of Breath] : shortness of breath [Wheezing] : wheezing [Dyspnea on Exertion] : dyspnea on exertion [Constipation] : constipation [Dizziness] : dizziness [Unsteady Walking] : ataxia [Depression] : depression [Fever] : no fever [Recent Change In Weight] : ~T no recent weight change [Chest Pain] : no chest pain [Leg Claudication] : no leg claudication [Lower Ext Edema] : no lower extremity edema [Cough] : no cough [Abdominal Pain] : no abdominal pain [Nausea] : no nausea [Diarrhea] : diarrhea [Heartburn] : no heartburn [Joint Pain] : no joint pain [Muscle Pain] : no muscle pain [Back Pain] : no back pain [Headache] : no headache [Fainting] : no fainting [FreeTextEntry3] : see HPI [FreeTextEntry4] : bialteral ear "clogged" sensation; bilateral ringing in ears [FreeTextEntry5] : incisional discomfort [FreeTextEntry6] : mild as per patient;  [de-identified] : MSI healed as per patient [de-identified] : see HPI

## 2024-02-06 NOTE — HISTORY OF PRESENT ILLNESS
[Yes ___ How many times per day?] : Yes, [unfilled] times per day [Does Patient follow with other health care specialists for comprehensive diabetes care?] : Does Patient follow with other health care specialists for comprehensive diabetes care? Yes [Following diabetes way of eating] : following diabetes way of eating [Healthy weight management] : healthy weight management [Individualized exercise program as developed at cardiac rehabilitation] : individualized exercise program as developed at cardiac rehabilitation [Overview of diabetes and cardiovascular disease] : overview of diabetes and cardiovascular disease [Hypoglycemia and Hyperglycemia: sign and symptoms] : Hypoglycemia and Hyperglycemia: sign and symptoms [Teach back utilized] : teach back utilized [Yes, continue with Diabetes plan] : Yes, continue with Diabetes plan [In progress] : in progress [Assessment] : Assessment [Action] : Action [Former smoker] : former smoker [< 12 months] : < 12 months

## 2024-02-06 NOTE — PLAN
[FreeTextEntry1] : Cardiac Rehabilitation Phase 2 Focus assessment and physical exam at session #1 ITP initiated- to be finalized by Dr. Lara at session #1  All questions answered.  Welcome information emailed to patient- okay given. Start date: 7/15/24, 11am session Obtain office visit, EKG from Juvnetino Romano and Erin

## 2024-02-08 ENCOUNTER — TRANSCRIPTION ENCOUNTER (OUTPATIENT)
Age: 59
End: 2024-02-08

## 2024-02-08 NOTE — DISCHARGE NOTE NURSING/CASE MANAGEMENT/SOCIAL WORK - NSDCPEEMAIL_GEN_ALL_CORE
Monitor blood pressure  Amlodipine  Metoprolol   [employer] for Tobacco Control email [email or web address] email [email or web address]

## 2024-02-14 ENCOUNTER — TRANSCRIPTION ENCOUNTER (OUTPATIENT)
Age: 59
End: 2024-02-14

## 2024-02-15 DIAGNOSIS — B19.10 UNSPECIFIED VIRAL HEPATITIS B W/OUT HEPATIC COMA: ICD-10-CM

## 2024-02-21 ENCOUNTER — NON-APPOINTMENT (OUTPATIENT)
Age: 59
End: 2024-02-21

## 2024-02-21 ENCOUNTER — APPOINTMENT (OUTPATIENT)
Dept: ELECTROPHYSIOLOGY | Facility: CLINIC | Age: 59
End: 2024-02-21
Payer: MEDICAID

## 2024-02-21 PROCEDURE — 93298 REM INTERROG DEV EVAL SCRMS: CPT

## 2024-03-08 NOTE — PROGRESS NOTE ADULT - PROVIDER SPECIALTY LIST ADULT
Anesthesia
CT Surgery
Cardiology
Critical Care
Critical Care
Endocrinology
Neurology
Anesthesia
Cardiology
Critical Care
Endocrinology
Neurology
Pain Medicine
CT Surgery
Cardiology
Neurology
Neurology
Cardiology
Cardiology
Neurology
Pulmonology
CT Surgery
Cardiology
Endocrinology
Quality 226: Preventive Care And Screening: Tobacco Use: Screening And Cessation Intervention: Patient screened for tobacco use and is an ex/non-smoker
Detail Level: Detailed
Endocrinology
Endocrinology
CT Surgery
CT Surgery
Endocrinology
Quality 47: Advance Care Plan: Advance Care Planning discussed and documented; advance care plan or surrogate decision maker documented in the medical record.
CT Surgery
Endocrinology
Pulmonology
Quality 130: Documentation Of Current Medications In The Medical Record: Current Medications Documented
CT Surgery
Endocrinology
Quality 431: Preventive Care And Screening: Unhealthy Alcohol Use - Screening: Patient screened for unhealthy alcohol use using a single question and scores less than 2 times per year
CT Surgery
Endocrinology
Endocrinology
Quality 402: Tobacco Use And Help With Quitting Among Adolescents: Patient screened for tobacco and never smoked
Pulmonology
CT Surgery
Pulmonology
CT Surgery
Pulmonology
CT Surgery
Pulmonology

## 2024-03-25 ENCOUNTER — APPOINTMENT (OUTPATIENT)
Dept: NEUROLOGY | Facility: CLINIC | Age: 59
End: 2024-03-25

## 2024-03-28 ENCOUNTER — APPOINTMENT (OUTPATIENT)
Dept: ELECTROPHYSIOLOGY | Facility: CLINIC | Age: 59
End: 2024-03-28

## 2024-04-03 ENCOUNTER — APPOINTMENT (OUTPATIENT)
Dept: OPHTHALMOLOGY | Facility: CLINIC | Age: 59
End: 2024-04-03
Payer: MEDICAID

## 2024-04-03 ENCOUNTER — NON-APPOINTMENT (OUTPATIENT)
Age: 59
End: 2024-04-03

## 2024-04-03 PROCEDURE — 92014 COMPRE OPH EXAM EST PT 1/>: CPT | Mod: 25

## 2024-04-08 ENCOUNTER — APPOINTMENT (OUTPATIENT)
Dept: RHEUMATOLOGY | Facility: CLINIC | Age: 59
End: 2024-04-08
Payer: MEDICAID

## 2024-04-08 VITALS
WEIGHT: 186 LBS | OXYGEN SATURATION: 96 % | RESPIRATION RATE: 16 BRPM | TEMPERATURE: 98.6 F | SYSTOLIC BLOOD PRESSURE: 98 MMHG | HEIGHT: 61 IN | HEART RATE: 82 BPM | BODY MASS INDEX: 35.12 KG/M2 | DIASTOLIC BLOOD PRESSURE: 64 MMHG

## 2024-04-08 DIAGNOSIS — J43.9 EMPHYSEMA, UNSPECIFIED: ICD-10-CM

## 2024-04-08 DIAGNOSIS — Z79.899 OTHER LONG TERM (CURRENT) DRUG THERAPY: ICD-10-CM

## 2024-04-08 DIAGNOSIS — J84.9 INTERSTITIAL PULMONARY DISEASE, UNSPECIFIED: ICD-10-CM

## 2024-04-08 DIAGNOSIS — M79.7 FIBROMYALGIA: ICD-10-CM

## 2024-04-08 PROCEDURE — 99214 OFFICE O/P EST MOD 30 MIN: CPT

## 2024-04-08 PROCEDURE — G2211 COMPLEX E/M VISIT ADD ON: CPT | Mod: NC,1L

## 2024-04-08 RX ORDER — TIZANIDINE 2 MG/1
2 TABLET ORAL
Qty: 60 | Refills: 5 | Status: ACTIVE | COMMUNITY
Start: 2024-04-08 | End: 1900-01-01

## 2024-04-08 RX ORDER — LIDOCAINE 4% 4 G/100G
4 CREAM TOPICAL
Qty: 1 | Refills: 11 | Status: ACTIVE | COMMUNITY
Start: 2024-04-08 | End: 1900-01-01

## 2024-04-08 NOTE — HISTORY OF PRESENT ILLNESS
[___ Month(s) Ago] : [unfilled] month(s) ago [FreeTextEntry1] : =pt had CABG done w valve repair on 11/23 =pt taking MTX 6 tabs weekly w folic acid 1mg daily  =pt says her uveitis is active =pt says her R knee is hurting significant; pt says her mm and joints are in pain; pt also taking prednisone 5mg daily  =pt says pain is significant worse in am; pt says she has some mild swelling in her R Knee =no fevers, SOB, CP, abdominal pain, n/v/d, rashes

## 2024-04-08 NOTE — PHYSICAL EXAM
[General Appearance - Well Nourished] : well nourished [General Appearance - Well Developed] : well developed [Sclera] : the sclera and conjunctiva were normal [Abdomen Tenderness] : non-tender [Cervical Lymph Nodes Enlarged Posterior Bilaterally] : posterior cervical [Cervical Lymph Nodes Enlarged Anterior Bilaterally] : anterior cervical [] : no rash [Skin Lesions] : no skin lesions [Oriented To Time, Place, And Person] : oriented to person, place, and time [FreeTextEntry1] : multiple tender points

## 2024-04-08 NOTE — DATA REVIEWED
[FreeTextEntry1] : Labs reviewed from 11/23 ESR, CRP wnl  WBC 16, Hbg 8.7,   cardiolipin, b2gp negative ,   CCP, RNA polymerase III, centromere, scl70, ALLYSON, sjogren DsDNA negative  surgical phatology 11/23 Final Diagnosis Aortic valve leaflets: -   Cardiac valvular tissue with chronic lymphoplasmacytic inflammation with area of necrobiosis consistent with a rheumatoid nodule.  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  Special stains for microorganisms (Gram stain for bacteria, GMS stain for fungus, and AFB stain) are negative  Cardiac MR 11/23 IMPRESSION:  Mild thinning and hypokinesis of the mid to apical anterior and  anteroseptal segments.  The left ventricular ejection fraction measures  43%.Findings consistent with ischemic cardiomyopathy.  No myocardial scarring is appreciated on postcontrast imaging, suggestive  of viable myocardium.  Mild to moderate aortic regurgitation by qualitative assessment.  --- End of Report ---

## 2024-04-08 NOTE — ASSESSMENT
[FreeTextEntry1] : 57 y/o F w seropositive RA, uveitis, FM?  =pain, stiffness in wrists, MCPs, PIPs, DIPs, elbows, shoulders, knees, ankles =reported swelling, no obvious swelling on exam =uveitis  =labs 11/23 w high RF; negative ESR, CRP, CC P *************************************************************************************************** =hx of stroke, CAD, HF **************************************************************************************************** =SOB, exam w rhonchi - hx of COPD, pt w hx of significant smoking  =CT chest 9/23 w significant emphysema; cystic changes in bases, honeycombing, ILD?   Complicated patient. I think it is important to determine what to target in patient's dz. Does patient currently have active synovitis? Pt seems to have FM component given physical exam and lack of high inflammatory markers.   RA nodule on valve. One must consider if MTX worsening this, although only one valvular nodule found. I think MTX still option.   Will need to clarify if uveitis is still active. Can be addressed by MTX. Other options aza, cellcept (not best ot help w synovitis), but TNF inhibitors do not seem like option given HF......  Pt w def emphysema given pt still smoking and CT chest changes; however CT chest changes show possible ILD changes w honeycombing. I think it is best to refer to pulm to have PFTs done and assess if there is restrictive component. Rituxan can be option, but would have to be combined w another agent to address uveitis if active.   Plan -Labs w serologies, inflammatory markers -MTX 6 tabs weekly for now -folic acid 1mg daily -touch base w optho -pulm referal  RTO in 6 weeks

## 2024-04-15 ENCOUNTER — APPOINTMENT (OUTPATIENT)
Dept: PULMONOLOGY | Facility: CLINIC | Age: 59
End: 2024-04-15
Payer: MEDICAID

## 2024-04-15 VITALS
DIASTOLIC BLOOD PRESSURE: 69 MMHG | OXYGEN SATURATION: 95 % | HEART RATE: 76 BPM | RESPIRATION RATE: 16 BRPM | SYSTOLIC BLOOD PRESSURE: 103 MMHG | BODY MASS INDEX: 34.93 KG/M2 | HEIGHT: 61 IN | WEIGHT: 185 LBS

## 2024-04-15 PROCEDURE — 99203 OFFICE O/P NEW LOW 30 MIN: CPT | Mod: 25

## 2024-04-15 PROCEDURE — 94729 DIFFUSING CAPACITY: CPT

## 2024-04-15 PROCEDURE — 94727 GAS DIL/WSHOT DETER LNG VOL: CPT

## 2024-04-15 PROCEDURE — 94060 EVALUATION OF WHEEZING: CPT

## 2024-04-15 RX ORDER — BUDESONIDE, GLYCOPYRROLATE, AND FORMOTEROL FUMARATE 160; 9; 4.8 UG/1; UG/1; UG/1
160-9-4.8 AEROSOL, METERED RESPIRATORY (INHALATION)
Qty: 1 | Refills: 3 | Status: ACTIVE | COMMUNITY
Start: 2024-04-15 | End: 1900-01-01

## 2024-04-18 LAB
ALBUMIN SERPL ELPH-MCNC: 4.6 G/DL
ALP BLD-CCNC: 116 U/L
ALT SERPL-CCNC: 13 U/L
ANION GAP SERPL CALC-SCNC: 16 MMOL/L
AST SERPL-CCNC: 15 U/L
BASOPHILS # BLD AUTO: 0.13 K/UL
BASOPHILS NFR BLD AUTO: 0.9 %
BILIRUB SERPL-MCNC: 1 MG/DL
BUN SERPL-MCNC: 17 MG/DL
CALCIUM SERPL-MCNC: 9.3 MG/DL
CHLORIDE SERPL-SCNC: 100 MMOL/L
CO2 SERPL-SCNC: 23 MMOL/L
CREAT SERPL-MCNC: 0.82 MG/DL
CRP SERPL-MCNC: <3 MG/L
EGFR: 82 ML/MIN/1.73M2
EOSINOPHIL # BLD AUTO: 0.24 K/UL
EOSINOPHIL NFR BLD AUTO: 1.7 %
ERYTHROCYTE [SEDIMENTATION RATE] IN BLOOD BY WESTERGREN METHOD: 40 MM/HR
GLUCOSE SERPL-MCNC: 80 MG/DL
HCT VFR BLD CALC: 40.6 %
HGB BLD-MCNC: 13.2 G/DL
IMM GRANULOCYTES NFR BLD AUTO: 0.3 %
LYMPHOCYTES # BLD AUTO: 2.74 K/UL
LYMPHOCYTES NFR BLD AUTO: 19.8 %
MAN DIFF?: NORMAL
MCHC RBC-ENTMCNC: 29.5 PG
MCHC RBC-ENTMCNC: 32.5 GM/DL
MCV RBC AUTO: 90.8 FL
MONOCYTES # BLD AUTO: 1.06 K/UL
MONOCYTES NFR BLD AUTO: 7.7 %
NEUTROPHILS # BLD AUTO: 9.6 K/UL
NEUTROPHILS NFR BLD AUTO: 69.6 %
PLATELET # BLD AUTO: 285 K/UL
POTASSIUM SERPL-SCNC: 4.2 MMOL/L
PROT SERPL-MCNC: 7.2 G/DL
RBC # BLD: 4.47 M/UL
RBC # FLD: 17.4 %
SODIUM SERPL-SCNC: 139 MMOL/L
TPMT ENZYME INTERPRETATION: NORMAL
TPMT ENZYME METHODOLOGY: NORMAL
TPMT ENZYME: 26.1
WBC # FLD AUTO: 13.81 K/UL

## 2024-04-18 RX ORDER — METHOTREXATE 2.5 MG/1
2.5 TABLET ORAL
Qty: 32 | Refills: 1 | Status: ACTIVE | COMMUNITY
Start: 2024-04-18 | End: 1900-01-01

## 2024-04-19 ENCOUNTER — APPOINTMENT (OUTPATIENT)
Dept: ELECTROPHYSIOLOGY | Facility: CLINIC | Age: 59
End: 2024-04-19
Payer: MEDICAID

## 2024-04-19 DIAGNOSIS — I63.81 OTHER CEREBRAL INFARCTION DUE TO OCCLUSION OR STENOSIS OF SMALL ARTERY: ICD-10-CM

## 2024-04-19 PROCEDURE — 93291 INTERROG DEV EVAL SCRMS IP: CPT

## 2024-04-21 NOTE — HISTORY OF PRESENT ILLNESS
[Former] : former [>= 20 pack years] : >= 20 pack years [Current] : current [TextBox_4] : 58-year-old female with history of "asthma/emphysema" presents for pulmonary evaluation.  Patient complains of occasional dyspnea on exertion for years associated with productive cough and wheezing.  She denies fever, chills, chest pain, hemoptysis, night sweats or weight loss. She has used albuterol both MDI and via nebulizer.  Patient has a greater than 40-pack-year history of smoking having quit November 2023 just prior to open heart surgery.  She continues to vape however. [TextBox_11] : 1 [TextBox_13] : 40 [YearQuit] : 2023 [TextBox_29] : Denies snoring, daytime somnolence, apneic episodes, AM headaches

## 2024-04-21 NOTE — PROCEDURE
[FreeTextEntry1] : PFT results: Mild OAD with minimal bronchodilator response.  Decreased diffusion.  Lung volumes are normal.

## 2024-04-21 NOTE — DISCUSSION/SUMMARY
[FreeTextEntry1] : 58-year-old female with KIRK in part due to OAD.  I reviewed the PFT results with the patient.  Breztri twice daily was added to albuterol.  She is to continue to abstain from cigarette smoking and stop vaping.  Follow-up screening chest CT in the future.  She is to follow-up with her cardiologist and PMD as before.

## 2024-04-21 NOTE — CONSULT LETTER
[Dear  ___] : Dear  [unfilled], [Courtesy Letter:] : I had the pleasure of seeing your patient, [unfilled], in my office today. [Please see my note below.] : Please see my note below. [Consult Closing:] : Thank you very much for allowing me to participate in the care of this patient.  If you have any questions, please do not hesitate to contact me. [Sincerely,] : Sincerely, [FreeTextEntry3] : TRACEE WILKINSON MD  [address], [address] [place] [address]

## 2024-04-25 ENCOUNTER — APPOINTMENT (OUTPATIENT)
Dept: OPHTHALMOLOGY | Facility: CLINIC | Age: 59
End: 2024-04-25
Payer: MEDICAID

## 2024-04-25 ENCOUNTER — NON-APPOINTMENT (OUTPATIENT)
Age: 59
End: 2024-04-25

## 2024-04-25 ENCOUNTER — APPOINTMENT (OUTPATIENT)
Dept: ELECTROPHYSIOLOGY | Facility: CLINIC | Age: 59
End: 2024-04-25

## 2024-04-25 PROCEDURE — 99214 OFFICE O/P EST MOD 30 MIN: CPT

## 2024-04-29 ENCOUNTER — APPOINTMENT (OUTPATIENT)
Dept: ELECTROPHYSIOLOGY | Facility: CLINIC | Age: 59
End: 2024-04-29

## 2024-04-30 ENCOUNTER — OUTPATIENT (OUTPATIENT)
Dept: OUTPATIENT SERVICES | Facility: HOSPITAL | Age: 59
LOS: 1 days | Discharge: HOME | End: 2024-04-30
Payer: MEDICAID

## 2024-04-30 DIAGNOSIS — Z90.710 ACQUIRED ABSENCE OF BOTH CERVIX AND UTERUS: Chronic | ICD-10-CM

## 2024-04-30 DIAGNOSIS — Z98.890 OTHER SPECIFIED POSTPROCEDURAL STATES: Chronic | ICD-10-CM

## 2024-04-30 DIAGNOSIS — Z95.1 PRESENCE OF AORTOCORONARY BYPASS GRAFT: Chronic | ICD-10-CM

## 2024-04-30 DIAGNOSIS — Z95.2 PRESENCE OF PROSTHETIC HEART VALVE: Chronic | ICD-10-CM

## 2024-04-30 DIAGNOSIS — Z90.49 ACQUIRED ABSENCE OF OTHER SPECIFIED PARTS OF DIGESTIVE TRACT: Chronic | ICD-10-CM

## 2024-04-30 PROCEDURE — 33286 RMVL SUBQ CAR RHYTHM MNTR: CPT

## 2024-04-30 RX ORDER — METOPROLOL TARTRATE 50 MG
1 TABLET ORAL
Refills: 0 | DISCHARGE

## 2024-04-30 RX ORDER — ASPIRIN/CALCIUM CARB/MAGNESIUM 324 MG
1 TABLET ORAL
Refills: 0 | DISCHARGE

## 2024-04-30 RX ORDER — GABAPENTIN 400 MG/1
1 CAPSULE ORAL
Refills: 0 | DISCHARGE

## 2024-04-30 NOTE — H&P CARDIOLOGY - NSICDXPASTSURGICALHX_GEN_ALL_CORE_FT
PAST SURGICAL HISTORY:  History of cholecystectomy     History of hysterectomy     History of loop recorder     S/P AVR     S/P CABG x 1

## 2024-04-30 NOTE — H&P CARDIOLOGY - NSICDXPASTMEDICALHX_GEN_ALL_CORE_FT
PAST MEDICAL HISTORY:  APS (antiphospholipid syndrome)     Asthma     Breast cancer     CAD (coronary artery disease)     Cigarette smoker     Facial paresthesia     History of COPD     History of COPD     History of depressed bipolar disorder     History of multiple cerebrovascular accidents (CVAs)     Hyperlipidemia     Migraines     Other depression     Paresthesia of left arm     Rheumatoid arthritis     Suicidal ideation     Type 2 diabetes mellitus

## 2024-04-30 NOTE — H&P CARDIOLOGY - HISTORY OF PRESENT ILLNESS
58 year old obese female with HTN, hyperlipidemia, Dm type 2, asthma/COPD, rheumatoid arthritis, breast CA treated with radiation, known CAD with CABG LIMA to LAD and tissue AVR 11/2023, CVA x2 in the past with residual left sided weakness, numbness and tingling who presented for loop recorder follow up and it was reportedly identified that the loop recorder has not had good function since CABG in November 2023. Patient was recommended for a loop recorder explant and a new loop recorder implant.  Patient declines new loop recorder implantation at this time as the site is uncomfortable and she wants to just focus on her reconditioning since her CABG.    Patient discussed with Dr Russo at length at the bedside, patient is already taking Eliquis 5mg po BID for history of CVA.   Patient will undergo a loop recorder explant only and follow up in the future for possible new loop recorder implant.

## 2024-05-01 PROBLEM — I25.10 ATHEROSCLEROTIC HEART DISEASE OF NATIVE CORONARY ARTERY WITHOUT ANGINA PECTORIS: Chronic | Status: ACTIVE | Noted: 2024-04-30

## 2024-05-13 ENCOUNTER — APPOINTMENT (OUTPATIENT)
Dept: CARDIOLOGY | Facility: CLINIC | Age: 59
End: 2024-05-13

## 2024-05-14 PROBLEM — I63.9 CEREBROVASCULAR ACCIDENT (CVA), UNSPECIFIED MECHANISM: Status: ACTIVE | Noted: 2024-01-03

## 2024-05-14 PROBLEM — Z86.73 H/O: CVA (CEREBROVASCULAR ACCIDENT): Status: ACTIVE | Noted: 2023-07-31

## 2024-05-15 ENCOUNTER — APPOINTMENT (OUTPATIENT)
Dept: CARDIOLOGY | Facility: CLINIC | Age: 59
End: 2024-05-15
Payer: MEDICAID

## 2024-05-15 ENCOUNTER — NON-APPOINTMENT (OUTPATIENT)
Age: 59
End: 2024-05-15

## 2024-05-15 DIAGNOSIS — I63.9 CEREBRAL INFARCTION, UNSPECIFIED: ICD-10-CM

## 2024-05-16 ENCOUNTER — NON-APPOINTMENT (OUTPATIENT)
Age: 59
End: 2024-05-16

## 2024-05-16 ENCOUNTER — APPOINTMENT (OUTPATIENT)
Dept: CARDIOLOGY | Facility: CLINIC | Age: 59
End: 2024-05-16
Payer: MEDICAID

## 2024-05-16 ENCOUNTER — APPOINTMENT (OUTPATIENT)
Dept: ELECTROPHYSIOLOGY | Facility: CLINIC | Age: 59
End: 2024-05-16

## 2024-05-16 VITALS
HEART RATE: 69 BPM | BODY MASS INDEX: 36.25 KG/M2 | RESPIRATION RATE: 17 BRPM | SYSTOLIC BLOOD PRESSURE: 116 MMHG | OXYGEN SATURATION: 96 % | DIASTOLIC BLOOD PRESSURE: 81 MMHG | HEIGHT: 61 IN | WEIGHT: 192 LBS

## 2024-05-16 DIAGNOSIS — Z86.73 PERSONAL HISTORY OF TRANSIENT ISCHEMIC ATTACK (TIA), AND CEREBRAL INFARCTION W/OUT RESIDUAL DEFICITS: ICD-10-CM

## 2024-05-16 PROCEDURE — 93000 ELECTROCARDIOGRAM COMPLETE: CPT | Mod: 59

## 2024-05-16 PROCEDURE — 93798 PHYS/QHP OP CAR RHAB W/ECG: CPT

## 2024-05-16 PROCEDURE — G2211 COMPLEX E/M VISIT ADD ON: CPT | Mod: NC,1L

## 2024-05-16 PROCEDURE — 99204 OFFICE O/P NEW MOD 45 MIN: CPT | Mod: 25

## 2024-05-20 ENCOUNTER — APPOINTMENT (OUTPATIENT)
Dept: CARDIOLOGY | Facility: CLINIC | Age: 59
End: 2024-05-20
Payer: MEDICAID

## 2024-05-20 PROBLEM — J45.909 UNSPECIFIED ASTHMA, UNCOMPLICATED: Chronic | Status: ACTIVE | Noted: 2024-04-30

## 2024-05-20 PROBLEM — E11.9 TYPE 2 DIABETES MELLITUS WITHOUT COMPLICATIONS: Chronic | Status: ACTIVE | Noted: 2024-04-30

## 2024-05-20 PROBLEM — E78.5 HYPERLIPIDEMIA, UNSPECIFIED: Chronic | Status: ACTIVE | Noted: 2024-04-30

## 2024-05-20 PROCEDURE — 93798 PHYS/QHP OP CAR RHAB W/ECG: CPT

## 2024-05-20 NOTE — PHYSICAL EXAM
[No Acute Distress] : no acute distress [Well-Appearing] : well-appearing [No JVD] : no jugular venous distention [Supple] : supple [No Respiratory Distress] : no respiratory distress  [No Accessory Muscle Use] : no accessory muscle use [Clear to Auscultation] : lungs were clear to auscultation bilaterally [Normal Rate] : normal rate  [Regular Rhythm] : with a regular rhythm [No Carotid Bruits] : no carotid bruits [No Edema] : there was no peripheral edema [Soft] : abdomen soft [Coordination Grossly Intact] : coordination grossly intact [Normal Gait] : normal gait [Normal Affect] : the affect was normal [Normal Insight/Judgement] : insight and judgment were intact [de-identified] : small scab with redness noted to left upper chest wall from ILR removal on 4/16

## 2024-05-20 NOTE — PHYSICAL EXAM
[No Acute Distress] : no acute distress [Well-Appearing] : well-appearing [No JVD] : no jugular venous distention [Supple] : supple [No Respiratory Distress] : no respiratory distress  [No Accessory Muscle Use] : no accessory muscle use [Clear to Auscultation] : lungs were clear to auscultation bilaterally [Normal Rate] : normal rate  [Regular Rhythm] : with a regular rhythm [No Carotid Bruits] : no carotid bruits [No Edema] : there was no peripheral edema [Soft] : abdomen soft [Coordination Grossly Intact] : coordination grossly intact [Normal Gait] : normal gait [Normal Affect] : the affect was normal [Normal Insight/Judgement] : insight and judgment were intact [de-identified] : small scab with redness noted to left upper chest wall from ILR removal on 4/16

## 2024-05-20 NOTE — REVIEW OF SYSTEMS
[Vision Problems] : vision problems [Hearing Loss] : hearing loss [Shortness Of Breath] : shortness of breath [Wheezing] : wheezing [Dyspnea on Exertion] : dyspnea on exertion [Constipation] : constipation [Dizziness] : dizziness [Unsteady Walking] : ataxia [Depression] : depression [Fever] : no fever [Recent Change In Weight] : ~T no recent weight change [Chest Pain] : no chest pain [Leg Claudication] : no leg claudication [Lower Ext Edema] : no lower extremity edema [Cough] : no cough [Abdominal Pain] : no abdominal pain [Nausea] : no nausea [Diarrhea] : diarrhea [Heartburn] : no heartburn [Joint Pain] : no joint pain [Muscle Pain] : no muscle pain [Back Pain] : no back pain [Headache] : no headache [Fainting] : no fainting [FreeTextEntry3] : see HPI [FreeTextEntry4] : bialteral ear "clogged" sensation; bilateral ringing in ears [FreeTextEntry5] : incisional discomfort [FreeTextEntry6] : mild as per patient;  [de-identified] : MSI healed as per patient [de-identified] : see HPI

## 2024-05-20 NOTE — HISTORY OF PRESENT ILLNESS
[Type II Diabetes] : Type II Diabetes [Yes ___ How many times per day?] : Yes, [unfilled] times per day [Is Patient adherent with medication?] : patient is adherent with medication [Is Patient aware of signs and symptoms of hypoglycemia and hyperglycemia?] : patient is aware of signs and symptoms of hypoglycemia and hyperglycemia [Does Patient follow with other health care specialists for comprehensive diabetes care?] : Does Patient follow with other health care specialists for comprehensive diabetes care? Yes [Following diabetes way of eating] : following diabetes way of eating [Healthy weight management] : healthy weight management [Overview of diabetes and cardiovascular disease] : overview of diabetes and cardiovascular disease [Hypoglycemia and Hyperglycemia: sign and symptoms] : Hypoglycemia and Hyperglycemia: sign and symptoms [Yes, continue with Diabetes plan] : Yes, continue with Diabetes plan [Former smoker] : former smoker [< 12 months] : < 12 months [Patient will remain a non-smoker] : Patient will remain a non-smoker [Yes, continue with Smoking/Tobacco Cession plan] : Yes, continue with Smoking/Tobacco Cession plan [Height: ___] : Height: [unfilled] [Weight: ___ lbs] : Weight: [unfilled] lbs [Does patient monitor weight at home?] : Does patient monitor weight at home? Yes [Patient will lose 1 to 2 lb per week] : Patient will lose 1 to 2 lb per week [Monitoring of weight at home and at cardiac rehabilitation] : Monitoring of weight at home and at cardiac rehabilitation [Individualized exercise program as developed at cardiac rehabilitation] : Individualized exercise program as developed at cardiac rehabilitation [Calories and healthy weight management] : Calories and healthy weight management [Exercise and diet] : exercise and diet [Discharge instructions as per guidelines] : discharge instructions as per guidelines [Yes, continue with Weight Management plan] : Yes, continue with weight management plan [None] : none [Diabetes Mellitus] : diabetes mellitus [Sedentary] : sedentary [Overweight/Obesity] : overweight/obesity [Smoking] : smoking [Heart Failure] : heart failure [Fall Risk] : fall risk [Other restrictions: ____] : [unfilled] [Cardiac Rehabilitation] : Cardiac Rehabilitation [___ Days per week] : [unfilled] days per week [>= 31 minutes per session] : >= 31 minutes per session [Target RPE: ___] : Target RPE: [unfilled] [Recumbent bike] : recumbent bike [BioStep] : BioStep [Upper body ergometer] : upper body ergometer [Walking] : walking [Will assess for musculoskeletal and other restrictions] : will assess for musculoskeletal and other restrictions [To increase my time spent in physical activity and/or aerobic exercise] : to increase my time spent in physical activity and/or aerobic exercise [Individualized Exercise Rx] : individualized exercise Rx [Yes, per exercise prescription, policy] : Yes, per exercise prescription, policy [Self-reports of improved psychosocial well-being] : Self-reports of improved psychosocial well-being [Discuss overview of emotional health supportive modalities] : Discuss overview of emotional health supportive modalities [Yes, continue with psychosocial plan] : Yes, continue with psychosocial plan [Assessment] : Assessment [Action] : Action [Diabetes] : Diabetes [Obesity] : Obesity [Constipation] : constipation [Reflux] : reflux [Sugar] : sugar [Sodium] : sodium [Cholesterol] : cholesterol [Trans fats/saturated fats] : trans fats/saturated fats [Is patient on medication for hyperlipidemia?] : Is patient on medication for hyperlipidemia? Yes [Atorvastatin] : atorvastatin [Discuss an overview of healthy eating plan] : Discuss an overview of healthy eating plan [MyPlate.gov] : MyPlate.gov [Yes, continue with nutrition plan] : Yes, continue with nutrition plan [In progress] : in progress [Medication Adherence] : medication adherence [FreeTextEntry2] : [id number] [Preparation/Planning] : Preparation/planning [Patient will demonstrate readiness to change by expressing decision to quit] : Patient will demonstrate readiness to change by expressing decision to quit [Patient will set a quit date (preparation)] : Patient will set a quit date (preparation) [Patient will practice coping strategies as per recommendations and guidelines] : Patient will practice coping strategies as per recommendations and guidelines [Patient will demonstrate action by quitting] : Patient will demonstrate action by quitting [Discussed overview of risks of continued use] : overview of risks of continued use [Assess patient's relevance of quitting] : assess patient's relevance of quitting [Explore rewards of quitting] : explore rewards of quitting [Examine possible roadblocks to quitting] : examine possible roadblocks to quitting [Teach back utilized] : teach back utilized [Angina with exercise] : no angina with exercise [BP: ____ mmHg] : BP: [unfilled] mmHg [HR: ____ bpm] : BP: [unfilled] bpm [O2sat: ____ %] : O2sat: [unfilled] % [RPE: ____] : RPE: [unfilled]  [METS: ____] : METS: [unfilled]  [Exercise Benefits/Guidelines education] : exercise benefits/guidelines education [Signs/Symptoms to report] : signs/symptoms to report [Hemodynamic responses] : hemodynamic responses [Welcome packet provided] : welcome packet provided [FreeTextEntry1] : CT surgeon: Dr. Loaiza [FreeTextEntry5] : Dr. Khan [de-identified] : Intake: seated modalities only Session 1: Tolerated NuStep however had to take frequent breaks due to severe knee pain. Prior to exercise, she c/o 10/10 bilateral knee pain which is what she feels each day. She was seeing her PCP after the session and was going to see if she would be a candidate for gel shots or steroid injections to help with pain.  [Does patient see mental health provider?] : Does patient see mental health provider? No [FreeTextEntry9] : Session #1: Patients psychosocial scores are pending, will update accordingly when patient returns paperwork.  [FreeTextEntry6] : sugar, sweets [FreeTextEntry8] : Intake: patient's caregiver, Ann-Marie, assists patient with cooking and serving meals Session #1: Patient verbalizes understanding of appropriate heart healthy diet and nutrition. Admits that she struggles with sweets.  [FreeTextEntry7] : Improved RYP; reduced intake of sweets

## 2024-05-20 NOTE — PLAN
[FreeTextEntry1] : Cardiac Rehabilitation Phase 2 Focus assessment and physical exam at session #1 ITP initiated, finalized by Dr. Lara at session #1  All questions answered.  Patient seen and evaluated by Dr. Lara prior to session #1 to establish care and ITP.

## 2024-05-20 NOTE — REVIEW OF SYSTEMS
[Vision Problems] : vision problems [Hearing Loss] : hearing loss [Shortness Of Breath] : shortness of breath [Wheezing] : wheezing [Dyspnea on Exertion] : dyspnea on exertion [Constipation] : constipation [Dizziness] : dizziness [Unsteady Walking] : ataxia [Depression] : depression [Fever] : no fever [Recent Change In Weight] : ~T no recent weight change [Chest Pain] : no chest pain [Leg Claudication] : no leg claudication [Lower Ext Edema] : no lower extremity edema [Cough] : no cough [Abdominal Pain] : no abdominal pain [Nausea] : no nausea [Diarrhea] : diarrhea [Heartburn] : no heartburn [Joint Pain] : no joint pain [Muscle Pain] : no muscle pain [Back Pain] : no back pain [Headache] : no headache [Fainting] : no fainting [FreeTextEntry3] : see HPI [FreeTextEntry4] : bialteral ear "clogged" sensation; bilateral ringing in ears [FreeTextEntry5] : incisional discomfort [FreeTextEntry6] : mild as per patient;  [de-identified] : MSI healed as per patient [de-identified] : see HPI

## 2024-05-20 NOTE — HISTORY OF PRESENT ILLNESS
[Type II Diabetes] : Type II Diabetes [Yes ___ How many times per day?] : Yes, [unfilled] times per day [Is Patient adherent with medication?] : patient is adherent with medication [Is Patient aware of signs and symptoms of hypoglycemia and hyperglycemia?] : patient is aware of signs and symptoms of hypoglycemia and hyperglycemia [Does Patient follow with other health care specialists for comprehensive diabetes care?] : Does Patient follow with other health care specialists for comprehensive diabetes care? Yes [Following diabetes way of eating] : following diabetes way of eating [Healthy weight management] : healthy weight management [Overview of diabetes and cardiovascular disease] : overview of diabetes and cardiovascular disease [Hypoglycemia and Hyperglycemia: sign and symptoms] : Hypoglycemia and Hyperglycemia: sign and symptoms [Yes, continue with Diabetes plan] : Yes, continue with Diabetes plan [Former smoker] : former smoker [< 12 months] : < 12 months [Patient will remain a non-smoker] : Patient will remain a non-smoker [Yes, continue with Smoking/Tobacco Cession plan] : Yes, continue with Smoking/Tobacco Cession plan [Height: ___] : Height: [unfilled] [Weight: ___ lbs] : Weight: [unfilled] lbs [Does patient monitor weight at home?] : Does patient monitor weight at home? Yes [Patient will lose 1 to 2 lb per week] : Patient will lose 1 to 2 lb per week [Monitoring of weight at home and at cardiac rehabilitation] : Monitoring of weight at home and at cardiac rehabilitation [Individualized exercise program as developed at cardiac rehabilitation] : Individualized exercise program as developed at cardiac rehabilitation [Calories and healthy weight management] : Calories and healthy weight management [Exercise and diet] : exercise and diet [Discharge instructions as per guidelines] : discharge instructions as per guidelines [Yes, continue with Weight Management plan] : Yes, continue with weight management plan [None] : none [Diabetes Mellitus] : diabetes mellitus [Sedentary] : sedentary [Overweight/Obesity] : overweight/obesity [Smoking] : smoking [Heart Failure] : heart failure [Fall Risk] : fall risk [Other restrictions: ____] : [unfilled] [Cardiac Rehabilitation] : Cardiac Rehabilitation [___ Days per week] : [unfilled] days per week [>= 31 minutes per session] : >= 31 minutes per session [Target RPE: ___] : Target RPE: [unfilled] [Recumbent bike] : recumbent bike [BioStep] : BioStep [Upper body ergometer] : upper body ergometer [Walking] : walking [Will assess for musculoskeletal and other restrictions] : will assess for musculoskeletal and other restrictions [To increase my time spent in physical activity and/or aerobic exercise] : to increase my time spent in physical activity and/or aerobic exercise [Individualized Exercise Rx] : individualized exercise Rx [Yes, per exercise prescription, policy] : Yes, per exercise prescription, policy [Self-reports of improved psychosocial well-being] : Self-reports of improved psychosocial well-being [Discuss overview of emotional health supportive modalities] : Discuss overview of emotional health supportive modalities [Yes, continue with psychosocial plan] : Yes, continue with psychosocial plan [Assessment] : Assessment [Action] : Action [Diabetes] : Diabetes [Obesity] : Obesity [Constipation] : constipation [Reflux] : reflux [Sugar] : sugar [Sodium] : sodium [Cholesterol] : cholesterol [Trans fats/saturated fats] : trans fats/saturated fats [Is patient on medication for hyperlipidemia?] : Is patient on medication for hyperlipidemia? Yes [Atorvastatin] : atorvastatin [Discuss an overview of healthy eating plan] : Discuss an overview of healthy eating plan [MyPlate.gov] : MyPlate.gov [Yes, continue with nutrition plan] : Yes, continue with nutrition plan [In progress] : in progress [Medication Adherence] : medication adherence [FreeTextEntry2] : [id number] [Preparation/Planning] : Preparation/planning [Patient will demonstrate readiness to change by expressing decision to quit] : Patient will demonstrate readiness to change by expressing decision to quit [Patient will set a quit date (preparation)] : Patient will set a quit date (preparation) [Patient will practice coping strategies as per recommendations and guidelines] : Patient will practice coping strategies as per recommendations and guidelines [Patient will demonstrate action by quitting] : Patient will demonstrate action by quitting [Discussed overview of risks of continued use] : overview of risks of continued use [Assess patient's relevance of quitting] : assess patient's relevance of quitting [Explore rewards of quitting] : explore rewards of quitting [Examine possible roadblocks to quitting] : examine possible roadblocks to quitting [Teach back utilized] : teach back utilized [Angina with exercise] : no angina with exercise [BP: ____ mmHg] : BP: [unfilled] mmHg [HR: ____ bpm] : BP: [unfilled] bpm [O2sat: ____ %] : O2sat: [unfilled] % [RPE: ____] : RPE: [unfilled]  [METS: ____] : METS: [unfilled]  [Exercise Benefits/Guidelines education] : exercise benefits/guidelines education [Signs/Symptoms to report] : signs/symptoms to report [Hemodynamic responses] : hemodynamic responses [Welcome packet provided] : welcome packet provided [FreeTextEntry1] : CT surgeon: Dr. Loaiza [FreeTextEntry5] : Dr. Khan [de-identified] : Intake: seated modalities only Session 1: Tolerated NuStep however had to take frequent breaks due to severe knee pain. Prior to exercise, she c/o 10/10 bilateral knee pain which is what she feels each day. She was seeing her PCP after the session and was going to see if she would be a candidate for gel shots or steroid injections to help with pain.  [Does patient see mental health provider?] : Does patient see mental health provider? No [FreeTextEntry9] : Session #1: Patients psychosocial scores are pending, will update accordingly when patient returns paperwork.  [FreeTextEntry6] : sugar, sweets [FreeTextEntry8] : Intake: patient's caregiver, Ann-Marie, assists patient with cooking and serving meals Session #1: Patient verbalizes understanding of appropriate heart healthy diet and nutrition. Admits that she struggles with sweets.  [FreeTextEntry7] : Improved RYP; reduced intake of sweets

## 2024-05-20 NOTE — ASSESSMENT
[FreeTextEntry1] : status post CABG LIMA-LAD, AVR(t) - Inspiris 23mm, LAAL with AtriClip 40mm  Session #1: 5/16/24: Patient stable and appropriate for CR phase II at this time. ILR removal site red with scabbing. No shaking/chills/fever or drainage. With patients permission, I sent a Teams message to Dr. Russo with attached photo of site, no concerns on his end regarding infection.

## 2024-05-22 ENCOUNTER — APPOINTMENT (OUTPATIENT)
Dept: CARDIOLOGY | Facility: CLINIC | Age: 59
End: 2024-05-22
Payer: MEDICAID

## 2024-05-22 PROCEDURE — 93798 PHYS/QHP OP CAR RHAB W/ECG: CPT

## 2024-05-23 ENCOUNTER — APPOINTMENT (OUTPATIENT)
Dept: CARDIOLOGY | Facility: CLINIC | Age: 59
End: 2024-05-23
Payer: MEDICAID

## 2024-05-23 PROCEDURE — 93797 PHYS/QHP OP CAR RHAB WO ECG: CPT

## 2024-05-29 ENCOUNTER — APPOINTMENT (OUTPATIENT)
Dept: OPHTHALMOLOGY | Facility: CLINIC | Age: 59
End: 2024-05-29
Payer: MEDICAID

## 2024-05-29 ENCOUNTER — APPOINTMENT (OUTPATIENT)
Dept: CARDIOLOGY | Facility: CLINIC | Age: 59
End: 2024-05-29
Payer: MEDICAID

## 2024-05-29 ENCOUNTER — NON-APPOINTMENT (OUTPATIENT)
Age: 59
End: 2024-05-29

## 2024-05-29 PROCEDURE — 92012 INTRM OPH EXAM EST PATIENT: CPT | Mod: 25

## 2024-05-29 PROCEDURE — 93798 PHYS/QHP OP CAR RHAB W/ECG: CPT

## 2024-05-29 PROCEDURE — 92250 FUNDUS PHOTOGRAPHY W/I&R: CPT

## 2024-05-30 ENCOUNTER — APPOINTMENT (OUTPATIENT)
Dept: CARDIOLOGY | Facility: CLINIC | Age: 59
End: 2024-05-30
Payer: MEDICAID

## 2024-05-30 PROCEDURE — 93798 PHYS/QHP OP CAR RHAB W/ECG: CPT

## 2024-06-03 ENCOUNTER — APPOINTMENT (OUTPATIENT)
Dept: CARDIOLOGY | Facility: CLINIC | Age: 59
End: 2024-06-03
Payer: MEDICAID

## 2024-06-03 PROCEDURE — 93797 PHYS/QHP OP CAR RHAB WO ECG: CPT

## 2024-06-05 ENCOUNTER — APPOINTMENT (OUTPATIENT)
Dept: CARDIOLOGY | Facility: CLINIC | Age: 59
End: 2024-06-05
Payer: MEDICAID

## 2024-06-05 PROCEDURE — 93797 PHYS/QHP OP CAR RHAB WO ECG: CPT

## 2024-06-06 ENCOUNTER — APPOINTMENT (OUTPATIENT)
Dept: CARDIOLOGY | Facility: CLINIC | Age: 59
End: 2024-06-06
Payer: MEDICAID

## 2024-06-06 PROCEDURE — 93797 PHYS/QHP OP CAR RHAB WO ECG: CPT

## 2024-06-10 ENCOUNTER — APPOINTMENT (OUTPATIENT)
Dept: CARDIOLOGY | Facility: CLINIC | Age: 59
End: 2024-06-10
Payer: MEDICAID

## 2024-06-10 PROCEDURE — 93797 PHYS/QHP OP CAR RHAB WO ECG: CPT

## 2024-06-12 ENCOUNTER — APPOINTMENT (OUTPATIENT)
Dept: CARDIOLOGY | Facility: CLINIC | Age: 59
End: 2024-06-12
Payer: MEDICAID

## 2024-06-12 PROCEDURE — 93797 PHYS/QHP OP CAR RHAB WO ECG: CPT

## 2024-06-13 ENCOUNTER — APPOINTMENT (OUTPATIENT)
Dept: CARDIOLOGY | Facility: CLINIC | Age: 59
End: 2024-06-13
Payer: MEDICAID

## 2024-06-13 PROCEDURE — 93797 PHYS/QHP OP CAR RHAB WO ECG: CPT

## 2024-06-15 ENCOUNTER — NON-APPOINTMENT (OUTPATIENT)
Age: 59
End: 2024-06-15

## 2024-06-18 DIAGNOSIS — M06.9 RHEUMATOID ARTHRITIS, UNSPECIFIED: ICD-10-CM

## 2024-06-18 RX ORDER — PREDNISONE 5 MG/1
5 TABLET ORAL
Qty: 60 | Refills: 1 | Status: ACTIVE | COMMUNITY

## 2024-06-19 ENCOUNTER — APPOINTMENT (OUTPATIENT)
Dept: CARDIOLOGY | Facility: CLINIC | Age: 59
End: 2024-06-19
Payer: MEDICAID

## 2024-06-19 PROCEDURE — 93797 PHYS/QHP OP CAR RHAB WO ECG: CPT

## 2024-06-20 ENCOUNTER — APPOINTMENT (OUTPATIENT)
Dept: CARDIOLOGY | Facility: CLINIC | Age: 59
End: 2024-06-20
Payer: MEDICAID

## 2024-06-20 PROCEDURE — 93797 PHYS/QHP OP CAR RHAB WO ECG: CPT

## 2024-06-20 NOTE — HISTORY OF PRESENT ILLNESS
[Type II Diabetes] : Type II Diabetes [Yes ___ How many times per day?] : Yes, [unfilled] times per day [Is Patient adherent with medication?] : patient is adherent with medication [Is Patient aware of signs and symptoms of hypoglycemia and hyperglycemia?] : patient is aware of signs and symptoms of hypoglycemia and hyperglycemia [Does Patient follow with other health care specialists for comprehensive diabetes care?] : Does Patient follow with other health care specialists for comprehensive diabetes care? Yes [Medication Adherence] : medication adherence [Following diabetes way of eating] : following diabetes way of eating [Healthy weight management] : healthy weight management [Overview of diabetes and cardiovascular disease] : overview of diabetes and cardiovascular disease [Hypoglycemia and Hyperglycemia: sign and symptoms] : Hypoglycemia and Hyperglycemia: sign and symptoms [Yes, continue with Diabetes plan] : Yes, continue with Diabetes plan [Former smoker] : former smoker [< 12 months] : < 12 months [Patient will demonstrate readiness to change by expressing decision to quit] : Patient will demonstrate readiness to change by expressing decision to quit [Patient will set a quit date (preparation)] : Patient will set a quit date (preparation) [Patient will practice coping strategies as per recommendations and guidelines] : Patient will practice coping strategies as per recommendations and guidelines [Patient will demonstrate action by quitting] : Patient will demonstrate action by quitting [Discussed overview of risks of continued use] : overview of risks of continued use [Assess patient's relevance of quitting] : assess patient's relevance of quitting [Explore rewards of quitting] : explore rewards of quitting [Examine possible roadblocks to quitting] : examine possible roadblocks to quitting [Yes, continue with Smoking/Tobacco Cession plan] : Yes, continue with Smoking/Tobacco Cession plan [Height: ___] : Height: [unfilled] [Weight: ___ lbs] : Weight: [unfilled] lbs [Does patient monitor weight at home?] : Does patient monitor weight at home? Yes [Patient will lose 1 to 2 lb per week] : Patient will lose 1 to 2 lb per week [Monitoring of weight at home and at cardiac rehabilitation] : Monitoring of weight at home and at cardiac rehabilitation [Individualized exercise program as developed at cardiac rehabilitation] : Individualized exercise program as developed at cardiac rehabilitation [Exercise and diet] : exercise and diet [Calories and healthy weight management] : Calories and healthy weight management [Discharge instructions as per guidelines] : discharge instructions as per guidelines [Yes, continue with Weight Management plan] : Yes, continue with weight management plan [None] : none [Diabetes Mellitus] : diabetes mellitus [Sedentary] : sedentary [Overweight/Obesity] : overweight/obesity [Smoking] : smoking [Heart Failure] : heart failure [Fall Risk] : fall risk [Other restrictions: ____] : [unfilled] [BP: ____ mmHg] : BP: [unfilled] mmHg [HR: ____ bpm] : BP: [unfilled] bpm [O2sat: ____ %] : O2sat: [unfilled] % [RPE: ____] : RPE: [unfilled]  [METS: ____] : METS: [unfilled]  [Cardiac Rehabilitation] : Cardiac Rehabilitation [___ Days per week] : [unfilled] days per week [>= 31 minutes per session] : >= 31 minutes per session [Target RPE: ___] : Target RPE: [unfilled] [Recumbent bike] : recumbent bike [BioStep] : BioStep [Upper body ergometer] : upper body ergometer [Walking] : walking [Will assess for musculoskeletal and other restrictions] : will assess for musculoskeletal and other restrictions [To increase my time spent in physical activity and/or aerobic exercise] : to increase my time spent in physical activity and/or aerobic exercise [Exercise Benefits/Guidelines education] : exercise benefits/guidelines education [Individualized Exercise Rx] : individualized exercise Rx [Signs/Symptoms to report] : signs/symptoms to report [Hemodynamic responses] : hemodynamic responses [Welcome packet provided] : welcome packet provided [Yes, per exercise prescription, policy] : Yes, per exercise prescription, policy [Preparation/Planning] : Preparation/planning [Self-reports of improved psychosocial well-being] : Self-reports of improved psychosocial well-being [Discuss overview of emotional health supportive modalities] : Discuss overview of emotional health supportive modalities [Yes, continue with psychosocial plan] : Yes, continue with psychosocial plan [Assessment] : Assessment [Action] : Action [Diabetes] : Diabetes [Obesity] : Obesity [Constipation] : constipation [Reflux] : reflux [Sugar] : sugar [Sodium] : sodium [Cholesterol] : cholesterol [Trans fats/saturated fats] : trans fats/saturated fats [Is patient on medication for hyperlipidemia?] : Is patient on medication for hyperlipidemia? Yes [Atorvastatin] : atorvastatin [Discuss an overview of healthy eating plan] : Discuss an overview of healthy eating plan [MyPlate.gov] : MyPlate.gov [Teach back utilized] : teach back utilized [Yes, continue with nutrition plan] : Yes, continue with nutrition plan [In progress] : in progress [FreeTextEntry2] : [id number] [Present smoker] : present smoker [Cigarettes] : cigarettes [Angina with exercise] : no angina with exercise [To start resistance training] : to start resistance training [Patient verbalizes understanding of exercise education all questions answered] : Patient verbalizes understanding of exercise education all questions answered [FreeTextEntry1] : CT surgeon: Dr. Loaiza [FreeTextEntry5] : Dr. Khan [de-identified] : Intake: seated modalities only Session 1: Tolerated NuStep however had to take frequent breaks due to severe knee pain. Prior to exercise, she c/o 10/10 bilateral knee pain which is what she feels each day. She was seeing her PCP after the session and was going to see if she would be a candidate for gel shots or steroid injections to help with pain.  Session 12: Patient tolerating slow, gradual increases well. Struggles with her knee pain. Is consistent with her sessions and determined to complete exercise program.  [PHQ-9: ___] : PHQ-9: [unfilled] [[facility]: ___] : [facility] total: [unfilled] [Feelings Score: ___] : Feelings Score: [unfilled] [Physical Fitness Score: ____] : Physical Fitness Score: [unfilled] [Daily Activities Score: ___] : Daily Activities Score: [unfilled] [Social Activities Score: ___] : Social Activities Score: [unfilled] [Pain Score: ___] : Pain Score: [unfilled] [Overall Health Score: ___] : Overall Health Score: [unfilled] [Change in Health Score: ___] : Change in Health Score: [unfilled] [Quality of Life Score: ___] : Quality of Life Score: [unfilled] [Social Support Score: ___] : Social Support Score: [unfilled] [Does patient see mental health provider?] : Does patient see mental health provider? No [FreeTextEntry9] : Session #1: Patients psychosocial scores are pending, will update accordingly when patient returns paperwork.  Session #12: Patients psychosocial scores noted above, patient provided with [facility] resources. States she is going to follow up with [facility] to establish psychiatric care as she has not been following with someone. States she feels well managed on current medication regimen (PCP), but states she "usually feels better when she is followed and in therapy." Support and encouragement provided.  [Reassessment] : Reassessment [Rate your plate score: ____] : Rate your plate score: [unfilled] [FreeTextEntry6] : sugar, sweets [FreeTextEntry8] : Intake: patient's caregiver, Ann-Marie, assists patient with cooking and serving meals Session #1: Patient verbalizes understanding of appropriate heart healthy diet and nutrition. Admits that she struggles with sweets.  Session #12: Patient admits that she still struggles with sweets and fried foods, tries her best to try healthy alternatives.  [FreeTextEntry7] : Improved RYP; reduced intake of sweets

## 2024-06-24 ENCOUNTER — APPOINTMENT (OUTPATIENT)
Dept: CARDIOLOGY | Facility: CLINIC | Age: 59
End: 2024-06-24
Payer: MEDICAID

## 2024-06-24 PROCEDURE — 93797 PHYS/QHP OP CAR RHAB WO ECG: CPT

## 2024-06-25 ENCOUNTER — NON-APPOINTMENT (OUTPATIENT)
Age: 59
End: 2024-06-25

## 2024-06-25 ENCOUNTER — APPOINTMENT (OUTPATIENT)
Dept: OPHTHALMOLOGY | Facility: CLINIC | Age: 59
End: 2024-06-25
Payer: MEDICAID

## 2024-06-25 PROCEDURE — 99213 OFFICE O/P EST LOW 20 MIN: CPT | Mod: 25

## 2024-06-25 PROCEDURE — 92136 OPHTHALMIC BIOMETRY: CPT

## 2024-06-25 PROCEDURE — 92134 CPTRZ OPH DX IMG PST SGM RTA: CPT

## 2024-06-26 ENCOUNTER — APPOINTMENT (OUTPATIENT)
Dept: CARDIOTHORACIC SURGERY | Facility: CLINIC | Age: 59
End: 2024-06-26
Payer: MEDICAID

## 2024-06-26 ENCOUNTER — APPOINTMENT (OUTPATIENT)
Dept: CARDIOLOGY | Facility: CLINIC | Age: 59
End: 2024-06-26
Payer: MEDICAID

## 2024-06-26 VITALS
RESPIRATION RATE: 14 BRPM | HEIGHT: 61 IN | BODY MASS INDEX: 36.06 KG/M2 | TEMPERATURE: 98.2 F | OXYGEN SATURATION: 94 % | DIASTOLIC BLOOD PRESSURE: 70 MMHG | HEART RATE: 72 BPM | WEIGHT: 191 LBS | SYSTOLIC BLOOD PRESSURE: 106 MMHG

## 2024-06-26 DIAGNOSIS — Z95.2 PRESENCE OF PROSTHETIC HEART VALVE: ICD-10-CM

## 2024-06-26 DIAGNOSIS — E11.9 TYPE 2 DIABETES MELLITUS W/OUT COMPLICATIONS: ICD-10-CM

## 2024-06-26 DIAGNOSIS — J44.9 CHRONIC OBSTRUCTIVE PULMONARY DISEASE, UNSPECIFIED: ICD-10-CM

## 2024-06-26 DIAGNOSIS — Z95.1 PRESENCE OF AORTOCORONARY BYPASS GRAFT: ICD-10-CM

## 2024-06-26 PROCEDURE — 99214 OFFICE O/P EST MOD 30 MIN: CPT

## 2024-06-26 PROCEDURE — 93797 PHYS/QHP OP CAR RHAB WO ECG: CPT

## 2024-06-26 RX ORDER — ALCOHOL ANTISEPTIC PADS
70 PADS, MEDICATED (EA) TOPICAL
Qty: 1 | Refills: 0 | Status: COMPLETED | COMMUNITY
Start: 2023-12-06 | End: 2024-06-26

## 2024-06-26 RX ORDER — SPIRONOLACTONE 25 MG/1
25 TABLET ORAL DAILY
Refills: 0 | Status: COMPLETED | COMMUNITY
End: 2024-06-26

## 2024-06-26 RX ORDER — BLOOD SUGAR DIAGNOSTIC
STRIP MISCELLANEOUS 4 TIMES DAILY
Qty: 1 | Refills: 3 | Status: COMPLETED | COMMUNITY
Start: 2023-12-06 | End: 2024-06-26

## 2024-06-26 RX ORDER — TORSEMIDE 20 MG/1
20 TABLET ORAL DAILY
Refills: 0 | Status: COMPLETED | COMMUNITY
End: 2024-06-26

## 2024-06-26 RX ORDER — METOPROLOL SUCCINATE 25 MG/1
25 TABLET, EXTENDED RELEASE ORAL DAILY
Refills: 0 | Status: ACTIVE | COMMUNITY

## 2024-06-26 RX ORDER — BLOOD-GLUCOSE METER
W/DEVICE KIT MISCELLANEOUS
Qty: 1 | Refills: 0 | Status: COMPLETED | COMMUNITY
Start: 2023-12-06 | End: 2024-06-26

## 2024-06-27 ENCOUNTER — APPOINTMENT (OUTPATIENT)
Dept: CARDIOLOGY | Facility: CLINIC | Age: 59
End: 2024-06-27
Payer: MEDICAID

## 2024-06-27 PROCEDURE — 93797 PHYS/QHP OP CAR RHAB WO ECG: CPT

## 2024-07-03 ENCOUNTER — APPOINTMENT (OUTPATIENT)
Dept: CARDIOLOGY | Facility: CLINIC | Age: 59
End: 2024-07-03
Payer: MEDICAID

## 2024-07-03 ENCOUNTER — OUTPATIENT (OUTPATIENT)
Dept: OUTPATIENT SERVICES | Facility: HOSPITAL | Age: 59
LOS: 1 days | End: 2024-07-03
Payer: MEDICAID

## 2024-07-03 DIAGNOSIS — Z90.49 ACQUIRED ABSENCE OF OTHER SPECIFIED PARTS OF DIGESTIVE TRACT: Chronic | ICD-10-CM

## 2024-07-03 DIAGNOSIS — Z95.2 PRESENCE OF PROSTHETIC HEART VALVE: ICD-10-CM

## 2024-07-03 DIAGNOSIS — Z95.2 PRESENCE OF PROSTHETIC HEART VALVE: Chronic | ICD-10-CM

## 2024-07-03 DIAGNOSIS — Z95.1 PRESENCE OF AORTOCORONARY BYPASS GRAFT: Chronic | ICD-10-CM

## 2024-07-03 DIAGNOSIS — Z95.1 PRESENCE OF AORTOCORONARY BYPASS GRAFT: ICD-10-CM

## 2024-07-03 DIAGNOSIS — Z90.710 ACQUIRED ABSENCE OF BOTH CERVIX AND UTERUS: Chronic | ICD-10-CM

## 2024-07-03 DIAGNOSIS — Z98.890 OTHER SPECIFIED POSTPROCEDURAL STATES: Chronic | ICD-10-CM

## 2024-07-03 PROCEDURE — 71250 CT THORAX DX C-: CPT

## 2024-07-03 PROCEDURE — 93797 PHYS/QHP OP CAR RHAB WO ECG: CPT

## 2024-07-03 PROCEDURE — 71250 CT THORAX DX C-: CPT | Mod: 26

## 2024-07-09 ENCOUNTER — APPOINTMENT (OUTPATIENT)
Dept: RHEUMATOLOGY | Facility: CLINIC | Age: 59
End: 2024-07-09
Payer: MEDICAID

## 2024-07-09 VITALS
DIASTOLIC BLOOD PRESSURE: 65 MMHG | SYSTOLIC BLOOD PRESSURE: 99 MMHG | WEIGHT: 192 LBS | HEART RATE: 59 BPM | RESPIRATION RATE: 14 BRPM | OXYGEN SATURATION: 93 % | BODY MASS INDEX: 36.25 KG/M2 | HEIGHT: 61 IN | TEMPERATURE: 97.9 F

## 2024-07-09 DIAGNOSIS — M79.7 FIBROMYALGIA: ICD-10-CM

## 2024-07-09 DIAGNOSIS — M06.9 RHEUMATOID ARTHRITIS, UNSPECIFIED: ICD-10-CM

## 2024-07-09 DIAGNOSIS — R79.89 OTHER SPECIFIED ABNORMAL FINDINGS OF BLOOD CHEMISTRY: ICD-10-CM

## 2024-07-09 DIAGNOSIS — M17.9 OSTEOARTHRITIS OF KNEE, UNSPECIFIED: ICD-10-CM

## 2024-07-09 DIAGNOSIS — Z79.899 OTHER LONG TERM (CURRENT) DRUG THERAPY: ICD-10-CM

## 2024-07-09 PROCEDURE — 99214 OFFICE O/P EST MOD 30 MIN: CPT

## 2024-07-09 PROCEDURE — G2211 COMPLEX E/M VISIT ADD ON: CPT | Mod: NC

## 2024-07-09 RX ORDER — PREDNISONE 2.5 MG/1
2.5 TABLET ORAL DAILY
Qty: 90 | Refills: 1 | Status: ACTIVE | COMMUNITY
Start: 2024-07-09 | End: 1900-01-01

## 2024-07-09 RX ORDER — HYLAN G-F 20 16MG/2ML
48 SYRINGE (ML) INTRAARTICULAR
Qty: 2 | Refills: 0 | Status: ACTIVE | COMMUNITY
Start: 2024-07-09

## 2024-07-10 ENCOUNTER — APPOINTMENT (OUTPATIENT)
Dept: CARDIOLOGY | Facility: CLINIC | Age: 59
End: 2024-07-10
Payer: MEDICAID

## 2024-07-10 PROCEDURE — 93797 PHYS/QHP OP CAR RHAB WO ECG: CPT

## 2024-07-11 ENCOUNTER — NON-APPOINTMENT (OUTPATIENT)
Age: 59
End: 2024-07-11

## 2024-07-11 ENCOUNTER — APPOINTMENT (OUTPATIENT)
Dept: CARDIOLOGY | Facility: CLINIC | Age: 59
End: 2024-07-11
Payer: MEDICAID

## 2024-07-11 PROCEDURE — 93797 PHYS/QHP OP CAR RHAB WO ECG: CPT

## 2024-07-12 ENCOUNTER — NON-APPOINTMENT (OUTPATIENT)
Age: 59
End: 2024-07-12

## 2024-07-15 ENCOUNTER — APPOINTMENT (OUTPATIENT)
Dept: PULMONOLOGY | Facility: CLINIC | Age: 59
End: 2024-07-15
Payer: MEDICAID

## 2024-07-15 ENCOUNTER — APPOINTMENT (OUTPATIENT)
Dept: CARDIOLOGY | Facility: CLINIC | Age: 59
End: 2024-07-15
Payer: MEDICAID

## 2024-07-15 VITALS
HEART RATE: 64 BPM | SYSTOLIC BLOOD PRESSURE: 91 MMHG | RESPIRATION RATE: 16 BRPM | DIASTOLIC BLOOD PRESSURE: 67 MMHG | BODY MASS INDEX: 36.82 KG/M2 | HEIGHT: 61 IN | OXYGEN SATURATION: 96 % | WEIGHT: 195 LBS

## 2024-07-15 DIAGNOSIS — F17.290 NICOTINE DEPENDENCE, OTHER TOBACCO PRODUCT, UNCOMPLICATED: ICD-10-CM

## 2024-07-15 DIAGNOSIS — F17.210 NICOTINE DEPENDENCE, CIGARETTES, UNCOMPLICATED: ICD-10-CM

## 2024-07-15 PROCEDURE — 93797 PHYS/QHP OP CAR RHAB WO ECG: CPT

## 2024-07-15 PROCEDURE — 99214 OFFICE O/P EST MOD 30 MIN: CPT

## 2024-07-15 RX ORDER — LIDOCAINE 40 MG/G
4 PATCH TOPICAL
Qty: 1 | Refills: 11 | Status: ACTIVE | COMMUNITY
Start: 2024-07-15 | End: 1900-01-01

## 2024-07-17 ENCOUNTER — APPOINTMENT (OUTPATIENT)
Dept: CARDIOLOGY | Facility: CLINIC | Age: 59
End: 2024-07-17
Payer: MEDICAID

## 2024-07-17 ENCOUNTER — NON-APPOINTMENT (OUTPATIENT)
Age: 59
End: 2024-07-17

## 2024-07-17 ENCOUNTER — APPOINTMENT (OUTPATIENT)
Dept: OPHTHALMOLOGY | Facility: CLINIC | Age: 59
End: 2024-07-17
Payer: MEDICAID

## 2024-07-17 PROCEDURE — 93797 PHYS/QHP OP CAR RHAB WO ECG: CPT

## 2024-07-17 PROCEDURE — 92014 COMPRE OPH EXAM EST PT 1/>: CPT | Mod: 25

## 2024-07-17 PROCEDURE — 92134 CPTRZ OPH DX IMG PST SGM RTA: CPT

## 2024-07-17 PROCEDURE — 67515 INJECT/TREAT EYE SOCKET: CPT | Mod: LT

## 2024-07-18 ENCOUNTER — APPOINTMENT (OUTPATIENT)
Dept: OPHTHALMOLOGY | Facility: CLINIC | Age: 59
End: 2024-07-18
Payer: MEDICAID

## 2024-07-18 ENCOUNTER — APPOINTMENT (OUTPATIENT)
Dept: CARDIOLOGY | Facility: CLINIC | Age: 59
End: 2024-07-18
Payer: MEDICAID

## 2024-07-18 ENCOUNTER — NON-APPOINTMENT (OUTPATIENT)
Age: 59
End: 2024-07-18

## 2024-07-18 PROCEDURE — 92014 COMPRE OPH EXAM EST PT 1/>: CPT

## 2024-07-18 PROCEDURE — 93797 PHYS/QHP OP CAR RHAB WO ECG: CPT

## 2024-07-22 ENCOUNTER — APPOINTMENT (OUTPATIENT)
Dept: CARDIOLOGY | Facility: CLINIC | Age: 59
End: 2024-07-22
Payer: MEDICAID

## 2024-07-22 PROCEDURE — 93797 PHYS/QHP OP CAR RHAB WO ECG: CPT

## 2024-07-24 ENCOUNTER — APPOINTMENT (OUTPATIENT)
Dept: CARDIOLOGY | Facility: CLINIC | Age: 59
End: 2024-07-24
Payer: MEDICAID

## 2024-07-24 PROCEDURE — 93797 PHYS/QHP OP CAR RHAB WO ECG: CPT

## 2024-07-25 ENCOUNTER — APPOINTMENT (OUTPATIENT)
Dept: CARDIOLOGY | Facility: CLINIC | Age: 59
End: 2024-07-25

## 2024-07-29 ENCOUNTER — APPOINTMENT (OUTPATIENT)
Dept: CARDIOLOGY | Facility: CLINIC | Age: 59
End: 2024-07-29
Payer: MEDICAID

## 2024-07-29 ENCOUNTER — NON-APPOINTMENT (OUTPATIENT)
Age: 59
End: 2024-07-29

## 2024-07-29 ENCOUNTER — APPOINTMENT (OUTPATIENT)
Dept: OPHTHALMOLOGY | Facility: CLINIC | Age: 59
End: 2024-07-29
Payer: MEDICAID

## 2024-07-29 PROCEDURE — 93797 PHYS/QHP OP CAR RHAB WO ECG: CPT

## 2024-07-29 PROCEDURE — 92012 INTRM OPH EXAM EST PATIENT: CPT

## 2024-07-31 ENCOUNTER — APPOINTMENT (OUTPATIENT)
Dept: CARDIOLOGY | Facility: CLINIC | Age: 59
End: 2024-07-31
Payer: MEDICAID

## 2024-07-31 PROCEDURE — 93797 PHYS/QHP OP CAR RHAB WO ECG: CPT

## 2024-08-01 ENCOUNTER — APPOINTMENT (OUTPATIENT)
Dept: CARDIOLOGY | Facility: CLINIC | Age: 59
End: 2024-08-01
Payer: MEDICAID

## 2024-08-01 PROCEDURE — 93797 PHYS/QHP OP CAR RHAB WO ECG: CPT

## 2024-08-05 ENCOUNTER — APPOINTMENT (OUTPATIENT)
Dept: CARDIOLOGY | Facility: CLINIC | Age: 59
End: 2024-08-05

## 2024-08-05 PROCEDURE — 93797 PHYS/QHP OP CAR RHAB WO ECG: CPT

## 2024-08-08 ENCOUNTER — APPOINTMENT (OUTPATIENT)
Dept: CARDIOLOGY | Facility: CLINIC | Age: 59
End: 2024-08-08

## 2024-08-08 PROCEDURE — 93797 PHYS/QHP OP CAR RHAB WO ECG: CPT

## 2024-08-12 ENCOUNTER — APPOINTMENT (OUTPATIENT)
Dept: CARDIOLOGY | Facility: CLINIC | Age: 59
End: 2024-08-12
Payer: MEDICAID

## 2024-08-12 PROCEDURE — 93797 PHYS/QHP OP CAR RHAB WO ECG: CPT

## 2024-08-15 ENCOUNTER — APPOINTMENT (OUTPATIENT)
Dept: CARDIOLOGY | Facility: CLINIC | Age: 59
End: 2024-08-15
Payer: MEDICAID

## 2024-08-15 PROCEDURE — 93797 PHYS/QHP OP CAR RHAB WO ECG: CPT

## 2024-08-16 ENCOUNTER — APPOINTMENT (OUTPATIENT)
Dept: OPHTHALMOLOGY | Facility: EYE CENTER | Age: 59
End: 2024-08-16

## 2024-08-17 ENCOUNTER — APPOINTMENT (OUTPATIENT)
Dept: OPHTHALMOLOGY | Facility: CLINIC | Age: 59
End: 2024-08-17

## 2024-08-19 ENCOUNTER — APPOINTMENT (OUTPATIENT)
Dept: RHEUMATOLOGY | Facility: CLINIC | Age: 59
End: 2024-08-19
Payer: MEDICAID

## 2024-08-19 VITALS
HEIGHT: 61 IN | BODY MASS INDEX: 36.06 KG/M2 | DIASTOLIC BLOOD PRESSURE: 69 MMHG | SYSTOLIC BLOOD PRESSURE: 102 MMHG | WEIGHT: 191 LBS | RESPIRATION RATE: 16 BRPM | TEMPERATURE: 97 F | HEART RATE: 106 BPM | OXYGEN SATURATION: 96 %

## 2024-08-19 DIAGNOSIS — M17.9 OSTEOARTHRITIS OF KNEE, UNSPECIFIED: ICD-10-CM

## 2024-08-19 PROCEDURE — 20610 DRAIN/INJ JOINT/BURSA W/O US: CPT | Mod: 50

## 2024-08-19 RX ORDER — HYLAN G-F 20 16MG/2ML
48 SYRINGE (ML) INTRAARTICULAR
Refills: 0 | Status: COMPLETED | OUTPATIENT
Start: 2024-08-19

## 2024-08-19 RX ADMIN — Medication 0 MG/6ML: at 00:00

## 2024-08-19 NOTE — PROCEDURE
[Today's Date:] : Date: [unfilled] [Patient] : the patient [Risks] : risks [Benefits] : benefits [Consent Obtained] : written consent was obtained prior to the procedure and is detailed in the patient's record [Therapeutic] : therapeutic [#1 Site: ______] : #1 site identified in the [unfilled] [22 gauge 1.5 inch] : A 22 gauge 1.5 inch needle was used [___ml Synvisc 1] : [unfilled] ml of synvisc 1 [Tolerated Well] : the patient tolerated the procedure well [No Complications] : there were no complications [Instructions Given] : handouts/patient instructions were given to patient [Patient Instructed to Call] : patient was instructed to call if redness at site, a decrease in range of motion or an increase in pain is noted after procedure. [#2 Site: ___] : # 2 site identified in the [unfilled] [de-identified] : buy and bill

## 2024-08-20 ENCOUNTER — NON-APPOINTMENT (OUTPATIENT)
Age: 59
End: 2024-08-20

## 2024-08-21 ENCOUNTER — NON-APPOINTMENT (OUTPATIENT)
Age: 59
End: 2024-08-21

## 2024-08-21 ENCOUNTER — APPOINTMENT (OUTPATIENT)
Dept: CARDIOLOGY | Facility: CLINIC | Age: 59
End: 2024-08-21

## 2024-08-21 NOTE — HISTORY OF PRESENT ILLNESS
[Type II Diabetes] : Type II Diabetes [Yes ___ How many times per day?] : Yes, [unfilled] times per day [Is Patient adherent with medication?] : patient is adherent with medication [Is Patient aware of signs and symptoms of hypoglycemia and hyperglycemia?] : patient is aware of signs and symptoms of hypoglycemia and hyperglycemia [Does Patient follow with other health care specialists for comprehensive diabetes care?] : Does Patient follow with other health care specialists for comprehensive diabetes care? Yes [Medication Adherence] : medication adherence [Following diabetes way of eating] : following diabetes way of eating [Healthy weight management] : healthy weight management [Overview of diabetes and cardiovascular disease] : overview of diabetes and cardiovascular disease [Hypoglycemia and Hyperglycemia: sign and symptoms] : Hypoglycemia and Hyperglycemia: sign and symptoms [Yes, continue with Diabetes plan] : Yes, continue with Diabetes plan [FreeTextEntry2] : [id number] [Preparation/Planning] : Preparation/planning [Present smoker] : present smoker [Cigarettes] : cigarettes [Patient will demonstrate readiness to change by expressing decision to quit] : Patient will demonstrate readiness to change by expressing decision to quit [Patient will set a quit date (preparation)] : Patient will set a quit date (preparation) [Patient will practice coping strategies as per recommendations and guidelines] : Patient will practice coping strategies as per recommendations and guidelines [Patient will demonstrate action by quitting] : Patient will demonstrate action by quitting [Discussed overview of risks of continued use] : overview of risks of continued use [Assess patient's relevance of quitting] : assess patient's relevance of quitting [Explore rewards of quitting] : explore rewards of quitting [Examine possible roadblocks to quitting] : examine possible roadblocks to quitting [Continue repetition of the discussion] : continue repetition of the discussion [Yes, continue with Smoking/Tobacco Cession plan] : Yes, continue with Smoking/Tobacco Cession plan [Height: ___] : Height: [unfilled] [Weight: ___ lbs] : Weight: [unfilled] lbs [Does patient monitor weight at home?] : Does patient monitor weight at home? Yes [Patient will lose 1 to 2 lb per week] : Patient will lose 1 to 2 lb per week [Monitoring of weight at home and at cardiac rehabilitation] : Monitoring of weight at home and at cardiac rehabilitation [Individualized exercise program as developed at cardiac rehabilitation] : Individualized exercise program as developed at cardiac rehabilitation [Calories and healthy weight management] : Calories and healthy weight management [Exercise and diet] : exercise and diet [CareNotes written material provided] : CareNotes written material provided [Yes, continue with Weight Management plan] : Yes, continue with weight management plan [None] : none [Diabetes Mellitus] : diabetes mellitus [Sedentary] : sedentary [Overweight/Obesity] : overweight/obesity [Smoking] : smoking [Heart Failure] : heart failure [Angina with exercise] : no angina with exercise [Fall Risk] : fall risk [Other restrictions: ____] : [unfilled] [O2sat: ____ %] : O2sat: [unfilled] % [BP: ____ mmHg] : BP: [unfilled] mmHg [HR: ____ bpm] : BP: [unfilled] bpm [RPE: ____] : RPE: [unfilled]  [METS: ____] : METS: [unfilled]  [Cardiac Rehabilitation] : Cardiac Rehabilitation [___ Days per week] : [unfilled] days per week [>= 31 minutes per session] : >= 31 minutes per session [Target RPE: ___] : Target RPE: [unfilled] [Recumbent bike] : recumbent bike [BioStep] : BioStep [Upper body ergometer] : upper body ergometer [Walking] : walking [Will assess for musculoskeletal and other restrictions] : will assess for musculoskeletal and other restrictions [To start resistance training] : to start resistance training [To increase my time spent in physical activity and/or aerobic exercise] : to increase my time spent in physical activity and/or aerobic exercise [Exercise Benefits/Guidelines education] : exercise benefits/guidelines education [Individualized Exercise Rx] : individualized exercise Rx [Signs/Symptoms to report] : signs/symptoms to report [Patient verbalizes understanding of exercise education all questions answered] : Patient verbalizes understanding of exercise education all questions answered [No progression, Specify: ___] : No progression, [unfilled] [FreeTextEntry1] : CT surgeon: Dr. Loaiza [FreeTextEntry5] : Dr. Khan [de-identified] : Intake: seated modalities only Session 1: Tolerated NuStep however had to take frequent breaks due to severe knee pain. Prior to exercise, she c/o 10/10 bilateral knee pain which is what she feels each day. She was seeing her PCP after the session and was going to see if she would be a candidate for gel shots or steroid injections to help with pain.  Session 12: Patient tolerating slow, gradual increases well. Struggles with her knee pain. Is consistent with her sessions and determined to complete exercise program Session #20: Patient conitnues to struggle with knee pain. Able to complete two full bouts on NuStep without a break.   8/11/24: Patient with nonunion of sternum as per CTS; she was given okay from Dr. Loaiza to continue cardiac rehab with no restrictions. She endorses extreme bilateral knee pain, states she has an orthopedic appt on 8/19/24 and gel injections are planned Discharge: Patient completed 32 sessions/ As per patient, her rheumatologist advised her to avoid cardiac rehab due to extreme knee pain. Gel injections took place, and she offers some relief. Discharge instructions provided. Resistance training not initiated due to multiple orthopedic issues. Patient METS on SciFIt increased 0.9 since starting cardiac rehab.  [de-identified] : a [PHQ-9: ___] : PHQ-9: [unfilled] [[facility]: ___] : [facility] score total: [unfilled] [Feelings Score: ___] : Feelings Score: [unfilled] [Physical Fitness Score: ____] : Physical Fitness Score: [unfilled] [Daily Activities Score: ___] : Daily Activities Score: [unfilled] [Social Activities Score: ___] : Social Activities Score: [unfilled] [Pain Score: ___] : Pain Score: [unfilled] [Overall Health Score: ___] : Overall Health Score: [unfilled] [Change in Health Score: ___] : Change in Health Score: [unfilled] [Quality of Life Score: ___] : Quality of Life Score: [unfilled] [Social Support Score: ___] : Social Support Score: [unfilled] [Does patient see mental health provider?] : Does patient see mental health provider? No [Self-reports of improved psychosocial well-being] : Self-reports of improved psychosocial well-being [Discuss overview of emotional health supportive modalities] : Discuss overview of emotional health supportive modalities [Meditation] : meditation [Relaxation breathing techniques] : relaxation breathing techniques [Discharge instructions as per guidelines] : discharge instructions as per guidelines [Yes, continue with psychosocial plan] : Yes, continue with psychosocial plan [In progress] : in progress [FreeTextEntry9] : Session #1: Patients psychosocial scores are pending, will update accordingly when patient returns paperwork.  Session #12: Patients psychosocial scores noted above, patient provided with [facility] resources. States she is going to follow up with [facility] to establish psychiatric care as she has not been following with someone. States she feels well managed on current medication regimen (PCP), but states she "usually feels better when she is followed and in therapy." Support and encouragement provided.  8/11/24: Patient states she is emotionally fair, as she has orthopedic pain. She continues to have support from her housemate and her children and grandkids. Discharge: Patient states she does not have a current therapist. I provided her with [facility] contact list. She states she is interested in seeking one close to her home, who does in-person as opposed to remote sessions. She endorses support from her children and grandchildren. She is hoping to move out of the house, though she finds her friend very supportive. [Discharge/Follow-up] : Discharge/Follow-up [Action] : Action [Rate your plate score: ____] : Rate your plate score: [unfilled] [Diabetes] : Diabetes [Obesity] : Obesity [Constipation] : constipation [Reflux] : reflux [Sugar] : sugar [Sodium] : sodium [Cholesterol] : cholesterol [Trans fats/saturated fats] : trans fats/saturated fats [Is patient on medication for hyperlipidemia?] : Is patient on medication for hyperlipidemia? Yes [Atorvastatin] : atorvastatin [Discuss an overview of healthy eating plan] : Discuss an overview of healthy eating plan [MyPlate.gov] : MyPlate.gov [Teach back utilized] : teach back utilized [Yes, continue with nutrition plan] : Yes, continue with nutrition plan [Not met] : not met [FreeTextEntry8] : Intake: patient's caregiver, Ann-Marie, assists patient with cooking and serving meals Session #1: Patient verbalizes understanding of appropriate heart healthy diet and nutrition. Admits that she struggles with sweets.  Session #12: Patient admits that she still struggles with sweets and fried foods, tries her best to try healthy alternatives.  Session #20: Struggles withe her diet and making healthy choices. Gave her contact info for [facility]/CDE.  8/11/24: Continued supportive education re: heart healthy eating provided. Discharge: Patient provided with education regarding a heart healthy diet, discussed nutrient-dense foods. Patient has terrible dislike for vegetables,and fish. She recognizes sweets as her "weakness," and states she has challenges. Provide  [facility] contact information again to patient [FreeTextEntry6] : sugar, sweets [FreeTextEntry7] : Improved RYP; reduced intake of sweets

## 2024-08-21 NOTE — REASON FOR VISIT
[Discharge] : a discharge assessment [FreeTextEntry1] : Patient telephoned, stating her rheumatologist advised her to stop coming to [facility] due to knee pain;ITP finalized, reviewed and manually signed by Dr. Carranza;  Clinical indication for cardiac rehabilitation: CABG/ AVR

## 2024-08-22 ENCOUNTER — APPOINTMENT (OUTPATIENT)
Dept: CARDIOLOGY | Facility: CLINIC | Age: 59
End: 2024-08-22

## 2024-08-26 ENCOUNTER — NON-APPOINTMENT (OUTPATIENT)
Age: 59
End: 2024-08-26

## 2024-08-26 ENCOUNTER — APPOINTMENT (OUTPATIENT)
Dept: CARDIOLOGY | Facility: CLINIC | Age: 59
End: 2024-08-26

## 2024-08-28 ENCOUNTER — APPOINTMENT (OUTPATIENT)
Dept: CARDIOLOGY | Facility: CLINIC | Age: 59
End: 2024-08-28

## 2024-08-28 RX ORDER — FOLIC ACID 1 MG/1
1 TABLET ORAL DAILY
Qty: 90 | Refills: 1 | Status: ACTIVE | COMMUNITY
Start: 2024-08-28 | End: 1900-01-01

## 2024-09-22 NOTE — ED PROVIDER NOTE - WR ORDER NAME 2
Xray Chest 2 Views PA/Lat
2024 18:12
Patient requests all Lab, Cardiology, and Radiology Results on their Discharge Instructions

## 2024-09-28 ENCOUNTER — INPATIENT (INPATIENT)
Facility: HOSPITAL | Age: 59
LOS: 3 days | Discharge: HOME | DRG: 204 | End: 2024-10-02
Attending: GENERAL PRACTICE | Admitting: GENERAL PRACTICE
Payer: MEDICAID

## 2024-09-28 VITALS
HEIGHT: 61 IN | RESPIRATION RATE: 20 BRPM | HEART RATE: 87 BPM | DIASTOLIC BLOOD PRESSURE: 79 MMHG | OXYGEN SATURATION: 95 % | TEMPERATURE: 97 F | WEIGHT: 197.09 LBS | SYSTOLIC BLOOD PRESSURE: 115 MMHG

## 2024-09-28 DIAGNOSIS — E11.9 TYPE 2 DIABETES MELLITUS WITHOUT COMPLICATIONS: ICD-10-CM

## 2024-09-28 DIAGNOSIS — M06.9 RHEUMATOID ARTHRITIS, UNSPECIFIED: ICD-10-CM

## 2024-09-28 DIAGNOSIS — Z95.2 PRESENCE OF PROSTHETIC HEART VALVE: Chronic | ICD-10-CM

## 2024-09-28 DIAGNOSIS — I25.10 ATHEROSCLEROTIC HEART DISEASE OF NATIVE CORONARY ARTERY WITHOUT ANGINA PECTORIS: ICD-10-CM

## 2024-09-28 DIAGNOSIS — Z90.49 ACQUIRED ABSENCE OF OTHER SPECIFIED PARTS OF DIGESTIVE TRACT: Chronic | ICD-10-CM

## 2024-09-28 DIAGNOSIS — L73.9 FOLLICULAR DISORDER, UNSPECIFIED: ICD-10-CM

## 2024-09-28 DIAGNOSIS — Z29.9 ENCOUNTER FOR PROPHYLACTIC MEASURES, UNSPECIFIED: ICD-10-CM

## 2024-09-28 DIAGNOSIS — Z90.710 ACQUIRED ABSENCE OF BOTH CERVIX AND UTERUS: Chronic | ICD-10-CM

## 2024-09-28 DIAGNOSIS — Z95.1 PRESENCE OF AORTOCORONARY BYPASS GRAFT: Chronic | ICD-10-CM

## 2024-09-28 DIAGNOSIS — L02.419 CUTANEOUS ABSCESS OF LIMB, UNSPECIFIED: ICD-10-CM

## 2024-09-28 DIAGNOSIS — Z86.73 PERSONAL HISTORY OF TRANSIENT ISCHEMIC ATTACK (TIA), AND CEREBRAL INFARCTION WITHOUT RESIDUAL DEFICITS: ICD-10-CM

## 2024-09-28 DIAGNOSIS — R06.02 SHORTNESS OF BREATH: ICD-10-CM

## 2024-09-28 DIAGNOSIS — I10 ESSENTIAL (PRIMARY) HYPERTENSION: ICD-10-CM

## 2024-09-28 DIAGNOSIS — S32.2XXA FRACTURE OF COCCYX, INITIAL ENCOUNTER FOR CLOSED FRACTURE: ICD-10-CM

## 2024-09-28 DIAGNOSIS — D68.61 ANTIPHOSPHOLIPID SYNDROME: ICD-10-CM

## 2024-09-28 DIAGNOSIS — Z98.890 OTHER SPECIFIED POSTPROCEDURAL STATES: Chronic | ICD-10-CM

## 2024-09-28 DIAGNOSIS — E78.5 HYPERLIPIDEMIA, UNSPECIFIED: ICD-10-CM

## 2024-09-28 DIAGNOSIS — J44.1 CHRONIC OBSTRUCTIVE PULMONARY DISEASE WITH (ACUTE) EXACERBATION: ICD-10-CM

## 2024-09-28 DIAGNOSIS — F32.89 OTHER SPECIFIED DEPRESSIVE EPISODES: ICD-10-CM

## 2024-09-28 LAB
ALBUMIN SERPL ELPH-MCNC: 4 G/DL — SIGNIFICANT CHANGE UP (ref 3.3–5)
ALP SERPL-CCNC: 162 U/L — HIGH (ref 40–120)
ALT FLD-CCNC: 21 U/L — SIGNIFICANT CHANGE UP (ref 10–45)
ANION GAP SERPL CALC-SCNC: 13 MMOL/L — SIGNIFICANT CHANGE UP (ref 5–17)
AST SERPL-CCNC: 21 U/L — SIGNIFICANT CHANGE UP (ref 10–40)
BASOPHILS # BLD AUTO: 0.08 K/UL — SIGNIFICANT CHANGE UP (ref 0–0.2)
BASOPHILS NFR BLD AUTO: 0.7 % — SIGNIFICANT CHANGE UP (ref 0–2)
BILIRUB SERPL-MCNC: 0.6 MG/DL — SIGNIFICANT CHANGE UP (ref 0.2–1.2)
BUN SERPL-MCNC: 13 MG/DL — SIGNIFICANT CHANGE UP (ref 7–23)
CALCIUM SERPL-MCNC: 9.2 MG/DL — SIGNIFICANT CHANGE UP (ref 8.4–10.5)
CHLORIDE SERPL-SCNC: 103 MMOL/L — SIGNIFICANT CHANGE UP (ref 96–108)
CO2 SERPL-SCNC: 22 MMOL/L — SIGNIFICANT CHANGE UP (ref 22–31)
CREAT SERPL-MCNC: 0.64 MG/DL — SIGNIFICANT CHANGE UP (ref 0.5–1.3)
EGFR: 101 ML/MIN/1.73M2 — SIGNIFICANT CHANGE UP
EOSINOPHIL # BLD AUTO: 0.04 K/UL — SIGNIFICANT CHANGE UP (ref 0–0.5)
EOSINOPHIL NFR BLD AUTO: 0.4 % — SIGNIFICANT CHANGE UP (ref 0–6)
FLUAV AG NPH QL: SIGNIFICANT CHANGE UP
FLUBV AG NPH QL: SIGNIFICANT CHANGE UP
GAS PNL BLDV: SIGNIFICANT CHANGE UP
GLUCOSE BLDC GLUCOMTR-MCNC: 270 MG/DL — HIGH (ref 70–99)
GLUCOSE SERPL-MCNC: 114 MG/DL — HIGH (ref 70–99)
HCT VFR BLD CALC: 40.1 % — SIGNIFICANT CHANGE UP (ref 34.5–45)
HGB BLD-MCNC: 12.6 G/DL — SIGNIFICANT CHANGE UP (ref 11.5–15.5)
IMM GRANULOCYTES NFR BLD AUTO: 0.6 % — SIGNIFICANT CHANGE UP (ref 0–0.9)
LYMPHOCYTES # BLD AUTO: 0.95 K/UL — LOW (ref 1–3.3)
LYMPHOCYTES # BLD AUTO: 8.6 % — LOW (ref 13–44)
MAGNESIUM SERPL-MCNC: 1.6 MG/DL — SIGNIFICANT CHANGE UP (ref 1.6–2.6)
MCHC RBC-ENTMCNC: 30.7 PG — SIGNIFICANT CHANGE UP (ref 27–34)
MCHC RBC-ENTMCNC: 31.4 GM/DL — LOW (ref 32–36)
MCV RBC AUTO: 97.8 FL — SIGNIFICANT CHANGE UP (ref 80–100)
MONOCYTES # BLD AUTO: 0.81 K/UL — SIGNIFICANT CHANGE UP (ref 0–0.9)
MONOCYTES NFR BLD AUTO: 7.3 % — SIGNIFICANT CHANGE UP (ref 2–14)
NEUTROPHILS # BLD AUTO: 9.14 K/UL — HIGH (ref 1.8–7.4)
NEUTROPHILS NFR BLD AUTO: 82.4 % — HIGH (ref 43–77)
NRBC # BLD: 0 /100 WBCS — SIGNIFICANT CHANGE UP (ref 0–0)
NT-PROBNP SERPL-SCNC: 306 PG/ML — HIGH (ref 0–300)
PHOSPHATE SERPL-MCNC: 3.6 MG/DL — SIGNIFICANT CHANGE UP (ref 2.5–4.5)
PLATELET # BLD AUTO: 258 K/UL — SIGNIFICANT CHANGE UP (ref 150–400)
POTASSIUM SERPL-MCNC: 4.1 MMOL/L — SIGNIFICANT CHANGE UP (ref 3.5–5.3)
POTASSIUM SERPL-SCNC: 4.1 MMOL/L — SIGNIFICANT CHANGE UP (ref 3.5–5.3)
PROT SERPL-MCNC: 7.6 G/DL — SIGNIFICANT CHANGE UP (ref 6–8.3)
RBC # BLD: 4.1 M/UL — SIGNIFICANT CHANGE UP (ref 3.8–5.2)
RBC # FLD: 15.7 % — HIGH (ref 10.3–14.5)
RSV RNA NPH QL NAA+NON-PROBE: SIGNIFICANT CHANGE UP
SARS-COV-2 RNA SPEC QL NAA+PROBE: SIGNIFICANT CHANGE UP
SODIUM SERPL-SCNC: 138 MMOL/L — SIGNIFICANT CHANGE UP (ref 135–145)
TROPONIN T, HIGH SENSITIVITY RESULT: 11 NG/L — SIGNIFICANT CHANGE UP (ref 0–51)
WBC # BLD: 11.09 K/UL — HIGH (ref 3.8–10.5)
WBC # FLD AUTO: 11.09 K/UL — HIGH (ref 3.8–10.5)

## 2024-09-28 PROCEDURE — 93010 ELECTROCARDIOGRAM REPORT: CPT

## 2024-09-28 PROCEDURE — 99285 EMERGENCY DEPT VISIT HI MDM: CPT

## 2024-09-28 PROCEDURE — 99223 1ST HOSP IP/OBS HIGH 75: CPT

## 2024-09-28 PROCEDURE — 71045 X-RAY EXAM CHEST 1 VIEW: CPT | Mod: 26

## 2024-09-28 RX ORDER — OXYCODONE HYDROCHLORIDE 30 MG/1
5 TABLET, FILM COATED, EXTENDED RELEASE ORAL EVERY 6 HOURS
Refills: 0 | Status: DISCONTINUED | OUTPATIENT
Start: 2024-09-28 | End: 2024-10-02

## 2024-09-28 RX ORDER — METHYLPREDNISOLONE ACETATE 80 MG/ML
40 INJECTION, SUSPENSION INTRA-ARTICULAR; INTRALESIONAL; INTRAMUSCULAR; SOFT TISSUE
Refills: 0 | Status: DISCONTINUED | OUTPATIENT
Start: 2024-09-28 | End: 2024-10-01

## 2024-09-28 RX ORDER — METHOTREXATE 25 MG/.5ML
8 INJECTION, SOLUTION SUBCUTANEOUS
Refills: 0 | DISCHARGE

## 2024-09-28 RX ORDER — FLUTICASONE PROPION/SALMETEROL 100-50 MCG
1 BLISTER, WITH INHALATION DEVICE INHALATION
Refills: 0 | Status: DISCONTINUED | OUTPATIENT
Start: 2024-09-28 | End: 2024-10-02

## 2024-09-28 RX ORDER — ACETAMINOPHEN 325 MG
650 TABLET ORAL EVERY 6 HOURS
Refills: 0 | Status: DISCONTINUED | OUTPATIENT
Start: 2024-09-28 | End: 2024-10-02

## 2024-09-28 RX ORDER — METOPROLOL TARTRATE 50 MG
25 TABLET ORAL DAILY
Refills: 0 | Status: DISCONTINUED | OUTPATIENT
Start: 2024-09-28 | End: 2024-10-02

## 2024-09-28 RX ORDER — GABAPENTIN 800 MG/1
300 TABLET, FILM COATED ORAL THREE TIMES A DAY
Refills: 0 | Status: DISCONTINUED | OUTPATIENT
Start: 2024-09-28 | End: 2024-10-02

## 2024-09-28 RX ORDER — ASPIRIN 325 MG
81 TABLET ORAL DAILY
Refills: 0 | Status: DISCONTINUED | OUTPATIENT
Start: 2024-09-28 | End: 2024-10-02

## 2024-09-28 RX ORDER — TORSEMIDE 10 MG/1
20 TABLET ORAL DAILY
Refills: 0 | Status: DISCONTINUED | OUTPATIENT
Start: 2024-09-28 | End: 2024-10-02

## 2024-09-28 RX ORDER — ALCOHOL ANTISEPTIC PADS
25 PADS, MEDICATED (EA) TOPICAL ONCE
Refills: 0 | Status: DISCONTINUED | OUTPATIENT
Start: 2024-09-28 | End: 2024-10-02

## 2024-09-28 RX ORDER — ACETAMINOPHEN 325 MG
1000 TABLET ORAL ONCE
Refills: 0 | Status: COMPLETED | OUTPATIENT
Start: 2024-09-28 | End: 2024-09-28

## 2024-09-28 RX ORDER — METFORMIN HCL 500 MG
1 TABLET ORAL
Refills: 0 | DISCHARGE

## 2024-09-28 RX ORDER — INSULIN LISPRO 100/ML
VIAL (ML) SUBCUTANEOUS AT BEDTIME
Refills: 0 | Status: DISCONTINUED | OUTPATIENT
Start: 2024-09-28 | End: 2024-10-02

## 2024-09-28 RX ORDER — MAGNESIUM SULFATE 500 MG/ML
2 VIAL (ML) INJECTION ONCE
Refills: 0 | Status: COMPLETED | OUTPATIENT
Start: 2024-09-28 | End: 2024-09-28

## 2024-09-28 RX ORDER — OLANZAPINE 2.5 MG
7.5 TABLET ORAL DAILY
Refills: 0 | Status: DISCONTINUED | OUTPATIENT
Start: 2024-09-28 | End: 2024-10-02

## 2024-09-28 RX ORDER — METHYLPREDNISOLONE ACETATE 80 MG/ML
40 INJECTION, SUSPENSION INTRA-ARTICULAR; INTRALESIONAL; INTRAMUSCULAR; SOFT TISSUE ONCE
Refills: 0 | Status: COMPLETED | OUTPATIENT
Start: 2024-09-28 | End: 2024-09-28

## 2024-09-28 RX ORDER — INSULIN LISPRO 100/ML
VIAL (ML) SUBCUTANEOUS
Refills: 0 | Status: DISCONTINUED | OUTPATIENT
Start: 2024-09-28 | End: 2024-10-02

## 2024-09-28 RX ORDER — OXYCODONE HYDROCHLORIDE 30 MG/1
10 TABLET, FILM COATED, EXTENDED RELEASE ORAL EVERY 6 HOURS
Refills: 0 | Status: DISCONTINUED | OUTPATIENT
Start: 2024-09-28 | End: 2024-09-30

## 2024-09-28 RX ORDER — ALCOHOL ANTISEPTIC PADS
12.5 PADS, MEDICATED (EA) TOPICAL ONCE
Refills: 0 | Status: DISCONTINUED | OUTPATIENT
Start: 2024-09-28 | End: 2024-10-02

## 2024-09-28 RX ORDER — ATORVASTATIN CALCIUM 10 MG/1
80 TABLET, FILM COATED ORAL AT BEDTIME
Refills: 0 | Status: DISCONTINUED | OUTPATIENT
Start: 2024-09-28 | End: 2024-10-02

## 2024-09-28 RX ORDER — APIXABAN 5 MG/1
5 TABLET, FILM COATED ORAL
Refills: 0 | Status: DISCONTINUED | OUTPATIENT
Start: 2024-09-28 | End: 2024-10-02

## 2024-09-28 RX ORDER — GLUCAGON INJECTION, SOLUTION 0.5 MG/.1ML
1 INJECTION, SOLUTION SUBCUTANEOUS ONCE
Refills: 0 | Status: DISCONTINUED | OUTPATIENT
Start: 2024-09-28 | End: 2024-10-02

## 2024-09-28 RX ORDER — SODIUM CHLORIDE IRRIG SOLUTION 0.9 %
1000 SOLUTION, IRRIGATION IRRIGATION
Refills: 0 | Status: DISCONTINUED | OUTPATIENT
Start: 2024-09-28 | End: 2024-10-02

## 2024-09-28 RX ORDER — SPIRONOLACTONE 100 MG/1
25 TABLET, FILM COATED ORAL DAILY
Refills: 0 | Status: DISCONTINUED | OUTPATIENT
Start: 2024-09-29 | End: 2024-10-02

## 2024-09-28 RX ORDER — METHOTREXATE 25 MG/.5ML
20 INJECTION, SOLUTION SUBCUTANEOUS
Refills: 0 | Status: DISCONTINUED | OUTPATIENT
Start: 2024-09-30 | End: 2024-10-02

## 2024-09-28 RX ORDER — IPRATROPIUM BROMIDE AND ALBUTEROL SULFATE .5; 3 MG/3ML; MG/3ML
3 SOLUTION RESPIRATORY (INHALATION) EVERY 6 HOURS
Refills: 0 | Status: DISCONTINUED | OUTPATIENT
Start: 2024-09-28 | End: 2024-10-02

## 2024-09-28 RX ORDER — ALCOHOL ANTISEPTIC PADS
15 PADS, MEDICATED (EA) TOPICAL ONCE
Refills: 0 | Status: DISCONTINUED | OUTPATIENT
Start: 2024-09-28 | End: 2024-10-02

## 2024-09-28 RX ORDER — 5-HYDROXYTRYPTOPHAN (5-HTP) 100 MG
3 TABLET,DISINTEGRATING ORAL AT BEDTIME
Refills: 0 | Status: DISCONTINUED | OUTPATIENT
Start: 2024-09-28 | End: 2024-10-02

## 2024-09-28 RX ADMIN — METHYLPREDNISOLONE ACETATE 40 MILLIGRAM(S): 80 INJECTION, SUSPENSION INTRA-ARTICULAR; INTRALESIONAL; INTRAMUSCULAR; SOFT TISSUE at 16:30

## 2024-09-28 RX ADMIN — Medication 150 GRAM(S): at 16:33

## 2024-09-28 RX ADMIN — GABAPENTIN 300 MILLIGRAM(S): 800 TABLET, FILM COATED ORAL at 22:25

## 2024-09-28 RX ADMIN — Medication 50 MILLIGRAM(S): at 22:25

## 2024-09-28 RX ADMIN — Medication 400 MILLIGRAM(S): at 16:32

## 2024-09-28 RX ADMIN — Medication 1000 MILLIGRAM(S): at 17:30

## 2024-09-28 RX ADMIN — Medication 1000 MILLIGRAM(S): at 17:00

## 2024-09-28 RX ADMIN — ATORVASTATIN CALCIUM 80 MILLIGRAM(S): 10 TABLET, FILM COATED ORAL at 22:25

## 2024-09-28 RX ADMIN — IPRATROPIUM BROMIDE AND ALBUTEROL SULFATE 3 MILLILITER(S): .5; 3 SOLUTION RESPIRATORY (INHALATION) at 17:01

## 2024-09-28 RX ADMIN — APIXABAN 5 MILLIGRAM(S): 5 TABLET, FILM COATED ORAL at 23:02

## 2024-09-28 NOTE — ED ADULT NURSE NOTE - NSFALLUNIVINTERV_ED_ALL_ED
Bed/Stretcher in lowest position, wheels locked, appropriate side rails in place/Call bell, personal items and telephone in reach/Instruct patient to call for assistance before getting out of bed/chair/stretcher/Non-slip footwear applied when patient is off stretcher/Rebecca to call system/Physically safe environment - no spills, clutter or unnecessary equipment/Purposeful proactive rounding/Room/bathroom lighting operational, light cord in reach

## 2024-09-28 NOTE — ED PROVIDER NOTE - CLINICAL SUMMARY MEDICAL DECISION MAKING FREE TEXT BOX
59 year old female with PMH HTN, HLD, DM, COPD, APLS, RA, breast cancer, CAD w CABG, AVR in 2023, CVA x2, presents with tailbone pain 2/2 tailbone fracture, right lower extremity pustule, and increased shortness of breath. Primary presentation concerning for asthma/COPD exacerbation. Wheezing on exam. Will treat with steroids, duonebs, magnesium. Workup will include flu/covid/rsv, CXR, basic labs, ECG, trop/ProBNP.

## 2024-09-28 NOTE — H&P ADULT - ASSESSMENT
59F PMHx CAD s/p CABG and AVR 11/2023, CVA x2, APLS on Eliquis, COPD, RA, breast CA, HTN, HLD, T2DM, and depression presenting for shortness of breath, coccygeal pain in setting of recent fracture, and RLE pain. Found to be wheezing on physical exam along w/ small R posterior thigh abscess. Admitted for management of COPD exacerbation and thigh abscess. 59F PMHx CAD s/p CABG and AVR 11/2023, CVA x2, APLS on Eliquis, COPD, RA, breast CA, HTN, HLD, T2DM, and depression presenting for shortness of breath, coccygeal pain in setting of recent fracture, and RLE pain. Found to be wheezing on physical exam along w/ small R medial thigh folliculitis vs abscess. Admitted for management of COPD exacerbation and thigh abscess/folliculitis.

## 2024-09-28 NOTE — ED ADULT NURSE NOTE - OBJECTIVE STATEMENT
58 y/o F, AXOX4, with a PMH of CABG x 1, COPD emphysema 1pack/day cigarette smoker,  asthma, loop recorder, HTN, presents to the ED reporting dyspnea x 1.5 weeks. Pt presents with audible inspiratory wheezes. Pt endorses a non productive cough and chest discomfort. Pt O2 saturation was 89% on room air. Pt was placed on 2L NC. Pt does not require O2 at home. Pt reports frequent falls; one which occurred 2 weeks ago and resulted in a broken tailbone. Pt endorses 8/10 pain in the coccyx. Pt also reporting a painful boil to the inner R groin. Site is red and mildly raised.  Pt denies fever/chills/ body aches, N/V, dizziness or headache. Pt found in gown on stretcher, breathing unlabored on 2L NC, speaking in complete sentences, strong and equal strength in all extremities, sensations intact, abd soft non tender non distended, no edema noted. Safety and comfort measures maintained. 60 y/o F, AXOX4, with a PMH of CABG x 1, COPD emphysema 1pack/day cigarette smoker,  asthma, loop recorder, HTN, HLD, DM, RA, CAD, presents to the ED reporting dyspnea x 1.5 weeks. Pt presents with audible inspiratory wheezes. Pt endorses a non productive cough and chest discomfort. Pt O2 saturation was 89% on room air. Pt was placed on 2L NC. Pt does not require O2 at home. Pt reports frequent falls; one which occurred 2 weeks ago and resulted in a broken tailbone. Pt endorses 8/10 pain in the coccyx. Pt also reporting a painful boil to the inner R groin. Site is red and mildly raised.  Pt denies fever/chills/ body aches, N/V, dizziness or headache. Pt found in gown on stretcher, breathing unlabored on 2L NC, speaking in complete sentences, strong and equal strength in all extremities, sensations intact, abd soft non tender non distended, no edema noted. Safety and comfort measures maintained.

## 2024-09-28 NOTE — H&P ADULT - NSHPLABSRESULTS_GEN_ALL_CORE
12.6   11.09 )-----------( 258      ( 28 Sep 2024 16:40 )             40.1     09-28    138  |  103  |  13  ----------------------------<  114[H]  4.1   |  22  |  0.64    Ca    9.2      28 Sep 2024 16:40  Phos  3.6     09-28  Mg     1.6     09-28    TPro  7.6  /  Alb  4.0  /  TBili  0.6  /  DBili  x   /  AST  21  /  ALT  21  /  AlkPhos  162[H]  09-28          LIVER FUNCTIONS - ( 28 Sep 2024 16:40 )  Alb: 4.0 g/dL / Pro: 7.6 g/dL / ALK PHOS: 162 U/L / ALT: 21 U/L / AST: 21 U/L / GGT: x             Urinalysis Basic - ( 28 Sep 2024 16:40 )    Color: x / Appearance: x / SG: x / pH: x  Gluc: 114 mg/dL / Ketone: x  / Bili: x / Urobili: x   Blood: x / Protein: x / Nitrite: x   Leuk Esterase: x / RBC: x / WBC x   Sq Epi: x / Non Sq Epi: x / Bacteria: x

## 2024-09-28 NOTE — H&P ADULT - PROBLEM SELECTOR PLAN 1
- Patient with cough and expiratory wheezing on exam, requiring 2LNC  - Start IV solumedrol 40mg BID  - Duonebs q6hrs ATC for now  - C/w Budesonide-formoterol 160-4.5mcg BID - Patient with cough and expiratory wheezing on exam, requiring 2LNC  - Start IV solumedrol 40mg BID  - Karan q6hrs ATC for now  - Patient states she no longer takes symbicort, doesn't remember the name of her inhaler, will start Advair while admitted

## 2024-09-28 NOTE — H&P ADULT - HISTORY OF PRESENT ILLNESS
59F PMHx CAD s/p CABG and AVR 11/2023, CVA x2, APLS on Eliquis, COPD, RA, breast CA, HTN, HLD, T2DM, and depression presenting for shortness of breath and pain in multiple sites. She sustained a tailbone fracture after a fall 10 days ago. She was in [place] for this, was evaluated in a hospital, was recommended nonoperative management w/ a donut pillow for when she sits. For the past week, she has been having a cough, increased SOB, and wheezing, progressively worsening by the day. She has also been having intermittent palpitations and chest pain but this has been chronic since her CABG last November. Finally, she is also complaining of pain in her RLE that started about 4 days ago after she developed a pimple on her thigh.    In the ED, she was afebrile and hemodynamically stable, saturating 89% on RA, so was placed on 2LNC. CBC w/ mild leukocytosis of 11.09, CMP grossly wnl except for elevated ALP of 162. Trop 11. ECG NSR w/ 1st degree AV block. CXR w/o consolidations, CT chest showing diffuse sternal nonunion, with fluid interposed between the sternal fragments, widest area of separation of the sternal fragments up to 2.8 cm. Received Solumedrol 40mg, Duonebs, and Mag sulfate in the ED.

## 2024-09-28 NOTE — H&P ADULT - NSHPPHYSICALEXAM_GEN_ALL_CORE
Vital Signs Last 24 Hrs  T(C): 36.4 (28 Sep 2024 20:25), Max: 36.4 (28 Sep 2024 16:15)  T(F): 97.5 (28 Sep 2024 20:25), Max: 97.6 (28 Sep 2024 16:15)  HR: 72 (28 Sep 2024 20:25) (72 - 87)  BP: 104/75 (28 Sep 2024 20:25) (102/56 - 115/79)  BP(mean): 85 (28 Sep 2024 20:25) (71 - 93)  RR: 18 (28 Sep 2024 20:25) (18 - 20)  SpO2: 100% (28 Sep 2024 20:25) (89% - 100%)    Parameters below as of 28 Sep 2024 20:25  Patient On (Oxygen Delivery Method): nasal cannula  O2 Flow (L/min): 2      CONSTITUTIONAL: Well-groomed, in no apparent distress  EYES: No conjunctival or scleral injection, non-icteric;   NECK: Trachea midline without palpable neck mass  RESPIRATORY: Breathing comfortably; lungs CTA without wheeze/rhonchi/rales  CARDIOVASCULAR: +S1S2, RRR, no M/G/R; no lower extremity edema  GASTROINTESTINAL: No palpable masses or tenderness, +BS throughout, no rebound/guarding;  SKIN: No rashes or ulcers noted  NEUROLOGIC: Sensation intact in LEs b/l to light touch  PSYCHIATRIC: A+O x 3; mood and affect appropriate; appropriate insight and judgment Vital Signs Last 24 Hrs  T(C): 36.4 (28 Sep 2024 20:25), Max: 36.4 (28 Sep 2024 16:15)  T(F): 97.5 (28 Sep 2024 20:25), Max: 97.6 (28 Sep 2024 16:15)  HR: 72 (28 Sep 2024 20:25) (72 - 87)  BP: 104/75 (28 Sep 2024 20:25) (102/56 - 115/79)  BP(mean): 85 (28 Sep 2024 20:25) (71 - 93)  RR: 18 (28 Sep 2024 20:25) (18 - 20)  SpO2: 100% (28 Sep 2024 20:25) (89% - 100%)    Parameters below as of 28 Sep 2024 20:25  Patient On (Oxygen Delivery Method): nasal cannula  O2 Flow (L/min): 2      CONSTITUTIONAL: Well-groomed, in no apparent distress  EYES: No conjunctival or scleral injection, non-icteric;   NECK: Trachea midline without palpable neck mass  RESPIRATORY: Breathing comfortably; Diffuse expiratory wheezing across all lung fields  CARDIOVASCULAR: +S1S2, RRR, no M/G/R; no lower extremity edema  GASTROINTESTINAL: No palpable masses or tenderness, +BS throughout, no rebound/guarding;  SKIN: Site of CABG c/d/i, R medial thigh near groin w/ small area of induration with open pore without surrounding inflammation, pain w/ palpation present, no purulence expressed  NEUROLOGIC: Sensation intact in LEs b/l to light touch  PSYCHIATRIC: A+O x 3 Vital Signs Last 24 Hrs  T(C): 36.4 (28 Sep 2024 20:25), Max: 36.4 (28 Sep 2024 16:15)  T(F): 97.5 (28 Sep 2024 20:25), Max: 97.6 (28 Sep 2024 16:15)  HR: 72 (28 Sep 2024 20:25) (72 - 87)  BP: 104/75 (28 Sep 2024 20:25) (102/56 - 115/79)  BP(mean): 85 (28 Sep 2024 20:25) (71 - 93)  RR: 18 (28 Sep 2024 20:25) (18 - 20)  SpO2: 100% (28 Sep 2024 20:25) (89% - 100%)    Parameters below as of 28 Sep 2024 20:25  Patient On (Oxygen Delivery Method): nasal cannula  O2 Flow (L/min): 2      CONSTITUTIONAL: Well-groomed, in no apparent distress  EYES: No conjunctival or scleral injection, non-icteric;   NECK: Trachea midline without palpable neck mass  RESPIRATORY: Breathing comfortably; Diffuse expiratory wheezing across all lung fields  CARDIOVASCULAR: +S1S2, RRR, no M/G/R; no lower extremity edema  GASTROINTESTINAL: No palpable masses or tenderness, +BS throughout, no rebound/guarding;  SKIN: Site of CABG c/d/i but chest pain elicited upon palpation, R medial thigh near groin w/ small area of induration with open pore without surrounding inflammation, pain w/ palpation present, no purulence expressed  NEUROLOGIC: Sensation intact in LEs b/l to light touch  PSYCHIATRIC: A+O x 3

## 2024-09-28 NOTE — H&P ADULT - PROBLEM SELECTOR PLAN 4
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, consult CT-surgery for recs, per LAWANDA they were aware of this back in July, patient states her CT surgeon told her she may need a - S/p CABG and AVR last year 11/21/23 after cath found 100% occlusion in pLAD  - C/w ASA 81mg daily, atorvastatin 80mg QHS  - ECG personally reviewed, NSR w/ 1st degree AV block  - 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 - S/p CABG and AVR last year 11/21/23 after cath found 100% occlusion in pLAD  - C/w ASA 81mg daily, atorvastatin 80mg QHS, metoprolol succinate 25mg daily  - ECG personally reviewed, NSR w/ 1st degree AV block  - 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

## 2024-09-28 NOTE — ED ADULT NURSE NOTE - NSICDXPASTMEDICALHX_GEN_ALL_CORE_FT
Physical Therapy Visit    Visit Type: Daily Treatment Note  Visit: 7  Referring Provider: Sherry Apple MD  Medical Diagnosis (from order): M54.50, G89.29 - Chronic midline low back pain, unspecified whether sciatica present     SUBJECTIVE                                                                                                               Patient reports her back is feeling better, feels the worst first thing in the morning.     Pain / Symptoms  - Pain rating (out of 10): Current: 3       OBJECTIVE                                                                                                                               Ambulation / Gait  - Assistive device: none  No notable deviation.          Treatment     Therapeutic Exercise  NuStep x legs and left arm x seat 7 x level 3 x 6 minutes  Seated hamstring stretch x 30 seconds x 2 repetitions on each leg  Single knee to chest stretch x 20 seconds x 2 repetitions on each leg  Lower trunk rotations x 10 repetitions x 2 sets  Seated cat cow x 5 repetitions     Manual Therapy   Lateral hip stretch x 30 seconds x 3 repetitions on each leg    Neuromuscular Re-Education  Standing hip extension and abduction at wall x 10 repetitions x 2nd set with yellow theraband   Transverse abdominus contractions in sitting and hooklying x 3 seconds x 5 repetitions  90/90 toe taps x 5 repetitions on each leg  Double knee to chest on small stability ball x 10 repetitions     Skilled input: verbal instruction/cues    Writer verbally educated and received verbal consent for hand placement, positioning of patient, and techniques to be performed today from patient for clothing adjustments for techniques, therapist position for techniques and hand placement and palpation for techniques as described above and how they are pertinent to the patient's plan of care.  Home Exercise Program  Access Code: [email or web address]  URL: [email or web address]  Date:  04/29/2024  Prepared by: Delmis Piña    Exercises  - Supine Lower Trunk Rotation  - 2 x daily - 7 x weekly - 2 sets - 10-15 reps  - Seated Hamstring Stretch  - 2 x daily - 7 x weekly - 2-3 reps - 20-30 seconds hold  - Supine Single Knee to Chest Stretch  - 2 x daily - 7 x weekly - 2 sets - 2-4 reps - 20-30 seconds hold  - Hooklying Clamshell with Resistance  - 2 x daily - 7 x weekly - 2 sets - 10-15 reps  - Hip Abduction with Resistance Loop  - 2 x daily - 7 x weekly - 2 sets - 10-15 reps  - Hip Extension with Resistance Loop  - 2 x daily - 7 x weekly - 2 sets - 10-15 reps      ASSESSMENT                                                                                                            Patient presents with improving symptoms in low back and left hip. Patient had excellent tolerance to strengthening progression except mild pain with 90/90 heel taps. Home exercise program progressed with hip stability strengthening in weight bearing, demonstrates impaired strength as seen by fatigue when performing. Recommend continued skilled Physical Therapy to address functional mobility deficits.   Education:   - Results of above outlined education: Verbalizes understanding and Demonstrates understanding    PLAN                                                                                                                           Suggestions for next session as indicated: Progress per plan of care. Progress lumbar and hip mobility, hip stability strength, hamstring stretching, core strengthening. Anticipate discharge next session.        Therapy procedure time and total treatment time can be found documented on the Time Entry flowsheet     PAST MEDICAL HISTORY:  APS (antiphospholipid syndrome)     Asthma     Breast cancer     CAD (coronary artery disease)     Cigarette smoker     Facial paresthesia     History of COPD     History of COPD     History of depressed bipolar disorder     History of multiple cerebrovascular accidents (CVAs)     Hyperlipidemia     Migraines     Other depression     Paresthesia of left arm     Rheumatoid arthritis     Suicidal ideation     Type 2 diabetes mellitus

## 2024-09-28 NOTE — ED PROVIDER NOTE - OBJECTIVE STATEMENT
59 year old female with PMH HTN, HLD, DM, COPD, APLS, RA, breast cancer, CAD w CABG, AVR in 2023, CVA x2, presents with multiple medical complaints. First complain is tailbone pain secondary to tailbone fracture after falling out of bed 1.5 weeks ago. Patient was evaluated by a physician in [place] and diagnosed with tailbone fracture. 2nd complain is swelling and pain to the right posterior leg 3-4 days ago. Patient believed she had a pimple which was causing this pain. Finally reports increased trouble breathing for 1 week which has been worsening. Sensation of her heart popping out of her chest. Chest pain has been ongoing since cardiac surgeries in the past. No fevers, sick contacts.

## 2024-09-28 NOTE — H&P ADULT - NSHPREVIEWOFSYSTEMS_GEN_ALL_CORE
Review of Systems:   CONSTITUTIONAL: No fever, weight loss  EYES: No eye pain, visual disturbances, or discharge  RESPIRATORY:  +SOB. No cough, wheezing, chills or hemoptysis  CARDIOVASCULAR: No chest pain, palpitations, dizziness, or leg swelling  GASTROINTESTINAL: No abdominal or epigastric pain. No nausea, vomiting, or hematemesis  GENITOURINARY: No dysuria, frequency, hematuria, or incontinence  NEUROLOGICAL: No headaches, memory loss, loss of strength, numbness, or tremors  SKIN: No itching, burning, rashes, or lesions   MUSCULOSKELETAL: R leg pain, tailbone pain  PSYCHIATRIC: No depression, anxiety, mood swings, or difficulty sleeping  HEME/LYMPH: No easy bruising, or bleeding gums Review of Systems:   CONSTITUTIONAL: No fever, weight loss  EYES: No eye pain, visual disturbances, or discharge  RESPIRATORY:  +SOB, cough, and, wheezing  CARDIOVASCULAR: +Chest pain and palpitations, no dizziness, or leg swelling  GASTROINTESTINAL: No abdominal or epigastric pain. No nausea, vomiting, or hematemesis  GENITOURINARY: No dysuria, frequency, hematuria, or incontinence  NEUROLOGICAL: No headaches, memory loss, loss of strength, numbness, or tremors  SKIN: No itching, burning, rashes, or lesions   MUSCULOSKELETAL: R thigh pain, tailbone pain  PSYCHIATRIC: No depression, anxiety, mood swings, or difficulty sleeping  HEME/LYMPH: No easy bruising, or bleeding gums

## 2024-09-28 NOTE — H&P ADULT - PROBLEM SELECTOR PLAN 3
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, consult CT-surgery for recs - Was diagnosed w/ coccygeal fracture s/p fall in [place] 1.5 weeks ago  - Told by hospital in [place] that she would need to use a donut pillow for 6 weeks  - Fall precautions  - Tylenol PRN for mild pain, oxycodone 2.5mg q6hrs PRN for moderate pain and 5mg q6hrs PRN for severe pain - Was diagnosed w/ coccygeal fracture s/p fall in [place] 1.5 weeks ago  - Told by hospital in [place] that she would need to use a donut pillow for 6 weeks  - Fall precautions  - Tylenol PRN for mild pain, oxycodone 5mg q6hrs PRN for moderate pain and 10mg q6hrs PRN for severe pain  - Robaxin 500mg TID

## 2024-09-28 NOTE — H&P ADULT - PROBLEM SELECTOR PLAN 5
- C/w Eliquis 5mg BID - Has had 2 CVAs (basal ganglia infarct), with residual L-sided weakness and numbness  - C/w ASA and atorvastatin

## 2024-09-28 NOTE — ED ADULT TRIAGE NOTE - CHIEF COMPLAINT QUOTE
1.5w slip and fall in the shower and same day fell off bed and fractured tailbone, also c/o cyst on R leg and asthma exacerbation, endorses SOB. Pt also endorsing some chest pain since having quadruple valve replacement last November.

## 2024-09-28 NOTE — H&P ADULT - PROBLEM SELECTOR PLAN 2
- Has small abscess on R posterior thigh  - US to evaluate area  - Surgery consult for possible bedside I&D  - Given CABG and AVR history and nonunion seen on CT chest, would control will start bactrim DS 2 tablets BID - Has small abscess on R posterior thigh, but cellulitis  - Surgery consult for possible bedside I&D  - Given history of RA on chronic steroids, CABG/AVR, and nonunion seen on CT chest, would control will start bactrim DS 2 tablets BID - Has small abscess vs folliculitis on R posterior thigh, but no cellulitis seen  - Surgery consult for possible bedside I&D  - Will order RLE US

## 2024-09-28 NOTE — ED ADULT TRIAGE NOTE - PATIENT ON (OXYGEN DELIVERY METHOD)
room air Detail Level: Zone Recommendations (Free Text): CO2 laser with [facility] dermatology with Dr Avila Recommendation Preamble: The following recommendations were made during the visit:

## 2024-09-28 NOTE — H&P ADULT - PROBLEM SELECTOR PLAN 9
- Hold prednisone 10mg daily for now while on IV steroids - Takes Lantus 28u QHS + Lispro 8u TID   - F/u A1c  - Will do Lantus 28u QHS + 4u TID + ISS for now, titrate as needed  - Watch sugars given IV steroids  - C/w gabapentin 300mg TID - Patient states she takes metformin now, off of insulin  - ISS for now while admitted, titrate as needed  - Watch sugars given IV steroids  - C/w gabapentin 300mg TID

## 2024-09-28 NOTE — ED PROVIDER NOTE - NS ED ROS FT
REVIEW OF SYSTEMS:    CONSTITUTIONAL: No weakness, fevers or chills  EYES/ENT: No visual changes;  No vertigo or throat pain   NECK: No pain or stiffness  RESPIRATORY: (+) cough, wheezing, No hemoptysis; (+) shortness of breath  CARDIOVASCULAR: (+) chest pain, palpitations  GASTROINTESTINAL: No abdominal or epigastric pain. No nausea, vomiting, or hematemesis; No diarrhea or constipation. No melena or hematochezia.  GENITOURINARY: No dysuria, frequency or hematuria  NEUROLOGICAL: No numbness or weakness  SKIN: No itching, rashes, (+) right posterior thigh induration

## 2024-09-28 NOTE — ED PROVIDER NOTE - PHYSICAL EXAMINATION
T(C): 36.3 (09-28-24 @ 15:39), Max: 36.3 (09-28-24 @ 15:39)  HR: 87 (09-28-24 @ 15:39) (87 - 87)  BP: 115/79 (09-28-24 @ 15:39) (115/79 - 115/79)  RR: 20 (09-28-24 @ 15:39) (20 - 20)  SpO2: 95% (09-28-24 @ 15:39) (95% - 95%)    CONSTITUTIONAL: Well groomed, no apparent distress  EYES: PERRLA and symmetric, EOMI, No conjunctival or scleral injection, non-icteric  ENMT: Oral mucosa with moist membranes. Normal dentition; no pharyngeal injection or exudates  RESP: No respiratory distress, no use of accessory muscles; (+) prolonged expiratory wheezing   CV: RRR, +S1S2, no MRG; no JVD; no peripheral edema  GI: Soft, NT, ND, no rebound, no guarding; no palpable masses; no hepatosplenomegaly; no hernia palpated  SKIN:  R posterior thigh nodule, w/o surrounding erythema, +TTP  NEURO: CN II-XII intact; normal reflexes in upper and lower extremities, sensation intact in upper and lower extremities b/l to light touch   PSYCH: Appropriate insight/judgment; A+O x 3, mood and affect appropriate, recent/remote memory intact

## 2024-09-28 NOTE — ED PROVIDER NOTE - ATTENDING CONTRIBUTION TO CARE
[facility] Attending [facility] Rojas:  patient seen and evaluated with the resident.  I was present for key portions of the History & Physical, and I agree with the Impression & Plan.    Patient is a near patient is a 59-year-old female complaining of cough, shortness of breath, wheezing.  Duration 1 week.  Patient also complaining of chest pain for 11 months.  Patient had CABG with aortic valve replacement November 2024 and has been having chest pain ever since.    Of note, patient endorses significant coccygeal pain.  She broke her tailbone last week, was evaluated in [place].  Told that it was nonoperative and that she would need to sit on a donut pillow for 6 weeks.    Vital signs: Blood pressure 115/79, respirations 20, heart rate 87, temperature 97.3, O2 sat 95% on room air.  Gen: elderly female in NAD.    Head: NC/AT.   PERRL, EOMI.    Neck: trachea midline, supple.    Resp:  + Tachypnea, bilateral wheezing throughout, good inspiration, prolonged expiration  Cardiac RRR, no RMG.    Abdomen:  soft, nondistended, nontender; no R/G.  Ext:  R posterior thigh nodule, w/o surrounding erythema, +TTP  Neuro:  A&Ox4 appears non focal.  Moving all 4 extremities equally  Skin:  thin skin, otherwise arm and dry as visualized, no rash.     Psych:  Normal affect and mood.    Medical Decision Making / Differential Diagnosis:  HPI most consistent with asthma COPD exacerbation  Wheezing on exam warrants steroids, nebs, magnesium.    Workup will include flu/covid/rsv, CXR, basic labs, ECG, trop/ProBNP.    Incidentally, patient seems to have a very small abscess on the posterior R thigh.  No surrounding cellulitis.      ECG directly visualized by me and shows sinus rhythm, first-degree AV block, rate 90, , QRS 70, QTc 428.  No ST elevations or depressions.

## 2024-09-28 NOTE — H&P ADULT - PROBLEM SELECTOR PLAN 10
- C/w Olanzapine 7.5mg daily  - C/w Trazodone 50mg QHS - Hold prednisone 10mg daily for now while on IV steroids - Hold prednisone 5mg daily for now while on IV steroids  - Takes methotrexate 20mg weekly for RA and associated uveitis

## 2024-09-28 NOTE — H&P ADULT - PROBLEM SELECTOR PLAN 8
- Takes Lantus 28u QHS + Lispro 8u TID   - F/u A1c  - Will do Lantus 28u QHS + 4u TID + ISS for now, titrate as needed  - Watch sugars given IV steroids  - C/w gabapentin 300mg TID - C/w atorvastatin 80mg QHS

## 2024-09-29 LAB
A1C WITH ESTIMATED AVERAGE GLUCOSE RESULT: 6.1 % — HIGH (ref 4–5.6)
ALBUMIN SERPL ELPH-MCNC: 3.8 G/DL — SIGNIFICANT CHANGE UP (ref 3.3–5)
ALP SERPL-CCNC: 150 U/L — HIGH (ref 40–120)
ALT FLD-CCNC: 17 U/L — SIGNIFICANT CHANGE UP (ref 10–45)
ANION GAP SERPL CALC-SCNC: 13 MMOL/L — SIGNIFICANT CHANGE UP (ref 5–17)
AST SERPL-CCNC: 16 U/L — SIGNIFICANT CHANGE UP (ref 10–40)
BILIRUB SERPL-MCNC: 0.5 MG/DL — SIGNIFICANT CHANGE UP (ref 0.2–1.2)
BUN SERPL-MCNC: 16 MG/DL — SIGNIFICANT CHANGE UP (ref 7–23)
CALCIUM SERPL-MCNC: 9 MG/DL — SIGNIFICANT CHANGE UP (ref 8.4–10.5)
CHLORIDE SERPL-SCNC: 104 MMOL/L — SIGNIFICANT CHANGE UP (ref 96–108)
CHOLEST SERPL-MCNC: 121 MG/DL — SIGNIFICANT CHANGE UP
CO2 SERPL-SCNC: 21 MMOL/L — LOW (ref 22–31)
CREAT SERPL-MCNC: 0.54 MG/DL — SIGNIFICANT CHANGE UP (ref 0.5–1.3)
EGFR: 105 ML/MIN/1.73M2 — SIGNIFICANT CHANGE UP
ESTIMATED AVERAGE GLUCOSE: 128 MG/DL — HIGH (ref 68–114)
GLUCOSE BLDC GLUCOMTR-MCNC: 135 MG/DL — HIGH (ref 70–99)
GLUCOSE BLDC GLUCOMTR-MCNC: 144 MG/DL — HIGH (ref 70–99)
GLUCOSE BLDC GLUCOMTR-MCNC: 160 MG/DL — HIGH (ref 70–99)
GLUCOSE BLDC GLUCOMTR-MCNC: 189 MG/DL — HIGH (ref 70–99)
GLUCOSE SERPL-MCNC: 122 MG/DL — HIGH (ref 70–99)
HCT VFR BLD CALC: 38.9 % — SIGNIFICANT CHANGE UP (ref 34.5–45)
HDLC SERPL-MCNC: 51 MG/DL — SIGNIFICANT CHANGE UP
HGB BLD-MCNC: 12.2 G/DL — SIGNIFICANT CHANGE UP (ref 11.5–15.5)
LIPID PNL WITH DIRECT LDL SERPL: 58 MG/DL — SIGNIFICANT CHANGE UP
MCHC RBC-ENTMCNC: 30.5 PG — SIGNIFICANT CHANGE UP (ref 27–34)
MCHC RBC-ENTMCNC: 31.4 GM/DL — LOW (ref 32–36)
MCV RBC AUTO: 97.3 FL — SIGNIFICANT CHANGE UP (ref 80–100)
NON HDL CHOLESTEROL: 71 MG/DL — SIGNIFICANT CHANGE UP
NRBC # BLD: 0 /100 WBCS — SIGNIFICANT CHANGE UP (ref 0–0)
PLATELET # BLD AUTO: 237 K/UL — SIGNIFICANT CHANGE UP (ref 150–400)
POTASSIUM SERPL-MCNC: 4.5 MMOL/L — SIGNIFICANT CHANGE UP (ref 3.5–5.3)
POTASSIUM SERPL-SCNC: 4.5 MMOL/L — SIGNIFICANT CHANGE UP (ref 3.5–5.3)
PROT SERPL-MCNC: 7.2 G/DL — SIGNIFICANT CHANGE UP (ref 6–8.3)
RBC # BLD: 4 M/UL — SIGNIFICANT CHANGE UP (ref 3.8–5.2)
RBC # FLD: 15.4 % — HIGH (ref 10.3–14.5)
SODIUM SERPL-SCNC: 138 MMOL/L — SIGNIFICANT CHANGE UP (ref 135–145)
TRIGL SERPL-MCNC: 61 MG/DL — SIGNIFICANT CHANGE UP
WBC # BLD: 7.54 K/UL — SIGNIFICANT CHANGE UP (ref 3.8–10.5)
WBC # FLD AUTO: 7.54 K/UL — SIGNIFICANT CHANGE UP (ref 3.8–10.5)

## 2024-09-29 PROCEDURE — 99222 1ST HOSP IP/OBS MODERATE 55: CPT

## 2024-09-29 RX ORDER — SODIUM CHLORIDE 0.9 % (FLUSH) 0.9 %
250 SYRINGE (ML) INJECTION ONCE
Refills: 0 | Status: COMPLETED | OUTPATIENT
Start: 2024-09-29 | End: 2024-09-29

## 2024-09-29 RX ADMIN — Medication 500 MILLIGRAM(S): at 14:13

## 2024-09-29 RX ADMIN — Medication 1 TABLET(S): at 05:44

## 2024-09-29 RX ADMIN — Medication 1 DOSE(S): at 18:11

## 2024-09-29 RX ADMIN — APIXABAN 5 MILLIGRAM(S): 5 TABLET, FILM COATED ORAL at 18:11

## 2024-09-29 RX ADMIN — OXYCODONE HYDROCHLORIDE 5 MILLIGRAM(S): 30 TABLET, FILM COATED, EXTENDED RELEASE ORAL at 21:21

## 2024-09-29 RX ADMIN — Medication 1000 MILLILITER(S): at 21:24

## 2024-09-29 RX ADMIN — Medication 500 MILLIGRAM(S): at 05:45

## 2024-09-29 RX ADMIN — Medication 0: at 21:19

## 2024-09-29 RX ADMIN — Medication 50 MILLIGRAM(S): at 21:16

## 2024-09-29 RX ADMIN — METHYLPREDNISOLONE ACETATE 40 MILLIGRAM(S): 80 INJECTION, SUSPENSION INTRA-ARTICULAR; INTRALESIONAL; INTRAMUSCULAR; SOFT TISSUE at 18:11

## 2024-09-29 RX ADMIN — GABAPENTIN 300 MILLIGRAM(S): 800 TABLET, FILM COATED ORAL at 14:13

## 2024-09-29 RX ADMIN — GABAPENTIN 300 MILLIGRAM(S): 800 TABLET, FILM COATED ORAL at 05:44

## 2024-09-29 RX ADMIN — Medication 25 MILLIGRAM(S): at 05:45

## 2024-09-29 RX ADMIN — IPRATROPIUM BROMIDE AND ALBUTEROL SULFATE 3 MILLILITER(S): .5; 3 SOLUTION RESPIRATORY (INHALATION) at 18:39

## 2024-09-29 RX ADMIN — IPRATROPIUM BROMIDE AND ALBUTEROL SULFATE 3 MILLILITER(S): .5; 3 SOLUTION RESPIRATORY (INHALATION) at 14:14

## 2024-09-29 RX ADMIN — IPRATROPIUM BROMIDE AND ALBUTEROL SULFATE 3 MILLILITER(S): .5; 3 SOLUTION RESPIRATORY (INHALATION) at 00:55

## 2024-09-29 RX ADMIN — APIXABAN 5 MILLIGRAM(S): 5 TABLET, FILM COATED ORAL at 05:44

## 2024-09-29 RX ADMIN — OXYCODONE HYDROCHLORIDE 5 MILLIGRAM(S): 30 TABLET, FILM COATED, EXTENDED RELEASE ORAL at 22:21

## 2024-09-29 RX ADMIN — ATORVASTATIN CALCIUM 80 MILLIGRAM(S): 10 TABLET, FILM COATED ORAL at 21:15

## 2024-09-29 RX ADMIN — Medication 1 DOSE(S): at 05:51

## 2024-09-29 RX ADMIN — Medication 7.5 MILLIGRAM(S): at 14:13

## 2024-09-29 RX ADMIN — METHYLPREDNISOLONE ACETATE 40 MILLIGRAM(S): 80 INJECTION, SUSPENSION INTRA-ARTICULAR; INTRALESIONAL; INTRAMUSCULAR; SOFT TISSUE at 05:46

## 2024-09-29 RX ADMIN — GABAPENTIN 300 MILLIGRAM(S): 800 TABLET, FILM COATED ORAL at 21:16

## 2024-09-29 RX ADMIN — Medication 81 MILLIGRAM(S): at 14:13

## 2024-09-29 RX ADMIN — Medication 1 TABLET(S): at 18:11

## 2024-09-29 RX ADMIN — Medication 500 MILLIGRAM(S): at 21:16

## 2024-09-29 RX ADMIN — IPRATROPIUM BROMIDE AND ALBUTEROL SULFATE 3 MILLILITER(S): .5; 3 SOLUTION RESPIRATORY (INHALATION) at 05:46

## 2024-09-29 NOTE — PATIENT PROFILE ADULT - NSPROGENBLOODRESTRICT_GEN_A_NUR
Counseling and Referral of Other Preventative  (Italic type indicates deductible and co-insurance are waived)    Patient Name: Jyoti Tyler  Today's Date: 12/20/2023    Health Maintenance       Date Due Completion Date    Mammogram 12/05/2023 12/5/2022    RSV Vaccine (Age 60+ and Pregnant patients) (1 - 1-dose 60+ series) 12/25/2023 (Originally 6/8/2014) ---    TETANUS VACCINE 05/17/2024 (Originally 6/8/1973) ---    DEXA Scan 01/05/2026 1/5/2023    Lipid Panel 11/21/2028 11/21/2023    Colorectal Cancer Screening 03/08/2029 3/8/2022        Orders Placed This Encounter   Procedures    Ambulatory referral/consult to Audiology    Ambulatory referral/consult to ENT     The following information is provided to all patients.  This information is to help you find resources for any of the problems found today that may be affecting your health:                Living healthy guide: [email or web address] [place] [facility]      Understanding Diabetes: www.diabetes.org      Eating healthy: www.cdc.gov/healthyweight      CDC home safety checklist: www.cdc.gov/steadi/patient.html      Agency on Aging: www.goea.[place].[facility] [place] [facility]      Alcoholics anonymous (AA): www.aa.org      Physical Activity: www.reena.nih.gov/qa1oycc      Tobacco use: www.quitwithusla.org     
none

## 2024-09-29 NOTE — CONSULT NOTE ADULT - SUBJECTIVE AND OBJECTIVE BOX
GENERAL SURGERY CONSULT NOTE    HPI: 59F PMHx CAD s/p CABG and AVR 11/2023, CVA x2, APLS on Eliquis, COPD, RA, breast CA, HTN, HLD, T2DM, and depression presenting for shortness of breath and pain in multiple sites. She sustained a tailbone fracture after a fall 10 days ago. She was in [place] for this, was evaluated in a hospital, was recommended nonoperative management w/ a donut pillow for when she sits. For the past week, she has been having a cough, increased SOB, and wheezing, progressively worsening by the day. Additionally patient reports R thigh pimple starting 4 days ago.       PMH/PSH: Cigarette smoker    Rheumatoid arthritis    Other depression    Breast cancer    Migraines    History of multiple cerebrovascular accidents (CVAs)    Suicidal ideation    Paresthesia of left arm    Facial paresthesia    APS (antiphospholipid syndrome)    History of depressed bipolar disorder    History of COPD    History of COPD    Hyperlipidemia    Type 2 diabetes mellitus    Asthma    CAD (coronary artery disease)    History of hysterectomy    History of cholecystectomy    History of loop recorder    S/P CABG x 1    S/P AVR        MEDS:albuterol/ipratropium for Nebulization 3 milliLiter(s) Nebulizer every 6 hours  amoxicillin  875 milliGRAM(s)/clavulanate 1 Tablet(s) Oral two times a day  apixaban 5 milliGRAM(s) Oral two times a day  aspirin enteric coated 81 milliGRAM(s) Oral daily  atorvastatin 80 milliGRAM(s) Oral at bedtime  dextrose 5%. 1000 milliLiter(s) (50 mL/Hr) IV Continuous <Continuous>  dextrose 5%. 1000 milliLiter(s) (100 mL/Hr) IV Continuous <Continuous>  dextrose 50% Injectable 12.5 Gram(s) IV Push once  dextrose 50% Injectable 25 Gram(s) IV Push once  dextrose 50% Injectable 25 Gram(s) IV Push once  fluticasone propionate/ salmeterol 250-50 MICROgram(s) Diskus 1 Dose(s) Inhalation two times a day  gabapentin 300 milliGRAM(s) Oral three times a day  glucagon  Injectable 1 milliGRAM(s) IntraMuscular once  insulin lispro (ADMELOG) corrective regimen sliding scale   SubCutaneous at bedtime  insulin lispro (ADMELOG) corrective regimen sliding scale   SubCutaneous three times a day before meals  methocarbamol 500 milliGRAM(s) Oral three times a day  methylPREDNISolone sodium succinate Injectable 40 milliGRAM(s) IV Push two times a day  metoprolol succinate ER 25 milliGRAM(s) Oral daily  OLANZapine 7.5 milliGRAM(s) Oral daily  spironolactone 25 milliGRAM(s) Oral daily  torsemide 20 milliGRAM(s) Oral daily  traZODone 50 milliGRAM(s) Oral at bedtime  acetaminophen     Tablet .. 650 milliGRAM(s) Oral every 6 hours PRN Temp greater or equal to 38C (100.4F), Mild Pain (1 - 3)  dextrose Oral Gel 15 Gram(s) Oral once PRN Blood Glucose LESS THAN 70 milliGRAM(s)/deciliter  melatonin 3 milliGRAM(s) Oral at bedtime PRN Insomnia  oxyCODONE    IR 5 milliGRAM(s) Oral every 6 hours PRN Moderate Pain (4 - 6)  oxyCODONE    IR 10 milliGRAM(s) Oral every 6 hours PRN Severe Pain (7 - 10)      ALLERGIES: NKDA    REVIEW OF SYSTEMS: All ROS negative except as per HPI.  ____________________________________________    VITALS:T(C): 36.5, Max: 36.5 (09-28)  T(F): 97.7, Max: 97.7 (09-28)  HR: 74 (72 - 87)  BP: 113/79 (102/56 - 115/79)  BP(mean): 85 (71 - 93)  RR: 18 (18 - 20)  SpO2: 95% (89% - 100%) nasal cannula 3       PHYSICAL EXAM:  General: AAOx3, no acute distress.  Respiratory: breathing comfortably, no increased WOB   Abdomen: soft, nontender, nondistended, no rebound, no guarding  Extremities: Right inner thigh macule, possible open boil, no gross evidence of cellulitis, limited erythema, no fluctuance or drainage   ____________________________________________    LABS:                      12.6  11.09 )-----------( 258    ( 28 Sep 2024 16:40 )             40.1  138  |  103  |  13  ----------------------------<  114[H]    (09-28)  4.1   |  22  |  0.64          Ca    9.2      09-28  Mg    1.6  Phos  3.6        LIVER FUNCTIONS - ( 28 Sep 2024 16:40 )  Alb: 4.0 g/dL / Pro: 7.6 g/dL / ALK PHOS: 162 U/L / ALT: 21 U/L / AST: 21 U/L / GGT: x        Urinalysis Basic - ( 28 Sep 2024 16:40 )    Color: x / Appearance: x / SG: x / pH: x  Gluc: 114 mg/dL / Ketone: x  / Bili: x / Urobili: x   Blood: x / Protein: x / Nitrite: x   Leuk Esterase: x / RBC: x / WBC x   Sq Epi: x / Non Sq Epi: x / Bacteria: x      ____________________________________________    RADIOLOGY & ADDITIONAL STUDIES:

## 2024-09-29 NOTE — CONSULT NOTE ADULT - ASSESSMENT
58F with history of CVA  COPD/Asthma, Breast Ca s/p ?RT, Bipolar depression, Rheumatoid Arthritis, Antiphospholipid syndrome, L eye uveitis/scleritis,  severe AR s/p AVR CABG x 1     1.   s/p AVR CABG x 1   - pt has sternotomy dehiscense, will consult Dr Loaiza for surgical repair     2. CVA  -history of multiple CVAs and is on eliquis     3. Antiphospholipid syndrome  -on eliquis     4. LV dysfunction   - cont torsemide , toprol and aldactone     5 COPD exacerbation   - cont methylprednisone       
59F PMHx CAD s/p CABG and AVR 11/2023, CVA x2, APLS on Eliquis, COPD, RA, breast CA, HTN, HLD, T2DM, and depression presenting for shortness of breath and pain in multiple sites. Additionally patient reports R thigh pimple starting 4 days ago with associated pain.       Recommendation   No fluctuance or abscess appreciated on exam, recommend 5 days of PO Augmentin   No surgical intervention warranted at this time       Patient seen and examined with Dr. Buck     [facility] Surgery   [id number]

## 2024-09-29 NOTE — PATIENT PROFILE ADULT - FALL HARM RISK - HARM RISK INTERVENTIONS

## 2024-09-29 NOTE — CONSULT NOTE ADULT - SUBJECTIVE AND OBJECTIVE BOX
German Carrasco MD  Interventional Cardiology / [facility] Failure [facility] Cardiac Transplant Specialist  [place] Office : [phone] [place] [address] [place] [address]  Tel:   [place] Office : [phone] [facility] [place] [address]  Tel: [phone]         HISTORY OF PRESENTING ILLNESS:  HPI:  59F PMHx CAD s/p CABG and AVR 11/2023, CVA x2, APLS on Eliquis, COPD, RA, breast CA, HTN, HLD, T2DM, and depression presenting for shortness of breath and pain in multiple sites. She sustained a tailbone fracture after a fall 10 days ago. She was in [place] for this, was evaluated in a hospital, was recommended nonoperative management w/ a donut pillow for when she sits. For the past week, she has been having a cough, increased SOB, and wheezing, progressively worsening by the day. She has also been having intermittent palpitations and chest pain but this has been chronic since her CABG last November. Finally, she is also complaining of pain in her RLE that started about 4 days ago after she developed a pimple on her thigh.    In the ED, she was afebrile and hemodynamically stable, saturating 89% on RA, so was placed on 2LNC. CBC w/ mild leukocytosis of 11.09, CMP grossly wnl except for elevated ALP of 162. Trop 11. ECG NSR w/ 1st degree AV block. CXR w/o consolidations, CT chest showing diffuse sternal nonunion, with fluid interposed between the sternal fragments, widest area of separation of the sternal fragments up to 2.8 cm. Received Solumedrol 40mg, Duonebs, and Mag sulfate in the ED.          PAST MEDICAL & SURGICAL HISTORY:  Cigarette smoker      Rheumatoid arthritis      Other depression      Breast cancer      Migraines      History of multiple cerebrovascular accidents (CVAs)      Suicidal ideation      Paresthesia of left arm      Facial paresthesia      APS (antiphospholipid syndrome)      History of depressed bipolar disorder      History of COPD      History of COPD      Hyperlipidemia      Type 2 diabetes mellitus      Asthma      CAD (coronary artery disease)      History of hysterectomy      History of cholecystectomy      History of loop recorder      S/P CABG x 1      S/P AVR          SOCIAL HISTORY: Substance Use (street drugs): ( x ) never used  (  ) other:    FAMILY HISTORY:  Family history of breast cancer (Mother)    Family history of diabetes mellitus (DM) (Father)        MEDICATIONS:  apixaban 5 milliGRAM(s) Oral two times a day  aspirin enteric coated 81 milliGRAM(s) Oral daily  metoprolol succinate ER 25 milliGRAM(s) Oral daily  spironolactone 25 milliGRAM(s) Oral daily  torsemide 20 milliGRAM(s) Oral daily  amoxicillin  875 milliGRAM(s)/clavulanate 1 Tablet(s) Oral two times a day  albuterol/ipratropium for Nebulization 3 milliLiter(s) Nebulizer every 6 hours  fluticasone propionate/ salmeterol 250-50 MICROgram(s) Diskus 1 Dose(s) Inhalation two times a day  acetaminophen     Tablet .. 650 milliGRAM(s) Oral every 6 hours PRN  gabapentin 300 milliGRAM(s) Oral three times a day  melatonin 3 milliGRAM(s) Oral at bedtime PRN  methocarbamol 500 milliGRAM(s) Oral three times a day  OLANZapine 7.5 milliGRAM(s) Oral daily  oxyCODONE    IR 5 milliGRAM(s) Oral every 6 hours PRN  oxyCODONE    IR 10 milliGRAM(s) Oral every 6 hours PRN  traZODone 50 milliGRAM(s) Oral at bedtime  atorvastatin 80 milliGRAM(s) Oral at bedtime  dextrose 50% Injectable 25 Gram(s) IV Push once  dextrose 50% Injectable 12.5 Gram(s) IV Push once  dextrose 50% Injectable 25 Gram(s) IV Push once  dextrose Oral Gel 15 Gram(s) Oral once PRN  glucagon  Injectable 1 milliGRAM(s) IntraMuscular once  insulin lispro (ADMELOG) corrective regimen sliding scale   SubCutaneous three times a day before meals  insulin lispro (ADMELOG) corrective regimen sliding scale   SubCutaneous at bedtime  methylPREDNISolone sodium succinate Injectable 40 milliGRAM(s) IV Push two times a day    dextrose 5%. 1000 milliLiter(s) IV Continuous <Continuous>  dextrose 5%. 1000 milliLiter(s) IV Continuous <Continuous>      FAMILY HISTORY:  Family history of breast cancer (Mother)    Family history of diabetes mellitus (DM) (Father)          Allergies    No Known Allergies    Intolerances    	      PHYSICAL EXAM:  T(C): 36.7 (09-29-24 @ 20:05), Max: 36.7 (09-29-24 @ 01:31)  HR: 55 (09-29-24 @ 20:05) (55 - 67)  BP: 103/65 (09-29-24 @ 20:05) (87/50 - 103/65)  RR: 18 (09-29-24 @ 20:05) (18 - 19)  SpO2: 95% (09-29-24 @ 20:05) (94% - 96%)  Wt(kg): --  I&O's Summary    29 Sep 2024 07:01  -  29 Sep 2024 23:32  --------------------------------------------------------  IN: 280 mL / OUT: 0 mL / NET: 280 mL        GENERAL: NAD   EYES: EOMI, PERRLA, conjunctiva and sclera clear  ENMT: No tonsillar erythema, exudates, or enlargement; Moist mucous membranes, Good dentition, No lesions  Cardiovascular: Normal S1 S2, No JVD, No murmurs, No edema  Respiratory: b/l rhonchi   Gastrointestinal:  Soft, Non-tender, + BS	  Extremities: no edema      LABS:	 	    CARDIAC MARKERS:                                  12.2   7.54  )-----------( 237      ( 29 Sep 2024 07:25 )             38.9     09-29    138  |  104  |  16  ----------------------------<  122[H]  4.5   |  21[L]  |  0.54    Ca    9.0      29 Sep 2024 07:25  Phos  3.6     09-28  Mg     1.6     09-28    TPro  7.2  /  Alb  3.8  /  TBili  0.5  /  DBili  x   /  AST  16  /  ALT  17  /  AlkPhos  150[H]  09-29    proBNP:   Lipid Profile:   HgA1c:   TSH:     Consultant(s) Notes Reviewed:  [x ] YES  [ ] NO    Care Discussed with Consultants/Other Providers [ x] YES  [ ] NO    Imaging Personally Reviewed independently:  [x] YES  [ ] NO    All labs, radiologic studies, vitals, orders and medications list reviewed. Patient is seen and examined at bedside. Case discussed with medical team.    ASSESSMENT/PLAN:

## 2024-09-29 NOTE — CHART NOTE - NSCHARTNOTEFT_GEN_A_CORE
Patient seen this morning.   Endorses pain at right medial thigh.   No fluctuance on exam, erythema around pimple.     No acute surgical intervention  Surgery to sign off   Please recall as needed     Acute Care Surgery  [id number] or [phone]

## 2024-09-29 NOTE — PROGRESS NOTE ADULT - ASSESSMENT
{\rtf1\qhdfdn14937\ansi\jzzufxk7743\ftnbj\uc1\deff0  {\fonttbl{\f0 \fnil Segoe UI;}{\f1 \fnil \fcharset0 Segoe UI;}{\f2 \fnil Times New Sohail;}}  {\colortbl ;\ffq824\zqygt482\wtaq234 ;\red0\green0\blue0 ;\red0\green0\uzbj567 ;\red0\green0\blue0 ;}  {\stylesheet{\f0\fs20 Normal;}{\cs1 Default Paragraph Font;}{\cs2\f0\fs16 Line Number;}{\cs3\f2\fs24\ul\cf3 Hyperlink;}}  {\*\revtbl{Unknown;}}  \glcdmh76304\adjrir27791\ikkwv1962\zsimr9981\qzoyg4613\lvfvd4910\cnykiwb461\rmnihbf590\nogrowautofit\xdnbpq615\formshade\nofeaturethrottle1\dntblnsbdb\fet4\aendnotes\aftnnrlc\pgbrdrhead\pgbrdrfoot  \sectd\zwyyxz18950\nxewxv07458\guttersxn0\mbsiirtc4144\qpzjgydi8154\tpuggjys4725\glluepkd8085\xemdlnb791\ctiztxf639\sbkpage\pgncont\pgndec  \plain\plain\f0\fs24\pard\plain\f0\fs24\plain\f0\fs20\ycow5644\hich\f0\dbch\f0\loch\f0\fs20\par  _________________________________________________________________________________________\par  ========>>  M E D I C A L   A T T E N D I N G    F O L L O W  U P  N O T E  <<=========\par  -----------------------------------------------------------------------------------------------------\par  \par  - Patient seen and examined by me earlier today. \par  - In summary,  BRENDEN DE LA CRUZ is a 59y year old woman admitted with COPD exacerbation \par  - Patient today overall doing ok, comfortable, eating OK. overall feels better, breathing improved.. \par  \par  ==================>> REVIEW OF SYSTEM <<=================\par  \par  GEN: no fever, no chills, no pain\par  RESP: SOB improved , no cough, no sputum\par  CVS: no chest pain, no palpitations\par  GI: no abdominal pain, no nausea, no constipation, no diarrhea\par  : no dysuria, no frequency\par  Neuro: no headache, no dizziness\par  \par  ==================>> PHYSICAL EXAM <<=================\par  \par  GEN: A&O X 3 , NAD , comfortable, pleasant, calm \par  HEENT: NCAT, PERRL, MMM, hearing intact\par  CVS: S1S2 , regular , No M/R/G appreciated\par  PULM: + bilateral mild wheezing.. off O2 \par  ABD.: soft. non tender, non distended,  obese \par  Extrem: intact pulses , no edema \par  \par        ( Note written / Date of service 09-29-24 ( This is certified to be the same as "ENTERED" date above ( for billing purposes)))\par    ==================>> MEDICATIONS <<====================\par  \par  MEDICATIONS  (STANDING):\par  albuterol/ipratropium for Nebulization 3 milliLiter(s) Nebulizer every 6 hours\par  amoxicillin  875 milliGRAM(s)/clavulanate 1 Tablet(s) Oral two times a day\par  apixaban 5 milliGRAM(s) Oral two times a day\par  aspirin enteric coated 81 milliGRAM(s) Oral daily\par  atorvastatin 80 milliGRAM(s) Oral at bedtime\par  dextrose 5%. 1000 milliLiter(s) (50 mL/Hr) IV Continuous <Continuous>\par  dextrose 5%. 1000 milliLiter(s) (100 mL/Hr) IV Continuous <Continuous>\par  dextrose 50% Injectable 25 Gram(s) IV Push once\par  dextrose 50% Injectable 12.5 Gram(s) IV Push once\par  dextrose 50% Injectable 25 Gram(s) IV Push once\par  fluticasone propionate/ salmeterol 250-50 MICROgram(s) Diskus 1 Dose(s) Inhalation two times a day\par  gabapentin 300 milliGRAM(s) Oral three times a day\par  glucagon  Injectable 1 milliGRAM(s) IntraMuscular once\par  insulin lispro (ADMELOG) corrective regimen sliding scale   SubCutaneous three times a day before meals\par  insulin lispro (ADMELOG) corrective regimen sliding scale   SubCutaneous at bedtime\par  methocarbamol 500 milliGRAM(s) Oral three times a day\par  methylPREDNISolone sodium succinate Injectable 40 milliGRAM(s) IV Push two times a day\par  metoprolol succinate ER 25 milliGRAM(s) Oral daily\par  OLANZapine 7.5 milliGRAM(s) Oral daily\par  spironolactone 25 milliGRAM(s) Oral daily\par  torsemide 20 milliGRAM(s) Oral daily\par  traZODone 50 milliGRAM(s) Oral at bedtime\par  \par  MEDICATIONS  (PRN):\par  acetaminophen     Tablet .. 650 milliGRAM(s) Oral every 6 hours PRN Temp greater or equal to 38C (100.4F), Mild Pain (1 - 3)\par  dextrose Oral Gel 15 Gram(s) Oral once PRN Blood Glucose LESS THAN 70 milliGRAM(s)/deciliter\par  melatonin 3 milliGRAM(s) Oral at bedtime PRN Insomnia\par  oxyCODONE    IR 5 milliGRAM(s) Oral every 6 hours PRN Moderate Pain (4 - 6)\par  oxyCODONE    IR 10 milliGRAM(s) Oral every 6 hours PRN Severe Pain (7 - 10)\par  \par  ___________\par  Active diet:\par  Diet, Consistent Carbohydrate/No Snacks: \par  DASH/TLC Sodium & Cholesterol Restricted (DASH)\par  ___________________\par  \par  ==================>> VITAL SIGNS <<==================\par  \par  T(C): 36.4 (09-29-24 @ 05:49), Max: 36.7 (09-29-24 @ 01:31)\par  HR: 67 (09-29-24 @ 05:49) (60 - 87)\par  BP: 99/61 (09-29-24 @ 05:49) (97/69 - 115/79)\par  BP(mean): 85 (09-28-24 @ 20:25)\par  RR: 18 (09-29-24 @ 05:49) (18 - 20)\par  SpO2: 94% (09-29-24 @ 05:49) (89% - 100%) \par  \par  CAPILLARY BLOOD GLUCOSE\par  POCT Blood Glucose.: 135 mg/dL (29 Sep 2024 08:03)\par  POCT Blood Glucose.: 270 mg/dL (28 Sep 2024 22:56)\par  \par   ==================>> LAB AND IMAGING <<==================\par           \par             12.2 \par  7.54  )-----------( 237      ( 29 Sep 2024 07:25 )\par             38.9 \par     \par  09-29\par  \par  138  |  104  |  16\par  ----------------------------<  122[H]\par  4.5   |  21[L]  |  0.54\par  \par  Ca    9.0      29 Sep 2024 07:25\par  Phos  3.6     09-28\par  Mg     1.6     09-28\par  \par  TPro  7.2  /  Alb  3.8  /  TBili  0.5  /  DBili  x   /  AST  16  /  ALT  17  /  AlkPhos  150[H]  09-29\par  \par  Lipid profile:  (09-29-24)\par     Total: 121\par     LDL  : (p)\par     HDL  :51\par     TG   :61\par   \par  HgA1C:   (09-29-24)      \par    (09-29-24)      6.1\par  \par  ___________________________________________________________________________________\par  ===============>>  A S S E S S M E N T   A N D   P L A N <<===============\par  ------------------------------------------------------------------------------------------\par  \par  \trowd\hmizve81\lastrow\dnxaddz39\trpaddfl3\dhntifk30\trpaddfr3\trpaddt0\trpaddft3\trpaddb0\trpaddfb3\trleft0  \clvertalt\slroxr94\clpadft3\pwjdua58\clpadfr3\clpadl0\clpadfl3\clpadb0\clpadfb3\bggqd1714  \clvertalt\rsbiou53\clpadft3\cschoq18\clpadfr3\clpadl0\clpadfl3\clpadb0\clpadfb3\fahyx9076  \pard\intbl\ssparaaux0\s0\fi-120\li120\ql\plain\f0\fs24{\*\bkmkstart [id number]}{\*\bkmkend [id number]}\plain\f0\fs20\xzjp1980\hich\f0\dbch\f0\loch\f0\fs20 \'b7 \plain\f1\fs20\mbxl2451\hich\f1\dbch\f1\loch\f1\cf2\fs20\b Assessment\plain\f0\fs20\ewxx9295\hich\f0\dbch\f0\loch\f0\fs20\cell  \pard\intbl\ssparaaux0\s0\li300\ri300\ql\plain\f0\fs24\plain\f0\fs20\rism9073\hich\f0\dbch\f0\loch\f0\fs20\cell  \intbl\row  \pard\ssparaaux0\s0\ql\plain\f0\fs24\plain\f0\fs20\gkfw0548\hich\f0\dbch\f0\loch\f0\fs20 59F PMHx CAD s/p CABG and AVR 11/2023, CVA x2, APLS on Eliquis, COPD, RA, breast CA, HTN, HLD, T2DM, and depression presenting for shortness of breath, coccygeal   pain in setting of recent fracture, and RLE pain. Found to be wheezing on physical exam along w/ small R medial thigh folliculitis vs abscess. Admitted for management of COPD exacerbation and thigh abscess/folliculitis.\par  \par  \par  \plain\f1\fs20\iymg7503\hich\f1\dbch\f1\loch\f1\cf2\fs20\b\ul{\field{\*\fldinst HYPERLINK [id number] }{\fldrslt Problem/Plan - 1:}}\plain\f0\fs20\kotv9416\hich\f0\dbch\f0\loch\f0\fs20\ql\par  \'b7  {\*\bkmkstart wr76594769017}{\*\bkmkend kj43068619236}Problem: {\*\bkmkstart bw09069474671}{\*\bkmkend ls08494921042}COPD exacerbation. \par  \'b7  {\*\bkmkstart qg82928802468}{\*\bkmkend ri56029509429}Plan: {\*\bkmkstart zi85144663656}{\*\bkmkend hg85661515776}- Patient with cough and expiratory wheezing on exam, requiring 2LNC\par  - on IV solumedrol 40mg BID >> likely taper down in next 24 hrs if continues to improve / stable \par  - Duonebs q6hrs ATC for now\par  - Patient states she no longer takes symbicort, doesn't remember the name of her inhaler, will start Advair while admitted.\plain\f1\fs20\nnkw1155\hich\f1\dbch\f1\loch\f1\cf2\fs20\strike\plain\f0\fs20\jzyg5206\hich\f0\dbch\f0\loch\f0\fs20 - patient educated   again to QUIT smoking \par  \par  \plain\f1\fs20\qyjd7647\hich\f1\dbch\f1\loch\f1\cf2\fs20\b\ul{\field{\*\fldinst HYPERLINK [id number] }{\fldrslt Problem/Plan - 2:}}\plain\f0\fs20\npcx2900\hich\f0\dbch\f0\loch\f0\fs20\ql\par  \'b7  {\*\bkmkstart et69407658492}{\*\bkmkend vs30378418849}Problem: {\*\bkmkstart sp37789604580}{\*\bkmkend zh00772957293}Folliculitis. \par  \'b7  {\*\bkmkstart gm62393657590}{\*\bkmkend ve40658061086}Plan: {\*\bkmkstart fi14288568886}{\*\bkmkend px72098614322}- Has small abscess vs folliculitis on R posterior thigh, but no cellulitis seen\par  - Surgery consult appreciated \par  - continue antibiotics \par  \par  \plain\f1\fs20\zicr7355\hich\f1\dbch\f1\loch\f1\cf2\fs20\b\ul{\field{\*\fldinst HYPERLINK [id number] }{\fldrslt Problem/Plan - 3:}}\plain\f0\fs20\dpnr1787\hich\f0\dbch\f0\loch\f0\fs20\ql\par  \'b7  {\*\bkmkstart rc38451200856}{\*\bkmkend la42371972792}Problem: {\*\bkmkstart hh17451210546}{\*\bkmkend fw95382198403}Coccygeal fracture. \par  \'b7  {\*\bkmkstart xs27872214597}{\*\bkmkend xb64811136363}Plan: {\*\bkmkstart oz97495883316}{\*\bkmkend tw03732867110}- Was diagnosed w/ coccygeal fracture s/p fall in [place] 1.5 weeks ago\par  - Told by hospital in [place] that she would need to use a donut pillow for 6 weeks\par  - Fall precautions\par  - Tylenol PRN for mild pain, oxycodone 5mg q6hrs PRN for moderate pain and 10mg q6hrs PRN for severe pain\par  - Robaxin 500mg TID.\par  \par  \plain\f1\fs20\yrpd7312\hich\f1\dbch\f1\loch\f1\cf2\fs20\b\ul{\field{\*\fldinst HYPERLINK [id number] }{\fldrslt Problem/Plan - 4:}}\plain\f0\fs20\yogm3270\hich\f0\dbch\f0\loch\f0\fs20\ql\par  \'b7  {\*\bkmkstart cu13247668898}{\*\bkmkend bk17097552878}Problem: {\*\bkmkstart gh40240430073}{\*\bkmkend cr81856876948}CAD (coronary artery disease). 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, patient with feelings of palpitations and chest pain elicited w/ palpation of surgical site\par  - CT-surgery follow up , per [facility] they were aware of this back in July, patient states her CT surgeon told her she may need a repeat procedure.\par       in vs outpatient follow up with Dr Loaiza \par  \par  \plain\f1\fs20\mtej0888\hich\f1\dbch\f1\loch\f1\cf2\fs20\b\ul{\field{\*\fldinst HYPERLINK [id number] }{\fldrslt Problem/Plan - 5:}}\plain\f0\fs20\svet5519\hich\f0\dbch\f0\loch\f0\fs20\ql\par  \'b7  {\*\bkmkstart hm95641767792}{\*\bkmkend pn38744001336}Problem: {\*\bkmkstart tu78378089962}{\*\bkmkend tv07894020019}History of CVA in adulthood. \par  \'b7  {\*\bkmkstart hq94514091665}{\*\bkmkend gn69838157944}Plan: {\*\bkmkstart qf12877374719}{\*\bkmkend tw41341658085}- Has had 2 CVAs (basal ganglia infarct), with residual L-sided weakness and numbness\par  - C/w ASA and atorvastatin.\par  \par  \plain\f1\fs20\xfns1610\hich\f1\dbch\f1\loch\f1\cf2\fs20\b\ul{\field{\*\fldinst HYPERLINK [id number] }{\fldrslt Problem/Plan - 6:}}\plain\f0\fs20\vgqm8542\hich\f0\dbch\f0\loch\f0\fs20\ql\par  \'b7  {\*\bkmkstart oy09902865418}{\*\bkmkend bp70949468480}Problem: {\*\bkmkstart tt24252670903}{\*\bkmkend cr27942085791}Antiphospholipid syndrome. \par  \'b7  {\*\bkmkstart vk14439028345}{\*\bkmkend ij76377055350}Plan: {\*\bkmkstart hg88881903701}{\*\bkmkend ze18143021072}- C/w Eliquis 5mg BID.\par  \par  \plain\f1\fs20\tepd3003\hich\f1\dbch\f1\loch\f1\cf2\fs20\b\ul{\field{\*\fldinst HYPERLINK [id number] }{\fldrslt Problem/Plan - 7:}}\plain\f0\fs20\dhun1653\hich\f0\dbch\f0\loch\f0\fs20\ql\par  \'b7  {\*\bkmkstart lw39590309442}{\*\bkmkend so76622922554}Problem: {\*\bkmkstart ye29001210618}{\*\bkmkend xi43126296109}HTN (hypertension). \par  \'b7  {\*\bkmkstart yk24183288940}{\*\bkmkend bt95215222494}Plan: {\*\bkmkstart ms56829715659}{\*\bkmkend iw75141549220}- C/w spironolactone 25mg daily\par  - C/w torsemide 20mg daily.\par  \par  \plain\f1\fs20\tbwm2745\hich\f1\dbch\f1\loch\f1\cf2\fs20\b\ul{\field{\*\fldinst HYPERLINK [id number] }{\fldrslt Problem/Plan - 8:}}\plain\f0\fs20\hymg8609\hich\f0\dbch\f0\loch\f0\fs20\ql\par  \'b7  {\*\bkmkstart ch45116636719}{\*\bkmkend bb56941338892}Problem: {\*\bkmkstart yl47605355393}{\*\bkmkend it52293437603}HLD (hyperlipidemia). \par  \'b7  {\*\bkmkstart mt93907012494}{\*\bkmkend cd50251751752}Plan: {\*\bkmkstart zg92980239003}{\*\bkmkend oo37081934608}- C/w atorvastatin 80mg QHS.\par  \par  \plain\f1\fs20\uqbj5738\hich\f1\dbch\f1\loch\f1\cf2\fs20\b\ul{\field{\*\fldinst HYPERLINK [id number] }{\fldrslt Problem/Plan - 9:}}\plain\f0\fs20\tdsx9612\hich\f0\dbch\f0\loch\f0\fs20\ql\par  \'b7  {\*\bkmkstart mt30686295845}{\*\bkmkend wm08166481554}Problem: {\*\bkmkstart wv05315626141}{\*\bkmkend bn37794745621}T2DM (type 2 diabetes mellitus). \par  \'b7  {\*\bkmkstart ek91046243167}{\*\bkmkend tx95394359239}Plan: {\*\bkmkstart hh24114537698}{\*\bkmkend sj63169967645}- Patient states she takes metformin now, off of insulin\par  - ISS for now while admitted, titrate as needed\par  - Watch sugars given IV steroids\par  - C/w gabapentin 300mg TID.\par  \par  \plain\f1\fs20\mkjs5090\hich\f1\dbch\f1\loch\f1\cf2\fs20\b\ul{\field{\*\fldinst HYPERLINK [id number] }{\fldrslt Problem/Plan - 10:}}\plain\f0\fs20\qpxo9521\hich\f0\dbch\f0\loch\f0\fs20\ql\par  \'b7  {\*\bkmkstart yu07040439844}{\*\bkmkend xx69696793345}Problem: {\*\bkmkstart iq11452773883}{\*\bkmkend gw66227865573}Rheumatoid arthritis. \par  \'b7  {\*\bkmkstart uo61375725865}{\*\bkmkend pz74783237825}Plan; {\*\bkmkstart jv29863410067}{\*\bkmkend jt16794657322}- Hold prednisone 5mg daily for now while on IV steroids\par  - Takes methotrexate 20mg weekly for RA and associated uveitis.\par  \par  \plain\f1\fs20\xhot6615\hich\f1\dbch\f1\loch\f1\cf2\fs20\b\ul{\field{\*\fldinst HYPERLINK [id number] }{\fldrslt Problem/Plan - 11:}}\plain\f0\fs20\pdcf7740\hich\f0\dbch\f0\loch\f0\fs20\ql\par  \'b7  {\*\bkmkstart jm746886382184}{\*\bkmkend db049373058997}Problem: {\*\bkmkstart zj151736795995}{\*\bkmkend sz030586921768}Other depression. \par  \'b7  {\*\bkmkstart np018146011199}{\*\bkmkend oi024679994159}Plan: {\*\bkmkstart jt950426253042}{\*\bkmkend ea844056435162}- C/w Olanzapine 7.5mg daily\par  - C/w Trazodone 50mg QHS.\par  \par  \plain\f1\fs20\ghlq4764\hich\f1\dbch\f1\loch\f1\cf2\fs20\b\ul{\field{\*\fldinst HYPERLINK [id number] }{\fldrslt Problem/Plan - 12:}}\plain\f0\fs20\wfls9735\hich\f0\dbch\f0\loch\f0\fs20\ql\par  \'b7  {\*\bkmkstart pp796925074853}{\*\bkmkend ra966777169651}Problem: {\*\bkmkstart wh350321639621}{\*\bkmkend el182356298031}Prophylactic measure. \par  \'b7  {\*\bkmkstart ct436598803831}{\*\bkmkend fa300115131061}Plan: {\*\bkmkstart jt722183993476}{\*\bkmkend kn251449527462}DVT PPx: Elifidelis.\par  \pard\plain\f0\fs24\plain\f0\fs20\kvhc6544\hich\f0\dbch\f0\loch\f0\fs20\par  -------------------------------------------\par  Case discussed with patient \par  Education given on findings and plan of care\par  ___________________________\par  WILLIE Khan D.O.\par  Pager: [phone]\par  \par  \ql\plain\f0\fs24\plain\f0\fs20\favs9681\hich\f0\dbch\f0\loch\f0\fs20\par  }

## 2024-09-29 NOTE — PATIENT PROFILE ADULT - AVIAN FLU SYMPTOMS
"  Problem: Suicidal Behavior  Goal: Suicidal Behavior is Absent or Managed  Outcome: Ongoing, Progressing  Intervention: Provide Immediate and Ongoing Protective Physical Environment  Recent Flowsheet Documentation  Taken 5/4/2022 1630 by Pinky Goldman RN  Safe Transition Promotion: personal safety plan developed   Goal Outcome Evaluation:    Plan of Care Reviewed With: patient      Patient isolative to room most of the shift reports \"feeling lonely and struggling around 2000; comes out to dining room for a snack.    NURSING ASSESSMENT  SI/SIB: denies   A/V HA: denies  HI/Aggression: No aggression this shift.  Milieu participation: Napping quite a bit before and after supper; Attends evening Art Therapy group with prompts and immediately leaves stating \"collages are very triggering for me.\" Patient returns to room to read.  Sleep: \"not so great,\"  PRN Medications: Ibuprofen prn for headache and Tums prn heartburn helpful.  Medication AE: pt denies and reports she needs nicotine patch during the night.  Physical complaints/medical concerns: pt denies any medical concerns; medications reviewed and patient does not know her medications. Printed MAR given for reference.  Appetite: good, 100% supper and snacks  ADLs: WDL, Independent with prompts  Status:15 minute checks, SI and fall precautions  Intake & output: Pt denies concerns  LBM: 5/3/22  Vital signs: WNL    BP (!) 142/89 (BP Location: Left arm, Patient Position: Sitting)   Pulse 89   Temp 97.3  F (36.3  C) (Temporal)   Resp 18   Ht 1.676 m (5' 6\")   Wt (!) 158.1 kg (348 lb 9.6 oz)   SpO2 94%   BMI 56.27 kg/m                        " No

## 2024-09-30 ENCOUNTER — RESULT REVIEW (OUTPATIENT)
Age: 59
End: 2024-09-30

## 2024-09-30 LAB
ANION GAP SERPL CALC-SCNC: 13 MMOL/L — SIGNIFICANT CHANGE UP (ref 5–17)
BUN SERPL-MCNC: 20 MG/DL — SIGNIFICANT CHANGE UP (ref 7–23)
CALCIUM SERPL-MCNC: 8.5 MG/DL — SIGNIFICANT CHANGE UP (ref 8.4–10.5)
CHLORIDE SERPL-SCNC: 106 MMOL/L — SIGNIFICANT CHANGE UP (ref 96–108)
CO2 SERPL-SCNC: 20 MMOL/L — LOW (ref 22–31)
CREAT SERPL-MCNC: 0.7 MG/DL — SIGNIFICANT CHANGE UP (ref 0.5–1.3)
EGFR: 99 ML/MIN/1.73M2 — SIGNIFICANT CHANGE UP
GLUCOSE BLDC GLUCOMTR-MCNC: 126 MG/DL — HIGH (ref 70–99)
GLUCOSE BLDC GLUCOMTR-MCNC: 198 MG/DL — HIGH (ref 70–99)
GLUCOSE BLDC GLUCOMTR-MCNC: 251 MG/DL — HIGH (ref 70–99)
GLUCOSE BLDC GLUCOMTR-MCNC: 283 MG/DL — HIGH (ref 70–99)
GLUCOSE SERPL-MCNC: 161 MG/DL — HIGH (ref 70–99)
HCT VFR BLD CALC: 35 % — SIGNIFICANT CHANGE UP (ref 34.5–45)
HGB BLD-MCNC: 10.7 G/DL — LOW (ref 11.5–15.5)
MAGNESIUM SERPL-MCNC: 1.9 MG/DL — SIGNIFICANT CHANGE UP (ref 1.6–2.6)
MCHC RBC-ENTMCNC: 29.9 PG — SIGNIFICANT CHANGE UP (ref 27–34)
MCHC RBC-ENTMCNC: 30.6 GM/DL — LOW (ref 32–36)
MCV RBC AUTO: 97.8 FL — SIGNIFICANT CHANGE UP (ref 80–100)
NRBC # BLD: 0 /100 WBCS — SIGNIFICANT CHANGE UP (ref 0–0)
PHOSPHATE SERPL-MCNC: 2.3 MG/DL — LOW (ref 2.5–4.5)
PLATELET # BLD AUTO: 222 K/UL — SIGNIFICANT CHANGE UP (ref 150–400)
POTASSIUM SERPL-MCNC: 3.8 MMOL/L — SIGNIFICANT CHANGE UP (ref 3.5–5.3)
POTASSIUM SERPL-SCNC: 3.8 MMOL/L — SIGNIFICANT CHANGE UP (ref 3.5–5.3)
RBC # BLD: 3.58 M/UL — LOW (ref 3.8–5.2)
RBC # FLD: 15.4 % — HIGH (ref 10.3–14.5)
SODIUM SERPL-SCNC: 139 MMOL/L — SIGNIFICANT CHANGE UP (ref 135–145)
WBC # BLD: 8.8 K/UL — SIGNIFICANT CHANGE UP (ref 3.8–10.5)
WBC # FLD AUTO: 8.8 K/UL — SIGNIFICANT CHANGE UP (ref 3.8–10.5)

## 2024-09-30 PROCEDURE — 99222 1ST HOSP IP/OBS MODERATE 55: CPT

## 2024-09-30 PROCEDURE — 99221 1ST HOSP IP/OBS SF/LOW 40: CPT

## 2024-09-30 PROCEDURE — 93306 TTE W/DOPPLER COMPLETE: CPT | Mod: 26

## 2024-09-30 PROCEDURE — 76882 US LMTD JT/FCL EVL NVASC XTR: CPT | Mod: 26,RT

## 2024-09-30 RX ADMIN — APIXABAN 5 MILLIGRAM(S): 5 TABLET, FILM COATED ORAL at 05:33

## 2024-09-30 RX ADMIN — ATORVASTATIN CALCIUM 80 MILLIGRAM(S): 10 TABLET, FILM COATED ORAL at 21:30

## 2024-09-30 RX ADMIN — IPRATROPIUM BROMIDE AND ALBUTEROL SULFATE 3 MILLILITER(S): .5; 3 SOLUTION RESPIRATORY (INHALATION) at 05:36

## 2024-09-30 RX ADMIN — GABAPENTIN 300 MILLIGRAM(S): 800 TABLET, FILM COATED ORAL at 13:19

## 2024-09-30 RX ADMIN — IPRATROPIUM BROMIDE AND ALBUTEROL SULFATE 3 MILLILITER(S): .5; 3 SOLUTION RESPIRATORY (INHALATION) at 00:36

## 2024-09-30 RX ADMIN — APIXABAN 5 MILLIGRAM(S): 5 TABLET, FILM COATED ORAL at 17:36

## 2024-09-30 RX ADMIN — Medication 2: at 21:31

## 2024-09-30 RX ADMIN — GABAPENTIN 300 MILLIGRAM(S): 800 TABLET, FILM COATED ORAL at 21:29

## 2024-09-30 RX ADMIN — Medication 2: at 17:35

## 2024-09-30 RX ADMIN — SPIRONOLACTONE 25 MILLIGRAM(S): 100 TABLET, FILM COATED ORAL at 06:56

## 2024-09-30 RX ADMIN — Medication 500 MILLIGRAM(S): at 13:19

## 2024-09-30 RX ADMIN — Medication 7.5 MILLIGRAM(S): at 12:49

## 2024-09-30 RX ADMIN — METHOTREXATE 20 MILLIGRAM(S): 25 INJECTION, SOLUTION SUBCUTANEOUS at 12:49

## 2024-09-30 RX ADMIN — OXYCODONE HYDROCHLORIDE 5 MILLIGRAM(S): 30 TABLET, FILM COATED, EXTENDED RELEASE ORAL at 20:39

## 2024-09-30 RX ADMIN — Medication 1 DOSE(S): at 05:38

## 2024-09-30 RX ADMIN — Medication 500 MILLIGRAM(S): at 21:30

## 2024-09-30 RX ADMIN — Medication 81 MILLIGRAM(S): at 12:49

## 2024-09-30 RX ADMIN — METHYLPREDNISOLONE ACETATE 40 MILLIGRAM(S): 80 INJECTION, SUSPENSION INTRA-ARTICULAR; INTRALESIONAL; INTRAMUSCULAR; SOFT TISSUE at 17:37

## 2024-09-30 RX ADMIN — GABAPENTIN 300 MILLIGRAM(S): 800 TABLET, FILM COATED ORAL at 05:32

## 2024-09-30 RX ADMIN — OXYCODONE HYDROCHLORIDE 10 MILLIGRAM(S): 30 TABLET, FILM COATED, EXTENDED RELEASE ORAL at 10:05

## 2024-09-30 RX ADMIN — OXYCODONE HYDROCHLORIDE 5 MILLIGRAM(S): 30 TABLET, FILM COATED, EXTENDED RELEASE ORAL at 21:00

## 2024-09-30 RX ADMIN — Medication 1 DOSE(S): at 17:37

## 2024-09-30 RX ADMIN — Medication 6: at 12:51

## 2024-09-30 RX ADMIN — Medication 500 MILLIGRAM(S): at 05:36

## 2024-09-30 RX ADMIN — METHYLPREDNISOLONE ACETATE 40 MILLIGRAM(S): 80 INJECTION, SUSPENSION INTRA-ARTICULAR; INTRALESIONAL; INTRAMUSCULAR; SOFT TISSUE at 05:34

## 2024-09-30 RX ADMIN — Medication 1 TABLET(S): at 17:36

## 2024-09-30 RX ADMIN — IPRATROPIUM BROMIDE AND ALBUTEROL SULFATE 3 MILLILITER(S): .5; 3 SOLUTION RESPIRATORY (INHALATION) at 17:37

## 2024-09-30 RX ADMIN — Medication 1 TABLET(S): at 05:32

## 2024-09-30 RX ADMIN — IPRATROPIUM BROMIDE AND ALBUTEROL SULFATE 3 MILLILITER(S): .5; 3 SOLUTION RESPIRATORY (INHALATION) at 12:51

## 2024-09-30 RX ADMIN — OXYCODONE HYDROCHLORIDE 10 MILLIGRAM(S): 30 TABLET, FILM COATED, EXTENDED RELEASE ORAL at 09:35

## 2024-09-30 RX ADMIN — TORSEMIDE 20 MILLIGRAM(S): 10 TABLET ORAL at 06:56

## 2024-09-30 RX ADMIN — Medication 50 MILLIGRAM(S): at 21:30

## 2024-09-30 NOTE — PHYSICAL THERAPY INITIAL EVALUATION ADULT - GAIT DEVIATIONS NOTED, PT EVAL
decreased elieser/increased time in double stance/decreased step length/decreased stride length/decreased swing-to-stance ratio/decreased weight-shifting ability

## 2024-09-30 NOTE — PROGRESS NOTE ADULT - ASSESSMENT
58F with history of CVA  COPD/Asthma, Breast Ca s/p ?RT, Bipolar depression, Rheumatoid Arthritis, Antiphospholipid syndrome, L eye uveitis/scleritis,  severe AR s/p AVR CABG x 1     1.   s/p AVR CABG x 1   - pt has sternotomy dehiscense, Plz consult Dr Loaiza for surgical repair     2. CVA  -history of multiple CVAs and is on eliquis     3. Antiphospholipid syndrome  -on eliquis     4. LV dysfunction   - cont torsemide , toprol and aldactone     5 COPD exacerbation   - cont methylprednisone

## 2024-09-30 NOTE — CONSULT NOTE ADULT - NS ATTEND AMEND GEN_ALL_CORE FT
Pt seen and examined.  Chronic non union of sternum, Active infection.  Will plan for elective sternal fixation as outpatient after infective issues are resolved

## 2024-09-30 NOTE — PHYSICAL THERAPY INITIAL EVALUATION ADULT - ADDITIONAL COMMENTS
pt lives with friend Ann-Marie Mclean [phone] who is [employer] aide(15 hrs a week ) in apt ; a full flight of steps with HR ; zenon delvalle sister [phone] rep pt lives with friend Ann-Marie Mclean [phone] who is [employer] aide(15 hrs a week ) in apt ; a full flight of steps with HR ; zenon delvalle sister [phone] rep ;pt has a tub shower ; pt has been holding intermittent when need onto walls to get around or furniture , pt has had 2 cvas most recent 1 yr ago , has numbness L side or body including face but can feel light touch just not as strong as r side , r foot tingling /numbness as well but not as intense as L side of body

## 2024-09-30 NOTE — CONSULT NOTE ADULT - ATTENDING COMMENTS
Patient seen and examined with surgical house staff in ED  59 year old female with PMHx of obesity, CVA, CAD, CABG & AVR, HTN, HLD, DM, antiphospholipid syndrome, COPD, & breast cancer presents with shortness of breath.  In ED found to have possible soft tissue infection of right thigh.    On exam-  Mild SOB  afebrile  + induration, erythema, mild tenderness of circular area (approximately 10cm diameter) or upper right medial thigh  No drainage, no area that seems drainable at this time    - Antibiotics (can be PO)  - Soft tissue infection can be treated as outpatient, but admitted to medical service for SOB evaluation
Pt seen and examined

## 2024-09-30 NOTE — PHYSICAL THERAPY INITIAL EVALUATION ADULT - PLANNED THERAPY INTERVENTIONS, PT EVAL
GOAL stairs : pt will negotiate a flight of steps with HR with supervision in 2-3 weeks/balance training/bed mobility training/gait training/strengthening/transfer training

## 2024-09-30 NOTE — PROGRESS NOTE ADULT - SUBJECTIVE AND OBJECTIVE BOX
German Carrasco MD  Interventional Cardiology / Endovascular Specialist  [place] Office : [phone] [place] [address].[place]. [address]  Tel:   [place] Office : [address] [facility] [place] [address]  Tel: [phone]    Subjective/Overnight events: Patient sitting in recliner. No acute distress.   	  MEDICATIONS:  apixaban 5 milliGRAM(s) Oral two times a day  aspirin enteric coated 81 milliGRAM(s) Oral daily  metoprolol succinate ER 25 milliGRAM(s) Oral daily  spironolactone 25 milliGRAM(s) Oral daily  torsemide 20 milliGRAM(s) Oral daily    amoxicillin  875 milliGRAM(s)/clavulanate 1 Tablet(s) Oral two times a day    albuterol/ipratropium for Nebulization 3 milliLiter(s) Nebulizer every 6 hours  fluticasone propionate/ salmeterol 250-50 MICROgram(s) Diskus 1 Dose(s) Inhalation two times a day    acetaminophen     Tablet .. 650 milliGRAM(s) Oral every 6 hours PRN  gabapentin 300 milliGRAM(s) Oral three times a day  melatonin 3 milliGRAM(s) Oral at bedtime PRN  methocarbamol 500 milliGRAM(s) Oral three times a day  OLANZapine 7.5 milliGRAM(s) Oral daily  oxyCODONE    IR 5 milliGRAM(s) Oral every 6 hours PRN  traZODone 50 milliGRAM(s) Oral at bedtime      atorvastatin 80 milliGRAM(s) Oral at bedtime  dextrose 50% Injectable 12.5 Gram(s) IV Push once  dextrose 50% Injectable 25 Gram(s) IV Push once  dextrose 50% Injectable 25 Gram(s) IV Push once  dextrose Oral Gel 15 Gram(s) Oral once PRN  glucagon  Injectable 1 milliGRAM(s) IntraMuscular once  insulin lispro (ADMELOG) corrective regimen sliding scale   SubCutaneous at bedtime  insulin lispro (ADMELOG) corrective regimen sliding scale   SubCutaneous three times a day before meals  methylPREDNISolone sodium succinate Injectable 40 milliGRAM(s) IV Push two times a day    dextrose 5%. 1000 milliLiter(s) IV Continuous <Continuous>  dextrose 5%. 1000 milliLiter(s) IV Continuous <Continuous>  methotrexate 20 milliGRAM(s) Oral every week      PAST MEDICAL/SURGICAL HISTORY  PAST MEDICAL & SURGICAL HISTORY:  Cigarette smoker      Rheumatoid arthritis      Other depression      Breast cancer      Migraines      History of multiple cerebrovascular accidents (CVAs)      Suicidal ideation      Paresthesia of left arm      Facial paresthesia      APS (antiphospholipid syndrome)      History of depressed bipolar disorder      History of COPD      History of COPD      Hyperlipidemia      Type 2 diabetes mellitus      Asthma      CAD (coronary artery disease)      History of hysterectomy      History of cholecystectomy      History of loop recorder      S/P CABG x 1      S/P AVR          SOCIAL HISTORY: Substance Use (street drugs): ( x ) never used  (  ) other:    FAMILY HISTORY:  Family history of breast cancer (Mother)    Family history of diabetes mellitus (DM) (Father)        REVIEW OF SYSTEMS:  CONSTITUTIONAL: No fever, weight loss, or fatigue  EYES: No eye pain, visual disturbances, or discharge  ENMT:  No difficulty hearing, tinnitus, vertigo; No sinus or throat pain  BREASTS: No pain, masses, or nipple discharge  GASTROINTESTINAL: No abdominal or epigastric pain. No nausea, vomiting, or hematemesis; No diarrhea or constipation. No melena or hematochezia.  GENITOURINARY: No dysuria, frequency, hematuria, or incontinence  NEUROLOGICAL: No headaches, memory loss, loss of strength, numbness, or tremors  ENDOCRINE: No heat or cold intolerance; No hair loss  MUSCULOSKELETAL: No joint pain or swelling; No muscle, back, or extremity pain  PSYCHIATRIC: No depression, anxiety, mood swings, or difficulty sleeping  HEME/LYMPH: No easy bruising, or bleeding gums  All others negative    PHYSICAL EXAM:  T(C): 36.7 (09-30-24 @ 05:01), Max: 36.7 (09-29-24 @ 12:16)  HR: 69 (09-30-24 @ 10:02) (55 - 78)  BP: 76/49 (09-30-24 @ 10:02) (76/49 - 104/66)  RR: 18 (09-30-24 @ 10:02) (18 - 19)  SpO2: 93% (09-30-24 @ 10:02) (92% - 95%)  Wt(kg): --  I&O's Summary    29 Sep 2024 07:01  -  30 Sep 2024 07:00  --------------------------------------------------------  IN: 280 mL / OUT: 0 mL / NET: 280 mL      GENERAL: NAD  EYES: EOMI, PERRLA, conjunctiva and sclera clear  ENMT: No tonsillar erythema, exudates, or enlargement  Cardiovascular: Normal S1 S2, No JVD, No murmurs, No edema  Respiratory: Lungs clear to auscultation	  Gastrointestinal:  Soft, Non-tender, + BS	  Extremities: No edema                                   10.7   8.80  )-----------( 222      ( 30 Sep 2024 07:19 )             35.0     09-30    139  |  106  |  20  ----------------------------<  161[H]  3.8   |  20[L]  |  0.70    Ca    8.5      30 Sep 2024 07:19  Phos  2.3     09-30  Mg     1.9     09-30    TPro  7.2  /  Alb  3.8  /  TBili  0.5  /  DBili  x   /  AST  16  /  ALT  17  /  AlkPhos  150[H]  09-29    proBNP:   Lipid Profile:   HgA1c:   TSH:     Consultant(s) Notes Reviewed:  [x ] YES  [ ] NO    Care Discussed with Consultants/Other Providers [ x] YES  [ ] NO    Imaging Personally Reviewed independently:  [x] YES  [ ] NO    All labs, radiologic studies, vitals, orders and medications list reviewed. Patient is seen and examined at bedside. Case discussed with medical team.

## 2024-09-30 NOTE — PHYSICAL THERAPY INITIAL EVALUATION ADULT - PERTINENT HX OF CURRENT PROBLEM, REHAB EVAL
59F PMHx CAD s/p CABG and AVR 11/2023, CVA x2, APLS on Eliquis, COPD, RA, breast CA, HTN, HLD, T2DM, and depression presenting for shortness of breath and pain in multiple sites. She sustained a tailbone fracture after a fall 10 days ago. She was in [place] for this, was evaluated in a hospital, was recommended nonoperative management w/ a donut pillow for when she sits. For the past week, she has been having a cough, increased SOB, and wheezing, progressively worsening by the day. She has also been having intermittent palpitations and chest pain but this has been chronic since her CABG last November. Finally, she is also complaining of pain in her RLE that started about 4 days ago after she developed a pimple on her thigh.    In the ED, she was afebrile and hemodynamically stable, saturating 89% on RA, so was placed on 2LNC. CBC w/ mild leukocytosis of 11.09, CMP grossly wnl except for elevated ALP of 162. Trop 11. ECG NSR w/ 1st degree AV block. CXR 9/28/24 : B hazy opacities ; EKG 9/28/24 : SR with AVB primary ; CT chest showing diffuse sternal nonunion, with fluid interposed between the sternal fragments, widest area of separation of the sternal fragments up to 2.8 cm. Received Solumedrol 40mg, Duonebs, and Mag sulfate in the ED. 59F PMHx CAD s/p CABG and AVR 11/2023, CVA x2, APLS on Eliquis, COPD, RA, breast CA, HTN, HLD, T2DM, and depression presenting for shortness of breath and pain in multiple sites. She sustained a tailbone fracture after a fall 10 days ago. She was in [place] for this, was evaluated in a hospital, was recommended nonoperative management w/ a donut pillow for when she sits. For the past week, she has been having a cough, increased SOB, and wheezing, progressively worsening by the day. She has also been having intermittent palpitations and chest pain but this has been chronic since her CABG last November. Finally, she is also complaining of pain in her RLE that started about 4 days ago after she developed a pimple on her thigh.    In the ED, she was afebrile and hemodynamically stable, saturating 89% on RA, so was placed on 2LNC. CBC w/ mild leukocytosis of 11.09, CMP grossly wnl except for elevated ALP of 162. Trop 11. ECG NSR w/ 1st degree AV block. CXR 9/28/24 : B hazy opacities ; EKG 9/28/24 : SR with AVB primary ; CT chest showing diffuse sternal nonunion, with fluid interposed between the sternal fragments, widest area of separation of the sternal fragments up to 2.8 cm. Received Solumedrol 40mg, Duonebs, and Mag sulfate in the ED.  US R Thigh 9/30/24 :Proximal inner right thigh subcutaneous complex collection with surrounding inflammatory changes, most concerning for abscess and associated cellulitis.  CT CHEST 10/1/24 : 1.  Status post sternotomy. The sternotomy margins are not ossified and are  between 18-32 mm.

## 2024-09-30 NOTE — PHYSICAL THERAPY INITIAL EVALUATION ADULT - NSPTDISCHREC_GEN_A_CORE
TBD once fxl assess can be completed TBD once fxl assess can be completed; 10/2/24 PT recommend Home PT pt decline despite benefit explained  if not Home PT then Outpt PT to increase fxl mobility , strength, balance, endurance , fall prevention education continued/Home PT

## 2024-09-30 NOTE — PHYSICAL THERAPY INITIAL EVALUATION ADULT - GENERAL OBSERVATIONS, REHAB EVAL
pt received in bed sidelying R top rails up and 1 sderail HOB 30 degrees zayda Nowak in during session and gave pain meds ; pt with 8/10 pain in bed ; pt agreeable for PT as pt report pain always there and only drops a little w/ pain meds ; pt report had fall in [place] in shower then overnight at like 3 or 4 in am went to get up oob and slipped to floor hard on buttock then pain coccyx area ; dx with fx per pt of coccyx bone and has donut cushion when sit oob in chair at home ; pt has not been moving around too much since that fall 2 weeks ago then was having SOB and came into hospital , prior to this recent fall pt had fallen another time per pt but a long time ago per pt unable to remember when pt received in bed sidelying R top rails up and 1 sderail HOB 30 degrees zayda Nowak in during session and gave pain meds ; pt with 8/10 pain in bed ; pt agreeable for PT as pt report pain always there and only drops a little w/ pain meds ; pt report had fall in [place] in shower then overnight at like 3 or 4 in am went to get up oob and slipped to floor hard on buttock then pain coccyx area ; dx with fx per pt of coccyx bone and has donut cushion when sit oob in chair at home ; pt has not been moving around too much since that fall 2 weeks ago then was having SOB and came into hospital , prior to this recent fall pt had fallen another time per pt but a long time ago per pt unable to remember when; 10/2/24 pt received in bed top rails up HOB 30 degrees with mayra Chance d/w md and pt re mondays session , pt for possible d/c today per md pending PT assess

## 2024-09-30 NOTE — PHYSICAL THERAPY INITIAL EVALUATION ADULT - IMPAIRMENTS FOUND, PT EVAL
aerobic capacity/endurance/gait, locomotion, and balance/muscle strength aerobic capacity/endurance/gait, locomotion, and balance/muscle strength/sensory integrity

## 2024-09-30 NOTE — PROGRESS NOTE ADULT - ASSESSMENT
_________________________________________________________________________________________  ========>>  M E D I C A L   A T T E N D I N G    F O L L O W  U P  N O T E  <<=========  -----------------------------------------------------------------------------------------------------    - Patient seen and examined by me earlier today.   - In summary,  BRENDEN DE LA CRUZ is a 59y year old woman admitted with COPD exacerbation   - Patient today overall doing ok, comfortable, eating OK. overall feels better, breathing improved..     ==================>> REVIEW OF SYSTEM <<=================    GEN: no fever, no chills, no pain  RESP: SOB improved , no cough, no sputum  CVS: no chest pain, no palpitations  GI: no abdominal pain, no nausea, no constipation, no diarrhea  : no dysuria, no frequency  Neuro: no headache, no dizziness    ==================>> PHYSICAL EXAM <<=================    GEN: A&O X 3 , NAD , comfortable, pleasant, calm , sitting at edge of bed   HEENT: NCAT, PERRL, MMM, hearing intact  CVS: S1S2 , regular , No M/R/G appreciated  PULM: + bilateral mild wheezing.. off O2   ABD.: soft. non tender, non distended,  obese   Extrem: intact pulses , no edema        ( Note written / Date of service 09-30-24 ( This is certified to be the same as "ENTERED" date above ( for billing purposes)))    ==================>> MEDICATIONS <<====================    albuterol/ipratropium for Nebulization 3 milliLiter(s) Nebulizer every 6 hours  amoxicillin  875 milliGRAM(s)/clavulanate 1 Tablet(s) Oral two times a day  apixaban 5 milliGRAM(s) Oral two times a day  aspirin enteric coated 81 milliGRAM(s) Oral daily  atorvastatin 80 milliGRAM(s) Oral at bedtime  dextrose 5%. 1000 milliLiter(s) IV Continuous <Continuous>  dextrose 5%. 1000 milliLiter(s) IV Continuous <Continuous>  dextrose 50% Injectable 25 Gram(s) IV Push once  dextrose 50% Injectable 12.5 Gram(s) IV Push once  dextrose 50% Injectable 25 Gram(s) IV Push once  fluticasone propionate/ salmeterol 250-50 MICROgram(s) Diskus 1 Dose(s) Inhalation two times a day  gabapentin 300 milliGRAM(s) Oral three times a day  glucagon  Injectable 1 milliGRAM(s) IntraMuscular once  insulin lispro (ADMELOG) corrective regimen sliding scale   SubCutaneous three times a day before meals  insulin lispro (ADMELOG) corrective regimen sliding scale   SubCutaneous at bedtime  methocarbamol 500 milliGRAM(s) Oral three times a day  methotrexate 20 milliGRAM(s) Oral every week  methylPREDNISolone sodium succinate Injectable 40 milliGRAM(s) IV Push two times a day  metoprolol succinate ER 25 milliGRAM(s) Oral daily  OLANZapine 7.5 milliGRAM(s) Oral daily  spironolactone 25 milliGRAM(s) Oral daily  torsemide 20 milliGRAM(s) Oral daily  traZODone 50 milliGRAM(s) Oral at bedtime    MEDICATIONS  (PRN):  acetaminophen     Tablet .. 650 milliGRAM(s) Oral every 6 hours PRN Temp greater or equal to 38C (100.4F), Mild Pain (1 - 3)  dextrose Oral Gel 15 Gram(s) Oral once PRN Blood Glucose LESS THAN 70 milliGRAM(s)/deciliter  melatonin 3 milliGRAM(s) Oral at bedtime PRN Insomnia  oxyCODONE    IR 5 milliGRAM(s) Oral every 6 hours PRN Moderate Pain (4 - 6)    ___________  Active diet:  Diet, Consistent Carbohydrate/No Snacks:   DASH/TLC Sodium & Cholesterol Restricted (DASH)  ___________________    ==================>> VITAL SIGNS <<==================    Height (cm): 154.9  Weight (kg): 89.4  BMI (kg/m2): 37.3  Vital Signs Last 24 HrsT(C): 36.7 (09-30-24 @ 05:01)  T(F): 98.1 (09-30-24 @ 05:01), Max: 98.1 (09-29-24 @ 12:16)  HR: 69 (09-30-24 @ 10:02) (55 - 78)  BP: 76/49 (09-30-24 @ 10:02)  RR: 18 (09-30-24 @ 10:02) (18 - 19)  SpO2: 93% (09-30-24 @ 10:02) (92% - 95%)      CAPILLARY BLOOD GLUCOSE  POCT Blood Glucose.: 126 mg/dL (30 Sep 2024 06:44)  POCT Blood Glucose.: 160 mg/dL (29 Sep 2024 21:06)  POCT Blood Glucose.: 144 mg/dL (29 Sep 2024 17:18)  POCT Blood Glucose.: 189 mg/dL (29 Sep 2024 12:13)     ==================>> LAB AND IMAGING <<==================                        10.7   8.80  )-----------( 222      ( 30 Sep 2024 07:19 )             35.0        09-30    139  |  106  |  20  ----------------------------<  161[H]  3.8   |  20[L]  |  0.70    Ca    8.5      30 Sep 2024 07:19  Phos  2.3     09-30  Mg     1.9     09-30    TPro  7.2  /  Alb  3.8  /  TBili  0.5  /  DBili  x   /  AST  16  /  ALT  17  /  AlkPhos  150[H]  09-29    WBC count:   8.80 <<== ,  7.54 <<== ,  11.09 <<==   Hemoglobin:   10.7 <<==,  12.2 <<==,  12.6 <<==  platelets:  222 <==, 237 <==, 258 <==    Creatinine:  0.70  <<==, 0.54  <<==, 0.64  <<==  Sodium:   139  <==, 138  <==, 138  <==       AST:          16(09-29) <== , 21(09-28) <==      ALT:        17(09-29)  <== , 21(09-28)  <==      AP:        150(09-29)  <=, 162(09-28)  <=     Bili:        0.5(09-29)  <=, 0.6(09-28)  <=      ___________________________________________________________________________________  ===============>>  A S S E S S M E N T   A N D   P L A N <<===============  ------------------------------------------------------------------------------------------    · Assessment	  59F PMHx CAD s/p CABG and AVR 11/2023, CVA x2, APLS on Eliquis, COPD, RA, breast CA, HTN, HLD, T2DM, and depression presenting for shortness of breath, coccygeal pain in setting of recent fracture, and RLE pain. Found to be wheezing on physical exam along w/ small R medial thigh folliculitis vs abscess. Admitted for management of COPD exacerbation and thigh abscess/folliculitis.      Problem/Plan - 1:  ·  Problem: COPD exacerbation.   ·  Plan: - Patient with cough and expiratory wheezing on exam, requiring 2LNC  - on IV solumedrol 40mg BID >> likely taper down in next 24 hrs if continues to improve / stable   - Duonebs q6hrs ATC for now  - Patient encouraged to follow up with primary pulm as outpatient  ( Dr Pennington)   - patient educated again to QUIT smoking     Problem/Plan - 2:  ·  Problem: Folliculitis.   ·  Plan: - Has small abscess vs folliculitis on R posterior thigh, but no cellulitis seen  - Surgery consult appreciated   - continue antibiotics    follow duplex    Problem/Plan - 3:  ·  Problem: Coccygeal fracture.   ·  Plan: - Was diagnosed w/ coccygeal fracture s/p fall in [place] 1.5 weeks ago  - Told by hospital in [place] that she would need to use a donut pillow for 6 weeks  - Fall precautions  - Tylenol PRN for mild pain, oxycodone 5mg q6hrs PRN for moderate   - Robaxin 500mg TID.    Problem/Plan - 4:  ·  Problem: CAD (coronary artery disease).   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       in vs outpatient follow up with Dr Loaiza   >> episode of hypotension >> check Echo, r/o pericardial effusion ..  - cardio on board, following, appreciated     adjust medications as needed     Problem/Plan - 5:  ·  Problem: History of CVA in adulthood.   ·  Plan: - Has had 2 CVAs (basal ganglia infarct), with residual L-sided weakness and numbness  - C/w ASA and atorvastatin.    Problem/Plan - 6:  ·  Problem: Antiphospholipid syndrome.   ·  Plan: - C/w Eliquis 5mg BID. ( patient was not able to tolerate coumadin and declined further lovenox  before)     Problem/Plan - 7:  ·  Problem: HTN (hypertension).   ·  Plan: - C/w spironolactone 25mg daily  - C/w torsemide 20mg daily.    Problem/Plan - 8:  ·  Problem: HLD (hyperlipidemia).   ·  Plan: - C/w atorvastatin 80mg QHS.    Problem/Plan - 9:  ·  Problem: T2DM (type 2 diabetes mellitus).   ·  Plan: - Patient states she takes metformin now, off of insulin  - ISS for now while admitted, titrate as needed  - Watch sugars given IV steroids  - C/w gabapentin 300mg TID.    Problem/Plan - 10:  ·  Problem: Rheumatoid arthritis.   ·  Plan; - Hold prednisone 5mg daily for now while on IV steroids  - Takes methotrexate 20mg weekly for RA and associated uveitis.    Problem/Plan - 11:  ·  Problem: Other depression.   ·  Plan: - C/w Olanzapine 7.5mg daily  - C/w Trazodone 50mg QHS.    Problem/Plan - 12:  ·  Problem: Prophylactic measure.   ·  Plan: DVT PPx: Eliquis.    dispo planing home pending above    -------------------------------------------  Case discussed with patient ..   Education given on findings and plan of care  ___________________________  H. NIKUNJ Khan.  Pager: [phone]

## 2024-09-30 NOTE — PHYSICAL THERAPY INITIAL EVALUATION ADULT - ASSISTIVE DEVICE FOR STAIR TRANSFER, REHAB EVAL
2 hand support on HR steady independent , 1 hand support need close superviison pt understood/left rail up/right rail down

## 2024-09-30 NOTE — PHYSICAL THERAPY INITIAL EVALUATION ADULT - IMPAIRMENTS CONTRIBUTING TO GAIT DEVIATIONS, PT EVAL
decrease endurance , see gait comments above/impaired balance/impaired postural control/impaired sensory feedback/decreased strength

## 2024-09-30 NOTE — PHYSICAL THERAPY INITIAL EVALUATION ADULT - GAIT DISTANCE, PT EVAL
1st amb w/o AD x 10 ft min unstady min to cg of 1 looking to hold onto object for support ; pt then amb with rolling walker steady gait independent 70 ft x 2

## 2024-09-30 NOTE — PHYSICAL THERAPY INITIAL EVALUATION ADULT - GAIT TRAINING, PT EVAL
GOAL: pt will amb with least restrictive AD for 150-200 ft x 2 with steady gait independent in 3 weeks

## 2024-09-30 NOTE — PHYSICAL THERAPY INITIAL EVALUATION ADULT - PATIENT/FAMILY AGREES WITH PLAN
TBD once fxl assess can be completed TBD once fxl assess can be completed; PT recommend 10/2/24 Home PT to increase fxl mobility , strength, balance, endurane , fall prevention education continued pt decline , if not Home PT then PT recommend Outpt PT pt to think about per pt/no

## 2024-09-30 NOTE — CONSULT NOTE ADULT - SUBJECTIVE AND OBJECTIVE BOX
History of Present Illness:  59y Female    Past Medical History  CVA (cerebrovascular accident)    Cigarette smoker    Rheumatoid arthritis    Other depression    Breast cancer    Migraines    History of multiple cerebrovascular accidents (CVAs)    Suicidal ideation    Paresthesia of left arm    Facial paresthesia    APS (antiphospholipid syndrome)    History of depressed bipolar disorder    History of COPD    History of COPD    Hyperlipidemia    Type 2 diabetes mellitus    Asthma    CAD (coronary artery disease)        Past Surgical History  No significant past surgical history    History of hysterectomy    History of cholecystectomy    History of loop recorder    S/P CABG x 1    S/P AVR        MEDICATIONS  (STANDING):  albuterol/ipratropium for Nebulization 3 milliLiter(s) Nebulizer every 6 hours  amoxicillin  875 milliGRAM(s)/clavulanate 1 Tablet(s) Oral two times a day  apixaban 5 milliGRAM(s) Oral two times a day  aspirin enteric coated 81 milliGRAM(s) Oral daily  atorvastatin 80 milliGRAM(s) Oral at bedtime  dextrose 5%. 1000 milliLiter(s) (100 mL/Hr) IV Continuous <Continuous>  dextrose 5%. 1000 milliLiter(s) (50 mL/Hr) IV Continuous <Continuous>  dextrose 50% Injectable 25 Gram(s) IV Push once  dextrose 50% Injectable 12.5 Gram(s) IV Push once  dextrose 50% Injectable 25 Gram(s) IV Push once  fluticasone propionate/ salmeterol 250-50 MICROgram(s) Diskus 1 Dose(s) Inhalation two times a day  gabapentin 300 milliGRAM(s) Oral three times a day  glucagon  Injectable 1 milliGRAM(s) IntraMuscular once  insulin lispro (ADMELOG) corrective regimen sliding scale   SubCutaneous at bedtime  insulin lispro (ADMELOG) corrective regimen sliding scale   SubCutaneous three times a day before meals  methocarbamol 500 milliGRAM(s) Oral three times a day  methotrexate 20 milliGRAM(s) Oral every week  methylPREDNISolone sodium succinate Injectable 40 milliGRAM(s) IV Push two times a day  metoprolol succinate ER 25 milliGRAM(s) Oral daily  OLANZapine 7.5 milliGRAM(s) Oral daily  spironolactone 25 milliGRAM(s) Oral daily  torsemide 20 milliGRAM(s) Oral daily  traZODone 50 milliGRAM(s) Oral at bedtime    MEDICATIONS  (PRN):  acetaminophen     Tablet .. 650 milliGRAM(s) Oral every 6 hours PRN Temp greater or equal to 38C (100.4F), Mild Pain (1 - 3)  dextrose Oral Gel 15 Gram(s) Oral once PRN Blood Glucose LESS THAN 70 milliGRAM(s)/deciliter  melatonin 3 milliGRAM(s) Oral at bedtime PRN Insomnia  oxyCODONE    IR 5 milliGRAM(s) Oral every 6 hours PRN Moderate Pain (4 - 6)    Antiplatelet therapy:                           Last dose/amt:    Allergies: No Known Allergies      SOCIAL HISTORY:  Smoker: [ ] Yes  [ ] No        PACK YEARS:                         WHEN QUIT?  ETOH use: [ ] Yes  [ ] No              FREQUENCY / QUANTITY:  Ilicit Drug use:  [ ] Yes  [ ] No  Occupation:  Live with:  Assist device use:    Relevant Family History  FAMILY HISTORY:  Family history of breast cancer (Mother)    Family history of diabetes mellitus (DM) (Father)        Review of Systems  GENERAL:  Fevers[] chills[] sweats[] fatigue[] weight loss[] weight gain []                                        NEURO:  parathesias[] seizures []  syncope []  confusion []                                                                                  EYES: glasses[]  blurry vision[]  discharge[] pain[] glaucoma []                                                                            ENMT:  difficulty hearing []  vertigo[]  dysphagia[] epistaxis[] recent dental work []                                      CV:  chest pain[] palpitations[] KIRK [] diaphoresis [] edema[]                                                                                             RESPIRATORY:  wheezing[] SOB[] cough [] sputum[] hemoptysis[]                                                                    GI:  nausea[]  vomiting []  diarrhea[] constipation [] melena []                                                                        : hematuria[ ]  dysuria[ ] urgency[] incontinence[]                                                                                              MUSCULOSKELETAL:  arthritis[ ]  joint swelling [ ] muscle weakness [ ]                                                                  SKIN/BREAST:  rash[ ] itching [ ]  hair loss[ ] masses[ ]                                                                                                PSYCH:  dementia [ ] depression [ ] anxiety[ ]                                                                                                                  HEME/LYMPH:  bruises easily[ ] enlarged lymph nodes[ ] tender lymph nodes[ ]                                                 ENDOCRINE:  cold intolerance[ ] heat intolerance[ ] polydipsia[ ]                                                                              PHYSICAL EXAM  Vital Signs Last 24 Hrs  T(C): 36.4 (30 Sep 2024 12:19), Max: 36.7 (29 Sep 2024 20:05)  T(F): 97.6 (30 Sep 2024 12:19), Max: 98.1 (30 Sep 2024 05:01)  HR: 54 (30 Sep 2024 12:19) (54 - 78)  BP: 110/54 (30 Sep 2024 12:19) (76/49 - 110/54)  BP(mean): 79 (30 Sep 2024 00:18) (79 - 79)  RR: 18 (30 Sep 2024 12:19) (18 - 18)  SpO2: 96% (30 Sep 2024 12:19) (92% - 96%)    Parameters below as of 30 Sep 2024 12:19  Patient On (Oxygen Delivery Method): room air        General: Well nourished, well developed, no acute distress.                                                         Neuro: Normal exam oriented to person/place & time with no focal motor or sensory  deficits.                    Eyes: Normal exam of conjunctiva & lids, pupils equally reactive.   ENT: Normal exam of nasal/oral mucosa with absence of cyanosis.   Neck: Normal exam of jugular veins, trachea & thyroid.   Chest: Normal lung exam with good air movement absence of wheezes, rales, or rhonchi:                                                                          CV:  Auscultation: normal [ ] S3[ ] S4[ ] Irregular [ ] Rub[ ] Clicks[ ]  Murmurs none:[ ]systolic [ ]  diastolic [ ] holosystolic [ ]  Carotids: No Bruits[ ] Other____________ Abdominal Aorta: normal [ ] nonpalpable[ ]                                                                         GI: Normal exam of abdomen, liver & spleen with no noted masses or tenderness.                                                                                              Extremities: Normal no evidence of cyanosis or deformity Edema: none[ ]trace[ ]1+[ ]2+[ ]3+[ ]4+[ ]  Lower Extremity Pulses: Right[ ] Left[ ]Varicosities[ ]  SKIN : Normal exam to inspection & palpation.                                                           LABS:                        10.7   8.80  )-----------( 222      ( 30 Sep 2024 07:19 )             35.0     09-30    139  |  106  |  20  ----------------------------<  161[H]  3.8   |  20[L]  |  0.70    Ca    8.5      30 Sep 2024 07:19  Phos  2.3     09-30  Mg     1.9     09-30    TPro  7.2  /  Alb  3.8  /  TBili  0.5  /  DBili  x   /  AST  16  /  ALT  17  /  AlkPhos  150[H]  09-29      Urinalysis Basic - ( 30 Sep 2024 07:19 )    Color: x / Appearance: x / SG: x / pH: x  Gluc: 161 mg/dL / Ketone: x  / Bili: x / Urobili: x   Blood: x / Protein: x / Nitrite: x   Leuk Esterase: x / RBC: x / WBC x   Sq Epi: x / Non Sq Epi: x / Bacteria: x          Cardiac Cath:    TTE / LESLEY:    Assessment:  59y Female presents with Shortness of breath        Plan:             History of Present Illness:   59F PMHx CAD s/p CABG and AVR 11/2023, CVA x2, APLS on Eliquis, COPD, RA, breast CA, HTN, HLD, T2DM, and depression presenting for shortness of breath, coccygeal pain in setting of recent fracture, and RLE pain.  w/ small R medial thigh folliculitis vs abscess. Admitted for management of COPD exacerbation and thigh abscess/folliculitis.   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      Past Medical History  CVA (cerebrovascular accident)    Cigarette smoker    Rheumatoid arthritis    Other depression    Breast cancer    Migraines    History of multiple cerebrovascular accidents (CVAs)    Suicidal ideation    Paresthesia of left arm    Facial paresthesia    APS (antiphospholipid syndrome)    History of depressed bipolar disorder    History of COPD    History of COPD    Hyperlipidemia    Type 2 diabetes mellitus    Asthma    CAD (coronary artery disease)        Past Surgical History  No significant past surgical history    History of hysterectomy    History of cholecystectomy    History of loop recorder    S/P CABG x 1    S/P AVR        MEDICATIONS  (STANDING):  albuterol/ipratropium for Nebulization 3 milliLiter(s) Nebulizer every 6 hours  amoxicillin  875 milliGRAM(s)/clavulanate 1 Tablet(s) Oral two times a day  apixaban 5 milliGRAM(s) Oral two times a day  aspirin enteric coated 81 milliGRAM(s) Oral daily  atorvastatin 80 milliGRAM(s) Oral at bedtime  dextrose 5%. 1000 milliLiter(s) (100 mL/Hr) IV Continuous <Continuous>  dextrose 5%. 1000 milliLiter(s) (50 mL/Hr) IV Continuous <Continuous>  dextrose 50% Injectable 25 Gram(s) IV Push once  dextrose 50% Injectable 12.5 Gram(s) IV Push once  dextrose 50% Injectable 25 Gram(s) IV Push once  fluticasone propionate/ salmeterol 250-50 MICROgram(s) Diskus 1 Dose(s) Inhalation two times a day  gabapentin 300 milliGRAM(s) Oral three times a day  glucagon  Injectable 1 milliGRAM(s) IntraMuscular once  insulin lispro (ADMELOG) corrective regimen sliding scale   SubCutaneous at bedtime  insulin lispro (ADMELOG) corrective regimen sliding scale   SubCutaneous three times a day before meals  methocarbamol 500 milliGRAM(s) Oral three times a day  methotrexate 20 milliGRAM(s) Oral every week  methylPREDNISolone sodium succinate Injectable 40 milliGRAM(s) IV Push two times a day  metoprolol succinate ER 25 milliGRAM(s) Oral daily  OLANZapine 7.5 milliGRAM(s) Oral daily  spironolactone 25 milliGRAM(s) Oral daily  torsemide 20 milliGRAM(s) Oral daily  traZODone 50 milliGRAM(s) Oral at bedtime    MEDICATIONS  (PRN):  acetaminophen     Tablet .. 650 milliGRAM(s) Oral every 6 hours PRN Temp greater or equal to 38C (100.4F), Mild Pain (1 - 3)  dextrose Oral Gel 15 Gram(s) Oral once PRN Blood Glucose LESS THAN 70 milliGRAM(s)/deciliter  melatonin 3 milliGRAM(s) Oral at bedtime PRN Insomnia  oxyCODONE    IR 5 milliGRAM(s) Oral every 6 hours PRN Moderate Pain (4 - 6)    Antiplatelet therapy:                           Last dose/amt:    Allergies: No Known Allergies      SOCIAL HISTORY:  Smoker: [x ] Yes  [ ] No        PACK YEARS:                         WHEN QUIT?  ETOH use: [ ] Yes  [x ] No              FREQUENCY / QUANTITY:  Ilicit Drug use:  [ ] Yes  [x ] No    Relevant Family History  FAMILY HISTORY:  Family history of breast cancer (Mother)    Family history of diabetes mellitus (DM) (Father)        Review of Systems  GENERAL:  Fevers[] chills[] sweats[] fatigue[] weight loss[] weight gain []                                        NEURO:  parathesias[] seizures []  syncope []  confusion []                                                                                  EYES: glasses[]  blurry vision[]  discharge[] pain[] glaucoma []                                                                            ENMT:  difficulty hearing []  vertigo[]  dysphagia[] epistaxis[] recent dental work []                                      CV:  chest pain[x] palpitations[] KIRK [] diaphoresis [] edema[]                                                                                             RESPIRATORY:  wheezing[] SOB[] cough [] sputum[] hemoptysis[]                                                                    GI:  nausea[]  vomiting []  diarrhea[] constipation [] melena []                                                                        : hematuria[ ]  dysuria[ ] urgency[] incontinence[]                                                                                              MUSCULOSKELETAL:  arthritis[ ]  joint swelling [ ] muscle weakness [ ]                                                                  SKIN/BREAST:  rash[ ] itching [ ]  hair loss[ ] masses[ ]                                                                                                PSYCH:  dementia [ ] depression [ ] anxiety[ ]                                                                                                                  HEME/LYMPH:  bruises easily[ ] enlarged lymph nodes[ ] tender lymph nodes[ ]                                                 ENDOCRINE:  cold intolerance[ ] heat intolerance[ ] polydipsia[ ]                                                                              PHYSICAL EXAM  Vital Signs Last 24 Hrs  T(C): 36.4 (30 Sep 2024 12:19), Max: 36.7 (29 Sep 2024 20:05)  T(F): 97.6 (30 Sep 2024 12:19), Max: 98.1 (30 Sep 2024 05:01)  HR: 54 (30 Sep 2024 12:19) (54 - 78)  BP: 110/54 (30 Sep 2024 12:19) (76/49 - 110/54)  BP(mean): 79 (30 Sep 2024 00:18) (79 - 79)  RR: 18 (30 Sep 2024 12:19) (18 - 18)  SpO2: 96% (30 Sep 2024 12:19) (92% - 96%)    Parameters below as of 30 Sep 2024 12:19  Patient On (Oxygen Delivery Method): room air        General: Well nourished, well developed, no acute distress.                                                         Neuro: Normal exam oriented to person/place & time with no focal motor or sensory  deficits.                    Eyes: Normal exam of conjunctiva & lids, pupils equally reactive.   ENT: Normal exam of nasal/oral mucosa with absence of cyanosis.   Neck: Normal exam of jugular veins, trachea & thyroid.   Chest: Normal lung exam with good air movement absence of wheezes, rales, or rhonchi:                                                                          CV:  Auscultation: normal [ ] S3[ ] S4[ ] Irregular [ ] Rub[ ] Clicks[ ]  Murmurs none:[x ]systolic [ ]  diastolic [ ] holosystolic [ ]  + click on palpation>non union sternum  Carotids: No Bruits[x ] Other____________ Abdominal Aorta: normal [ ] nonpalpable[ ]                                                                         GI: Normal exam of abdomen, liver & spleen with no noted masses or tenderness.                                                                                              Extremities: Normal no evidence of cyanosis or deformity Edema: none[x ]trace[ ]1+[ ]2+[ ]3+[ ]4+[ ]  Lower Extremity Pulses: Right[ ] Left[ ]Varicosities[ ]  SKIN : Normal exam to inspection & palpation.                                                           LABS:                        10.7   8.80  )-----------( 222      ( 30 Sep 2024 07:19 )             35.0     09-30    139  |  106  |  20  ----------------------------<  161[H]  3.8   |  20[L]  |  0.70    Ca    8.5      30 Sep 2024 07:19  Phos  2.3     09-30  Mg     1.9     09-30    TPro  7.2  /  Alb  3.8  /  TBili  0.5  /  DBili  x   /  AST  16  /  ALT  17  /  AlkPhos  150[H]  09-29      Urinalysis Basic - ( 30 Sep 2024 07:19 )    Color: x / Appearance: x / SG: x / pH: x  Gluc: 161 mg/dL / Ketone: x  / Bili: x / Urobili: x   Blood: x / Protein: x / Nitrite: x   Leuk Esterase: x / RBC: x / WBC x   Sq Epi: x / Non Sq Epi: x / Bacteria: x          CT < from: CT Chest No Cont (07.03.24 @ 10:10) >  FINDINGS:    LUNGS AND LARGE AIRWAYS: Patent central airways. Unchanged   smoking-related lung disease with emphysema and some fibrotic component.   Subsegmental atelectasis in the right middle lobe.  PLEURA: No pleural effusion.  VESSELS: Status post CABG.  HEART: Heart size is normal. No pericardial effusion. Aortic valve   replacement. Left atrial appendage clip.  MEDIASTINUM AND YADIEL: No lymphadenopathy.  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 The widest   separation of the sternal fragments measures up to 2.8 cm. There is fluid   interposed between the sternal fragments. 5 mm calcified nodule in the   right thyroid lobe.  VISUALIZED UPPER ABDOMEN: Within normal limits.  BONES: Chronic right posterior first and lateral second rib fractures.    IMPRESSION:  Diffuse sternal nonunion, with fluid interposed between the sternal   fragments. The widest area of separation of the sternal fragments   measures up to 2.8 cm.        Assessment:  59y Female admitted for  Shortness of breath, COPD excerbation , R thigh abcess on abx, who reports clicking in her chest c/w non union of sternum  Pt had CABG/ AVR  11/23 w/ Dr Loaiza and has been seen in office followup, for this click, CT was done 7/24 which confirmed non union, and was advised elective   stabilization could be performed.  Currently pt on steroids, and abx for thigh abscess therefore elective stabilization would be contraindicated at this time  Recommend OP office follow up , will obtain repeat chest CT while admitted

## 2024-09-30 NOTE — PHYSICAL THERAPY INITIAL EVALUATION ADULT - BED MOBILITY LIMITATIONS, REHAB EVAL
decreased ability to use arms for pushing/pulling/decreased ability to use legs for bridging/pushing 10/2/24 sat x several min independent feels good no LOB vss/decreased ability to use arms for pushing/pulling/decreased ability to use legs for bridging/pushing

## 2024-09-30 NOTE — PHYSICAL THERAPY INITIAL EVALUATION ADULT - IMPAIRED TRANSFERS: SIT/STAND, REHAB EVAL
decrease endurance , sit -stand at Rolling walker independent x2 , w/o AD cg of 1 min unsteady/impaired balance/impaired postural control/impaired sensory feedback/decreased strength

## 2024-09-30 NOTE — PHYSICAL THERAPY INITIAL EVALUATION ADULT - NSPTDMEREC_GEN_A_CORE
TBD once fxl assess can be completed TBD once fxl assess can be completed ( so far recommend shower tub bench and shown pt what l;ooks like and inform and where and how to obtain TBD once fxl assess can be completed ( so far recommend shower tub bench and shown pt what l;ooks like and inform and where and how to obtain, cm Darell Bettencourt inform as well 10/2/24 TBD once fxl assess can be completed ( so far recommend shower tub bench and shown pt what l;ooks like and inform and where and how to obtain, ita Bettencourt inform as well 10/2/24 rollator needed and shower tub transfer bench ; pt has donut cushion for coccyx fx

## 2024-10-01 ENCOUNTER — RESULT REVIEW (OUTPATIENT)
Age: 59
End: 2024-10-01

## 2024-10-01 LAB
ANION GAP SERPL CALC-SCNC: 13 MMOL/L — SIGNIFICANT CHANGE UP (ref 5–17)
BUN SERPL-MCNC: 17 MG/DL — SIGNIFICANT CHANGE UP (ref 7–23)
CALCIUM SERPL-MCNC: 8.7 MG/DL — SIGNIFICANT CHANGE UP (ref 8.4–10.5)
CHLORIDE SERPL-SCNC: 107 MMOL/L — SIGNIFICANT CHANGE UP (ref 96–108)
CO2 SERPL-SCNC: 18 MMOL/L — LOW (ref 22–31)
CREAT SERPL-MCNC: 0.48 MG/DL — LOW (ref 0.5–1.3)
EGFR: 108 ML/MIN/1.73M2 — SIGNIFICANT CHANGE UP
GLUCOSE BLDC GLUCOMTR-MCNC: 115 MG/DL — HIGH (ref 70–99)
GLUCOSE BLDC GLUCOMTR-MCNC: 197 MG/DL — HIGH (ref 70–99)
GLUCOSE BLDC GLUCOMTR-MCNC: 198 MG/DL — HIGH (ref 70–99)
GLUCOSE BLDC GLUCOMTR-MCNC: 204 MG/DL — HIGH (ref 70–99)
GLUCOSE BLDC GLUCOMTR-MCNC: 216 MG/DL — HIGH (ref 70–99)
GLUCOSE SERPL-MCNC: 149 MG/DL — HIGH (ref 70–99)
HCT VFR BLD CALC: 35.3 % — SIGNIFICANT CHANGE UP (ref 34.5–45)
HGB BLD-MCNC: 11 G/DL — LOW (ref 11.5–15.5)
MAGNESIUM SERPL-MCNC: 1.8 MG/DL — SIGNIFICANT CHANGE UP (ref 1.6–2.6)
MCHC RBC-ENTMCNC: 30.8 PG — SIGNIFICANT CHANGE UP (ref 27–34)
MCHC RBC-ENTMCNC: 31.2 GM/DL — LOW (ref 32–36)
MCV RBC AUTO: 98.9 FL — SIGNIFICANT CHANGE UP (ref 80–100)
NRBC # BLD: 0 /100 WBCS — SIGNIFICANT CHANGE UP (ref 0–0)
PHOSPHATE SERPL-MCNC: 2.5 MG/DL — SIGNIFICANT CHANGE UP (ref 2.5–4.5)
PLATELET # BLD AUTO: 241 K/UL — SIGNIFICANT CHANGE UP (ref 150–400)
POTASSIUM SERPL-MCNC: 4.4 MMOL/L — SIGNIFICANT CHANGE UP (ref 3.5–5.3)
POTASSIUM SERPL-SCNC: 4.4 MMOL/L — SIGNIFICANT CHANGE UP (ref 3.5–5.3)
RBC # BLD: 3.57 M/UL — LOW (ref 3.8–5.2)
RBC # FLD: 15.2 % — HIGH (ref 10.3–14.5)
SODIUM SERPL-SCNC: 138 MMOL/L — SIGNIFICANT CHANGE UP (ref 135–145)
WBC # BLD: 9.83 K/UL — SIGNIFICANT CHANGE UP (ref 3.8–10.5)
WBC # FLD AUTO: 9.83 K/UL — SIGNIFICANT CHANGE UP (ref 3.8–10.5)

## 2024-10-01 PROCEDURE — 93321 DOPPLER ECHO F-UP/LMTD STD: CPT | Mod: 26

## 2024-10-01 PROCEDURE — 71250 CT THORAX DX C-: CPT | Mod: 26

## 2024-10-01 PROCEDURE — 93308 TTE F-UP OR LMTD: CPT | Mod: 26

## 2024-10-01 RX ORDER — METHYLPREDNISOLONE ACETATE 80 MG/ML
20 INJECTION, SUSPENSION INTRA-ARTICULAR; INTRALESIONAL; INTRAMUSCULAR; SOFT TISSUE
Refills: 0 | Status: DISCONTINUED | OUTPATIENT
Start: 2024-10-01 | End: 2024-10-02

## 2024-10-01 RX ADMIN — Medication 1 TABLET(S): at 17:34

## 2024-10-01 RX ADMIN — GABAPENTIN 300 MILLIGRAM(S): 800 TABLET, FILM COATED ORAL at 21:50

## 2024-10-01 RX ADMIN — IPRATROPIUM BROMIDE AND ALBUTEROL SULFATE 3 MILLILITER(S): .5; 3 SOLUTION RESPIRATORY (INHALATION) at 05:36

## 2024-10-01 RX ADMIN — OXYCODONE HYDROCHLORIDE 5 MILLIGRAM(S): 30 TABLET, FILM COATED, EXTENDED RELEASE ORAL at 21:50

## 2024-10-01 RX ADMIN — Medication 1 DOSE(S): at 17:35

## 2024-10-01 RX ADMIN — METHYLPREDNISOLONE ACETATE 20 MILLIGRAM(S): 80 INJECTION, SUSPENSION INTRA-ARTICULAR; INTRALESIONAL; INTRAMUSCULAR; SOFT TISSUE at 20:29

## 2024-10-01 RX ADMIN — Medication 7.5 MILLIGRAM(S): at 12:10

## 2024-10-01 RX ADMIN — Medication 1 TABLET(S): at 05:27

## 2024-10-01 RX ADMIN — SPIRONOLACTONE 25 MILLIGRAM(S): 100 TABLET, FILM COATED ORAL at 05:28

## 2024-10-01 RX ADMIN — Medication 1 DOSE(S): at 05:29

## 2024-10-01 RX ADMIN — OXYCODONE HYDROCHLORIDE 5 MILLIGRAM(S): 30 TABLET, FILM COATED, EXTENDED RELEASE ORAL at 22:20

## 2024-10-01 RX ADMIN — Medication 0: at 21:51

## 2024-10-01 RX ADMIN — Medication 500 MILLIGRAM(S): at 14:00

## 2024-10-01 RX ADMIN — Medication 4: at 08:24

## 2024-10-01 RX ADMIN — Medication 50 MILLIGRAM(S): at 21:51

## 2024-10-01 RX ADMIN — TORSEMIDE 20 MILLIGRAM(S): 10 TABLET ORAL at 05:28

## 2024-10-01 RX ADMIN — APIXABAN 5 MILLIGRAM(S): 5 TABLET, FILM COATED ORAL at 17:34

## 2024-10-01 RX ADMIN — METHYLPREDNISOLONE ACETATE 40 MILLIGRAM(S): 80 INJECTION, SUSPENSION INTRA-ARTICULAR; INTRALESIONAL; INTRAMUSCULAR; SOFT TISSUE at 05:29

## 2024-10-01 RX ADMIN — OXYCODONE HYDROCHLORIDE 5 MILLIGRAM(S): 30 TABLET, FILM COATED, EXTENDED RELEASE ORAL at 05:30

## 2024-10-01 RX ADMIN — Medication 500 MILLIGRAM(S): at 21:50

## 2024-10-01 RX ADMIN — Medication 81 MILLIGRAM(S): at 12:10

## 2024-10-01 RX ADMIN — IPRATROPIUM BROMIDE AND ALBUTEROL SULFATE 3 MILLILITER(S): .5; 3 SOLUTION RESPIRATORY (INHALATION) at 12:11

## 2024-10-01 RX ADMIN — APIXABAN 5 MILLIGRAM(S): 5 TABLET, FILM COATED ORAL at 05:28

## 2024-10-01 RX ADMIN — GABAPENTIN 300 MILLIGRAM(S): 800 TABLET, FILM COATED ORAL at 14:00

## 2024-10-01 RX ADMIN — Medication 4: at 12:11

## 2024-10-01 RX ADMIN — IPRATROPIUM BROMIDE AND ALBUTEROL SULFATE 3 MILLILITER(S): .5; 3 SOLUTION RESPIRATORY (INHALATION) at 17:34

## 2024-10-01 RX ADMIN — ATORVASTATIN CALCIUM 80 MILLIGRAM(S): 10 TABLET, FILM COATED ORAL at 21:50

## 2024-10-01 RX ADMIN — Medication 500 MILLIGRAM(S): at 05:29

## 2024-10-01 RX ADMIN — GABAPENTIN 300 MILLIGRAM(S): 800 TABLET, FILM COATED ORAL at 05:28

## 2024-10-01 NOTE — PROGRESS NOTE ADULT - ASSESSMENT
_________________________________________________________________________________________  ========>>  M E D I C A L   A T T E N D I N G    F O L L O W  U P  N O T E  <<=========  -----------------------------------------------------------------------------------------------------    - Patient seen and examined by me earlier today.   - In summary,  BRENDEN DE LA CRUZ is a 59y year old woman admitted with COPD exacerbation   - Patient today overall doing ok, comfortable, eating OK. overall feels better, breathing improved..     ==================>> REVIEW OF SYSTEM <<=================    GEN: no fever, no chills, no pain  RESP: SOB improved , no cough, no sputum  CVS: no chest pain, no palpitations  GI: no abdominal pain, no nausea, no constipation, no diarrhea  : no dysuria, no frequency  Neuro: no headache, no dizziness    ==================>> PHYSICAL EXAM <<=================    GEN: A&O X 3 , NAD , comfortable, pleasant, calm , sitting at edge of bed   HEENT: NCAT, PERRL, MMM, hearing intact  CVS: S1S2 , regular , No M/R/G appreciated  PULM: + bilateral mild wheezing.. off O2   ABD.: soft. non tender, non distended,  obese   Extrem: intact pulses , no edema        ( Note written / Date of service 10-01-24 ( This is certified to be the same as "ENTERED" date above ( for billing purposes)))    ==================>> MEDICATIONS <<====================    albuterol/ipratropium for Nebulization 3 milliLiter(s) Nebulizer every 6 hours  amoxicillin  875 milliGRAM(s)/clavulanate 1 Tablet(s) Oral two times a day  apixaban 5 milliGRAM(s) Oral two times a day  aspirin enteric coated 81 milliGRAM(s) Oral daily  atorvastatin 80 milliGRAM(s) Oral at bedtime  dextrose 5%. 1000 milliLiter(s) IV Continuous <Continuous>  dextrose 5%. 1000 milliLiter(s) IV Continuous <Continuous>  dextrose 50% Injectable 12.5 Gram(s) IV Push once  dextrose 50% Injectable 25 Gram(s) IV Push once  dextrose 50% Injectable 25 Gram(s) IV Push once  fluticasone propionate/ salmeterol 250-50 MICROgram(s) Diskus 1 Dose(s) Inhalation two times a day  gabapentin 300 milliGRAM(s) Oral three times a day  glucagon  Injectable 1 milliGRAM(s) IntraMuscular once  insulin lispro (ADMELOG) corrective regimen sliding scale   SubCutaneous at bedtime  insulin lispro (ADMELOG) corrective regimen sliding scale   SubCutaneous three times a day before meals  methocarbamol 500 milliGRAM(s) Oral three times a day  methotrexate 20 milliGRAM(s) Oral every week  methylPREDNISolone sodium succinate Injectable 20 milliGRAM(s) IV Push two times a day  metoprolol succinate ER 25 milliGRAM(s) Oral daily  OLANZapine 7.5 milliGRAM(s) Oral daily  spironolactone 25 milliGRAM(s) Oral daily  torsemide 20 milliGRAM(s) Oral daily  traZODone 50 milliGRAM(s) Oral at bedtime    MEDICATIONS  (PRN):  acetaminophen     Tablet .. 650 milliGRAM(s) Oral every 6 hours PRN Temp greater or equal to 38C (100.4F), Mild Pain (1 - 3)  dextrose Oral Gel 15 Gram(s) Oral once PRN Blood Glucose LESS THAN 70 milliGRAM(s)/deciliter  melatonin 3 milliGRAM(s) Oral at bedtime PRN Insomnia  oxyCODONE    IR 5 milliGRAM(s) Oral every 6 hours PRN Moderate Pain (4 - 6)    ___________  Active diet:  Diet, Consistent Carbohydrate/No Snacks:   DASH/TLC Sodium & Cholesterol Restricted (DASH)  ___________________    ==================>> VITAL SIGNS <<==================    Vital Signs Last 24 HrsT(C): 36.3 (10-01-24 @ 09:56)  T(F): 97.4 (10-01-24 @ 09:56), Max: 97.9 (09-30-24 @ 20:34)  HR: 69 (10-01-24 @ 09:56) (46 - 71)  BP: 103/69 (10-01-24 @ 09:56)  RR: 18 (10-01-24 @ 09:56) (18 - 18)  SpO2: 97% (10-01-24 @ 09:56) (95% - 97%)      CAPILLARY BLOOD GLUCOSE      POCT Blood Glucose.: 115 mg/dL (01 Oct 2024 17:09)  POCT Blood Glucose.: 216 mg/dL (01 Oct 2024 12:01)  POCT Blood Glucose.: 204 mg/dL (01 Oct 2024 08:16)  POCT Blood Glucose.: 251 mg/dL (30 Sep 2024 21:05)     ==================>> LAB AND IMAGING <<==================                        11.0   9.83  )-----------( 241      ( 01 Oct 2024 07:04 )             35.3        10-01    138  |  107  |  17  ----------------------------<  149[H]  4.4   |  18[L]  |  0.48[L]    Ca    8.7      01 Oct 2024 07:05  Phos  2.5     10-01  Mg     1.8     10-01      WBC count:   9.83 <<== ,  8.80 <<== ,  7.54 <<== ,  11.09 <<==   Hemoglobin:   11.0 <<==,  10.7 <<==,  12.2 <<==,  12.6 <<==  platelets:  241 <==, 222 <==, 237 <==, 258 <==    Creatinine:  0.48  <<==, 0.70  <<==, 0.54  <<==, 0.64  <<==  Sodium:   138  <==, 139  <==, 138  <==, 138  <==       AST:          16(09-29) <== , 21(09-28) <==      ALT:        17(09-29)  <== , 21(09-28)  <==      AP:        150(09-29)  <=, 162(09-28)  <=     Bili:        0.5(09-29)  <=, 0.6(09-28)  <=    ECHO  CT    ___________________________________________________________________________________  ===============>>  A S S E S S M E N T   A N D   P L A N <<===============  ------------------------------------------------------------------------------------------    · Assessment	  59F PMHx CAD s/p CABG and AVR 11/2023, CVA x2, APLS on Eliquis, COPD, RA, breast CA, HTN, HLD, T2DM, and depression presenting for shortness of breath, coccygeal pain in setting of recent fracture, and RLE pain. Found to be wheezing on physical exam along w/ small R medial thigh folliculitis vs abscess. Admitted for management of COPD exacerbation and thigh abscess/folliculitis.      Problem/Plan - 1:  ·  Problem: COPD exacerbation.   ·  Plan: - Patient with cough and expiratory wheezing on exam, requiring 2LNC  - on IV solumedrol 40mg BID >> likely taper down in next 24 hrs if continues to improve / stable   - Duonebs q6hrs ATC for now  - Patient encouraged to follow up with primary pulm as outpatient  ( Dr Pennington)   - patient educated again to QUIT smoking     Problem/Plan - 2:  ·  Problem: Folliculitis.   ·  Plan: - Has small abscess vs folliculitis on R posterior thigh, but no cellulitis seen  - Surgery consult appreciated   - continue antibiotics    follow duplex    Problem/Plan - 3:  ·  Problem: Coccygeal fracture.   ·  Plan: - Was diagnosed w/ coccygeal fracture s/p fall in [place] 1.5 weeks ago  - Told by hospital in [place] that she would need to use a donut pillow for 6 weeks  - Fall precautions  - Tylenol PRN for mild pain, oxycodone 5mg q6hrs PRN for moderate   - Robaxin 500mg TID.    Problem/Plan - 4:  ·  Problem: CAD (coronary artery disease).   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       in vs outpatient follow up with Dr Loaiza   >> episode of hypotension >> check Echo, r/o pericardial effusion ..  - cardio on board, following, appreciated     adjust medications as needed     Problem/Plan - 5:  ·  Problem: History of CVA in adulthood.   ·  Plan: - Has had 2 CVAs (basal ganglia infarct), with residual L-sided weakness and numbness  - C/w ASA and atorvastatin.    Problem/Plan - 6:  ·  Problem: Antiphospholipid syndrome.   ·  Plan: - C/w Eliquis 5mg BID. ( patient was not able to tolerate coumadin and declined further lovenox  before)     Problem/Plan - 7:  ·  Problem: HTN (hypertension).   ·  Plan: - C/w spironolactone 25mg daily  - C/w torsemide 20mg daily.    Problem/Plan - 8:  ·  Problem: HLD (hyperlipidemia).   ·  Plan: - C/w atorvastatin 80mg QHS.    Problem/Plan - 9:  ·  Problem: T2DM (type 2 diabetes mellitus).   ·  Plan: - Patient states she takes metformin now, off of insulin  - ISS for now while admitted, titrate as needed  - Watch sugars given IV steroids  - C/w gabapentin 300mg TID.    Problem/Plan - 10:  ·  Problem: Rheumatoid arthritis.   ·  Plan; - Hold prednisone 5mg daily for now while on IV steroids  - Takes methotrexate 20mg weekly for RA and associated uveitis.    Problem/Plan - 11:  ·  Problem: Other depression.   ·  Plan: - C/w Olanzapine 7.5mg daily  - C/w Trazodone 50mg QHS.    Problem/Plan - 12:  ·  Problem: Prophylactic measure.   ·  Plan: DVT PPx: Eliquis.    dispo planing home pending above    -------------------------------------------  Case discussed with patient ..   Education given on findings and plan of care  ___________________________  WILLIE Khan D.O.  Pager: [phone]       _________________________________________________________________________________________  ========>>  M E D I C A L   A T T E N D I N G    F O L L O W  U P  N O T E  <<=========  -----------------------------------------------------------------------------------------------------    - Patient seen and examined by me earlier today.   - In summary,  BRENDEN DE LA CRUZ is a 59y year old woman admitted with COPD exacerbation   - Patient today overall doing ok, comfortable, eating OK. overall feels better, breathing improved.. asking about going home     ==================>> REVIEW OF SYSTEM <<=================    GEN: no fever, no chills, no pain  RESP: SOB improved , no cough, no sputum  CVS: no chest pain, no palpitations  GI: no abdominal pain, no nausea, no constipation, no diarrhea  : no dysuria, no frequency  Neuro: no headache, no dizziness    ==================>> PHYSICAL EXAM <<=================    GEN: A&O X 3 , NAD , comfortable, pleasant, calm , sitting at edge of bed   HEENT: NCAT, PERRL, MMM, hearing intact  CVS: S1S2 , regular , No M/R/G appreciated  PULM: + bilateral mild wheezing.. off O2   ABD.: soft. non tender, non distended,  obese   Extrem: intact pulses , no edema        ( Note written / Date of service 10-01-24 ( This is certified to be the same as "ENTERED" date above ( for billing purposes)))    ==================>> MEDICATIONS <<====================    albuterol/ipratropium for Nebulization 3 milliLiter(s) Nebulizer every 6 hours  amoxicillin  875 milliGRAM(s)/clavulanate 1 Tablet(s) Oral two times a day  apixaban 5 milliGRAM(s) Oral two times a day  aspirin enteric coated 81 milliGRAM(s) Oral daily  atorvastatin 80 milliGRAM(s) Oral at bedtime  dextrose 5%. 1000 milliLiter(s) IV Continuous <Continuous>  dextrose 5%. 1000 milliLiter(s) IV Continuous <Continuous>  dextrose 50% Injectable 12.5 Gram(s) IV Push once  dextrose 50% Injectable 25 Gram(s) IV Push once  dextrose 50% Injectable 25 Gram(s) IV Push once  fluticasone propionate/ salmeterol 250-50 MICROgram(s) Diskus 1 Dose(s) Inhalation two times a day  gabapentin 300 milliGRAM(s) Oral three times a day  glucagon  Injectable 1 milliGRAM(s) IntraMuscular once  insulin lispro (ADMELOG) corrective regimen sliding scale   SubCutaneous at bedtime  insulin lispro (ADMELOG) corrective regimen sliding scale   SubCutaneous three times a day before meals  methocarbamol 500 milliGRAM(s) Oral three times a day  methotrexate 20 milliGRAM(s) Oral every week  methylPREDNISolone sodium succinate Injectable 20 milliGRAM(s) IV Push two times a day  metoprolol succinate ER 25 milliGRAM(s) Oral daily  OLANZapine 7.5 milliGRAM(s) Oral daily  spironolactone 25 milliGRAM(s) Oral daily  torsemide 20 milliGRAM(s) Oral daily  traZODone 50 milliGRAM(s) Oral at bedtime    MEDICATIONS  (PRN):  acetaminophen     Tablet .. 650 milliGRAM(s) Oral every 6 hours PRN Temp greater or equal to 38C (100.4F), Mild Pain (1 - 3)  dextrose Oral Gel 15 Gram(s) Oral once PRN Blood Glucose LESS THAN 70 milliGRAM(s)/deciliter  melatonin 3 milliGRAM(s) Oral at bedtime PRN Insomnia  oxyCODONE    IR 5 milliGRAM(s) Oral every 6 hours PRN Moderate Pain (4 - 6)    ___________  Active diet:  Diet, Consistent Carbohydrate/No Snacks:   DASH/TLC Sodium & Cholesterol Restricted (DASH)  ___________________    ==================>> VITAL SIGNS <<==================    Vital Signs Last 24 HrsT(C): 36.3 (10-01-24 @ 09:56)  T(F): 97.4 (10-01-24 @ 09:56), Max: 97.9 (09-30-24 @ 20:34)  HR: 69 (10-01-24 @ 09:56) (46 - 71)  BP: 103/69 (10-01-24 @ 09:56)  RR: 18 (10-01-24 @ 09:56) (18 - 18)  SpO2: 97% (10-01-24 @ 09:56) (95% - 97%)      CAPILLARY BLOOD GLUCOSE      POCT Blood Glucose.: 115 mg/dL (01 Oct 2024 17:09)  POCT Blood Glucose.: 216 mg/dL (01 Oct 2024 12:01)  POCT Blood Glucose.: 204 mg/dL (01 Oct 2024 08:16)  POCT Blood Glucose.: 251 mg/dL (30 Sep 2024 21:05)     ==================>> LAB AND IMAGING <<==================                        11.0   9.83  )-----------( 241      ( 01 Oct 2024 07:04 )             35.3        10-01    138  |  107  |  17  ----------------------------<  149[H]  4.4   |  18[L]  |  0.48[L]    Ca    8.7      01 Oct 2024 07:05  Phos  2.5     10-01  Mg     1.8     10-01      WBC count:   9.83 <<== ,  8.80 <<== ,  7.54 <<== ,  11.09 <<==   Hemoglobin:   11.0 <<==,  10.7 <<==,  12.2 <<==,  12.6 <<==  platelets:  241 <==, 222 <==, 237 <==, 258 <==    Creatinine:  0.48  <<==, 0.70  <<==, 0.54  <<==, 0.64  <<==  Sodium:   138  <==, 139  <==, 138  <==, 138  <==       AST:          16(09-29) <== , 21(09-28) <==      ALT:        17(09-29)  <== , 21(09-28)  <==      AP:        150(09-29)  <=, 162(09-28)  <=     Bili:        0.5(09-29)  <=, 0.6(09-28)  <=    < from: TTE W or WO Ultrasound Enhancing Agent (10.01.24 @ 13:12) >  CONCLUSIONS:    1. Limited TTE performed to re-evaluate the echodensity in the proximal ascending aorta, seen on yesterday's study (9/30/2024).   2. The echodensity in the proximal ascending aorta is re-demonstrated. It measures 1.7 cm x 1.5 cm. It is well-circumscribed with discrete borders, seen within the lumen, abutting the wall of the ascending aorta. Gradients across the valve/aorta are within the normal range for the prosthetic valve and the contour of the Doppler envelope is early peaking (not suggestive of a fixed obstruction to flow). In the thoracic descending aorta (distal to the echodensity), normal flow is observed. Taken together, these findings suggest that there is no mass within the lumen of the aorta (there is no evidence of obstruction). Could consider cardiac MRI for further evaluation if clinically indicated.  < end of copied text >    < from: TTE W or WO Ultrasound Enhancing Agent (09.30.24 @ 15:48) >  CONCLUSIONS:   1. Left ventricular cavity is normal in size. Left ventricular wall thickness is normal. Left ventricular systolic function is normal with an ejection fraction visually estimated at 60 to 65 %. There are no regional wall motion abnormalities seen.   2. Normal right ventricular cavity size, with normal wall thickness, and normal right ventricular systolic function. Tricuspid annular plane systolic excursion (TAPSE) is 2.3 cm (normal >=1.7 cm).   3. A bioprosthetic valve replacement valve replacement is present in the aortic position The prosthetic valve is well seated with normal function. No intravalvular regurgitation No paravalvular regurgitation. Implanted 11/21/2023.   4. No pericardial effusion seen.   5. Compared to the transthoracic echocardiogram performed on 11/20/2023, LVEF has improved. S/P AVR.   6. There is normal LV mass and concentric remodeling.  < end of copied text >    < from: CT Chest No Cont (10.01.24 @ 10:28) >  IMPRESSION:  1.  Status post sternotomy. The sternotomy margins are not ossified and   are  between 18-32 mm.  < end of copied text >    < from: US Extremity Nonvasc Limited, Right (09.30.24 @ 11:31) >  IMPRESSION:  Proximal inner right thigh subcutaneous complex collection with surrounding inflammatory changes, most concerning for abscess and associated cellulitis.  < end of copied text >    ___________________________________________________________________________________  ===============>>  A S S E S S M E N T   A N D   P L A N <<===============  ------------------------------------------------------------------------------------------    · Assessment	  59F PMHx CAD s/p CABG and AVR 11/2023, CVA x2, APLS on Eliquis, COPD, RA, breast CA, HTN, HLD, T2DM, and depression presenting for shortness of breath, coccygeal pain in setting of recent fracture, and RLE pain. Found to be wheezing on physical exam along w/ small R medial thigh folliculitis vs abscess. Admitted for management of COPD exacerbation and thigh abscess/folliculitis.      Problem/Plan - 1:  ·  Problem: COPD exacerbation.   ·  Plan: - Patient with cough and expiratory wheezing on exam, requiring 2LNC  - on IV solumedrol 40mg BID >> taper down today and continue as pt improves   - Duonebs q6hrs ATC for now  - Patient encouraged to follow up with primary pulm as outpatient  ( Dr Pennington)   - patient educated again to QUIT smoking     Problem/Plan - 2:  ·  Problem: Folliculitis and soft tissue small abscess   - Surgery follow up re US findings as above   - continue antibiotics     Problem/Plan - 3:  ·  Problem: Coccygeal fracture.   ·  Plan: - Was diagnosed w/ coccygeal fracture s/p fall in [place] 1.5 weeks ago  - Told by hospital in [place] that she would need to use a donut pillow for 6 weeks  - Fall precautions  - Tylenol PRN for mild pain, oxycodone 5mg q6hrs PRN for moderate   - Robaxin 500mg TID.    Problem/Plan - 4:  ·  Problem: CAD (coronary artery disease).   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   - cardio on board, following, appreciated     adjust medications as needed     Problem/Plan - 5:  ·  Problem: History of CVA in adulthood.   ·  Plan: - Has had 2 CVAs (basal ganglia infarct), with residual L-sided weakness and numbness  - C/w ASA and atorvastatin.    Problem/Plan - 6:  ·  Problem: Antiphospholipid syndrome.   ·  Plan: - C/w Eliquis 5mg BID. ( patient was not able to tolerate coumadin and declined further lovenox  before)     Problem/Plan - 7:  ·  Problem: HTN (hypertension).   ·  Plan: - C/w spironolactone 25mg daily  - C/w torsemide 20mg daily.    Problem/Plan - 8:  ·  Problem: HLD (hyperlipidemia).   ·  Plan: - C/w atorvastatin 80mg QHS.    Problem/Plan - 9:  ·  Problem: T2DM (type 2 diabetes mellitus).   ·  Plan: - Patient states she takes metformin now, off of insulin  - ISS for now while admitted, titrate as needed  - Watch sugars given IV steroids  - C/w gabapentin 300mg TID.    Problem/Plan - 10:  ·  Problem: Rheumatoid arthritis.   ·  Plan; - Hold prednisone 5mg daily for now while on IV steroids  - Takes methotrexate 20mg weekly for RA and associated uveitis.    Problem/Plan - 11:  ·  Problem: Other depression.   ·  Plan: - C/w Olanzapine 7.5mg daily  - C/w Trazodone 50mg QHS.    Problem/Plan - 12:  ·  Problem: Prophylactic measure.   ·  Plan: DVT PPx: Eliquis.    dispo planing home likely within 24-48 hrs if stable and pending above     -------------------------------------------  Case discussed with patient ..   Education given on findings and plan of care  ___________________________  H. NIKUNJ Khan.  Pager: [phone]

## 2024-10-02 ENCOUNTER — TRANSCRIPTION ENCOUNTER (OUTPATIENT)
Age: 59
End: 2024-10-02

## 2024-10-02 VITALS
TEMPERATURE: 98 F | SYSTOLIC BLOOD PRESSURE: 99 MMHG | OXYGEN SATURATION: 97 % | RESPIRATION RATE: 18 BRPM | DIASTOLIC BLOOD PRESSURE: 63 MMHG | HEART RATE: 71 BPM

## 2024-10-02 LAB
GLUCOSE BLDC GLUCOMTR-MCNC: 183 MG/DL — HIGH (ref 70–99)
GLUCOSE BLDC GLUCOMTR-MCNC: 252 MG/DL — HIGH (ref 70–99)

## 2024-10-02 PROCEDURE — 82947 ASSAY GLUCOSE BLOOD QUANT: CPT

## 2024-10-02 PROCEDURE — 85025 COMPLETE CBC W/AUTO DIFF WBC: CPT

## 2024-10-02 PROCEDURE — C8929: CPT

## 2024-10-02 PROCEDURE — 84132 ASSAY OF SERUM POTASSIUM: CPT

## 2024-10-02 PROCEDURE — 85014 HEMATOCRIT: CPT

## 2024-10-02 PROCEDURE — 80053 COMPREHEN METABOLIC PANEL: CPT

## 2024-10-02 PROCEDURE — 83735 ASSAY OF MAGNESIUM: CPT

## 2024-10-02 PROCEDURE — 93308 TTE F-UP OR LMTD: CPT

## 2024-10-02 PROCEDURE — 85018 HEMOGLOBIN: CPT

## 2024-10-02 PROCEDURE — 83036 HEMOGLOBIN GLYCOSYLATED A1C: CPT

## 2024-10-02 PROCEDURE — 99285 EMERGENCY DEPT VISIT HI MDM: CPT

## 2024-10-02 PROCEDURE — 83605 ASSAY OF LACTIC ACID: CPT

## 2024-10-02 PROCEDURE — 97116 GAIT TRAINING THERAPY: CPT

## 2024-10-02 PROCEDURE — 84100 ASSAY OF PHOSPHORUS: CPT

## 2024-10-02 PROCEDURE — 84295 ASSAY OF SERUM SODIUM: CPT

## 2024-10-02 PROCEDURE — 93321 DOPPLER ECHO F-UP/LMTD STD: CPT

## 2024-10-02 PROCEDURE — 71045 X-RAY EXAM CHEST 1 VIEW: CPT

## 2024-10-02 PROCEDURE — 85027 COMPLETE CBC AUTOMATED: CPT

## 2024-10-02 PROCEDURE — 97530 THERAPEUTIC ACTIVITIES: CPT

## 2024-10-02 PROCEDURE — 94640 AIRWAY INHALATION TREATMENT: CPT

## 2024-10-02 PROCEDURE — 80061 LIPID PANEL: CPT

## 2024-10-02 PROCEDURE — 82962 GLUCOSE BLOOD TEST: CPT

## 2024-10-02 PROCEDURE — 82330 ASSAY OF CALCIUM: CPT

## 2024-10-02 PROCEDURE — 93005 ELECTROCARDIOGRAM TRACING: CPT

## 2024-10-02 PROCEDURE — 82435 ASSAY OF BLOOD CHLORIDE: CPT

## 2024-10-02 PROCEDURE — 97162 PT EVAL MOD COMPLEX 30 MIN: CPT

## 2024-10-02 PROCEDURE — 71250 CT THORAX DX C-: CPT | Mod: MC

## 2024-10-02 PROCEDURE — 82803 BLOOD GASES ANY COMBINATION: CPT

## 2024-10-02 PROCEDURE — 76882 US LMTD JT/FCL EVL NVASC XTR: CPT

## 2024-10-02 PROCEDURE — 84484 ASSAY OF TROPONIN QUANT: CPT

## 2024-10-02 PROCEDURE — 87637 SARSCOV2&INF A&B&RSV AMP PRB: CPT

## 2024-10-02 PROCEDURE — 83880 ASSAY OF NATRIURETIC PEPTIDE: CPT

## 2024-10-02 PROCEDURE — 80048 BASIC METABOLIC PNL TOTAL CA: CPT

## 2024-10-02 RX ORDER — ATORVASTATIN CALCIUM 10 MG/1
1 TABLET, FILM COATED ORAL
Qty: 0 | Refills: 0 | DISCHARGE
Start: 2024-10-02

## 2024-10-02 RX ORDER — TORSEMIDE 10 MG/1
1 TABLET ORAL
Qty: 0 | Refills: 0 | DISCHARGE
Start: 2024-10-02

## 2024-10-02 RX ORDER — PREDNISONE 5 MG/1
2 TABLET ORAL
Qty: 24 | Refills: 0
Start: 2024-10-02 | End: 2024-10-07

## 2024-10-02 RX ORDER — SPIRONOLACTONE 100 MG/1
1 TABLET, FILM COATED ORAL
Qty: 0 | Refills: 0 | DISCHARGE
Start: 2024-10-02

## 2024-10-02 RX ORDER — ALBUTEROL 90 MCG
2 AEROSOL (GRAM) INHALATION
Qty: 1 | Refills: 0
Start: 2024-10-02 | End: 2024-10-31

## 2024-10-02 RX ORDER — FLUTICASONE PROPIONATE 220 MCG
250 AEROSOL WITH ADAPTER (GRAM) INHALATION
Qty: 1 | Refills: 0
Start: 2024-10-02 | End: 2024-10-31

## 2024-10-02 RX ADMIN — IPRATROPIUM BROMIDE AND ALBUTEROL SULFATE 3 MILLILITER(S): .5; 3 SOLUTION RESPIRATORY (INHALATION) at 11:23

## 2024-10-02 RX ADMIN — Medication 25 MILLIGRAM(S): at 05:49

## 2024-10-02 RX ADMIN — GABAPENTIN 300 MILLIGRAM(S): 800 TABLET, FILM COATED ORAL at 05:48

## 2024-10-02 RX ADMIN — Medication 1 TABLET(S): at 05:47

## 2024-10-02 RX ADMIN — Medication 7.5 MILLIGRAM(S): at 11:23

## 2024-10-02 RX ADMIN — Medication 1 DOSE(S): at 05:49

## 2024-10-02 RX ADMIN — Medication 2: at 09:24

## 2024-10-02 RX ADMIN — METHYLPREDNISOLONE ACETATE 20 MILLIGRAM(S): 80 INJECTION, SUSPENSION INTRA-ARTICULAR; INTRALESIONAL; INTRAMUSCULAR; SOFT TISSUE at 05:50

## 2024-10-02 RX ADMIN — APIXABAN 5 MILLIGRAM(S): 5 TABLET, FILM COATED ORAL at 05:49

## 2024-10-02 RX ADMIN — TORSEMIDE 20 MILLIGRAM(S): 10 TABLET ORAL at 09:24

## 2024-10-02 RX ADMIN — IPRATROPIUM BROMIDE AND ALBUTEROL SULFATE 3 MILLILITER(S): .5; 3 SOLUTION RESPIRATORY (INHALATION) at 05:51

## 2024-10-02 RX ADMIN — Medication 500 MILLIGRAM(S): at 05:47

## 2024-10-02 RX ADMIN — Medication 81 MILLIGRAM(S): at 11:22

## 2024-10-02 RX ADMIN — Medication 6: at 11:23

## 2024-10-02 NOTE — PROGRESS NOTE ADULT - ASSESSMENT
_________________________________________________________________________________________  ========>>  M E D I C A L   A T T E N D I N G    F O L L O W  U P  N O T E  <<=========  -----------------------------------------------------------------------------------------------------    - Patient seen and examined by me earlier today.   - Patient today overall doing ok, comfortable, eating OK. overall feels better, breathing improved.. asking to go home     ==================>> REVIEW OF SYSTEM <<=================    GEN: no fever, no chills, no significant pain at rest   RESP: SOB improved , no cough, no sputum  CVS: no chest pain, no palpitations  GI: no abdominal pain, no nausea  : no dysuria, no frequency  Neuro: no headache, no dizziness    ==================>> PHYSICAL EXAM <<=================    GEN: A&O X 3 , NAD , comfortable, pleasant, calm , sitting at edge of bed   HEENT: NCAT, PERRL, MMM, hearing intact  CVS: S1S2 , regular , No M/R/G appreciated  PULM: no more wheezing noted .. off O2   ABD.: soft. non tender, non distended,  obese   Extrem: intact pulses , no edema        ( Note written / Date of service 10-02-24 ( This is certified to be the same as "ENTERED" date above ( for billing purposes)))    ==================>> MEDICATIONS <<====================    albuterol/ipratropium for Nebulization 3 milliLiter(s) Nebulizer every 6 hours  amoxicillin  875 milliGRAM(s)/clavulanate 1 Tablet(s) Oral two times a day  apixaban 5 milliGRAM(s) Oral two times a day  aspirin enteric coated 81 milliGRAM(s) Oral daily  atorvastatin 80 milliGRAM(s) Oral at bedtime  dextrose 5%. 1000 milliLiter(s) IV Continuous <Continuous>  dextrose 5%. 1000 milliLiter(s) IV Continuous <Continuous>  dextrose 50% Injectable 25 Gram(s) IV Push once  dextrose 50% Injectable 12.5 Gram(s) IV Push once  dextrose 50% Injectable 25 Gram(s) IV Push once  fluticasone propionate/ salmeterol 250-50 MICROgram(s) Diskus 1 Dose(s) Inhalation two times a day  gabapentin 300 milliGRAM(s) Oral three times a day  glucagon  Injectable 1 milliGRAM(s) IntraMuscular once  insulin lispro (ADMELOG) corrective regimen sliding scale   SubCutaneous at bedtime  insulin lispro (ADMELOG) corrective regimen sliding scale   SubCutaneous three times a day before meals  methocarbamol 500 milliGRAM(s) Oral three times a day  methotrexate 20 milliGRAM(s) Oral every week  methylPREDNISolone sodium succinate Injectable 20 milliGRAM(s) IV Push two times a day  metoprolol succinate ER 25 milliGRAM(s) Oral daily  OLANZapine 7.5 milliGRAM(s) Oral daily  spironolactone 25 milliGRAM(s) Oral daily  torsemide 20 milliGRAM(s) Oral daily  traZODone 50 milliGRAM(s) Oral at bedtime    MEDICATIONS  (PRN):  acetaminophen     Tablet .. 650 milliGRAM(s) Oral every 6 hours PRN Temp greater or equal to 38C (100.4F), Mild Pain (1 - 3)  dextrose Oral Gel 15 Gram(s) Oral once PRN Blood Glucose LESS THAN 70 milliGRAM(s)/deciliter  melatonin 3 milliGRAM(s) Oral at bedtime PRN Insomnia  oxyCODONE    IR 5 milliGRAM(s) Oral every 6 hours PRN Moderate Pain (4 - 6)    ___________  Active diet:  Diet, Consistent Carbohydrate/No Snacks:   DASH/TLC Sodium & Cholesterol Restricted (DASH)  ___________________    ==================>> VITAL SIGNS <<==================    Vital Signs Last 24 HrsT(C): 36.5 (10-02-24 @ 05:28)  T(F): 97.7 (10-02-24 @ 05:28), Max: 98.3 (10-02-24 @ 00:32)  HR: 68 (10-02-24 @ 09:05) (61 - 78)  BP: 117/72 (10-02-24 @ 09:05)  RR: 18 (10-02-24 @ 09:05) (18 - 18)  SpO2: 97% (10-02-24 @ 09:05) (92% - 97%)      CAPILLARY BLOOD GLUCOSE  POCT Blood Glucose.: 183 mg/dL (02 Oct 2024 08:28)  POCT Blood Glucose.: 197 mg/dL (01 Oct 2024 22:00)  POCT Blood Glucose.: 198 mg/dL (01 Oct 2024 21:49)  POCT Blood Glucose.: 115 mg/dL (01 Oct 2024 17:09)  POCT Blood Glucose.: 216 mg/dL (01 Oct 2024 12:01)     ==================>> LAB AND IMAGING <<==================                        11.0   9.83  )-----------( 241      ( 01 Oct 2024 07:04 )             35.3        10-01    138  |  107  |  17  ----------------------------<  149[H]  4.4   |  18[L]  |  0.48[L]    Ca    8.7      01 Oct 2024 07:05  Phos  2.5     10-01  Mg     1.8     10-01      WBC count:   9.83 <<== ,  8.80 <<== ,  7.54 <<== ,  11.09 <<==   Hemoglobin:   11.0 <<==,  10.7 <<==,  12.2 <<==,  12.6 <<==  platelets:  241 <==, 222 <==, 237 <==, 258 <==    Creatinine:  0.48  <<==, 0.70  <<==, 0.54  <<==, 0.64  <<==  Sodium:   138  <==, 139  <==, 138  <==, 138  <==       AST:          16(09-29) <== , 21(09-28) <==      ALT:        17(09-29)  <== , 21(09-28)  <==      AP:        150(09-29)  <=, 162(09-28)  <=     Bili:        0.5(09-29)  <=, 0.6(09-28)  <=      < from: TTE W or WO Ultrasound Enhancing Agent (10.01.24 @ 13:12) >  CONCLUSIONS:    1. Limited TTE performed to re-evaluate the echodensity in the proximal ascending aorta, seen on yesterday's study (9/30/2024).   2. The echodensity in the proximal ascending aorta is re-demonstrated. It measures 1.7 cm x 1.5 cm. It is well-circumscribed with discrete borders, seen within the lumen, abutting the wall of the ascending aorta. Gradients across the valve/aorta are within the normal range for the prosthetic valve and the contour of the Doppler envelope is early peaking (not suggestive of a fixed obstruction to flow). In the thoracic descending aorta (distal to the echodensity), normal flow is observed. Taken together, these findings suggest that there is no mass within the lumen of the aorta (there is no evidence of obstruction). Could consider cardiac MRI for further evaluation if clinically indicated.  < end of copied text >    < from: TTE W or WO Ultrasound Enhancing Agent (09.30.24 @ 15:48) >  CONCLUSIONS:   1. Left ventricular cavity is normal in size. Left ventricular wall thickness is normal. Left ventricular systolic function is normal with an ejection fraction visually estimated at 60 to 65 %. There are no regional wall motion abnormalities seen.   2. Normal right ventricular cavity size, with normal wall thickness, and normal right ventricular systolic function. Tricuspid annular plane systolic excursion (TAPSE) is 2.3 cm (normal >=1.7 cm).   3. A bioprosthetic valve replacement valve replacement is present in the aortic position The prosthetic valve is well seated with normal function. No intravalvular regurgitation No paravalvular regurgitation. Implanted 11/21/2023.   4. No pericardial effusion seen.   5. Compared to the transthoracic echocardiogram performed on 11/20/2023, LVEF has improved. S/P AVR.   6. There is normal LV mass and concentric remodeling.  < end of copied text >    < from: CT Chest No Cont (10.01.24 @ 10:28) >  IMPRESSION:  1.  Status post sternotomy. The sternotomy margins are not ossified and   are  between 18-32 mm.  < end of copied text >    < from: US Extremity Nonvasc Limited, Right (09.30.24 @ 11:31) >  IMPRESSION:  Proximal inner right thigh subcutaneous complex collection with surrounding inflammatory changes, most concerning for abscess and associated cellulitis.  < end of copied text >    ___________________________________________________________________________________  ===============>>  A S S E S S M E N T   A N D   P L A N <<===============  ------------------------------------------------------------------------------------------    · Assessment	  59F PMHx CAD s/p CABG and AVR 11/2023, CVA x2, APLS on Eliquis, COPD, RA, breast CA, HTN, HLD, T2DM, and depression presenting for shortness of breath, coccygeal pain in setting of recent fracture, and RLE pain. Found to be wheezing on physical exam along w/ small R medial thigh folliculitis vs abscess. Admitted for management of COPD exacerbation and thigh abscess/folliculitis.      Problem/Plan - 1:  ·  Problem: COPD exacerbation.   ·  Plan: - Patient with cough and expiratory wheezing on exam, requiring 2LNC  - on IV solumedrol 40mg BID >> taper with PO prednisone   - Duonebs q6hrs ATC for now .. patient has nebulizer machine at home   - Patient encouraged to follow up with primary pulm as outpatient  ( Dr Pennington)   - patient educated again to QUIT smoking     Problem/Plan - 2:  ·  Problem: Folliculitis and soft tissue small abscess   - Surgery did not follow .. patient feels better, abscess sub  and clinically better   - continue antibiotics >> plan for 7 days total of Augmentin      Problem/Plan - 3:  ·  Problem: Coccygeal fracture.   ·  Plan: - Was diagnosed w/ coccygeal fracture s/p fall in [place] 1.5 weeks ago  - Told by hospital in [place] that she would need to use a donut pillow for 6 weeks  - Fall precautions  - Tylenol PRN for mild pain, oxycodone 5mg q6hrs PRN for moderate   - Robaxin 500mg TID.    Problem/Plan - 4:  ·  Problem: CAD (coronary artery disease).   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   - cardio on board, following, appreciated     adjust medications as needed     Problem/Plan - 5:  ·  Problem: History of CVA in adulthood.   ·  Plan: - Has had 2 CVAs (basal ganglia infarct), with residual L-sided weakness and numbness  - C/w ASA and atorvastatin.    Problem/Plan - 6:  ·  Problem: Antiphospholipid syndrome.   ·  Plan: - C/w Eliquis 5mg BID. ( patient was not able to tolerate coumadin and declined further lovenox  before)     Problem/Plan - 7:  ·  Problem: HTN (hypertension).   ·  Plan: - C/w spironolactone 25mg daily  - C/w torsemide 20mg daily.    Problem/Plan - 8:  ·  Problem: HLD (hyperlipidemia).   ·  Plan: - C/w atorvastatin 80mg QHS.    Problem/Plan - 9:  ·  Problem: T2DM (type 2 diabetes mellitus).   ·  Plan: - Patient states she takes metformin now, off of insulin  - ISS for now while admitted, titrate as needed  - Watch sugars given IV steroids  - C/w gabapentin 300mg TID.    Problem/Plan - 10:  ·  Problem: Rheumatoid arthritis.   ·  Plan; - Hold prednisone 5mg daily for now while on IV steroids  - Takes methotrexate 20mg weekly for RA and associated uveitis.    Problem/Plan - 11:  ·  Problem: Other depression.   ·  Plan: - C/w Olanzapine 7.5mg daily  - C/w Trazodone 50mg QHS.    Problem/Plan - 12:  ·  Problem: Prophylactic measure.   ·  Plan: DVT PPx: Eliquis.    dispo planing home with outpatient follow up     PT seeing today     -------------------------------------------  Case discussed with patient, Nurse Practitioner   Education given on findings and plan of care  ___________________________  WILLIE Khan D.O.  Pager: [phone]

## 2024-10-02 NOTE — DISCHARGE NOTE PROVIDER - CARE PROVIDER_API CALL
Luke Khan  Internal Medicine  [address] [place] [id number]  Phone: [phone]  Fax: [phone]  Follow Up Time:    Luke Khan  Internal Medicine  [address] [place] [id number]  Phone: [phone]  Fax: [phone]  Follow Up Time:     Sivakumar Loaiza  Thoracic and Cardiac Surgery  [address] [place] [id number]  Phone: [phone]  Fax: [phone]  Follow Up Time:

## 2024-10-02 NOTE — DISCHARGE NOTE PROVIDER - CARE PROVIDERS DIRECT ADDRESSES
[email or web address]

## 2024-10-02 NOTE — DISCHARGE NOTE PROVIDER - NSDCFUADDAPPT_GEN_ALL_CORE_FT
APPTS ARE READY TO BE MADE: [ x] YES    Best Family or Patient Contact (if needed):    Additional Information about above appointments (if needed):    1:   2:   3:     Other comments or requests:    APPTS ARE READY TO BE MADE: [ x] YES    Best Family or Patient Contact (if needed):    Additional Information about above appointments (if needed):    1: Please follow up with Pcp with in a week   2: Please follow up with DR Loaiza's office  as per routine    3:     Other comments or requests:    APPTS ARE READY TO BE MADE: [ x] YES    Best Family or Patient Contact (if needed):    Additional Information about above appointments (if needed):    1: Please follow up with Pcp with in a week   2: Please follow up with DR Loaiza's office  as per routine    3:     Other comments or requests:     Patient advised they did not want to proceed with scheduling appointments with the providers on their referrals. They will coordinate care on their own.

## 2024-10-02 NOTE — CHART NOTE - NSCHARTNOTEFT_GEN_A_CORE
Patient will require a rollator   at home due to their diagnosis of CVA ,  COPD    and h/o CABG AVR    to help complete MRADLs  Cinthya CACERES

## 2024-10-02 NOTE — DISCHARGE NOTE NURSING/CASE MANAGEMENT/SOCIAL WORK - NSDCPEWEB_GEN_ALL_CORE
[facility] for Tobacco Control website --- [email or web address] website --- [email or web address]

## 2024-10-02 NOTE — DISCHARGE NOTE PROVIDER - PROVIDER TOKENS
PROVIDER:[TOKEN:[[id number]]] PROVIDER:[TOKEN:[[id number]]],PROVIDER:[TOKEN:[[id number]]]

## 2024-10-02 NOTE — DISCHARGE NOTE NURSING/CASE MANAGEMENT/SOCIAL WORK - NSDCPEFALRISK_GEN_ALL_CORE
For information on Fall & Injury Prevention, visit: [email or web address] OR  [email or web address] OR  https://www.cdc.gov/steadi/patient.html

## 2024-10-02 NOTE — DISCHARGE NOTE PROVIDER - NSDCMRMEDTOKEN_GEN_ALL_CORE_FT
Albuterol (Eqv-ProAir HFA) 90 mcg/inh inhalation aerosol: 2 puff(s) inhaled 4 times a day as needed for wheezing  amoxicillin-clavulanate 875 mg-125 mg oral tablet: 1 tab(s) orally 2 times a day  apixaban 5 mg oral tablet: 1 tab(s) orally 2 times a day  aspirin 81 mg oral tablet: 1 tab(s) orally once a day  atorvastatin 80 mg oral tablet: 1 tab(s) orally once a day (at bedtime)  fluticasone 250 mcg/inh inhalation powder: 250 microgram(s) inhaled 2 times a day  gabapentin 300 mg oral capsule: 1 cap(s) orally 3 times a day  metFORMIN 500 mg oral tablet, extended release: 1 tab(s) orally 2 times a day  methotrexate 2.5 mg oral tablet: 8 tab(s) orally once a week  metoprolol succinate 25 mg oral capsule, extended release: 1 cap(s) orally once a day  OLANZapine 7.5 mg oral tablet: 1 tab(s) orally once a day  OP Physical Therapy eval and treat: as directed  predniSONE 10 mg oral tablet: 2 tab(s) orally 2 times a day Take  2 tabs  twice daily for  2 days    Then  1 tab twice daily for 2 days    Then 1 tab daily for 2 days  spironolactone 25 mg oral tablet: 1 tab(s) orally once a day  torsemide 20 mg oral tablet: 1 tab(s) orally once a day  traZODone 50 mg oral tablet: 1 tab(s) orally once a day (at bedtime)

## 2024-10-02 NOTE — DISCHARGE NOTE NURSING/CASE MANAGEMENT/SOCIAL WORK - NSDCPEELIQUISDIET_GEN_ALL_CORE
Physical Therapy      Patient Name:  Nelsy Marino   MRN:  [id number]    Patient not seen today secondary to Bowel/bladder accident, Other (Comment) (Pt with low bp, attempted eval however bp decreased more with attempt to raise HOB.). Will follow-up as pt able to tolerate.     Eat healthy foods you enjoy. Apixaban/Eliquis DOES NOT have a special diet. Limit your alcohol intake.

## 2024-10-02 NOTE — DISCHARGE NOTE PROVIDER - HOSPITAL COURSE
HPI:  59F PMHx CAD s/p CABG and AVR 11/2023, CVA x2, APLS on Eliquis, COPD, RA, breast CA, HTN, HLD, T2DM, and depression presenting for shortness of breath and pain in multiple sites. She sustained a tailbone fracture after a fall 10 days ago. She was in [place] for this, was evaluated in a hospital, was recommended nonoperative management w/ a donut pillow for when she sits. For the past week, she has been having a cough, increased SOB, and wheezing, progressively worsening by the day. She has also been having intermittent palpitations and chest pain but this has been chronic since her CABG last November. Finally, she is also complaining of pain in her RLE that started about 4 days ago after she developed a pimple on her thigh.    In the ED, she was afebrile and hemodynamically stable, saturating 89% on RA, so was placed on 2LNC. CBC w/ mild leukocytosis of 11.09, CMP grossly wnl except for elevated ALP of 162. Trop 11. ECG NSR w/ 1st degree AV block. CXR w/o consolidations, CT chest showing diffuse sternal nonunion, with fluid interposed between the sternal fragments, widest area of separation of the sternal fragments up to 2.8 cm. Received Solumedrol 40mg, Duonebs, and Mag sulfate in the ED. (28 Sep 2024 20:39)    Hospital Course:      Important Medication Changes and Reason:    Active or Pending Issues Requiring Follow-up:    Advanced Directives:   [ ] Full code  [ ] DNR  [ ] Hospice    Discharge Diagnoses:         HPI:  59F PMHx CAD s/p CABG and AVR 11/2023, CVA x2, APLS on Eliquis, COPD, RA, breast CA, HTN, HLD, T2DM, and depression presenting for shortness of breath and pain in multiple sites. She sustained a tailbone fracture after a fall 10 days ago. She was in [place] for this, was evaluated in a hospital, was recommended nonoperative management w/ a donut pillow for when she sits. For the past week, she has been having a cough, increased SOB, and wheezing, progressively worsening by the day. She has also been having intermittent palpitations and chest pain but this has been chronic since her CABG last November. Finally, she is also complaining of pain in her RLE that started about 4 days ago after she developed a pimple on her thigh.    In the ED, she was afebrile and hemodynamically stable, saturating 89% on RA, so was placed on 2LNC. CBC w/ mild leukocytosis of 11.09, CMP grossly wnl except for elevated ALP of 162. Trop 11. ECG NSR w/ 1st degree AV block. CXR w/o consolidations, CT chest showing diffuse sternal nonunion, with fluid interposed between the sternal fragments, widest area of separation of the sternal fragments up to 2.8 cm. Received Solumedrol 40mg, Duonebs, and Mag sulfate in the ED. (28 Sep 2024 20:39)    Hospital Course:  Patient admitted for COPD exacerbation with fevr. Viral panel negative. Thoracic Sx was consulted for non union chest incision showed on CT. She has been waiting for elective surgery. Recommended to follow up with Dr Loaiza once off steroid and antibiotic. Pulse ox was 89% on admission. Received solumedrol and nebs. Now on RA. C/O RLE pain, noted to have folliculitis, no cellulitis. No need of surgical intervention as per SX. On augmentin now. Now fever and hypoxia resolved. Cleared for discharge.       Important Medication Changes and Reason:    Active or Pending Issues Requiring Follow-up:  CT Sx, Pulm  Advanced Directives:   [ ] Full code  [ ] DNR  [ ] Hospice    Discharge Diagnoses:  Copd exacerbation  R Thigh folliculitis  Non union chest incision         HPI:  59F PMHx CAD s/p CABG and AVR 11/2023, CVA x2, APLS on Eliquis, COPD, RA, breast CA, HTN, HLD, T2DM, and depression presenting for shortness of breath and pain in multiple sites. She sustained a tailbone fracture after a fall 10 days ago. She was in [place] for this, was evaluated in a hospital, was recommended nonoperative management w/ a donut pillow for when she sits. For the past week, she has been having a cough, increased SOB, and wheezing, progressively worsening by the day. She has also been having intermittent palpitations and chest pain but this has been chronic since her CABG last November. Finally, she is also complaining of pain in her RLE that started about 4 days ago after she developed a pimple on her thigh.    In the ED, she was afebrile and hemodynamically stable, saturating 89% on RA, so was placed on 2LNC. CBC w/ mild leukocytosis of 11.09, CMP grossly wnl except for elevated ALP of 162. Trop 11. ECG NSR w/ 1st degree AV block. CXR w/o consolidations, CT chest showing diffuse sternal nonunion, with fluid interposed between the sternal fragments, widest area of separation of the sternal fragments up to 2.8 cm. Received Solumedrol 40mg, Duonebs, and Mag sulfate in the ED. (28 Sep 2024 20:39)    Hospital Course:  Patient admitted for COPD exacerbation with fevr. Viral panel negative. Thoracic Sx was consulted for non union chest incision showed on CT. She has been waiting for elective surgery. Recommended to follow up with Dr Loaiza once off steroid and antibiotic. Pulse ox was 89% on admission. Received solumedrol and nebs. Now on RA. C/O RLE pain, noted to have folliculitis, no cellulitis. No need of surgical intervention as per SX. On augmentin now. Now fever and hypoxia resolved. Cleared for discharge.       Important Medication Changes and Reason: antibiotics for 3 more days   Prednisone taper    Inhalers      Active or Pending Issues Requiring Follow-up:  CT Sx, Pulm  Advanced Directives:   [ x] Full code  [ ] DNR  [ ] Hospice    Discharge Diagnoses:  Copd exacerbation  R Thigh folliculitis  Non union chest incision

## 2024-10-02 NOTE — DISCHARGE NOTE NURSING/CASE MANAGEMENT/SOCIAL WORK - PATIENT PORTAL LINK FT
You can access the FollowMyHealth Patient Portal offered by [facility] by registering at the following website: [email or web address]. By joining [employer]’s FollowMyHealth portal, you will also be able to view your health information using other applications (apps) compatible with our system.

## 2024-10-02 NOTE — DISCHARGE NOTE NURSING/CASE MANAGEMENT/SOCIAL WORK - NSDCPEEMAIL_GEN_ALL_CORE
[facility] for Tobacco Control email [email or web address]

## 2024-10-02 NOTE — DISCHARGE NOTE NURSING/CASE MANAGEMENT/SOCIAL WORK - NSDCDMETYPESERV_GEN_ALL_CORE_FT
a rollator and tub transfer bench is for delivery to your home once it is approved on your day of discharge

## 2024-10-02 NOTE — DISCHARGE NOTE PROVIDER - NSDCCPCAREPLAN_GEN_ALL_CORE_FT
PRINCIPAL DISCHARGE DIAGNOSIS  Diagnosis: COPD exacerbation  Assessment and Plan of Treatment: You were treated with IV steroid, now switched to po. Now your oxygen saturation is improved. On room air now. Please follow up with your pulmonary doctor.      SECONDARY DISCHARGE DIAGNOSES  Diagnosis: Folliculitis  Assessment and Plan of Treatment: R thigh folliculitis. Continue antibiotic as recommended. Follow up with your PCP. No need of surgical intervention as per surgical consult.    Diagnosis: Abnormal chest CT  Assessment and Plan of Treatment: chest with  non union of sternum.  Had a CABG/ AVR  11/23 w/ Dr Loaiza and has been seen in office followup, for this click, CT was done 7/24 which confirmed non union, and was advised elective stabilization could be performed.  Currently you are on steroids, and abx for thigh abscess therefore elective stabilization would be contraindicated at this time  Recommend OP office follow up with Dr Loaiza.       PRINCIPAL DISCHARGE DIAGNOSIS  Diagnosis: COPD exacerbation  Assessment and Plan of Treatment: You were treated with IV steroid, now switched to po. Now your oxygen saturation is improved. On room air now. Please follow up with your pulmonary doctor.      SECONDARY DISCHARGE DIAGNOSES  Diagnosis: Folliculitis  Assessment and Plan of Treatment: R thigh folliculitis. Continue antibiotic as recommended. Follow up with your PCP. No need of surgical intervention as per surgical consult.    Diagnosis: Coccygeal fracture  Assessment and Plan of Treatment: Continue pain medications    Diagnosis: Abnormal chest CT  Assessment and Plan of Treatment: chest with  non union of sternum.  Had a CABG/ AVR  11/23 w/ Dr Loaiza and has been seen in office followup, for this click, CT was done 7/24 which confirmed non union, and was advised elective stabilization could be performed.  Currently you are on steroids, and abx for thigh abscess therefore elective stabilization would be contraindicated at this time  Recommend OP office follow up with Dr Loaiza.

## 2024-10-02 NOTE — DISCHARGE NOTE PROVIDER - EXTENDED VTE YES NO FOR MLM ENOXAPARIN
CN[facility] OB Progress Note    Patient seen and evaluated at bedside.  Denies complaints.  Comfortable w/ anesthesia epidural.      T(C): 36.8 (23 @ 12:13), Max: 36.8 (23 @ 16:09)  HR: 90 (23 @ 13:45) (71 - 113)  BP: 135/74 (23 @ 13:45) (102/65 - 144/98)  RR: 17 (23 @ 06:15) (15 - 18)  SpO2: 97% (23 @ 13:45) (91% - 99%)    SVE: /-2, bloody show      -Labor: cat 2 tracing  -Fetus: EFW 7#13  -Analgesia: anesthesia epidural  -Induction with: IV Pitocin    IV Pitocin recently decreased to 8u  repositioned to left lateral position  advanced VE  consider using peanutball  anticipate     -Continue to monitor with EFM & TOCO  -Recheck patient in 2-4 hours or PRN    Discussed with MD Jennifer Stokes  
A/P 33y  @39w IOL for cHTN  -IOL: s/p PO, for pitocin after patient comfortable with epidural  -Cat 1 tracing  -GBS pos on amp  -EFW 3175  -cHTN - no SRBPs, denies sxs of PEC, HELLP labs wnl, P/C 1.1, continue to monitor  -Analgesia - requesting epidural  -Anticipate     d/w Dr. Veronica Rollins, PGY-1
,

## 2024-10-02 NOTE — DISCHARGE NOTE NURSING/CASE MANAGEMENT/SOCIAL WORK - FLU SEASON?
"Subjective:    Patient is a 43 year old female presenting with rehab pelvic hpi. The history is provided by the patient. No  was used.   Janelle Alston is a 43 year old female with a pelvic dysfunction (LBP) condition.  Condition occurred with:  Insidious onset.  Condition occurred: for unknown reasons.  This is a chronic condition  Onset of pelvic floor dysfunction ~ 1-2 years ago. Noticed had urgency/frequency/difficulty urinating/ incomplete emptying/painful intercourse and tampon use. Developed anal fissure as well (8 mos ago). , 2 c-sections (12 years ago). Up at night occasionally to void but able to ignore. Also able to ignore urge during day for the most part. Had severe pain with periods/tampon use so had ablation Dec 2016. Pain in rectal/perineum from fissure. Uses heating pad after BM b/c pain occurs in fissure area about 1 hour after BM. Denies any pain with BM or hard stool. Graston to  and upper abdominal also helps. Goal is to get rid of pain, and urgency, have intercourse with less pain.     LBP across  For 10+ year history. Sees chiropractor regularly. 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 MRI recently showed L4-5 annual tear and L5-S1 DDD. Feels really tight. WORSE with walking, bending.  BETTER with laying on back, foam roller, chiro, traction, Graston..    Patient reports pain:  Lumbar spine right, lumbar spine left, vaginal and coccyx.    Pain is described as aching and sharp and is constant and intermittent and reported as 4/10.        Since onset symptoms are gradually worsening.        General health as reported by patient is excellent.  Pertinent medical history includes:  Asthma.  Medical allergies: yes (see EPIC).  Other surgeries include:  None.  Current medications:  Anti-inflammatory and pain medication.  Current occupation is /seamstress/dance.                                    Objective:    Standing Alignment:  "       Lumbar:  Lordosis decr  Pelvic:  PSIS high L        General deviations alignment: L anterior innominate rotation in supine, leg length appears = B in supine and long sit.   Gait:  Slight R lateral shift with gait                   Lumbar/SI Evaluation  ROM:    AROM Lumbar:   Flexion:        To mid-thigh, max loss flexion, uses hips to hinge and hands to return to neutral.  Ext:                    Max loss, central LBP   Side Bend:        Left:     Right:   Rotation:           Left:     Right:   Side Glide:        Left:     Right:         Strength: Prone press-ups decreased central LBP after 3 reps.   Lumbar Myotomes:  normal                Lumbar Dermtomes:  not assessed                Neural Tension/Mobility:  Lumbar:  Normal        Lumbar Palpation:    Tenderness present at Left:    Quadratus Lumborum and Erector Spinae  Tenderness present at Right: Quadratus Lumborum and Erector Spinae        SI joint/Sacrum:          Left negative at:    Squish    Right negative at:  Squish  Sacral conclusion left:  Anterior inominate  Sacral conclusion right:  Posterior inominate                        Pelvic Dysfunction Evaluation:    Bladder/Pelvic Problems:    Storage Problem:  Urgency and frequency  Emptying Problem:  Incomplete emptying, hesitancy and dyspareunia  Dyspareunia:  Grade 2    Diagnostic Tests:    Pelvic Exam:  Yes, ~ 1 year ago, painful                      Flexibility:    Tightness present at:Adductors; Iliopsoas; Hamstrings and Piriformis    Abdominal Wall:  Abdominal wall pelvic: will check next.        Pelvic Clock Exam:  Pelvic clock exam: OI L>R pain +++  Ischiocavernosis pain:  -  Bulbocavernosis pain:  -  Transverse Perineal:  -  Levator ANI:  +++  Perineal Body:  -    Reflex Testing:  normal    External Assessment:        Bearing Down/Coughing:  Normal  Tissue Symmetry:  Normal  Introitus:  Normal  Muscle Contraction/Perineal Mobility:  Slight lift, no urogential triangle descent  Internal  Assessment:      Contraction/Grade:  Weak squeeze, 2 second hold (2)          SEMG Biofeedback:  NA                  Additional History:  Delivery History:    Number of Pregnancies: 3  Number of Live Births: 2            Hip Evaluation  Hip PROM:  Hip PROM:  Left Hip:    Normal  Right Hip:  Normal                                           General     ROS    Assessment/Plan:      Patient is a 43 year old female with lumbar and pelvic complaints.    Patient has the following significant findings with corresponding treatment plan.                Diagnosis 1:  Pelvic pain and LBP  Pain -  hot/cold therapy, manual therapy, splint/taping/bracing/orthotics, self management, education, directional preference exercise and home program  Decreased ROM/flexibility - manual therapy and therapeutic exercise  Decreased joint mobility - manual therapy and therapeutic exercise  Decreased strength - therapeutic exercise and therapeutic activities  Decreased proprioception - neuro re-education and therapeutic activities  Inflammation - cold therapy and self management/home program  Impaired gait - gait training  Impaired muscle performance - neuro re-education  Decreased function - therapeutic activities  Impaired posture - neuro re-education    Therapy Evaluation Codes:   1) History comprised of:   Personal factors that impact the plan of care:      None.    Comorbidity factors that impact the plan of care are:      None.     Medications impacting care: None.  2) Examination of Body Systems comprised of:   Body structures and functions that impact the plan of care:      Lumbar spine and Pelvis.   Activity limitations that impact the plan of care are:      Bending, Walking, Frequency, Pelvic Exam, [place], Urgency and Using a tampon.  3) Clinical presentation characteristics are:   Stable/Uncomplicated.  4) Decision-Making    Low complexity using standardized patient assessment instrument and/or measureable assessment of  Yes... functional outcome.  Cumulative Therapy Evaluation is: Low complexity.    Previous and current functional limitations:  (See Goal Flow Sheet for this information)    Short term and Long term goals: (See Goal Flow Sheet for this information)     Communication ability:  Patient appears to be able to clearly communicate and understand verbal and written communication and follow directions correctly.  Treatment Explanation - The following has been discussed with the patient:   RX ordered/plan of care  Anticipated outcomes  Possible risks and side effects  This patient would benefit from PT intervention to resume normal activities.   Rehab potential is excellent.    Frequency:  1 X week, once daily  Duration:  for 6 weeks  Discharge Plan:  Achieve all LTG.  Independent in home treatment program.  Reach maximal therapeutic benefit.    Please refer to the daily flowsheet for treatment today, total treatment time and time spent performing 1:1 timed codes.

## 2024-10-02 NOTE — DISCHARGE NOTE PROVIDER - NSDCCAREPROVSEEN_GEN_ALL_CORE_FT
Luke Khan Luke Butler Delshden Butler Delshadfar  German Lang  Ordering Physician  Luke Galiciao  German Carrasco  [facility]      [ Greater than 35 min spent for discharge services.   WILLIE Khan ]       ( Note written / Date of service is the same as last day of patient stay  in the hospital ( for billing purposes)))

## 2024-10-02 NOTE — PROGRESS NOTE ADULT - REASON FOR ADMISSION
COPD exacerbation, RLE abscess

## 2024-10-03 ENCOUNTER — TRANSCRIPTION ENCOUNTER (OUTPATIENT)
Age: 59
End: 2024-10-03

## 2024-10-16 ENCOUNTER — APPOINTMENT (OUTPATIENT)
Dept: CARDIOTHORACIC SURGERY | Facility: CLINIC | Age: 59
End: 2024-10-16
Payer: MEDICAID

## 2024-10-16 VITALS
RESPIRATION RATE: 16 BRPM | HEIGHT: 61 IN | SYSTOLIC BLOOD PRESSURE: 121 MMHG | TEMPERATURE: 97.8 F | BODY MASS INDEX: 36.06 KG/M2 | WEIGHT: 191 LBS | OXYGEN SATURATION: 97 % | HEART RATE: 86 BPM | DIASTOLIC BLOOD PRESSURE: 85 MMHG

## 2024-10-16 DIAGNOSIS — Z95.1 PRESENCE OF AORTOCORONARY BYPASS GRAFT: ICD-10-CM

## 2024-10-16 DIAGNOSIS — Z95.2 PRESENCE OF PROSTHETIC HEART VALVE: ICD-10-CM

## 2024-10-16 PROCEDURE — 99214 OFFICE O/P EST MOD 30 MIN: CPT

## 2024-10-21 ENCOUNTER — APPOINTMENT (OUTPATIENT)
Dept: CARDIOLOGY | Facility: CLINIC | Age: 59
End: 2024-10-21

## 2024-10-29 ENCOUNTER — NON-APPOINTMENT (OUTPATIENT)
Age: 59
End: 2024-10-29

## 2024-10-29 ENCOUNTER — APPOINTMENT (OUTPATIENT)
Dept: OPHTHALMOLOGY | Facility: CLINIC | Age: 59
End: 2024-10-29
Payer: MEDICAID

## 2024-10-29 PROCEDURE — 92012 INTRM OPH EXAM EST PATIENT: CPT | Mod: 25

## 2024-10-29 PROCEDURE — 92134 CPTRZ OPH DX IMG PST SGM RTA: CPT

## 2024-10-30 ENCOUNTER — APPOINTMENT (OUTPATIENT)
Dept: RHEUMATOLOGY | Facility: CLINIC | Age: 59
End: 2024-10-30
Payer: MEDICAID

## 2024-10-30 VITALS
HEIGHT: 61 IN | RESPIRATION RATE: 14 BRPM | OXYGEN SATURATION: 95 % | DIASTOLIC BLOOD PRESSURE: 66 MMHG | WEIGHT: 193 LBS | TEMPERATURE: 97.8 F | SYSTOLIC BLOOD PRESSURE: 92 MMHG | BODY MASS INDEX: 36.44 KG/M2 | HEART RATE: 93 BPM

## 2024-10-30 DIAGNOSIS — Z23 ENCOUNTER FOR IMMUNIZATION: ICD-10-CM

## 2024-10-30 DIAGNOSIS — Z79.899 OTHER LONG TERM (CURRENT) DRUG THERAPY: ICD-10-CM

## 2024-10-30 DIAGNOSIS — M17.9 OSTEOARTHRITIS OF KNEE, UNSPECIFIED: ICD-10-CM

## 2024-10-30 DIAGNOSIS — K74.60 UNSPECIFIED CIRRHOSIS OF LIVER: ICD-10-CM

## 2024-10-30 DIAGNOSIS — M06.9 RHEUMATOID ARTHRITIS, UNSPECIFIED: ICD-10-CM

## 2024-10-30 DIAGNOSIS — M79.7 FIBROMYALGIA: ICD-10-CM

## 2024-10-30 PROCEDURE — 90662 IIV NO PRSV INCREASED AG IM: CPT

## 2024-10-30 PROCEDURE — G0008: CPT

## 2024-10-30 PROCEDURE — 99214 OFFICE O/P EST MOD 30 MIN: CPT | Mod: 25

## 2024-10-31 RX ORDER — HYLAN G-F 20 16MG/2ML
48 SYRINGE (ML) INTRAARTICULAR
Qty: 2 | Refills: 0 | Status: ACTIVE | COMMUNITY
Start: 2024-10-30

## 2024-11-04 ENCOUNTER — APPOINTMENT (OUTPATIENT)
Dept: OPHTHALMOLOGY | Facility: CLINIC | Age: 59
End: 2024-11-04
Payer: MEDICAID

## 2024-11-04 ENCOUNTER — NON-APPOINTMENT (OUTPATIENT)
Age: 59
End: 2024-11-04

## 2024-11-04 PROCEDURE — 92014 COMPRE OPH EXAM EST PT 1/>: CPT

## 2024-11-07 LAB
ALBUMIN SERPL ELPH-MCNC: 4.2 G/DL
ALP BLD-CCNC: 159 U/L
ALT SERPL-CCNC: 18 U/L
ANION GAP SERPL CALC-SCNC: 13 MMOL/L
AST SERPL-CCNC: 16 U/L
BASOPHILS # BLD AUTO: 0.08 K/UL
BASOPHILS NFR BLD AUTO: 0.5 %
BILIRUB SERPL-MCNC: 0.7 MG/DL
BUN SERPL-MCNC: 11 MG/DL
CALCIUM SERPL-MCNC: 9.1 MG/DL
CHLORIDE SERPL-SCNC: 105 MMOL/L
CO2 SERPL-SCNC: 24 MMOL/L
CREAT SERPL-MCNC: 0.52 MG/DL
CRP SERPL-MCNC: 6 MG/L
EGFR: 106 ML/MIN/1.73M2
EOSINOPHIL # BLD AUTO: 0.12 K/UL
EOSINOPHIL NFR BLD AUTO: 0.7 %
ERYTHROCYTE [SEDIMENTATION RATE] IN BLOOD BY WESTERGREN METHOD: 27 MM/HR
GLUCOSE SERPL-MCNC: 188 MG/DL
HBV CORE IGG+IGM SER QL: NONREACTIVE
HBV SURFACE AB SER QL: NONREACTIVE
HBV SURFACE AG SER QL: NONREACTIVE
HCT VFR BLD CALC: 42 %
HCV AB SER QL: NONREACTIVE
HCV S/CO RATIO: 0.27 S/CO
HGB BLD-MCNC: 13 G/DL
IMM GRANULOCYTES NFR BLD AUTO: 0.5 %
LYMPHOCYTES # BLD AUTO: 1.48 K/UL
LYMPHOCYTES NFR BLD AUTO: 8.7 %
MAN DIFF?: NORMAL
MCHC RBC-ENTMCNC: 30.5 PG
MCHC RBC-ENTMCNC: 31 G/DL
MCV RBC AUTO: 98.6 FL
MONOCYTES # BLD AUTO: 0.47 K/UL
MONOCYTES NFR BLD AUTO: 2.8 %
NEUTROPHILS # BLD AUTO: 14.79 K/UL
NEUTROPHILS NFR BLD AUTO: 86.8 %
PLATELET # BLD AUTO: 295 K/UL
POTASSIUM SERPL-SCNC: 4.3 MMOL/L
PROT SERPL-MCNC: 6.7 G/DL
RBC # BLD: 4.26 M/UL
RBC # FLD: 15.7 %
SODIUM SERPL-SCNC: 142 MMOL/L
WBC # FLD AUTO: 17.03 K/UL

## 2024-11-25 ENCOUNTER — APPOINTMENT (OUTPATIENT)
Dept: CT IMAGING | Facility: CLINIC | Age: 59
End: 2024-11-25
Payer: MEDICAID

## 2024-11-25 ENCOUNTER — OUTPATIENT (OUTPATIENT)
Dept: OUTPATIENT SERVICES | Facility: HOSPITAL | Age: 59
LOS: 1 days | End: 2024-11-25
Payer: MEDICAID

## 2024-11-25 VITALS
DIASTOLIC BLOOD PRESSURE: 86 MMHG | HEART RATE: 76 BPM | TEMPERATURE: 98 F | SYSTOLIC BLOOD PRESSURE: 125 MMHG | RESPIRATION RATE: 14 BRPM | OXYGEN SATURATION: 95 % | HEIGHT: 61 IN | WEIGHT: 194.89 LBS

## 2024-11-25 DIAGNOSIS — Z96.7 PRESENCE OF OTHER BONE AND TENDON IMPLANTS: ICD-10-CM

## 2024-11-25 DIAGNOSIS — Z29.9 ENCOUNTER FOR PROPHYLACTIC MEASURES, UNSPECIFIED: ICD-10-CM

## 2024-11-25 DIAGNOSIS — Z90.710 ACQUIRED ABSENCE OF BOTH CERVIX AND UTERUS: Chronic | ICD-10-CM

## 2024-11-25 DIAGNOSIS — Z98.890 OTHER SPECIFIED POSTPROCEDURAL STATES: Chronic | ICD-10-CM

## 2024-11-25 DIAGNOSIS — Z95.2 PRESENCE OF PROSTHETIC HEART VALVE: Chronic | ICD-10-CM

## 2024-11-25 DIAGNOSIS — Z95.1 PRESENCE OF AORTOCORONARY BYPASS GRAFT: Chronic | ICD-10-CM

## 2024-11-25 DIAGNOSIS — Z90.49 ACQUIRED ABSENCE OF OTHER SPECIFIED PARTS OF DIGESTIVE TRACT: Chronic | ICD-10-CM

## 2024-11-25 LAB
A1C WITH ESTIMATED AVERAGE GLUCOSE RESULT: 6.2 % — HIGH (ref 4–5.6)
ALBUMIN SERPL ELPH-MCNC: 4 G/DL — SIGNIFICANT CHANGE UP (ref 3.3–5)
ALP SERPL-CCNC: 149 U/L — HIGH (ref 40–120)
ALT FLD-CCNC: 18 U/L — SIGNIFICANT CHANGE UP (ref 10–45)
ANION GAP SERPL CALC-SCNC: 14 MMOL/L — SIGNIFICANT CHANGE UP (ref 5–17)
AST SERPL-CCNC: 16 U/L — SIGNIFICANT CHANGE UP (ref 10–40)
BASOPHILS # BLD AUTO: 0.12 K/UL — SIGNIFICANT CHANGE UP (ref 0–0.2)
BASOPHILS NFR BLD AUTO: 0.8 % — SIGNIFICANT CHANGE UP (ref 0–2)
BILIRUB SERPL-MCNC: 0.5 MG/DL — SIGNIFICANT CHANGE UP (ref 0.2–1.2)
BLD GP AB SCN SERPL QL: NEGATIVE — SIGNIFICANT CHANGE UP
BUN SERPL-MCNC: 13 MG/DL — SIGNIFICANT CHANGE UP (ref 7–23)
CALCIUM SERPL-MCNC: 9.2 MG/DL — SIGNIFICANT CHANGE UP (ref 8.4–10.5)
CHLORIDE SERPL-SCNC: 104 MMOL/L — SIGNIFICANT CHANGE UP (ref 96–108)
CO2 SERPL-SCNC: 23 MMOL/L — SIGNIFICANT CHANGE UP (ref 22–31)
CREAT SERPL-MCNC: 0.6 MG/DL — SIGNIFICANT CHANGE UP (ref 0.5–1.3)
EGFR: 103 ML/MIN/1.73M2 — SIGNIFICANT CHANGE UP
EOSINOPHIL # BLD AUTO: 0.2 K/UL — SIGNIFICANT CHANGE UP (ref 0–0.5)
EOSINOPHIL NFR BLD AUTO: 1.3 % — SIGNIFICANT CHANGE UP (ref 0–6)
ESTIMATED AVERAGE GLUCOSE: 131 MG/DL — HIGH (ref 68–114)
GLUCOSE SERPL-MCNC: 135 MG/DL — HIGH (ref 70–99)
HCT VFR BLD CALC: 42.3 % — SIGNIFICANT CHANGE UP (ref 34.5–45)
HGB BLD-MCNC: 13.2 G/DL — SIGNIFICANT CHANGE UP (ref 11.5–15.5)
IMM GRANULOCYTES NFR BLD AUTO: 0.6 % — SIGNIFICANT CHANGE UP (ref 0–0.9)
LYMPHOCYTES # BLD AUTO: 1.51 K/UL — SIGNIFICANT CHANGE UP (ref 1–3.3)
LYMPHOCYTES # BLD AUTO: 9.5 % — LOW (ref 13–44)
MCHC RBC-ENTMCNC: 30.6 PG — SIGNIFICANT CHANGE UP (ref 27–34)
MCHC RBC-ENTMCNC: 31.2 G/DL — LOW (ref 32–36)
MCV RBC AUTO: 98.1 FL — SIGNIFICANT CHANGE UP (ref 80–100)
MONOCYTES # BLD AUTO: 1.09 K/UL — HIGH (ref 0–0.9)
MONOCYTES NFR BLD AUTO: 6.9 % — SIGNIFICANT CHANGE UP (ref 2–14)
MRSA PCR RESULT.: SIGNIFICANT CHANGE UP
NEUTROPHILS # BLD AUTO: 12.88 K/UL — HIGH (ref 1.8–7.4)
NEUTROPHILS NFR BLD AUTO: 80.9 % — HIGH (ref 43–77)
PLATELET # BLD AUTO: 283 K/UL — SIGNIFICANT CHANGE UP (ref 150–400)
POTASSIUM SERPL-MCNC: 4.1 MMOL/L — SIGNIFICANT CHANGE UP (ref 3.5–5.3)
POTASSIUM SERPL-SCNC: 4.1 MMOL/L — SIGNIFICANT CHANGE UP (ref 3.5–5.3)
PROT SERPL-MCNC: 7.6 G/DL — SIGNIFICANT CHANGE UP (ref 6–8.3)
RBC # BLD: 4.31 M/UL — SIGNIFICANT CHANGE UP (ref 3.8–5.2)
RBC # FLD: 15.8 % — HIGH (ref 10.3–14.5)
RH IG SCN BLD-IMP: POSITIVE — SIGNIFICANT CHANGE UP
S AUREUS DNA NOSE QL NAA+PROBE: SIGNIFICANT CHANGE UP
SODIUM SERPL-SCNC: 141 MMOL/L — SIGNIFICANT CHANGE UP (ref 135–145)
WBC # BLD: 15.9 K/UL — HIGH (ref 3.8–10.5)
WBC # FLD AUTO: 15.9 K/UL — HIGH (ref 3.8–10.5)

## 2024-11-25 PROCEDURE — 71260 CT THORAX DX C+: CPT

## 2024-11-25 PROCEDURE — 86923 COMPATIBILITY TEST ELECTRIC: CPT

## 2024-11-25 PROCEDURE — 87640 STAPH A DNA AMP PROBE: CPT

## 2024-11-25 PROCEDURE — 83036 HEMOGLOBIN GLYCOSYLATED A1C: CPT

## 2024-11-25 PROCEDURE — 86900 BLOOD TYPING SEROLOGIC ABO: CPT

## 2024-11-25 PROCEDURE — 86901 BLOOD TYPING SEROLOGIC RH(D): CPT

## 2024-11-25 PROCEDURE — 85025 COMPLETE CBC W/AUTO DIFF WBC: CPT

## 2024-11-25 PROCEDURE — 36415 COLL VENOUS BLD VENIPUNCTURE: CPT

## 2024-11-25 PROCEDURE — 80053 COMPREHEN METABOLIC PANEL: CPT

## 2024-11-25 PROCEDURE — G0463: CPT

## 2024-11-25 PROCEDURE — 87641 MR-STAPH DNA AMP PROBE: CPT

## 2024-11-25 PROCEDURE — 86850 RBC ANTIBODY SCREEN: CPT

## 2024-11-25 NOTE — H&P PST ADULT - HISTORY OF PRESENT ILLNESS
59 year old female current smoker (currently smokee year old female smoker with PMHCVA with residual Left-sided weakness/numbness/L gaze deviation, DM2 with b/l peripheral neuropathy, COPD/Asthma, Breast Ca s/p ?RT, Bipolar depression, Rheumatoid Arthritis, Antiphospholipid syndrome, and L eye uveitis/scleritis, Presents to [facility] transferred from [facility]. p/w multiple medical complaints. A/w exacerbation of L-sided subjective weakness/numbness likely 2/2 recrudescence of prior Right BG infarct. Cardiology following for new LV dysfunction since July, pending ischemic eval. LESLEY was done showing severe Aortic Insufficiency and moderated MR. Cardiac cath was done showing LAD prox 100% fills via right to left collateral. Patient now transferred to CTS Dr. Loaiza for evaluation.  ?  HOSPITAL COURSE: Neuro consult for hx cva L sided weakness, Preop for Friday, Repeat echo shows only mild valve pathology LESLEY this am=> severe AR,  ?  s/p CABG LIMA-LAD, AVR(t) - Inspiris 23mm, LAAL with AtriClip 40mm 11/21/2023.  ??  Seen post op 12/13/2023 and again 6/26/2024 gradually progressing and stable from CTS perspective, Weight down to 188, saw Dr Carrasco and PCP BS mainly 130's, lower than 150. Had some med changes with PCP, started back on Trazadone. Was tp fu with PCP Dr. Almodovar and cardiologist Dr. German Carrasco. Also had follow up TEB with neuro.  ?  At her 6/26/24 visit she co burning sensation to MSI. Underwent CT chest non con 7/3/24 that revealed Diffuse sternal nonunion, with fluid interposed between the sternal fragments. The widest area of separation of the sternal fragments measures up to 2.8 cm.  ?  She was recently admitted for COPD exacerbation and RLE abscess 9/28-10/2. She was in [place] and had tailbone fx the week prior. Steroids and ABX.  ? 59 year old female current smoker (currently smokes 0.5 ppd for last 30 years, PMH of  CAD, CABG on 11/21/23, CVA with residual Left-sided weakness/numbness/Left gaze deviation, T2D with b/l peripheral neuropathy, COPD/Asthma, Left Breast Ca s/p XRT, Bipolar depression, Rheumatoid Arthritis, Antiphospholipid syndrome, and Left  eye Uveitis/scleritis, States she was recently admitted for COPD exacerbation and RLE abscess 9/28-10/2 also S/P tailbone fracture  in [place] in august.  Patient endorses on her  6/26/24 visit she reported experiencing burning sensation to mid-sternal incision site.  She underwent CT chest  on 7/3/24 which revealed Diffuse sternal nonunion, with fluid interposed between the sternal fragments. The widest area of separation of the sternal fragments measures up to 2.8 cm. She now presents to PST prior to scheduled Sternal Plating on 12/5/2024.  ?   59 year old female current smoker (states currently smokes 0.5 ppd for last 30 years, PMH of  CAD, CABG/Aortic Valve repair on 11/21/23, CVA with residual Left-sided weakness/numbness/Left gaze deviation, ILR (extracted), T2D with b/l peripheral neuropathy, COPD/Asthma, Left Breast Ca s/p XRT, Bipolar depression, Rheumatoid Arthritis, Antiphospholipid syndrome on eliquis, and Left  eye Uveitis/scleritis, States she was recently admitted for COPD exacerbation and RLE abscess 9/28-10/2 also S/P recent tailbone fracture  while in [place], noted to have  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 She reports experiencing burning sensation to mid-sternal surgical site.  CT chest ---Diffuse sternal nonunion, with fluid interposed between the sternal fragments. The widest area of separation of the sternal fragments measures up to 2.8 cm. She now presents to PST prior to scheduled Sternal Plating on 12/5/2024.  ?

## 2024-11-25 NOTE — H&P PST ADULT - NEGATIVE CARDIOVASCULAR SYMPTOMS
Recorded Pressure: Ao, HR=73, Condition=Condition 1 (Aorta) Ao 140/71/99 no chest pain/no palpitations/no peripheral edema

## 2024-11-25 NOTE — H&P PST ADULT - NSICDXPASTMEDICALHX_GEN_ALL_CORE_FT
PAST MEDICAL HISTORY:  APS (antiphospholipid syndrome)     Asthma     Breast cancer     CAD (coronary artery disease)     Cigarette smoker     Facial paresthesia     History of COPD     History of COPD     History of depressed bipolar disorder     History of multiple cerebrovascular accidents (CVAs)     Hyperlipidemia     Migraines     Other depression     Paresthesia of left arm     Rheumatoid arthritis     Suicidal ideation     Type 2 diabetes mellitus      PAST MEDICAL HISTORY:  APS (antiphospholipid syndrome)     Asthma     Breast cancer     CAD (coronary artery disease)     Cigarette smoker     Facial paresthesia     History of COPD     History of COPD     History of depressed bipolar disorder     History of multiple cerebrovascular accidents (CVAs)     Hyperlipidemia     Migraines     Other depression     Paresthesia of left arm     Rheumatoid arthritis     Suicidal ideation     Type 2 diabetes mellitus     Uveitis

## 2024-11-25 NOTE — H&P PST ADULT - NSICDXPASTSURGICALHX_GEN_ALL_CORE_FT
PAST SURGICAL HISTORY:  History of cholecystectomy     History of hysterectomy     History of loop recorder     S/P AVR     S/P CABG x 1     S/P lumpectomy, left breast

## 2024-11-25 NOTE — H&P PST ADULT - ASSESSMENT
denies loose, has full top and partial bottom dentures  denies loose, has full top and partial bottom dentures   CAPRINI SCORE    AGE RELATED RISK FACTORS                                                             [x ] Age 41-60 years                                            (1 Point)  [ ] Age: 61-74 years                                           (2 Points)                 [ ] Age= 75 years                                                (3 Points)             DISEASE RELATED RISK FACTORS                                                       [ ] Edema in the lower extremities                 (1 Point)                     [ ] Varicose veins                                               (1 Point)                                 [x ] BMI > 25 Kg/m2                                            (1 Point)                                  [ ] Serious infection (ie PNA)                            (1 Point)                     [ ] Lung disease ( COPD, Emphysema)            (1 Point)                                                                          [ ] Acute myocardial infarction                         (1 Point)                  [ ] Congestive heart failure (in the previous month)  (1 Point)         [ ] Inflammatory bowel disease                            (1 Point)                  [ ] Central venous access, PICC or Port               (2 points)       (within the last month)                                                                [ ] Stroke (in the previous month)                        (5 Points)    [x ] Previous or present malignancy                       (2 points)                                                                                                                                                         HEMATOLOGY RELATED FACTORS                                                         [ ] Prior episodes of VTE                                     (3 Points)                     [ ] Positive family history for VTE                      (3 Points)                  [ ] Prothrombin 04058 A                                     (3 Points)                     [ ] Factor V Leiden                                                (3 Points)                        [ ] Lupus anticoagulants                                      (3 Points)                                                           [ ] Anticardiolipin antibodies                              (3 Points)                                                       [ ] High homocysteine in the blood                   (3 Points)                                             [ ] Other congenital or acquired thrombophilia      (3 Points)                                                [ ] Heparin induced thrombocytopenia                  (3 Points)                                        MOBILITY RELATED FACTORS  [ ] Bed rest                                                         (1 Point)  [ ] Plaster cast                                                    (2 points)  [ ] Bed bound for more than 72 hours           (2 Points)    GENDER SPECIFIC FACTORS  [ ] Pregnancy or had a baby within the last month   (1 Point)  [ ] Post-partum < 6 weeks                                   (1 Point)  [ ] Hormonal therapy  or oral contraception   (1 Point)  [ ] History of pregnancy complications              (1 point)  [ ] Unexplained or recurrent              (1 Point)    OTHER RISK FACTORS                                           (1 Point)  [x ] BMI >40, smoking, diabetes requiring insulin, chemotherapy  blood transfusions and length of surgery over 2 hours    SURGERY RELATED RISK FACTORS  [ ]  Section within the last month     (1 Point)  [ ] Minor surgery                                                  (1 Point)  [ ] Arthroscopic surgery                                       (2 Points)  [x ] Planned major surgery lasting more            (2 Points)      than 45 minutes     [ ] Elective hip or knee joint replacement       (5 points)       surgery                                                TRAUMA RELATED RISK FACTORS  [ ] Fracture of the hip, pelvis, or leg                       (5 Points)  [ ] Spinal cord injury resulting in paralysis             (5 points)       (in the previous month)    [ ] Paralysis  (less than 1 month)                             (5 Points)  [ ] Multiple Trauma within 1 month                        (5 Points)    Total Score [    7    ]    Caprini Score 0-2: Low Risk, NO VTE prophylaxis required for most patients, encourage ambulation  Caprini Score 3-6: Moderate Risk , pharmacologic VTE prophylaxis is indicated for most patients (in the absence of contraindications)  Caprini Score Greater than or =7: High risk, pharmocologic VTE prophylaxis indicated for most patients (in the absence of contraindications)

## 2024-11-25 NOTE — H&P PST ADULT - PROBLEM SELECTOR PLAN 1
Sternal Plating  -cbc w/anemia reflex, cmp, type and screen, A1c, mrsa/mssa swab,   -preop instructions discussed  -per patient, instructed by CTS team to hold eliquis 5 days preop with last dose on 11/29  -instructed to continue low dose aspirin   -instructed to hold metformin DOS  -smoking cessation strongly encouraged   -instructed to avoid smoking DOS  -fingerstick and ABO on admit Sternal Plating  -cbc w/anemia reflex, cmp, type and screen, A1c, mrsa/mssa swab,   -preop instructions discussed  -instructed to stop eliquis 5 days preop with last dose on 11/29  -instructed to continue low dose aspirin   -instructed to hold metformin DOS  -smoking cessation strongly encouraged   -instructed to avoid smoking DOS  -fingerstick and ABO on admit

## 2024-11-25 NOTE — H&P PST ADULT - NEGATIVE ENMT SYMPTOMS
Quality 110: Preventive Care And Screening: Influenza Immunization: Influenza immunization was not ordered or administered, reason not given
Quality 134: Screening For Clinical Depression And Follow-Up Plan: The patient was screened for depression and the screen was negative and no follow up required
Quality 137: Melanoma: Continuity Of Care - Recall System: Recall system not utilized, reason not otherwise specified
Quality 130: Documentation Of Current Medications In The Medical Record: Current Medications Documented
Quality 226: Preventive Care And Screening: Tobacco Use: Screening And Cessation Intervention: Patient screened for tobacco use and is an ex/non-smoker
Quality 111:Pneumonia Vaccination Status For Older Adults: Pneumococcal Vaccination not Administered or Previously Received, Reason not Otherwise Specified
Detail Level: Detailed
Quality 431: Preventive Care And Screening: Unhealthy Alcohol Use - Screening: Patient screened for unhealthy alcohol use using a single question and scores less than 2 times per year
Quality 47: Advance Care Plan: Advance Care Planning discussed and documented; advance care plan or surrogate decision maker documented in the medical record.
no throat pain/no dysphagia

## 2024-11-25 NOTE — H&P PST ADULT - OTHER CARE PROVIDERS
Cardiologist-- // Pulmonary-- // Rheumatologist--   // Hematologist-- Cardiologist-- German Carrasco, [phone] // Pulmonary-- Arron Erickson, [phone] // Rheumatologist--Dr. Teresa, [phone] // Hematology-- Dr. Driver , [phone]

## 2024-12-05 ENCOUNTER — APPOINTMENT (OUTPATIENT)
Dept: OPHTHALMOLOGY | Facility: CLINIC | Age: 59
End: 2024-12-05

## 2024-12-05 DEVICE — KIT CVC 2LUM MAC 9FR CHG: Type: IMPLANTABLE DEVICE | Status: FUNCTIONAL

## 2024-12-05 DEVICE — LIGATING CLIPS WECK HORIZON SMALL-WIDE (RED) 24: Type: IMPLANTABLE DEVICE | Status: FUNCTIONAL

## 2024-12-05 DEVICE — KIT A-LINE 1LUM 20G X 12CM SAFE KIT: Type: IMPLANTABLE DEVICE | Status: FUNCTIONAL

## 2024-12-05 DEVICE — SURGICEL 2 X 14": Type: IMPLANTABLE DEVICE | Status: FUNCTIONAL

## 2024-12-05 DEVICE — BONE WAX 2.5GM: Type: IMPLANTABLE DEVICE | Status: FUNCTIONAL

## 2024-12-05 DEVICE — CHEST DRAIN THORACIC ARGYLE PVC 28FR STRAIGHT: Type: IMPLANTABLE DEVICE | Status: FUNCTIONAL

## 2024-12-05 DEVICE — SURGICEL FIBRILLAR 2 X 4": Type: IMPLANTABLE DEVICE | Status: FUNCTIONAL

## 2024-12-05 DEVICE — LIGATING CLIPS WECK HORIZON MEDIUM (BLUE) 24: Type: IMPLANTABLE DEVICE | Status: FUNCTIONAL

## 2024-12-13 ENCOUNTER — INPATIENT (INPATIENT)
Facility: HOSPITAL | Age: 59
LOS: 5 days | Discharge: HOME HEALTH | DRG: 908 | End: 2024-12-19
Attending: THORACIC SURGERY (CARDIOTHORACIC VASCULAR SURGERY) | Admitting: THORACIC SURGERY (CARDIOTHORACIC VASCULAR SURGERY)
Payer: MEDICAID

## 2024-12-13 ENCOUNTER — APPOINTMENT (OUTPATIENT)
Dept: CARDIOTHORACIC SURGERY | Facility: HOSPITAL | Age: 59
End: 2024-12-13

## 2024-12-13 VITALS
TEMPERATURE: 98 F | RESPIRATION RATE: 16 BRPM | WEIGHT: 197.09 LBS | HEIGHT: 61 IN | OXYGEN SATURATION: 95 % | DIASTOLIC BLOOD PRESSURE: 70 MMHG | SYSTOLIC BLOOD PRESSURE: 112 MMHG | HEART RATE: 72 BPM

## 2024-12-13 DIAGNOSIS — M96.89 OTHER INTRAOPERATIVE AND POSTPROCEDURAL COMPLICATIONS AND DISORDERS OF THE MUSCULOSKELETAL SYSTEM: ICD-10-CM

## 2024-12-13 DIAGNOSIS — Z96.7 PRESENCE OF OTHER BONE AND TENDON IMPLANTS: ICD-10-CM

## 2024-12-13 DIAGNOSIS — Z95.1 PRESENCE OF AORTOCORONARY BYPASS GRAFT: Chronic | ICD-10-CM

## 2024-12-13 DIAGNOSIS — Z98.890 OTHER SPECIFIED POSTPROCEDURAL STATES: Chronic | ICD-10-CM

## 2024-12-13 DIAGNOSIS — Z95.2 PRESENCE OF PROSTHETIC HEART VALVE: Chronic | ICD-10-CM

## 2024-12-13 DIAGNOSIS — Z90.49 ACQUIRED ABSENCE OF OTHER SPECIFIED PARTS OF DIGESTIVE TRACT: Chronic | ICD-10-CM

## 2024-12-13 DIAGNOSIS — Z90.710 ACQUIRED ABSENCE OF BOTH CERVIX AND UTERUS: Chronic | ICD-10-CM

## 2024-12-13 LAB
GLUCOSE BLDC GLUCOMTR-MCNC: 112 MG/DL — HIGH (ref 70–99)
GLUCOSE BLDC GLUCOMTR-MCNC: 127 MG/DL — HIGH (ref 70–99)
GLUCOSE BLDC GLUCOMTR-MCNC: 146 MG/DL — HIGH (ref 70–99)

## 2024-12-13 PROCEDURE — 21825 TREAT STERNUM FRACTURE: CPT

## 2024-12-13 PROCEDURE — 71045 X-RAY EXAM CHEST 1 VIEW: CPT | Mod: 26

## 2024-12-13 DEVICE — IMPLANTABLE DEVICE: Type: IMPLANTABLE DEVICE | Status: FUNCTIONAL

## 2024-12-13 DEVICE — PLATE 8H BUTTERFLY: Type: IMPLANTABLE DEVICE | Status: FUNCTIONAL

## 2024-12-13 DEVICE — SCREW SF 2.4X14MM: Type: IMPLANTABLE DEVICE | Status: FUNCTIONAL

## 2024-12-13 DEVICE — SCREW LOKG SF 2.4X16MM: Type: IMPLANTABLE DEVICE | Status: FUNCTIONAL

## 2024-12-13 RX ORDER — METOCLOPRAMIDE HYDROCHLORIDE 10 MG/1
10 TABLET ORAL EVERY 8 HOURS
Refills: 0 | Status: DISCONTINUED | OUTPATIENT
Start: 2024-12-13 | End: 2024-12-13

## 2024-12-13 RX ORDER — FAMOTIDINE 20 MG/1
20 TABLET, FILM COATED ORAL DAILY
Refills: 0 | Status: DISCONTINUED | OUTPATIENT
Start: 2024-12-13 | End: 2024-12-19

## 2024-12-13 RX ORDER — CHLORHEXIDINE GLUCONATE 1.2 MG/ML
15 RINSE ORAL EVERY 12 HOURS
Refills: 0 | Status: DISCONTINUED | OUTPATIENT
Start: 2024-12-13 | End: 2024-12-13

## 2024-12-13 RX ORDER — OXYCODONE HYDROCHLORIDE 30 MG/1
5 TABLET ORAL EVERY 4 HOURS
Refills: 0 | Status: DISCONTINUED | OUTPATIENT
Start: 2024-12-13 | End: 2024-12-13

## 2024-12-13 RX ORDER — LIDOCAINE HCL 20 MG/ML
0.2 VIAL (ML) INJECTION ONCE
Refills: 0 | Status: DISCONTINUED | OUTPATIENT
Start: 2024-12-13 | End: 2024-12-13

## 2024-12-13 RX ORDER — SODIUM CHLORIDE 9 MG/ML
3 INJECTION, SOLUTION INTRAMUSCULAR; INTRAVENOUS; SUBCUTANEOUS EVERY 8 HOURS
Refills: 0 | Status: DISCONTINUED | OUTPATIENT
Start: 2024-12-13 | End: 2024-12-13

## 2024-12-13 RX ORDER — ONDANSETRON HYDROCHLORIDE 4 MG/1
4 TABLET, FILM COATED ORAL ONCE
Refills: 0 | Status: DISCONTINUED | OUTPATIENT
Start: 2024-12-13 | End: 2024-12-13

## 2024-12-13 RX ORDER — IPRATROPIUM BROMIDE AND ALBUTEROL SULFATE 2.5; .5 MG/3ML; MG/3ML
3 SOLUTION RESPIRATORY (INHALATION) ONCE
Refills: 0 | Status: COMPLETED | OUTPATIENT
Start: 2024-12-13 | End: 2024-12-13

## 2024-12-13 RX ORDER — ACETAMINOPHEN 500MG 500 MG/1
1000 TABLET, COATED ORAL ONCE
Refills: 0 | Status: COMPLETED | OUTPATIENT
Start: 2024-12-13 | End: 2024-12-13

## 2024-12-13 RX ORDER — FLUTICASONE PROPIONATE AND SALMETEROL XINAFOATE 45; 21 UG/1; UG/1
1 AEROSOL, METERED RESPIRATORY (INHALATION)
Refills: 0 | Status: DISCONTINUED | OUTPATIENT
Start: 2024-12-13 | End: 2024-12-19

## 2024-12-13 RX ORDER — ACETAMINOPHEN 500MG 500 MG/1
650 TABLET, COATED ORAL EVERY 6 HOURS
Refills: 0 | Status: DISCONTINUED | OUTPATIENT
Start: 2024-12-13 | End: 2024-12-13

## 2024-12-13 RX ORDER — ALBUTEROL 90 MCG
1.25 AEROSOL (GRAM) INHALATION DAILY
Refills: 0 | Status: DISCONTINUED | OUTPATIENT
Start: 2024-12-13 | End: 2024-12-14

## 2024-12-13 RX ORDER — POTASSIUM CHLORIDE 600 MG/1
10 TABLET, EXTENDED RELEASE ORAL
Refills: 0 | Status: DISCONTINUED | OUTPATIENT
Start: 2024-12-13 | End: 2024-12-13

## 2024-12-13 RX ORDER — PREDNISONE 20 MG/1
5 TABLET ORAL DAILY
Refills: 0 | Status: DISCONTINUED | OUTPATIENT
Start: 2024-12-13 | End: 2024-12-19

## 2024-12-13 RX ORDER — SODIUM CHLORIDE 9 MG/ML
1000 INJECTION, SOLUTION INTRAMUSCULAR; INTRAVENOUS; SUBCUTANEOUS
Refills: 0 | Status: DISCONTINUED | OUTPATIENT
Start: 2024-12-13 | End: 2024-12-13

## 2024-12-13 RX ORDER — SENNOSIDES 8.6 MG
2 TABLET ORAL AT BEDTIME
Refills: 0 | Status: DISCONTINUED | OUTPATIENT
Start: 2024-12-14 | End: 2024-12-19

## 2024-12-13 RX ORDER — PREDNISONE 20 MG/1
1 TABLET ORAL
Refills: 0 | DISCHARGE

## 2024-12-13 RX ORDER — INSULIN REG, HUM S-S BUFF 100/ML
1 VIAL (ML) INJECTION
Qty: 100 | Refills: 0 | Status: DISCONTINUED | OUTPATIENT
Start: 2024-12-13 | End: 2024-12-13

## 2024-12-13 RX ORDER — CHOLECALCIFEROL (VITAMIN D3) 10MCG/0.25
2000 DROPS ORAL DAILY
Refills: 0 | Status: DISCONTINUED | OUTPATIENT
Start: 2024-12-13 | End: 2024-12-19

## 2024-12-13 RX ORDER — MONTELUKAST SODIUM 10 MG/1
10 TABLET ORAL DAILY
Refills: 0 | Status: DISCONTINUED | OUTPATIENT
Start: 2024-12-13 | End: 2024-12-19

## 2024-12-13 RX ORDER — AMIODARONE HYDROCHLORIDE 200 MG/1
400 TABLET ORAL
Refills: 0 | Status: COMPLETED | OUTPATIENT
Start: 2024-12-13 | End: 2024-12-16

## 2024-12-13 RX ORDER — OXYCODONE HYDROCHLORIDE 30 MG/1
5 TABLET ORAL EVERY 4 HOURS
Refills: 0 | Status: DISCONTINUED | OUTPATIENT
Start: 2024-12-13 | End: 2024-12-19

## 2024-12-13 RX ORDER — CHOLECALCIFEROL (VITAMIN D3) 10MCG/0.25
1 DROPS ORAL
Refills: 0 | DISCHARGE

## 2024-12-13 RX ORDER — OXYCODONE HYDROCHLORIDE 30 MG/1
10 TABLET ORAL EVERY 4 HOURS
Refills: 0 | Status: DISCONTINUED | OUTPATIENT
Start: 2024-12-13 | End: 2024-12-13

## 2024-12-13 RX ORDER — MULTIVIT WITH MINERALS/LUTEIN
500 TABLET ORAL
Refills: 0 | Status: COMPLETED | OUTPATIENT
Start: 2024-12-13 | End: 2024-12-18

## 2024-12-13 RX ORDER — PANTOPRAZOLE SODIUM 40 MG/1
1 TABLET, DELAYED RELEASE ORAL
Refills: 0 | DISCHARGE

## 2024-12-13 RX ORDER — HYDROMORPHONE HYDROCHLORIDE 2 MG/1
0.5 TABLET ORAL EVERY 6 HOURS
Refills: 0 | Status: DISCONTINUED | OUTPATIENT
Start: 2024-12-13 | End: 2024-12-13

## 2024-12-13 RX ORDER — ACETAMINOPHEN 500MG 500 MG/1
650 TABLET, COATED ORAL EVERY 6 HOURS
Refills: 0 | Status: DISCONTINUED | OUTPATIENT
Start: 2024-12-16 | End: 2024-12-19

## 2024-12-13 RX ORDER — PANTOPRAZOLE SODIUM 40 MG/1
40 TABLET, DELAYED RELEASE ORAL
Refills: 0 | Status: DISCONTINUED | OUTPATIENT
Start: 2024-12-13 | End: 2024-12-19

## 2024-12-13 RX ORDER — TRAZODONE HYDROCHLORIDE 150 MG/1
0 TABLET ORAL
Refills: 0 | DISCHARGE

## 2024-12-13 RX ORDER — CHLORHEXIDINE GLUCONATE 1.2 MG/ML
1 RINSE ORAL DAILY
Refills: 0 | Status: DISCONTINUED | OUTPATIENT
Start: 2024-12-13 | End: 2024-12-19

## 2024-12-13 RX ORDER — OLANZAPINE 20 MG/1
7.5 TABLET ORAL DAILY
Refills: 0 | Status: DISCONTINUED | OUTPATIENT
Start: 2024-12-13 | End: 2024-12-19

## 2024-12-13 RX ORDER — ALBUTEROL 90 MCG
2 AEROSOL (GRAM) INHALATION EVERY 6 HOURS
Refills: 0 | Status: DISCONTINUED | OUTPATIENT
Start: 2024-12-13 | End: 2024-12-19

## 2024-12-13 RX ORDER — GABAPENTIN 300 MG/1
100 CAPSULE ORAL EVERY 8 HOURS
Refills: 0 | Status: DISCONTINUED | OUTPATIENT
Start: 2024-12-13 | End: 2024-12-13

## 2024-12-13 RX ORDER — TRAZODONE HYDROCHLORIDE 150 MG/1
100 TABLET ORAL AT BEDTIME
Refills: 0 | Status: DISCONTINUED | OUTPATIENT
Start: 2024-12-13 | End: 2024-12-19

## 2024-12-13 RX ORDER — FENTANYL 12 UG/H
25 PATCH, EXTENDED RELEASE TRANSDERMAL
Refills: 0 | Status: DISCONTINUED | OUTPATIENT
Start: 2024-12-13 | End: 2024-12-13

## 2024-12-13 RX ORDER — HYDROMORPHONE HYDROCHLORIDE 2 MG/1
0.25 TABLET ORAL
Refills: 0 | Status: DISCONTINUED | OUTPATIENT
Start: 2024-12-13 | End: 2024-12-13

## 2024-12-13 RX ORDER — ACETAMINOPHEN 500MG 500 MG/1
650 TABLET, COATED ORAL EVERY 6 HOURS
Refills: 0 | Status: DISCONTINUED | OUTPATIENT
Start: 2024-12-13 | End: 2024-12-19

## 2024-12-13 RX ORDER — ENOXAPARIN SODIUM 30 MG/.3ML
40 INJECTION SUBCUTANEOUS EVERY 24 HOURS
Refills: 0 | Status: DISCONTINUED | OUTPATIENT
Start: 2024-12-13 | End: 2024-12-14

## 2024-12-13 RX ORDER — POLYETHYLENE GLYCOL 3350 17 G/17G
17 POWDER, FOR SOLUTION ORAL DAILY
Refills: 0 | Status: DISCONTINUED | OUTPATIENT
Start: 2024-12-14 | End: 2024-12-19

## 2024-12-13 RX ORDER — METOPROLOL TARTRATE 100 MG/1
12.5 TABLET, FILM COATED ORAL DAILY
Refills: 0 | Status: DISCONTINUED | OUTPATIENT
Start: 2024-12-13 | End: 2024-12-19

## 2024-12-13 RX ORDER — OXYCODONE HYDROCHLORIDE 30 MG/1
10 TABLET ORAL EVERY 4 HOURS
Refills: 0 | Status: DISCONTINUED | OUTPATIENT
Start: 2024-12-13 | End: 2024-12-19

## 2024-12-13 RX ORDER — GABAPENTIN 300 MG/1
300 CAPSULE ORAL THREE TIMES A DAY
Refills: 0 | Status: DISCONTINUED | OUTPATIENT
Start: 2024-12-13 | End: 2024-12-19

## 2024-12-13 RX ORDER — MONTELUKAST SODIUM 10 MG/1
1 TABLET ORAL
Refills: 0 | DISCHARGE

## 2024-12-13 RX ORDER — PANTOPRAZOLE SODIUM 40 MG/1
40 TABLET, DELAYED RELEASE ORAL DAILY
Refills: 0 | Status: DISCONTINUED | OUTPATIENT
Start: 2024-12-13 | End: 2024-12-13

## 2024-12-13 RX ORDER — 0.9 % SODIUM CHLORIDE 0.9 %
1000 INTRAVENOUS SOLUTION INTRAVENOUS
Refills: 0 | Status: DISCONTINUED | OUTPATIENT
Start: 2024-12-13 | End: 2024-12-13

## 2024-12-13 RX ADMIN — AMIODARONE HYDROCHLORIDE 400 MILLIGRAM(S): 200 TABLET ORAL at 17:43

## 2024-12-13 RX ADMIN — IPRATROPIUM BROMIDE AND ALBUTEROL SULFATE 3 MILLILITER(S): 2.5; .5 SOLUTION RESPIRATORY (INHALATION) at 17:00

## 2024-12-13 RX ADMIN — Medication 100 MILLIGRAM(S): at 23:16

## 2024-12-13 RX ADMIN — TRAZODONE HYDROCHLORIDE 100 MILLIGRAM(S): 150 TABLET ORAL at 21:52

## 2024-12-13 RX ADMIN — OXYCODONE HYDROCHLORIDE 10 MILLIGRAM(S): 30 TABLET ORAL at 15:30

## 2024-12-13 RX ADMIN — IPRATROPIUM BROMIDE AND ALBUTEROL SULFATE 3 MILLILITER(S): 2.5; .5 SOLUTION RESPIRATORY (INHALATION) at 10:54

## 2024-12-13 RX ADMIN — Medication 500 MILLIGRAM(S): at 17:43

## 2024-12-13 RX ADMIN — ACETAMINOPHEN 500MG 650 MILLIGRAM(S): 500 TABLET, COATED ORAL at 17:53

## 2024-12-13 RX ADMIN — ACETAMINOPHEN 500MG 1000 MILLIGRAM(S): 500 TABLET, COATED ORAL at 05:40

## 2024-12-13 RX ADMIN — Medication 80 MILLIGRAM(S): at 21:52

## 2024-12-13 RX ADMIN — Medication 100 MILLIGRAM(S): at 16:56

## 2024-12-13 RX ADMIN — FLUTICASONE PROPIONATE AND SALMETEROL XINAFOATE 1 DOSE(S): 45; 21 AEROSOL, METERED RESPIRATORY (INHALATION) at 17:44

## 2024-12-13 RX ADMIN — Medication 2000 UNIT(S): at 16:15

## 2024-12-13 RX ADMIN — FAMOTIDINE 20 MILLIGRAM(S): 20 TABLET, FILM COATED ORAL at 16:15

## 2024-12-13 RX ADMIN — GABAPENTIN 300 MILLIGRAM(S): 300 CAPSULE ORAL at 16:14

## 2024-12-13 RX ADMIN — OLANZAPINE 7.5 MILLIGRAM(S): 20 TABLET ORAL at 21:51

## 2024-12-13 RX ADMIN — ACETAMINOPHEN 500MG 1000 MILLIGRAM(S): 500 TABLET, COATED ORAL at 12:20

## 2024-12-13 RX ADMIN — ACETAMINOPHEN 500MG 650 MILLIGRAM(S): 500 TABLET, COATED ORAL at 17:33

## 2024-12-13 RX ADMIN — OXYCODONE HYDROCHLORIDE 5 MILLIGRAM(S): 30 TABLET ORAL at 22:52

## 2024-12-13 RX ADMIN — ACETAMINOPHEN 500MG 400 MILLIGRAM(S): 500 TABLET, COATED ORAL at 11:50

## 2024-12-13 RX ADMIN — OXYCODONE HYDROCHLORIDE 5 MILLIGRAM(S): 30 TABLET ORAL at 21:52

## 2024-12-13 RX ADMIN — Medication 1.25 MILLIGRAM(S): at 14:05

## 2024-12-13 RX ADMIN — CHLORHEXIDINE GLUCONATE 1 APPLICATION(S): 1.2 RINSE ORAL at 22:04

## 2024-12-13 RX ADMIN — GABAPENTIN 300 MILLIGRAM(S): 300 CAPSULE ORAL at 21:52

## 2024-12-13 RX ADMIN — ACETAMINOPHEN 500MG 650 MILLIGRAM(S): 500 TABLET, COATED ORAL at 23:15

## 2024-12-13 RX ADMIN — Medication 81 MILLIGRAM(S): at 16:20

## 2024-12-13 RX ADMIN — OXYCODONE HYDROCHLORIDE 10 MILLIGRAM(S): 30 TABLET ORAL at 15:00

## 2024-12-13 NOTE — PROGRESS NOTE ADULT - PROBLEM SELECTOR PLAN 1
- Hx CABG/Aortic Valve repair on 11/21/23  - s/p Sternal Plating 12/13 with Dr. Sivakumar Loaiza  - maintain prevena --> negative suction  - maintain abdominal binder  - maintain mediastinal chest tube to BEAU. monitor chest tube output   - rpt CXR in AM  - maintain sternal precautions  - c/w Zinacef ppx  - on ASA, Atorvastatin, ToprolXL  - ERP Amio, Gabapentin, VitC  - Hx of COPD. On Advair, Singulair, Proventil HFA PRN, Albuterol Neb, Prednisone   - Hx Bipolar Depression. On Olanzapine, Trazodone   - pain regimen PRN  - bowel regimen  - DVT ppx with LVX  - GI ppx with Pantoprazole, Pepcid  - Plan per d/w CTS Attending

## 2024-12-13 NOTE — PHYSICAL THERAPY INITIAL EVALUATION ADULT - ADDITIONAL COMMENTS
as per pt and daughter at bedside pt lives in a 2nd floor walk up apartment + hand rail. approx 10-13 steps up. Lives at a friends place. pt has had recent CTS surgery Nov 2024. was a rehab for 1 day however left rehab to go home. Pt is able to ambulate with Rolator walker on her own since surgery, and also has a shower chair, denies any other DME in the home. Pt is currently declining any DC to a [facility] facility and wants to be DC'ed back to the 2nd story walk up apartment with her friend. as per pt and daughter at bedside pt lives in a 2nd floor walk up apartment + hand rail. approx 10-13 steps up. Lives at a friends place. pt has had recent CTS surgery Nov 2023. was a rehab for 1 day however left rehab to go home. Pt is able to ambulate with Rolator walker on her own since surgery, and also has a shower chair, denies any other DME in the home. Pt is currently declining any DC to a [facility] facility and wants to be DC'ed back to the 2nd story walk up apartment with her friend. +CDPAP 15hr/wk

## 2024-12-13 NOTE — BRIEF OPERATIVE NOTE - BRIEF OP NOTE DRAINS
24 Fr hayden [place] Counseling:  I discussed with the patient the risks of imiquimod including but not limited to erythema, scaling, itching, weeping, crusting, and pain.  Patient understands that the inflammatory response to imiquimod is variable from person to person and was educated regarded proper titration schedule.  If flu-like symptoms develop, patient knows to discontinue the medication and contact us.

## 2024-12-13 NOTE — PATIENT PROFILE ADULT - FALL HARM RISK - UNIVERSAL INTERVENTIONS
Bed in lowest position, wheels locked, appropriate side rails in place/Call bell, personal items and telephone in reach/Instruct patient to call for assistance before getting out of bed or chair/Non-slip footwear when patient is out of bed/[place] to call system/Physically safe environment - no spills, clutter or unnecessary equipment/Purposeful Proactive Rounding/Room/bathroom lighting operational, light cord in reach

## 2024-12-13 NOTE — BRIEF OPERATIVE NOTE - NSICDXBRIEFPOSTOP_GEN_ALL_CORE_FT
POST-OP DIAGNOSIS:  Sternal manubrial dissociation with nonunion 13-Dec-2024 09:51:55  Lalitha Ladd

## 2024-12-13 NOTE — PRE-ANESTHESIA EVALUATION ADULT - NSANTHPMHFT_GEN_ALL_CORE
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 She reports experiencing burning sensation to mid-sternal surgical site.  CT chest ---Diffuse sternal nonunion, with fluid interposed between the sternal fragments. The widest area of separation of the sternal fragments measures up to 2.8 cm. She presents now for Sternal Plating.    Denies active CP/SOB/Orthopnea  Denies hx of CHF

## 2024-12-13 NOTE — PROGRESS NOTE ADULT - ASSESSMENT
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 She reports experiencing burning sensation to mid-sternal surgical site.  CT chest ---Diffuse sternal nonunion, with fluid interposed between the sternal fragments. The widest area of separation of the sternal fragments measures up to 2.8 cm."   She presented today for scheduled Sternal Plating with Dr. Sivakumar Loaiza    Hospital Course:  12/13 s/p sternal plating with Dr. Loaiza. Transferred to [facility]. Prevena and abdominal binder. MED Chest tube. Monitor chest tube output. C/w Ancef. Daily CXR

## 2024-12-13 NOTE — PHYSICAL THERAPY INITIAL EVALUATION ADULT - PERTINENT HX OF CURRENT PROBLEM, REHAB EVAL
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 She reports experiencing burning sensation to mid-sternal surgical site.  CT chest ---Diffuse sternal nonunion, with fluid interposed between the sternal fragments. The widest area of separation of the sternal fragments measures up to 2.8 cm." She presented for scheduled Sternal Plating with Dr. 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 She reports experiencing burning sensation to mid-sternal surgical site.  CT chest ---Diffuse sternal nonunion, with fluid interposed between the sternal fragments. The widest area of separation of the sternal fragments measures up to 2.8 cm." Now s/p sternal plating (12/13) with Dr. Sivakumar Loaiza

## 2024-12-13 NOTE — PHYSICAL THERAPY INITIAL EVALUATION ADULT - PATIENT/FAMILY/SIGNIFICANT OTHER GOALS STATEMENT, PT EVAL
to go home
You can access the FollowMyHealth Patient Portal offered by [facility] by registering at the following website: [email or web address]. By joining [facility]’s FollowMyHealth portal, you will also be able to view your health information using other applications (apps) compatible with our system.

## 2024-12-13 NOTE — BRIEF OPERATIVE NOTE - OPERATION/FINDINGS
Patient has a history of AVR CABG 11/2023 now presenting with symptomatic sternal nonunion   Previous incision was opened and the sternal edges debrided   The sternum was reapproximated with 4 sternal plates: two straight plates, one X plate, and one ladder plate   24Fr hayden placed between the sternum and fascia   Fascia and dermis was closed in layers  Prevena and abdominal binder applied

## 2024-12-13 NOTE — PATIENT PROFILE ADULT - MEDICATIONS/VISITS
nausea with hematemeiss , vitals stablem,  will order ct head to follow up , and check cbc at next round no

## 2024-12-13 NOTE — PROGRESS NOTE ADULT - SUBJECTIVE AND OBJECTIVE BOX
Patient seen and examined in PACU 23.     SUBJECTIVE: "Hi." Denies acute chest pain, palpitations, or shortness of breath. S/P STERNAL PLATING today.    TELEMETRY:  SR, 76    Vital Signs Last 24 Hrs  T(C): 36 (12-13-24 @ 12:15), Max: 36.5 (12-13-24 @ 05:48)  T(F): 96.8 (12-13-24 @ 12:15), Max: 97.7 (12-13-24 @ 05:48)  HR: 83 (12-13-24 @ 16:00) (72 - 91)  BP: 98/65 (12-13-24 @ 16:00) (93/52 - 124/73)  RR: 16 (12-13-24 @ 16:00) (16 - 16)  SpO2: 97% (12-13-24 @ 16:00) (95% - 100%)           INPUT/OUTPUT:  13 Dec 2024 07:01  -  13 Dec 2024 16:38  --------------------------------------------------------  IN:    Oral Fluid: 480 mL  Total IN: 480 mL  OUT:    Bulb (mL): 90 mL  Total OUT: 90 mL  Total NET: 390 mL      Daily Height in cm: 154.94 (13 Dec 2024 06:52)        PHYSICAL EXAM:  NEURO: alert and oriented; chronic residual L-sided weakness from Hx CVA (per patient and family at bedside)  HEART: s1, s2  STERNAL WOUND: +PREVENA --> neg suction    (+) MED Chest Tube ---> BEAU  LUNG: non-labored breathing with no use of accessory muscles  ABD: soft, (+) bowel sounds in all quadrants          EXT: range of motion intact, peripheral pulses intact bilaterally      ACTIVE MEDICATIONS:  acetaminophen     Tablet .. 650 milliGRAM(s) Oral every 6 hours  albuterol    90 MICROgram(s) HFA Inhaler 2 Puff(s) Inhalation every 6 hours PRN  albuterol   0.042% 1.25 milliGRAM(s) Nebulizer daily  aMIOdarone    Tablet 400 milliGRAM(s) Oral two times a day  ascorbic acid 500 milliGRAM(s) Oral two times a day  aspirin enteric coated 81 milliGRAM(s) Oral daily  atorvastatin 80 milliGRAM(s) Oral at bedtime  cefuroxime  IVPB 1500 milliGRAM(s) IV Intermittent once  cefuroxime  IVPB 1500 milliGRAM(s) IV Intermittent every 8 hours  chlorhexidine 2% Cloths 1 Application(s) Topical daily  cholecalciferol 2000 Unit(s) Oral daily  dextrose 50% Injectable 50 milliLiter(s) IV Push every 15 minutes  dextrose 50% Injectable 25 milliLiter(s) IV Push every 15 minutes  enoxaparin Injectable 40 milliGRAM(s) SubCutaneous every 24 hours  famotidine    Tablet 20 milliGRAM(s) Oral daily  fentaNYL    Injectable 25 MICROGram(s) IV Push every 5 minutes PRN  fluticasone propionate/ salmeterol 250-50 MICROgram(s) Diskus 1 Dose(s) Inhalation two times a day  gabapentin 300 milliGRAM(s) Oral three times a day  HYDROmorphone  Injectable 0.5 milliGRAM(s) IV Push every 6 hours PRN  HYDROmorphone  Injectable 0.25 milliGRAM(s) IV Push every 10 minutes PRN  metoprolol succinate ER 12.5 milliGRAM(s) Oral daily  montelukast 10 milliGRAM(s) Oral daily  OLANZapine 7.5 milliGRAM(s) Oral daily  ondansetron Injectable 4 milliGRAM(s) IV Push once PRN  oxyCODONE    IR 10 milliGRAM(s) Oral every 4 hours PRN  oxyCODONE    IR 5 milliGRAM(s) Oral every 4 hours PRN  pantoprazole    Tablet 40 milliGRAM(s) Oral before breakfast  predniSONE   Tablet 5 milliGRAM(s) Oral daily  traZODone 100 milliGRAM(s) Oral at bedtime    Case discussed in detail with Cardiothoracic Team and Attending. Plan below as per discussion.

## 2024-12-13 NOTE — BRIEF OPERATIVE NOTE - NSICDXBRIEFPREOP_GEN_ALL_CORE_FT
PRE-OP DIAGNOSIS:  Sternal manubrial dissociation with nonunion 13-Dec-2024 09:51:40  Lalitha Ladd

## 2024-12-13 NOTE — PHYSICAL THERAPY INITIAL EVALUATION ADULT - PLANNED THERAPY INTERVENTIONS, PT EVAL
Pt will negotiate 1 flight of stairs indepenedently in 2 weeks./balance training/bed mobility training/gait training/strengthening/transfer training Pt will negotiate 1 flight of stairs indepenedently in 2 weeks./bed mobility training/gait training/transfer training

## 2024-12-13 NOTE — CONSULT NOTE ADULT - SUBJECTIVE AND OBJECTIVE BOX
German Carrasco MD  Interventional Cardiology / Advance Heart Failure and Cardiac Transplant Specialist  [place] Office : [address] [place] [address] [place] [address]  Tel:   [place] Office : [address] [facility] [place] [address]  Tel: [phone]         HISTORY OF PRESENTING ILLNESS:  HPI:  59 year old female current smoker (states currently smokes 0.5 ppd for last 30 years, PMH of  CAD, CABG/Aortic Valve repair on 11/21/23, CVA with residual Left-sided weakness/numbness/Left gaze deviation, ILR (extracted), T2D with b/l peripheral neuropathy, COPD/Asthma, Left Breast Ca s/p XRT, Bipolar depression, Rheumatoid Arthritis, Antiphospholipid syndrome on eliquis, and Left  eye Uveitis/scleritis, States she was recently admitted for COPD exacerbation and RLE abscess 9/28-10/2 also S/P recent tailbone fracture  while in [place], noted to have  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 She reports experiencing burning sensation to mid-sternal surgical site.  CT chest ---Diffuse sternal nonunion, with fluid interposed between the sternal fragments. The widest area of separation of the sternal fragments measures up to 2.8 cm.   - pt s/p sternal union using sternal plates    PAST MEDICAL & SURGICAL HISTORY:  Cigarette smoker      Rheumatoid arthritis      Other depression      Breast cancer      Migraines      History of multiple cerebrovascular accidents (CVAs)      Suicidal ideation      Paresthesia of left arm      Facial paresthesia      APS (antiphospholipid syndrome)      History of depressed bipolar disorder      History of COPD      History of COPD      Hyperlipidemia      Type 2 diabetes mellitus      Asthma      CAD (coronary artery disease)      Uveitis      History of hysterectomy      History of cholecystectomy      History of loop recorder      S/P CABG x 1      S/P AVR      S/P lumpectomy, left breast          SOCIAL HISTORY: Substance Use (street drugs): ( x ) never used  (  ) other:    FAMILY HISTORY:  Family history of breast cancer (Mother)    Family history of diabetes mellitus (DM) (Father)      MEDICATIONS:  aMIOdarone    Tablet 400 milliGRAM(s) Oral two times a day  aspirin enteric coated 81 milliGRAM(s) Oral daily  enoxaparin Injectable 40 milliGRAM(s) SubCutaneous every 24 hours  metoprolol succinate ER 12.5 milliGRAM(s) Oral daily  cefuroxime  IVPB 1500 milliGRAM(s) IV Intermittent once  cefuroxime  IVPB 1500 milliGRAM(s) IV Intermittent every 8 hours  albuterol    90 MICROgram(s) HFA Inhaler 2 Puff(s) Inhalation every 6 hours PRN  albuterol   0.042% 1.25 milliGRAM(s) Nebulizer daily  fluticasone propionate/ salmeterol 250-50 MICROgram(s) Diskus 1 Dose(s) Inhalation two times a day  montelukast 10 milliGRAM(s) Oral daily  acetaminophen     Tablet .. 650 milliGRAM(s) Oral every 6 hours  fentaNYL    Injectable 25 MICROGram(s) IV Push every 5 minutes PRN  gabapentin 300 milliGRAM(s) Oral three times a day  HYDROmorphone  Injectable 0.5 milliGRAM(s) IV Push every 6 hours PRN  HYDROmorphone  Injectable 0.25 milliGRAM(s) IV Push every 10 minutes PRN  OLANZapine 7.5 milliGRAM(s) Oral daily  ondansetron Injectable 4 milliGRAM(s) IV Push once PRN  oxyCODONE    IR 10 milliGRAM(s) Oral every 4 hours PRN  oxyCODONE    IR 5 milliGRAM(s) Oral every 4 hours PRN  traZODone 100 milliGRAM(s) Oral at bedtime  famotidine    Tablet 20 milliGRAM(s) Oral daily  pantoprazole    Tablet 40 milliGRAM(s) Oral before breakfast  atorvastatin 80 milliGRAM(s) Oral at bedtime  dextrose 50% Injectable 50 milliLiter(s) IV Push every 15 minutes  dextrose 50% Injectable 25 milliLiter(s) IV Push every 15 minutes  predniSONE   Tablet 5 milliGRAM(s) Oral daily    ascorbic acid 500 milliGRAM(s) Oral two times a day  chlorhexidine 2% Cloths 1 Application(s) Topical daily  cholecalciferol 2000 Unit(s) Oral daily      FAMILY HISTORY:  Family history of breast cancer (Mother)    Family history of diabetes mellitus (DM) (Father)          Allergies    No Known Allergies    Intolerances    	      PHYSICAL EXAM:  T(C): 36 (12-13-24 @ 12:15), Max: 36.5 (12-13-24 @ 05:48)  HR: 83 (12-13-24 @ 15:00) (72 - 91)  BP: 97/62 (12-13-24 @ 15:00) (93/52 - 124/73)  RR: 16 (12-13-24 @ 15:00) (16 - 16)  SpO2: 97% (12-13-24 @ 15:00) (95% - 100%)  Wt(kg): --  I&O's Summary    13 Dec 2024 07:01  -  13 Dec 2024 15:09  --------------------------------------------------------  IN: 0 mL / OUT: 30 mL / NET: -30 mL        GENERAL: NAD   EYES: EOMI, PERRLA, conjunctiva and sclera clear  ENMT: No tonsillar erythema, exudates, or enlargement; Moist mucous membranes, Good dentition, No lesions  Cardiovascular: Normal S1 S2, No JVD, No murmurs, No edema, s/p sternal surgery   Respiratory: Lungs clear to auscultation	  Gastrointestinal:  Soft, Non-tender, + BS	  Extremities: no edema      LABS:	 	    CARDIAC MARKERS:                    proBNP:   Lipid Profile:   HgA1c:   TSH:     Consultant(s) Notes Reviewed:  [x ] YES  [ ] NO    Care Discussed with Consultants/Other Providers [ x] YES  [ ] NO    Imaging Personally Reviewed independently:  [x] YES  [ ] NO    All labs, radiologic studies, vitals, orders and medications list reviewed. Patient is seen and examined at bedside. Case discussed with medical team.    ASSESSMENT/PLAN:

## 2024-12-13 NOTE — PRE-OP CHECKLIST - IV STARTED
Patient Instructions by Dotty Clements MD at 8/5/2020 11:20 AM     Author: Dotty Clements MD Service: -- Author Type: Physician    Filed: 8/5/2020 12:04 PM Encounter Date: 8/5/2020 Status: Signed    : Dotty Clements MD (Physician)          Patient Education      BRIGHT [facility] HANDOUT- PARENT  5 YEAR VISIT  Here are some suggestions from [employer] experts that may be of value to your family.      HOW YOUR FAMILY IS DOING  Spend time with your child. Hug and praise him.  Help your child do things for himself.  Help your child deal with conflict.  If you are worried about your living or food situation, talk with us. Community agencies and programs such as [employer] can also provide information and assistance.  Dont smoke or use e-cigarettes. Keep your home and car smoke-free. Tobacco-free spaces keep children healthy.  Dont use alcohol or drugs. If youre worried about a family members use, let us know, or reach out to local or online resources that can help.    STAYING HEALTHY  Help your child brush his teeth twice a day  After breakfast  Before bed  Use a pea-sized amount of toothpaste with fluoride.  Help your child floss his teeth once a day.  Your child should visit the dentist at least twice a year.  Help your child be a healthy eater by  Providing healthy foods, such as vegetables, fruits, lean protein, and whole grains  Eating together as a family  Being a role model in what you eat  Buy fat-free milk and low-fat dairy foods. Encourage 2 to 3 servings each day.  Limit candy, soft drinks, juice, and sugary foods.  Make sure your child is active for 1 hour or more daily.  Dont put a TV in your deann bedroom.  Consider making a family media plan. It helps you make rules for media use and balance screen time with other activities, including exercise.    FAMILY RULES AND ROUTINES  Family routines create a sense of safety and security for your child.  Teach your child what is right and what is wrong.  Give  yes your child chores to do and expect them to be done.  Use discipline to teach, not to punish.  Help your child deal with anger. Be a role model.  Teach your child to walk away when she is angry and do something else to calm down, such as playing or reading.    READY FOR SCHOOL  Talk to your child about school.  Read books with your child about starting school.  Take your child to see the school and meet the teacher.  Help your child get ready to learn. Feed her a healthy breakfast and give her regular bedtimes so she gets at least 10 to 11 hours of sleep.  Make sure your child goes to a safe place after school.  If your child has disabilities or special health care needs, be active in the Individualized Education Program process.    SAFETY  Your child should always ride in the back seat (until at least 13 years of age) and use a forward-facing car safety seat or belt-positioning booster seat.  Teach your child how to safely cross the street and ride the school bus. Children are not ready to cross the street alone until 10 years or older.  Provide a properly fitting helmet and safety gear for riding scooters, biking, skating, in-line skating, skiing, snowboarding, and horseback riding.  Make sure your child learns to swim. Never let your child swim alone.  Use a hat, sun protection clothing, and sunscreen with SPF of 15 or higher on his exposed skin. Limit time outside when the sun is strongest (11:00 am-3:00 pm).  Teach your child about how to be safe with other adults.  No adult should ask a child to keep secrets from parents.  No adult should ask to see a deann private parts.  No adult should ask a child for help with the adults own private parts.  Have working smoke and carbon monoxide alarms on every floor. Test them every month and change the batteries every year. Make a family escape plan in case of fire in your home.  If it is necessary to keep a gun in your home, store it unloaded and locked with the  ammunition locked separately from the gun.  Ask if there are guns in homes where your child plays. If so, make sure they are stored safely.      Helpful Resources:  Family Media Use Plan: www.healthychildren.org/37coinsUsePlan  Smoking Quit Line: 590.356.5402 Information About Car Safety Seats: www.safercar.gov/parents  Toll-free Auto Safety Hotline: 350.403.9463  Consistent with Bright Futures: Guidelines for Health Supervision of Infants, Children, and Adolescents, 4th Edition  For more information, go to https://brightfutures.aap.org.

## 2024-12-13 NOTE — PRE-ANESTHESIA EVALUATION ADULT - NSRADCARDRESULTSFT_GEN_ALL_CORE
< from: TTE W or WO Ultrasound Enhancing Agent (10.01.24 @ 13:12) >    CONCLUSIONS:      1. Limited TTE performed to re-evaluate the echodensity in the proximal ascending aorta, seen on yesterday's study (9/30/2024).   2. The echodensity in the proximal ascending aorta is re-demonstrated. It measures 1.7 cm x 1.5 cm. It is well-circumscribed with discrete borders, seen within the lumen, abutting the wall of the ascending aorta. Gradients across the valve/aorta are within the normal range for the prosthetic valve and the contour of the Doppler envelope is early peaking (not suggestive of a fixed obstruction to flow). In the thoracic descending aorta (distal to the echodensity), normal flow is observed. Taken together, these findings suggest that there is no mass within the lumen of the aorta (there is no evidence of obstruction). Could consider cardiac MRI for further evaluation if clinically indicated.      < end of copied text >    < from: TTE W or WO Ultrasound Enhancing Agent (09.30.24 @ 15:48) >    CONCLUSIONS:      1. Left ventricular cavity is normal in size. Left ventricular wall thickness is normal. Left ventricular systolic function is normal with an ejection fraction visually estimated at 60 to 65 %. There are no regional wall motion abnormalities seen.   2. Normal right ventricular cavity size, with normal wall thickness, and normal right ventricular systolic function. Tricuspid annular plane systolic excursion (TAPSE) is 2.3 cm (normal >=1.7 cm).   3. A bioprosthetic valve replacement valve replacement is present in the aortic position The prosthetic valve is well seated with normal function. No intravalvular regurgitation No paravalvular regurgitation. Implanted 11/21/2023.   4. No pericardial effusion seen.   5. Compared to the transthoracic echocardiogram performed on 11/20/2023, LVEF has improved. S/P AVR.   6. There is normal LV mass and concentric remodeling.    < end of copied text >

## 2024-12-13 NOTE — PACU DISCHARGE NOTE - AIRWAY PATENCY:
Satisfactory 8mm nodule in LT breast   -prior mammo 2016 neg   -recommended US/outpt mammo. monitor FS QAC/HS, FS controlled, ISS  -A1c-6.9.  Give metformin on DC

## 2024-12-13 NOTE — CONSULT NOTE ADULT - ASSESSMENT
58F with history of CVA  COPD/Asthma, Breast Ca s/p ?RT, Bipolar depression, Rheumatoid Arthritis, Antiphospholipid syndrome, L eye uveitis/scleritis,  severe AR s/p AVR CABG x 1 in for sternal surgery      1.   s/p AVR CABG x 1   - pt has sternotomy dehiscense s/p surgery doing well     2. CVA  -resume eliquis when ok with surgery     3. Antiphospholipid syndrome  -on eliquis     4. LV dysfunction   - cont torsemide , toprol and aldactone     5 COPD exacerbation   - on nebs, advair , prednisone, consider increasing steroid dose

## 2024-12-14 DIAGNOSIS — J44.89 OTHER SPECIFIED CHRONIC OBSTRUCTIVE PULMONARY DISEASE: ICD-10-CM

## 2024-12-14 DIAGNOSIS — D68.61 ANTIPHOSPHOLIPID SYNDROME: ICD-10-CM

## 2024-12-14 DIAGNOSIS — F32.89 OTHER SPECIFIED DEPRESSIVE EPISODES: ICD-10-CM

## 2024-12-14 LAB
ALBUMIN SERPL ELPH-MCNC: 3.7 G/DL — SIGNIFICANT CHANGE UP (ref 3.3–5)
ALP SERPL-CCNC: 135 U/L — HIGH (ref 40–120)
ALT FLD-CCNC: 20 U/L — SIGNIFICANT CHANGE UP (ref 10–45)
ANION GAP SERPL CALC-SCNC: 14 MMOL/L — SIGNIFICANT CHANGE UP (ref 5–17)
AST SERPL-CCNC: 16 U/L — SIGNIFICANT CHANGE UP (ref 10–40)
BASOPHILS # BLD AUTO: 0.11 K/UL — SIGNIFICANT CHANGE UP (ref 0–0.2)
BASOPHILS NFR BLD AUTO: 0.6 % — SIGNIFICANT CHANGE UP (ref 0–2)
BILIRUB SERPL-MCNC: 0.5 MG/DL — SIGNIFICANT CHANGE UP (ref 0.2–1.2)
BUN SERPL-MCNC: 14 MG/DL — SIGNIFICANT CHANGE UP (ref 7–23)
CALCIUM SERPL-MCNC: 8.6 MG/DL — SIGNIFICANT CHANGE UP (ref 8.4–10.5)
CHLORIDE SERPL-SCNC: 103 MMOL/L — SIGNIFICANT CHANGE UP (ref 96–108)
CO2 SERPL-SCNC: 20 MMOL/L — LOW (ref 22–31)
CREAT SERPL-MCNC: 0.76 MG/DL — SIGNIFICANT CHANGE UP (ref 0.5–1.3)
EGFR: 90 ML/MIN/1.73M2 — SIGNIFICANT CHANGE UP
EOSINOPHIL # BLD AUTO: 0.2 K/UL — SIGNIFICANT CHANGE UP (ref 0–0.5)
EOSINOPHIL NFR BLD AUTO: 1.1 % — SIGNIFICANT CHANGE UP (ref 0–6)
GLUCOSE BLDC GLUCOMTR-MCNC: 147 MG/DL — HIGH (ref 70–99)
GLUCOSE BLDC GLUCOMTR-MCNC: 179 MG/DL — HIGH (ref 70–99)
GLUCOSE BLDC GLUCOMTR-MCNC: 215 MG/DL — HIGH (ref 70–99)
GLUCOSE BLDC GLUCOMTR-MCNC: 234 MG/DL — HIGH (ref 70–99)
GLUCOSE BLDC GLUCOMTR-MCNC: 256 MG/DL — HIGH (ref 70–99)
GLUCOSE SERPL-MCNC: 200 MG/DL — HIGH (ref 70–99)
HCT VFR BLD CALC: 37.7 % — SIGNIFICANT CHANGE UP (ref 34.5–45)
HGB BLD-MCNC: 11.5 G/DL — SIGNIFICANT CHANGE UP (ref 11.5–15.5)
IMM GRANULOCYTES NFR BLD AUTO: 0.6 % — SIGNIFICANT CHANGE UP (ref 0–0.9)
LYMPHOCYTES # BLD AUTO: 1.49 K/UL — SIGNIFICANT CHANGE UP (ref 1–3.3)
LYMPHOCYTES # BLD AUTO: 8.4 % — LOW (ref 13–44)
MAGNESIUM SERPL-MCNC: 1.7 MG/DL — SIGNIFICANT CHANGE UP (ref 1.6–2.6)
MCHC RBC-ENTMCNC: 29.9 PG — SIGNIFICANT CHANGE UP (ref 27–34)
MCHC RBC-ENTMCNC: 30.5 G/DL — LOW (ref 32–36)
MCV RBC AUTO: 98.2 FL — SIGNIFICANT CHANGE UP (ref 80–100)
MONOCYTES # BLD AUTO: 1.39 K/UL — HIGH (ref 0–0.9)
MONOCYTES NFR BLD AUTO: 7.8 % — SIGNIFICANT CHANGE UP (ref 2–14)
NEUTROPHILS # BLD AUTO: 14.42 K/UL — HIGH (ref 1.8–7.4)
NEUTROPHILS NFR BLD AUTO: 81.5 % — HIGH (ref 43–77)
NRBC # BLD: 0 /100 WBCS — SIGNIFICANT CHANGE UP (ref 0–0)
PHOSPHATE SERPL-MCNC: 3.7 MG/DL — SIGNIFICANT CHANGE UP (ref 2.5–4.5)
PLATELET # BLD AUTO: 258 K/UL — SIGNIFICANT CHANGE UP (ref 150–400)
POTASSIUM SERPL-MCNC: 4.2 MMOL/L — SIGNIFICANT CHANGE UP (ref 3.5–5.3)
POTASSIUM SERPL-SCNC: 4.2 MMOL/L — SIGNIFICANT CHANGE UP (ref 3.5–5.3)
PROT SERPL-MCNC: 6.7 G/DL — SIGNIFICANT CHANGE UP (ref 6–8.3)
RBC # BLD: 3.84 M/UL — SIGNIFICANT CHANGE UP (ref 3.8–5.2)
RBC # FLD: 15.7 % — HIGH (ref 10.3–14.5)
SODIUM SERPL-SCNC: 137 MMOL/L — SIGNIFICANT CHANGE UP (ref 135–145)
WBC # BLD: 17.71 K/UL — HIGH (ref 3.8–10.5)
WBC # FLD AUTO: 17.71 K/UL — HIGH (ref 3.8–10.5)

## 2024-12-14 PROCEDURE — 71045 X-RAY EXAM CHEST 1 VIEW: CPT | Mod: 26

## 2024-12-14 PROCEDURE — 99232 SBSQ HOSP IP/OBS MODERATE 35: CPT

## 2024-12-14 RX ORDER — IPRATROPIUM BROMIDE AND ALBUTEROL SULFATE 2.5; .5 MG/3ML; MG/3ML
3 SOLUTION RESPIRATORY (INHALATION) EVERY 6 HOURS
Refills: 0 | Status: DISCONTINUED | OUTPATIENT
Start: 2024-12-14 | End: 2024-12-19

## 2024-12-14 RX ORDER — APIXABAN 2.5 MG/1
5 TABLET, FILM COATED ORAL
Refills: 0 | Status: DISCONTINUED | OUTPATIENT
Start: 2024-12-14 | End: 2024-12-19

## 2024-12-14 RX ADMIN — PANTOPRAZOLE SODIUM 40 MILLIGRAM(S): 40 TABLET, DELAYED RELEASE ORAL at 05:26

## 2024-12-14 RX ADMIN — AMIODARONE HYDROCHLORIDE 400 MILLIGRAM(S): 200 TABLET ORAL at 05:26

## 2024-12-14 RX ADMIN — AMIODARONE HYDROCHLORIDE 400 MILLIGRAM(S): 200 TABLET ORAL at 16:53

## 2024-12-14 RX ADMIN — APIXABAN 5 MILLIGRAM(S): 2.5 TABLET, FILM COATED ORAL at 16:53

## 2024-12-14 RX ADMIN — OXYCODONE HYDROCHLORIDE 5 MILLIGRAM(S): 30 TABLET ORAL at 22:11

## 2024-12-14 RX ADMIN — GABAPENTIN 300 MILLIGRAM(S): 300 CAPSULE ORAL at 13:16

## 2024-12-14 RX ADMIN — FLUTICASONE PROPIONATE AND SALMETEROL XINAFOATE 1 DOSE(S): 45; 21 AEROSOL, METERED RESPIRATORY (INHALATION) at 06:04

## 2024-12-14 RX ADMIN — Medication 2 TABLET(S): at 21:38

## 2024-12-14 RX ADMIN — ACETAMINOPHEN 500MG 650 MILLIGRAM(S): 500 TABLET, COATED ORAL at 17:45

## 2024-12-14 RX ADMIN — Medication 100 MILLIGRAM(S): at 06:04

## 2024-12-14 RX ADMIN — GABAPENTIN 300 MILLIGRAM(S): 300 CAPSULE ORAL at 05:26

## 2024-12-14 RX ADMIN — Medication 5 UNIT(S): at 13:57

## 2024-12-14 RX ADMIN — Medication 80 MILLIGRAM(S): at 21:38

## 2024-12-14 RX ADMIN — Medication 500 MILLIGRAM(S): at 16:53

## 2024-12-14 RX ADMIN — FLUTICASONE PROPIONATE AND SALMETEROL XINAFOATE 1 DOSE(S): 45; 21 AEROSOL, METERED RESPIRATORY (INHALATION) at 18:18

## 2024-12-14 RX ADMIN — METOPROLOL TARTRATE 12.5 MILLIGRAM(S): 100 TABLET, FILM COATED ORAL at 05:27

## 2024-12-14 RX ADMIN — OXYCODONE HYDROCHLORIDE 5 MILLIGRAM(S): 30 TABLET ORAL at 03:35

## 2024-12-14 RX ADMIN — ENOXAPARIN SODIUM 40 MILLIGRAM(S): 30 INJECTION SUBCUTANEOUS at 05:28

## 2024-12-14 RX ADMIN — ACETAMINOPHEN 500MG 650 MILLIGRAM(S): 500 TABLET, COATED ORAL at 05:16

## 2024-12-14 RX ADMIN — ACETAMINOPHEN 500MG 650 MILLIGRAM(S): 500 TABLET, COATED ORAL at 10:51

## 2024-12-14 RX ADMIN — APIXABAN 5 MILLIGRAM(S): 2.5 TABLET, FILM COATED ORAL at 10:51

## 2024-12-14 RX ADMIN — IPRATROPIUM BROMIDE AND ALBUTEROL SULFATE 3 MILLILITER(S): 2.5; .5 SOLUTION RESPIRATORY (INHALATION) at 13:15

## 2024-12-14 RX ADMIN — Medication 4: at 16:45

## 2024-12-14 RX ADMIN — IPRATROPIUM BROMIDE AND ALBUTEROL SULFATE 3 MILLILITER(S): 2.5; .5 SOLUTION RESPIRATORY (INHALATION) at 18:19

## 2024-12-14 RX ADMIN — PREDNISONE 5 MILLIGRAM(S): 20 TABLET ORAL at 05:26

## 2024-12-14 RX ADMIN — GABAPENTIN 300 MILLIGRAM(S): 300 CAPSULE ORAL at 21:38

## 2024-12-14 RX ADMIN — TRAZODONE HYDROCHLORIDE 100 MILLIGRAM(S): 150 TABLET ORAL at 22:10

## 2024-12-14 RX ADMIN — ACETAMINOPHEN 500MG 650 MILLIGRAM(S): 500 TABLET, COATED ORAL at 16:46

## 2024-12-14 RX ADMIN — OXYCODONE HYDROCHLORIDE 10 MILLIGRAM(S): 30 TABLET ORAL at 08:55

## 2024-12-14 RX ADMIN — MONTELUKAST SODIUM 10 MILLIGRAM(S): 10 TABLET ORAL at 11:45

## 2024-12-14 RX ADMIN — OXYCODONE HYDROCHLORIDE 5 MILLIGRAM(S): 30 TABLET ORAL at 23:11

## 2024-12-14 RX ADMIN — ACETAMINOPHEN 500MG 650 MILLIGRAM(S): 500 TABLET, COATED ORAL at 11:45

## 2024-12-14 RX ADMIN — FAMOTIDINE 20 MILLIGRAM(S): 20 TABLET, FILM COATED ORAL at 11:45

## 2024-12-14 RX ADMIN — Medication 81 MILLIGRAM(S): at 11:45

## 2024-12-14 RX ADMIN — OLANZAPINE 7.5 MILLIGRAM(S): 20 TABLET ORAL at 13:16

## 2024-12-14 RX ADMIN — POLYETHYLENE GLYCOL 3350 17 GRAM(S): 17 POWDER, FOR SOLUTION ORAL at 11:45

## 2024-12-14 RX ADMIN — Medication 25 GRAM(S): at 10:51

## 2024-12-14 RX ADMIN — OXYCODONE HYDROCHLORIDE 10 MILLIGRAM(S): 30 TABLET ORAL at 08:17

## 2024-12-14 RX ADMIN — Medication 500 MILLIGRAM(S): at 05:26

## 2024-12-14 RX ADMIN — OXYCODONE HYDROCHLORIDE 5 MILLIGRAM(S): 30 TABLET ORAL at 04:35

## 2024-12-14 RX ADMIN — Medication 2000 UNIT(S): at 11:44

## 2024-12-14 NOTE — OCCUPATIONAL THERAPY INITIAL EVALUATION ADULT - PERTINENT HX OF CURRENT PROBLEM, REHAB EVAL
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 She reports experiencing burning sensation to mid-sternal surgical site.  CT chest ---Diffuse sternal nonunion, with fluid interposed between the sternal fragments. The widest area of separation of the sternal fragments measures up to 2.8 cm." On 12/13 s/p sternal plating with Dr. Loaiza.

## 2024-12-14 NOTE — OCCUPATIONAL THERAPY INITIAL EVALUATION ADULT - ADL RETRAINING, OT EVAL
GOAL: Patient will perform LB dressing with independence by 4 weeks maintaining sternal precautions. GOAL: Patient will perform grooming standing at sink with independence by 4 weeks maintaining sternal precautions. Patient will perform UB dressing with independent maintaining sternal precautions. 4 weeks

## 2024-12-14 NOTE — PROGRESS NOTE ADULT - PROBLEM SELECTOR PLAN 3
- Hx of COPD.   Continue on home Advair, Singulair, Proventil HFA PRN, Duonebs q6, Prednisone 5 daily  Cough and deep breathe, Incentive Spirometry Q1h, Chest PT.

## 2024-12-14 NOTE — PROGRESS NOTE ADULT - PROBLEM SELECTOR PLAN 1
- s/p Sternal Plating 12/13   - maintain MS Prevena dressing and sternal precautions  - maintain abdominal binder  - maintain mediastinal chest tube to BEAU. monitor chest tube output strict I & Os  - Continue on ASA 81, Atorvastatin 80 HS, ToprolXL 12.5 daily  - ERP Amio, Gabapentin, VitC  Cough and deep breathe, Incentive Spirometry Q1h, Chest PT.  Ambulate 4x daily as tolerated and with PT.   C/W GI prophylaxis on Protonix   Disposition: Home PT when medically cleared

## 2024-12-14 NOTE — PROGRESS NOTE ADULT - SUBJECTIVE AND OBJECTIVE BOX
Subjective: Pt states "Hello" denies any CP or SOB. No acute events overnight.     Telemetry:  SR 80 - 100  Vital Signs Last 24 Hrs  T(C): 36.7 (24 @ 11:00), Max: 37.1 (24 @ 07:14)  T(F): 98 (24 @ 11:00), Max: 98.8 (24 @ 07:14)  HR: 85 (24 @ 11:00) (82 - 97)  BP: 109/61 (24 @ 11:00) (97/62 - 113/60)  RR: 18 (24 @ 11:00) (16 - 18)  SpO2: 92% (24 @ 11:00) (92% - 100%)              @ 07:01  -   @ 07:00  --------------------------------------------------------  IN: 820 mL / OUT: 300 mL / NET: 520 mL      Daily Weight in k.3 (14 Dec 2024 03:08)                        11.5   17.71 )-----------( 258      ( 14 Dec 2024 06:07 )             37.7     137  |  103  |  14  ----------------------------<  200[H]  4.2   |  20[L]  |  0.76    Phos  3.7     -  Mg     1.7     -  AST  16  /  ALT  20  /  AlkPhos  135[H]  -        CAPILLARY BLOOD GLUCOSE  179 - 234        PHYSICAL EXAM  Neurology: A&Ox3, NAD  CV : RRR+S1S2  Sternal Wound: MSI CDI with Prevena dressing, Stable    +Mediastinal tube to BEAU bulb suction with serosanguinous drainage  Lungs: Respirations non-labored, B/L BS with expiratory wheezing  Abdomen: Soft, NT/ND, +BSx4Q, +BM  (-)N/V/D +ABD Binder  : Voiding without difficulty  Extremities: B/L LE trace edema, negative calf tenderness, +PP              MEDICATIONS  acetaminophen     Tablet .. 650 milliGRAM(s) Oral every 6 hours  albuterol    90 MICROgram(s) HFA Inhaler 2 Puff(s) Inhalation every 6 hours PRN  albuterol/ipratropium for Nebulization 3 milliLiter(s) Nebulizer every 6 hours  aMIOdarone    Tablet 400 milliGRAM(s) Oral two times a day  apixaban 5 milliGRAM(s) Oral two times a day  ascorbic acid 500 milliGRAM(s) Oral two times a day  aspirin enteric coated 81 milliGRAM(s) Oral daily  atorvastatin 80 milliGRAM(s) Oral at bedtime  cefuroxime  IVPB 1500 milliGRAM(s) IV Intermittent once  chlorhexidine 2% Cloths 1 Application(s) Topical daily  cholecalciferol 2000 Unit(s) Oral daily  famotidine    Tablet 20 milliGRAM(s) Oral daily  fluticasone propionate/ salmeterol 250-50 MICROgram(s) Diskus 1 Dose(s) Inhalation two times a day  gabapentin 300 milliGRAM(s) Oral three times a day  metoprolol succinate ER 12.5 milliGRAM(s) Oral daily  montelukast 10 milliGRAM(s) Oral daily  OLANZapine 7.5 milliGRAM(s) Oral daily  oxyCODONE    IR 10 milliGRAM(s) Oral every 4 hours PRN  oxyCODONE    IR 5 milliGRAM(s) Oral every 4 hours PRN  pantoprazole    Tablet 40 milliGRAM(s) Oral before breakfast  polyethylene glycol 3350 17 Gram(s) Oral daily  predniSONE   Tablet 5 milliGRAM(s) Oral daily  senna 2 Tablet(s) Oral at bedtime  traZODone 100 milliGRAM(s) Oral at bedtime      Physical Therapy Rec:   Home  [  ]   Home w/ PT  [ X ]  Rehab  [  ]    Discussed with Cardiothoracic Team at AM rounds.

## 2024-12-14 NOTE — OCCUPATIONAL THERAPY INITIAL EVALUATION ADULT - DIAGNOSIS, OT EVAL
decreased functional activity endurance, decreased strength, Impaired balance, sternal precs, pain, impacting ability to perform ADL and functional mobility.

## 2024-12-14 NOTE — OCCUPATIONAL THERAPY INITIAL EVALUATION ADULT - ADDITIONAL COMMENTS
Pt lives in friends apartment, 2 flights of steps to apartment, main level set up. As per patient, independent with ADLs prior to admission, ambulated independently with no assistive devices. Pt reports friend works from home/home all day, has [employer] 15 hours per week. Pt reports she owns rollator and shower chair.

## 2024-12-14 NOTE — OCCUPATIONAL THERAPY INITIAL EVALUATION ADULT - GENERAL OBSERVATIONS, REHAB EVAL
Upon entry, patient seated in chair at bedside, +02 via NC, +purewick +tele +pulse ox +BP cuff, +abd binder, +prevena vac, +BEAU drain, patient agreeable to OT eval, cleared for OT evaluation as per RN.

## 2024-12-14 NOTE — PROGRESS NOTE ADULT - ASSESSMENT
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 She reports experiencing burning sensation to mid-sternal surgical site.  CT chest ---Diffuse sternal nonunion, with fluid interposed between the sternal fragments. The widest area of separation of the sternal fragments measures up to 2.8 cm."   She presented today for scheduled Sternal Plating with Dr. Sivakumar Loaiza    Hospital Course:  12/13 s/p sternal plating with Dr. Loaiza. Transferred to [facility]. Prevena and abdominal binder. MED Chest tube. Monitor chest tube output. C/w Ancef. Daily CXR  12/14 VSS, maintain mediastinal tube, output 200cc/24h. Pt resumed on Eliquis today for APLS per Dr. Loaiza. Ambulated with PT/OT. Recommended for Home PT.

## 2024-12-15 LAB
ALBUMIN SERPL ELPH-MCNC: 3.4 G/DL — SIGNIFICANT CHANGE UP (ref 3.3–5)
ALP SERPL-CCNC: 114 U/L — SIGNIFICANT CHANGE UP (ref 40–120)
ALT FLD-CCNC: 14 U/L — SIGNIFICANT CHANGE UP (ref 10–45)
ANION GAP SERPL CALC-SCNC: 13 MMOL/L — SIGNIFICANT CHANGE UP (ref 5–17)
AST SERPL-CCNC: 11 U/L — SIGNIFICANT CHANGE UP (ref 10–40)
BASOPHILS # BLD AUTO: 0.06 K/UL — SIGNIFICANT CHANGE UP (ref 0–0.2)
BASOPHILS NFR BLD AUTO: 0.4 % — SIGNIFICANT CHANGE UP (ref 0–2)
BILIRUB SERPL-MCNC: 0.8 MG/DL — SIGNIFICANT CHANGE UP (ref 0.2–1.2)
BUN SERPL-MCNC: 12 MG/DL — SIGNIFICANT CHANGE UP (ref 7–23)
CALCIUM SERPL-MCNC: 8.5 MG/DL — SIGNIFICANT CHANGE UP (ref 8.4–10.5)
CHLORIDE SERPL-SCNC: 102 MMOL/L — SIGNIFICANT CHANGE UP (ref 96–108)
CO2 SERPL-SCNC: 23 MMOL/L — SIGNIFICANT CHANGE UP (ref 22–31)
CREAT SERPL-MCNC: 0.61 MG/DL — SIGNIFICANT CHANGE UP (ref 0.5–1.3)
EGFR: 102 ML/MIN/1.73M2 — SIGNIFICANT CHANGE UP
EOSINOPHIL # BLD AUTO: 0.22 K/UL — SIGNIFICANT CHANGE UP (ref 0–0.5)
EOSINOPHIL NFR BLD AUTO: 1.6 % — SIGNIFICANT CHANGE UP (ref 0–6)
GLUCOSE BLDC GLUCOMTR-MCNC: 112 MG/DL — HIGH (ref 70–99)
GLUCOSE BLDC GLUCOMTR-MCNC: 150 MG/DL — HIGH (ref 70–99)
GLUCOSE BLDC GLUCOMTR-MCNC: 153 MG/DL — HIGH (ref 70–99)
GLUCOSE BLDC GLUCOMTR-MCNC: 175 MG/DL — HIGH (ref 70–99)
GLUCOSE SERPL-MCNC: 130 MG/DL — HIGH (ref 70–99)
HCT VFR BLD CALC: 33.1 % — LOW (ref 34.5–45)
HGB BLD-MCNC: 10.2 G/DL — LOW (ref 11.5–15.5)
IMM GRANULOCYTES NFR BLD AUTO: 0.4 % — SIGNIFICANT CHANGE UP (ref 0–0.9)
LYMPHOCYTES # BLD AUTO: 1.72 K/UL — SIGNIFICANT CHANGE UP (ref 1–3.3)
LYMPHOCYTES # BLD AUTO: 12.8 % — LOW (ref 13–44)
MAGNESIUM SERPL-MCNC: 2.1 MG/DL — SIGNIFICANT CHANGE UP (ref 1.6–2.6)
MCHC RBC-ENTMCNC: 29.9 PG — SIGNIFICANT CHANGE UP (ref 27–34)
MCHC RBC-ENTMCNC: 30.8 G/DL — LOW (ref 32–36)
MCV RBC AUTO: 97.1 FL — SIGNIFICANT CHANGE UP (ref 80–100)
MONOCYTES # BLD AUTO: 1.34 K/UL — HIGH (ref 0–0.9)
MONOCYTES NFR BLD AUTO: 10 % — SIGNIFICANT CHANGE UP (ref 2–14)
NEUTROPHILS # BLD AUTO: 10 K/UL — HIGH (ref 1.8–7.4)
NEUTROPHILS NFR BLD AUTO: 74.8 % — SIGNIFICANT CHANGE UP (ref 43–77)
NRBC # BLD: 0 /100 WBCS — SIGNIFICANT CHANGE UP (ref 0–0)
PHOSPHATE SERPL-MCNC: 2.4 MG/DL — LOW (ref 2.5–4.5)
PLATELET # BLD AUTO: 204 K/UL — SIGNIFICANT CHANGE UP (ref 150–400)
POTASSIUM SERPL-MCNC: 3.9 MMOL/L — SIGNIFICANT CHANGE UP (ref 3.5–5.3)
POTASSIUM SERPL-SCNC: 3.9 MMOL/L — SIGNIFICANT CHANGE UP (ref 3.5–5.3)
PROT SERPL-MCNC: 6 G/DL — SIGNIFICANT CHANGE UP (ref 6–8.3)
RBC # BLD: 3.41 M/UL — LOW (ref 3.8–5.2)
RBC # FLD: 15.7 % — HIGH (ref 10.3–14.5)
SODIUM SERPL-SCNC: 138 MMOL/L — SIGNIFICANT CHANGE UP (ref 135–145)
WBC # BLD: 13.4 K/UL — HIGH (ref 3.8–10.5)
WBC # FLD AUTO: 13.4 K/UL — HIGH (ref 3.8–10.5)

## 2024-12-15 PROCEDURE — 71045 X-RAY EXAM CHEST 1 VIEW: CPT | Mod: 26,76

## 2024-12-15 RX ORDER — LIDOCAINE HCL 20 MG/ML
10 VIAL (ML) INJECTION ONCE
Refills: 0 | Status: COMPLETED | OUTPATIENT
Start: 2024-12-15 | End: 2024-12-15

## 2024-12-15 RX ORDER — POTASSIUM CHLORIDE 600 MG/1
20 TABLET, EXTENDED RELEASE ORAL ONCE
Refills: 0 | Status: COMPLETED | OUTPATIENT
Start: 2024-12-15 | End: 2024-12-15

## 2024-12-15 RX ADMIN — CHLORHEXIDINE GLUCONATE 1 APPLICATION(S): 1.2 RINSE ORAL at 11:32

## 2024-12-15 RX ADMIN — Medication 80 MILLIGRAM(S): at 21:41

## 2024-12-15 RX ADMIN — APIXABAN 5 MILLIGRAM(S): 2.5 TABLET, FILM COATED ORAL at 18:00

## 2024-12-15 RX ADMIN — IPRATROPIUM BROMIDE AND ALBUTEROL SULFATE 3 MILLILITER(S): 2.5; .5 SOLUTION RESPIRATORY (INHALATION) at 15:32

## 2024-12-15 RX ADMIN — FAMOTIDINE 20 MILLIGRAM(S): 20 TABLET, FILM COATED ORAL at 11:21

## 2024-12-15 RX ADMIN — Medication 500 MILLIGRAM(S): at 05:32

## 2024-12-15 RX ADMIN — PANTOPRAZOLE SODIUM 40 MILLIGRAM(S): 40 TABLET, DELAYED RELEASE ORAL at 05:32

## 2024-12-15 RX ADMIN — Medication 2: at 11:42

## 2024-12-15 RX ADMIN — ACETAMINOPHEN 500MG 650 MILLIGRAM(S): 500 TABLET, COATED ORAL at 05:32

## 2024-12-15 RX ADMIN — ACETAMINOPHEN 500MG 650 MILLIGRAM(S): 500 TABLET, COATED ORAL at 11:21

## 2024-12-15 RX ADMIN — OXYCODONE HYDROCHLORIDE 5 MILLIGRAM(S): 30 TABLET ORAL at 09:23

## 2024-12-15 RX ADMIN — Medication 2 TABLET(S): at 21:41

## 2024-12-15 RX ADMIN — TRAZODONE HYDROCHLORIDE 100 MILLIGRAM(S): 150 TABLET ORAL at 21:41

## 2024-12-15 RX ADMIN — PREDNISONE 5 MILLIGRAM(S): 20 TABLET ORAL at 05:33

## 2024-12-15 RX ADMIN — APIXABAN 5 MILLIGRAM(S): 2.5 TABLET, FILM COATED ORAL at 05:33

## 2024-12-15 RX ADMIN — OXYCODONE HYDROCHLORIDE 5 MILLIGRAM(S): 30 TABLET ORAL at 16:30

## 2024-12-15 RX ADMIN — ACETAMINOPHEN 500MG 650 MILLIGRAM(S): 500 TABLET, COATED ORAL at 18:00

## 2024-12-15 RX ADMIN — GABAPENTIN 300 MILLIGRAM(S): 300 CAPSULE ORAL at 21:41

## 2024-12-15 RX ADMIN — GABAPENTIN 300 MILLIGRAM(S): 300 CAPSULE ORAL at 05:32

## 2024-12-15 RX ADMIN — Medication 81 MILLIGRAM(S): at 11:21

## 2024-12-15 RX ADMIN — AMIODARONE HYDROCHLORIDE 400 MILLIGRAM(S): 200 TABLET ORAL at 18:00

## 2024-12-15 RX ADMIN — GABAPENTIN 300 MILLIGRAM(S): 300 CAPSULE ORAL at 14:55

## 2024-12-15 RX ADMIN — OXYCODONE HYDROCHLORIDE 5 MILLIGRAM(S): 30 TABLET ORAL at 08:53

## 2024-12-15 RX ADMIN — POLYETHYLENE GLYCOL 3350 17 GRAM(S): 17 POWDER, FOR SOLUTION ORAL at 11:22

## 2024-12-15 RX ADMIN — Medication 2000 UNIT(S): at 11:21

## 2024-12-15 RX ADMIN — METOPROLOL TARTRATE 12.5 MILLIGRAM(S): 100 TABLET, FILM COATED ORAL at 05:33

## 2024-12-15 RX ADMIN — ACETAMINOPHEN 500MG 650 MILLIGRAM(S): 500 TABLET, COATED ORAL at 23:18

## 2024-12-15 RX ADMIN — Medication 10 MILLILITER(S): at 14:30

## 2024-12-15 RX ADMIN — OXYCODONE HYDROCHLORIDE 5 MILLIGRAM(S): 30 TABLET ORAL at 17:00

## 2024-12-15 RX ADMIN — AMIODARONE HYDROCHLORIDE 400 MILLIGRAM(S): 200 TABLET ORAL at 05:33

## 2024-12-15 RX ADMIN — POTASSIUM CHLORIDE 20 MILLIEQUIVALENT(S): 600 TABLET, EXTENDED RELEASE ORAL at 11:06

## 2024-12-15 RX ADMIN — Medication 500 MILLIGRAM(S): at 18:00

## 2024-12-15 RX ADMIN — ACETAMINOPHEN 500MG 650 MILLIGRAM(S): 500 TABLET, COATED ORAL at 00:17

## 2024-12-15 RX ADMIN — ACETAMINOPHEN 500MG 650 MILLIGRAM(S): 500 TABLET, COATED ORAL at 11:51

## 2024-12-15 RX ADMIN — OLANZAPINE 7.5 MILLIGRAM(S): 20 TABLET ORAL at 11:47

## 2024-12-15 RX ADMIN — MONTELUKAST SODIUM 10 MILLIGRAM(S): 10 TABLET ORAL at 11:22

## 2024-12-15 RX ADMIN — OXYCODONE HYDROCHLORIDE 5 MILLIGRAM(S): 30 TABLET ORAL at 23:18

## 2024-12-15 NOTE — PROGRESS NOTE ADULT - SUBJECTIVE AND OBJECTIVE BOX
Subjective: Pt states "Hello" denies any CP or SOB. No acute events overnight.     Telemetry:  SR  70 - 80  Vital Signs Last 24 Hrs  T(C): 36.7 (12-15-24 @ 07:07), Max: 36.9 (24 @ 15:10)  T(F): 98 (12-15-24 @ 07:07), Max: 98.4 (24 @ 15:10)  HR: 84 (12-15-24 @ 07:07) (73 - 84)  BP: 128/57 (12-15-24 @ 07:07) (100/58 - 128/57)  RR: 18 (12-15-24 @ 07:07) (18 - 19)  SpO2: 94% (12-15-24 @ 07:07) (91% - 94%)              @ 07:01  -  12-15 @ 07:00  --------------------------------------------------------  IN: 660 mL / OUT: 995 mL / NET: -335 mL      Daily Weight in k.7 (15 Dec 2024 02:53)                        10.2   13.40 )-----------( 204      ( 15 Dec 2024 06:11 )             33.1     138  |  102  |  12  ----------------------------<  130[H]  3.9   |  23  |  0.61    Phos  2.4     12-15  Mg     2.1     12-15  AST  11  /  ALT  14  /  AlkPhos  114  12-15        CAPILLARY BLOOD GLUCOSE  150 - 175          PHYSICAL EXAM  Neurology: A&Ox3, NAD  CV : RRR+S1S2  Sternal Wound: MSI CDI with Prevena dressing, Stable    +Mediastinal tube to BEAU bulb suction with serosanguinous drainage  Lungs: Respirations non-labored, B/L BS with expiratory wheezing  Abdomen: Soft, NT/ND, +BSx4Q, +BM  (-)N/V/D +ABD Binder  : Voiding without difficulty  Extremities: B/L LE trace edema, negative calf tenderness, +PP              MEDICATIONS  acetaminophen     Tablet .. 650 milliGRAM(s) Oral every 6 hours  albuterol    90 MICROgram(s) HFA Inhaler 2 Puff(s) Inhalation every 6 hours PRN  albuterol/ipratropium for Nebulization 3 milliLiter(s) Nebulizer every 6 hours  aMIOdarone    Tablet 400 milliGRAM(s) Oral two times a day  apixaban 5 milliGRAM(s) Oral two times a day  ascorbic acid 500 milliGRAM(s) Oral two times a day  aspirin enteric coated 81 milliGRAM(s) Oral daily  atorvastatin 80 milliGRAM(s) Oral at bedtime  chlorhexidine 2% Cloths 1 Application(s) Topical daily  cholecalciferol 2000 Unit(s) Oral daily  famotidine    Tablet 20 milliGRAM(s) Oral daily  fluticasone propionate/ salmeterol 250-50 MICROgram(s) Diskus 1 Dose(s) Inhalation two times a day  gabapentin 300 milliGRAM(s) Oral three times a day  insulin lispro (ADMELOG) corrective regimen sliding scale   SubCutaneous three times a day before meals  insulin lispro (ADMELOG) corrective regimen sliding scale   SubCutaneous at bedtime  metoprolol succinate ER 12.5 milliGRAM(s) Oral daily  montelukast 10 milliGRAM(s) Oral daily  OLANZapine 7.5 milliGRAM(s) Oral daily  oxyCODONE    IR 10 milliGRAM(s) Oral every 4 hours PRN  oxyCODONE    IR 5 milliGRAM(s) Oral every 4 hours PRN  pantoprazole    Tablet 40 milliGRAM(s) Oral before breakfast  polyethylene glycol 3350 17 Gram(s) Oral daily  predniSONE   Tablet 5 milliGRAM(s) Oral daily  senna 2 Tablet(s) Oral at bedtime  traZODone 100 milliGRAM(s) Oral at bedtime      Physical Therapy Rec:   Home  [  ]   Home w/ PT  [ X ]  Rehab  [  ]    Discussed with Cardiothoracic Team at AM rounds.

## 2024-12-15 NOTE — PROGRESS NOTE ADULT - ASSESSMENT
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 She reports experiencing burning sensation to mid-sternal surgical site.  CT chest ---Diffuse sternal nonunion, with fluid interposed between the sternal fragments. The widest area of separation of the sternal fragments measures up to 2.8 cm.   She presented today for scheduled Sternal Plating with Dr. Sivakumar Loaiza    Hospital Course:  12/13 s/p sternal plating with Dr. Loaiza. Transferred to [facility]. Prevena and abdominal binder. MED Chest tube. Monitor chest tube output. C/w Ancef. Daily CXR  12/14 VSS, maintain mediastinal tube, output 200cc/24h. Pt resumed on Eliquis today for APLS per Dr. Loaiza. Ambulated with PT/OT. Recommended for Home PT.  12/15 VSS, mediastinal tube output 95cc/24h, maintain to bulb suction per Dr. Loaiza. Plan for d/c with Home PT Monday. 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 She reports experiencing burning sensation to mid-sternal surgical site.  CT chest ---Diffuse sternal nonunion, with fluid interposed between the sternal fragments. The widest area of separation of the sternal fragments measures up to 2.8 cm.   She presented today for scheduled Sternal Plating with Dr. Sivakumar Loaiza    Hospital Course:  12/13 s/p sternal plating with Dr. Loaiza. Transferred to [facility]. Prevena and abdominal binder. MED Chest tube. Monitor chest tube output. C/w Ancef. Daily CXR  12/14 VSS, maintain mediastinal tube, output 200cc/24h. Pt resumed on Eliquis today for APLS per Dr. Loaiza. Ambulated with PT/OT. Recommended for Home PT.  12/15 VSS, mediastinal tube output 95cc/24h, maintain to bulb suction per Dr. Loaiza. CXR this AM noted with persistent right ptx postop now s/p right PTC placement. Maintain pigtail to lws.

## 2024-12-15 NOTE — PROGRESS NOTE ADULT - PROBLEM SELECTOR PLAN 3
- Hx of COPD.   Continue on home Advair, Singulair, Proventil HFA PRN, Duonebs q6, Prednisone 5 daily  Cough and deep breathe, Incentive Spirometry Q1h, Chest PT. - Hx of COPD.   Continue on home Advair, Singulair, Proventil HFA PRN, Duonebs q6, Prednisone 5 daily  Cough and deep breathe, Incentive Spirometry Q1h, Chest PT.  12/15 s/p right pigtail for right ptx  Maintain right pigtail to LWS

## 2024-12-15 NOTE — PROGRESS NOTE ADULT - PROBLEM SELECTOR PLAN 1
- s/p Sternal Plating 12/13   - maintain MS Prevena dressing and sternal precautions  - maintain abdominal binder  - maintain mediastinal chest tube to BEAU. monitor chest tube output strict I & Os  - Continue on ASA 81, Atorvastatin 80 HS, Toprol XL 12.5 daily  - ERP Amio, Gabapentin, VitC  Cough and deep breathe, Incentive Spirometry Q1h, Chest PT.  Ambulate 4x daily as tolerated and with PT.   C/W GI prophylaxis on Protonix   Disposition: Home PT Monday - s/p Sternal Plating 12/13   - maintain MS Prevena dressing and sternal precautions  - maintain abdominal binder  - maintain mediastinal chest tube to BEAU. monitor chest tube output strict I & Os  - Continue on ASA 81, Atorvastatin 80 HS, Toprol XL 12.5 daily  - ERP Amio, Gabapentin, VitC  Cough and deep breathe, Incentive Spirometry Q1h, Chest PT.  Ambulate 4x daily as tolerated and with PT.   C/W GI prophylaxis on Protonix   Disposition: Home PT when medically cleared

## 2024-12-16 DIAGNOSIS — J93.9 PNEUMOTHORAX, UNSPECIFIED: ICD-10-CM

## 2024-12-16 LAB
ANION GAP SERPL CALC-SCNC: 14 MMOL/L — SIGNIFICANT CHANGE UP (ref 5–17)
BUN SERPL-MCNC: 13 MG/DL — SIGNIFICANT CHANGE UP (ref 7–23)
CALCIUM SERPL-MCNC: 8.6 MG/DL — SIGNIFICANT CHANGE UP (ref 8.4–10.5)
CHLORIDE SERPL-SCNC: 101 MMOL/L — SIGNIFICANT CHANGE UP (ref 96–108)
CO2 SERPL-SCNC: 22 MMOL/L — SIGNIFICANT CHANGE UP (ref 22–31)
CREAT SERPL-MCNC: 0.63 MG/DL — SIGNIFICANT CHANGE UP (ref 0.5–1.3)
EGFR: 101 ML/MIN/1.73M2 — SIGNIFICANT CHANGE UP
GLUCOSE BLDC GLUCOMTR-MCNC: 133 MG/DL — HIGH (ref 70–99)
GLUCOSE BLDC GLUCOMTR-MCNC: 144 MG/DL — HIGH (ref 70–99)
GLUCOSE BLDC GLUCOMTR-MCNC: 165 MG/DL — HIGH (ref 70–99)
GLUCOSE BLDC GLUCOMTR-MCNC: 203 MG/DL — HIGH (ref 70–99)
GLUCOSE BLDC GLUCOMTR-MCNC: 228 MG/DL — HIGH (ref 70–99)
GLUCOSE SERPL-MCNC: 163 MG/DL — HIGH (ref 70–99)
HCT VFR BLD CALC: 33.9 % — LOW (ref 34.5–45)
HGB BLD-MCNC: 10.5 G/DL — LOW (ref 11.5–15.5)
MAGNESIUM SERPL-MCNC: 2 MG/DL — SIGNIFICANT CHANGE UP (ref 1.6–2.6)
MCHC RBC-ENTMCNC: 30.1 PG — SIGNIFICANT CHANGE UP (ref 27–34)
MCHC RBC-ENTMCNC: 31 G/DL — LOW (ref 32–36)
MCV RBC AUTO: 97.1 FL — SIGNIFICANT CHANGE UP (ref 80–100)
NRBC # BLD: 0 /100 WBCS — SIGNIFICANT CHANGE UP (ref 0–0)
PHOSPHATE SERPL-MCNC: 1.9 MG/DL — LOW (ref 2.5–4.5)
PLATELET # BLD AUTO: 229 K/UL — SIGNIFICANT CHANGE UP (ref 150–400)
POTASSIUM SERPL-MCNC: 4.1 MMOL/L — SIGNIFICANT CHANGE UP (ref 3.5–5.3)
POTASSIUM SERPL-SCNC: 4.1 MMOL/L — SIGNIFICANT CHANGE UP (ref 3.5–5.3)
RBC # BLD: 3.49 M/UL — LOW (ref 3.8–5.2)
RBC # FLD: 15.9 % — HIGH (ref 10.3–14.5)
SODIUM SERPL-SCNC: 137 MMOL/L — SIGNIFICANT CHANGE UP (ref 135–145)
WBC # BLD: 14.38 K/UL — HIGH (ref 3.8–10.5)
WBC # FLD AUTO: 14.38 K/UL — HIGH (ref 3.8–10.5)

## 2024-12-16 PROCEDURE — 99232 SBSQ HOSP IP/OBS MODERATE 35: CPT

## 2024-12-16 PROCEDURE — 71045 X-RAY EXAM CHEST 1 VIEW: CPT | Mod: 26

## 2024-12-16 RX ORDER — GUAIFENESIN 400 MG
100 TABLET ORAL EVERY 8 HOURS
Refills: 0 | Status: DISCONTINUED | OUTPATIENT
Start: 2024-12-16 | End: 2024-12-19

## 2024-12-16 RX ADMIN — OXYCODONE HYDROCHLORIDE 5 MILLIGRAM(S): 30 TABLET ORAL at 00:18

## 2024-12-16 RX ADMIN — ACETAMINOPHEN 500MG 650 MILLIGRAM(S): 500 TABLET, COATED ORAL at 05:05

## 2024-12-16 RX ADMIN — PANTOPRAZOLE SODIUM 40 MILLIGRAM(S): 40 TABLET, DELAYED RELEASE ORAL at 05:06

## 2024-12-16 RX ADMIN — OXYCODONE HYDROCHLORIDE 5 MILLIGRAM(S): 30 TABLET ORAL at 17:45

## 2024-12-16 RX ADMIN — METOPROLOL TARTRATE 12.5 MILLIGRAM(S): 100 TABLET, FILM COATED ORAL at 05:04

## 2024-12-16 RX ADMIN — ACETAMINOPHEN 500MG 650 MILLIGRAM(S): 500 TABLET, COATED ORAL at 16:16

## 2024-12-16 RX ADMIN — FLUTICASONE PROPIONATE AND SALMETEROL XINAFOATE 1 DOSE(S): 45; 21 AEROSOL, METERED RESPIRATORY (INHALATION) at 16:17

## 2024-12-16 RX ADMIN — Medication 2000 UNIT(S): at 12:53

## 2024-12-16 RX ADMIN — Medication 2: at 13:11

## 2024-12-16 RX ADMIN — Medication 100 MILLIGRAM(S): at 10:22

## 2024-12-16 RX ADMIN — Medication 2 TABLET(S): at 21:41

## 2024-12-16 RX ADMIN — Medication 4: at 16:49

## 2024-12-16 RX ADMIN — MONTELUKAST SODIUM 10 MILLIGRAM(S): 10 TABLET ORAL at 12:53

## 2024-12-16 RX ADMIN — TRAZODONE HYDROCHLORIDE 100 MILLIGRAM(S): 150 TABLET ORAL at 21:42

## 2024-12-16 RX ADMIN — AMIODARONE HYDROCHLORIDE 400 MILLIGRAM(S): 200 TABLET ORAL at 05:05

## 2024-12-16 RX ADMIN — PREDNISONE 5 MILLIGRAM(S): 20 TABLET ORAL at 05:05

## 2024-12-16 RX ADMIN — ACETAMINOPHEN 500MG 650 MILLIGRAM(S): 500 TABLET, COATED ORAL at 10:23

## 2024-12-16 RX ADMIN — Medication 80 MILLIGRAM(S): at 21:41

## 2024-12-16 RX ADMIN — APIXABAN 5 MILLIGRAM(S): 2.5 TABLET, FILM COATED ORAL at 16:17

## 2024-12-16 RX ADMIN — OXYCODONE HYDROCHLORIDE 5 MILLIGRAM(S): 30 TABLET ORAL at 16:47

## 2024-12-16 RX ADMIN — IPRATROPIUM BROMIDE AND ALBUTEROL SULFATE 3 MILLILITER(S): 2.5; .5 SOLUTION RESPIRATORY (INHALATION) at 16:23

## 2024-12-16 RX ADMIN — FAMOTIDINE 20 MILLIGRAM(S): 20 TABLET, FILM COATED ORAL at 12:52

## 2024-12-16 RX ADMIN — GABAPENTIN 300 MILLIGRAM(S): 300 CAPSULE ORAL at 21:42

## 2024-12-16 RX ADMIN — OXYCODONE HYDROCHLORIDE 5 MILLIGRAM(S): 30 TABLET ORAL at 21:42

## 2024-12-16 RX ADMIN — Medication 500 MILLIGRAM(S): at 16:17

## 2024-12-16 RX ADMIN — POLYETHYLENE GLYCOL 3350 17 GRAM(S): 17 POWDER, FOR SOLUTION ORAL at 12:58

## 2024-12-16 RX ADMIN — IPRATROPIUM BROMIDE AND ALBUTEROL SULFATE 3 MILLILITER(S): 2.5; .5 SOLUTION RESPIRATORY (INHALATION) at 12:58

## 2024-12-16 RX ADMIN — GABAPENTIN 300 MILLIGRAM(S): 300 CAPSULE ORAL at 05:04

## 2024-12-16 RX ADMIN — OXYCODONE HYDROCHLORIDE 5 MILLIGRAM(S): 30 TABLET ORAL at 22:50

## 2024-12-16 RX ADMIN — Medication 81 MILLIGRAM(S): at 12:52

## 2024-12-16 RX ADMIN — Medication 500 MILLIGRAM(S): at 05:05

## 2024-12-16 RX ADMIN — CHLORHEXIDINE GLUCONATE 1 APPLICATION(S): 1.2 RINSE ORAL at 10:24

## 2024-12-16 RX ADMIN — APIXABAN 5 MILLIGRAM(S): 2.5 TABLET, FILM COATED ORAL at 05:05

## 2024-12-16 RX ADMIN — Medication 100 MILLIGRAM(S): at 16:24

## 2024-12-16 RX ADMIN — GABAPENTIN 300 MILLIGRAM(S): 300 CAPSULE ORAL at 12:57

## 2024-12-16 RX ADMIN — OLANZAPINE 7.5 MILLIGRAM(S): 20 TABLET ORAL at 12:53

## 2024-12-16 RX ADMIN — Medication 100 MILLIGRAM(S): at 21:41

## 2024-12-16 NOTE — PROGRESS NOTE ADULT - ASSESSMENT
58F with history of CVA  COPD/Asthma, Breast Ca s/p ?RT, Bipolar depression, Rheumatoid Arthritis, Antiphospholipid syndrome, L eye uveitis/scleritis,  severe AR s/p AVR CABG x 1 in for sternal surgery      1.   s/p AVR CABG x 1   - pt has sternotomy dehiscense s/p surgery   - chest tube placed yesterday for Pneumothorax     2. CVA  -resume eliquis when ok with surgery     3. Antiphospholipid syndrome  -on eliquis     4. LV dysfunction   - cont torsemide , toprol and aldactone     5 COPD exacerbation   - on nebs, advair , prednisone, consider increasing steroid dose

## 2024-12-16 NOTE — PROGRESS NOTE ADULT - ASSESSMENT
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 She reports experiencing burning sensation to mid-sternal surgical site.  CT chest ---Diffuse sternal nonunion, with fluid interposed between the sternal fragments. The widest area of separation of the sternal fragments measures up to 2.8 cm.   She presented today for scheduled Sternal Plating with Dr. Sivakumar Loaiza    Hospital Course:  12/13 s/p sternal plating with Dr. Loaiza. Transferred to [facility]. Prevena and abdominal binder. MED Chest tube. Monitor chest tube output. C/w Ancef. Daily CXR  12/14 VSS, maintain mediastinal tube, output 200cc/24h. Pt resumed on Eliquis today for APLS per Dr. Loaiza. Ambulated with PT/OT. Recommended for Home PT.  12/15 VSS, mediastinal tube output 95cc/24h, maintain to bulb suction per Dr. Loaiza. CXR this AM noted with persistent right ptx postop now s/p right PTC placement. Maintain pigtail to lws.  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 She reports experiencing burning sensation to mid-sternal surgical site.  CT chest ---Diffuse sternal nonunion, with fluid interposed between the sternal fragments. The widest area of separation of the sternal fragments measures up to 2.8 cm.   She presented today for scheduled Sternal Plating with Dr. Sivakumar Loaiza    Hospital Course:  12/13 s/p sternal plating with Dr. Loaiza. Transferred to [facility]. Prevena and abdominal binder. MED Chest tube. Monitor chest tube output. C/w Ancef. Daily CXR  12/14 VSS, maintain mediastinal tube, output 200cc/24h. Pt resumed on Eliquis today for APLS per Dr. Loaiza. Ambulated with PT/OT. Recommended for Home PT.  12/15 VSS, mediastinal tube output 95cc/24h, maintain to bulb suction per Dr. Loaiza. CXR this AM noted with persistent right ptx postop now s/p right PTC placement. Maintain pigtail to lws  12/16 VSS; RSR 70-80; continue eliquis 5 po bid; maintain mediastinal katherine--> 130 cc noted for 24 hours; continue rt pigtail to suction; chest xray this am- ptx resolved; repeat in am; continue pulm toilet/ bronchodilators for hx copd/asthma  discharge planning- home pt when stable

## 2024-12-16 NOTE — PROGRESS NOTE ADULT - SUBJECTIVE AND OBJECTIVE BOX
VITAL SIGNS    Telemetry:    Vital Signs Last 24 Hrs  T(C): 37.1 (12-16-24 @ 03:06), Max: 37.4 (12-15-24 @ 11:00)  T(F): 98.8 (12-16-24 @ 03:06), Max: 99.3 (12-15-24 @ 11:00)  HR: 71 (12-16-24 @ 05:08) (71 - 84)  BP: 116/60 (12-16-24 @ 05:08) (97/70 - 174/83)  RR: 18 (12-16-24 @ 05:08) (16 - 21)  SpO2: 95% (12-16-24 @ 05:08) (93% - 95%)            12-14 @ 07:01  -  12-15 @ 07:00  --------------------------------------------------------  IN: 660 mL / OUT: 995 mL / NET: -335 mL    12-15 @ 07:01  -  12-16 @ 06:25  --------------------------------------------------------  IN: 910 mL / OUT: 905 mL / NET: 5 mL       Daily     Daily   Admit Wt: Drug Dosing Weight  Height (cm): 154.9 (13 Dec 2024 06:52)  Weight (kg): 89.4 (13 Dec 2024 06:52)  BMI (kg/m2): 37.3 (13 Dec 2024 06:52)  BSA (m2): 1.88 (13 Dec 2024 06:52)      CAPILLARY BLOOD GLUCOSE      POCT Blood Glucose.: 153 mg/dL (15 Dec 2024 21:25)  POCT Blood Glucose.: 112 mg/dL (15 Dec 2024 16:38)  POCT Blood Glucose.: 175 mg/dL (15 Dec 2024 11:19)  POCT Blood Glucose.: 150 mg/dL (15 Dec 2024 07:43)          LABS:    12-14 @ 07:01  -  12-15 @ 07:00  --------------------------------------------------------  IN: 660 mL / OUT: 995 mL / NET: -335 mL    12-15 @ 07:01 - 12-16 @ 06:25  --------------------------------------------------------  IN: 910 mL / OUT: 905 mL / NET: 5 mL    cret                        10.5   14.38 )-----------( 229      ( 16 Dec 2024 06:13 )             33.9     12-15    138  |  102  |  12  ----------------------------<  130[H]  3.9   |  23  |  0.61    Ca    8.5      15 Dec 2024 06:11  Phos  2.4     12-15  Mg     2.1     12-15    TPro  6.0  /  Alb  3.4  /  TBili  0.8  /  DBili  x   /  AST  11  /  ALT  14  /  AlkPhos  114  12-15        acetaminophen     Tablet .. 650 milliGRAM(s) Oral every 6 hours PRN  acetaminophen     Tablet .. 650 milliGRAM(s) Oral every 6 hours  albuterol    90 MICROgram(s) HFA Inhaler 2 Puff(s) Inhalation every 6 hours PRN  albuterol/ipratropium for Nebulization 3 milliLiter(s) Nebulizer every 6 hours  apixaban 5 milliGRAM(s) Oral two times a day  ascorbic acid 500 milliGRAM(s) Oral two times a day  aspirin enteric coated 81 milliGRAM(s) Oral daily  atorvastatin 80 milliGRAM(s) Oral at bedtime  chlorhexidine 2% Cloths 1 Application(s) Topical daily  cholecalciferol 2000 Unit(s) Oral daily  dextrose 50% Injectable 50 milliLiter(s) IV Push every 15 minutes  dextrose 50% Injectable 25 milliLiter(s) IV Push every 15 minutes  famotidine    Tablet 20 milliGRAM(s) Oral daily  fluticasone propionate/ salmeterol 250-50 MICROgram(s) Diskus 1 Dose(s) Inhalation two times a day  gabapentin 300 milliGRAM(s) Oral three times a day  insulin lispro (ADMELOG) corrective regimen sliding scale   SubCutaneous three times a day before meals  insulin lispro (ADMELOG) corrective regimen sliding scale   SubCutaneous at bedtime  metoprolol succinate ER 12.5 milliGRAM(s) Oral daily  montelukast 10 milliGRAM(s) Oral daily  OLANZapine 7.5 milliGRAM(s) Oral daily  oxyCODONE    IR 5 milliGRAM(s) Oral every 4 hours PRN  oxyCODONE    IR 10 milliGRAM(s) Oral every 4 hours PRN  pantoprazole    Tablet 40 milliGRAM(s) Oral before breakfast  polyethylene glycol 3350 17 Gram(s) Oral daily  predniSONE   Tablet 5 milliGRAM(s) Oral daily  senna 2 Tablet(s) Oral at bedtime  traZODone 100 milliGRAM(s) Oral at bedtime      PHYSICAL EXAM    Subjective: "Hi.   Neurology: alert and oriented x 3, nonfocal, no gross deficits  CV : tele:  RSR  Sternal Wound :  CDI with dressing , Stable  Lungs: clear. RR easy, unlabored   Abdomen: soft, nontender, nondistended, positive bowel sounds, bowel movement   Neg N/V/D   :  pt voiding without difficulty   Extremities:   DEWITT; edema, neg calf tenderness.   PPP bilaterally      PW:  Chest tubes:                 VITAL SIGNS    Telemetry:  RSR 70-80  Vital Signs Last 24 Hrs  T(C): 37.1 (12-16-24 @ 03:06), Max: 37.4 (12-15-24 @ 11:00)  T(F): 98.8 (12-16-24 @ 03:06), Max: 99.3 (12-15-24 @ 11:00)  HR: 71 (12-16-24 @ 05:08) (71 - 84)  BP: 116/60 (12-16-24 @ 05:08) (97/70 - 174/83)  RR: 18 (12-16-24 @ 05:08) (16 - 21)  SpO2: 95% (12-16-24 @ 05:08) (93% - 95%)            12-14 @ 07:01  -  12-15 @ 07:00  --------------------------------------------------------  IN: 660 mL / OUT: 995 mL / NET: -335 mL    12-15 @ 07:01  -  12-16 @ 06:25  --------------------------------------------------------  IN: 910 mL / OUT: 905 mL / NET: 5 mL       Daily     Daily   Admit Wt: Drug Dosing Weight  Height (cm): 154.9 (13 Dec 2024 06:52)  Weight (kg): 89.4 (13 Dec 2024 06:52)  BMI (kg/m2): 37.3 (13 Dec 2024 06:52)  BSA (m2): 1.88 (13 Dec 2024 06:52)      CAPILLARY BLOOD GLUCOSE      POCT Blood Glucose.: 153 mg/dL (15 Dec 2024 21:25)  POCT Blood Glucose.: 112 mg/dL (15 Dec 2024 16:38)  POCT Blood Glucose.: 175 mg/dL (15 Dec 2024 11:19)  POCT Blood Glucose.: 150 mg/dL (15 Dec 2024 07:43)          LABS:    12-14 @ 07:01  -  12-15 @ 07:00  --------------------------------------------------------  IN: 660 mL / OUT: 995 mL / NET: -335 mL    12-15 @ 07:01 - 12-16 @ 06:25  --------------------------------------------------------  IN: 910 mL / OUT: 905 mL / NET: 5 mL    cret                        10.5   14.38 )-----------( 229      ( 16 Dec 2024 06:13 )             33.9     12-15    138  |  102  |  12  ----------------------------<  130[H]  3.9   |  23  |  0.61    Ca    8.5      15 Dec 2024 06:11  Phos  2.4     12-15  Mg     2.1     12-15    TPro  6.0  /  Alb  3.4  /  TBili  0.8  /  DBili  x   /  AST  11  /  ALT  14  /  AlkPhos  114  12-15        acetaminophen     Tablet .. 650 milliGRAM(s) Oral every 6 hours PRN  acetaminophen     Tablet .. 650 milliGRAM(s) Oral every 6 hours  albuterol    90 MICROgram(s) HFA Inhaler 2 Puff(s) Inhalation every 6 hours PRN  albuterol/ipratropium for Nebulization 3 milliLiter(s) Nebulizer every 6 hours  apixaban 5 milliGRAM(s) Oral two times a day  ascorbic acid 500 milliGRAM(s) Oral two times a day  aspirin enteric coated 81 milliGRAM(s) Oral daily  atorvastatin 80 milliGRAM(s) Oral at bedtime  chlorhexidine 2% Cloths 1 Application(s) Topical daily  cholecalciferol 2000 Unit(s) Oral daily  dextrose 50% Injectable 50 milliLiter(s) IV Push every 15 minutes  dextrose 50% Injectable 25 milliLiter(s) IV Push every 15 minutes  famotidine    Tablet 20 milliGRAM(s) Oral daily  fluticasone propionate/ salmeterol 250-50 MICROgram(s) Diskus 1 Dose(s) Inhalation two times a day  gabapentin 300 milliGRAM(s) Oral three times a day  insulin lispro (ADMELOG) corrective regimen sliding scale   SubCutaneous three times a day before meals  insulin lispro (ADMELOG) corrective regimen sliding scale   SubCutaneous at bedtime  metoprolol succinate ER 12.5 milliGRAM(s) Oral daily  montelukast 10 milliGRAM(s) Oral daily  OLANZapine 7.5 milliGRAM(s) Oral daily  oxyCODONE    IR 5 milliGRAM(s) Oral every 4 hours PRN  oxyCODONE    IR 10 milliGRAM(s) Oral every 4 hours PRN  pantoprazole    Tablet 40 milliGRAM(s) Oral before breakfast  polyethylene glycol 3350 17 Gram(s) Oral daily  predniSONE   Tablet 5 milliGRAM(s) Oral daily  senna 2 Tablet(s) Oral at bedtime  traZODone 100 milliGRAM(s) Oral at bedtime      PHYSICAL EXAM    Subjective: "I have some wheezing."   Neurology: alert and oriented x 3, nonfocal, no gross deficits  CV : tele:  RSR 70-80  Sternal Wound :  CDI with prevena dressing , Stable  Lungs: + rhonchi and exp wheezing noted;  RR easy, unlabored   Abdomen: soft, nontender, nondistended, positive bowel sounds, + bowel movement   Neg N/V/D; obese abdomen    :  pt voiding without difficulty   Extremities:   DEWITT; neg LE edema, neg calf tenderness.   PPP bilaterally        PW: no  Chest tubes: + mediastinal BEAU--> draining serous sang drainage  rt pigtail - neg drainage; neg airleak noted

## 2024-12-16 NOTE — PROGRESS NOTE ADULT - PROBLEM SELECTOR PLAN 4
- Hx Bipolar Depression. Continue on home Olanzapine 7.5mg daily, Trazodone 100mg daily Hx Bipolar Depression. Continue on home Olanzapine 7.5mg daily, Trazodone 100mg daily

## 2024-12-16 NOTE — PROGRESS NOTE ADULT - PROBLEM SELECTOR PLAN 1
- s/p Sternal Plating 12/13   - maintain MS Prevena dressing and sternal precautions  - maintain abdominal binder  - maintain mediastinal chest tube to BEAU. monitor chest tube output strict I & Os  - Continue on ASA 81, Atorvastatin 80 HS, Toprol XL 12.5 daily  - ERP Amio, Gabapentin, VitC  Cough and deep breathe, Incentive Spirometry Q1h, Chest PT.  Ambulate 4x daily as tolerated and with PT.   C/W GI prophylaxis on Protonix   Disposition: Home PT when medically cleared s/p Sternal Plating 12/13   maintain MS Prevena dressing and sternal precautions  maintain abdominal binder  maintain mediastinal chest tube to BEAU. monitor chest tube output strict I & Os  Continue ASA/ statin  bb--> toprol 12.5 qd  pain management  Cough and deep breathe, Incentive Spirometry Q1h, Chest PT.  Ambulate 4x daily as tolerated and with PT.   C/W GI prophylaxis on Protonix   discharge planning- home pt when stable

## 2024-12-16 NOTE — PROGRESS NOTE ADULT - SUBJECTIVE AND OBJECTIVE BOX
German Carrasco MD  Interventional Cardiology / Endovascular Specialist  [place] Office : [phone] [place] [address] [place] [address]  Tel:   [place] Office : [address] [facility] [place] [address]  Tel: [phone]    Pt lying in bed in NAD     MEDICATIONS:  apixaban 5 milliGRAM(s) Oral two times a day  aspirin enteric coated 81 milliGRAM(s) Oral daily  metoprolol succinate ER 12.5 milliGRAM(s) Oral daily      albuterol    90 MICROgram(s) HFA Inhaler 2 Puff(s) Inhalation every 6 hours PRN  albuterol/ipratropium for Nebulization 3 milliLiter(s) Nebulizer every 6 hours  fluticasone propionate/ salmeterol 250-50 MICROgram(s) Diskus 1 Dose(s) Inhalation two times a day  guaiFENesin Oral Liquid (Sugar-Free) 100 milliGRAM(s) Oral every 8 hours  montelukast 10 milliGRAM(s) Oral daily    acetaminophen     Tablet .. 650 milliGRAM(s) Oral every 6 hours PRN  acetaminophen     Tablet .. 650 milliGRAM(s) Oral every 6 hours  gabapentin 300 milliGRAM(s) Oral three times a day  OLANZapine 7.5 milliGRAM(s) Oral daily  oxyCODONE    IR 5 milliGRAM(s) Oral every 4 hours PRN  oxyCODONE    IR 10 milliGRAM(s) Oral every 4 hours PRN  traZODone 100 milliGRAM(s) Oral at bedtime    famotidine    Tablet 20 milliGRAM(s) Oral daily  pantoprazole    Tablet 40 milliGRAM(s) Oral before breakfast  polyethylene glycol 3350 17 Gram(s) Oral daily  senna 2 Tablet(s) Oral at bedtime    atorvastatin 80 milliGRAM(s) Oral at bedtime  dextrose 50% Injectable 50 milliLiter(s) IV Push every 15 minutes  dextrose 50% Injectable 25 milliLiter(s) IV Push every 15 minutes  insulin lispro (ADMELOG) corrective regimen sliding scale   SubCutaneous three times a day before meals  insulin lispro (ADMELOG) corrective regimen sliding scale   SubCutaneous at bedtime  predniSONE   Tablet 5 milliGRAM(s) Oral daily    ascorbic acid 500 milliGRAM(s) Oral two times a day  chlorhexidine 2% Cloths 1 Application(s) Topical daily  cholecalciferol 2000 Unit(s) Oral daily      PAST MEDICAL/SURGICAL HISTORY  PAST MEDICAL & SURGICAL HISTORY:  Cigarette smoker      Rheumatoid arthritis      Other depression      Breast cancer      Migraines      History of multiple cerebrovascular accidents (CVAs)      Suicidal ideation      Paresthesia of left arm      Facial paresthesia      APS (antiphospholipid syndrome)      History of depressed bipolar disorder      History of COPD      History of COPD      Hyperlipidemia      Type 2 diabetes mellitus      Asthma      CAD (coronary artery disease)      Uveitis      History of hysterectomy      History of cholecystectomy      History of loop recorder      S/P CABG x 1      S/P AVR      S/P lumpectomy, left breast          SOCIAL HISTORY: Substance Use (street drugs): ( x ) never used  (  ) other:    FAMILY HISTORY:  Family history of breast cancer (Mother)    Family history of diabetes mellitus (DM) (Father)        REVIEW OF SYSTEMS:  CONSTITUTIONAL: No fever, weight loss, or fatigue  EYES: No eye pain, visual disturbances, or discharge  ENMT:  No difficulty hearing, tinnitus, vertigo; No sinus or throat pain  BREASTS: No pain, masses, or nipple discharge  GASTROINTESTINAL: No abdominal or epigastric pain. No nausea, vomiting, or hematemesis; No diarrhea or constipation. No melena or hematochezia.  GENITOURINARY: No dysuria, frequency, hematuria, or incontinence  NEUROLOGICAL: No headaches, memory loss, loss of strength, numbness, or tremors  ENDOCRINE: No heat or cold intolerance; No hair loss  MUSCULOSKELETAL: No joint pain or swelling; No muscle, back, or extremity pain  PSYCHIATRIC: No depression, anxiety, mood swings, or difficulty sleeping  HEME/LYMPH: No easy bruising, or bleeding gums  All others negative    PHYSICAL EXAM:  T(C): 36.8 (12-16-24 @ 10:59), Max: 37.2 (12-15-24 @ 23:03)  HR: 72 (12-16-24 @ 10:59) (66 - 84)  BP: 120/60 (12-16-24 @ 10:59) (97/70 - 125/70)  RR: 18 (12-16-24 @ 10:59) (16 - 21)  SpO2: 91% (12-16-24 @ 10:59) (91% - 96%)  Wt(kg): --  I&O's Summary    15 Dec 2024 07:01  -  16 Dec 2024 07:00  --------------------------------------------------------  IN: 910 mL / OUT: 905 mL / NET: 5 mL    16 Dec 2024 07:01  -  16 Dec 2024 12:13  --------------------------------------------------------  IN: 240 mL / OUT: 305 mL / NET: -65 mL      GENERAL: NAD   EYES: EOMI, PERRLA, conjunctiva and sclera clear  ENMT: No tonsillar erythema, exudates, or enlargement;   Cardiovascular: Normal S1 S2, No JVD, No murmurs, No edema, s/p sternal surgery   Respiratory: Lungs clear to auscultation	s/p Chest tube   Gastrointestinal:  Soft, Non-tender, + BS	  Extremities: no edema                                10.5   14.38 )-----------( 229      ( 16 Dec 2024 06:13 )             33.9     12-16    137  |  101  |  13  ----------------------------<  163[H]  4.1   |  22  |  0.63    Ca    8.6      16 Dec 2024 06:14  Phos  1.9     12-16  Mg     2.0     12-16    TPro  6.0  /  Alb  3.4  /  TBili  0.8  /  DBili  x   /  AST  11  /  ALT  14  /  AlkPhos  114  12-15    proBNP:   Lipid Profile:   HgA1c:   TSH:     Consultant(s) Notes Reviewed:  [x ] YES  [ ] NO    Care Discussed with Consultants/Other Providers [ x] YES  [ ] NO    Imaging Personally Reviewed independently:  [x] YES  [ ] NO    All labs, radiologic studies, vitals, orders and medications list reviewed. Patient is seen and examined at bedside. Case discussed with medical team.

## 2024-12-16 NOTE — PROGRESS NOTE ADULT - PROBLEM SELECTOR PLAN 3
- Hx of COPD.   Continue on home Advair, Singulair, Proventil HFA PRN, Duonebs q6, Prednisone 5 daily  Cough and deep breathe, Incentive Spirometry Q1h, Chest PT.  12/15 s/p right pigtail for right ptx  Maintain right pigtail to LWS Continue on home Advair, Singulair, Proventil HFA PRN, Duonebs q6, Prednisone 5 daily  Cough and deep breathe, Incentive Spirometry Q1h, Chest PT.  12/15 s/p right pigtail for right ptx  Maintain right pigtail to LWS  12/16 chest xray today neg ptx  repeat chest xray in am

## 2024-12-17 ENCOUNTER — APPOINTMENT (OUTPATIENT)
Dept: OPHTHALMOLOGY | Facility: CLINIC | Age: 59
End: 2024-12-17

## 2024-12-17 LAB
ANION GAP SERPL CALC-SCNC: 11 MMOL/L — SIGNIFICANT CHANGE UP (ref 5–17)
BUN SERPL-MCNC: 15 MG/DL — SIGNIFICANT CHANGE UP (ref 7–23)
CALCIUM SERPL-MCNC: 8.6 MG/DL — SIGNIFICANT CHANGE UP (ref 8.4–10.5)
CHLORIDE SERPL-SCNC: 107 MMOL/L — SIGNIFICANT CHANGE UP (ref 96–108)
CO2 SERPL-SCNC: 25 MMOL/L — SIGNIFICANT CHANGE UP (ref 22–31)
CREAT SERPL-MCNC: 0.54 MG/DL — SIGNIFICANT CHANGE UP (ref 0.5–1.3)
EGFR: 105 ML/MIN/1.73M2 — SIGNIFICANT CHANGE UP
GLUCOSE BLDC GLUCOMTR-MCNC: 100 MG/DL — HIGH (ref 70–99)
GLUCOSE BLDC GLUCOMTR-MCNC: 149 MG/DL — HIGH (ref 70–99)
GLUCOSE BLDC GLUCOMTR-MCNC: 185 MG/DL — HIGH (ref 70–99)
GLUCOSE BLDC GLUCOMTR-MCNC: 217 MG/DL — HIGH (ref 70–99)
GLUCOSE SERPL-MCNC: 150 MG/DL — HIGH (ref 70–99)
HCT VFR BLD CALC: 32.9 % — LOW (ref 34.5–45)
HGB BLD-MCNC: 10.1 G/DL — LOW (ref 11.5–15.5)
MAGNESIUM SERPL-MCNC: 1.8 MG/DL — SIGNIFICANT CHANGE UP (ref 1.6–2.6)
MCHC RBC-ENTMCNC: 29.8 PG — SIGNIFICANT CHANGE UP (ref 27–34)
MCHC RBC-ENTMCNC: 30.7 G/DL — LOW (ref 32–36)
MCV RBC AUTO: 97.1 FL — SIGNIFICANT CHANGE UP (ref 80–100)
NRBC # BLD: 0 /100 WBCS — SIGNIFICANT CHANGE UP (ref 0–0)
PHOSPHATE SERPL-MCNC: 3.2 MG/DL — SIGNIFICANT CHANGE UP (ref 2.5–4.5)
PLATELET # BLD AUTO: 227 K/UL — SIGNIFICANT CHANGE UP (ref 150–400)
POTASSIUM SERPL-MCNC: 4 MMOL/L — SIGNIFICANT CHANGE UP (ref 3.5–5.3)
POTASSIUM SERPL-SCNC: 4 MMOL/L — SIGNIFICANT CHANGE UP (ref 3.5–5.3)
RBC # BLD: 3.39 M/UL — LOW (ref 3.8–5.2)
RBC # FLD: 15.7 % — HIGH (ref 10.3–14.5)
SODIUM SERPL-SCNC: 143 MMOL/L — SIGNIFICANT CHANGE UP (ref 135–145)
WBC # BLD: 12.87 K/UL — HIGH (ref 3.8–10.5)
WBC # FLD AUTO: 12.87 K/UL — HIGH (ref 3.8–10.5)

## 2024-12-17 PROCEDURE — 71045 X-RAY EXAM CHEST 1 VIEW: CPT | Mod: 26

## 2024-12-17 RX ADMIN — OXYCODONE HYDROCHLORIDE 5 MILLIGRAM(S): 30 TABLET ORAL at 16:09

## 2024-12-17 RX ADMIN — Medication 25 GRAM(S): at 08:00

## 2024-12-17 RX ADMIN — POLYETHYLENE GLYCOL 3350 17 GRAM(S): 17 POWDER, FOR SOLUTION ORAL at 10:52

## 2024-12-17 RX ADMIN — IPRATROPIUM BROMIDE AND ALBUTEROL SULFATE 3 MILLILITER(S): 2.5; .5 SOLUTION RESPIRATORY (INHALATION) at 03:54

## 2024-12-17 RX ADMIN — OLANZAPINE 7.5 MILLIGRAM(S): 20 TABLET ORAL at 10:52

## 2024-12-17 RX ADMIN — GABAPENTIN 300 MILLIGRAM(S): 300 CAPSULE ORAL at 21:11

## 2024-12-17 RX ADMIN — OXYCODONE HYDROCHLORIDE 5 MILLIGRAM(S): 30 TABLET ORAL at 06:20

## 2024-12-17 RX ADMIN — MONTELUKAST SODIUM 10 MILLIGRAM(S): 10 TABLET ORAL at 10:51

## 2024-12-17 RX ADMIN — Medication 500 MILLIGRAM(S): at 17:02

## 2024-12-17 RX ADMIN — OXYCODONE HYDROCHLORIDE 5 MILLIGRAM(S): 30 TABLET ORAL at 17:09

## 2024-12-17 RX ADMIN — ACETAMINOPHEN 500MG 650 MILLIGRAM(S): 500 TABLET, COATED ORAL at 05:51

## 2024-12-17 RX ADMIN — METOPROLOL TARTRATE 12.5 MILLIGRAM(S): 100 TABLET, FILM COATED ORAL at 05:40

## 2024-12-17 RX ADMIN — ACETAMINOPHEN 500MG 650 MILLIGRAM(S): 500 TABLET, COATED ORAL at 10:51

## 2024-12-17 RX ADMIN — IPRATROPIUM BROMIDE AND ALBUTEROL SULFATE 3 MILLILITER(S): 2.5; .5 SOLUTION RESPIRATORY (INHALATION) at 10:52

## 2024-12-17 RX ADMIN — ACETAMINOPHEN 500MG 650 MILLIGRAM(S): 500 TABLET, COATED ORAL at 00:00

## 2024-12-17 RX ADMIN — FAMOTIDINE 20 MILLIGRAM(S): 20 TABLET, FILM COATED ORAL at 10:52

## 2024-12-17 RX ADMIN — TRAZODONE HYDROCHLORIDE 100 MILLIGRAM(S): 150 TABLET ORAL at 21:12

## 2024-12-17 RX ADMIN — CHLORHEXIDINE GLUCONATE 1 APPLICATION(S): 1.2 RINSE ORAL at 10:56

## 2024-12-17 RX ADMIN — Medication 4: at 16:33

## 2024-12-17 RX ADMIN — OXYCODONE HYDROCHLORIDE 5 MILLIGRAM(S): 30 TABLET ORAL at 06:50

## 2024-12-17 RX ADMIN — GABAPENTIN 300 MILLIGRAM(S): 300 CAPSULE ORAL at 13:24

## 2024-12-17 RX ADMIN — OXYCODONE HYDROCHLORIDE 10 MILLIGRAM(S): 30 TABLET ORAL at 11:50

## 2024-12-17 RX ADMIN — APIXABAN 5 MILLIGRAM(S): 2.5 TABLET, FILM COATED ORAL at 17:02

## 2024-12-17 RX ADMIN — Medication 2000 UNIT(S): at 10:52

## 2024-12-17 RX ADMIN — PANTOPRAZOLE SODIUM 40 MILLIGRAM(S): 40 TABLET, DELAYED RELEASE ORAL at 05:39

## 2024-12-17 RX ADMIN — GABAPENTIN 300 MILLIGRAM(S): 300 CAPSULE ORAL at 05:44

## 2024-12-17 RX ADMIN — Medication 100 MILLIGRAM(S): at 13:24

## 2024-12-17 RX ADMIN — OXYCODONE HYDROCHLORIDE 10 MILLIGRAM(S): 30 TABLET ORAL at 22:20

## 2024-12-17 RX ADMIN — IPRATROPIUM BROMIDE AND ALBUTEROL SULFATE 3 MILLILITER(S): 2.5; .5 SOLUTION RESPIRATORY (INHALATION) at 17:02

## 2024-12-17 RX ADMIN — IPRATROPIUM BROMIDE AND ALBUTEROL SULFATE 3 MILLILITER(S): 2.5; .5 SOLUTION RESPIRATORY (INHALATION) at 05:40

## 2024-12-17 RX ADMIN — Medication 500 MILLIGRAM(S): at 05:40

## 2024-12-17 RX ADMIN — PREDNISONE 5 MILLIGRAM(S): 20 TABLET ORAL at 05:39

## 2024-12-17 RX ADMIN — Medication 100 MILLIGRAM(S): at 21:11

## 2024-12-17 RX ADMIN — ACETAMINOPHEN 500MG 650 MILLIGRAM(S): 500 TABLET, COATED ORAL at 17:02

## 2024-12-17 RX ADMIN — Medication 2: at 11:33

## 2024-12-17 RX ADMIN — OXYCODONE HYDROCHLORIDE 10 MILLIGRAM(S): 30 TABLET ORAL at 21:17

## 2024-12-17 RX ADMIN — FLUTICASONE PROPIONATE AND SALMETEROL XINAFOATE 1 DOSE(S): 45; 21 AEROSOL, METERED RESPIRATORY (INHALATION) at 05:51

## 2024-12-17 RX ADMIN — FLUTICASONE PROPIONATE AND SALMETEROL XINAFOATE 1 DOSE(S): 45; 21 AEROSOL, METERED RESPIRATORY (INHALATION) at 17:02

## 2024-12-17 RX ADMIN — Medication 100 MILLIGRAM(S): at 06:20

## 2024-12-17 RX ADMIN — Medication 81 MILLIGRAM(S): at 10:52

## 2024-12-17 RX ADMIN — APIXABAN 5 MILLIGRAM(S): 2.5 TABLET, FILM COATED ORAL at 05:40

## 2024-12-17 RX ADMIN — Medication 80 MILLIGRAM(S): at 21:11

## 2024-12-17 RX ADMIN — ACETAMINOPHEN 500MG 650 MILLIGRAM(S): 500 TABLET, COATED ORAL at 05:39

## 2024-12-17 RX ADMIN — OXYCODONE HYDROCHLORIDE 10 MILLIGRAM(S): 30 TABLET ORAL at 10:50

## 2024-12-17 NOTE — PROGRESS NOTE ADULT - SUBJECTIVE AND OBJECTIVE BOX
German Carrasco MD  Interventional Cardiology / Endovascular Specialist  [place] Office : [address] [place] [address] [place] [address]  Tel:   [place] Office : [address] [facility] [place] [address]  Tel: [phone]    Pt lying in bed in NAD  	  MEDICATIONS:  apixaban 5 milliGRAM(s) Oral two times a day  aspirin enteric coated 81 milliGRAM(s) Oral daily  metoprolol succinate ER 12.5 milliGRAM(s) Oral daily      albuterol    90 MICROgram(s) HFA Inhaler 2 Puff(s) Inhalation every 6 hours PRN  albuterol/ipratropium for Nebulization 3 milliLiter(s) Nebulizer every 6 hours  fluticasone propionate/ salmeterol 250-50 MICROgram(s) Diskus 1 Dose(s) Inhalation two times a day  guaiFENesin Oral Liquid (Sugar-Free) 100 milliGRAM(s) Oral every 8 hours  montelukast 10 milliGRAM(s) Oral daily    acetaminophen     Tablet .. 650 milliGRAM(s) Oral every 6 hours PRN  acetaminophen     Tablet .. 650 milliGRAM(s) Oral every 6 hours  gabapentin 300 milliGRAM(s) Oral three times a day  OLANZapine 7.5 milliGRAM(s) Oral daily  oxyCODONE    IR 5 milliGRAM(s) Oral every 4 hours PRN  oxyCODONE    IR 10 milliGRAM(s) Oral every 4 hours PRN  traZODone 100 milliGRAM(s) Oral at bedtime    famotidine    Tablet 20 milliGRAM(s) Oral daily  pantoprazole    Tablet 40 milliGRAM(s) Oral before breakfast  polyethylene glycol 3350 17 Gram(s) Oral daily  senna 2 Tablet(s) Oral at bedtime    atorvastatin 80 milliGRAM(s) Oral at bedtime  dextrose 50% Injectable 50 milliLiter(s) IV Push every 15 minutes  dextrose 50% Injectable 25 milliLiter(s) IV Push every 15 minutes  insulin lispro (ADMELOG) corrective regimen sliding scale   SubCutaneous three times a day before meals  insulin lispro (ADMELOG) corrective regimen sliding scale   SubCutaneous at bedtime  predniSONE   Tablet 5 milliGRAM(s) Oral daily    ascorbic acid 500 milliGRAM(s) Oral two times a day  chlorhexidine 2% Cloths 1 Application(s) Topical daily  cholecalciferol 2000 Unit(s) Oral daily      PAST MEDICAL/SURGICAL HISTORY  PAST MEDICAL & SURGICAL HISTORY:  Cigarette smoker      Rheumatoid arthritis      Other depression      Breast cancer      Migraines      History of multiple cerebrovascular accidents (CVAs)      Suicidal ideation      Paresthesia of left arm      Facial paresthesia      APS (antiphospholipid syndrome)      History of depressed bipolar disorder      History of COPD      History of COPD      Hyperlipidemia      Type 2 diabetes mellitus      Asthma      CAD (coronary artery disease)      Uveitis      History of hysterectomy      History of cholecystectomy      History of loop recorder      S/P CABG x 1      S/P AVR      S/P lumpectomy, left breast          SOCIAL HISTORY: Substance Use (street drugs): ( x ) never used  (  ) other:    FAMILY HISTORY:  Family history of breast cancer (Mother)    Family history of diabetes mellitus (DM) (Father)        REVIEW OF SYSTEMS:  CONSTITUTIONAL: No fever, weight loss, or fatigue  EYES: No eye pain, visual disturbances, or discharge  ENMT:  No difficulty hearing, tinnitus, vertigo; No sinus or throat pain  BREASTS: No pain, masses, or nipple discharge  GASTROINTESTINAL: No abdominal or epigastric pain. No nausea, vomiting, or hematemesis; No diarrhea or constipation. No melena or hematochezia.  GENITOURINARY: No dysuria, frequency, hematuria, or incontinence  NEUROLOGICAL: No headaches, memory loss, loss of strength, numbness, or tremors  ENDOCRINE: No heat or cold intolerance; No hair loss  MUSCULOSKELETAL: No joint pain or swelling; No muscle, back, or extremity pain  PSYCHIATRIC: No depression, anxiety, mood swings, or difficulty sleeping  HEME/LYMPH: No easy bruising, or bleeding gums  All others negative    PHYSICAL EXAM:  T(C): 36.7 (12-17-24 @ 11:12), Max: 36.8 (12-16-24 @ 15:25)  HR: 74 (12-17-24 @ 11:12) (68 - 78)  BP: 106/78 (12-17-24 @ 11:12) (91/59 - 109/61)  RR: 18 (12-17-24 @ 11:12) (18 - 19)  SpO2: 96% (12-17-24 @ 11:12) (92% - 97%)  Wt(kg): --  I&O's Summary    16 Dec 2024 07:01  -  17 Dec 2024 07:00  --------------------------------------------------------  IN: 940 mL / OUT: 1250 mL / NET: -310 mL    17 Dec 2024 07:01  -  17 Dec 2024 11:50  --------------------------------------------------------  IN: 220 mL / OUT: 0 mL / NET: 220 mL    GENERAL: NAD   EYES: EOMI, PERRLA, conjunctiva and sclera clear  ENMT: No tonsillar erythema, exudates, or enlargement;   Cardiovascular: Normal S1 S2, No JVD, No murmurs, No edema, s/p sternal surgery   Respiratory: Lungs clear to auscultation	s/p Chest tube   Gastrointestinal:  Soft, Non-tender, + BS	  Extremities: no edema                            10.1   12.87 )-----------( 227      ( 17 Dec 2024 05:19 )             32.9     12-17    143  |  107  |  15  ----------------------------<  150[H]  4.0   |  25  |  0.54    Ca    8.6      17 Dec 2024 05:19  Phos  3.2     12-17  Mg     1.8     12-17      proBNP:   Lipid Profile:   HgA1c:   TSH:     Consultant(s) Notes Reviewed:  [x ] YES  [ ] NO    Care Discussed with Consultants/Other Providers [ x] YES  [ ] NO    Imaging Personally Reviewed independently:  [x] YES  [ ] NO    All labs, radiologic studies, vitals, orders and medications list reviewed. Patient is seen and examined at bedside. Case discussed with medical team.

## 2024-12-17 NOTE — PROGRESS NOTE ADULT - SUBJECTIVE AND OBJECTIVE BOX
VITAL SIGNS    Telemetry:    nsr 68  Vital Signs Last 24 Hrs  T(C): 36.8 (24 @ 07:26), Max: 36.8 (24 @ 15:25)  T(F): 98.2 (24 @ 07:26), Max: 98.2 (24 @ 15:25)  HR: 70 (24 @ 07:26) (68 - 78)  BP: 102/63 (24 @ 07:26) (91/59 - 109/61)  RR: 18 (24 @ 07:26) (18 - 19)  SpO2: 97% (24 @ 07:26) (92% - 97%)                    @ 07:01  -   @ 07:00  --------------------------------------------------------  IN: 940 mL / OUT: 1250 mL / NET: -310 mL     @ 07:01  -   @ 11:12  --------------------------------------------------------  IN: 220 mL / OUT: 0 mL / NET: 220 mL          Daily     Daily Weight in k.8 (17 Dec 2024 03:17)            CAPILLARY BLOOD GLUCOSE      POCT Blood Glucose.: 149 mg/dL (17 Dec 2024 07:28)  POCT Blood Glucose.: 133 mg/dL (16 Dec 2024 21:30)  POCT Blood Glucose.: 228 mg/dL (16 Dec 2024 16:43)  POCT Blood Glucose.: 165 mg/dL (16 Dec 2024 13:08)  POCT Blood Glucose.: 203 mg/dL (16 Dec 2024 11:13)            Drains:    ms katherine, r pl    Pacing Wires          Coumadin    [ ] YES          [ x ]      NO       eliquis                            PHYSICAL EXAM        Neurology: alert and oriented x 3, nonfocal, no gross deficits  CV : s1 s2 RRR  Sternal Wound :  CDI , Stable  Lungs: cta  Abdomen: soft, nontender, nondistended, positive bowel sounds, last bowel movement                       chest tubes x 1 r ptc, ms katherine  :    voiding       Extremities:    +  edema   /  -   calve tenderness ,              acetaminophen     Tablet .. 650 milliGRAM(s) Oral every 6 hours PRN  acetaminophen     Tablet .. 650 milliGRAM(s) Oral every 6 hours  albuterol    90 MICROgram(s) HFA Inhaler 2 Puff(s) Inhalation every 6 hours PRN  albuterol/ipratropium for Nebulization 3 milliLiter(s) Nebulizer every 6 hours  apixaban 5 milliGRAM(s) Oral two times a day  ascorbic acid 500 milliGRAM(s) Oral two times a day  aspirin enteric coated 81 milliGRAM(s) Oral daily  atorvastatin 80 milliGRAM(s) Oral at bedtime  chlorhexidine 2% Cloths 1 Application(s) Topical daily  cholecalciferol 2000 Unit(s) Oral daily  dextrose 50% Injectable 50 milliLiter(s) IV Push every 15 minutes  dextrose 50% Injectable 25 milliLiter(s) IV Push every 15 minutes  famotidine    Tablet 20 milliGRAM(s) Oral daily  fluticasone propionate/ salmeterol 250-50 MICROgram(s) Diskus 1 Dose(s) Inhalation two times a day  gabapentin 300 milliGRAM(s) Oral three times a day  guaiFENesin Oral Liquid (Sugar-Free) 100 milliGRAM(s) Oral every 8 hours  insulin lispro (ADMELOG) corrective regimen sliding scale   SubCutaneous three times a day before meals  insulin lispro (ADMELOG) corrective regimen sliding scale   SubCutaneous at bedtime  metoprolol succinate ER 12.5 milliGRAM(s) Oral daily  montelukast 10 milliGRAM(s) Oral daily  OLANZapine 7.5 milliGRAM(s) Oral daily  oxyCODONE    IR 10 milliGRAM(s) Oral every 4 hours PRN  oxyCODONE    IR 5 milliGRAM(s) Oral every 4 hours PRN  pantoprazole    Tablet 40 milliGRAM(s) Oral before breakfast  polyethylene glycol 3350 17 Gram(s) Oral daily  predniSONE   Tablet 5 milliGRAM(s) Oral daily  senna 2 Tablet(s) Oral at bedtime  traZODone 100 milliGRAM(s) Oral at bedtime                    Physical Therapy Rec:   Home  [  ]   Home w/ PT  [  ]  Rehab  [  ]  Discussed with Cardiothoracic Team at AM rounds.

## 2024-12-17 NOTE — PROGRESS NOTE ADULT - ASSESSMENT
58F with history of CVA  COPD/Asthma, Breast Ca s/p ?RT, Bipolar depression, Rheumatoid Arthritis, Antiphospholipid syndrome, L eye uveitis/scleritis,  severe AR s/p AVR CABG x 1 in for sternal surgery      1.   s/p AVR CABG x 1   - pt has sternotomy dehiscense s/p sternal Plating   - chest tube placed for Pneumothorax     2. CVA  -resume eliquis when ok with surgery     3. Antiphospholipid syndrome  -on eliquis     4. LV dysfunction   - cont torsemide , toprol and aldactone     5 COPD exacerbation   - on nebs, advair , prednisone, consider increasing steroid dose

## 2024-12-17 NOTE — PROGRESS NOTE ADULT - PROBLEM SELECTOR PLAN 3
Continue on home Advair, Singulair, Proventil HFA PRN, Duonebs q6, Prednisone 5 daily  Cough and deep breathe, Incentive Spirometry Q1h, Chest PT.  12/15 s/p right pigtail for right ptx  Maintain right pigtail to LWS  12/16 chest xray today neg ptx  repeat chest xray in am

## 2024-12-17 NOTE — PROGRESS NOTE ADULT - ASSESSMENT
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 She reports experiencing burning sensation to mid-sternal surgical site.  CT chest ---Diffuse sternal nonunion, with fluid interposed between the sternal fragments. The widest area of separation of the sternal fragments measures up to 2.8 cm.   She presented today for scheduled Sternal Plating with Dr. Sivakumar Loaiza    Hospital Course:  12/13 s/p sternal plating with Dr. Loaiza. Transferred to [facility]. Prevena and abdominal binder. MED Chest tube. Monitor chest tube output. C/w Ancef. Daily CXR  12/14 VSS, maintain mediastinal tube, output 200cc/24h. Pt resumed on Eliquis today for APLS per Dr. Loaiza. Ambulated with PT/OT. Recommended for Home PT.  12/15 VSS, mediastinal tube output 95cc/24h, maintain to bulb suction per Dr. Loaiza. CXR this AM noted with persistent right ptx postop now s/p right PTC placement. Maintain pigtail to lws  12/16 VSS; RSR 70-80; continue eliquis 5 po bid; maintain mediastinal katherine--> 130 cc noted for 24 hours; continue rt pigtail to suction; chest xray this am- ptx resolved; repeat in am; continue pulm toilet/ bronchodilators for hx copd/asthma  discharge planning- home pt when stable   12/17  No airleak noted, ct remains on suction, no pneumo appreciated

## 2024-12-17 NOTE — PROGRESS NOTE ADULT - PROBLEM SELECTOR PLAN 1
s/p Sternal Plating 12/13   maintain MS Prevena dressing and sternal precautions  maintain abdominal binder  maintain mediastinal chest tube to BEAU. monitor chest tube output strict I & Os  Continue ASA/ statin  bb--> toprol 12.5 qd  pain management  Cough and deep breathe, Incentive Spirometry Q1h, Chest PT.  Ambulate 4x daily as tolerated and with PT.   C/W GI prophylaxis on Protonix   discharge planning- home pt when stable

## 2024-12-18 LAB
ALBUMIN SERPL ELPH-MCNC: 3.2 G/DL — LOW (ref 3.3–5)
ALP SERPL-CCNC: 114 U/L — SIGNIFICANT CHANGE UP (ref 40–120)
ALT FLD-CCNC: 12 U/L — SIGNIFICANT CHANGE UP (ref 10–45)
ANION GAP SERPL CALC-SCNC: 13 MMOL/L — SIGNIFICANT CHANGE UP (ref 5–17)
AST SERPL-CCNC: 12 U/L — SIGNIFICANT CHANGE UP (ref 10–40)
BASOPHILS # BLD AUTO: 0.08 K/UL — SIGNIFICANT CHANGE UP (ref 0–0.2)
BASOPHILS NFR BLD AUTO: 0.6 % — SIGNIFICANT CHANGE UP (ref 0–2)
BILIRUB SERPL-MCNC: 0.4 MG/DL — SIGNIFICANT CHANGE UP (ref 0.2–1.2)
BUN SERPL-MCNC: 14 MG/DL — SIGNIFICANT CHANGE UP (ref 7–23)
CALCIUM SERPL-MCNC: 8.9 MG/DL — SIGNIFICANT CHANGE UP (ref 8.4–10.5)
CHLORIDE SERPL-SCNC: 101 MMOL/L — SIGNIFICANT CHANGE UP (ref 96–108)
CO2 SERPL-SCNC: 25 MMOL/L — SIGNIFICANT CHANGE UP (ref 22–31)
CREAT SERPL-MCNC: 0.6 MG/DL — SIGNIFICANT CHANGE UP (ref 0.5–1.3)
EGFR: 103 ML/MIN/1.73M2 — SIGNIFICANT CHANGE UP
EOSINOPHIL # BLD AUTO: 0.34 K/UL — SIGNIFICANT CHANGE UP (ref 0–0.5)
EOSINOPHIL NFR BLD AUTO: 2.7 % — SIGNIFICANT CHANGE UP (ref 0–6)
GLUCOSE BLDC GLUCOMTR-MCNC: 134 MG/DL — HIGH (ref 70–99)
GLUCOSE BLDC GLUCOMTR-MCNC: 147 MG/DL — HIGH (ref 70–99)
GLUCOSE BLDC GLUCOMTR-MCNC: 241 MG/DL — HIGH (ref 70–99)
GLUCOSE BLDC GLUCOMTR-MCNC: 261 MG/DL — HIGH (ref 70–99)
GLUCOSE BLDC GLUCOMTR-MCNC: 86 MG/DL — SIGNIFICANT CHANGE UP (ref 70–99)
GLUCOSE SERPL-MCNC: 130 MG/DL — HIGH (ref 70–99)
HCT VFR BLD CALC: 32.3 % — LOW (ref 34.5–45)
HGB BLD-MCNC: 9.8 G/DL — LOW (ref 11.5–15.5)
IMM GRANULOCYTES NFR BLD AUTO: 0.7 % — SIGNIFICANT CHANGE UP (ref 0–0.9)
LYMPHOCYTES # BLD AUTO: 16.7 % — SIGNIFICANT CHANGE UP (ref 13–44)
LYMPHOCYTES # BLD AUTO: 2.09 K/UL — SIGNIFICANT CHANGE UP (ref 1–3.3)
MCHC RBC-ENTMCNC: 29.1 PG — SIGNIFICANT CHANGE UP (ref 27–34)
MCHC RBC-ENTMCNC: 30.3 G/DL — LOW (ref 32–36)
MCV RBC AUTO: 95.8 FL — SIGNIFICANT CHANGE UP (ref 80–100)
MONOCYTES # BLD AUTO: 1.04 K/UL — HIGH (ref 0–0.9)
MONOCYTES NFR BLD AUTO: 8.3 % — SIGNIFICANT CHANGE UP (ref 2–14)
NEUTROPHILS # BLD AUTO: 8.89 K/UL — HIGH (ref 1.8–7.4)
NEUTROPHILS NFR BLD AUTO: 71 % — SIGNIFICANT CHANGE UP (ref 43–77)
NRBC # BLD: 0 /100 WBCS — SIGNIFICANT CHANGE UP (ref 0–0)
PLATELET # BLD AUTO: 268 K/UL — SIGNIFICANT CHANGE UP (ref 150–400)
POTASSIUM SERPL-MCNC: 4.2 MMOL/L — SIGNIFICANT CHANGE UP (ref 3.5–5.3)
POTASSIUM SERPL-SCNC: 4.2 MMOL/L — SIGNIFICANT CHANGE UP (ref 3.5–5.3)
PROT SERPL-MCNC: 6.1 G/DL — SIGNIFICANT CHANGE UP (ref 6–8.3)
RBC # BLD: 3.37 M/UL — LOW (ref 3.8–5.2)
RBC # FLD: 15.9 % — HIGH (ref 10.3–14.5)
SODIUM SERPL-SCNC: 139 MMOL/L — SIGNIFICANT CHANGE UP (ref 135–145)
WBC # BLD: 12.53 K/UL — HIGH (ref 3.8–10.5)
WBC # FLD AUTO: 12.53 K/UL — HIGH (ref 3.8–10.5)

## 2024-12-18 PROCEDURE — 99232 SBSQ HOSP IP/OBS MODERATE 35: CPT | Mod: 24

## 2024-12-18 PROCEDURE — 71045 X-RAY EXAM CHEST 1 VIEW: CPT | Mod: 26,77

## 2024-12-18 PROCEDURE — 71045 X-RAY EXAM CHEST 1 VIEW: CPT | Mod: 26

## 2024-12-18 RX ADMIN — IPRATROPIUM BROMIDE AND ALBUTEROL SULFATE 3 MILLILITER(S): 2.5; .5 SOLUTION RESPIRATORY (INHALATION) at 16:47

## 2024-12-18 RX ADMIN — OLANZAPINE 7.5 MILLIGRAM(S): 20 TABLET ORAL at 12:06

## 2024-12-18 RX ADMIN — GABAPENTIN 300 MILLIGRAM(S): 300 CAPSULE ORAL at 14:10

## 2024-12-18 RX ADMIN — CHLORHEXIDINE GLUCONATE 1 APPLICATION(S): 1.2 RINSE ORAL at 12:04

## 2024-12-18 RX ADMIN — ACETAMINOPHEN 500MG 650 MILLIGRAM(S): 500 TABLET, COATED ORAL at 16:48

## 2024-12-18 RX ADMIN — PREDNISONE 5 MILLIGRAM(S): 20 TABLET ORAL at 04:08

## 2024-12-18 RX ADMIN — Medication 2000 UNIT(S): at 12:06

## 2024-12-18 RX ADMIN — APIXABAN 5 MILLIGRAM(S): 2.5 TABLET, FILM COATED ORAL at 04:08

## 2024-12-18 RX ADMIN — Medication 6: at 12:05

## 2024-12-18 RX ADMIN — Medication 100 MILLIGRAM(S): at 04:07

## 2024-12-18 RX ADMIN — ACETAMINOPHEN 500MG 650 MILLIGRAM(S): 500 TABLET, COATED ORAL at 04:09

## 2024-12-18 RX ADMIN — TRAZODONE HYDROCHLORIDE 100 MILLIGRAM(S): 150 TABLET ORAL at 21:03

## 2024-12-18 RX ADMIN — OXYCODONE HYDROCHLORIDE 10 MILLIGRAM(S): 30 TABLET ORAL at 21:04

## 2024-12-18 RX ADMIN — GABAPENTIN 300 MILLIGRAM(S): 300 CAPSULE ORAL at 21:02

## 2024-12-18 RX ADMIN — POLYETHYLENE GLYCOL 3350 17 GRAM(S): 17 POWDER, FOR SOLUTION ORAL at 12:04

## 2024-12-18 RX ADMIN — Medication 2 TABLET(S): at 21:03

## 2024-12-18 RX ADMIN — OXYCODONE HYDROCHLORIDE 10 MILLIGRAM(S): 30 TABLET ORAL at 14:10

## 2024-12-18 RX ADMIN — OXYCODONE HYDROCHLORIDE 10 MILLIGRAM(S): 30 TABLET ORAL at 21:34

## 2024-12-18 RX ADMIN — GABAPENTIN 300 MILLIGRAM(S): 300 CAPSULE ORAL at 04:08

## 2024-12-18 RX ADMIN — IPRATROPIUM BROMIDE AND ALBUTEROL SULFATE 3 MILLILITER(S): 2.5; .5 SOLUTION RESPIRATORY (INHALATION) at 22:29

## 2024-12-18 RX ADMIN — Medication 80 MILLIGRAM(S): at 21:02

## 2024-12-18 RX ADMIN — PANTOPRAZOLE SODIUM 40 MILLIGRAM(S): 40 TABLET, DELAYED RELEASE ORAL at 04:10

## 2024-12-18 RX ADMIN — METOPROLOL TARTRATE 12.5 MILLIGRAM(S): 100 TABLET, FILM COATED ORAL at 04:08

## 2024-12-18 RX ADMIN — MONTELUKAST SODIUM 10 MILLIGRAM(S): 10 TABLET ORAL at 12:05

## 2024-12-18 RX ADMIN — FLUTICASONE PROPIONATE AND SALMETEROL XINAFOATE 1 DOSE(S): 45; 21 AEROSOL, METERED RESPIRATORY (INHALATION) at 04:19

## 2024-12-18 RX ADMIN — FLUTICASONE PROPIONATE AND SALMETEROL XINAFOATE 1 DOSE(S): 45; 21 AEROSOL, METERED RESPIRATORY (INHALATION) at 16:46

## 2024-12-18 RX ADMIN — OXYCODONE HYDROCHLORIDE 10 MILLIGRAM(S): 30 TABLET ORAL at 16:41

## 2024-12-18 RX ADMIN — ACETAMINOPHEN 500MG 650 MILLIGRAM(S): 500 TABLET, COATED ORAL at 16:47

## 2024-12-18 RX ADMIN — APIXABAN 5 MILLIGRAM(S): 2.5 TABLET, FILM COATED ORAL at 16:47

## 2024-12-18 RX ADMIN — ACETAMINOPHEN 500MG 650 MILLIGRAM(S): 500 TABLET, COATED ORAL at 12:10

## 2024-12-18 RX ADMIN — OXYCODONE HYDROCHLORIDE 10 MILLIGRAM(S): 30 TABLET ORAL at 04:08

## 2024-12-18 RX ADMIN — Medication 500 MILLIGRAM(S): at 04:09

## 2024-12-18 RX ADMIN — ACETAMINOPHEN 500MG 650 MILLIGRAM(S): 500 TABLET, COATED ORAL at 12:03

## 2024-12-18 RX ADMIN — Medication 81 MILLIGRAM(S): at 12:03

## 2024-12-18 RX ADMIN — IPRATROPIUM BROMIDE AND ALBUTEROL SULFATE 3 MILLILITER(S): 2.5; .5 SOLUTION RESPIRATORY (INHALATION) at 12:03

## 2024-12-18 RX ADMIN — FAMOTIDINE 20 MILLIGRAM(S): 20 TABLET, FILM COATED ORAL at 12:06

## 2024-12-18 RX ADMIN — Medication 100 MILLIGRAM(S): at 21:03

## 2024-12-18 RX ADMIN — Medication 100 MILLIGRAM(S): at 14:11

## 2024-12-18 RX ADMIN — OXYCODONE HYDROCHLORIDE 10 MILLIGRAM(S): 30 TABLET ORAL at 05:15

## 2024-12-18 RX ADMIN — IPRATROPIUM BROMIDE AND ALBUTEROL SULFATE 3 MILLILITER(S): 2.5; .5 SOLUTION RESPIRATORY (INHALATION) at 04:10

## 2024-12-18 NOTE — PROGRESS NOTE ADULT - PROBLEM SELECTOR PLAN 5
rt pigtail placed 12/15 for rt ptx  repeat chest xray today - ptx resolved  monitor O2 sats  repeat chest xray in am 12/17

## 2024-12-18 NOTE — PROGRESS NOTE ADULT - ASSESSMENT
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 She reports experiencing burning sensation to mid-sternal surgical site.  CT chest ---Diffuse sternal nonunion, with fluid interposed between the sternal fragments. The widest area of separation of the sternal fragments measures up to 2.8 cm.   She presented today for scheduled Sternal Plating with Dr. Sivakumar Loaiza    Hospital Course:  12/13 s/p sternal plating with Dr. Loaiza. Transferred to [facility]. Prevena and abdominal binder. MED Chest tube. Monitor chest tube output. C/w Ancef. Daily CXR  12/14 VSS, maintain mediastinal tube, output 200cc/24h. Pt resumed on Eliquis today for APLS per Dr. Loaiza. Ambulated with PT/OT. Recommended for Home PT.  12/15 VSS, mediastinal tube output 95cc/24h, maintain to bulb suction per Dr. Loaiza. CXR this AM noted with persistent right ptx postop now s/p right PTC placement. Maintain pigtail to lws  12/16 VSS; RSR 70-80; continue eliquis 5 po bid; maintain mediastinal katherine--> 130 cc noted for 24 hours; continue rt pigtail to suction; chest xray this am- ptx resolved; repeat in am; continue pulm toilet/ bronchodilators for hx copd/asthma  12/17 VSS R PTC clamped this am  - will ck CXR @ 11am- med katherine - will d/c when output down discharge planning- home pt when stable

## 2024-12-18 NOTE — PROGRESS NOTE ADULT - PROBLEM SELECTOR PROBLEM 2
APS (antiphospholipid syndrome)

## 2024-12-18 NOTE — PROGRESS NOTE ADULT - SUBJECTIVE AND OBJECTIVE BOX
German Carrasco MD  Interventional Cardiology / Endovascular Specialist  [place] Office : [address] [place] [address] [place] [address]  Tel:   [place] Office : [address] [facility] [place] [address]  Tel: [phone]    Pt lying in bed in NAD  	  MEDICATIONS:  apixaban 5 milliGRAM(s) Oral two times a day  aspirin enteric coated 81 milliGRAM(s) Oral daily  metoprolol succinate ER 12.5 milliGRAM(s) Oral daily      albuterol    90 MICROgram(s) HFA Inhaler 2 Puff(s) Inhalation every 6 hours PRN  albuterol/ipratropium for Nebulization 3 milliLiter(s) Nebulizer every 6 hours  fluticasone propionate/ salmeterol 250-50 MICROgram(s) Diskus 1 Dose(s) Inhalation two times a day  guaiFENesin Oral Liquid (Sugar-Free) 100 milliGRAM(s) Oral every 8 hours  montelukast 10 milliGRAM(s) Oral daily    acetaminophen     Tablet .. 650 milliGRAM(s) Oral every 6 hours PRN  acetaminophen     Tablet .. 650 milliGRAM(s) Oral every 6 hours  gabapentin 300 milliGRAM(s) Oral three times a day  OLANZapine 7.5 milliGRAM(s) Oral daily  oxyCODONE    IR 10 milliGRAM(s) Oral every 4 hours PRN  oxyCODONE    IR 5 milliGRAM(s) Oral every 4 hours PRN  traZODone 100 milliGRAM(s) Oral at bedtime    famotidine    Tablet 20 milliGRAM(s) Oral daily  pantoprazole    Tablet 40 milliGRAM(s) Oral before breakfast  polyethylene glycol 3350 17 Gram(s) Oral daily  senna 2 Tablet(s) Oral at bedtime    atorvastatin 80 milliGRAM(s) Oral at bedtime  dextrose 50% Injectable 50 milliLiter(s) IV Push every 15 minutes  dextrose 50% Injectable 25 milliLiter(s) IV Push every 15 minutes  insulin lispro (ADMELOG) corrective regimen sliding scale   SubCutaneous three times a day before meals  insulin lispro (ADMELOG) corrective regimen sliding scale   SubCutaneous at bedtime  predniSONE   Tablet 5 milliGRAM(s) Oral daily    chlorhexidine 2% Cloths 1 Application(s) Topical daily  cholecalciferol 2000 Unit(s) Oral daily      PAST MEDICAL/SURGICAL HISTORY  PAST MEDICAL & SURGICAL HISTORY:  Cigarette smoker      Rheumatoid arthritis      Other depression      Breast cancer      Migraines      History of multiple cerebrovascular accidents (CVAs)      Suicidal ideation      Paresthesia of left arm      Facial paresthesia      APS (antiphospholipid syndrome)      History of depressed bipolar disorder      History of COPD      History of COPD      Hyperlipidemia      Type 2 diabetes mellitus      Asthma      CAD (coronary artery disease)      Uveitis      History of hysterectomy      History of cholecystectomy      History of loop recorder      S/P CABG x 1      S/P AVR      S/P lumpectomy, left breast          SOCIAL HISTORY: Substance Use (street drugs): ( x ) never used  (  ) other:    FAMILY HISTORY:  Family history of breast cancer (Mother)    Family history of diabetes mellitus (DM) (Father)        REVIEW OF SYSTEMS:  CONSTITUTIONAL: No fever, weight loss, or fatigue  EYES: No eye pain, visual disturbances, or discharge  ENMT:  No difficulty hearing, tinnitus, vertigo; No sinus or throat pain  BREASTS: No pain, masses, or nipple discharge  GASTROINTESTINAL: No abdominal or epigastric pain. No nausea, vomiting, or hematemesis; No diarrhea or constipation. No melena or hematochezia.  GENITOURINARY: No dysuria, frequency, hematuria, or incontinence  NEUROLOGICAL: No headaches, memory loss, loss of strength, numbness, or tremors  ENDOCRINE: No heat or cold intolerance; No hair loss  MUSCULOSKELETAL: No joint pain or swelling; No muscle, back, or extremity pain  PSYCHIATRIC: No depression, anxiety, mood swings, or difficulty sleeping  HEME/LYMPH: No easy bruising, or bleeding gums  All others negative    PHYSICAL EXAM:  T(C): 36.7 (12-18-24 @ 10:54), Max: 36.7 (12-17-24 @ 15:18)  HR: 66 (12-18-24 @ 10:54) (65 - 66)  BP: 100/67 (12-18-24 @ 10:54) (100/67 - 111/67)  RR: 18 (12-18-24 @ 10:54) (18 - 18)  SpO2: 92% (12-18-24 @ 10:54) (92% - 97%)  Wt(kg): --  I&O's Summary    17 Dec 2024 07:01  -  18 Dec 2024 07:00  --------------------------------------------------------  IN: 830 mL / OUT: 1145 mL / NET: -315 mL    18 Dec 2024 07:01  -  18 Dec 2024 11:35  --------------------------------------------------------  IN: 240 mL / OUT: 0 mL / NET: 240 mL      GENERAL: NAD   EYES: EOMI, PERRLA, conjunctiva and sclera clear  ENMT: No tonsillar erythema, exudates, or enlargement;   Cardiovascular: Normal S1 S2, No JVD, No murmurs, No edema, s/p sternal surgery   Respiratory: Lungs clear to auscultation	s/p Chest tube   Gastrointestinal:  Soft, Non-tender, + BS	  Extremities: no edema                          9.8    12.53 )-----------( 268      ( 18 Dec 2024 06:08 )             32.3     12-18    139  |  101  |  14  ----------------------------<  130[H]  4.2   |  25  |  0.60    Ca    8.9      18 Dec 2024 06:09  Phos  3.2     12-17  Mg     1.8     12-17    TPro  6.1  /  Alb  3.2[L]  /  TBili  0.4  /  DBili  x   /  AST  12  /  ALT  12  /  AlkPhos  114  12-18    proBNP:   Lipid Profile:   HgA1c:   TSH:     Consultant(s) Notes Reviewed:  [x ] YES  [ ] NO    Care Discussed with Consultants/Other Providers [ x] YES  [ ] NO    Imaging Personally Reviewed independently:  [x] YES  [ ] NO    All labs, radiologic studies, vitals, orders and medications list reviewed. Patient is seen and examined at bedside. Case discussed with medical team.

## 2024-12-18 NOTE — PROGRESS NOTE ADULT - SUBJECTIVE AND OBJECTIVE BOX
VITAL SIGNS-Telemetry:  SR 1st deg. 60-80  Vital Signs Last 24 Hrs  T(C): 36.4 (12-18-24 @ 04:10), Max: 36.8 (12-17-24 @ 07:26)  T(F): 97.5 (12-18-24 @ 04:10), Max: 98.2 (12-17-24 @ 07:26)  HR: 66 (12-18-24 @ 04:10) (65 - 74)  BP: 111/67 (12-18-24 @ 04:10) (102/63 - 111/67)  RR: 18 (12-18-24 @ 04:10) (18 - 18)  SpO2: 97% (12-18-24 @ 04:10) (95% - 97%)         12-16 @ 07:01  -  12-17 @ 07:00  --------------------------------------------------------  IN: 940 mL / OUT: 1250 mL / NET: -310 mL    12-17 @ 07:01  -  12-18 @ 06:01  --------------------------------------------------------  IN: 830 mL / OUT: 1115 mL / NET: -285 mL    Daily     Daily     CAPILLARY BLOOD GLUCOSE  POCT Blood Glucose.: 147 mg/dL (18 Dec 2024 02:13)  POCT Blood Glucose.: 100 mg/dL (17 Dec 2024 21:19)  POCT Blood Glucose.: 217 mg/dL (17 Dec 2024 16:31)  POCT Blood Glucose.: 185 mg/dL (17 Dec 2024 11:14)  POCT Blood Glucose.: 149 mg/dL (17 Dec 2024 07:28)        Drains:     MS         [ x ] Drainage:           R Pleural  [ x]  Drainage:    PHYSICAL EXAM:  Neurology: alert and oriented x 3, nonfocal, no gross deficits  CV :  Sternal Wound :  CDI , Stable  Lungs:  Abdomen: soft, nontender, nondistended, positive bowel sounds, last bowel movement         Extremities:         acetaminophen     Tablet .. 650 milliGRAM(s) Oral every 6 hours PRN  acetaminophen     Tablet .. 650 milliGRAM(s) Oral every 6 hours  albuterol    90 MICROgram(s) HFA Inhaler 2 Puff(s) Inhalation every 6 hours PRN  albuterol/ipratropium for Nebulization 3 milliLiter(s) Nebulizer every 6 hours  apixaban 5 milliGRAM(s) Oral two times a day  aspirin enteric coated 81 milliGRAM(s) Oral daily  atorvastatin 80 milliGRAM(s) Oral at bedtime  chlorhexidine 2% Cloths 1 Application(s) Topical daily  cholecalciferol 2000 Unit(s) Oral daily  dextrose 50% Injectable 50 milliLiter(s) IV Push every 15 minutes  dextrose 50% Injectable 25 milliLiter(s) IV Push every 15 minutes  famotidine    Tablet 20 milliGRAM(s) Oral daily  fluticasone propionate/ salmeterol 250-50 MICROgram(s) Diskus 1 Dose(s) Inhalation two times a day  gabapentin 300 milliGRAM(s) Oral three times a day  guaiFENesin Oral Liquid (Sugar-Free) 100 milliGRAM(s) Oral every 8 hours  insulin lispro (ADMELOG) corrective regimen sliding scale   SubCutaneous three times a day before meals  insulin lispro (ADMELOG) corrective regimen sliding scale   SubCutaneous at bedtime  metoprolol succinate ER 12.5 milliGRAM(s) Oral daily  montelukast 10 milliGRAM(s) Oral daily  OLANZapine 7.5 milliGRAM(s) Oral daily  oxyCODONE    IR 10 milliGRAM(s) Oral every 4 hours PRN  oxyCODONE    IR 5 milliGRAM(s) Oral every 4 hours PRN  pantoprazole    Tablet 40 milliGRAM(s) Oral before breakfast  polyethylene glycol 3350 17 Gram(s) Oral daily  predniSONE   Tablet 5 milliGRAM(s) Oral daily  senna 2 Tablet(s) Oral at bedtime  traZODone 100 milliGRAM(s) Oral at bedtime      Physical Therapy Rec:   Home  [  ]   Home w/ PT  [ x ]  Rehab  [  ]  Discussed with Cardiothoracic Team at AM rounds. VITAL SIGNS-Telemetry:  SR 1st deg. 60-80  Vital Signs Last 24 Hrs  T(C): 36.4 (12-18-24 @ 04:10), Max: 36.8 (12-17-24 @ 07:26)  T(F): 97.5 (12-18-24 @ 04:10), Max: 98.2 (12-17-24 @ 07:26)  HR: 66 (12-18-24 @ 04:10) (65 - 74)  BP: 111/67 (12-18-24 @ 04:10) (102/63 - 111/67)  RR: 18 (12-18-24 @ 04:10) (18 - 18)  SpO2: 97% (12-18-24 @ 04:10) (95% - 97%)         12-16 @ 07:01  -  12-17 @ 07:00  --------------------------------------------------------  IN: 940 mL / OUT: 1250 mL / NET: -310 mL    12-17 @ 07:01  -  12-18 @ 06:01  --------------------------------------------------------  IN: 830 mL / OUT: 1115 mL / NET: -285 mL    Daily     Daily     CAPILLARY BLOOD GLUCOSE  POCT Blood Glucose.: 147 mg/dL (18 Dec 2024 02:13)  POCT Blood Glucose.: 100 mg/dL (17 Dec 2024 21:19)  POCT Blood Glucose.: 217 mg/dL (17 Dec 2024 16:31)  POCT Blood Glucose.: 185 mg/dL (17 Dec 2024 11:14)  POCT Blood Glucose.: 149 mg/dL (17 Dec 2024 07:28)        Drains:     MS         [ x ] Drainage:   30/90cc total         R Pleural  [ x]  Drainage: 5cc    PHYSICAL EXAM:  Neurology: alert and oriented x 3, nonfocal, no gross deficits  CV : S1S2  Sternal Wound :  CDI , Stable  Lungs: cta  Abdomen: soft, nontender, nondistended, positive bowel sounds, last bowel movement    12/17     Extremities:   +edema  b/l  no calf tenderness    med ct & r ptc site cdi    acetaminophen     Tablet .. 650 milliGRAM(s) Oral every 6 hours PRN  acetaminophen     Tablet .. 650 milliGRAM(s) Oral every 6 hours  albuterol    90 MICROgram(s) HFA Inhaler 2 Puff(s) Inhalation every 6 hours PRN  albuterol/ipratropium for Nebulization 3 milliLiter(s) Nebulizer every 6 hours  apixaban 5 milliGRAM(s) Oral two times a day  aspirin enteric coated 81 milliGRAM(s) Oral daily  atorvastatin 80 milliGRAM(s) Oral at bedtime  chlorhexidine 2% Cloths 1 Application(s) Topical daily  cholecalciferol 2000 Unit(s) Oral daily  dextrose 50% Injectable 50 milliLiter(s) IV Push every 15 minutes  dextrose 50% Injectable 25 milliLiter(s) IV Push every 15 minutes  famotidine    Tablet 20 milliGRAM(s) Oral daily  fluticasone propionate/ salmeterol 250-50 MICROgram(s) Diskus 1 Dose(s) Inhalation two times a day  gabapentin 300 milliGRAM(s) Oral three times a day  guaiFENesin Oral Liquid (Sugar-Free) 100 milliGRAM(s) Oral every 8 hours  insulin lispro (ADMELOG) corrective regimen sliding scale   SubCutaneous three times a day before meals  insulin lispro (ADMELOG) corrective regimen sliding scale   SubCutaneous at bedtime  metoprolol succinate ER 12.5 milliGRAM(s) Oral daily  montelukast 10 milliGRAM(s) Oral daily  OLANZapine 7.5 milliGRAM(s) Oral daily  oxyCODONE    IR 10 milliGRAM(s) Oral every 4 hours PRN  oxyCODONE    IR 5 milliGRAM(s) Oral every 4 hours PRN  pantoprazole    Tablet 40 milliGRAM(s) Oral before breakfast  polyethylene glycol 3350 17 Gram(s) Oral daily  predniSONE   Tablet 5 milliGRAM(s) Oral daily  senna 2 Tablet(s) Oral at bedtime  traZODone 100 milliGRAM(s) Oral at bedtime      Physical Therapy Rec:   Home  [  ]   Home w/ PT  [ x ]  Rehab  [  ]  Discussed with Cardiothoracic Team at AM rounds. VITAL SIGNS-Telemetry:  SR 1st deg. 60-80  Vital Signs Last 24 Hrs  T(C): 36.4 (12-18-24 @ 04:10), Max: 36.8 (12-17-24 @ 07:26)  T(F): 97.5 (12-18-24 @ 04:10), Max: 98.2 (12-17-24 @ 07:26)  HR: 66 (12-18-24 @ 04:10) (65 - 74)  BP: 111/67 (12-18-24 @ 04:10) (102/63 - 111/67)  RR: 18 (12-18-24 @ 04:10) (18 - 18)  SpO2: 97% (12-18-24 @ 04:10) (95% - 97%)         12-16 @ 07:01  -  12-17 @ 07:00  --------------------------------------------------------  IN: 940 mL / OUT: 1250 mL / NET: -310 mL    12-17 @ 07:01  -  12-18 @ 06:01  --------------------------------------------------------  IN: 830 mL / OUT: 1115 mL / NET: -285 mL    Daily     Daily     CAPILLARY BLOOD GLUCOSE  POCT Blood Glucose.: 147 mg/dL (18 Dec 2024 02:13)  POCT Blood Glucose.: 100 mg/dL (17 Dec 2024 21:19)  POCT Blood Glucose.: 217 mg/dL (17 Dec 2024 16:31)  POCT Blood Glucose.: 185 mg/dL (17 Dec 2024 11:14)  POCT Blood Glucose.: 149 mg/dL (17 Dec 2024 07:28)        Drains:     MS         [ x ] Drainage:   30/90cc total         R Pleural  [ x]  Drainage: 5cc    PHYSICAL EXAM:  Neurology: alert and oriented x 3, nonfocal, no gross deficits  CV : S1S2  Sternal Wound :  CDI , Stable  Lungs: cta  Abdomen: soft, nontender, nondistended, positive bowel sounds, last bowel movement    12/17     Extremities:   +edema  b/l  no calf tenderness    med BEAU - ss  & r ptc site cdi    acetaminophen     Tablet .. 650 milliGRAM(s) Oral every 6 hours PRN  acetaminophen     Tablet .. 650 milliGRAM(s) Oral every 6 hours  albuterol    90 MICROgram(s) HFA Inhaler 2 Puff(s) Inhalation every 6 hours PRN  albuterol/ipratropium for Nebulization 3 milliLiter(s) Nebulizer every 6 hours  apixaban 5 milliGRAM(s) Oral two times a day  aspirin enteric coated 81 milliGRAM(s) Oral daily  atorvastatin 80 milliGRAM(s) Oral at bedtime  chlorhexidine 2% Cloths 1 Application(s) Topical daily  cholecalciferol 2000 Unit(s) Oral daily  dextrose 50% Injectable 50 milliLiter(s) IV Push every 15 minutes  dextrose 50% Injectable 25 milliLiter(s) IV Push every 15 minutes  famotidine    Tablet 20 milliGRAM(s) Oral daily  fluticasone propionate/ salmeterol 250-50 MICROgram(s) Diskus 1 Dose(s) Inhalation two times a day  gabapentin 300 milliGRAM(s) Oral three times a day  guaiFENesin Oral Liquid (Sugar-Free) 100 milliGRAM(s) Oral every 8 hours  insulin lispro (ADMELOG) corrective regimen sliding scale   SubCutaneous three times a day before meals  insulin lispro (ADMELOG) corrective regimen sliding scale   SubCutaneous at bedtime  metoprolol succinate ER 12.5 milliGRAM(s) Oral daily  montelukast 10 milliGRAM(s) Oral daily  OLANZapine 7.5 milliGRAM(s) Oral daily  oxyCODONE    IR 10 milliGRAM(s) Oral every 4 hours PRN  oxyCODONE    IR 5 milliGRAM(s) Oral every 4 hours PRN  pantoprazole    Tablet 40 milliGRAM(s) Oral before breakfast  polyethylene glycol 3350 17 Gram(s) Oral daily  predniSONE   Tablet 5 milliGRAM(s) Oral daily  senna 2 Tablet(s) Oral at bedtime  traZODone 100 milliGRAM(s) Oral at bedtime    Physical Therapy Rec:   Home  [  ]   Home w/ PT  [ x ]  Rehab  [  ]  Discussed with Cardiothoracic Team at AM rounds.

## 2024-12-18 NOTE — PROGRESS NOTE ADULT - PROBLEM SELECTOR PROBLEM 1
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

## 2024-12-19 ENCOUNTER — TRANSCRIPTION ENCOUNTER (OUTPATIENT)
Age: 59
End: 2024-12-19

## 2024-12-19 VITALS
DIASTOLIC BLOOD PRESSURE: 67 MMHG | SYSTOLIC BLOOD PRESSURE: 101 MMHG | RESPIRATION RATE: 18 BRPM | OXYGEN SATURATION: 93 % | TEMPERATURE: 98 F | HEART RATE: 72 BPM

## 2024-12-19 PROBLEM — H20.9 UNSPECIFIED IRIDOCYCLITIS: Chronic | Status: ACTIVE | Noted: 2024-11-25

## 2024-12-19 LAB
ANION GAP SERPL CALC-SCNC: 13 MMOL/L — SIGNIFICANT CHANGE UP (ref 5–17)
BUN SERPL-MCNC: 15 MG/DL — SIGNIFICANT CHANGE UP (ref 7–23)
CALCIUM SERPL-MCNC: 8.9 MG/DL — SIGNIFICANT CHANGE UP (ref 8.4–10.5)
CHLORIDE SERPL-SCNC: 103 MMOL/L — SIGNIFICANT CHANGE UP (ref 96–108)
CO2 SERPL-SCNC: 25 MMOL/L — SIGNIFICANT CHANGE UP (ref 22–31)
CREAT SERPL-MCNC: 0.56 MG/DL — SIGNIFICANT CHANGE UP (ref 0.5–1.3)
EGFR: 104 ML/MIN/1.73M2 — SIGNIFICANT CHANGE UP
GLUCOSE BLDC GLUCOMTR-MCNC: 123 MG/DL — HIGH (ref 70–99)
GLUCOSE BLDC GLUCOMTR-MCNC: 136 MG/DL — HIGH (ref 70–99)
GLUCOSE SERPL-MCNC: 98 MG/DL — SIGNIFICANT CHANGE UP (ref 70–99)
HCT VFR BLD CALC: 30.5 % — LOW (ref 34.5–45)
HGB BLD-MCNC: 9.4 G/DL — LOW (ref 11.5–15.5)
MCHC RBC-ENTMCNC: 29.8 PG — SIGNIFICANT CHANGE UP (ref 27–34)
MCHC RBC-ENTMCNC: 30.8 G/DL — LOW (ref 32–36)
MCV RBC AUTO: 96.8 FL — SIGNIFICANT CHANGE UP (ref 80–100)
NRBC # BLD: 0 /100 WBCS — SIGNIFICANT CHANGE UP (ref 0–0)
PLATELET # BLD AUTO: 284 K/UL — SIGNIFICANT CHANGE UP (ref 150–400)
POTASSIUM SERPL-MCNC: 3.8 MMOL/L — SIGNIFICANT CHANGE UP (ref 3.5–5.3)
POTASSIUM SERPL-SCNC: 3.8 MMOL/L — SIGNIFICANT CHANGE UP (ref 3.5–5.3)
RBC # BLD: 3.15 M/UL — LOW (ref 3.8–5.2)
RBC # FLD: 15.7 % — HIGH (ref 10.3–14.5)
SODIUM SERPL-SCNC: 141 MMOL/L — SIGNIFICANT CHANGE UP (ref 135–145)
WBC # BLD: 12.27 K/UL — HIGH (ref 3.8–10.5)
WBC # FLD AUTO: 12.27 K/UL — HIGH (ref 3.8–10.5)

## 2024-12-19 PROCEDURE — 82962 GLUCOSE BLOOD TEST: CPT

## 2024-12-19 PROCEDURE — 97167 OT EVAL HIGH COMPLEX 60 MIN: CPT

## 2024-12-19 PROCEDURE — 84100 ASSAY OF PHOSPHORUS: CPT

## 2024-12-19 PROCEDURE — C1713: CPT

## 2024-12-19 PROCEDURE — 97162 PT EVAL MOD COMPLEX 30 MIN: CPT

## 2024-12-19 PROCEDURE — 97535 SELF CARE MNGMENT TRAINING: CPT

## 2024-12-19 PROCEDURE — 97116 GAIT TRAINING THERAPY: CPT

## 2024-12-19 PROCEDURE — 85027 COMPLETE CBC AUTOMATED: CPT

## 2024-12-19 PROCEDURE — 94640 AIRWAY INHALATION TREATMENT: CPT

## 2024-12-19 PROCEDURE — 80048 BASIC METABOLIC PNL TOTAL CA: CPT

## 2024-12-19 PROCEDURE — 71045 X-RAY EXAM CHEST 1 VIEW: CPT

## 2024-12-19 PROCEDURE — 80053 COMPREHEN METABOLIC PANEL: CPT

## 2024-12-19 PROCEDURE — C9399: CPT

## 2024-12-19 PROCEDURE — 85025 COMPLETE CBC W/AUTO DIFF WBC: CPT

## 2024-12-19 PROCEDURE — 97110 THERAPEUTIC EXERCISES: CPT

## 2024-12-19 PROCEDURE — 83735 ASSAY OF MAGNESIUM: CPT

## 2024-12-19 PROCEDURE — 36415 COLL VENOUS BLD VENIPUNCTURE: CPT

## 2024-12-19 RX ORDER — ACETAMINOPHEN 500MG 500 MG/1
2 TABLET, COATED ORAL
Qty: 0 | Refills: 0 | DISCHARGE
Start: 2024-12-19

## 2024-12-19 RX ORDER — ALBUTEROL 90 MCG
3 AEROSOL (GRAM) INHALATION
Refills: 0 | DISCHARGE

## 2024-12-19 RX ORDER — FAMOTIDINE 20 MG/1
1 TABLET, FILM COATED ORAL
Refills: 0 | DISCHARGE

## 2024-12-19 RX ADMIN — Medication 10 MILLIGRAM(S): at 13:25

## 2024-12-19 RX ADMIN — ACETAMINOPHEN 500MG 650 MILLIGRAM(S): 500 TABLET, COATED ORAL at 06:00

## 2024-12-19 RX ADMIN — IPRATROPIUM BROMIDE AND ALBUTEROL SULFATE 3 MILLILITER(S): 2.5; .5 SOLUTION RESPIRATORY (INHALATION) at 12:48

## 2024-12-19 RX ADMIN — FAMOTIDINE 20 MILLIGRAM(S): 20 TABLET, FILM COATED ORAL at 12:48

## 2024-12-19 RX ADMIN — OLANZAPINE 7.5 MILLIGRAM(S): 20 TABLET ORAL at 12:53

## 2024-12-19 RX ADMIN — CHLORHEXIDINE GLUCONATE 1 APPLICATION(S): 1.2 RINSE ORAL at 12:46

## 2024-12-19 RX ADMIN — GABAPENTIN 300 MILLIGRAM(S): 300 CAPSULE ORAL at 13:25

## 2024-12-19 RX ADMIN — Medication 2000 UNIT(S): at 12:49

## 2024-12-19 RX ADMIN — PANTOPRAZOLE SODIUM 40 MILLIGRAM(S): 40 TABLET, DELAYED RELEASE ORAL at 05:30

## 2024-12-19 RX ADMIN — OXYCODONE HYDROCHLORIDE 10 MILLIGRAM(S): 30 TABLET ORAL at 06:52

## 2024-12-19 RX ADMIN — GABAPENTIN 300 MILLIGRAM(S): 300 CAPSULE ORAL at 05:29

## 2024-12-19 RX ADMIN — ACETAMINOPHEN 500MG 650 MILLIGRAM(S): 500 TABLET, COATED ORAL at 12:44

## 2024-12-19 RX ADMIN — APIXABAN 5 MILLIGRAM(S): 2.5 TABLET, FILM COATED ORAL at 05:30

## 2024-12-19 RX ADMIN — OXYCODONE HYDROCHLORIDE 10 MILLIGRAM(S): 30 TABLET ORAL at 06:22

## 2024-12-19 RX ADMIN — PREDNISONE 5 MILLIGRAM(S): 20 TABLET ORAL at 05:30

## 2024-12-19 RX ADMIN — Medication 81 MILLIGRAM(S): at 12:48

## 2024-12-19 RX ADMIN — METOPROLOL TARTRATE 12.5 MILLIGRAM(S): 100 TABLET, FILM COATED ORAL at 08:29

## 2024-12-19 RX ADMIN — MONTELUKAST SODIUM 10 MILLIGRAM(S): 10 TABLET ORAL at 12:50

## 2024-12-19 RX ADMIN — Medication 100 MILLIGRAM(S): at 05:30

## 2024-12-19 RX ADMIN — ACETAMINOPHEN 500MG 650 MILLIGRAM(S): 500 TABLET, COATED ORAL at 05:29

## 2024-12-19 RX ADMIN — IPRATROPIUM BROMIDE AND ALBUTEROL SULFATE 3 MILLILITER(S): 2.5; .5 SOLUTION RESPIRATORY (INHALATION) at 05:29

## 2024-12-19 RX ADMIN — ACETAMINOPHEN 500MG 650 MILLIGRAM(S): 500 TABLET, COATED ORAL at 00:31

## 2024-12-19 RX ADMIN — FLUTICASONE PROPIONATE AND SALMETEROL XINAFOATE 1 DOSE(S): 45; 21 AEROSOL, METERED RESPIRATORY (INHALATION) at 05:33

## 2024-12-19 RX ADMIN — Medication 100 MILLIGRAM(S): at 13:19

## 2024-12-19 RX ADMIN — ACETAMINOPHEN 500MG 650 MILLIGRAM(S): 500 TABLET, COATED ORAL at 00:01

## 2024-12-19 NOTE — DISCHARGE NOTE PROVIDER - NSDCFUSCHEDAPPT_GEN_ALL_CORE_FT
Filippo Alamo  [facility] Physician [facility]  [address]  Scheduled Appointment: 01/15/2025    Gerald Teresa  [facility] Physician [facility]  RHEUM [address]  Scheduled Appointment: 01/21/2025    Goldberg, Naomi  [facility] Physician [facility]  OP[address]THALM [address]  Scheduled Appointment: 02/04/2025     Sivakumar Loaiza  [facility] Physician [facility]  CTSURG [address]  Scheduled Appointment: 12/27/2024    Filippo Alamo  [facility] Physician [facility]  [address]  Scheduled Appointment: 01/15/2025    Gerald Teresa  [facility] Physician [facility]  RHEUM [address]  Scheduled Appointment: 01/21/2025    Goldberg, Naomi  [facility] Physician [facility]  [address]THALM [address]  Scheduled Appointment: 02/04/2025

## 2024-12-19 NOTE — DISCHARGE NOTE PROVIDER - CARE PROVIDER_API CALL
Sivakumar Loaiza  Thoracic and Cardiac Surgery  [address] [place] [address]  Phone: [phone]  Fax: [phone]  Established Patient  Scheduled Appointment: 12/27/2024 12:15 PM

## 2024-12-19 NOTE — DISCHARGE NOTE NURSING/CASE MANAGEMENT/SOCIAL WORK - NSDCPEFALRISK_GEN_ALL_CORE
For information on Fall & Injury Prevention, visit: [email or web address] OR  [email or web address] OR  https://www.cdc.gov/steadi/patient.html

## 2024-12-19 NOTE — DISCHARGE NOTE PROVIDER - NSDCACTIVITY_GEN_ALL_CORE
Sex allowed/Showering allowed/Stairs allowed/Walking - Indoors allowed/Walking - Outdoors allowed/Follow Instructions Provided by your Surgical Team/Activity as tolerated

## 2024-12-19 NOTE — DISCHARGE NOTE PROVIDER - PROVIDER TOKENS
PROVIDER:[TOKEN:[[id number]],SCHEDULEDAPPT:[12/27/2024],SCHEDULEDAPPTTIME:[12:15 PM],ESTABLISHEDPATIENT:[T]]

## 2024-12-19 NOTE — DISCHARGE NOTE PROVIDER - NSDCMRMEDTOKEN_GEN_ALL_CORE_FT
acetaminophen 325 mg oral tablet: 2 tab(s) orally every 6 hours  Albuterol (Eqv-ProAir HFA) 90 mcg/inh inhalation aerosol: 2 puff(s) inhaled 4 times a day as needed for wheezing  apixaban 5 mg oral tablet: 1 tab(s) orally 2 times a day  aspirin 81 mg oral tablet: 1 tab(s) orally once a day  atorvastatin 80 mg oral tablet: 1 tab(s) orally once a day (at bedtime)  cholecalciferol 50 mcg (2000 intl units) oral tablet: 1 tab(s) orally once a day  famotidine 20 mg oral tablet: 1 tab(s) orally once a day (at bedtime)  fluticasone 250 mcg/inh inhalation powder: 250 microgram(s) inhaled 2 times a day  gabapentin 300 mg oral capsule: 1 cap(s) orally 3 times a day  metFORMIN 500 mg oral tablet, extended release: 1 tab(s) orally 2 times a day  methotrexate 2.5 mg oral tablet: 8 tab(s) orally once a week  metoprolol succinate 25 mg oral capsule, extended release: 0.5 cap(s) orally once a day  montelukast 10 mg oral tablet: 1 tab(s) orally once a day  OLANZapine 7.5 mg oral tablet: 1 tab(s) orally once a day  pantoprazole 40 mg oral delayed release tablet: 1 tab(s) orally once a day  predniSONE 5 mg oral tablet: 1 tab(s) orally once a day  traZODone 100 mg oral tablet: orally once a day (at bedtime)   acetaminophen 325 mg oral tablet: 2 tab(s) orally every 6 hours  Albuterol (Eqv-ProAir HFA) 90 mcg/inh inhalation aerosol: 2 puff(s) inhaled 4 times a day as needed for wheezing  apixaban 5 mg oral tablet: 1 tab(s) orally 2 times a day  aspirin 81 mg oral tablet: 1 tab(s) orally once a day  atorvastatin 80 mg oral tablet: 1 tab(s) orally once a day (at bedtime)  cholecalciferol 50 mcg (2000 intl units) oral tablet: 1 tab(s) orally once a day  fluticasone 250 mcg/inh inhalation powder: 250 microgram(s) inhaled 2 times a day  gabapentin 300 mg oral capsule: 1 cap(s) orally 3 times a day  metFORMIN 500 mg oral tablet, extended release: 1 tab(s) orally 2 times a day  methotrexate 2.5 mg oral tablet: 8 tab(s) orally once a week  metoprolol succinate 25 mg oral capsule, extended release: 0.5 cap(s) orally once a day  montelukast 10 mg oral tablet: 1 tab(s) orally once a day  OLANZapine 7.5 mg oral tablet: 1 tab(s) orally once a day  pantoprazole 40 mg oral delayed release tablet: 1 tab(s) orally once a day  predniSONE 5 mg oral tablet: 1 tab(s) orally once a day  traZODone 100 mg oral tablet: orally once a day (at bedtime)

## 2024-12-19 NOTE — DISCHARGE NOTE PROVIDER - NSDCCPCAREPLAN_GEN_ALL_CORE_FT
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

## 2024-12-19 NOTE — DISCHARGE NOTE NURSING/CASE MANAGEMENT/SOCIAL WORK - PATIENT PORTAL LINK FT
You can access the FollowMyHealth Patient Portal offered by [facility] by registering at the following website: [email or web address]. By joining [employer]’s FollowMyHealth portal, you will also be able to view your health information using other applications (apps) compatible with our system.

## 2024-12-19 NOTE — DISCHARGE NOTE PROVIDER - NSDCFUADDAPPT_GEN_ALL_CORE_FT
follow up with DR. Loaiza on FRiday December 27 @ 12:15pm  follow up with your Primary Care MD in 2-3 weeks    Your Care Navigator Nurse Practitioner will be in touch to see you in your home within a few days from discharge. The Follow Your Heart program can help ensure you understand your medications, discharge instructions and answer any questions you may have at that time. They are also a great source to address concerns during the day and may be reached at [phone].

## 2024-12-19 NOTE — DISCHARGE NOTE NURSING/CASE MANAGEMENT/SOCIAL WORK - NSDCFUADDAPPT_GEN_ALL_CORE_FT
follow up with DR. Loaiza on FRiday December 27 @ 12:15pm  follow up with your Primary Care MD in 2-3 weeks    Your Care Navigator Nurse Practitioner will be in touch to see you in your home within a few days from discharge. The Follow Your Heart program can help ensure you understand your medications, discharge instructions and answer any questions you may have at that time. They are also a great source to address concerns during the day and may be reached at [phone].

## 2024-12-19 NOTE — DISCHARGE NOTE PROVIDER - HOSPITAL COURSE
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 She reports experiencing burning sensation to mid-sternal surgical site.  CT chest ---Diffuse sternal nonunion, with fluid interposed between the sternal fragments. The widest area of separation of the sternal fragments measures up to 2.8 cm.   She presented today for scheduled Sternal Plating with Dr. Sivakumar Loaiza    Hospital Course:  12/13 s/p sternal plating with Dr. Loaiza. Transferred to [facility]. Prevena and abdominal binder. MED Chest tube. Monitor chest tube output. C/w Ancef. Daily CXR  12/14 VSS, maintain mediastinal tube, output 200cc/24h. Pt resumed on Eliquis today for APLS per Dr. Loaiza. Ambulated with PT/OT. Recommended for Home PT.  12/15 VSS, mediastinal tube output 95cc/24h, maintain to bulb suction per Dr. Loaiza. CXR this AM noted with persistent right ptx postop now s/p right PTC placement. Maintain pigtail to lws  12/16 VSS; RSR 70-80; continue eliquis 5 po bid; maintain mediastinal katherine--> 130 cc noted for 24 hours; continue rt pigtail to suction; chest xray this am- ptx resolved; repeat in am; continue pulm toilet/ bronchodilators for hx copd/asthma  12/17 VSS R PTC clamped this am  - will ck CXR @ 11am- med katherine - will d/c when output down discharge planning- home pt when stable   12/18 VSS cleared for d/c home

## 2024-12-19 NOTE — DISCHARGE NOTE NURSING/CASE MANAGEMENT/SOCIAL WORK - FINANCIAL ASSISTANCE
[facility] provides services at a reduced cost to those who are determined to be eligible through [facility]’s financial assistance program. Information regarding [facility]’s financial assistance program can be found by going to [email or web address] or by calling [phone].

## 2024-12-20 ENCOUNTER — APPOINTMENT (OUTPATIENT)
Dept: OPHTHALMOLOGY | Facility: EYE CENTER | Age: 59
End: 2024-12-20

## 2024-12-21 ENCOUNTER — APPOINTMENT (OUTPATIENT)
Dept: OPHTHALMOLOGY | Facility: CLINIC | Age: 59
End: 2024-12-21

## 2024-12-27 ENCOUNTER — APPOINTMENT (OUTPATIENT)
Dept: CARDIOTHORACIC SURGERY | Facility: CLINIC | Age: 59
End: 2024-12-27
Payer: MEDICAID

## 2024-12-27 VITALS
RESPIRATION RATE: 16 BRPM | HEART RATE: 72 BPM | SYSTOLIC BLOOD PRESSURE: 104 MMHG | WEIGHT: 199 LBS | OXYGEN SATURATION: 97 % | HEIGHT: 61 IN | DIASTOLIC BLOOD PRESSURE: 60 MMHG | TEMPERATURE: 98.2 F | BODY MASS INDEX: 37.57 KG/M2

## 2024-12-27 DIAGNOSIS — Z95.1 PRESENCE OF AORTOCORONARY BYPASS GRAFT: ICD-10-CM

## 2024-12-27 DIAGNOSIS — Z95.2 PRESENCE OF PROSTHETIC HEART VALVE: ICD-10-CM

## 2024-12-27 PROCEDURE — 99024 POSTOP FOLLOW-UP VISIT: CPT

## 2024-12-31 NOTE — ED ADULT NURSE NOTE - NS ED NOTE  TALK SOMEONE YN
Operative Note  Patient - Rita Paul  Medical Record Number - [id number]   YOB: 1995      DATE AND TIME OF PROCEDURE: [unfilled]   4:30 PM     PREOPERATIVE DIAGNOSIS: Other (Comment)    POSTOPERATIVE DIAGNOSIS: * No post-op diagnosis entered *    PROCEDURE(S): Procedure(s):   SECTION     ANESTHESIA: * No anesthesia type entered *    SURGEON:  ELSIE JUNIOR MD    ASSISTANT: None     QUANTITATIVE BLOOD LOSS AT PROCEDURE END: 800cc     COMPLICATIONS: none    IMPLANTS: *No implants in the log*    SPECIMENS: placenta    FINDINGS: normal appearing uterus, ovaries and fallopian tubes      Prophylactic Antibiotics: Ancef, azithro    DVT Prophylaxis: Sequential Compression Devices         Fetal Description: pedraza     Birth Information:   Information for the patient's :  Sophia Paul [[id number]]   @[id number]@    Umbilical Cord: 3 vessels present    Placenta:  manual removal        Procedure Detail:      After proper patient identification and consent, the patient was taken to the operating room, where epidural anesthesia was administered and found to be adequate. Chapman catheter had been placed using sterile technique.  The patient was prepped and draped in the normal sterile fashion.The abdomen was entered using the Pfannenstiel technique. The peritoneum was entered sharply well superior to the bladder without any apparent injury. An huyen retractor was placed. The bladder flap was created without difficulty. A low transverse uterine incision was made with the scalpel and extended with blunt finger dissection. Amniotomy was performed and the fluid was medium amount clear.  The baby’s head was then delivered atraumatically. The nose and mouth were suctioned. The cord was clamped and cut and the baby was handed off to Nursing staff in attendance. Placenta was then removed from the uterus. The uterus was curettaged with a moist lap pad and cleared of all  clots and debris. The uterine incision was closed with 0 vicryl, double layer, second in running locking fashion with good hemostasis assured. The anterior pelvis was irrigated with warm normal saline and good hemostasis was again reassured throughout. The retractor was removed. The peritoneum and rectus muscles were reapproximated with 2-0 vicryl. The fascia was closed with 0 Vicryl in a running fashion. Good hemostasis was assured. The skin was closed with a 4-0 vicryl subcuticular closure. The patient tolerated the procedure well. Sponge, lap, and needle counts were correct times three and the patient and baby were taken to recovery/postpartum room in stable condition.    ELSIE JUNIOR MD  December 31, 2024  4:30 PM                   SAMM    No

## 2025-01-15 ENCOUNTER — APPOINTMENT (OUTPATIENT)
Dept: PULMONOLOGY | Facility: CLINIC | Age: 60
End: 2025-01-15
Payer: MEDICAID

## 2025-01-15 VITALS
OXYGEN SATURATION: 95 % | WEIGHT: 196 LBS | DIASTOLIC BLOOD PRESSURE: 71 MMHG | HEART RATE: 86 BPM | HEIGHT: 61 IN | SYSTOLIC BLOOD PRESSURE: 100 MMHG | RESPIRATION RATE: 18 BRPM | TEMPERATURE: 97.6 F | BODY MASS INDEX: 37 KG/M2

## 2025-01-15 PROCEDURE — 99406 BEHAV CHNG SMOKING 3-10 MIN: CPT

## 2025-01-15 PROCEDURE — 99214 OFFICE O/P EST MOD 30 MIN: CPT | Mod: 25

## 2025-01-15 RX ORDER — TIRZEPATIDE 7.5 MG/.5ML
INJECTION, SOLUTION SUBCUTANEOUS
Refills: 0 | Status: ACTIVE | COMMUNITY

## 2025-01-17 ENCOUNTER — APPOINTMENT (OUTPATIENT)
Dept: CARDIOTHORACIC SURGERY | Facility: CLINIC | Age: 60
End: 2025-01-17
Payer: MEDICAID

## 2025-01-17 VITALS
RESPIRATION RATE: 18 BRPM | BODY MASS INDEX: 37.19 KG/M2 | DIASTOLIC BLOOD PRESSURE: 73 MMHG | HEIGHT: 61 IN | TEMPERATURE: 98.1 F | HEART RATE: 91 BPM | SYSTOLIC BLOOD PRESSURE: 103 MMHG | OXYGEN SATURATION: 95 % | WEIGHT: 197 LBS

## 2025-01-17 DIAGNOSIS — Z95.1 PRESENCE OF AORTOCORONARY BYPASS GRAFT: ICD-10-CM

## 2025-01-17 DIAGNOSIS — Z09 ENCOUNTER FOR FOLLOW-UP EXAMINATION AFTER COMPLETED TREATMENT FOR CONDITIONS OTHER THAN MALIGNANT NEOPLASM: ICD-10-CM

## 2025-01-17 DIAGNOSIS — Z95.2 PRESENCE OF PROSTHETIC HEART VALVE: ICD-10-CM

## 2025-01-17 PROCEDURE — 99024 POSTOP FOLLOW-UP VISIT: CPT

## 2025-01-23 NOTE — PROGRESS NOTE ADULT - PROBLEM SELECTOR PLAN 4
- LFts noted, mild AST elevation, will CTM   - CTA of abdomen neg for acute pathology   - could be associated with constipation since last adequate bowel movement was more than one week ago  - bowel regimen prescribed  - cRUQ sono noted as above, no acute findings Performed

## 2025-02-09 NOTE — PROGRESS NOTE ADULT - PROBLEM/PLAN-1
DISPLAY PLAN FREE TEXT
Yes
DISPLAY PLAN FREE TEXT

## 2025-02-11 ENCOUNTER — APPOINTMENT (OUTPATIENT)
Dept: OPHTHALMOLOGY | Facility: CLINIC | Age: 60
End: 2025-02-11
Payer: MEDICAID

## 2025-02-11 ENCOUNTER — NON-APPOINTMENT (OUTPATIENT)
Age: 60
End: 2025-02-11

## 2025-02-11 PROCEDURE — 92250 FUNDUS PHOTOGRAPHY W/I&R: CPT

## 2025-02-11 PROCEDURE — 92012 INTRM OPH EXAM EST PATIENT: CPT | Mod: 25

## 2025-02-21 NOTE — BH CONSULTATION LIAISON PROGRESS NOTE - PRN MEDICATIONS SINCE LAST EVAL
Chief Complaint   Patient presents with    Office Visit     Left knee      Subjective   The patient is a 59-year-old female presenting for evaluation of left knee pain.    The patient reports a 35-year history of left knee pain, which has exacerbated over the past two weeks. Following a trip to [place] involving significant physical activity, she experienced notable swelling in her knee and lower leg during the return flight, which resolved the following day. She consulted her primary care physician due to persistent pain and decreased range of motion, which subsequently improved over the following weeks. The pain is predominantly experienced during descent of stairs, with a primary concern regarding the decreased range of motion, a condition persisting since her anterior cruciate ligament (ACL) surgery 20 years ago. Her medical history includes ACL and medial collateral ligament (MCL) repair, as well as two arthroscopic knee surgeries performed by Dr. Hollins, the most recent in 2011. The pain is primarily lateral, radiating posteriorly. She has been managing her symptoms with ibuprofen, naproxen, rest, and cryotherapy. The patient works part-time as a nurse, is a non-smoker, and engages in weightlifting, walking, and elliptical training. She has not pursued intra-articular injections and currently declines this option. Although her pain has improved, she notes a slight enhancement in range of motion. Recent activities, including jump roping and stair climbing during a vacation, have aggravated her symptoms. She expresses interest in the possibility of a partial knee replacement in the future, similar to her neighbor's procedure involving a smaller incision. The patient has a high pain tolerance and inquires whether an injection could reduce inflammation and improve her range of motion. She experiences mild pain when ascending and descending stairs, particularly during descent, but describes it as not  significantly debilitating.    SOCIAL HISTORY  She works as a nurse part-time. She does not smoke.    MEDICATIONS  ibuprofen, naproxen    Refer to encounter summary for past medical history, family history, social history, medications and allergies.      Review of Systems  As documented above.    Objective   Left KNEE:   mild varus alignment.   There is trace effusion.   There is no prepatellar bursitis.   The medial joint line is minimally tender.   The lateral joint line is minimally tender.   Pes anserinus is nontender.  Gerdy's tubercle nontender.    Knee ROM: 5 to 110  Anterior drawer: negative  Posterior drawer:negative  Varus stress test (@30degrees flexion): negative  Valgus stress test (@30degrees flexion): negative  Patellar grind test:positive  + pulses, sensation grossly intact throughout  No pain with IR/ER of ipsilateral hip      Contralateral knee exam is normal with full, painless ROM unless otherwise stated.      Imaging studies reviewed.  The pertinent positives are per below  Imaging  X-rays of the left knee from today show evidence of hardware consistent with prior ACL reconstruction.  Tricompartmental osteoarthritis with bone-on-bone articulation.    Assessment & Plan       1. Left knee posttraumatic arthritis.    Clinical decision making: The imaging was reviewed and the diagnosis discussed. It was explained that she is not a candidate for a unicompartmental knee replacement. It was explained that an injection would help decrease inflammation, improving pain but would not improve range of motion. A steroid injection was discussed as a potential treatment option if the pain worsens or becomes more consistent. She was advised to contact the clinic if she decides to proceed with the injection. If the pain becomes severe enough to limit daily activities and conservative measures are no longer working, a total knee replacement may be considered.    Plan: She has been managing the pain with  ibuprofen, naproxen, rest, and icing. She has not tried injections and does not want an injection right now.     Questions answered                no

## 2025-02-24 ENCOUNTER — APPOINTMENT (OUTPATIENT)
Dept: OPHTHALMOLOGY | Facility: CLINIC | Age: 60
End: 2025-02-24
Payer: MEDICAID

## 2025-02-24 ENCOUNTER — NON-APPOINTMENT (OUTPATIENT)
Age: 60
End: 2025-02-24

## 2025-02-24 PROCEDURE — 92014 COMPRE OPH EXAM EST PT 1/>: CPT

## 2025-03-12 ENCOUNTER — NON-APPOINTMENT (OUTPATIENT)
Age: 60
End: 2025-03-12

## 2025-03-12 ENCOUNTER — APPOINTMENT (OUTPATIENT)
Dept: OPHTHALMOLOGY | Facility: EYE CENTER | Age: 60
End: 2025-03-12
Payer: MEDICAID

## 2025-03-12 PROCEDURE — 66982 XCAPSL CTRC RMVL CPLX WO ECP: CPT | Mod: LT

## 2025-03-13 ENCOUNTER — NON-APPOINTMENT (OUTPATIENT)
Age: 60
End: 2025-03-13

## 2025-03-13 ENCOUNTER — APPOINTMENT (OUTPATIENT)
Dept: OPHTHALMOLOGY | Facility: CLINIC | Age: 60
End: 2025-03-13
Payer: MEDICAID

## 2025-03-13 PROCEDURE — 99024 POSTOP FOLLOW-UP VISIT: CPT

## 2025-03-18 ENCOUNTER — NON-APPOINTMENT (OUTPATIENT)
Age: 60
End: 2025-03-18

## 2025-03-18 ENCOUNTER — APPOINTMENT (OUTPATIENT)
Dept: OPHTHALMOLOGY | Facility: CLINIC | Age: 60
End: 2025-03-18
Payer: MEDICAID

## 2025-03-18 PROCEDURE — 99024 POSTOP FOLLOW-UP VISIT: CPT

## 2025-03-31 ENCOUNTER — APPOINTMENT (OUTPATIENT)
Dept: RHEUMATOLOGY | Facility: CLINIC | Age: 60
End: 2025-03-31
Payer: MEDICAID

## 2025-03-31 VITALS
OXYGEN SATURATION: 95 % | HEART RATE: 90 BPM | WEIGHT: 192 LBS | HEIGHT: 61 IN | DIASTOLIC BLOOD PRESSURE: 82 MMHG | TEMPERATURE: 97 F | RESPIRATION RATE: 18 BRPM | BODY MASS INDEX: 36.25 KG/M2 | SYSTOLIC BLOOD PRESSURE: 123 MMHG

## 2025-03-31 DIAGNOSIS — M06.9 RHEUMATOID ARTHRITIS, UNSPECIFIED: ICD-10-CM

## 2025-03-31 DIAGNOSIS — Z23 ENCOUNTER FOR IMMUNIZATION: ICD-10-CM

## 2025-03-31 DIAGNOSIS — Z79.899 OTHER LONG TERM (CURRENT) DRUG THERAPY: ICD-10-CM

## 2025-03-31 DIAGNOSIS — M17.9 OSTEOARTHRITIS OF KNEE, UNSPECIFIED: ICD-10-CM

## 2025-03-31 PROCEDURE — 99214 OFFICE O/P EST MOD 30 MIN: CPT | Mod: 25

## 2025-03-31 PROCEDURE — 90677 PCV20 VACCINE IM: CPT

## 2025-03-31 PROCEDURE — G0009: CPT

## 2025-04-07 LAB
ALBUMIN SERPL ELPH-MCNC: 4.1 G/DL
ALP BLD-CCNC: 159 U/L
ALT SERPL-CCNC: 16 U/L
ANION GAP SERPL CALC-SCNC: 13 MMOL/L
AST SERPL-CCNC: 16 U/L
BASOPHILS # BLD AUTO: 0.05 K/UL
BASOPHILS NFR BLD AUTO: 0.3 %
BILIRUB SERPL-MCNC: 0.7 MG/DL
BUN SERPL-MCNC: 13 MG/DL
CALCIUM SERPL-MCNC: 9.1 MG/DL
CHLORIDE SERPL-SCNC: 104 MMOL/L
CO2 SERPL-SCNC: 24 MMOL/L
CREAT SERPL-MCNC: 0.66 MG/DL
CRP SERPL-MCNC: 13 MG/L
EGFRCR SERPLBLD CKD-EPI 2021: 100 ML/MIN/1.73M2
EOSINOPHIL # BLD AUTO: 0.06 K/UL
EOSINOPHIL NFR BLD AUTO: 0.3 %
ERYTHROCYTE [SEDIMENTATION RATE] IN BLOOD BY WESTERGREN METHOD: 31 MM/HR
GLUCOSE SERPL-MCNC: 117 MG/DL
HCT VFR BLD CALC: 39.3 %
HGB BLD-MCNC: 11.6 G/DL
IMM GRANULOCYTES NFR BLD AUTO: 0.8 %
LYMPHOCYTES # BLD AUTO: 1.07 K/UL
LYMPHOCYTES NFR BLD AUTO: 6 %
MAN DIFF?: NORMAL
MCHC RBC-ENTMCNC: 27.6 PG
MCHC RBC-ENTMCNC: 29.5 G/DL
MCV RBC AUTO: 93.6 FL
MONOCYTES # BLD AUTO: 0.74 K/UL
MONOCYTES NFR BLD AUTO: 4.1 %
NEUTROPHILS # BLD AUTO: 15.81 K/UL
NEUTROPHILS NFR BLD AUTO: 88.5 %
PLATELET # BLD AUTO: 302 K/UL
POTASSIUM SERPL-SCNC: 4.1 MMOL/L
PROT SERPL-MCNC: 6.9 G/DL
RBC # BLD: 4.2 M/UL
RBC # FLD: 18.6 %
SODIUM SERPL-SCNC: 141 MMOL/L
WBC # FLD AUTO: 17.87 K/UL

## 2025-04-14 ENCOUNTER — APPOINTMENT (OUTPATIENT)
Dept: OPHTHALMOLOGY | Facility: CLINIC | Age: 60
End: 2025-04-14
Payer: MEDICAID

## 2025-04-14 ENCOUNTER — NON-APPOINTMENT (OUTPATIENT)
Age: 60
End: 2025-04-14

## 2025-04-14 PROCEDURE — 99024 POSTOP FOLLOW-UP VISIT: CPT

## 2025-06-16 RX ORDER — DICLOFENAC SODIUM 10 MG/G
1 GEL TOPICAL
Qty: 1 | Refills: 11 | Status: ACTIVE | COMMUNITY
Start: 2025-06-16 | End: 1900-01-01

## 2025-06-17 ENCOUNTER — APPOINTMENT (OUTPATIENT)
Dept: RHEUMATOLOGY | Facility: CLINIC | Age: 60
End: 2025-06-17

## 2025-07-14 ENCOUNTER — APPOINTMENT (OUTPATIENT)
Dept: OPHTHALMOLOGY | Facility: CLINIC | Age: 60
End: 2025-07-14
Payer: MEDICAID

## 2025-07-14 ENCOUNTER — NON-APPOINTMENT (OUTPATIENT)
Age: 60
End: 2025-07-14

## 2025-07-14 PROCEDURE — 92014 COMPRE OPH EXAM EST PT 1/>: CPT | Mod: 25

## 2025-07-14 PROCEDURE — 92286 ANT SGM IMG I&R SPECLR MIC: CPT

## 2025-07-14 PROCEDURE — 92015 DETERMINE REFRACTIVE STATE: CPT | Mod: NC

## 2025-07-16 ENCOUNTER — APPOINTMENT (OUTPATIENT)
Dept: PULMONOLOGY | Facility: CLINIC | Age: 60
End: 2025-07-16
Payer: MEDICAID

## 2025-07-16 VITALS
RESPIRATION RATE: 24 BRPM | OXYGEN SATURATION: 93 % | DIASTOLIC BLOOD PRESSURE: 75 MMHG | BODY MASS INDEX: 33.04 KG/M2 | WEIGHT: 175 LBS | SYSTOLIC BLOOD PRESSURE: 110 MMHG | TEMPERATURE: 98.1 F | HEIGHT: 61 IN | HEART RATE: 102 BPM

## 2025-07-16 PROCEDURE — 99214 OFFICE O/P EST MOD 30 MIN: CPT | Mod: 25

## 2025-07-16 PROCEDURE — 94729 DIFFUSING CAPACITY: CPT

## 2025-07-16 PROCEDURE — 99406 BEHAV CHNG SMOKING 3-10 MIN: CPT

## 2025-07-16 PROCEDURE — 94060 EVALUATION OF WHEEZING: CPT

## 2025-07-16 RX ORDER — ALBUTEROL SULFATE 90 UG/1
108 (90 BASE) INHALANT RESPIRATORY (INHALATION)
Qty: 1 | Refills: 3 | Status: ACTIVE | COMMUNITY
Start: 2025-07-16 | End: 1900-01-01

## 2025-07-16 RX ORDER — FLUTICASONE FUROATE, UMECLIDINIUM BROMIDE AND VILANTEROL TRIFENATATE 100; 62.5; 25 UG/1; UG/1; UG/1
100-62.5-25 POWDER RESPIRATORY (INHALATION)
Qty: 3 | Refills: 3 | Status: ACTIVE | COMMUNITY
Start: 2025-07-16 | End: 1900-01-01

## 2025-08-12 ENCOUNTER — APPOINTMENT (OUTPATIENT)
Dept: RHEUMATOLOGY | Facility: CLINIC | Age: 60
End: 2025-08-12

## 2025-08-12 VITALS
BODY MASS INDEX: 33.61 KG/M2 | HEIGHT: 61 IN | HEART RATE: 83 BPM | WEIGHT: 178 LBS | TEMPERATURE: 97.8 F | RESPIRATION RATE: 15 BRPM | SYSTOLIC BLOOD PRESSURE: 107 MMHG | DIASTOLIC BLOOD PRESSURE: 72 MMHG

## 2025-08-12 DIAGNOSIS — M06.9 RHEUMATOID ARTHRITIS, UNSPECIFIED: ICD-10-CM

## 2025-08-12 PROCEDURE — 20611 DRAIN/INJ JOINT/BURSA W/US: CPT | Mod: 50

## 2025-08-12 RX ORDER — HYLAN G-F 20 16MG/2ML
48 SYRINGE (ML) INTRAARTICULAR
Refills: 0 | Status: COMPLETED | OUTPATIENT
Start: 2025-08-12

## 2025-08-12 RX ADMIN — Medication 0 MG/6ML: at 00:00

## (undated) DEVICE — SOL INJ LR 1000ML

## (undated) DEVICE — DRAIN JACKSON PRATT 10MM FLAT FULL NO TROCAR

## (undated) DEVICE — DRAPE TOWEL BLUE 17" X 24"

## (undated) DEVICE — DRAPE 3/4 SHEET W REINFORCEMENT 56X77"

## (undated) DEVICE — PACK CARDIAC YELLOW

## (undated) DEVICE — GLV 7 PROTEXIS (WHITE)

## (undated) DEVICE — SYR LUER LOK 10CC

## (undated) DEVICE — FOLEY TRAY 16FR 5CC LF LUBRISIL ADVANCE TEMP CLOSED

## (undated) DEVICE — ELCTR REM POLYHESIVE ADULT PT RETURN 15FT

## (undated) DEVICE — FILTER REINFUSION FOR SALVAGED BLOOD DISP

## (undated) DEVICE — CHECK VALVE RELIEF VACUUM

## (undated) DEVICE — PACK CUSTOM W/INSPIRE OXYGENATOR

## (undated) DEVICE — SUT PLEDGET 9MM X 4MM X 1.5MM

## (undated) DEVICE — DRSG VAC GRANUFOAM SMALL (BLACK)

## (undated) DEVICE — CANISTER W/GEL INFOVAC 1000ML

## (undated) DEVICE — DOPPLER PROBE 20MHZ DISP

## (undated) DEVICE — SUT TICRON 2-0 36" CV-316 DA

## (undated) DEVICE — PREP DURAPREP 26CC

## (undated) DEVICE — GLV 7.5 PROTEXIS (WHITE)

## (undated) DEVICE — GLV 6.5 PROTEXIS (WHITE)

## (undated) DEVICE — SET PERF CARDIOPLEGIA 4 ARM STRL

## (undated) DEVICE — NDL HYPO SAFE 18G X 1.5" (PINK)

## (undated) DEVICE — SUT PROLENE 6-0 30" C-1

## (undated) DEVICE — LAP PAD 18 X 18"

## (undated) DEVICE — SUT SILK 4-0 17-18"

## (undated) DEVICE — ELCTR DEFIB PAD PRO-PADZ W 10FT LEAD WIRES ADULT

## (undated) DEVICE — TUBING ATS SUCTION LINE

## (undated) DEVICE — SUT POLYSORB 2-0 30" V-20 UNDYED

## (undated) DEVICE — DRSG STERISTRIPS 0.5 X 4"

## (undated) DEVICE — TUBING SUCTION NONCONDUCTIVE 6MM X 12FT

## (undated) DEVICE — WARMING BLANKET DUO-THERM HYPER/HYPOTHERM ADULT

## (undated) DEVICE — SOL NORMOSOL-R PH7.4 1000ML

## (undated) DEVICE — SUT PROLENE 3-0 36" SH

## (undated) DEVICE — SOL IRR BAG NS 0.9% 3000ML

## (undated) DEVICE — CANISTER DISPOSABLE THIN WALL 3000CC

## (undated) DEVICE — PACK UNIVERSAL CARDIAC

## (undated) DEVICE — PHRENIC NERVE PAD MEDIUM

## (undated) DEVICE — SUMP PERICARDIAL 20FR 1/4" ADULT

## (undated) DEVICE — SPECIMEN CONTAINER 100ML

## (undated) DEVICE — SUCTION YANKAUER TAPERED BULBOUS NO VENT

## (undated) DEVICE — SUT SILK 5-0 60" TIES

## (undated) DEVICE — DRAPE SLUSH / WARMER 44 X 66"

## (undated) DEVICE — SUT PLEDGET PRE PUNCH 4.8 X 9.5 X 1.5 MM

## (undated) DEVICE — SYR LUER LOK 20CC

## (undated) DEVICE — VENTING ADAPTER "Y" (RED/BLUE) 7.5"

## (undated) DEVICE — CANISTER W/GEL INFOVAC 500ML

## (undated) DEVICE — SUT PROLENE 4-0 36" RB-1

## (undated) DEVICE — TUBING SUCTION 20FT

## (undated) DEVICE — MEDTRONIC CLEARVIEW BLOWER MISTER KIT W TUBING SET

## (undated) DEVICE — SUT ETHIBOND 2-0 36" SH

## (undated) DEVICE — SUT VICRYL 1 36" CTX UNDYED

## (undated) DEVICE — SUMP INTRACARDIAC 20FR 1/4" ADULT

## (undated) DEVICE — SUCTION YANKAUER OPEN TIP NO VENT CURVE

## (undated) DEVICE — NDL COUNTER FOAM AND MAGNET 40-70

## (undated) DEVICE — TOURNIQUET SET SURE-SNARE 22FR (2 TUBES, 2 UMBILICAL TAPES, 2 PLASTIC SNARES) 5"

## (undated) DEVICE — DRAIN JACKSON PRATT 7MM FLAT FULL NO TROCAR

## (undated) DEVICE — DRAIN CHANNEL 32FR ROUND HUBLESS FULL FLUTED

## (undated) DEVICE — DRSG VAC GRANUFOAM MEDIUM (BLACK)

## (undated) DEVICE — SUT BOOT STANDARD (ASSORTED) 5 PAIR

## (undated) DEVICE — DRAPE MAYO STAND 30"

## (undated) DEVICE — TUBING TRUWAVE PRESSURE MALE/FEMALE 12"

## (undated) DEVICE — SYR LUER LOK 50CC

## (undated) DEVICE — SUT PROLENE 4-0 36" SH

## (undated) DEVICE — DRSG OPSITE 13.75 X 4"

## (undated) DEVICE — SUT SILK 0 30" TIES

## (undated) DEVICE — BLADE SCALPEL SAFETYLOCK #10

## (undated) DEVICE — VENODYNE/SCD SLEEVE CALF MEDIUM

## (undated) DEVICE — SUT SILK 2-0 18" TIES

## (undated) DEVICE — TRAP SPECIMEN SPUTUM 40CC

## (undated) DEVICE — CONNECTOR CARDIAC 1:1 FOR HUBLESS DRAINS

## (undated) DEVICE — SUCTION CATH ARGYLE WHISTLE TIP 14FR STRAIGHT PACKED

## (undated) DEVICE — SAW BLADE STRYKER SAGITTAL EXTRA WIDE THIN SHORT

## (undated) DEVICE — TUBING KIT FAST START ATF 40

## (undated) DEVICE — TUBING SUCTION CONN 1/4" X 10FT

## (undated) DEVICE — PACING CABLE (BROWN) A/V TEMP SCREW DOWN 12FT

## (undated) DEVICE — Device

## (undated) DEVICE — ELCTR BOVIE TIP BLADE MEGADYNE E-Z CLEAN 2.5" (SHORT)

## (undated) DEVICE — STOPCOCK 4-WAY (BLUE) DISCOFIX SPIN-LOCK CONNECTOR

## (undated) DEVICE — PACK UNIVERSAL CARDIAC SUPPLEMNTAL B

## (undated) DEVICE — MULTIPLE PERFUSION SET FEMALE 1 INLET LEG W 4 LEGS 15" (BLUE/RED)

## (undated) DEVICE — DRAPE IOBAN 33" X 23"

## (undated) DEVICE — SUT PROLENE 7-0 24" BV-1

## (undated) DEVICE — CHEST DRAIN PLEUR-EVAC WET/WET ADULT-PEDS SINGLE (QUICK)

## (undated) DEVICE — STAPLER COVIDIEN ENDO GIA SHORT HANDLE

## (undated) DEVICE — CANISTER KCI 500ML GEL SENSA TRAC

## (undated) DEVICE — DRSG TAPE MICROFOAM 4"

## (undated) DEVICE — VESSEL LOOP EXTRA MAXI-BLUE 0.200" X 22"

## (undated) DEVICE — GOWN TRIMAX XXL

## (undated) DEVICE — SUCTION YANKAUER NO CONTROL VENT

## (undated) DEVICE — IRRIGATION TRAY W PISTON SYRINGE 60ML

## (undated) DEVICE — WARMING BLANKET FULL UNDERBODY

## (undated) DEVICE — DRAIN RESERVOIR FOR JACKSON PRATT 100CC CARDINAL

## (undated) DEVICE — TOURNIQUET SET TOURNIKWIK 12FR (4 TUBES, 1 SNARE) 7.5"

## (undated) DEVICE — SUT VICRYL 2-0 27" CT-1 UNDYED

## (undated) DEVICE — SOL IRR POUR NS 0.9% 500ML

## (undated) DEVICE — ELCTR BOVIE TIP BLADE MEGADYNE E-Z CLEAN 6.5" (LONG)

## (undated) DEVICE — ELCTR BOVIE PENCIL HANDPIECE ROCKER SWITCH 15FT

## (undated) DEVICE — BLADE SCALPEL SAFETYLOCK #15

## (undated) DEVICE — DRSG PREVENA PEEL & PLACE KIT 20CM

## (undated) DEVICE — TUBING IV SET MICROCLAVE ADAPTER

## (undated) DEVICE — STOPCOCK 4-WAY NYLON MALE LL ADAPTER LG BORE

## (undated) DEVICE — GOWN XL

## (undated) DEVICE — DRSG DERMABOND PRINEO 60CM

## (undated) DEVICE — NDL HYPO SAFE 22G X 1.5" (BLACK)

## (undated) DEVICE — SUT PROLENE 5-0 36" RB-1

## (undated) DEVICE — CATH IV SAFE BC 24G X 0.75" (YELLOW)

## (undated) DEVICE — GLV 8 PROTEXIS (WHITE)

## (undated) DEVICE — SUT SILK 2-0 18" SH (POP-OFF)

## (undated) DEVICE — SAW BLADE STRYKER FAN OFFSET 20 X 41 X 0.38MM

## (undated) DEVICE — GOWN TRIMAX LG

## (undated) DEVICE — DRSG CURITY GAUZE SPONGE 4 X 4" 12-PLY

## (undated) DEVICE — SOL IRR POUR H2O 250ML

## (undated) DEVICE — DRAPE INSTRUMENT POUCH 6.75" X 11"

## (undated) DEVICE — CHEST DRAIN PLEUR-EVAC DRY/WET ADULT-PEDS SINGLE (QUICK)

## (undated) DEVICE — DRSG TEGADERM 4X4.75"

## (undated) DEVICE — VISITEC 4X4

## (undated) DEVICE — SUT VICRYL 3-0 27" SH UNDYED

## (undated) DEVICE — DRSG OPSITE 2.5 X 2"

## (undated) DEVICE — SYR LUER LOK 30CC

## (undated) DEVICE — GLV 8.5 PROTEXIS (WHITE)

## (undated) DEVICE — SUT SOFSILK 4-0 24" CV-15

## (undated) DEVICE — DRAPE 1/2 SHEET 40X57"

## (undated) DEVICE — PACING CABLE (BLUE) ATRIAL TEMP SCREW DOWN 12FT

## (undated) DEVICE — BIOMET DRILL DRIVER HI TORQUE

## (undated) DEVICE — SAW BLADE MICROAIRE STERNUM 1X34X9.4MM

## (undated) DEVICE — DRSG VAC GRANUFOAM LARGE (BLACK)

## (undated) DEVICE — BAG DECANTER 2

## (undated) DEVICE — BLADE SCALPEL SAFETYLOCK #11

## (undated) DEVICE — SAW BLADE STRYKER STERNUM 31MM X 6.27 X .79

## (undated) DEVICE — BLADE STERNUM 32X6.3X5.8MM

## (undated) DEVICE — GLV 6 PROTEXIS (WHITE)

## (undated) DEVICE — GLV 8.5 BIOGEL

## (undated) DEVICE — SPONGE PEANUT AUTO COUNT

## (undated) DEVICE — STAPLER SKIN VISI-STAT 35 WIDE

## (undated) DEVICE — BLOWER MISTER VIPER II

## (undated) DEVICE — ADAPTER DLP MALE 1/4" X 2" (CLEAR) BARBED

## (undated) DEVICE — SUT MONOCRYL 4-0 18" PS-2

## (undated) DEVICE — SENSOR MYOCARDIAL TEMP 15MM

## (undated) DEVICE — SYR LUER LOK 1CC

## (undated) DEVICE — STRYKER PULSE LAVAGE WITH HIGH FLOW TIP

## (undated) DEVICE — SUT DOUBLE 6 WIRE STERNAL

## (undated) DEVICE — SUT VICRYL 0 36" CTX UNDYED

## (undated) DEVICE — CONNECTOR STRAIGHT 1/2 X 1/2"

## (undated) DEVICE — DRSG XEROFORM 1 X 8"

## (undated) DEVICE — SUT PROLENE 7-0 24" BV175-6

## (undated) DEVICE — POSITIONER FOAM EGG CRATE ULNAR 2PCS (PINK)

## (undated) DEVICE — VESSEL LOOP MAXI-RED  0.120" X 16"